# Patient Record
Sex: FEMALE | Race: OTHER | HISPANIC OR LATINO
[De-identification: names, ages, dates, MRNs, and addresses within clinical notes are randomized per-mention and may not be internally consistent; named-entity substitution may affect disease eponyms.]

---

## 2017-02-02 ENCOUNTER — APPOINTMENT (OUTPATIENT)
Dept: OBGYN | Facility: CLINIC | Age: 67
End: 2017-02-02

## 2017-02-02 VITALS
BODY MASS INDEX: 29.88 KG/M2 | WEIGHT: 175 LBS | DIASTOLIC BLOOD PRESSURE: 70 MMHG | HEIGHT: 64 IN | SYSTOLIC BLOOD PRESSURE: 114 MMHG

## 2017-02-02 DIAGNOSIS — Z80.3 FAMILY HISTORY OF MALIGNANT NEOPLASM OF BREAST: ICD-10-CM

## 2017-02-02 DIAGNOSIS — Z80.51 FAMILY HISTORY OF MALIGNANT NEOPLASM OF KIDNEY: ICD-10-CM

## 2017-02-02 DIAGNOSIS — N85.9 NONINFLAMMATORY DISORDER OF UTERUS, UNSPECIFIED: ICD-10-CM

## 2017-02-02 DIAGNOSIS — N83.299 OTHER OVARIAN CYST, UNSPECIFIED SIDE: ICD-10-CM

## 2017-02-02 LAB — HEMOCCULT SP1 STL QL: NEGATIVE

## 2017-02-20 ENCOUNTER — APPOINTMENT (OUTPATIENT)
Dept: OBGYN | Facility: CLINIC | Age: 67
End: 2017-02-20

## 2017-02-20 DIAGNOSIS — Z13.71 ENCOUNTER FOR NONPROCREATIVE SCREENING FOR GENETIC DISEASE CARRIER STATUS: ICD-10-CM

## 2018-02-02 ENCOUNTER — APPOINTMENT (OUTPATIENT)
Dept: OBGYN | Facility: CLINIC | Age: 68
End: 2018-02-02
Payer: COMMERCIAL

## 2018-02-02 VITALS
HEIGHT: 64 IN | BODY MASS INDEX: 30.73 KG/M2 | DIASTOLIC BLOOD PRESSURE: 69 MMHG | SYSTOLIC BLOOD PRESSURE: 119 MMHG | WEIGHT: 180 LBS

## 2018-02-02 DIAGNOSIS — N89.8 OTHER SPECIFIED NONINFLAMMATORY DISORDERS OF VAGINA: ICD-10-CM

## 2018-02-02 DIAGNOSIS — N83.299 OTHER OVARIAN CYST, UNSPECIFIED SIDE: ICD-10-CM

## 2018-02-02 LAB — HEMOCCULT SP1 STL QL: NEGATIVE

## 2018-02-02 PROCEDURE — 82270 OCCULT BLOOD FECES: CPT

## 2018-02-02 PROCEDURE — 99213 OFFICE O/P EST LOW 20 MIN: CPT

## 2018-02-08 ENCOUNTER — APPOINTMENT (OUTPATIENT)
Dept: OBGYN | Facility: CLINIC | Age: 68
End: 2018-02-08

## 2018-02-21 ENCOUNTER — OTHER (OUTPATIENT)
Age: 68
End: 2018-02-21

## 2018-02-26 ENCOUNTER — RESULT REVIEW (OUTPATIENT)
Age: 68
End: 2018-02-26

## 2018-03-01 ENCOUNTER — APPOINTMENT (OUTPATIENT)
Dept: ORTHOPEDIC SURGERY | Facility: CLINIC | Age: 68
End: 2018-03-01
Payer: COMMERCIAL

## 2018-03-01 VITALS
BODY MASS INDEX: 30.73 KG/M2 | HEART RATE: 69 BPM | DIASTOLIC BLOOD PRESSURE: 79 MMHG | SYSTOLIC BLOOD PRESSURE: 133 MMHG | HEIGHT: 64 IN | WEIGHT: 180 LBS

## 2018-03-01 PROCEDURE — 73502 X-RAY EXAM HIP UNI 2-3 VIEWS: CPT | Mod: RT

## 2018-03-01 PROCEDURE — 99204 OFFICE O/P NEW MOD 45 MIN: CPT

## 2018-03-08 ENCOUNTER — ASOB RESULT (OUTPATIENT)
Age: 68
End: 2018-03-08

## 2018-03-08 ENCOUNTER — APPOINTMENT (OUTPATIENT)
Dept: OBGYN | Facility: CLINIC | Age: 68
End: 2018-03-08
Payer: COMMERCIAL

## 2018-03-08 ENCOUNTER — RESULT REVIEW (OUTPATIENT)
Age: 68
End: 2018-03-08

## 2018-03-08 PROCEDURE — 76857 US EXAM PELVIC LIMITED: CPT

## 2018-03-08 PROCEDURE — 76830 TRANSVAGINAL US NON-OB: CPT

## 2018-05-25 ENCOUNTER — OUTPATIENT (OUTPATIENT)
Dept: OUTPATIENT SERVICES | Facility: HOSPITAL | Age: 68
LOS: 1 days | Discharge: HOME | End: 2018-05-25

## 2018-05-25 DIAGNOSIS — D72.829 ELEVATED WHITE BLOOD CELL COUNT, UNSPECIFIED: ICD-10-CM

## 2018-05-29 ENCOUNTER — APPOINTMENT (OUTPATIENT)
Dept: HEMATOLOGY ONCOLOGY | Facility: CLINIC | Age: 68
End: 2018-05-29

## 2018-06-19 ENCOUNTER — RESULT REVIEW (OUTPATIENT)
Age: 68
End: 2018-06-19

## 2018-06-19 ENCOUNTER — APPOINTMENT (OUTPATIENT)
Dept: HEMATOLOGY ONCOLOGY | Facility: CLINIC | Age: 68
End: 2018-06-19
Payer: COMMERCIAL

## 2018-06-19 ENCOUNTER — OUTPATIENT (OUTPATIENT)
Dept: OUTPATIENT SERVICES | Facility: HOSPITAL | Age: 68
LOS: 1 days | Discharge: HOME | End: 2018-06-19

## 2018-06-19 ENCOUNTER — OUTPATIENT (OUTPATIENT)
Dept: OUTPATIENT SERVICES | Facility: HOSPITAL | Age: 68
LOS: 1 days | Discharge: HOME | End: 2018-06-19
Payer: COMMERCIAL

## 2018-06-19 VITALS
OXYGEN SATURATION: 100 % | HEART RATE: 63 BPM | TEMPERATURE: 98.2 F | BODY MASS INDEX: 30.99 KG/M2 | SYSTOLIC BLOOD PRESSURE: 148 MMHG | DIASTOLIC BLOOD PRESSURE: 84 MMHG | WEIGHT: 181.55 LBS | HEIGHT: 64 IN

## 2018-06-19 DIAGNOSIS — N20.0 CALCULUS OF KIDNEY: ICD-10-CM

## 2018-06-19 DIAGNOSIS — R76.8 OTHER SPECIFIED ABNORMAL IMMUNOLOGICAL FINDINGS IN SERUM: ICD-10-CM

## 2018-06-19 DIAGNOSIS — D72.829 ELEVATED WHITE BLOOD CELL COUNT, UNSPECIFIED: ICD-10-CM

## 2018-06-19 LAB
BASOPHILS # BLD AUTO: 0.1 K/UL — SIGNIFICANT CHANGE UP (ref 0–0.2)
BASOPHILS NFR BLD AUTO: 0.9 % — SIGNIFICANT CHANGE UP (ref 0–2)
EOSINOPHIL # BLD AUTO: 0.1 K/UL — SIGNIFICANT CHANGE UP (ref 0–0.5)
EOSINOPHIL NFR BLD AUTO: 1.5 % — SIGNIFICANT CHANGE UP (ref 0–6)
HCT VFR BLD CALC: 36 % — SIGNIFICANT CHANGE UP (ref 34.5–45)
HGB BLD-MCNC: 11.9 G/DL — SIGNIFICANT CHANGE UP (ref 11.5–15.5)
LYMPHOCYTES # BLD AUTO: 2.6 K/UL — SIGNIFICANT CHANGE UP (ref 1–3.3)
LYMPHOCYTES # BLD AUTO: 33.2 % — SIGNIFICANT CHANGE UP (ref 13–44)
MCHC RBC-ENTMCNC: 28.8 PG — SIGNIFICANT CHANGE UP (ref 27–34)
MCHC RBC-ENTMCNC: 33 GM/DL — SIGNIFICANT CHANGE UP (ref 32–36)
MCV RBC AUTO: 87 FL — SIGNIFICANT CHANGE UP (ref 80–100)
MONOCYTES # BLD AUTO: 0.4 K/UL — SIGNIFICANT CHANGE UP (ref 0–0.9)
MONOCYTES NFR BLD AUTO: 5.4 % — SIGNIFICANT CHANGE UP (ref 2–14)
NEUTROPHILS # BLD AUTO: 4.5 K/UL — SIGNIFICANT CHANGE UP (ref 1.8–7.4)
NEUTROPHILS NFR BLD AUTO: 59.1 % — SIGNIFICANT CHANGE UP (ref 43–77)
PLATELET # BLD AUTO: 165 K/UL — SIGNIFICANT CHANGE UP (ref 150–400)
RBC # BLD: 4.14 M/UL — SIGNIFICANT CHANGE UP (ref 3.8–5.2)
RBC # FLD: 11.9 % — SIGNIFICANT CHANGE UP (ref 10.3–14.5)
WBC # BLD: 7.7 K/UL — SIGNIFICANT CHANGE UP (ref 3.8–10.5)
WBC # FLD AUTO: 7.7 K/UL — SIGNIFICANT CHANGE UP (ref 3.8–10.5)

## 2018-06-19 PROCEDURE — 99205 OFFICE O/P NEW HI 60 MIN: CPT

## 2018-06-19 RX ORDER — MELOXICAM 7.5 MG/1
7.5 TABLET ORAL TWICE DAILY
Qty: 60 | Refills: 0 | Status: DISCONTINUED | COMMUNITY
Start: 2018-03-01 | End: 2018-06-19

## 2018-06-20 ENCOUNTER — OUTPATIENT (OUTPATIENT)
Dept: OUTPATIENT SERVICES | Facility: HOSPITAL | Age: 68
LOS: 1 days | End: 2018-06-20
Payer: COMMERCIAL

## 2018-06-20 DIAGNOSIS — C90.00 MULTIPLE MYELOMA NOT HAVING ACHIEVED REMISSION: ICD-10-CM

## 2018-06-20 PROBLEM — R76.8 ELEVATED SERUM IMMUNOGLOBULIN FREE LIGHT CHAINS: Status: ACTIVE | Noted: 2018-06-20

## 2018-06-20 LAB
ALBUMIN MFR SERPL ELPH: 63.5 %
ALBUMIN SERPL ELPH-MCNC: 4.4 G/DL
ALBUMIN SERPL-MCNC: 4.3 G/DL
ALBUMIN/GLOB SERPL: 1.8 RATIO
ALP BLD-CCNC: 69 U/L
ALPHA1 GLOB MFR SERPL ELPH: 4.9 %
ALPHA1 GLOB SERPL ELPH-MCNC: 0.3 G/DL
ALPHA2 GLOB MFR SERPL ELPH: 12.9 %
ALPHA2 GLOB SERPL ELPH-MCNC: 0.9 G/DL
ALT SERPL-CCNC: 10 U/L
ANION GAP SERPL CALC-SCNC: 15 MMOL/L
AST SERPL-CCNC: 18 U/L
B-GLOBULIN MFR SERPL ELPH: 10.2 %
B-GLOBULIN SERPL ELPH-MCNC: 0.7 G/DL
B2 MICROGLOB SERPL-MCNC: 1.5 MG/L
BILIRUB SERPL-MCNC: 0.3 MG/DL
BUN SERPL-MCNC: 12 MG/DL
CALCIUM SERPL-MCNC: 9.9 MG/DL
CHLORIDE SERPL-SCNC: 104 MMOL/L
CO2 SERPL-SCNC: 26 MMOL/L
CREAT SERPL-MCNC: 0.67 MG/DL
DEPRECATED KAPPA LC FREE/LAMBDA SER: 198.61 RATIO
FERRITIN SERPL-MCNC: 89 NG/ML
GAMMA GLOB FLD ELPH-MCNC: 0.6 G/DL
GAMMA GLOB MFR SERPL ELPH: 8.5 %
IGA SER QL IEP: 18 MG/DL
IGG SER QL IEP: 575 MG/DL
IGM SER QL IEP: 19 MG/DL
INTERPRETATION SERPL IEP-IMP: NORMAL
IRON SATN MFR SERPL: 25 %
IRON SERPL-MCNC: 86 UG/DL
KAPPA LC CSF-MCNC: 0.36 MG/DL
KAPPA LC SERPL-MCNC: 71.5 MG/DL
LDH SERPL-CCNC: 202 U/L
M PROTEIN MFR SERPL ELPH: 3.3 %
M PROTEIN SPEC IFE-MCNC: NORMAL
MONOCLON BAND OBS SERPL: 0.2 G/DL
POTASSIUM SERPL-SCNC: 4.3 MMOL/L
PROT SERPL-MCNC: 6.7 G/DL
SODIUM SERPL-SCNC: 145 MMOL/L
TIBC SERPL-MCNC: 341 UG/DL
UIBC SERPL-MCNC: 255 UG/DL

## 2018-06-22 ENCOUNTER — RESULT REVIEW (OUTPATIENT)
Age: 68
End: 2018-06-22

## 2018-06-22 ENCOUNTER — APPOINTMENT (OUTPATIENT)
Dept: HEMATOLOGY ONCOLOGY | Facility: CLINIC | Age: 68
End: 2018-06-22
Payer: COMMERCIAL

## 2018-06-22 VITALS
SYSTOLIC BLOOD PRESSURE: 150 MMHG | BODY MASS INDEX: 30.88 KG/M2 | HEART RATE: 72 BPM | DIASTOLIC BLOOD PRESSURE: 83 MMHG | WEIGHT: 179.88 LBS | OXYGEN SATURATION: 99 %

## 2018-06-22 LAB
BASOPHILS # BLD AUTO: 0.1 K/UL — SIGNIFICANT CHANGE UP (ref 0–0.2)
BASOPHILS NFR BLD AUTO: 0.9 % — SIGNIFICANT CHANGE UP (ref 0–2)
EOSINOPHIL # BLD AUTO: 0.2 K/UL — SIGNIFICANT CHANGE UP (ref 0–0.5)
EOSINOPHIL NFR BLD AUTO: 2.3 % — SIGNIFICANT CHANGE UP (ref 0–6)
HCT VFR BLD CALC: 37.4 % — SIGNIFICANT CHANGE UP (ref 34.5–45)
HGB BLD-MCNC: 12.2 G/DL — SIGNIFICANT CHANGE UP (ref 11.5–15.5)
LYMPHOCYTES # BLD AUTO: 2.9 K/UL — SIGNIFICANT CHANGE UP (ref 1–3.3)
LYMPHOCYTES # BLD AUTO: 37.9 % — SIGNIFICANT CHANGE UP (ref 13–44)
MCHC RBC-ENTMCNC: 28.6 PG — SIGNIFICANT CHANGE UP (ref 27–34)
MCHC RBC-ENTMCNC: 32.5 GM/DL — SIGNIFICANT CHANGE UP (ref 32–36)
MCV RBC AUTO: 88.1 FL — SIGNIFICANT CHANGE UP (ref 80–100)
MONOCYTES # BLD AUTO: 0.5 K/UL — SIGNIFICANT CHANGE UP (ref 0–0.9)
MONOCYTES NFR BLD AUTO: 6.3 % — SIGNIFICANT CHANGE UP (ref 2–14)
NEUTROPHILS # BLD AUTO: 4.1 K/UL — SIGNIFICANT CHANGE UP (ref 1.8–7.4)
NEUTROPHILS NFR BLD AUTO: 52.6 % — SIGNIFICANT CHANGE UP (ref 43–77)
PLATELET # BLD AUTO: 205 K/UL — SIGNIFICANT CHANGE UP (ref 150–400)
RBC # BLD: 4.24 M/UL — SIGNIFICANT CHANGE UP (ref 3.8–5.2)
RBC # FLD: 12.2 % — SIGNIFICANT CHANGE UP (ref 10.3–14.5)
WBC # BLD: 7.8 K/UL — SIGNIFICANT CHANGE UP (ref 3.8–10.5)
WBC # FLD AUTO: 7.8 K/UL — SIGNIFICANT CHANGE UP (ref 3.8–10.5)

## 2018-06-22 PROCEDURE — 88185 FLOWCYTOMETRY/TC ADD-ON: CPT

## 2018-06-22 PROCEDURE — 88360 TUMOR IMMUNOHISTOCHEM/MANUAL: CPT | Mod: 26

## 2018-06-22 PROCEDURE — 88313 SPECIAL STAINS GROUP 2: CPT

## 2018-06-22 PROCEDURE — 38222 DX BONE MARROW BX & ASPIR: CPT | Mod: RT

## 2018-06-22 PROCEDURE — 88285 CHROMOSOME COUNT ADDITIONAL: CPT

## 2018-06-22 PROCEDURE — 88342 IMHCHEM/IMCYTCHM 1ST ANTB: CPT

## 2018-06-22 PROCEDURE — 88271 CYTOGENETICS DNA PROBE: CPT

## 2018-06-22 PROCEDURE — 88237 TISSUE CULTURE BONE MARROW: CPT

## 2018-06-22 PROCEDURE — 88275 CYTOGENETICS 100-300: CPT

## 2018-06-22 PROCEDURE — 88184 FLOWCYTOMETRY/ TC 1 MARKER: CPT

## 2018-06-22 PROCEDURE — 88313 SPECIAL STAINS GROUP 2: CPT | Mod: 26

## 2018-06-22 PROCEDURE — 88342 IMHCHEM/IMCYTCHM 1ST ANTB: CPT | Mod: 26,59

## 2018-06-22 PROCEDURE — 88264 CHROMOSOME ANALYSIS 20-25: CPT

## 2018-06-22 PROCEDURE — 88305 TISSUE EXAM BY PATHOLOGIST: CPT | Mod: 26

## 2018-06-22 PROCEDURE — 88291 CYTO/MOLECULAR REPORT: CPT

## 2018-06-22 PROCEDURE — 88188 FLOWCYTOMETRY/READ 9-15: CPT

## 2018-06-22 PROCEDURE — 85097 BONE MARROW INTERPRETATION: CPT

## 2018-06-22 PROCEDURE — 88305 TISSUE EXAM BY PATHOLOGIST: CPT

## 2018-06-22 PROCEDURE — 88280 CHROMOSOME KARYOTYPE STUDY: CPT

## 2018-06-22 PROCEDURE — 88360 TUMOR IMMUNOHISTOCHEM/MANUAL: CPT

## 2018-06-22 PROCEDURE — 88341 IMHCHEM/IMCYTCHM EA ADD ANTB: CPT

## 2018-06-22 PROCEDURE — 88341 IMHCHEM/IMCYTCHM EA ADD ANTB: CPT | Mod: 26,59

## 2018-06-29 ENCOUNTER — APPOINTMENT (OUTPATIENT)
Dept: HEMATOLOGY ONCOLOGY | Facility: CLINIC | Age: 68
End: 2018-06-29
Payer: COMMERCIAL

## 2018-06-29 VITALS
SYSTOLIC BLOOD PRESSURE: 131 MMHG | OXYGEN SATURATION: 100 % | BODY MASS INDEX: 31.03 KG/M2 | WEIGHT: 180.78 LBS | HEART RATE: 90 BPM | TEMPERATURE: 98.5 F | DIASTOLIC BLOOD PRESSURE: 81 MMHG

## 2018-06-29 PROCEDURE — 99215 OFFICE O/P EST HI 40 MIN: CPT

## 2018-06-29 PROCEDURE — 93010 ELECTROCARDIOGRAM REPORT: CPT

## 2018-06-29 RX ORDER — POLYETHYLENE GLYCOL 3350 17 G/17G
17 POWDER, FOR SOLUTION ORAL
Qty: 28 | Refills: 0 | Status: DISCONTINUED | COMMUNITY
Start: 2018-06-08

## 2018-06-29 RX ORDER — MELOXICAM 15 MG/1
15 TABLET ORAL
Qty: 30 | Refills: 0 | Status: DISCONTINUED | COMMUNITY
Start: 2018-04-30

## 2018-06-29 RX ORDER — ASPIRIN 325 MG/1
325 TABLET, FILM COATED ORAL DAILY
Qty: 30 | Refills: 3 | Status: DISCONTINUED | COMMUNITY
Start: 2018-06-29 | End: 2018-06-29

## 2018-07-11 ENCOUNTER — FORM ENCOUNTER (OUTPATIENT)
Age: 68
End: 2018-07-11

## 2018-07-12 ENCOUNTER — APPOINTMENT (OUTPATIENT)
Dept: INFUSION THERAPY | Facility: CLINIC | Age: 68
End: 2018-07-12

## 2018-07-12 ENCOUNTER — OUTPATIENT (OUTPATIENT)
Dept: OUTPATIENT SERVICES | Facility: HOSPITAL | Age: 68
LOS: 1 days | End: 2018-07-12
Payer: COMMERCIAL

## 2018-07-12 ENCOUNTER — APPOINTMENT (OUTPATIENT)
Dept: HEMATOLOGY ONCOLOGY | Facility: CLINIC | Age: 68
End: 2018-07-12
Payer: COMMERCIAL

## 2018-07-12 ENCOUNTER — APPOINTMENT (OUTPATIENT)
Dept: NUCLEAR MEDICINE | Facility: CLINIC | Age: 68
End: 2018-07-12

## 2018-07-12 VITALS
OXYGEN SATURATION: 100 % | DIASTOLIC BLOOD PRESSURE: 86 MMHG | WEIGHT: 178.55 LBS | TEMPERATURE: 98.2 F | HEART RATE: 68 BPM | BODY MASS INDEX: 30.65 KG/M2 | SYSTOLIC BLOOD PRESSURE: 163 MMHG

## 2018-07-12 DIAGNOSIS — C90.00 MULTIPLE MYELOMA NOT HAVING ACHIEVED REMISSION: ICD-10-CM

## 2018-07-12 PROCEDURE — 78816 PET IMAGE W/CT FULL BODY: CPT | Mod: 26,PI

## 2018-07-12 PROCEDURE — 99214 OFFICE O/P EST MOD 30 MIN: CPT

## 2018-07-17 ENCOUNTER — APPOINTMENT (OUTPATIENT)
Dept: HEMATOLOGY ONCOLOGY | Facility: CLINIC | Age: 68
End: 2018-07-17

## 2018-07-19 ENCOUNTER — APPOINTMENT (OUTPATIENT)
Dept: INFUSION THERAPY | Facility: CLINIC | Age: 68
End: 2018-07-19

## 2018-07-19 ENCOUNTER — TRANSCRIPTION ENCOUNTER (OUTPATIENT)
Age: 68
End: 2018-07-19

## 2018-07-19 ENCOUNTER — RESULT REVIEW (OUTPATIENT)
Age: 68
End: 2018-07-19

## 2018-07-19 LAB
BASOPHILS # BLD AUTO: 0.1 K/UL — SIGNIFICANT CHANGE UP (ref 0–0.2)
BASOPHILS NFR BLD AUTO: 0.8 % — SIGNIFICANT CHANGE UP (ref 0–2)
EOSINOPHIL # BLD AUTO: 0.2 K/UL — SIGNIFICANT CHANGE UP (ref 0–0.5)
EOSINOPHIL NFR BLD AUTO: 2.6 % — SIGNIFICANT CHANGE UP (ref 0–6)
HCT VFR BLD CALC: 39.8 % — SIGNIFICANT CHANGE UP (ref 34.5–45)
HGB BLD-MCNC: 12.8 G/DL — SIGNIFICANT CHANGE UP (ref 11.5–15.5)
LYMPHOCYTES # BLD AUTO: 2.1 K/UL — SIGNIFICANT CHANGE UP (ref 1–3.3)
LYMPHOCYTES # BLD AUTO: 27.2 % — SIGNIFICANT CHANGE UP (ref 13–44)
MCHC RBC-ENTMCNC: 28.2 PG — SIGNIFICANT CHANGE UP (ref 27–34)
MCHC RBC-ENTMCNC: 32.2 GM/DL — SIGNIFICANT CHANGE UP (ref 32–36)
MCV RBC AUTO: 87.7 FL — SIGNIFICANT CHANGE UP (ref 80–100)
MONOCYTES # BLD AUTO: 0.3 K/UL — SIGNIFICANT CHANGE UP (ref 0–0.9)
MONOCYTES NFR BLD AUTO: 3.5 % — SIGNIFICANT CHANGE UP (ref 2–14)
NEUTROPHILS # BLD AUTO: 5 K/UL — SIGNIFICANT CHANGE UP (ref 1.8–7.4)
NEUTROPHILS NFR BLD AUTO: 65.8 % — SIGNIFICANT CHANGE UP (ref 43–77)
PLATELET # BLD AUTO: 171 K/UL — SIGNIFICANT CHANGE UP (ref 150–400)
RBC # BLD: 4.54 M/UL — SIGNIFICANT CHANGE UP (ref 3.8–5.2)
RBC # FLD: 12.8 % — SIGNIFICANT CHANGE UP (ref 10.3–14.5)
WBC # BLD: 7.5 K/UL — SIGNIFICANT CHANGE UP (ref 3.8–10.5)
WBC # FLD AUTO: 7.5 K/UL — SIGNIFICANT CHANGE UP (ref 3.8–10.5)

## 2018-07-20 ENCOUNTER — OUTPATIENT (OUTPATIENT)
Dept: OUTPATIENT SERVICES | Facility: HOSPITAL | Age: 68
LOS: 1 days | Discharge: HOME | End: 2018-07-20

## 2018-07-20 DIAGNOSIS — Z51.11 ENCOUNTER FOR ANTINEOPLASTIC CHEMOTHERAPY: ICD-10-CM

## 2018-07-20 DIAGNOSIS — C90.00 MULTIPLE MYELOMA NOT HAVING ACHIEVED REMISSION: ICD-10-CM

## 2018-07-20 DIAGNOSIS — R11.2 NAUSEA WITH VOMITING, UNSPECIFIED: ICD-10-CM

## 2018-07-22 PROBLEM — Z13.71 BRCA NEGATIVE: Status: ACTIVE | Noted: 2017-02-02

## 2018-07-26 ENCOUNTER — RESULT REVIEW (OUTPATIENT)
Age: 68
End: 2018-07-26

## 2018-07-26 ENCOUNTER — APPOINTMENT (OUTPATIENT)
Dept: INFUSION THERAPY | Facility: CLINIC | Age: 68
End: 2018-07-26

## 2018-07-26 LAB
BASOPHILS # BLD AUTO: 0 K/UL — SIGNIFICANT CHANGE UP (ref 0–0.2)
BASOPHILS NFR BLD AUTO: 0.3 % — SIGNIFICANT CHANGE UP (ref 0–2)
EOSINOPHIL # BLD AUTO: 0 K/UL — SIGNIFICANT CHANGE UP (ref 0–0.5)
EOSINOPHIL NFR BLD AUTO: 0.4 % — SIGNIFICANT CHANGE UP (ref 0–6)
HCT VFR BLD CALC: 38.5 % — SIGNIFICANT CHANGE UP (ref 34.5–45)
HGB BLD-MCNC: 12.6 G/DL — SIGNIFICANT CHANGE UP (ref 11.5–15.5)
LYMPHOCYTES # BLD AUTO: 0.7 K/UL — LOW (ref 1–3.3)
LYMPHOCYTES # BLD AUTO: 6.5 % — LOW (ref 13–44)
MCHC RBC-ENTMCNC: 28.8 PG — SIGNIFICANT CHANGE UP (ref 27–34)
MCHC RBC-ENTMCNC: 32.9 GM/DL — SIGNIFICANT CHANGE UP (ref 32–36)
MCV RBC AUTO: 87.4 FL — SIGNIFICANT CHANGE UP (ref 80–100)
MONOCYTES # BLD AUTO: 0.1 K/UL — SIGNIFICANT CHANGE UP (ref 0–0.9)
MONOCYTES NFR BLD AUTO: 0.4 % — LOW (ref 2–14)
NEUTROPHILS # BLD AUTO: 10.4 K/UL — HIGH (ref 1.8–7.4)
NEUTROPHILS NFR BLD AUTO: 92.3 % — HIGH (ref 43–77)
PLATELET # BLD AUTO: 191 K/UL — SIGNIFICANT CHANGE UP (ref 150–400)
RBC # BLD: 4.4 M/UL — SIGNIFICANT CHANGE UP (ref 3.8–5.2)
RBC # FLD: 12.8 % — SIGNIFICANT CHANGE UP (ref 10.3–14.5)
WBC # BLD: 11.3 K/UL — HIGH (ref 3.8–10.5)
WBC # FLD AUTO: 11.3 K/UL — HIGH (ref 3.8–10.5)

## 2018-07-27 ENCOUNTER — RESULT REVIEW (OUTPATIENT)
Age: 68
End: 2018-07-27

## 2018-07-27 DIAGNOSIS — Z51.11 ENCOUNTER FOR ANTINEOPLASTIC CHEMOTHERAPY: ICD-10-CM

## 2018-07-27 DIAGNOSIS — R11.2 NAUSEA WITH VOMITING, UNSPECIFIED: ICD-10-CM

## 2018-08-02 ENCOUNTER — RESULT REVIEW (OUTPATIENT)
Age: 68
End: 2018-08-02

## 2018-08-02 ENCOUNTER — LABORATORY RESULT (OUTPATIENT)
Age: 68
End: 2018-08-02

## 2018-08-02 ENCOUNTER — APPOINTMENT (OUTPATIENT)
Dept: INFUSION THERAPY | Facility: CLINIC | Age: 68
End: 2018-08-02

## 2018-08-02 LAB
BASOPHILS # BLD AUTO: 0 K/UL — SIGNIFICANT CHANGE UP (ref 0–0.2)
BASOPHILS NFR BLD AUTO: 0.4 % — SIGNIFICANT CHANGE UP (ref 0–2)
EOSINOPHIL # BLD AUTO: 0 K/UL — SIGNIFICANT CHANGE UP (ref 0–0.5)
EOSINOPHIL NFR BLD AUTO: 0.1 % — SIGNIFICANT CHANGE UP (ref 0–6)
HCT VFR BLD CALC: 33.9 % — LOW (ref 34.5–45)
HGB BLD-MCNC: 11.4 G/DL — LOW (ref 11.5–15.5)
LYMPHOCYTES # BLD AUTO: 0.5 K/UL — LOW (ref 1–3.3)
LYMPHOCYTES # BLD AUTO: 4.4 % — LOW (ref 13–44)
MCHC RBC-ENTMCNC: 29.4 PG — SIGNIFICANT CHANGE UP (ref 27–34)
MCHC RBC-ENTMCNC: 33.6 GM/DL — SIGNIFICANT CHANGE UP (ref 32–36)
MCV RBC AUTO: 87.5 FL — SIGNIFICANT CHANGE UP (ref 80–100)
MONOCYTES # BLD AUTO: 0.1 K/UL — SIGNIFICANT CHANGE UP (ref 0–0.9)
MONOCYTES NFR BLD AUTO: 0.7 % — LOW (ref 2–14)
NEUTROPHILS # BLD AUTO: 10.1 K/UL — HIGH (ref 1.8–7.4)
NEUTROPHILS NFR BLD AUTO: 94.4 % — HIGH (ref 43–77)
PLATELET # BLD AUTO: 226 K/UL — SIGNIFICANT CHANGE UP (ref 150–400)
RBC # BLD: 3.87 M/UL — SIGNIFICANT CHANGE UP (ref 3.8–5.2)
RBC # FLD: 12.9 % — SIGNIFICANT CHANGE UP (ref 10.3–14.5)
WBC # BLD: 10.7 K/UL — HIGH (ref 3.8–10.5)
WBC # FLD AUTO: 10.7 K/UL — HIGH (ref 3.8–10.5)

## 2018-08-09 ENCOUNTER — APPOINTMENT (OUTPATIENT)
Dept: INFUSION THERAPY | Facility: CLINIC | Age: 68
End: 2018-08-09

## 2018-08-09 ENCOUNTER — APPOINTMENT (OUTPATIENT)
Dept: HEMATOLOGY ONCOLOGY | Facility: CLINIC | Age: 68
End: 2018-08-09
Payer: COMMERCIAL

## 2018-08-09 ENCOUNTER — RESULT REVIEW (OUTPATIENT)
Age: 68
End: 2018-08-09

## 2018-08-09 ENCOUNTER — LABORATORY RESULT (OUTPATIENT)
Age: 68
End: 2018-08-09

## 2018-08-09 VITALS
SYSTOLIC BLOOD PRESSURE: 133 MMHG | WEIGHT: 184.19 LBS | HEIGHT: 64 IN | BODY MASS INDEX: 31.45 KG/M2 | TEMPERATURE: 98.1 F | HEART RATE: 77 BPM | DIASTOLIC BLOOD PRESSURE: 75 MMHG | OXYGEN SATURATION: 100 %

## 2018-08-09 LAB
BASOPHILS # BLD AUTO: 0.1 K/UL — SIGNIFICANT CHANGE UP (ref 0–0.2)
BASOPHILS NFR BLD AUTO: 0.7 % — SIGNIFICANT CHANGE UP (ref 0–2)
EOSINOPHIL # BLD AUTO: 0 K/UL — SIGNIFICANT CHANGE UP (ref 0–0.5)
EOSINOPHIL NFR BLD AUTO: 0.2 % — SIGNIFICANT CHANGE UP (ref 0–6)
HCT VFR BLD CALC: 34.2 % — LOW (ref 34.5–45)
HGB BLD-MCNC: 11.3 G/DL — LOW (ref 11.5–15.5)
LYMPHOCYTES # BLD AUTO: 1 K/UL — SIGNIFICANT CHANGE UP (ref 1–3.3)
LYMPHOCYTES # BLD AUTO: 13.1 % — SIGNIFICANT CHANGE UP (ref 13–44)
MCHC RBC-ENTMCNC: 29.7 PG — SIGNIFICANT CHANGE UP (ref 27–34)
MCHC RBC-ENTMCNC: 32.9 GM/DL — SIGNIFICANT CHANGE UP (ref 32–36)
MCV RBC AUTO: 90.2 FL — SIGNIFICANT CHANGE UP (ref 80–100)
MONOCYTES # BLD AUTO: 0.2 K/UL — SIGNIFICANT CHANGE UP (ref 0–0.9)
MONOCYTES NFR BLD AUTO: 2.7 % — SIGNIFICANT CHANGE UP (ref 2–14)
NEUTROPHILS # BLD AUTO: 6.6 K/UL — SIGNIFICANT CHANGE UP (ref 1.8–7.4)
NEUTROPHILS NFR BLD AUTO: 83.3 % — HIGH (ref 43–77)
PLATELET # BLD AUTO: 195 K/UL — SIGNIFICANT CHANGE UP (ref 150–400)
RBC # BLD: 3.79 M/UL — LOW (ref 3.8–5.2)
RBC # FLD: 13.5 % — SIGNIFICANT CHANGE UP (ref 10.3–14.5)
WBC # BLD: 7.9 K/UL — SIGNIFICANT CHANGE UP (ref 3.8–10.5)
WBC # FLD AUTO: 7.9 K/UL — SIGNIFICANT CHANGE UP (ref 3.8–10.5)

## 2018-08-09 PROCEDURE — 99214 OFFICE O/P EST MOD 30 MIN: CPT

## 2018-08-16 ENCOUNTER — RESULT REVIEW (OUTPATIENT)
Age: 68
End: 2018-08-16

## 2018-08-16 ENCOUNTER — APPOINTMENT (OUTPATIENT)
Dept: INFUSION THERAPY | Facility: CLINIC | Age: 68
End: 2018-08-16

## 2018-08-16 LAB
BASOPHILS # BLD AUTO: 0.1 K/UL — SIGNIFICANT CHANGE UP (ref 0–0.2)
BASOPHILS NFR BLD AUTO: 1.4 % — SIGNIFICANT CHANGE UP (ref 0–2)
EOSINOPHIL # BLD AUTO: 0.3 K/UL — SIGNIFICANT CHANGE UP (ref 0–0.5)
EOSINOPHIL NFR BLD AUTO: 4.2 % — SIGNIFICANT CHANGE UP (ref 0–6)
HCT VFR BLD CALC: 35.1 % — SIGNIFICANT CHANGE UP (ref 34.5–45)
HGB BLD-MCNC: 12 G/DL — SIGNIFICANT CHANGE UP (ref 11.5–15.5)
LYMPHOCYTES # BLD AUTO: 2 K/UL — SIGNIFICANT CHANGE UP (ref 1–3.3)
LYMPHOCYTES # BLD AUTO: 28.6 % — SIGNIFICANT CHANGE UP (ref 13–44)
MCHC RBC-ENTMCNC: 30 PG — SIGNIFICANT CHANGE UP (ref 27–34)
MCHC RBC-ENTMCNC: 34.2 GM/DL — SIGNIFICANT CHANGE UP (ref 32–36)
MCV RBC AUTO: 87.8 FL — SIGNIFICANT CHANGE UP (ref 80–100)
MONOCYTES # BLD AUTO: 0.5 K/UL — SIGNIFICANT CHANGE UP (ref 0–0.9)
MONOCYTES NFR BLD AUTO: 6.9 % — SIGNIFICANT CHANGE UP (ref 2–14)
NEUTROPHILS # BLD AUTO: 4.2 K/UL — SIGNIFICANT CHANGE UP (ref 1.8–7.4)
NEUTROPHILS NFR BLD AUTO: 58.9 % — SIGNIFICANT CHANGE UP (ref 43–77)
PLATELET # BLD AUTO: 164 K/UL — SIGNIFICANT CHANGE UP (ref 150–400)
RBC # BLD: 4 M/UL — SIGNIFICANT CHANGE UP (ref 3.8–5.2)
RBC # FLD: 13.5 % — SIGNIFICANT CHANGE UP (ref 10.3–14.5)
WBC # BLD: 7.1 K/UL — SIGNIFICANT CHANGE UP (ref 3.8–10.5)
WBC # FLD AUTO: 7.1 K/UL — SIGNIFICANT CHANGE UP (ref 3.8–10.5)

## 2018-08-17 ENCOUNTER — OUTPATIENT (OUTPATIENT)
Dept: OUTPATIENT SERVICES | Facility: HOSPITAL | Age: 68
LOS: 1 days | Discharge: HOME | End: 2018-08-17

## 2018-08-17 DIAGNOSIS — C90.00 MULTIPLE MYELOMA NOT HAVING ACHIEVED REMISSION: ICD-10-CM

## 2018-08-23 ENCOUNTER — RESULT REVIEW (OUTPATIENT)
Age: 68
End: 2018-08-23

## 2018-08-23 ENCOUNTER — APPOINTMENT (OUTPATIENT)
Dept: INFUSION THERAPY | Facility: CLINIC | Age: 68
End: 2018-08-23

## 2018-08-23 LAB
BASOPHILS # BLD AUTO: 0.1 K/UL — SIGNIFICANT CHANGE UP (ref 0–0.2)
BASOPHILS NFR BLD AUTO: 1.2 % — SIGNIFICANT CHANGE UP (ref 0–2)
EOSINOPHIL # BLD AUTO: 0.5 K/UL — SIGNIFICANT CHANGE UP (ref 0–0.5)
EOSINOPHIL NFR BLD AUTO: 7.7 % — HIGH (ref 0–6)
HCT VFR BLD CALC: 34.4 % — LOW (ref 34.5–45)
HGB BLD-MCNC: 12 G/DL — SIGNIFICANT CHANGE UP (ref 11.5–15.5)
LYMPHOCYTES # BLD AUTO: 1.7 K/UL — SIGNIFICANT CHANGE UP (ref 1–3.3)
LYMPHOCYTES # BLD AUTO: 25.6 % — SIGNIFICANT CHANGE UP (ref 13–44)
MCHC RBC-ENTMCNC: 30.6 PG — SIGNIFICANT CHANGE UP (ref 27–34)
MCHC RBC-ENTMCNC: 34.9 GM/DL — SIGNIFICANT CHANGE UP (ref 32–36)
MCV RBC AUTO: 87.6 FL — SIGNIFICANT CHANGE UP (ref 80–100)
MONOCYTES # BLD AUTO: 0.3 K/UL — SIGNIFICANT CHANGE UP (ref 0–0.9)
MONOCYTES NFR BLD AUTO: 4.2 % — SIGNIFICANT CHANGE UP (ref 2–14)
NEUTROPHILS # BLD AUTO: 4.1 K/UL — SIGNIFICANT CHANGE UP (ref 1.8–7.4)
NEUTROPHILS NFR BLD AUTO: 61.3 % — SIGNIFICANT CHANGE UP (ref 43–77)
PLATELET # BLD AUTO: 230 K/UL — SIGNIFICANT CHANGE UP (ref 150–400)
RBC # BLD: 3.93 M/UL — SIGNIFICANT CHANGE UP (ref 3.8–5.2)
RBC # FLD: 13.6 % — SIGNIFICANT CHANGE UP (ref 10.3–14.5)
WBC # BLD: 6.7 K/UL — SIGNIFICANT CHANGE UP (ref 3.8–10.5)
WBC # FLD AUTO: 6.7 K/UL — SIGNIFICANT CHANGE UP (ref 3.8–10.5)

## 2018-08-24 DIAGNOSIS — Z51.11 ENCOUNTER FOR ANTINEOPLASTIC CHEMOTHERAPY: ICD-10-CM

## 2018-08-24 DIAGNOSIS — R11.2 NAUSEA WITH VOMITING, UNSPECIFIED: ICD-10-CM

## 2018-08-28 ENCOUNTER — RESULT REVIEW (OUTPATIENT)
Age: 68
End: 2018-08-28

## 2018-08-28 ENCOUNTER — APPOINTMENT (OUTPATIENT)
Dept: HEMATOLOGY ONCOLOGY | Facility: CLINIC | Age: 68
End: 2018-08-28
Payer: COMMERCIAL

## 2018-08-28 LAB
ALBUMIN SERPL ELPH-MCNC: 4.5 G/DL
ALP BLD-CCNC: 58 U/L
ALT SERPL-CCNC: 14 U/L
ANION GAP SERPL CALC-SCNC: 15 MMOL/L
AST SERPL-CCNC: 16 U/L
BASOPHILS # BLD AUTO: 0.1 K/UL — SIGNIFICANT CHANGE UP (ref 0–0.2)
BASOPHILS NFR BLD AUTO: 0.9 % — SIGNIFICANT CHANGE UP (ref 0–2)
BILIRUB SERPL-MCNC: 0.4 MG/DL
BUN SERPL-MCNC: 11 MG/DL
CALCIUM SERPL-MCNC: 9 MG/DL
CHLORIDE SERPL-SCNC: 106 MMOL/L
CO2 SERPL-SCNC: 24 MMOL/L
CREAT SERPL-MCNC: 0.67 MG/DL
EOSINOPHIL # BLD AUTO: 0.1 K/UL — SIGNIFICANT CHANGE UP (ref 0–0.5)
EOSINOPHIL NFR BLD AUTO: 1.3 % — SIGNIFICANT CHANGE UP (ref 0–6)
GLUCOSE SERPL-MCNC: 95 MG/DL
HCT VFR BLD CALC: 34.8 % — SIGNIFICANT CHANGE UP (ref 34.5–45)
HGB BLD-MCNC: 11.5 G/DL — SIGNIFICANT CHANGE UP (ref 11.5–15.5)
LYMPHOCYTES # BLD AUTO: 1.2 K/UL — SIGNIFICANT CHANGE UP (ref 1–3.3)
LYMPHOCYTES # BLD AUTO: 20.8 % — SIGNIFICANT CHANGE UP (ref 13–44)
MCHC RBC-ENTMCNC: 29.3 PG — SIGNIFICANT CHANGE UP (ref 27–34)
MCHC RBC-ENTMCNC: 33 GM/DL — SIGNIFICANT CHANGE UP (ref 32–36)
MCV RBC AUTO: 88.7 FL — SIGNIFICANT CHANGE UP (ref 80–100)
MONOCYTES # BLD AUTO: 0.5 K/UL — SIGNIFICANT CHANGE UP (ref 0–0.9)
MONOCYTES NFR BLD AUTO: 7.9 % — SIGNIFICANT CHANGE UP (ref 2–14)
NEUTROPHILS # BLD AUTO: 4.1 K/UL — SIGNIFICANT CHANGE UP (ref 1.8–7.4)
NEUTROPHILS NFR BLD AUTO: 69 % — SIGNIFICANT CHANGE UP (ref 43–77)
PLATELET # BLD AUTO: 217 K/UL — SIGNIFICANT CHANGE UP (ref 150–400)
POTASSIUM SERPL-SCNC: 3.8 MMOL/L
PROT SERPL-MCNC: 6.1 G/DL
RBC # BLD: 3.92 M/UL — SIGNIFICANT CHANGE UP (ref 3.8–5.2)
RBC # FLD: 13.7 % — SIGNIFICANT CHANGE UP (ref 10.3–14.5)
SODIUM SERPL-SCNC: 145 MMOL/L
WBC # BLD: 5.9 K/UL — SIGNIFICANT CHANGE UP (ref 3.8–10.5)
WBC # FLD AUTO: 5.9 K/UL — SIGNIFICANT CHANGE UP (ref 3.8–10.5)

## 2018-08-28 PROCEDURE — 99214 OFFICE O/P EST MOD 30 MIN: CPT

## 2018-08-30 ENCOUNTER — RESULT REVIEW (OUTPATIENT)
Age: 68
End: 2018-08-30

## 2018-08-30 ENCOUNTER — APPOINTMENT (OUTPATIENT)
Dept: INFUSION THERAPY | Facility: CLINIC | Age: 68
End: 2018-08-30

## 2018-08-30 LAB
BASOPHILS # BLD AUTO: 0.1 K/UL — SIGNIFICANT CHANGE UP (ref 0–0.2)
BASOPHILS NFR BLD AUTO: 1.1 % — SIGNIFICANT CHANGE UP (ref 0–2)
EOSINOPHIL # BLD AUTO: 0.4 K/UL — SIGNIFICANT CHANGE UP (ref 0–0.5)
EOSINOPHIL NFR BLD AUTO: 7.1 % — HIGH (ref 0–6)
HCT VFR BLD CALC: 34.7 % — SIGNIFICANT CHANGE UP (ref 34.5–45)
HGB BLD-MCNC: 11.9 G/DL — SIGNIFICANT CHANGE UP (ref 11.5–15.5)
LYMPHOCYTES # BLD AUTO: 1.5 K/UL — SIGNIFICANT CHANGE UP (ref 1–3.3)
LYMPHOCYTES # BLD AUTO: 28.3 % — SIGNIFICANT CHANGE UP (ref 13–44)
MCHC RBC-ENTMCNC: 30.2 PG — SIGNIFICANT CHANGE UP (ref 27–34)
MCHC RBC-ENTMCNC: 34.3 GM/DL — SIGNIFICANT CHANGE UP (ref 32–36)
MCV RBC AUTO: 88 FL — SIGNIFICANT CHANGE UP (ref 80–100)
MONOCYTES # BLD AUTO: 0.4 K/UL — SIGNIFICANT CHANGE UP (ref 0–0.9)
MONOCYTES NFR BLD AUTO: 8.1 % — SIGNIFICANT CHANGE UP (ref 2–14)
NEUTROPHILS # BLD AUTO: 2.9 K/UL — SIGNIFICANT CHANGE UP (ref 1.8–7.4)
NEUTROPHILS NFR BLD AUTO: 55.4 % — SIGNIFICANT CHANGE UP (ref 43–77)
PLATELET # BLD AUTO: 192 K/UL — SIGNIFICANT CHANGE UP (ref 150–400)
RBC # BLD: 3.94 M/UL — SIGNIFICANT CHANGE UP (ref 3.8–5.2)
RBC # FLD: 13.3 % — SIGNIFICANT CHANGE UP (ref 10.3–14.5)
WBC # BLD: 5.3 K/UL — SIGNIFICANT CHANGE UP (ref 3.8–10.5)
WBC # FLD AUTO: 5.3 K/UL — SIGNIFICANT CHANGE UP (ref 3.8–10.5)

## 2018-09-06 ENCOUNTER — APPOINTMENT (OUTPATIENT)
Dept: INFUSION THERAPY | Facility: CLINIC | Age: 68
End: 2018-09-06

## 2018-09-06 ENCOUNTER — RESULT REVIEW (OUTPATIENT)
Age: 68
End: 2018-09-06

## 2018-09-06 LAB
BASOPHILS # BLD AUTO: 0.1 K/UL — SIGNIFICANT CHANGE UP (ref 0–0.2)
BASOPHILS NFR BLD AUTO: 1.5 % — SIGNIFICANT CHANGE UP (ref 0–2)
EOSINOPHIL # BLD AUTO: 0.5 K/UL — SIGNIFICANT CHANGE UP (ref 0–0.5)
EOSINOPHIL NFR BLD AUTO: 8 % — HIGH (ref 0–6)
HCT VFR BLD CALC: 32.3 % — LOW (ref 34.5–45)
HGB BLD-MCNC: 11.3 G/DL — LOW (ref 11.5–15.5)
LYMPHOCYTES # BLD AUTO: 1.5 K/UL — SIGNIFICANT CHANGE UP (ref 1–3.3)
LYMPHOCYTES # BLD AUTO: 25.9 % — SIGNIFICANT CHANGE UP (ref 13–44)
MCHC RBC-ENTMCNC: 30.1 PG — SIGNIFICANT CHANGE UP (ref 27–34)
MCHC RBC-ENTMCNC: 34.9 GM/DL — SIGNIFICANT CHANGE UP (ref 32–36)
MCV RBC AUTO: 86.1 FL — SIGNIFICANT CHANGE UP (ref 80–100)
MONOCYTES # BLD AUTO: 0.6 K/UL — SIGNIFICANT CHANGE UP (ref 0–0.9)
MONOCYTES NFR BLD AUTO: 10 % — SIGNIFICANT CHANGE UP (ref 2–14)
NEUTROPHILS # BLD AUTO: 3.2 K/UL — SIGNIFICANT CHANGE UP (ref 1.8–7.4)
NEUTROPHILS NFR BLD AUTO: 54.5 % — SIGNIFICANT CHANGE UP (ref 43–77)
PLATELET # BLD AUTO: 154 K/UL — SIGNIFICANT CHANGE UP (ref 150–400)
RBC # BLD: 3.75 M/UL — LOW (ref 3.8–5.2)
RBC # FLD: 13.7 % — SIGNIFICANT CHANGE UP (ref 10.3–14.5)
WBC # BLD: 5.8 K/UL — SIGNIFICANT CHANGE UP (ref 3.8–10.5)
WBC # FLD AUTO: 5.8 K/UL — SIGNIFICANT CHANGE UP (ref 3.8–10.5)

## 2018-09-13 ENCOUNTER — RESULT REVIEW (OUTPATIENT)
Age: 68
End: 2018-09-13

## 2018-09-13 ENCOUNTER — APPOINTMENT (OUTPATIENT)
Dept: INFUSION THERAPY | Facility: CLINIC | Age: 68
End: 2018-09-13

## 2018-09-13 LAB
BASOPHILS # BLD AUTO: 0.1 K/UL — SIGNIFICANT CHANGE UP (ref 0–0.2)
BASOPHILS NFR BLD AUTO: 1 % — SIGNIFICANT CHANGE UP (ref 0–2)
EOSINOPHIL # BLD AUTO: 0.2 K/UL — SIGNIFICANT CHANGE UP (ref 0–0.5)
EOSINOPHIL NFR BLD AUTO: 4 % — SIGNIFICANT CHANGE UP (ref 0–6)
HCT VFR BLD CALC: 34.2 % — LOW (ref 34.5–45)
HGB BLD-MCNC: 11.6 G/DL — SIGNIFICANT CHANGE UP (ref 11.5–15.5)
LYMPHOCYTES # BLD AUTO: 1.5 K/UL — SIGNIFICANT CHANGE UP (ref 1–3.3)
LYMPHOCYTES # BLD AUTO: 24.1 % — SIGNIFICANT CHANGE UP (ref 13–44)
MCHC RBC-ENTMCNC: 29.8 PG — SIGNIFICANT CHANGE UP (ref 27–34)
MCHC RBC-ENTMCNC: 33.9 GM/DL — SIGNIFICANT CHANGE UP (ref 32–36)
MCV RBC AUTO: 88.1 FL — SIGNIFICANT CHANGE UP (ref 80–100)
MONOCYTES # BLD AUTO: 0.3 K/UL — SIGNIFICANT CHANGE UP (ref 0–0.9)
MONOCYTES NFR BLD AUTO: 4.3 % — SIGNIFICANT CHANGE UP (ref 2–14)
NEUTROPHILS # BLD AUTO: 4.1 K/UL — SIGNIFICANT CHANGE UP (ref 1.8–7.4)
NEUTROPHILS NFR BLD AUTO: 66.6 % — SIGNIFICANT CHANGE UP (ref 43–77)
PLATELET # BLD AUTO: 167 K/UL — SIGNIFICANT CHANGE UP (ref 150–400)
RBC # BLD: 3.88 M/UL — SIGNIFICANT CHANGE UP (ref 3.8–5.2)
RBC # FLD: 13.8 % — SIGNIFICANT CHANGE UP (ref 10.3–14.5)
WBC # BLD: 6.1 K/UL — SIGNIFICANT CHANGE UP (ref 3.8–10.5)
WBC # FLD AUTO: 6.1 K/UL — SIGNIFICANT CHANGE UP (ref 3.8–10.5)

## 2018-09-14 ENCOUNTER — OUTPATIENT (OUTPATIENT)
Dept: OUTPATIENT SERVICES | Facility: HOSPITAL | Age: 68
LOS: 1 days | Discharge: HOME | End: 2018-09-14

## 2018-09-14 DIAGNOSIS — C90.00 MULTIPLE MYELOMA NOT HAVING ACHIEVED REMISSION: ICD-10-CM

## 2018-09-20 ENCOUNTER — APPOINTMENT (OUTPATIENT)
Dept: HEMATOLOGY ONCOLOGY | Facility: CLINIC | Age: 68
End: 2018-09-20
Payer: COMMERCIAL

## 2018-09-20 ENCOUNTER — APPOINTMENT (OUTPATIENT)
Dept: INFUSION THERAPY | Facility: CLINIC | Age: 68
End: 2018-09-20

## 2018-09-20 ENCOUNTER — LABORATORY RESULT (OUTPATIENT)
Age: 68
End: 2018-09-20

## 2018-09-20 ENCOUNTER — RESULT REVIEW (OUTPATIENT)
Age: 68
End: 2018-09-20

## 2018-09-20 VITALS
HEART RATE: 61 BPM | DIASTOLIC BLOOD PRESSURE: 83 MMHG | TEMPERATURE: 98.4 F | BODY MASS INDEX: 31.24 KG/M2 | OXYGEN SATURATION: 100 % | HEIGHT: 64 IN | WEIGHT: 183 LBS | SYSTOLIC BLOOD PRESSURE: 148 MMHG

## 2018-09-20 LAB
ALBUMIN MFR SERPL ELPH: 68.5 %
ALBUMIN SERPL ELPH-MCNC: 4.5 G/DL
ALBUMIN SERPL-MCNC: 4.3 G/DL
ALBUMIN/GLOB SERPL: 2.2 RATIO
ALP BLD-CCNC: 49 U/L
ALPHA1 GLOB MFR SERPL ELPH: 4.7 %
ALPHA1 GLOB SERPL ELPH-MCNC: 0.3 G/DL
ALPHA2 GLOB MFR SERPL ELPH: 11.8 %
ALPHA2 GLOB SERPL ELPH-MCNC: 0.7 G/DL
ALT SERPL-CCNC: 16 U/L
ANION GAP SERPL CALC-SCNC: 16 MMOL/L
AST SERPL-CCNC: 17 U/L
B-GLOBULIN MFR SERPL ELPH: 9.7 %
B-GLOBULIN SERPL ELPH-MCNC: 0.6 G/DL
B2 MICROGLOB SERPL-MCNC: 1.9 MG/L
BASOPHILS # BLD AUTO: 0.1 K/UL — SIGNIFICANT CHANGE UP (ref 0–0.2)
BASOPHILS NFR BLD AUTO: 1.7 % — SIGNIFICANT CHANGE UP (ref 0–2)
BILIRUB SERPL-MCNC: 0.4 MG/DL
BUN SERPL-MCNC: 8 MG/DL
CALCIUM SERPL-MCNC: 9.2 MG/DL
CHLORIDE SERPL-SCNC: 106 MMOL/L
CO2 SERPL-SCNC: 23 MMOL/L
CREAT SERPL-MCNC: 0.68 MG/DL
DEPRECATED KAPPA LC FREE/LAMBDA SER: 67 RATIO
DEPRECATED KAPPA LC FREE/LAMBDA SER: 67 RATIO
EOSINOPHIL # BLD AUTO: 0.3 K/UL — SIGNIFICANT CHANGE UP (ref 0–0.5)
EOSINOPHIL NFR BLD AUTO: 4.6 % — SIGNIFICANT CHANGE UP (ref 0–6)
GAMMA GLOB FLD ELPH-MCNC: 0.3 G/DL
GAMMA GLOB MFR SERPL ELPH: 5.3 %
GLUCOSE SERPL-MCNC: 120 MG/DL
HCT VFR BLD CALC: 33.8 % — LOW (ref 34.5–45)
HGB BLD-MCNC: 11.4 G/DL — LOW (ref 11.5–15.5)
IGA SER QL IEP: 9 MG/DL
IGG SER QL IEP: 432 MG/DL
IGM SER QL IEP: 11 MG/DL
INTERPRETATION SERPL IEP-IMP: NORMAL
KAPPA LC CSF-MCNC: 0.33 MG/DL
KAPPA LC CSF-MCNC: 0.33 MG/DL
KAPPA LC SERPL-MCNC: 22.11 MG/DL
KAPPA LC SERPL-MCNC: 22.11 MG/DL
LDH SERPL-CCNC: 222 U/L
LYMPHOCYTES # BLD AUTO: 1.8 K/UL — SIGNIFICANT CHANGE UP (ref 1–3.3)
LYMPHOCYTES # BLD AUTO: 27.1 % — SIGNIFICANT CHANGE UP (ref 13–44)
M PROTEIN MFR SERPL ELPH: 1.8 %
M PROTEIN SPEC IFE-MCNC: NORMAL
MCHC RBC-ENTMCNC: 29.9 PG — SIGNIFICANT CHANGE UP (ref 27–34)
MCHC RBC-ENTMCNC: 33.7 GM/DL — SIGNIFICANT CHANGE UP (ref 32–36)
MCV RBC AUTO: 88.7 FL — SIGNIFICANT CHANGE UP (ref 80–100)
MONOCLON BAND OBS SERPL: 0.1 G/DL
MONOCYTES # BLD AUTO: 0.5 K/UL — SIGNIFICANT CHANGE UP (ref 0–0.9)
MONOCYTES NFR BLD AUTO: 7.7 % — SIGNIFICANT CHANGE UP (ref 2–14)
NEUTROPHILS # BLD AUTO: 3.9 K/UL — SIGNIFICANT CHANGE UP (ref 1.8–7.4)
NEUTROPHILS NFR BLD AUTO: 58.9 % — SIGNIFICANT CHANGE UP (ref 43–77)
PLATELET # BLD AUTO: 202 K/UL — SIGNIFICANT CHANGE UP (ref 150–400)
POTASSIUM SERPL-SCNC: 3.4 MMOL/L
PROT SERPL-MCNC: 6.3 G/DL
RBC # BLD: 3.81 M/UL — SIGNIFICANT CHANGE UP (ref 3.8–5.2)
RBC # FLD: 14.3 % — SIGNIFICANT CHANGE UP (ref 10.3–14.5)
SODIUM SERPL-SCNC: 145 MMOL/L
WBC # BLD: 6.6 K/UL — SIGNIFICANT CHANGE UP (ref 3.8–10.5)
WBC # FLD AUTO: 6.6 K/UL — SIGNIFICANT CHANGE UP (ref 3.8–10.5)

## 2018-09-20 PROCEDURE — 99214 OFFICE O/P EST MOD 30 MIN: CPT

## 2018-09-21 ENCOUNTER — MOBILE ON CALL (OUTPATIENT)
Age: 68
End: 2018-09-21

## 2018-09-21 DIAGNOSIS — Z51.11 ENCOUNTER FOR ANTINEOPLASTIC CHEMOTHERAPY: ICD-10-CM

## 2018-09-25 ENCOUNTER — RX RENEWAL (OUTPATIENT)
Age: 68
End: 2018-09-25

## 2018-09-26 DIAGNOSIS — R11.2 NAUSEA WITH VOMITING, UNSPECIFIED: ICD-10-CM

## 2018-09-26 DIAGNOSIS — Z51.81 ENCOUNTER FOR THERAPEUTIC DRUG LEVEL MONITORING: ICD-10-CM

## 2018-09-27 ENCOUNTER — APPOINTMENT (OUTPATIENT)
Dept: INFUSION THERAPY | Facility: CLINIC | Age: 68
End: 2018-09-27
Payer: COMMERCIAL

## 2018-09-27 ENCOUNTER — LABORATORY RESULT (OUTPATIENT)
Age: 68
End: 2018-09-27

## 2018-09-27 ENCOUNTER — RESULT REVIEW (OUTPATIENT)
Age: 68
End: 2018-09-27

## 2018-09-27 LAB
B2 MICROGLOB SERPL-MCNC: 1.7 MG/L
BASOPHILS # BLD AUTO: 0.1 K/UL — SIGNIFICANT CHANGE UP (ref 0–0.2)
BASOPHILS NFR BLD AUTO: 1.4 % — SIGNIFICANT CHANGE UP (ref 0–2)
DEPRECATED KAPPA LC FREE/LAMBDA SER: 115.5 RATIO
DEPRECATED KAPPA LC FREE/LAMBDA SER: 115.5 RATIO
EOSINOPHIL # BLD AUTO: 0.1 K/UL — SIGNIFICANT CHANGE UP (ref 0–0.5)
EOSINOPHIL NFR BLD AUTO: 2.2 % — SIGNIFICANT CHANGE UP (ref 0–6)
HCT VFR BLD CALC: 33.6 % — LOW (ref 34.5–45)
HGB BLD-MCNC: 11.1 G/DL — LOW (ref 11.5–15.5)
IGA SER QL IEP: 4 MG/DL
IGG SER QL IEP: 414 MG/DL
IGM SER QL IEP: <11 MG/DL
KAPPA LC CSF-MCNC: 0.3 MG/DL
KAPPA LC CSF-MCNC: 0.3 MG/DL
KAPPA LC SERPL-MCNC: 34.65 MG/DL
KAPPA LC SERPL-MCNC: 34.65 MG/DL
LDH SERPL-CCNC: 286 U/L
LYMPHOCYTES # BLD AUTO: 2 K/UL — SIGNIFICANT CHANGE UP (ref 1–3.3)
LYMPHOCYTES # BLD AUTO: 31.6 % — SIGNIFICANT CHANGE UP (ref 13–44)
MCHC RBC-ENTMCNC: 29.1 PG — SIGNIFICANT CHANGE UP (ref 27–34)
MCHC RBC-ENTMCNC: 32.9 GM/DL — SIGNIFICANT CHANGE UP (ref 32–36)
MCV RBC AUTO: 88.6 FL — SIGNIFICANT CHANGE UP (ref 80–100)
MONOCYTES # BLD AUTO: 0.6 K/UL — SIGNIFICANT CHANGE UP (ref 0–0.9)
MONOCYTES NFR BLD AUTO: 9.4 % — SIGNIFICANT CHANGE UP (ref 2–14)
NEUTROPHILS # BLD AUTO: 3.6 K/UL — SIGNIFICANT CHANGE UP (ref 1.8–7.4)
NEUTROPHILS NFR BLD AUTO: 55.4 % — SIGNIFICANT CHANGE UP (ref 43–77)
PLATELET # BLD AUTO: 172 K/UL — SIGNIFICANT CHANGE UP (ref 150–400)
RBC # BLD: 3.79 M/UL — LOW (ref 3.8–5.2)
RBC # FLD: 14.7 % — HIGH (ref 10.3–14.5)
WBC # BLD: 6.5 K/UL — SIGNIFICANT CHANGE UP (ref 3.8–10.5)
WBC # FLD AUTO: 6.5 K/UL — SIGNIFICANT CHANGE UP (ref 3.8–10.5)

## 2018-09-27 PROCEDURE — G0008: CPT

## 2018-10-01 ENCOUNTER — RESULT REVIEW (OUTPATIENT)
Age: 68
End: 2018-10-01

## 2018-10-01 ENCOUNTER — APPOINTMENT (OUTPATIENT)
Dept: HEMATOLOGY ONCOLOGY | Facility: CLINIC | Age: 68
End: 2018-10-01

## 2018-10-01 LAB
BASOPHILS # BLD AUTO: 0.1 K/UL — SIGNIFICANT CHANGE UP (ref 0–0.2)
BASOPHILS NFR BLD AUTO: 1.1 % — SIGNIFICANT CHANGE UP (ref 0–2)
EOSINOPHIL # BLD AUTO: 0.2 K/UL — SIGNIFICANT CHANGE UP (ref 0–0.5)
EOSINOPHIL NFR BLD AUTO: 2.8 % — SIGNIFICANT CHANGE UP (ref 0–6)
HCT VFR BLD CALC: 36.2 % — SIGNIFICANT CHANGE UP (ref 34.5–45)
HGB BLD-MCNC: 11.5 G/DL — SIGNIFICANT CHANGE UP (ref 11.5–15.5)
LYMPHOCYTES # BLD AUTO: 2.4 K/UL — SIGNIFICANT CHANGE UP (ref 1–3.3)
LYMPHOCYTES # BLD AUTO: 43 % — SIGNIFICANT CHANGE UP (ref 13–44)
MCHC RBC-ENTMCNC: 28.6 PG — SIGNIFICANT CHANGE UP (ref 27–34)
MCHC RBC-ENTMCNC: 31.8 GM/DL — LOW (ref 32–36)
MCV RBC AUTO: 89.9 FL — SIGNIFICANT CHANGE UP (ref 80–100)
MONOCYTES # BLD AUTO: 0.4 K/UL — SIGNIFICANT CHANGE UP (ref 0–0.9)
MONOCYTES NFR BLD AUTO: 7.6 % — SIGNIFICANT CHANGE UP (ref 2–14)
NEUTROPHILS # BLD AUTO: 2.5 K/UL — SIGNIFICANT CHANGE UP (ref 1.8–7.4)
NEUTROPHILS NFR BLD AUTO: 45.5 % — SIGNIFICANT CHANGE UP (ref 43–77)
PLATELET # BLD AUTO: 190 K/UL — SIGNIFICANT CHANGE UP (ref 150–400)
RBC # BLD: 4.03 M/UL — SIGNIFICANT CHANGE UP (ref 3.8–5.2)
RBC # FLD: 14.8 % — HIGH (ref 10.3–14.5)
WBC # BLD: 5.6 K/UL — SIGNIFICANT CHANGE UP (ref 3.8–10.5)
WBC # FLD AUTO: 5.6 K/UL — SIGNIFICANT CHANGE UP (ref 3.8–10.5)

## 2018-10-02 LAB
ALBUMIN SERPL ELPH-MCNC: 4.3 G/DL
ALBUPE: 17.4 %
ALP BLD-CCNC: 51 U/L
ALPHA1UPE: 5.8 %
ALPHA2UPE: 11.5 %
ALT SERPL-CCNC: 19 U/L
ANION GAP SERPL CALC-SCNC: 13 MMOL/L
AST SERPL-CCNC: 14 U/L
BENCE JONES EXCRETION: 162.15 MG/24HR
BETAUPE: 4.9 %
BILIRUB SERPL-MCNC: 0.2 MG/DL
BUN SERPL-MCNC: 13 MG/DL
CALCIUM SERPL-MCNC: 9.2 MG/DL
CHLORIDE SERPL-SCNC: 105 MMOL/L
CO2 SERPL-SCNC: 27 MMOL/L
CREAT 24H UR-MCNC: 0.8 G/24 H
CREAT 24H UR-MCNC: 0.8 G/24 H
CREAT ?TM UR-MCNC: 45 MG/DL
CREAT SERPL-MCNC: 0.6 MG/DL
CREATININE UR (MAYO): 46 MG/DL
GAMMAUPE: 60.4 %
IGA 24H UR QL IFE: NORMAL
KAPPA LC 24H UR QL: 9.4 MG/DL
KAPPA LC 24H UR QL: PRESENT
LDH SERPL-CCNC: 190 U/L
M PROTEIN 24H MFR UR ELPH: 36.3 %
POTASSIUM SERPL-SCNC: 4.1 MMOL/L
PROT ?TM UR-MCNC: 24 HR
PROT ?TM UR-MCNC: 24 HR
PROT PATTERN 24H UR ELPH-IMP: NORMAL
PROT SERPL-MCNC: 5.5 G/DL
PROT UR-MCNC: 13 MG/DL
PROT UR-MCNC: 13 MG/DL
SODIUM SERPL-SCNC: 145 MMOL/L
SPECIMEN VOL 24H UR: 1725 ML
SPECIMEN VOL 24H UR: 1725 ML
U PROTEIN QNT CALCULATION: 224 MG/24 H

## 2018-10-04 ENCOUNTER — APPOINTMENT (OUTPATIENT)
Dept: HEMATOLOGY ONCOLOGY | Facility: CLINIC | Age: 68
End: 2018-10-04
Payer: COMMERCIAL

## 2018-10-04 ENCOUNTER — RESULT REVIEW (OUTPATIENT)
Age: 68
End: 2018-10-04

## 2018-10-04 ENCOUNTER — APPOINTMENT (OUTPATIENT)
Dept: INFUSION THERAPY | Facility: CLINIC | Age: 68
End: 2018-10-04

## 2018-10-04 VITALS
BODY MASS INDEX: 30.66 KG/M2 | OXYGEN SATURATION: 99 % | HEART RATE: 98 BPM | WEIGHT: 179.56 LBS | SYSTOLIC BLOOD PRESSURE: 133 MMHG | DIASTOLIC BLOOD PRESSURE: 74 MMHG | HEIGHT: 64 IN

## 2018-10-04 LAB
ALBUMIN MFR SERPL ELPH: 64.6 %
ALBUMIN SERPL-MCNC: 3.6 G/DL
ALBUMIN/GLOB SERPL: 1.9 RATIO
ALPHA1 GLOB MFR SERPL ELPH: 5.5 %
ALPHA1 GLOB SERPL ELPH-MCNC: 0.3 G/DL
ALPHA2 GLOB MFR SERPL ELPH: 13 %
ALPHA2 GLOB SERPL ELPH-MCNC: 0.7 G/DL
B-GLOBULIN MFR SERPL ELPH: 11.3 %
B-GLOBULIN SERPL ELPH-MCNC: 0.6 G/DL
BASOPHILS # BLD AUTO: 0.1 K/UL — SIGNIFICANT CHANGE UP (ref 0–0.2)
BASOPHILS NFR BLD AUTO: 0.7 % — SIGNIFICANT CHANGE UP (ref 0–2)
CREAT 24H UR-MCNC: 1.1 G/24 H
CREAT ?TM UR-MCNC: 47 MG/DL
DEPRECATED KAPPA LC FREE/LAMBDA SER: 91.61 RATIO
EOSINOPHIL # BLD AUTO: 0.3 K/UL — SIGNIFICANT CHANGE UP (ref 0–0.5)
EOSINOPHIL NFR BLD AUTO: 4.4 % — SIGNIFICANT CHANGE UP (ref 0–6)
GAMMA GLOB FLD ELPH-MCNC: 0.3 G/DL
GAMMA GLOB MFR SERPL ELPH: 5.6 %
HCT VFR BLD CALC: 35 % — SIGNIFICANT CHANGE UP (ref 34.5–45)
HGB BLD-MCNC: 11.7 G/DL — SIGNIFICANT CHANGE UP (ref 11.5–15.5)
IGA SER QL IEP: 3 MG/DL
IGG SER QL IEP: 290 MG/DL
IGM SER QL IEP: <11 MG/DL
INTERPRETATION SERPL IEP-IMP: NORMAL
KAPPA LC CSF-MCNC: 0.28 MG/DL
KAPPA LC SERPL-MCNC: 25.65 MG/DL
LYMPHOCYTES # BLD AUTO: 2.2 K/UL — SIGNIFICANT CHANGE UP (ref 1–3.3)
LYMPHOCYTES # BLD AUTO: 30 % — SIGNIFICANT CHANGE UP (ref 13–44)
M PROTEIN SPEC IFE-MCNC: NORMAL
MCHC RBC-ENTMCNC: 29.8 PG — SIGNIFICANT CHANGE UP (ref 27–34)
MCHC RBC-ENTMCNC: 33.4 GM/DL — SIGNIFICANT CHANGE UP (ref 32–36)
MCV RBC AUTO: 89.1 FL — SIGNIFICANT CHANGE UP (ref 80–100)
MONOCYTES # BLD AUTO: 0.3 K/UL — SIGNIFICANT CHANGE UP (ref 0–0.9)
MONOCYTES NFR BLD AUTO: 3.9 % — SIGNIFICANT CHANGE UP (ref 2–14)
NEUTROPHILS # BLD AUTO: 4.4 K/UL — SIGNIFICANT CHANGE UP (ref 1.8–7.4)
NEUTROPHILS NFR BLD AUTO: 61 % — SIGNIFICANT CHANGE UP (ref 43–77)
PLATELET # BLD AUTO: 120 K/UL — LOW (ref 150–400)
PROT 24H UR-MRATE: 8 MG/DL
PROT ?TM UR-MCNC: 24 HR
PROT SERPL-MCNC: 5.5 G/DL
PROT SERPL-MCNC: 5.5 G/DL
PROT UR-MCNC: 184 MG/24 H
RBC # BLD: 3.93 M/UL — SIGNIFICANT CHANGE UP (ref 3.8–5.2)
RBC # FLD: 14.6 % — HIGH (ref 10.3–14.5)
SPECIMEN VOL 24H UR: 2300 ML
WBC # BLD: 7.3 K/UL — SIGNIFICANT CHANGE UP (ref 3.8–10.5)
WBC # FLD AUTO: 7.3 K/UL — SIGNIFICANT CHANGE UP (ref 3.8–10.5)

## 2018-10-04 PROCEDURE — 99214 OFFICE O/P EST MOD 30 MIN: CPT

## 2018-10-04 RX ORDER — ASPIRIN 81 MG/1
81 TABLET ORAL DAILY
Qty: 90 | Refills: 2 | Status: DISCONTINUED | COMMUNITY
Start: 2018-06-29 | End: 2018-10-04

## 2018-10-11 ENCOUNTER — RESULT REVIEW (OUTPATIENT)
Age: 68
End: 2018-10-11

## 2018-10-11 ENCOUNTER — LABORATORY RESULT (OUTPATIENT)
Age: 68
End: 2018-10-11

## 2018-10-11 ENCOUNTER — APPOINTMENT (OUTPATIENT)
Age: 68
End: 2018-10-11

## 2018-10-11 LAB
BASOPHILS # BLD AUTO: 0.1 K/UL — SIGNIFICANT CHANGE UP (ref 0–0.2)
BASOPHILS NFR BLD AUTO: 1.1 % — SIGNIFICANT CHANGE UP (ref 0–2)
EOSINOPHIL # BLD AUTO: 0.2 K/UL — SIGNIFICANT CHANGE UP (ref 0–0.5)
EOSINOPHIL NFR BLD AUTO: 2.3 % — SIGNIFICANT CHANGE UP (ref 0–6)
HCT VFR BLD CALC: 35.1 % — SIGNIFICANT CHANGE UP (ref 34.5–45)
HGB BLD-MCNC: 11.3 G/DL — LOW (ref 11.5–15.5)
LYMPHOCYTES # BLD AUTO: 1.6 K/UL — SIGNIFICANT CHANGE UP (ref 1–3.3)
LYMPHOCYTES # BLD AUTO: 21.3 % — SIGNIFICANT CHANGE UP (ref 13–44)
MCHC RBC-ENTMCNC: 29 PG — SIGNIFICANT CHANGE UP (ref 27–34)
MCHC RBC-ENTMCNC: 32.2 GM/DL — SIGNIFICANT CHANGE UP (ref 32–36)
MCV RBC AUTO: 90.2 FL — SIGNIFICANT CHANGE UP (ref 80–100)
MONOCYTES # BLD AUTO: 0.5 K/UL — SIGNIFICANT CHANGE UP (ref 0–0.9)
MONOCYTES NFR BLD AUTO: 6.7 % — SIGNIFICANT CHANGE UP (ref 2–14)
NEUTROPHILS # BLD AUTO: 5.3 K/UL — SIGNIFICANT CHANGE UP (ref 1.8–7.4)
NEUTROPHILS NFR BLD AUTO: 68.6 % — SIGNIFICANT CHANGE UP (ref 43–77)
PLATELET # BLD AUTO: 204 K/UL — SIGNIFICANT CHANGE UP (ref 150–400)
RBC # BLD: 3.9 M/UL — SIGNIFICANT CHANGE UP (ref 3.8–5.2)
RBC # FLD: 14.5 % — SIGNIFICANT CHANGE UP (ref 10.3–14.5)
WBC # BLD: 7.7 K/UL — SIGNIFICANT CHANGE UP (ref 3.8–10.5)
WBC # FLD AUTO: 7.7 K/UL — SIGNIFICANT CHANGE UP (ref 3.8–10.5)

## 2018-10-12 ENCOUNTER — APPOINTMENT (OUTPATIENT)
Age: 68
End: 2018-10-12

## 2018-10-15 ENCOUNTER — OUTPATIENT (OUTPATIENT)
Dept: OUTPATIENT SERVICES | Facility: HOSPITAL | Age: 68
LOS: 1 days | Discharge: HOME | End: 2018-10-15

## 2018-10-15 DIAGNOSIS — C90.00 MULTIPLE MYELOMA NOT HAVING ACHIEVED REMISSION: ICD-10-CM

## 2018-10-15 DIAGNOSIS — R11.2 NAUSEA WITH VOMITING, UNSPECIFIED: ICD-10-CM

## 2018-10-15 DIAGNOSIS — Z51.11 ENCOUNTER FOR ANTINEOPLASTIC CHEMOTHERAPY: ICD-10-CM

## 2018-10-18 ENCOUNTER — RX RENEWAL (OUTPATIENT)
Age: 68
End: 2018-10-18

## 2018-10-18 ENCOUNTER — LABORATORY RESULT (OUTPATIENT)
Age: 68
End: 2018-10-18

## 2018-10-18 ENCOUNTER — APPOINTMENT (OUTPATIENT)
Age: 68
End: 2018-10-18

## 2018-10-18 ENCOUNTER — RESULT REVIEW (OUTPATIENT)
Age: 68
End: 2018-10-18

## 2018-10-18 LAB
BASOPHILS # BLD AUTO: 0.1 K/UL — SIGNIFICANT CHANGE UP (ref 0–0.2)
BASOPHILS NFR BLD AUTO: 0.8 % — SIGNIFICANT CHANGE UP (ref 0–2)
EOSINOPHIL # BLD AUTO: 0.2 K/UL — SIGNIFICANT CHANGE UP (ref 0–0.5)
EOSINOPHIL NFR BLD AUTO: 2.7 % — SIGNIFICANT CHANGE UP (ref 0–6)
HCT VFR BLD CALC: 35.5 % — SIGNIFICANT CHANGE UP (ref 34.5–45)
HGB BLD-MCNC: 11.6 G/DL — SIGNIFICANT CHANGE UP (ref 11.5–15.5)
LYMPHOCYTES # BLD AUTO: 2.3 K/UL — SIGNIFICANT CHANGE UP (ref 1–3.3)
LYMPHOCYTES # BLD AUTO: 33.2 % — SIGNIFICANT CHANGE UP (ref 13–44)
MCHC RBC-ENTMCNC: 29.4 PG — SIGNIFICANT CHANGE UP (ref 27–34)
MCHC RBC-ENTMCNC: 32.7 GM/DL — SIGNIFICANT CHANGE UP (ref 32–36)
MCV RBC AUTO: 90 FL — SIGNIFICANT CHANGE UP (ref 80–100)
MONOCYTES # BLD AUTO: 0.7 K/UL — SIGNIFICANT CHANGE UP (ref 0–0.9)
MONOCYTES NFR BLD AUTO: 9.8 % — SIGNIFICANT CHANGE UP (ref 2–14)
NEUTROPHILS # BLD AUTO: 3.7 K/UL — SIGNIFICANT CHANGE UP (ref 1.8–7.4)
NEUTROPHILS NFR BLD AUTO: 53.5 % — SIGNIFICANT CHANGE UP (ref 43–77)
PLATELET # BLD AUTO: 216 K/UL — SIGNIFICANT CHANGE UP (ref 150–400)
RBC # BLD: 3.94 M/UL — SIGNIFICANT CHANGE UP (ref 3.8–5.2)
RBC # FLD: 14.2 % — SIGNIFICANT CHANGE UP (ref 10.3–14.5)
WBC # BLD: 7 K/UL — SIGNIFICANT CHANGE UP (ref 3.8–10.5)
WBC # FLD AUTO: 7 K/UL — SIGNIFICANT CHANGE UP (ref 3.8–10.5)

## 2018-10-19 ENCOUNTER — APPOINTMENT (OUTPATIENT)
Age: 68
End: 2018-10-19

## 2018-10-22 DIAGNOSIS — E86.0 DEHYDRATION: ICD-10-CM

## 2018-11-02 ENCOUNTER — OUTPATIENT (OUTPATIENT)
Dept: OUTPATIENT SERVICES | Facility: HOSPITAL | Age: 68
LOS: 1 days | Discharge: HOME | End: 2018-11-02

## 2018-11-02 DIAGNOSIS — C90.00 MULTIPLE MYELOMA NOT HAVING ACHIEVED REMISSION: ICD-10-CM

## 2018-11-05 ENCOUNTER — RESULT REVIEW (OUTPATIENT)
Age: 68
End: 2018-11-05

## 2018-11-05 ENCOUNTER — RX RENEWAL (OUTPATIENT)
Age: 68
End: 2018-11-05

## 2018-11-05 ENCOUNTER — APPOINTMENT (OUTPATIENT)
Age: 68
End: 2018-11-05

## 2018-11-05 LAB
BASOPHILS # BLD AUTO: 0 K/UL — SIGNIFICANT CHANGE UP (ref 0–0.2)
BASOPHILS NFR BLD AUTO: 0.4 % — SIGNIFICANT CHANGE UP (ref 0–2)
EOSINOPHIL # BLD AUTO: 0 K/UL — SIGNIFICANT CHANGE UP (ref 0–0.5)
EOSINOPHIL NFR BLD AUTO: 0.4 % — SIGNIFICANT CHANGE UP (ref 0–6)
HCT VFR BLD CALC: 33.8 % — LOW (ref 34.5–45)
HGB BLD-MCNC: 10.9 G/DL — LOW (ref 11.5–15.5)
LYMPHOCYTES # BLD AUTO: 0.8 K/UL — LOW (ref 1–3.3)
LYMPHOCYTES # BLD AUTO: 8.4 % — LOW (ref 13–44)
MCHC RBC-ENTMCNC: 29.2 PG — SIGNIFICANT CHANGE UP (ref 27–34)
MCHC RBC-ENTMCNC: 32.4 GM/DL — SIGNIFICANT CHANGE UP (ref 32–36)
MCV RBC AUTO: 90.2 FL — SIGNIFICANT CHANGE UP (ref 80–100)
MONOCYTES # BLD AUTO: 0 K/UL — SIGNIFICANT CHANGE UP (ref 0–0.9)
MONOCYTES NFR BLD AUTO: 0.4 % — LOW (ref 2–14)
NEUTROPHILS # BLD AUTO: 8.4 K/UL — HIGH (ref 1.8–7.4)
NEUTROPHILS NFR BLD AUTO: 90.4 % — HIGH (ref 43–77)
PLATELET # BLD AUTO: 284 K/UL — SIGNIFICANT CHANGE UP (ref 150–400)
RBC # BLD: 3.75 M/UL — LOW (ref 3.8–5.2)
RBC # FLD: 14.2 % — SIGNIFICANT CHANGE UP (ref 10.3–14.5)
WBC # BLD: 9.3 K/UL — SIGNIFICANT CHANGE UP (ref 3.8–10.5)
WBC # FLD AUTO: 9.3 K/UL — SIGNIFICANT CHANGE UP (ref 3.8–10.5)

## 2018-11-06 ENCOUNTER — APPOINTMENT (OUTPATIENT)
Dept: HEMATOLOGY ONCOLOGY | Facility: CLINIC | Age: 68
End: 2018-11-06
Payer: COMMERCIAL

## 2018-11-06 ENCOUNTER — APPOINTMENT (OUTPATIENT)
Age: 68
End: 2018-11-06

## 2018-11-06 PROCEDURE — 99214 OFFICE O/P EST MOD 30 MIN: CPT

## 2018-11-06 NOTE — ADDENDUM
[FreeTextEntry1] : I, Gilma English, acted solely as a scribe for Dr. Marshall De Oliveira on this date 11/6/18.

## 2018-11-06 NOTE — PHYSICAL EXAM
[Restricted in physically strenuous activity but ambulatory and able to carry out work of a light or sedentary nature] : Status 1- Restricted in physically strenuous activity but ambulatory and able to carry out work of a light or sedentary nature, e.g., light house work, office work [Normal] : affect appropriate [de-identified] : negative cervical, supra/infraclavicular adenopathy. [de-identified] : negative pedal edema or calve tenderness [de-identified] : BS+, soft, nontender on palpation. [de-identified] : without spinal or cva tenderness.

## 2018-11-06 NOTE — HISTORY OF PRESENT ILLNESS
[de-identified] : Ms. Calero is a 68 year old female who was initially referred for monoclonal gammopathy.\par She was noted to have proteinuria and then had a 24 hour urine protein which showed non-nephrotic range proteinuria of 510 mg/24 hours. She was referred to Dr. Lesa Rendon of nephrology. SPEP showed faint M-spike 0.2 g/dL in gammaglobulin region, immunofixation showed this to be a IgG kappa monoclonal protein. UPEP showed 2 monoclonal protein bands, 61%, and 0.7%. \par \par Subsequent work up:\par 6/19/18 M-protein 0.2 g/dL, Kappa FLC 71, lambda FLC 0.36, FLC ratio 198.61\par , Beta 2 microglobulin 1.5 mg/L\par \par She had a bone marrow biopsy on 6/22/18. Pathology: plasma cell neoplasm, plasma cells comprise 20-25% of marrow cells. Trilineage hematopoiesis present with maturation, increased iron stores. Congo red stain negative for amyloid. Karyotype 46, XX. FISH panel - positive for CCND1/IGH fusion - a/w favorable prognosis. [de-identified] : Returns for follow up.  Today is Carfilzomib C2D1. Reports bipedal edema during trip to [place] which she suspects was secondary to a lot of walking+travel. Denies fatigue.  No pain. No fevers. No nausea or vomiting. Recurrent stye, no symptoms at this time. No cough. No SOB. No fever.  No headaches or dizziness. No SOB. No diarrhea.

## 2018-11-06 NOTE — ASSESSMENT
[FreeTextEntry1] :  68 year old female with stage I multiple myeloma, IgG kappa M protien 0.2 g/dl, FLC ratio 198 + proteinuria. S/p bone marrow biopsy on 6/22/18 consistent with plasma cell neoplasm. FISH panel - positive for CCND1/IGH fusion - a/w favorable prognosis. PET CT (7/12/18) did not show any bone lesions. Began VRd on 7/19/18. \par \par On C4 of VRd.  FLC ratio initially decreasing -->198 --> 126 --> 68, however has increased to 115 with urine studies positive for bence koo protein.  For refractory disease, she was switched to KRd for a deeper response.  Developed HTN with first cycle which is now well controlled on current meds. \par \par Plan:\par Carfilzomib C2D1\par Continue infection prophylaxis - Bactrim DS, Acyclovir \par Continue vitamin B6\par Thromboprophylaxis -  mg\par Transplant evaluation\par RTO 4 weeks

## 2018-11-07 DIAGNOSIS — Z51.11 ENCOUNTER FOR ANTINEOPLASTIC CHEMOTHERAPY: ICD-10-CM

## 2018-11-07 DIAGNOSIS — E86.0 DEHYDRATION: ICD-10-CM

## 2018-11-07 DIAGNOSIS — R11.2 NAUSEA WITH VOMITING, UNSPECIFIED: ICD-10-CM

## 2018-11-12 ENCOUNTER — APPOINTMENT (OUTPATIENT)
Age: 68
End: 2018-11-12

## 2018-11-12 ENCOUNTER — APPOINTMENT (OUTPATIENT)
Dept: HEMATOLOGY ONCOLOGY | Facility: CLINIC | Age: 68
End: 2018-11-12
Payer: COMMERCIAL

## 2018-11-12 ENCOUNTER — RX RENEWAL (OUTPATIENT)
Age: 68
End: 2018-11-12

## 2018-11-12 ENCOUNTER — LABORATORY RESULT (OUTPATIENT)
Age: 68
End: 2018-11-12

## 2018-11-12 ENCOUNTER — RESULT REVIEW (OUTPATIENT)
Age: 68
End: 2018-11-12

## 2018-11-12 ENCOUNTER — OUTPATIENT (OUTPATIENT)
Dept: OUTPATIENT SERVICES | Facility: HOSPITAL | Age: 68
LOS: 1 days | Discharge: HOME | End: 2018-11-12

## 2018-11-12 DIAGNOSIS — C90.00 MULTIPLE MYELOMA NOT HAVING ACHIEVED REMISSION: ICD-10-CM

## 2018-11-12 LAB
BASOPHILS # BLD AUTO: 0.1 K/UL — SIGNIFICANT CHANGE UP (ref 0–0.2)
BASOPHILS NFR BLD AUTO: 0.8 % — SIGNIFICANT CHANGE UP (ref 0–2)
EOSINOPHIL # BLD AUTO: 0.3 K/UL — SIGNIFICANT CHANGE UP (ref 0–0.5)
EOSINOPHIL NFR BLD AUTO: 4.3 % — SIGNIFICANT CHANGE UP (ref 0–6)
HCT VFR BLD CALC: 33.8 % — LOW (ref 34.5–45)
HGB BLD-MCNC: 11.2 G/DL — LOW (ref 11.5–15.5)
LYMPHOCYTES # BLD AUTO: 2.3 K/UL — SIGNIFICANT CHANGE UP (ref 1–3.3)
LYMPHOCYTES # BLD AUTO: 29 % — SIGNIFICANT CHANGE UP (ref 13–44)
MCHC RBC-ENTMCNC: 29.4 PG — SIGNIFICANT CHANGE UP (ref 27–34)
MCHC RBC-ENTMCNC: 33.3 GM/DL — SIGNIFICANT CHANGE UP (ref 32–36)
MCV RBC AUTO: 88.4 FL — SIGNIFICANT CHANGE UP (ref 80–100)
MONOCYTES # BLD AUTO: 0.2 K/UL — SIGNIFICANT CHANGE UP (ref 0–0.9)
MONOCYTES NFR BLD AUTO: 2.9 % — SIGNIFICANT CHANGE UP (ref 2–14)
NEUTROPHILS # BLD AUTO: 5 K/UL — SIGNIFICANT CHANGE UP (ref 1.8–7.4)
NEUTROPHILS NFR BLD AUTO: 63.1 % — SIGNIFICANT CHANGE UP (ref 43–77)
PLATELET # BLD AUTO: 216 K/UL — SIGNIFICANT CHANGE UP (ref 150–400)
RBC # BLD: 3.82 M/UL — SIGNIFICANT CHANGE UP (ref 3.8–5.2)
RBC # FLD: 14 % — SIGNIFICANT CHANGE UP (ref 10.3–14.5)
WBC # BLD: 8 K/UL — SIGNIFICANT CHANGE UP (ref 3.8–10.5)
WBC # FLD AUTO: 8 K/UL — SIGNIFICANT CHANGE UP (ref 3.8–10.5)

## 2018-11-13 ENCOUNTER — APPOINTMENT (OUTPATIENT)
Age: 68
End: 2018-11-13

## 2018-11-19 ENCOUNTER — APPOINTMENT (OUTPATIENT)
Dept: HEMATOLOGY ONCOLOGY | Facility: CLINIC | Age: 68
End: 2018-11-19

## 2018-11-19 ENCOUNTER — APPOINTMENT (OUTPATIENT)
Dept: HEMATOLOGY ONCOLOGY | Facility: CLINIC | Age: 68
End: 2018-11-19
Payer: COMMERCIAL

## 2018-11-19 ENCOUNTER — RX RENEWAL (OUTPATIENT)
Age: 68
End: 2018-11-19

## 2018-11-19 ENCOUNTER — LABORATORY RESULT (OUTPATIENT)
Age: 68
End: 2018-11-19

## 2018-11-19 ENCOUNTER — APPOINTMENT (OUTPATIENT)
Age: 68
End: 2018-11-19

## 2018-11-19 ENCOUNTER — RESULT REVIEW (OUTPATIENT)
Age: 68
End: 2018-11-19

## 2018-11-19 LAB
BASOPHILS # BLD AUTO: 0.1 K/UL — SIGNIFICANT CHANGE UP (ref 0–0.2)
BASOPHILS NFR BLD AUTO: 0.7 % — SIGNIFICANT CHANGE UP (ref 0–2)
EOSINOPHIL # BLD AUTO: 0.3 K/UL — SIGNIFICANT CHANGE UP (ref 0–0.5)
EOSINOPHIL NFR BLD AUTO: 3.5 % — SIGNIFICANT CHANGE UP (ref 0–6)
HCT VFR BLD CALC: 33.1 % — LOW (ref 34.5–45)
HGB BLD-MCNC: 10.7 G/DL — LOW (ref 11.5–15.5)
LYMPHOCYTES # BLD AUTO: 1.5 K/UL — SIGNIFICANT CHANGE UP (ref 1–3.3)
LYMPHOCYTES # BLD AUTO: 16.2 % — SIGNIFICANT CHANGE UP (ref 13–44)
MCHC RBC-ENTMCNC: 29 PG — SIGNIFICANT CHANGE UP (ref 27–34)
MCHC RBC-ENTMCNC: 32.4 GM/DL — SIGNIFICANT CHANGE UP (ref 32–36)
MCV RBC AUTO: 89.6 FL — SIGNIFICANT CHANGE UP (ref 80–100)
MONOCYTES # BLD AUTO: 0.4 K/UL — SIGNIFICANT CHANGE UP (ref 0–0.9)
MONOCYTES NFR BLD AUTO: 4.5 % — SIGNIFICANT CHANGE UP (ref 2–14)
NEUTROPHILS # BLD AUTO: 6.8 K/UL — SIGNIFICANT CHANGE UP (ref 1.8–7.4)
NEUTROPHILS NFR BLD AUTO: 75.1 % — SIGNIFICANT CHANGE UP (ref 43–77)
PLATELET # BLD AUTO: 221 K/UL — SIGNIFICANT CHANGE UP (ref 150–400)
RBC # BLD: 3.7 M/UL — LOW (ref 3.8–5.2)
RBC # FLD: 14 % — SIGNIFICANT CHANGE UP (ref 10.3–14.5)
WBC # BLD: 9.1 K/UL — SIGNIFICANT CHANGE UP (ref 3.8–10.5)
WBC # FLD AUTO: 9.1 K/UL — SIGNIFICANT CHANGE UP (ref 3.8–10.5)

## 2018-11-19 PROCEDURE — 99214 OFFICE O/P EST MOD 30 MIN: CPT

## 2018-11-20 ENCOUNTER — APPOINTMENT (OUTPATIENT)
Age: 68
End: 2018-11-20

## 2018-11-21 DIAGNOSIS — Z51.11 ENCOUNTER FOR ANTINEOPLASTIC CHEMOTHERAPY: ICD-10-CM

## 2018-11-21 DIAGNOSIS — R11.2 NAUSEA WITH VOMITING, UNSPECIFIED: ICD-10-CM

## 2018-11-21 NOTE — PHYSICAL EXAM
[Restricted in physically strenuous activity but ambulatory and able to carry out work of a light or sedentary nature] : Status 1- Restricted in physically strenuous activity but ambulatory and able to carry out work of a light or sedentary nature, e.g., light house work, office work [Normal] : affect appropriate [de-identified] : negative cervical, supra/infraclavicular adenopathy. [de-identified] : negative pedal edema or calve tenderness [de-identified] : BS+, soft, nontender on palpation. [de-identified] : without spinal or cva tenderness.

## 2018-11-21 NOTE — HISTORY OF PRESENT ILLNESS
[de-identified] : Ms. Calero is a 68 year old female who was initially referred for monoclonal gammopathy.\par She was noted to have proteinuria and then had a 24 hour urine protein which showed non-nephrotic range proteinuria of 510 mg/24 hours. She was referred to Dr. Lesa Rendon of nephrology. SPEP showed faint M-spike 0.2 g/dL in gammaglobulin region, immunofixation showed this to be a IgG kappa monoclonal protein. UPEP showed 2 monoclonal protein bands, 61%, and 0.7%. \par \par Subsequent work up:\par 6/19/18 M-protein 0.2 g/dL, Kappa FLC 71, lambda FLC 0.36, FLC ratio 198.61\par , Beta 2 microglobulin 1.5 mg/L\par \par She had a bone marrow biopsy on 6/22/18. Pathology: plasma cell neoplasm, plasma cells comprise 20-25% of marrow cells. Trilineage hematopoiesis present with maturation, increased iron stores. Congo red stain negative for amyloid. Karyotype 46, XX. FISH panel - positive for CCND1/IGH fusion - a/w favorable prognosis. [de-identified] : Denies fevers, has no night sweats, reports no bony aches or pains. Has c/o tingling in toes, when in bed, no discomfort w/ ambulation. Has no SOB, CP, appetite is good, no constipation or diarrhea, no swelling in LE. She remains on ASA, takes dexamethasone 40 mg once weekly, and took today. Taking prophylactic meds (Bactrim and acyclovir ) as directed.  Completed Revlimid yesterday (day 14). Last week had a sensation of imbalance w/ lightheadedness -she did not contact us because she thought it was the aftermath of a long trip and "jet-lag". Now completely resolved. The remainder of her ROS is negative.

## 2018-11-21 NOTE — ASSESSMENT
[FreeTextEntry1] :  68 year old female with stage I multiple myeloma, IgG kappa M protien 0.2 g/dl, FLC ratio 198 + proteinuria. S/p bone marrow biopsy on 6/22/18 consistent with plasma cell neoplasm. FISH panel - positive for CCND1/IGH fusion - a/w favorable prognosis. PET CT (7/12/18) did not show any bone lesions. Began VRd on 7/19/18. \par \par On C4 of VRd.  FLC ratio initially decreasing -->198 --> 126 --> 68, however has increased to 115 with urine studies positive for bence koo protein.  For refractory disease, she was switched to KRd for a deeper response.  Developed HTN with first cycle which is now well controlled on current meds. \par \par Plan:\par Carfilzomib C3D15 today.Notified [facility] that Revlimid is being changed from 14 days to a 21 day cycle. Received PA and sent to mail order pharmacy. They will send out ASAP. Wrote new calender out and instructed to stop on d 21- 11/25. \par Restarts Revlimid 12/03/18\par Continue infection prophylaxis - Bactrim DS, Acyclovir \par Continue vitamin B6\par Thromboprophylaxis -  mg\par Transplant evaluation on 11/26/18.\par RTO 4 weeks

## 2018-11-26 ENCOUNTER — APPOINTMENT (OUTPATIENT)
Dept: HEMATOLOGY ONCOLOGY | Facility: CLINIC | Age: 68
End: 2018-11-26
Payer: COMMERCIAL

## 2018-11-26 VITALS
WEIGHT: 178.13 LBS | TEMPERATURE: 98.2 F | RESPIRATION RATE: 16 BRPM | SYSTOLIC BLOOD PRESSURE: 148 MMHG | DIASTOLIC BLOOD PRESSURE: 77 MMHG | HEIGHT: 63.23 IN | BODY MASS INDEX: 31.17 KG/M2 | HEART RATE: 84 BPM | OXYGEN SATURATION: 99 %

## 2018-11-26 DIAGNOSIS — Z82.61 FAMILY HISTORY OF ARTHRITIS: ICD-10-CM

## 2018-11-26 DIAGNOSIS — Z86.39 PERSONAL HISTORY OF OTHER ENDOCRINE, NUTRITIONAL AND METABOLIC DISEASE: ICD-10-CM

## 2018-11-26 DIAGNOSIS — Z80.9 FAMILY HISTORY OF MALIGNANT NEOPLASM, UNSPECIFIED: ICD-10-CM

## 2018-11-26 DIAGNOSIS — N76.2 ACUTE VULVITIS: ICD-10-CM

## 2018-11-26 PROCEDURE — 99215 OFFICE O/P EST HI 40 MIN: CPT

## 2018-11-26 RX ORDER — ASPIRIN 325 MG/1
325 TABLET, COATED ORAL
Qty: 30 | Refills: 0 | Status: DISCONTINUED | COMMUNITY
Start: 2018-10-04 | End: 2018-11-26

## 2018-11-27 NOTE — ASSESSMENT
[FreeTextEntry1] : I had a very lengthy discussion with the patient regarding the diagnosis of multiple myeloma and that myeloma is not a curable disease with standard chemotherapy treatment.  I also discussed that high-dose chemotherapy followed by autologous peripheral stem cell transplant is considered one of the treatment options as part of standard of care for patients less than 70 years of age with newly diagnosed multiple myeloma. I discussed that event-free survival and overall survival are prolonged following high-dose chemotherapy followed by autologous peripheral stem cell transplantation compared with conventional chemotherapy treatment.  I did discuss that with the improved response rate with newer therapies for myeloma there is a question regarding timing of autologous stem cell transplant - early versus delayed.  I did discuss that early autologous peripheral stem cell transplantation may minimize total exposure to chemotherapy and may lead to an improvement in quality of life.  \par \par In addition, comprehensive discussion regarding acquisition of hematopoietic stem cells for autologous transplantation by apheresis following mobilization with Cytoxan chemotherapy plus growth factor took place. This was followed by discussion regarding hospitalization for high-dose chemotherapy with Melphalan followed by autologous stem cell transplantation.  Potential side effects and toxicities of Cytoxan chemotherapy for mobilization as well as subsequent high-dose chemotherapy with Melphalan preparative regimen in the setting of transplant were discussed with review of specific consent forms taking place during consultation visit.  The patient states she will review consent forms at home, speak to her family and primary hematologist. She will then inform me as to whether she wishes to proceed with early autologous peripheral stem cell transplantation for her disease. She will be scheduled for standard pre-transplant testing if she decides to proceed with transplant.\par \par I have had an extensive discussion with the patient regarding the risks, benefits and alternatives to high-dose chemotherapy and autologous peripheral blood stem cell transplantation. We discussed stem cell mobilization with Cytoxan 3 g/m2 followed by Neulasta 12 mg.  My goal would be to collect 5 to 10 x 10^6 CD34 cells per kilo.  This would be enough for two transplants if at some point a second transplant be deemed necessary.   A preparative regimen including Melphalan 200 mg/m2 was discussed.  Risks including but not limited to infection, bleeding, end organ damage, secondary malignancy, venoocclusive disease and mortality were discussed. Arrangements will be made for the patient to attend our transplant orientation meeting.   Venoocclusive disease and infection prophylaxis and the use of Kepivance to help prevent mucositis were discussed.  Literature and consents have been provided for review.   \par \par \par

## 2018-11-27 NOTE — CONSULT LETTER
[Dear  ___] : Dear  [unfilled], [Consult Letter:] : I had the pleasure of evaluating your patient, [unfilled]. [Please see my note below.] : Please see my note below. [Consult Closing:] : Thank you very much for allowing me to participate in the care of this patient.  If you have any questions, please do not hesitate to contact me. [Sincerely,] : Sincerely, [FreeTextEntry2] : Marshall De Oliveira MD\par Medical Oncology/Hematology\par [facility]\par [facility] \par [address] [facility]\par [place]   [id number]\par \par  [FreeTextEntry3] : \par Analisa Rahman M.D.\par \par  of Medicine\par [facility]\par [facility] \par [facility]\par [address] [place] [address] \[place] [address] \par ph: [phone]\par fax: [phone]\par

## 2018-11-27 NOTE — HISTORY OF PRESENT ILLNESS
[de-identified] : Ms. Calero is a 68 year old female who was initially referred for monoclonal gammopathy.\par She was noted to have proteinuria and then had a 24 hour urine protein which showed non-nephrotic range proteinuria of 510 mg/24 hours. She was referred to Dr. Lesa Rendon of nephrology. SPEP showed faint M-spike 0.2 g/dL in gammaglobulin region, immunofixation showed this to be a IgG kappa monoclonal protein. UPEP showed 2 monoclonal protein bands, 61%, and 0.7%. \par \par Subsequent work up:\par 6/19/18 M-protein 0.2 g/dL, Kappa FLC 71, lambda FLC 0.36, FLC ratio 198.61\par , Beta 2 microglobulin 1.5 mg/L\par \par She had a bone marrow biopsy on 6/22/18. Pathology: plasma cell neoplasm, plasma cells comprise 20-25% of marrow cells. Trilineage hematopoiesis present with maturation, increased iron stores. Congo red stain negative for amyloid. Karyotype 46, XX. FISH panel - positive for CCND1/IGH fusion - a/w favorable prognosis. PET CT (7/12/18) did not show any bone lesions. Began VRd on 7/19/18. \par \par On C4 of VRd. FLC ratio initially decreasing -->198 --> 126 --> 68, however has increased to 115 with urine studies positive for Bence Brooks protein. For refractory disease, she was switched to KRd for a deeper response.\par \par

## 2018-11-27 NOTE — REVIEW OF SYSTEMS
[Fever] : no fever [Chills] : no chills [Vision Problems] : no vision problems [Mucosal Pain] : no mucosal pain [Chest Pain] : no chest pain [Lower Ext Edema] : no lower extremity edema [Shortness Of Breath] : no shortness of breath [Cough] : no cough [Abdominal Pain] : no abdominal pain [Vomiting] : no vomiting [Diarrhea] : no diarrhea [Dysuria] : no dysuria [Skin Rash] : no skin rash [Dizziness] : no dizziness [Fainting] : no fainting [Easy Bleeding] : no tendency for easy bleeding [Easy Bruising] : no tendency for easy bruising [FreeTextEntry7] : no nausea [FreeTextEntry9] : no bone pain [de-identified] : Has c/o tingling in toes, when in bed, no discomfort w/ ambulation.

## 2018-11-27 NOTE — REASON FOR VISIT
[Initial Consultation] : an initial consultation for [FreeTextEntry2] : IgG kappa myeloma, currently on KRd to discuss high-dose chemotherapy followed by autologous peripheral stem cell transplant.

## 2018-11-27 NOTE — PHYSICAL EXAM
[Normal] : affect appropriate [Ulcers] : no ulcers [Thrush] : no thrush [de-identified] : RRR, normal S1S2 [de-identified] : warm, no edema [de-identified] : no cervical/SCV adenopathy [de-identified] : no rash [de-identified] : A & O x 4

## 2018-11-28 ENCOUNTER — RX RENEWAL (OUTPATIENT)
Age: 68
End: 2018-11-28

## 2018-12-04 ENCOUNTER — RESULT REVIEW (OUTPATIENT)
Age: 68
End: 2018-12-04

## 2018-12-04 ENCOUNTER — APPOINTMENT (OUTPATIENT)
Dept: HEMATOLOGY ONCOLOGY | Facility: CLINIC | Age: 68
End: 2018-12-04
Payer: COMMERCIAL

## 2018-12-04 ENCOUNTER — LABORATORY RESULT (OUTPATIENT)
Age: 68
End: 2018-12-04

## 2018-12-04 ENCOUNTER — APPOINTMENT (OUTPATIENT)
Age: 68
End: 2018-12-04

## 2018-12-04 ENCOUNTER — OUTPATIENT (OUTPATIENT)
Dept: OUTPATIENT SERVICES | Facility: HOSPITAL | Age: 68
LOS: 1 days | Discharge: HOME | End: 2018-12-04

## 2018-12-04 VITALS
SYSTOLIC BLOOD PRESSURE: 147 MMHG | TEMPERATURE: 97.9 F | BODY MASS INDEX: 31.4 KG/M2 | OXYGEN SATURATION: 99 % | DIASTOLIC BLOOD PRESSURE: 82 MMHG | WEIGHT: 179.45 LBS | HEIGHT: 63.23 IN | HEART RATE: 82 BPM

## 2018-12-04 DIAGNOSIS — C90.00 MULTIPLE MYELOMA NOT HAVING ACHIEVED REMISSION: ICD-10-CM

## 2018-12-04 LAB
BASOPHILS # BLD AUTO: 0.1 K/UL — SIGNIFICANT CHANGE UP (ref 0–0.2)
EOSINOPHIL # BLD AUTO: 0.1 K/UL — SIGNIFICANT CHANGE UP (ref 0–0.5)
HCT VFR BLD CALC: 32.6 % — LOW (ref 34.5–45)
HGB BLD-MCNC: 10.4 G/DL — LOW (ref 11.5–15.5)
LYMPHOCYTES # BLD AUTO: 0.8 K/UL — LOW (ref 1–3.3)
LYMPHOCYTES # BLD AUTO: 9 % — LOW (ref 13–44)
MCHC RBC-ENTMCNC: 29.7 PG — SIGNIFICANT CHANGE UP (ref 27–34)
MCHC RBC-ENTMCNC: 31.9 GM/DL — LOW (ref 32–36)
MCV RBC AUTO: 93 FL — SIGNIFICANT CHANGE UP (ref 80–100)
MONOCYTES # BLD AUTO: 0.2 K/UL — SIGNIFICANT CHANGE UP (ref 0–0.9)
MONOCYTES NFR BLD AUTO: 2 % — SIGNIFICANT CHANGE UP (ref 2–14)
NEUTROPHILS # BLD AUTO: 6.7 K/UL — SIGNIFICANT CHANGE UP (ref 1.8–7.4)
NEUTROPHILS NFR BLD AUTO: 89 % — HIGH (ref 43–77)
PLAT MORPH BLD: NORMAL — SIGNIFICANT CHANGE UP
PLATELET # BLD AUTO: 271 K/UL — SIGNIFICANT CHANGE UP (ref 150–400)
RBC # BLD: 3.51 M/UL — LOW (ref 3.8–5.2)
RBC # FLD: 14.8 % — HIGH (ref 10.3–14.5)
RBC BLD AUTO: NORMAL — SIGNIFICANT CHANGE UP
WBC # BLD: 7.8 K/UL — SIGNIFICANT CHANGE UP (ref 3.8–10.5)
WBC # FLD AUTO: 7.8 K/UL — SIGNIFICANT CHANGE UP (ref 3.8–10.5)

## 2018-12-04 PROCEDURE — 99214 OFFICE O/P EST MOD 30 MIN: CPT

## 2018-12-05 ENCOUNTER — APPOINTMENT (OUTPATIENT)
Age: 68
End: 2018-12-05

## 2018-12-05 DIAGNOSIS — R11.2 NAUSEA WITH VOMITING, UNSPECIFIED: ICD-10-CM

## 2018-12-05 DIAGNOSIS — Z51.11 ENCOUNTER FOR ANTINEOPLASTIC CHEMOTHERAPY: ICD-10-CM

## 2018-12-05 DIAGNOSIS — E86.0 DEHYDRATION: ICD-10-CM

## 2018-12-05 NOTE — PHYSICAL EXAM
[Restricted in physically strenuous activity but ambulatory and able to carry out work of a light or sedentary nature] : Status 1- Restricted in physically strenuous activity but ambulatory and able to carry out work of a light or sedentary nature, e.g., light house work, office work [Normal] : affect appropriate [de-identified] : negative cervical, supra/infraclavicular adenopathy. [de-identified] : negative pedal edema or calve tenderness [de-identified] : BS+, soft, nontender on palpation. [de-identified] : without spinal or cva tenderness.

## 2018-12-05 NOTE — HISTORY OF PRESENT ILLNESS
[de-identified] : Ms. Calero is a 68 year old female who was initially referred for monoclonal gammopathy.\par She was noted to have proteinuria and then had a 24 hour urine protein which showed non-nephrotic range proteinuria of 510 mg/24 hours. She was referred to Dr. Lesa Rendon of nephrology. SPEP showed faint M-spike 0.2 g/dL in gammaglobulin region, immunofixation showed this to be a IgG kappa monoclonal protein. UPEP showed 2 monoclonal protein bands, 61%, and 0.7%. \par \par Subsequent work up:\par 6/19/18 M-protein 0.2 g/dL, Kappa FLC 71, lambda FLC 0.36, FLC ratio 198.61\par , Beta 2 microglobulin 1.5 mg/L\par \par She had a bone marrow biopsy on 6/22/18. Pathology: plasma cell neoplasm, plasma cells comprise 20-25% of marrow cells. Trilineage hematopoiesis present with maturation, increased iron stores. Congo red stain negative for amyloid. Karyotype 46, XX. FISH panel - positive for CCND1/IGH fusion - a/w favorable prognosis.\par \par 7/12/18 PET - negative\par \par Treatment Summary \par 7/19/18 - C1 VRd\par 8/9/18 -C2 VRd\par 8/30/18 - C3 VRd\par 9/20/18 -partial C4 VRd\par 10/11/18 - C1D1 - KRd, carfilzomib only given day 1, day2, day 8, day 9 as patient was travelling out of country\par 11/5/18 -C2 KRd\par 12/4/18 - C3 KRd [de-identified] : Returns for follow up.\par C3D1 of KRd.\par She feels more fatigued compared to before. \par No fever but does have hot flashes/sweating. \par No leg swelling. No SOB. No cough. No chest pain.\par She had numbness/tingling of feet that is intermittent. \par She has no pain. \par S/p autoBMT evaluation with Dr. Rahman.  She is unsure of when to have the transplant. \par

## 2018-12-05 NOTE — ASSESSMENT
[FreeTextEntry1] :  68 year old female with stage I multiple myeloma, IgG kappa M protien 0.2 g/dl, FLC ratio 198 + proteinuria. S/p bone marrow biopsy on 6/22/18 consistent with plasma cell neoplasm. FISH panel - positive for CCND1/IGH fusion - a/w favorable prognosis. PET CT (7/12/18) did not show any bone lesions. S/p 3.5 cycles of VRd from 7/19/18-10/4/18.  \par \par  FLC ratio initially decreasing -->198 --> 126 --> 68, however has increased to 115 with urine studies positive for bence koo protein.  For refractory disease, she was switched to KRd on 10/11/18.  Developed HTN with first cycle which is now well controlled on current meds. \par \par Plan:\par C3D1 KRd today, Revlimid for days 1-21, 1 week off.\par Continue infection prophylaxis - Bactrim DS, Acyclovir \par Continue vitamin B6\par Thromboprophylaxis -  mg\par RTO 4 weeks

## 2018-12-06 ENCOUNTER — MESSAGE (OUTPATIENT)
Age: 68
End: 2018-12-06

## 2018-12-10 ENCOUNTER — RESULT REVIEW (OUTPATIENT)
Age: 68
End: 2018-12-10

## 2018-12-10 ENCOUNTER — LABORATORY RESULT (OUTPATIENT)
Age: 68
End: 2018-12-10

## 2018-12-10 ENCOUNTER — APPOINTMENT (OUTPATIENT)
Dept: HEMATOLOGY ONCOLOGY | Facility: CLINIC | Age: 68
End: 2018-12-10

## 2018-12-10 LAB
BASOPHILS # BLD AUTO: 0 K/UL — SIGNIFICANT CHANGE UP (ref 0–0.2)
BASOPHILS NFR BLD AUTO: 0.3 % — SIGNIFICANT CHANGE UP (ref 0–2)
EOSINOPHIL # BLD AUTO: 0.1 K/UL — SIGNIFICANT CHANGE UP (ref 0–0.5)
EOSINOPHIL NFR BLD AUTO: 0.6 % — SIGNIFICANT CHANGE UP (ref 0–6)
HCT VFR BLD CALC: 35.9 % — SIGNIFICANT CHANGE UP (ref 34.5–45)
HGB BLD-MCNC: 11.2 G/DL — LOW (ref 11.5–15.5)
LYMPHOCYTES # BLD AUTO: 1.2 K/UL — SIGNIFICANT CHANGE UP (ref 1–3.3)
LYMPHOCYTES # BLD AUTO: 13 % — SIGNIFICANT CHANGE UP (ref 13–44)
MCHC RBC-ENTMCNC: 29.2 PG — SIGNIFICANT CHANGE UP (ref 27–34)
MCHC RBC-ENTMCNC: 31.1 GM/DL — LOW (ref 32–36)
MCV RBC AUTO: 93.6 FL — SIGNIFICANT CHANGE UP (ref 80–100)
MONOCYTES # BLD AUTO: 0.2 K/UL — SIGNIFICANT CHANGE UP (ref 0–0.9)
MONOCYTES NFR BLD AUTO: 2.3 % — SIGNIFICANT CHANGE UP (ref 2–14)
NEUTROPHILS # BLD AUTO: 7.5 K/UL — HIGH (ref 1.8–7.4)
NEUTROPHILS NFR BLD AUTO: 83.7 % — HIGH (ref 43–77)
PLATELET # BLD AUTO: 270 K/UL — SIGNIFICANT CHANGE UP (ref 150–400)
RBC # BLD: 3.83 M/UL — SIGNIFICANT CHANGE UP (ref 3.8–5.2)
RBC # FLD: 14.8 % — HIGH (ref 10.3–14.5)
WBC # BLD: 9 K/UL — SIGNIFICANT CHANGE UP (ref 3.8–10.5)
WBC # FLD AUTO: 9 K/UL — SIGNIFICANT CHANGE UP (ref 3.8–10.5)

## 2018-12-11 ENCOUNTER — APPOINTMENT (OUTPATIENT)
Age: 68
End: 2018-12-11

## 2018-12-11 ENCOUNTER — OTHER (OUTPATIENT)
Age: 68
End: 2018-12-11

## 2018-12-12 ENCOUNTER — LABORATORY RESULT (OUTPATIENT)
Age: 68
End: 2018-12-12

## 2018-12-12 ENCOUNTER — APPOINTMENT (OUTPATIENT)
Age: 68
End: 2018-12-12

## 2018-12-12 ENCOUNTER — RESULT REVIEW (OUTPATIENT)
Age: 68
End: 2018-12-12

## 2018-12-12 ENCOUNTER — APPOINTMENT (OUTPATIENT)
Dept: HEMATOLOGY ONCOLOGY | Facility: CLINIC | Age: 68
End: 2018-12-12

## 2018-12-12 LAB
ALBUMIN MFR SERPL ELPH: 64.7 %
ALBUMIN SERPL-MCNC: 4 G/DL
ALBUMIN/GLOB SERPL: 1.8 RATIO
ALPHA1 GLOB MFR SERPL ELPH: 5.8 %
ALPHA1 GLOB SERPL ELPH-MCNC: 0.4 G/DL
ALPHA2 GLOB MFR SERPL ELPH: 13.1 %
ALPHA2 GLOB SERPL ELPH-MCNC: 0.8 G/DL
B-GLOBULIN MFR SERPL ELPH: 11.1 %
B-GLOBULIN SERPL ELPH-MCNC: 0.7 G/DL
BASOPHILS # BLD AUTO: 0 K/UL — SIGNIFICANT CHANGE UP (ref 0–0.2)
BASOPHILS NFR BLD AUTO: 0.5 % — SIGNIFICANT CHANGE UP (ref 0–2)
DEPRECATED KAPPA LC FREE/LAMBDA SER: 96.68 RATIO
EOSINOPHIL # BLD AUTO: 0.2 K/UL — SIGNIFICANT CHANGE UP (ref 0–0.5)
EOSINOPHIL NFR BLD AUTO: 1.8 % — SIGNIFICANT CHANGE UP (ref 0–6)
GAMMA GLOB FLD ELPH-MCNC: 0.3 G/DL
GAMMA GLOB MFR SERPL ELPH: 5.3 %
HCT VFR BLD CALC: 34.5 % — SIGNIFICANT CHANGE UP (ref 34.5–45)
HGB BLD-MCNC: 10.8 G/DL — LOW (ref 11.5–15.5)
IGA SER QL IEP: <2 MG/DL
IGG SER QL IEP: 370 MG/DL
IGM SER QL IEP: 18 MG/DL
INTERPRETATION SERPL IEP-IMP: NORMAL
KAPPA LC CSF-MCNC: 0.19 MG/DL
KAPPA LC SERPL-MCNC: 18.37 MG/DL
LYMPHOCYTES # BLD AUTO: 2.2 K/UL — SIGNIFICANT CHANGE UP (ref 1–3.3)
LYMPHOCYTES # BLD AUTO: 23.3 % — SIGNIFICANT CHANGE UP (ref 13–44)
M PROTEIN SPEC IFE-MCNC: NORMAL
MCHC RBC-ENTMCNC: 29.1 PG — SIGNIFICANT CHANGE UP (ref 27–34)
MCHC RBC-ENTMCNC: 31.4 GM/DL — LOW (ref 32–36)
MCV RBC AUTO: 92.8 FL — SIGNIFICANT CHANGE UP (ref 80–100)
MONOCYTES # BLD AUTO: 0.8 K/UL — SIGNIFICANT CHANGE UP (ref 0–0.9)
MONOCYTES NFR BLD AUTO: 8.2 % — SIGNIFICANT CHANGE UP (ref 2–14)
NEUTROPHILS # BLD AUTO: 6.2 K/UL — SIGNIFICANT CHANGE UP (ref 1.8–7.4)
NEUTROPHILS NFR BLD AUTO: 66.1 % — SIGNIFICANT CHANGE UP (ref 43–77)
PLATELET # BLD AUTO: 220 K/UL — SIGNIFICANT CHANGE UP (ref 150–400)
PROT SERPL-MCNC: 6.2 G/DL
PROT SERPL-MCNC: 6.2 G/DL
RBC # BLD: 3.72 M/UL — LOW (ref 3.8–5.2)
RBC # FLD: 14.5 % — SIGNIFICANT CHANGE UP (ref 10.3–14.5)
WBC # BLD: 9.3 K/UL — SIGNIFICANT CHANGE UP (ref 3.8–10.5)
WBC # FLD AUTO: 9.3 K/UL — SIGNIFICANT CHANGE UP (ref 3.8–10.5)

## 2018-12-13 ENCOUNTER — APPOINTMENT (OUTPATIENT)
Age: 68
End: 2018-12-13

## 2018-12-13 LAB
CREAT 24H UR-MCNC: 1 G/24 H
CREAT ?TM UR-MCNC: 52 MG/DL
PROT ?TM UR-MCNC: 24 HR
SPECIMEN VOL 24H UR: 2000 ML

## 2018-12-14 LAB
ALBUPE: 16.7 %
ALPHA1UPE: 27.7 %
ALPHA2UPE: 17.2 %
BETAUPE: 31.3 %
CREAT 24H UR-MCNC: NORMAL G/24 H
CREATININE UR (MAYO): 21 MG/DL
GAMMAUPE: 7.1 %
IGA 24H UR QL IFE: NORMAL
KAPPA LC 24H UR QL: 21.3 MG/DL
PROT PATTERN 24H UR ELPH-IMP: NORMAL
PROT UR-MCNC: 9 MG/DL
PROT UR-MCNC: 9 MG/DL
SPECIMEN VOL 24H UR: NORMAL

## 2018-12-16 ENCOUNTER — FORM ENCOUNTER (OUTPATIENT)
Age: 68
End: 2018-12-16

## 2018-12-17 ENCOUNTER — APPOINTMENT (OUTPATIENT)
Dept: NUCLEAR MEDICINE | Facility: CLINIC | Age: 68
End: 2018-12-17
Payer: COMMERCIAL

## 2018-12-17 ENCOUNTER — OUTPATIENT (OUTPATIENT)
Dept: OUTPATIENT SERVICES | Facility: HOSPITAL | Age: 68
LOS: 1 days | End: 2018-12-17

## 2018-12-17 DIAGNOSIS — C90.00 MULTIPLE MYELOMA NOT HAVING ACHIEVED REMISSION: ICD-10-CM

## 2018-12-17 PROCEDURE — 78816 PET IMAGE W/CT FULL BODY: CPT | Mod: 26,PS

## 2018-12-18 ENCOUNTER — RESULT REVIEW (OUTPATIENT)
Age: 68
End: 2018-12-18

## 2018-12-18 ENCOUNTER — APPOINTMENT (OUTPATIENT)
Dept: HEMATOLOGY ONCOLOGY | Facility: CLINIC | Age: 68
End: 2018-12-18
Payer: COMMERCIAL

## 2018-12-18 VITALS
WEIGHT: 173.04 LBS | TEMPERATURE: 97.9 F | HEIGHT: 63.23 IN | BODY MASS INDEX: 30.28 KG/M2 | HEART RATE: 75 BPM | SYSTOLIC BLOOD PRESSURE: 121 MMHG | OXYGEN SATURATION: 99 % | DIASTOLIC BLOOD PRESSURE: 76 MMHG

## 2018-12-18 LAB
BASOPHILS # BLD AUTO: 0.1 K/UL — SIGNIFICANT CHANGE UP (ref 0–0.2)
BASOPHILS # BLD AUTO: 0.2 K/UL — SIGNIFICANT CHANGE UP (ref 0–0.2)
BASOPHILS NFR BLD AUTO: 0 % — SIGNIFICANT CHANGE UP (ref 0–2)
ELLIPTOCYTES BLD QL SMEAR: SLIGHT — SIGNIFICANT CHANGE UP
EOSINOPHIL # BLD AUTO: 0 K/UL — SIGNIFICANT CHANGE UP (ref 0–0.5)
EOSINOPHIL # BLD AUTO: 0.1 K/UL — SIGNIFICANT CHANGE UP (ref 0–0.5)
EOSINOPHIL NFR BLD AUTO: 0 % — SIGNIFICANT CHANGE UP (ref 0–6)
HCT VFR BLD CALC: 32.5 % — LOW (ref 34.5–45)
HCT VFR BLD CALC: 34.9 % — SIGNIFICANT CHANGE UP (ref 34.5–45)
HGB BLD-MCNC: 10.6 G/DL — LOW (ref 11.5–15.5)
HGB BLD-MCNC: 11.4 G/DL — LOW (ref 11.5–15.5)
LYMPHOCYTES # BLD AUTO: 1.1 K/UL — SIGNIFICANT CHANGE UP (ref 1–3.3)
LYMPHOCYTES # BLD AUTO: 1.2 K/UL — SIGNIFICANT CHANGE UP (ref 1–3.3)
LYMPHOCYTES # BLD AUTO: 3 % — LOW (ref 13–44)
LYMPHOCYTES # BLD AUTO: 3 % — LOW (ref 13–44)
MCHC RBC-ENTMCNC: 29.8 PG — SIGNIFICANT CHANGE UP (ref 27–34)
MCHC RBC-ENTMCNC: 30 PG — SIGNIFICANT CHANGE UP (ref 27–34)
MCHC RBC-ENTMCNC: 32.5 GM/DL — SIGNIFICANT CHANGE UP (ref 32–36)
MCHC RBC-ENTMCNC: 32.7 GM/DL — SIGNIFICANT CHANGE UP (ref 32–36)
MCV RBC AUTO: 91.6 FL — SIGNIFICANT CHANGE UP (ref 80–100)
MCV RBC AUTO: 91.7 FL — SIGNIFICANT CHANGE UP (ref 80–100)
MONOCYTES # BLD AUTO: 1.9 K/UL — HIGH (ref 0–0.9)
MONOCYTES # BLD AUTO: 2.1 K/UL — HIGH (ref 0–0.9)
MONOCYTES NFR BLD AUTO: 7 % — SIGNIFICANT CHANGE UP (ref 2–14)
MONOCYTES NFR BLD AUTO: 8 % — SIGNIFICANT CHANGE UP (ref 2–14)
NEUTROPHILS # BLD AUTO: 18.6 K/UL — HIGH (ref 1.8–7.4)
NEUTROPHILS # BLD AUTO: 20.3 K/UL — HIGH (ref 1.8–7.4)
NEUTROPHILS NFR BLD AUTO: 88 % — HIGH (ref 43–77)
NEUTROPHILS NFR BLD AUTO: 89 % — HIGH (ref 43–77)
NEUTS BAND # BLD: 2 % — SIGNIFICANT CHANGE UP (ref 0–8)
OVALOCYTES BLD QL SMEAR: SLIGHT — SIGNIFICANT CHANGE UP
PLAT MORPH BLD: NORMAL — SIGNIFICANT CHANGE UP
PLAT MORPH BLD: NORMAL — SIGNIFICANT CHANGE UP
PLATELET # BLD AUTO: 255 K/UL — SIGNIFICANT CHANGE UP (ref 150–400)
PLATELET # BLD AUTO: 260 K/UL — SIGNIFICANT CHANGE UP (ref 150–400)
POIKILOCYTOSIS BLD QL AUTO: SLIGHT — SIGNIFICANT CHANGE UP
RBC # BLD: 3.55 M/UL — LOW (ref 3.8–5.2)
RBC # BLD: 3.8 M/UL — SIGNIFICANT CHANGE UP (ref 3.8–5.2)
RBC # FLD: 13.9 % — SIGNIFICANT CHANGE UP (ref 10.3–14.5)
RBC # FLD: 13.9 % — SIGNIFICANT CHANGE UP (ref 10.3–14.5)
RBC BLD AUTO: NORMAL — SIGNIFICANT CHANGE UP
RBC BLD AUTO: NORMAL — SIGNIFICANT CHANGE UP
SCHISTOCYTES BLD QL AUTO: SLIGHT — SIGNIFICANT CHANGE UP
SMUDGE CELLS # BLD: PRESENT — SIGNIFICANT CHANGE UP
WBC # BLD: 21.9 K/UL — HIGH (ref 3.8–10.5)
WBC # BLD: 23.7 K/UL — HIGH (ref 3.8–10.5)
WBC # FLD AUTO: 21.9 K/UL — HIGH (ref 3.8–10.5)
WBC # FLD AUTO: 23.7 K/UL — HIGH (ref 3.8–10.5)

## 2018-12-18 PROCEDURE — 99214 OFFICE O/P EST MOD 30 MIN: CPT

## 2018-12-19 ENCOUNTER — LABORATORY RESULT (OUTPATIENT)
Age: 68
End: 2018-12-19

## 2018-12-19 ENCOUNTER — APPOINTMENT (OUTPATIENT)
Age: 68
End: 2018-12-19

## 2018-12-20 ENCOUNTER — APPOINTMENT (OUTPATIENT)
Age: 68
End: 2018-12-20

## 2018-12-20 ENCOUNTER — FORM ENCOUNTER (OUTPATIENT)
Age: 68
End: 2018-12-20

## 2018-12-21 ENCOUNTER — LABORATORY RESULT (OUTPATIENT)
Age: 68
End: 2018-12-21

## 2018-12-21 ENCOUNTER — APPOINTMENT (OUTPATIENT)
Dept: RADIOLOGY | Facility: IMAGING CENTER | Age: 68
End: 2018-12-21
Payer: COMMERCIAL

## 2018-12-21 ENCOUNTER — APPOINTMENT (OUTPATIENT)
Dept: PULMONOLOGY | Facility: CLINIC | Age: 68
End: 2018-12-21
Payer: COMMERCIAL

## 2018-12-21 ENCOUNTER — RESULT REVIEW (OUTPATIENT)
Age: 68
End: 2018-12-21

## 2018-12-21 ENCOUNTER — APPOINTMENT (OUTPATIENT)
Dept: HEMATOLOGY ONCOLOGY | Facility: CLINIC | Age: 68
End: 2018-12-21

## 2018-12-21 ENCOUNTER — OUTPATIENT (OUTPATIENT)
Dept: OUTPATIENT SERVICES | Facility: HOSPITAL | Age: 68
LOS: 1 days | End: 2018-12-21
Payer: COMMERCIAL

## 2018-12-21 ENCOUNTER — APPOINTMENT (OUTPATIENT)
Dept: CV DIAGNOSITCS | Facility: HOSPITAL | Age: 68
End: 2018-12-21

## 2018-12-21 ENCOUNTER — APPOINTMENT (OUTPATIENT)
Dept: NUCLEAR MEDICINE | Facility: HOSPITAL | Age: 68
End: 2018-12-21

## 2018-12-21 ENCOUNTER — OUTPATIENT (OUTPATIENT)
Dept: OUTPATIENT SERVICES | Facility: HOSPITAL | Age: 68
LOS: 1 days | Discharge: HOME | End: 2018-12-21

## 2018-12-21 DIAGNOSIS — C90.00 MULTIPLE MYELOMA NOT HAVING ACHIEVED REMISSION: ICD-10-CM

## 2018-12-21 DIAGNOSIS — Z00.8 ENCOUNTER FOR OTHER GENERAL EXAMINATION: ICD-10-CM

## 2018-12-21 LAB
HCT VFR BLD CALC: 29.2 % — LOW (ref 34.5–45)
HGB BLD-MCNC: 10.2 G/DL — LOW (ref 11.5–15.5)
MCHC RBC-ENTMCNC: 31.9 PG — SIGNIFICANT CHANGE UP (ref 27–34)
MCHC RBC-ENTMCNC: 34.9 G/DL — SIGNIFICANT CHANGE UP (ref 32–36)
MCV RBC AUTO: 91.4 FL — SIGNIFICANT CHANGE UP (ref 80–100)
PLATELET # BLD AUTO: 205 K/UL — SIGNIFICANT CHANGE UP (ref 150–400)
RBC # BLD: 3.2 M/UL — LOW (ref 3.8–5.2)
RBC # FLD: 14.3 % — SIGNIFICANT CHANGE UP (ref 10.3–14.5)
WBC # BLD: 14.1 K/UL — HIGH (ref 3.8–10.5)
WBC # FLD AUTO: 14.1 K/UL — HIGH (ref 3.8–10.5)

## 2018-12-21 PROCEDURE — 94726 PLETHYSMOGRAPHY LUNG VOLUMES: CPT

## 2018-12-21 PROCEDURE — ZZZZZ: CPT

## 2018-12-21 PROCEDURE — 78472 GATED HEART PLANAR SINGLE: CPT

## 2018-12-21 PROCEDURE — 36600 WITHDRAWAL OF ARTERIAL BLOOD: CPT | Mod: 59

## 2018-12-21 PROCEDURE — 78472 GATED HEART PLANAR SINGLE: CPT | Mod: 26

## 2018-12-21 PROCEDURE — 94729 DIFFUSING CAPACITY: CPT

## 2018-12-21 PROCEDURE — 70210 X-RAY EXAM OF SINUSES: CPT

## 2018-12-21 PROCEDURE — 93306 TTE W/DOPPLER COMPLETE: CPT

## 2018-12-21 PROCEDURE — A9560: CPT

## 2018-12-21 PROCEDURE — 93306 TTE W/DOPPLER COMPLETE: CPT | Mod: 26

## 2018-12-21 PROCEDURE — 70210 X-RAY EXAM OF SINUSES: CPT | Mod: 26

## 2018-12-21 PROCEDURE — 94060 EVALUATION OF WHEEZING: CPT

## 2018-12-21 PROCEDURE — 82803 BLOOD GASES ANY COMBINATION: CPT

## 2018-12-21 PROCEDURE — 0399T: CPT

## 2018-12-27 ENCOUNTER — RX RENEWAL (OUTPATIENT)
Age: 68
End: 2018-12-27

## 2019-01-02 ENCOUNTER — APPOINTMENT (OUTPATIENT)
Age: 69
End: 2019-01-02

## 2019-01-02 ENCOUNTER — LABORATORY RESULT (OUTPATIENT)
Age: 69
End: 2019-01-02

## 2019-01-02 ENCOUNTER — RESULT REVIEW (OUTPATIENT)
Age: 69
End: 2019-01-02

## 2019-01-02 LAB
APPEARANCE: CLEAR
BACTERIA UR CULT: ABNORMAL
BACTERIA: NEGATIVE
BASOPHILS # BLD AUTO: 0.1 K/UL — SIGNIFICANT CHANGE UP (ref 0–0.2)
BASOPHILS NFR BLD AUTO: 0.9 % — SIGNIFICANT CHANGE UP (ref 0–2)
BILIRUBIN URINE: NEGATIVE
BLOOD URINE: NEGATIVE
COLOR: YELLOW
EOSINOPHIL # BLD AUTO: 0 K/UL — SIGNIFICANT CHANGE UP (ref 0–0.5)
EOSINOPHIL NFR BLD AUTO: 0.2 % — SIGNIFICANT CHANGE UP (ref 0–6)
GLUCOSE QUALITATIVE U: 100 MG/DL
HCT VFR BLD CALC: 31.2 % — LOW (ref 34.5–45)
HGB BLD-MCNC: 10.1 G/DL — LOW (ref 11.5–15.5)
HYALINE CASTS: 11 /LPF
KETONES URINE: ABNORMAL
LEUKOCYTE ESTERASE URINE: NEGATIVE
LYMPHOCYTES # BLD AUTO: 2.6 K/UL — SIGNIFICANT CHANGE UP (ref 1–3.3)
LYMPHOCYTES # BLD AUTO: 25.4 % — SIGNIFICANT CHANGE UP (ref 13–44)
MCHC RBC-ENTMCNC: 30.5 PG — SIGNIFICANT CHANGE UP (ref 27–34)
MCHC RBC-ENTMCNC: 32.3 GM/DL — SIGNIFICANT CHANGE UP (ref 32–36)
MCV RBC AUTO: 94.2 FL — SIGNIFICANT CHANGE UP (ref 80–100)
MICROSCOPIC-UA: NORMAL
MONOCYTES # BLD AUTO: 1.2 K/UL — HIGH (ref 0–0.9)
MONOCYTES NFR BLD AUTO: 11.9 % — SIGNIFICANT CHANGE UP (ref 2–14)
NEUTROPHILS # BLD AUTO: 6.2 K/UL — SIGNIFICANT CHANGE UP (ref 1.8–7.4)
NEUTROPHILS NFR BLD AUTO: 61.6 % — SIGNIFICANT CHANGE UP (ref 43–77)
NITRITE URINE: NEGATIVE
PH URINE: 5
PLATELET # BLD AUTO: 241 K/UL — SIGNIFICANT CHANGE UP (ref 150–400)
PROTEIN URINE: ABNORMAL MG/DL
RBC # BLD: 3.32 M/UL — LOW (ref 3.8–5.2)
RBC # FLD: 15.1 % — HIGH (ref 10.3–14.5)
RED BLOOD CELLS URINE: 2 /HPF
SPECIFIC GRAVITY URINE: 1.02
SQUAMOUS EPITHELIAL CELLS: 5 /HPF
UROBILINOGEN URINE: NEGATIVE MG/DL
WBC # BLD: 10.1 K/UL — SIGNIFICANT CHANGE UP (ref 3.8–10.5)
WBC # FLD AUTO: 10.1 K/UL — SIGNIFICANT CHANGE UP (ref 3.8–10.5)
WHITE BLOOD CELLS URINE: 3 /HPF

## 2019-01-02 NOTE — PHYSICAL EXAM
[Restricted in physically strenuous activity but ambulatory and able to carry out work of a light or sedentary nature] : Status 1- Restricted in physically strenuous activity but ambulatory and able to carry out work of a light or sedentary nature, e.g., light house work, office work [Normal] : affect appropriate [de-identified] : negative cervical, supra/infraclavicular adenopathy. [de-identified] : negative pedal edema or calve tenderness [de-identified] : BS+, soft, nontender on palpation. [de-identified] : without spinal or cva tenderness.

## 2019-01-02 NOTE — ASSESSMENT
[FreeTextEntry1] :  68 year old female with stage I multiple myeloma, IgG kappa M protien 0.2 g/dl, FLC ratio 198 + proteinuria. S/p bone marrow biopsy on 6/22/18 consistent with plasma cell neoplasm. FISH panel - positive for CCND1/IGH fusion - a/w favorable prognosis. PET CT (7/12/18) did not show any bone lesions. S/p 3.5 cycles of VRd from 7/19/18-10/4/18.  \par \par  FLC ratio initially decreasing -->198 --> 126 --> 68, however has increased to 115 with urine studies positive for bence koo protein.  For refractory disease, she was switched to KRd on 10/11/18.  Developed HTN with first cycle which is now well controlled on current meds. \par \par Plan:\par Proceed w/ C3D15 KRd tomorrow. Cont Revlimid (start date this cycle ~ 12/03- thru day 21), then 1 week off.\par No c/o dysuria, but notes "foamy" consistency to urine--> u/a and C&S.\par Continue infection prophylaxis - Bactrim DS, Acyclovir \par Continue vitamin B6\par Thromboprophylaxis -  mg\par BMT evaluation is in process w/ Dr. Analisa Melgar at [facility].\par RTO 4 weeks

## 2019-01-02 NOTE — ADDENDUM
[FreeTextEntry1] : U/A appearance clear, nitrite - negative, bacteria - neg, leukocyte esterase  neg, wbc 3 HPF, RBC 2 HPF, trace Protein. \par Discussed w/ Anahi that U/A is not showing signs of active infection. Urine Cx-  Verified:12/19/2018 21:43\par      10,000 - 49,000 CFU/mL Coag Negative Staphylococcus\par      "Susceptibilities not performed"\par No antibx prescribed.

## 2019-01-02 NOTE — HISTORY OF PRESENT ILLNESS
[de-identified] : Ms. Calreo is a 68 year old female who was initially referred for monoclonal gammopathy.\par She was noted to have proteinuria and then had a 24 hour urine protein which showed non-nephrotic range proteinuria of 510 mg/24 hours. She was referred to Dr. Lesa Rendon of nephrology. SPEP showed faint M-spike 0.2 g/dL in gammaglobulin region, immunofixation showed this to be a IgG kappa monoclonal protein. UPEP showed 2 monoclonal protein bands, 61%, and 0.7%. \par \par Subsequent work up:\par 6/19/18 M-protein 0.2 g/dL, Kappa FLC 71, lambda FLC 0.36, FLC ratio 198.61\par , Beta 2 microglobulin 1.5 mg/L\par \par She had a bone marrow biopsy on 6/22/18. Pathology: plasma cell neoplasm, plasma cells comprise 20-25% of marrow cells. Trilineage hematopoiesis present with maturation, increased iron stores. Congo red stain negative for amyloid. Karyotype 46, XX. FISH panel - positive for CCND1/IGH fusion - a/w favorable prognosis.\par \par 7/12/18 PET - negative\par \par Treatment Summary \par 7/19/18 - C1 VRd\par 8/9/18 -C2 VRd\par 8/30/18 - C3 VRd\par 9/20/18 -partial C4 VRd\par 10/11/18 - C1D1 - KRd, carfilzomib only given day 1, day 2, day 8, day 9 as patient was travelling out of country\par 11/5/18 -C2 KRd\par 12/4/18 - C3 KRd [de-identified] : Returns for follow up. Today is day 15 of C3 Kyprolis/Revlimid. Her Kyprolis is scheduled 1 day off tomorrow on 12/19 and 12/20 (days 15 + 16). \par Denies any fevers, no night sweats, no SOB, no CP, appetite has decreased a little- and rates as fair. Has lost 1 pant size. Chronic constipation manageable w/ laxative, no diarrhea, no pain or burning w/ urination. Notes "sudsy foam" to urine consistency. PET-CT done yesterday.\par \par S/p autoBMT evaluation with Dr. Rahman.  She is unsure of when to have the transplant. \par

## 2019-01-03 ENCOUNTER — APPOINTMENT (OUTPATIENT)
Age: 69
End: 2019-01-03

## 2019-01-03 ENCOUNTER — APPOINTMENT (OUTPATIENT)
Dept: HEMATOLOGY ONCOLOGY | Facility: CLINIC | Age: 69
End: 2019-01-03
Payer: COMMERCIAL

## 2019-01-03 PROCEDURE — 99214 OFFICE O/P EST MOD 30 MIN: CPT

## 2019-01-03 NOTE — HISTORY OF PRESENT ILLNESS
[de-identified] : Ms. Calero is a 68 year old female who was initially referred for monoclonal gammopathy.\par She was noted to have proteinuria and then had a 24 hour urine protein which showed non-nephrotic range proteinuria of 510 mg/24 hours. She was referred to Dr. Lesa Rendon of nephrology. SPEP showed faint M-spike 0.2 g/dL in gammaglobulin region, immunofixation showed this to be a IgG kappa monoclonal protein. UPEP showed 2 monoclonal protein bands, 61%, and 0.7%. \par \par Subsequent work up:\par 6/19/18 M-protein 0.2 g/dL, Kappa FLC 71, lambda FLC 0.36, FLC ratio 198.61\par , Beta 2 microglobulin 1.5 mg/L\par \par She had a bone marrow biopsy on 6/22/18. Pathology: plasma cell neoplasm, plasma cells comprise 20-25% of marrow cells. Trilineage hematopoiesis present with maturation, increased iron stores. Congo red stain negative for amyloid. Karyotype 46, XX. FISH panel - positive for CCND1/IGH fusion - a/w favorable prognosis.\par \par 7/12/18 PET - negative\par \par Treatment Summary \par 7/19/18 - C1 VRd\par 8/9/18 -C2 VRd\par 8/30/18 - C3 VRd\par 9/20/18 -partial C4 VRd\par 10/11/18 - C1D1 - KRd, carfilzomib only given day 1, day 2, day 8, day 9 as patient was [place] of [place]\par 11/5/18 -C2 KRd\par 12/4/18 - C3 KRd\par 1/2/19 - C4 KRd [de-identified] : Returns for follow up. Today is C4D2 KRd. \par She has some mild fatigue but otherwise denies any SOB, cough, SOB, leg swelling.  \par No dysuria. \par \par S/p autoBMT evaluation with Dr. Rahman on 11/26/18.

## 2019-01-03 NOTE — ASSESSMENT
[FreeTextEntry1] :  68 year old female with stage I multiple myeloma, IgG kappa M protien 0.2 g/dl, FLC ratio 198 + proteinuria. S/p bone marrow biopsy on 6/22/18 consistent with plasma cell neoplasm. FISH panel - positive for CCND1/IGH fusion - a/w favorable prognosis. PET CT (7/12/18) did not show any bone lesions. S/p 3.5 cycles of VRd from 7/19/18-10/4/18.  \par \par FLC ratio initially decreasing -->198 --> 126 --> 68, however then increased to 115 with urine studies positive for bence koo protein.  For refractory disease, she was switched to KRd on 10/11/18.  Developed HTN with first cycle which is now well controlled on current meds.  On 12/4/18, she did have FLC ratio rise to 207 which on repeat was 96 and stable.\par \par She's doing well on KRd, minimal fatigue. Kappa light chains and FLC ratio downtrending.  12/17/18 PET negative for FDG avid lesion, but showed unchanged right ovarian hypodensity.\par \par Plan:\par Continue KRd\par Continue infection prophylaxis - Bactrim DS, Acyclovir \par Continue vitamin B6\par Thromboprophylaxis -  mg\par BMT evaluation is in process w/ Dr. Analisa Melgar at [facility].\par RTO 4 weeks\par Right ovarian hypodensity --> transvaginal US for 2/2019

## 2019-01-03 NOTE — PHYSICAL EXAM
[Restricted in physically strenuous activity but ambulatory and able to carry out work of a light or sedentary nature] : Status 1- Restricted in physically strenuous activity but ambulatory and able to carry out work of a light or sedentary nature, e.g., light house work, office work [Normal] : affect appropriate [de-identified] : negative cervical, supra/infraclavicular adenopathy. [de-identified] : negative pedal edema or calve tenderness [de-identified] : BS+, soft, nontender on palpation. [de-identified] : without spinal or cva tenderness.

## 2019-01-04 DIAGNOSIS — Z51.11 ENCOUNTER FOR ANTINEOPLASTIC CHEMOTHERAPY: ICD-10-CM

## 2019-01-04 DIAGNOSIS — R11.2 NAUSEA WITH VOMITING, UNSPECIFIED: ICD-10-CM

## 2019-01-07 ENCOUNTER — OUTPATIENT (OUTPATIENT)
Dept: OUTPATIENT SERVICES | Facility: HOSPITAL | Age: 69
LOS: 1 days | Discharge: HOME | End: 2019-01-07
Payer: COMMERCIAL

## 2019-01-07 DIAGNOSIS — C90.00 MULTIPLE MYELOMA NOT HAVING ACHIEVED REMISSION: ICD-10-CM

## 2019-01-08 ENCOUNTER — RX RENEWAL (OUTPATIENT)
Age: 69
End: 2019-01-08

## 2019-01-09 ENCOUNTER — LABORATORY RESULT (OUTPATIENT)
Age: 69
End: 2019-01-09

## 2019-01-09 ENCOUNTER — RESULT REVIEW (OUTPATIENT)
Age: 69
End: 2019-01-09

## 2019-01-09 ENCOUNTER — APPOINTMENT (OUTPATIENT)
Age: 69
End: 2019-01-09

## 2019-01-09 LAB
APPEARANCE: CLEAR
BACTERIA UR CULT: NORMAL
BASOPHILS # BLD AUTO: 0.1 K/UL — SIGNIFICANT CHANGE UP (ref 0–0.2)
BASOPHILS NFR BLD AUTO: 0.9 % — SIGNIFICANT CHANGE UP (ref 0–2)
BILIRUBIN URINE: NEGATIVE
BLOOD URINE: NEGATIVE
COLOR: YELLOW
CREAT 24H UR-MCNC: 1.1 G/24 H
CREAT ?TM UR-MCNC: 42 MG/DL
EOSINOPHIL # BLD AUTO: 0.2 K/UL — SIGNIFICANT CHANGE UP (ref 0–0.5)
EOSINOPHIL NFR BLD AUTO: 2.9 % — SIGNIFICANT CHANGE UP (ref 0–6)
GLUCOSE QUALITATIVE U: NEGATIVE MG/DL
HCT VFR BLD CALC: 29.8 % — LOW (ref 34.5–45)
HGB BLD-MCNC: 9.8 G/DL — LOW (ref 11.5–15.5)
KETONES URINE: NEGATIVE
LEUKOCYTE ESTERASE URINE: NEGATIVE
LYMPHOCYTES # BLD AUTO: 1.9 K/UL — SIGNIFICANT CHANGE UP (ref 1–3.3)
LYMPHOCYTES # BLD AUTO: 24.7 % — SIGNIFICANT CHANGE UP (ref 13–44)
MCHC RBC-ENTMCNC: 31 PG — SIGNIFICANT CHANGE UP (ref 27–34)
MCHC RBC-ENTMCNC: 33 GM/DL — SIGNIFICANT CHANGE UP (ref 32–36)
MCV RBC AUTO: 94 FL — SIGNIFICANT CHANGE UP (ref 80–100)
MONOCYTES # BLD AUTO: 0.5 K/UL — SIGNIFICANT CHANGE UP (ref 0–0.9)
MONOCYTES NFR BLD AUTO: 6.3 % — SIGNIFICANT CHANGE UP (ref 2–14)
NEUTROPHILS # BLD AUTO: 5.1 K/UL — SIGNIFICANT CHANGE UP (ref 1.8–7.4)
NEUTROPHILS NFR BLD AUTO: 65.1 % — SIGNIFICANT CHANGE UP (ref 43–77)
NITRITE URINE: NEGATIVE
PH URINE: 7
PLATELET # BLD AUTO: 191 K/UL — SIGNIFICANT CHANGE UP (ref 150–400)
PROT 24H UR-MRATE: 10 MG/DL
PROT ?TM UR-MCNC: 24 HR
PROT UR-MCNC: 252 MG/24 H
PROTEIN URINE: NEGATIVE MG/DL
RBC # BLD: 3.17 M/UL — LOW (ref 3.8–5.2)
RBC # FLD: 14.6 % — HIGH (ref 10.3–14.5)
SPECIFIC GRAVITY URINE: 1.01
SPECIMEN VOL 24H UR: 2525 ML
UROBILINOGEN URINE: NEGATIVE MG/DL
WBC # BLD: 7.8 K/UL — SIGNIFICANT CHANGE UP (ref 3.8–10.5)
WBC # FLD AUTO: 7.8 K/UL — SIGNIFICANT CHANGE UP (ref 3.8–10.5)

## 2019-01-10 ENCOUNTER — APPOINTMENT (OUTPATIENT)
Age: 69
End: 2019-01-10

## 2019-01-10 DIAGNOSIS — Z51.11 ENCOUNTER FOR ANTINEOPLASTIC CHEMOTHERAPY: ICD-10-CM

## 2019-01-10 DIAGNOSIS — R11.2 NAUSEA WITH VOMITING, UNSPECIFIED: ICD-10-CM

## 2019-01-11 ENCOUNTER — RX RENEWAL (OUTPATIENT)
Age: 69
End: 2019-01-11

## 2019-01-11 LAB
KAPPA LC 24H UR-MCNC: 7.59 MG/DL
KAPPA LC 24H UR-MRATE: 191.65 MG/24 H
LAMBDA LC 24H UR-MCNC: <0.4 MG/DL
LAMBDA LC 24H UR-MRATE: NORMAL
SPECIMEN VOL 24H UR: 2525 ML/24 H

## 2019-01-15 ENCOUNTER — APPOINTMENT (OUTPATIENT)
Dept: HEMATOLOGY ONCOLOGY | Facility: CLINIC | Age: 69
End: 2019-01-15
Payer: COMMERCIAL

## 2019-01-15 ENCOUNTER — RESULT REVIEW (OUTPATIENT)
Age: 69
End: 2019-01-15

## 2019-01-15 VITALS
DIASTOLIC BLOOD PRESSURE: 73 MMHG | OXYGEN SATURATION: 100 % | TEMPERATURE: 98.3 F | SYSTOLIC BLOOD PRESSURE: 136 MMHG | RESPIRATION RATE: 16 BRPM | BODY MASS INDEX: 31.02 KG/M2 | WEIGHT: 176.37 LBS | HEART RATE: 71 BPM

## 2019-01-15 LAB
BASOPHILS # BLD AUTO: 0 K/UL — SIGNIFICANT CHANGE UP (ref 0–0.2)
BASOPHILS NFR BLD AUTO: 0.2 % — SIGNIFICANT CHANGE UP (ref 0–2)
EOSINOPHIL # BLD AUTO: 0.1 K/UL — SIGNIFICANT CHANGE UP (ref 0–0.5)
EOSINOPHIL NFR BLD AUTO: 0.5 % — SIGNIFICANT CHANGE UP (ref 0–6)
HCT VFR BLD CALC: 30 % — LOW (ref 34.5–45)
HGB BLD-MCNC: 10.2 G/DL — LOW (ref 11.5–15.5)
LYMPHOCYTES # BLD AUTO: 1.7 K/UL — SIGNIFICANT CHANGE UP (ref 1–3.3)
LYMPHOCYTES # BLD AUTO: 10 % — LOW (ref 13–44)
MCHC RBC-ENTMCNC: 31.3 PG — SIGNIFICANT CHANGE UP (ref 27–34)
MCHC RBC-ENTMCNC: 33.9 G/DL — SIGNIFICANT CHANGE UP (ref 32–36)
MCV RBC AUTO: 92.4 FL — SIGNIFICANT CHANGE UP (ref 80–100)
MONOCYTES # BLD AUTO: 2 K/UL — HIGH (ref 0–0.9)
MONOCYTES NFR BLD AUTO: 11.7 % — SIGNIFICANT CHANGE UP (ref 2–14)
NEUTROPHILS # BLD AUTO: 13 K/UL — HIGH (ref 1.8–7.4)
NEUTROPHILS NFR BLD AUTO: 77.6 % — HIGH (ref 43–77)
PLATELET # BLD AUTO: 256 K/UL — SIGNIFICANT CHANGE UP (ref 150–400)
RBC # BLD: 3.25 M/UL — LOW (ref 3.8–5.2)
RBC # FLD: 14.1 % — SIGNIFICANT CHANGE UP (ref 10.3–14.5)
WBC # BLD: 16.8 K/UL — HIGH (ref 3.8–10.5)
WBC # FLD AUTO: 16.8 K/UL — HIGH (ref 3.8–10.5)

## 2019-01-15 PROCEDURE — 99215 OFFICE O/P EST HI 40 MIN: CPT

## 2019-01-16 ENCOUNTER — APPOINTMENT (OUTPATIENT)
Age: 69
End: 2019-01-16

## 2019-01-16 ENCOUNTER — LABORATORY RESULT (OUTPATIENT)
Age: 69
End: 2019-01-16

## 2019-01-17 ENCOUNTER — APPOINTMENT (OUTPATIENT)
Dept: HEMATOLOGY ONCOLOGY | Facility: CLINIC | Age: 69
End: 2019-01-17

## 2019-01-17 ENCOUNTER — APPOINTMENT (OUTPATIENT)
Age: 69
End: 2019-01-17

## 2019-01-22 ENCOUNTER — APPOINTMENT (OUTPATIENT)
Dept: HEMATOLOGY ONCOLOGY | Facility: CLINIC | Age: 69
End: 2019-01-22
Payer: COMMERCIAL

## 2019-01-22 ENCOUNTER — RESULT REVIEW (OUTPATIENT)
Age: 69
End: 2019-01-22

## 2019-01-22 VITALS
HEART RATE: 81 BPM | TEMPERATURE: 98.2 F | OXYGEN SATURATION: 97 % | DIASTOLIC BLOOD PRESSURE: 75 MMHG | WEIGHT: 175.05 LBS | BODY MASS INDEX: 30.63 KG/M2 | SYSTOLIC BLOOD PRESSURE: 142 MMHG | HEIGHT: 63.23 IN

## 2019-01-22 LAB
BASOPHILS # BLD AUTO: 0.1 K/UL — SIGNIFICANT CHANGE UP (ref 0–0.2)
BASOPHILS NFR BLD AUTO: 0.5 % — SIGNIFICANT CHANGE UP (ref 0–2)
EOSINOPHIL # BLD AUTO: 0 K/UL — SIGNIFICANT CHANGE UP (ref 0–0.5)
EOSINOPHIL NFR BLD AUTO: 0.2 % — SIGNIFICANT CHANGE UP (ref 0–6)
HCT VFR BLD CALC: 32.5 % — LOW (ref 34.5–45)
HGB BLD-MCNC: 10.9 G/DL — LOW (ref 11.5–15.5)
LYMPHOCYTES # BLD AUTO: 1.6 K/UL — SIGNIFICANT CHANGE UP (ref 1–3.3)
LYMPHOCYTES # BLD AUTO: 11.7 % — LOW (ref 13–44)
MCHC RBC-ENTMCNC: 30.8 PG — SIGNIFICANT CHANGE UP (ref 27–34)
MCHC RBC-ENTMCNC: 33.4 GM/DL — SIGNIFICANT CHANGE UP (ref 32–36)
MCV RBC AUTO: 92.2 FL — SIGNIFICANT CHANGE UP (ref 80–100)
MONOCYTES # BLD AUTO: 1.1 K/UL — HIGH (ref 0–0.9)
MONOCYTES NFR BLD AUTO: 7.8 % — SIGNIFICANT CHANGE UP (ref 2–14)
NEUTROPHILS # BLD AUTO: 10.7 K/UL — HIGH (ref 1.8–7.4)
NEUTROPHILS NFR BLD AUTO: 79.8 % — HIGH (ref 43–77)
PLATELET # BLD AUTO: 162 K/UL — SIGNIFICANT CHANGE UP (ref 150–400)
RBC # BLD: 3.53 M/UL — LOW (ref 3.8–5.2)
RBC # FLD: 14.1 % — SIGNIFICANT CHANGE UP (ref 10.3–14.5)
WBC # BLD: 13.4 K/UL — HIGH (ref 3.8–10.5)
WBC # FLD AUTO: 13.4 K/UL — HIGH (ref 3.8–10.5)

## 2019-01-22 PROCEDURE — 99212 OFFICE O/P EST SF 10 MIN: CPT

## 2019-01-24 LAB
ALBUMIN MFR SERPL ELPH: 65.9 %
ALBUMIN SERPL ELPH-MCNC: 4.2 G/DL
ALBUMIN SERPL-MCNC: 4.1 G/DL
ALBUMIN/GLOB SERPL: 2 RATIO
ALP BLD-CCNC: 55 U/L
ALPHA1 GLOB MFR SERPL ELPH: 6.2 %
ALPHA1 GLOB SERPL ELPH-MCNC: 0.4 G/DL
ALPHA2 GLOB MFR SERPL ELPH: 13 %
ALPHA2 GLOB SERPL ELPH-MCNC: 0.8 G/DL
ALT SERPL-CCNC: 48 U/L
ANION GAP SERPL CALC-SCNC: 11 MMOL/L
AST SERPL-CCNC: 24 U/L
B-GLOBULIN MFR SERPL ELPH: 10.7 %
B-GLOBULIN SERPL ELPH-MCNC: 0.7 G/DL
BILIRUB SERPL-MCNC: 0.3 MG/DL
BUN SERPL-MCNC: 17 MG/DL
CALCIUM SERPL-MCNC: 9.4 MG/DL
CHLORIDE SERPL-SCNC: 108 MMOL/L
CO2 SERPL-SCNC: 23 MMOL/L
CREAT SERPL-MCNC: 0.58 MG/DL
DEPRECATED KAPPA LC FREE/LAMBDA SER: 111.6 RATIO
GAMMA GLOB FLD ELPH-MCNC: 0.3 G/DL
GAMMA GLOB MFR SERPL ELPH: 4.2 %
GLUCOSE SERPL-MCNC: 103 MG/DL
IGA SER QL IEP: <2 MG/DL
IGG SER QL IEP: 267 MG/DL
IGM SER QL IEP: 12 MG/DL
INTERPRETATION SERPL IEP-IMP: NORMAL
KAPPA LC CSF-MCNC: 0.15 MG/DL
KAPPA LC SERPL-MCNC: 16.74 MG/DL
LDH SERPL-CCNC: 234 U/L
M PROTEIN SPEC IFE-MCNC: NORMAL
MAGNESIUM SERPL-MCNC: 2.1 MG/DL
POTASSIUM SERPL-SCNC: 3.7 MMOL/L
PROT SERPL-MCNC: 6.2 G/DL
SODIUM SERPL-SCNC: 142 MMOL/L

## 2019-01-25 ENCOUNTER — OUTPATIENT (OUTPATIENT)
Dept: OUTPATIENT SERVICES | Facility: HOSPITAL | Age: 69
LOS: 1 days | Discharge: HOME | End: 2019-01-25

## 2019-01-25 DIAGNOSIS — C90.00 MULTIPLE MYELOMA NOT HAVING ACHIEVED REMISSION: ICD-10-CM

## 2019-01-30 ENCOUNTER — RX CHANGE (OUTPATIENT)
Age: 69
End: 2019-01-30

## 2019-01-31 ENCOUNTER — MESSAGE (OUTPATIENT)
Age: 69
End: 2019-01-31

## 2019-02-02 NOTE — HISTORY OF PRESENT ILLNESS
[de-identified] : Ms. Calero is a 68 year old female who was initially referred for monoclonal gammopathy.\par She was noted to have proteinuria and then had a 24 hour urine protein which showed non-nephrotic range proteinuria of 510 mg/24 hours. She was referred to Dr. Lesa Rendon of nephrology. SPEP showed faint M-spike 0.2 g/dL in gammaglobulin region, immunofixation showed this to be a IgG kappa monoclonal protein. UPEP showed 2 monoclonal protein bands, 61%, and 0.7%. \par \par Subsequent work up:\par 6/19/18 M-protein 0.2 g/dL, Kappa FLC 71, lambda FLC 0.36, FLC ratio 198.61\par , Beta 2 microglobulin 1.5 mg/L\par \par She had a bone marrow biopsy on 6/22/18. Pathology: plasma cell neoplasm, plasma cells comprise 20-25% of marrow cells. Trilineage hematopoiesis present with maturation, increased iron stores. Congo red stain negative for amyloid. Karyotype 46, XX. FISH panel - positive for CCND1/IGH fusion - a/w favorable prognosis. PET CT (7/12/18) did not show any bone lesions. Began VRd on 7/19/18. \par \par On C4 of VRd. FLC ratio initially decreasing -->198 --> 126 --> 68, however has increased to 115 with urine studies positive for Bence Brooks protein. For refractory disease, she was switched to KRd for a deeper response.\par \par  [de-identified] : Since initial consult visit on 11/26/18, the patient is currently on cycle 4 KRd(started 1/2/19), with day 21 of Revlimid completed on 1/22/19. She underwent pre-transplant testing on 12/21/18. She has moderate fatigue and good appetite. She denies fever, shortness of breath, abdominal pain. Today, I again did comprehensive review of consent forms and after all questions addressed, the patient signed consent forms.

## 2019-02-02 NOTE — PHYSICAL EXAM
[Normal] : affect appropriate [Ulcers] : no ulcers [Thrush] : no thrush [de-identified] : RRR, normal S1S2 [de-identified] : warm, no edema [de-identified] : no cervical/SCV adenopathy [de-identified] : no rash [de-identified] : A & O x 4

## 2019-02-02 NOTE — REASON FOR VISIT
[Follow-Up Visit] : a follow-up visit for [Family Member] : family member [FreeTextEntry2] : IgG kappa myeloma, currently on KRd to re-discuss high-dose chemotherapy followed by autologous peripheral stem cell transplant.

## 2019-02-02 NOTE — CONSULT LETTER
[Dear  ___] : Dear  [unfilled], [Consult Letter:] : I had the pleasure of evaluating your patient, [unfilled]. [Please see my note below.] : Please see my note below. [Consult Closing:] : Thank you very much for allowing me to participate in the care of this patient.  If you have any questions, please do not hesitate to contact me. [Sincerely,] : Sincerely, [FreeTextEntry2] : Marshall De Oliveira MD\par Medical Oncology/Hematology\par [facility] [place]\par [facility] \par [address] [facility] [facility]\[place]   [address]\par \par  [FreeTextEntry3] : \par Analisa Rahman M.D.\par \par  of Medicine\par [facility]\par [facility] \par [facility] [place]\par [address] [place] [address] \[place] [address] \par ph: [phone]\par fax: [phone]\par

## 2019-02-02 NOTE — ASSESSMENT
[FreeTextEntry1] : IgG kappa myeloma, currently on KRd to re-discuss high-dose chemotherapy followed by autologous peripheral stem cell transplant.\par Plan- \par Cytoxan 3 gm/m2 IV with IV hydration, and MESNA on 2/5/19(day 1)\par Drink plenty of water for several days post Cytoxan as directed\par Neulasta 12 mg sq on 2/6/19(day 2)\par Antiemetics\par Discontinue aspirin on day 1 with Cytoxan\par Begin Cipro 250 mg po 2X/day on day +5, until WBC recovery post iris count\par Monitor CBC with diff, CD34 count as directed

## 2019-02-02 NOTE — REVIEW OF SYSTEMS
[Fever] : no fever [Chills] : no chills [Vision Problems] : no vision problems [Mucosal Pain] : no mucosal pain [Chest Pain] : no chest pain [Lower Ext Edema] : no lower extremity edema [Shortness Of Breath] : no shortness of breath [Cough] : no cough [Abdominal Pain] : no abdominal pain [Vomiting] : no vomiting [Diarrhea] : no diarrhea [Skin Rash] : no skin rash [Dizziness] : no dizziness [Fainting] : no fainting [Easy Bleeding] : no tendency for easy bleeding [Easy Bruising] : no tendency for easy bruising [FreeTextEntry7] : no nausea [FreeTextEntry9] : no bone pain [de-identified] : Has c/o tingling in toes, when in bed, no discomfort w/ ambulation.

## 2019-02-04 ENCOUNTER — MESSAGE (OUTPATIENT)
Age: 69
End: 2019-02-04

## 2019-02-04 ENCOUNTER — APPOINTMENT (OUTPATIENT)
Dept: HEMATOLOGY ONCOLOGY | Facility: CLINIC | Age: 69
End: 2019-02-04

## 2019-02-05 ENCOUNTER — APPOINTMENT (OUTPATIENT)
Dept: INFUSION THERAPY | Facility: HOSPITAL | Age: 69
End: 2019-02-05

## 2019-02-05 ENCOUNTER — RESULT REVIEW (OUTPATIENT)
Age: 69
End: 2019-02-05

## 2019-02-05 ENCOUNTER — OUTPATIENT (OUTPATIENT)
Dept: OUTPATIENT SERVICES | Facility: HOSPITAL | Age: 69
LOS: 1 days | End: 2019-02-05
Payer: COMMERCIAL

## 2019-02-05 DIAGNOSIS — C90.00 MULTIPLE MYELOMA NOT HAVING ACHIEVED REMISSION: ICD-10-CM

## 2019-02-05 LAB
HCT VFR BLD CALC: 32.5 % — LOW (ref 34.5–45)
HGB BLD-MCNC: 11.1 G/DL — LOW (ref 11.5–15.5)
MCHC RBC-ENTMCNC: 31.7 PG — SIGNIFICANT CHANGE UP (ref 27–34)
MCHC RBC-ENTMCNC: 34 G/DL — SIGNIFICANT CHANGE UP (ref 32–36)
MCV RBC AUTO: 93.1 FL — SIGNIFICANT CHANGE UP (ref 80–100)
PLATELET # BLD AUTO: 293 K/UL — SIGNIFICANT CHANGE UP (ref 150–400)
RBC # BLD: 3.49 M/UL — LOW (ref 3.8–5.2)
RBC # FLD: 13.6 % — SIGNIFICANT CHANGE UP (ref 10.3–14.5)
WBC # BLD: 5.4 K/UL — SIGNIFICANT CHANGE UP (ref 3.8–10.5)
WBC # FLD AUTO: 5.4 K/UL — SIGNIFICANT CHANGE UP (ref 3.8–10.5)

## 2019-02-05 PROCEDURE — 93010 ELECTROCARDIOGRAM REPORT: CPT

## 2019-02-06 ENCOUNTER — APPOINTMENT (OUTPATIENT)
Dept: INFUSION THERAPY | Facility: HOSPITAL | Age: 69
End: 2019-02-06

## 2019-02-06 ENCOUNTER — APPOINTMENT (OUTPATIENT)
Dept: HEMATOLOGY ONCOLOGY | Facility: CLINIC | Age: 69
End: 2019-02-06

## 2019-02-06 DIAGNOSIS — R11.2 NAUSEA WITH VOMITING, UNSPECIFIED: ICD-10-CM

## 2019-02-06 DIAGNOSIS — Z51.11 ENCOUNTER FOR ANTINEOPLASTIC CHEMOTHERAPY: ICD-10-CM

## 2019-02-06 DIAGNOSIS — E86.0 DEHYDRATION: ICD-10-CM

## 2019-02-07 ENCOUNTER — MESSAGE (OUTPATIENT)
Age: 69
End: 2019-02-07

## 2019-02-07 ENCOUNTER — APPOINTMENT (OUTPATIENT)
Dept: OBGYN | Facility: CLINIC | Age: 69
End: 2019-02-07
Payer: COMMERCIAL

## 2019-02-07 VITALS
SYSTOLIC BLOOD PRESSURE: 116 MMHG | BODY MASS INDEX: 30.83 KG/M2 | DIASTOLIC BLOOD PRESSURE: 68 MMHG | WEIGHT: 174 LBS | HEIGHT: 63 IN

## 2019-02-07 DIAGNOSIS — Z51.89 ENCOUNTER FOR OTHER SPECIFIED AFTERCARE: ICD-10-CM

## 2019-02-07 DIAGNOSIS — Z01.419 ENCOUNTER FOR GYNECOLOGICAL EXAMINATION (GENERAL) (ROUTINE) W/OUT ABNORMAL FINDINGS: ICD-10-CM

## 2019-02-07 LAB — HEMOCCULT SP1 STL QL: NEGATIVE

## 2019-02-07 PROCEDURE — 82270 OCCULT BLOOD FECES: CPT

## 2019-02-07 PROCEDURE — G0101: CPT

## 2019-02-07 RX ORDER — DEXAMETHASONE 4 MG/1
4 TABLET ORAL
Qty: 40 | Refills: 5 | Status: COMPLETED | COMMUNITY
Start: 2018-06-29 | End: 2019-02-07

## 2019-02-07 RX ORDER — METOCLOPRAMIDE 10 MG/1
10 TABLET ORAL EVERY 6 HOURS
Qty: 30 | Refills: 0 | Status: COMPLETED | COMMUNITY
Start: 2019-01-30 | End: 2019-02-07

## 2019-02-07 RX ORDER — POTASSIUM CHLORIDE 1500 MG/1
20 TABLET, EXTENDED RELEASE ORAL
Qty: 5 | Refills: 0 | Status: COMPLETED | COMMUNITY
Start: 2019-01-11 | End: 2019-02-07

## 2019-02-07 RX ORDER — OFLOXACIN 3 MG/ML
0.3 SOLUTION/ DROPS OPHTHALMIC
Qty: 10 | Refills: 0 | Status: COMPLETED | COMMUNITY
Start: 2019-01-12 | End: 2019-02-07

## 2019-02-07 RX ORDER — ASPIRIN 325 MG/1
325 TABLET, FILM COATED ORAL DAILY
Qty: 90 | Refills: 3 | Status: COMPLETED | COMMUNITY
Start: 2018-10-04 | End: 2019-02-07

## 2019-02-07 RX ORDER — TOBRAMYCIN AND DEXAMETHASONE 3; 1 MG/G; MG/G
0.3-0.1 OINTMENT OPHTHALMIC
Qty: 4 | Refills: 0 | Status: COMPLETED | COMMUNITY
Start: 2018-10-06 | End: 2019-02-07

## 2019-02-07 RX ORDER — NEOMYCIN SULFATE, POLYMYXIN B SULFATE AND DEXAMETHASONE 3.5; 10000; 1 MG/ML; [USP'U]/ML; MG/ML
3.5-10000-0.1 SUSPENSION OPHTHALMIC
Qty: 5 | Refills: 0 | Status: COMPLETED | COMMUNITY
Start: 2018-10-08 | End: 2019-02-07

## 2019-02-07 RX ORDER — SULFAMETHOXAZOLE AND TRIMETHOPRIM 800; 160 MG/1; MG/1
800-160 TABLET ORAL DAILY
Qty: 30 | Refills: 1 | Status: COMPLETED | COMMUNITY
Start: 2018-06-29 | End: 2019-02-07

## 2019-02-07 NOTE — COUNSELING
[Breast Self Exam] : breast self exam [Nutrition] : nutrition [Exercise] : exercise [FreeTextEntry2] : Bone health discussed, pap protocol, call for any postmenop bleeding.  Discussed remedies for vaginal dryness

## 2019-02-07 NOTE — HISTORY OF PRESENT ILLNESS
[1 Year Ago] : 1 year ago [Good] : being in good health [Healthy Diet] : a healthy diet [Weight Concerns] : weight concens [___ Servings of Dairy/Day] : [unfilled] servings of dairy foods per day [Less than 3 times/wk] : less than three times a week [Last Mammogram ___] : Last Mammogram was [unfilled] [Last Bone Density ___] : Last bone density studies [unfilled] [Last Colonoscopy ___] : Last colonoscopy [unfilled] [Last Pap ___] : Last cervical pap smear was [unfilled] [BRCA1 ___] : BRCA1 gene [unfilled] [BRCA2 ___] : BRCA2 gene [unfilled] [ ___] :  [unfilled] [Performed Within 5 Years] : lipid profile performed within the past five years [Performed Last Year] : thyroid function test performed last year [High LDL] : high LDL cholesterol [Low HDL] : low HDL cholesterol [Obesity] : obesity [Sedentary Lifestyle] : sedentary lifestyle [FH Cardiovascular Disease] : family history of cardiovascular disease [Previous Colon Polyps] : previous colon polyps [Osteoporosis Risk Factors] : osteoporosis risk factors [Postmenopausal] : is postmenopausal [Pregnancy History] : pregnancy history: [Total Preg ___] : [unfilled] [Full Term ___] : [unfilled] [AB Induced ___] : elective abortions: [unfilled] [Sexually Active] : is sexually active [Monogamous (Male Partner)] : is monogamous with a male partner [Current] : risk screening reviewed and current [Hypertension] : hypertension [Regular Exercise] : not exercising regularly [Diabetes] : no diabetes [Stress] : no stress [Tobacco Use] : no tobacco use [Illicit Drug Use] : no illicit drug use [Previous Breast Cancer] : no previous breast cancer [Abnormal Cervical Cytology] : no abnormal cervical cytology [HPV Positive] : no positive screening for human papilloma virus [Hormone Therapy] : no hormone therapy [In Utero BARBY Exposure] : no diethylstilbestrol (BARBY) exposure in utero [Inflammatory Bowel Disease] : no inflammatory bowel disease [de-identified] : Menopause age 51

## 2019-02-07 NOTE — REVIEW OF SYSTEMS
[Feeling Tired] : feeling tired [Dec Hearing] : decreased hearing [Cough] : cough [Loss of Hair] : loss of hair [Nl] : Integumentary [FreeTextEntry3] : hair loss

## 2019-02-07 NOTE — PHYSICAL EXAM
[Awake] : awake [Alert] : alert [Thyroid Nodule] : thyroid nodule [Soft] : soft [Oriented x3] : oriented to person, place, and time [Normal] : uterus [Atrophy] : atrophy [No Bleeding] : there was no active vaginal bleeding [Uterine Adnexae] : were not tender and not enlarged [No Tenderness] : no rectal tenderness [RRR, No Murmurs] : RRR, no murmurs [CTAB] : CTAB [Acute Distress] : no acute distress [LAD] : no lymphadenopathy [Goiter] : goiter [Mass] : no breast mass [Nipple Discharge] : no nipple discharge [Axillary LAD] : no axillary lymphadenopathy [Tender] : non tender [Occult Blood] : occult blood test from digital rectal exam was negative

## 2019-02-08 ENCOUNTER — RESULT REVIEW (OUTPATIENT)
Age: 69
End: 2019-02-08

## 2019-02-08 ENCOUNTER — APPOINTMENT (OUTPATIENT)
Dept: HEMATOLOGY ONCOLOGY | Facility: CLINIC | Age: 69
End: 2019-02-08
Payer: COMMERCIAL

## 2019-02-08 VITALS
TEMPERATURE: 98.6 F | SYSTOLIC BLOOD PRESSURE: 122 MMHG | DIASTOLIC BLOOD PRESSURE: 70 MMHG | RESPIRATION RATE: 16 BRPM | OXYGEN SATURATION: 98 % | WEIGHT: 177.47 LBS | BODY MASS INDEX: 31.44 KG/M2 | HEART RATE: 82 BPM

## 2019-02-08 LAB
BASOPHILS # BLD AUTO: 0 K/UL — SIGNIFICANT CHANGE UP (ref 0–0.2)
BASOPHILS NFR BLD AUTO: 0.2 % — SIGNIFICANT CHANGE UP (ref 0–2)
BLD GP AB SCN SERPL QL: NEGATIVE — SIGNIFICANT CHANGE UP
EOSINOPHIL # BLD AUTO: 0 K/UL — SIGNIFICANT CHANGE UP (ref 0–0.5)
EOSINOPHIL NFR BLD AUTO: 0.3 % — SIGNIFICANT CHANGE UP (ref 0–6)
HCT VFR BLD CALC: 28.1 % — LOW (ref 34.5–45)
HGB BLD-MCNC: 9.8 G/DL — LOW (ref 11.5–15.5)
LYMPHOCYTES # BLD AUTO: 0.9 K/UL — LOW (ref 1–3.3)
LYMPHOCYTES # BLD AUTO: 6.6 % — LOW (ref 13–44)
MCHC RBC-ENTMCNC: 33.3 PG — SIGNIFICANT CHANGE UP (ref 27–34)
MCHC RBC-ENTMCNC: 34.9 G/DL — SIGNIFICANT CHANGE UP (ref 32–36)
MCV RBC AUTO: 95.2 FL — SIGNIFICANT CHANGE UP (ref 80–100)
MONOCYTES # BLD AUTO: 0.2 K/UL — SIGNIFICANT CHANGE UP (ref 0–0.9)
MONOCYTES NFR BLD AUTO: 1.3 % — LOW (ref 2–14)
NEUTROPHILS # BLD AUTO: 11.8 K/UL — HIGH (ref 1.8–7.4)
NEUTROPHILS NFR BLD AUTO: 91.7 % — HIGH (ref 43–77)
PLATELET # BLD AUTO: 186 K/UL — SIGNIFICANT CHANGE UP (ref 150–400)
RBC # BLD: 2.95 M/UL — LOW (ref 3.8–5.2)
RBC # FLD: 13.2 % — SIGNIFICANT CHANGE UP (ref 10.3–14.5)
RH IG SCN BLD-IMP: POSITIVE — SIGNIFICANT CHANGE UP
RH IG SCN BLD-IMP: POSITIVE — SIGNIFICANT CHANGE UP
WBC # BLD: 12.8 K/UL — HIGH (ref 3.8–10.5)
WBC # FLD AUTO: 12.8 K/UL — HIGH (ref 3.8–10.5)

## 2019-02-08 PROCEDURE — 99214 OFFICE O/P EST MOD 30 MIN: CPT

## 2019-02-11 ENCOUNTER — APPOINTMENT (OUTPATIENT)
Dept: HEMATOLOGY ONCOLOGY | Facility: CLINIC | Age: 69
End: 2019-02-11
Payer: COMMERCIAL

## 2019-02-11 ENCOUNTER — RESULT REVIEW (OUTPATIENT)
Age: 69
End: 2019-02-11

## 2019-02-11 VITALS
DIASTOLIC BLOOD PRESSURE: 76 MMHG | TEMPERATURE: 98.8 F | HEART RATE: 87 BPM | SYSTOLIC BLOOD PRESSURE: 120 MMHG | OXYGEN SATURATION: 100 % | RESPIRATION RATE: 16 BRPM | BODY MASS INDEX: 31.01 KG/M2 | WEIGHT: 175.05 LBS

## 2019-02-11 LAB
BASOPHILS # BLD AUTO: 0 K/UL — SIGNIFICANT CHANGE UP (ref 0–0.2)
BASOPHILS NFR BLD AUTO: 4 % — HIGH (ref 0–2)
BLD GP AB SCN SERPL QL: NEGATIVE — SIGNIFICANT CHANGE UP
CYTOLOGY CVX/VAG DOC THIN PREP: NORMAL
EOSINOPHIL # BLD AUTO: 0.1 K/UL — SIGNIFICANT CHANGE UP (ref 0–0.5)
EOSINOPHIL NFR BLD AUTO: 24 % — HIGH (ref 0–6)
HCT VFR BLD CALC: 29.5 % — LOW (ref 34.5–45)
HGB BLD-MCNC: 10.1 G/DL — LOW (ref 11.5–15.5)
HPV HIGH+LOW RISK DNA PNL CVX: NOT DETECTED
LYMPHOCYTES # BLD AUTO: 0.4 K/UL — LOW (ref 1–3.3)
LYMPHOCYTES # BLD AUTO: 62 % — HIGH (ref 13–44)
MCHC RBC-ENTMCNC: 32.5 PG — SIGNIFICANT CHANGE UP (ref 27–34)
MCHC RBC-ENTMCNC: 34.3 G/DL — SIGNIFICANT CHANGE UP (ref 32–36)
MCV RBC AUTO: 94.6 FL — SIGNIFICANT CHANGE UP (ref 80–100)
MONOCYTES # BLD AUTO: 0 K/UL — SIGNIFICANT CHANGE UP (ref 0–0.9)
MONOCYTES NFR BLD AUTO: 2 % — SIGNIFICANT CHANGE UP (ref 2–14)
NEUTROPHILS # BLD AUTO: 0 K/UL — LOW (ref 1.8–7.4)
NEUTROPHILS NFR BLD AUTO: 8 % — LOW (ref 43–77)
PLAT MORPH BLD: NORMAL — SIGNIFICANT CHANGE UP
PLATELET # BLD AUTO: 144 K/UL — LOW (ref 150–400)
RBC # BLD: 3.12 M/UL — LOW (ref 3.8–5.2)
RBC # FLD: 12.6 % — SIGNIFICANT CHANGE UP (ref 10.3–14.5)
RBC BLD AUTO: SIGNIFICANT CHANGE UP
RH IG SCN BLD-IMP: POSITIVE — SIGNIFICANT CHANGE UP
WBC # BLD: 0.6 K/UL — CRITICAL LOW (ref 3.8–10.5)
WBC # FLD AUTO: 0.6 K/UL — CRITICAL LOW (ref 3.8–10.5)

## 2019-02-11 PROCEDURE — 99214 OFFICE O/P EST MOD 30 MIN: CPT

## 2019-02-12 NOTE — CONSULT LETTER
[Please see my note below.] : Please see my note below. [Consult Closing:] : Thank you very much for allowing me to participate in the care of this patient.  If you have any questions, please do not hesitate to contact me. [Sincerely,] : Sincerely, [FreeTextEntry3] : \par Analisa Rahman M.D.\par \par  of Medicine\par [facility]\par [facility] \par [facility] [place]\par [address] [place] [address] \[place] [address] \par ph: [phone]\par fax: [phone]\par

## 2019-02-12 NOTE — REVIEW OF SYSTEMS
[Negative] : Neurological [Fever] : no fever [Chills] : no chills [Fatigue] : no fatigue [Eye Pain] : no eye pain [Dry Eyes] : no dryness of the eyes [Vision Problems] : no vision problems [Dysphagia] : no dysphagia [Nosebleeds] : no nosebleeds [Odynophagia] : no odynophagia [Mucosal Pain] : no mucosal pain [Chest Pain] : no chest pain [Lower Ext Edema] : no lower extremity edema [Shortness Of Breath] : no shortness of breath [Cough] : no cough [Abdominal Pain] : no abdominal pain [Vomiting] : no vomiting [Constipation] : no constipation [Diarrhea] : no diarrhea [Dysuria] : no dysuria [Joint Pain] : no joint pain [Muscle Pain] : no muscle pain [Skin Rash] : no skin rash [Dizziness] : no dizziness [Fainting] : no fainting [Easy Bleeding] : no tendency for easy bleeding [FreeTextEntry7] : nausea without vomiting [FreeTextEntry9] : no bone pain

## 2019-02-12 NOTE — REASON FOR VISIT
[Follow-Up Visit] : a follow-up visit for [Other: _____] : [unfilled] [FreeTextEntry2] : IgG kappa myeloma, undergoing stem cell mobilization s/p Cytoxan on 2/5/19 and DD Neulasta on 2/6/19, here for count check

## 2019-02-12 NOTE — HISTORY OF PRESENT ILLNESS
[de-identified] : Ms. Calero is a 68 year old female who was initially referred for monoclonal gammopathy.\par She was noted to have proteinuria and then had a 24 hour urine protein which showed non-nephrotic range proteinuria of 510 mg/24 hours. She was referred to Dr. Lesa Rendon of nephrology. SPEP showed faint M-spike 0.2 g/dL in gammaglobulin region, immunofixation showed this to be a IgG kappa monoclonal protein. UPEP showed 2 monoclonal protein bands, 61%, and 0.7%. \par \par Subsequent work up:\par 6/19/18 M-protein 0.2 g/dL, Kappa FLC 71, lambda FLC 0.36, FLC ratio 198.61\par , Beta 2 microglobulin 1.5 mg/L\par \par She had a bone marrow biopsy on 6/22/18. Pathology: plasma cell neoplasm, plasma cells comprise 20-25% of marrow cells. Trilineage hematopoiesis present with maturation, increased iron stores. Congo red stain negative for amyloid. Karyotype 46, XX. FISH panel - positive for CCND1/IGH fusion - a/w favorable prognosis. PET CT (7/12/18) did not show any bone lesions. Began VRd on 7/19/18. \par \par On C4 of VRd. FLC ratio initially decreasing -->198 --> 126 --> 67, however has increased to 115 with urine studies positive for Bence Brooks protein. For refractory disease, she was switched to KRd for a deeper response.\par \par  [de-identified] : Since initial consult visit on 11/26/18, the patient is currently on cycle 4 KRd(started 1/2/19), with day 21 of Revlimid completed on 1/22/19. She underwent pre-transplant testing on 12/21/18. She has moderate fatigue and good appetite. She denies fever, shortness of breath, abdominal pain. Today, I again did comprehensive review of consent forms and after all questions addressed, the patient signed consent forms.\par \par On 2/8/19 visit, s/p Cytoxan on 2/5/19 and double dose Neulasta on 2/6/19. Overall feeling well. Decreased appetite but tolerating adequate PO intake. Occasional nausea without vomiting, has not needed PRN antiemetics. Rhinitis without sore throat. Compliant with meds as prescribed. Denies fever, chills, headache, dizziness, blurred vision, mucositis/odynophagia, chest pain, SOB, cough, vomiting, diarrhea/constipation, abdominal pain, dysuria, LE edema, rash, bleeding, pain

## 2019-02-12 NOTE — ASSESSMENT
[FreeTextEntry1] : IgG kappa myeloma, currently on KRd to re-discuss high-dose chemotherapy followed by autologous peripheral stem cell transplant.\par Plan- \par Cytoxan 3 gm/m2 IV with IV hydration, and MESNA on 2/5/19(day 1)\par Drink plenty of water for several days post Cytoxan as directed\par Neulasta 12 mg sq on 2/6/19(day 2)\par Antiemetics\par Discontinue aspirin on day 1 with Cytoxan\par Begin Cipro 250 mg po 2X/day on day +5, until WBC recovery post iris count\par Monitor CBC with diff, CD34 count as directed\par \par 2/8/19:\par Stem cell mobilization s/p Cytoxan on 2/5/19 and double dose Neulasta on 2/6/19\par CBC reviewed with pt, too early for stem cell collection at this time\par Instructed to start ppx with ciprofloxacin 250mg bid x 10 days on Sun 2/10/19\par PRN Reglan for nausea/vomiting\par May use Claritin daily for rhinitis and in anticipation of bone pain\par Instructed to avoid ASA and NSAIDs (i.e. ibuprofen, Advil, Motrin, Aleve), may only use PRN Tylenol for pain \par I spent 15min reviewing anticipated side effects of recent Neulasta, upcoming appts for count checks, Shiley catheter placement, and stem cell collection process \par Pt educated regarding plan of care, all questions/concerns addressed \par F/u with NP on Mon 2/11/19 for ongoing count checks\par

## 2019-02-12 NOTE — PHYSICAL EXAM
[Restricted in physically strenuous activity but ambulatory and able to carry out work of a light or sedentary nature] : Status 1- Restricted in physically strenuous activity but ambulatory and able to carry out work of a light or sedentary nature, e.g., light house work, office work [Normal] : grossly intact [Ulcers] : no ulcers [Thrush] : no thrush [de-identified] : warm, no edema [de-identified] : no rash

## 2019-02-13 ENCOUNTER — RESULT REVIEW (OUTPATIENT)
Age: 69
End: 2019-02-13

## 2019-02-13 ENCOUNTER — APPOINTMENT (OUTPATIENT)
Dept: INFUSION THERAPY | Facility: HOSPITAL | Age: 69
End: 2019-02-13

## 2019-02-13 ENCOUNTER — APPOINTMENT (OUTPATIENT)
Dept: HEMATOLOGY ONCOLOGY | Facility: CLINIC | Age: 69
End: 2019-02-13
Payer: COMMERCIAL

## 2019-02-13 VITALS
BODY MASS INDEX: 31.4 KG/M2 | RESPIRATION RATE: 16 BRPM | OXYGEN SATURATION: 100 % | SYSTOLIC BLOOD PRESSURE: 125 MMHG | HEART RATE: 65 BPM | TEMPERATURE: 98.1 F | WEIGHT: 177.25 LBS | DIASTOLIC BLOOD PRESSURE: 86 MMHG

## 2019-02-13 LAB — CD34 CELLS # FLD: 3 /UL — SIGNIFICANT CHANGE UP

## 2019-02-13 PROCEDURE — 99213 OFFICE O/P EST LOW 20 MIN: CPT

## 2019-02-13 RX ORDER — LENALIDOMIDE 25 MG/1
25 CAPSULE ORAL
Qty: 21 | Refills: 0 | Status: DISCONTINUED | COMMUNITY
Start: 2018-06-29 | End: 2019-02-13

## 2019-02-13 NOTE — PHYSICAL EXAM
[Normal] : affect appropriate [Ulcers] : no ulcers [Thrush] : no thrush [de-identified] : RRR, normal S1S2 [de-identified] : warm, no edema [de-identified] : no rash [de-identified] : A & O x 4

## 2019-02-13 NOTE — ASSESSMENT
[FreeTextEntry1] : IgG kappa myeloma, currently on KRd to re-discuss high-dose chemotherapy followed by autologous peripheral stem cell transplant.\par Plan- \par Cytoxan 3 gm/m2 IV with IV hydration, and MESNA on 2/5/19(day 1)\par Drink plenty of water for several days post Cytoxan as directed\par Neulasta 12 mg sq on 2/6/19(day 2)\par Antiemetics\par Discontinue aspirin on day 1 with Cytoxan\par Begin Cipro 250 mg po 2X/day on day +5, until WBC recovery post iris count\par Monitor CBC with diff, CD34 count as directed\par \par 2/11/19\par IgG kappa myeloma s/p cytoxan on 2/5/19 and 12 mg of neulasta on 2/6/19. \par Patient to proceed with autologous peripheral stem cell transplant.\par \par Peripheral blood work reviewed. Type and cross sent today.\par CBC results: WBC 0.6, H/H 10.1/29.5, platelets 144. \par Patient aware it can take 7-10 days post neulasta to mobilize stem cells\par Continue ciprofloxacin 250 mg BID for 10 days\par Continue to monitor cbc with diff, cd 34\par Patient currently not on any blood thinners or NSAIDS\par Questions and concerns addressed\par Patient to follow up on Wednesday 2/13/19 with NUNU Fu. Tentative catheter appointment on 2/13/19.\par

## 2019-02-13 NOTE — CONSULT LETTER
[Dear  ___] : Dear  [unfilled], [Consult Letter:] : I had the pleasure of evaluating your patient, [unfilled]. [Please see my note below.] : Please see my note below. [Consult Closing:] : Thank you very much for allowing me to participate in the care of this patient.  If you have any questions, please do not hesitate to contact me. [Sincerely,] : Sincerely, [FreeTextEntry2] : Marshall De Oliveira MD\par Medical Oncology/Hematology\par [facility] [place]\par [facility] \par [address] [facility]\par [place], [place]   [address]\par \par  [FreeTextEntry3] : \par Analisa Rahman M.D.\par \par  of Medicine\par [facility]\par [facility] \par [facility] [place]\par [address] [place] [address] \[place] [address] \par ph: [phone]\par fax: [phone]\par

## 2019-02-13 NOTE — REVIEW OF SYSTEMS
[Negative] : Integumentary [Fever] : no fever [Chills] : no chills [Vision Problems] : no vision problems [Mucosal Pain] : no mucosal pain [Chest Pain] : no chest pain [Lower Ext Edema] : no lower extremity edema [Shortness Of Breath] : no shortness of breath [Cough] : no cough [Abdominal Pain] : no abdominal pain [Vomiting] : no vomiting [Diarrhea] : no diarrhea [Skin Rash] : no skin rash [Dizziness] : no dizziness [Fainting] : no fainting [Easy Bleeding] : no tendency for easy bleeding [Easy Bruising] : no tendency for easy bruising [FreeTextEntry9] : Right hip bone pain

## 2019-02-13 NOTE — REASON FOR VISIT
[Follow-Up Visit] : a follow-up visit for [FreeTextEntry2] : IgG kappa myeloma s/p cytoxan on 2/5/19 and 12 mg neulasta on 2/6/19 for stem cell mobilization

## 2019-02-13 NOTE — REVIEW OF SYSTEMS
[Negative] : Integumentary [Fever] : no fever [Chills] : no chills [Vision Problems] : no vision problems [Mucosal Pain] : no mucosal pain [Chest Pain] : no chest pain [Lower Ext Edema] : no lower extremity edema [Shortness Of Breath] : no shortness of breath [Cough] : no cough [Abdominal Pain] : no abdominal pain [Vomiting] : no vomiting [Diarrhea] : no diarrhea [Skin Rash] : no skin rash [Dizziness] : no dizziness [Fainting] : no fainting [Easy Bleeding] : no tendency for easy bleeding [Easy Bruising] : no tendency for easy bruising [FreeTextEntry9] : no bone pain

## 2019-02-13 NOTE — CONSULT LETTER
[Dear  ___] : Dear  [unfilled], [Consult Letter:] : I had the pleasure of evaluating your patient, [unfilled]. [Please see my note below.] : Please see my note below. [Consult Closing:] : Thank you very much for allowing me to participate in the care of this patient.  If you have any questions, please do not hesitate to contact me. [Sincerely,] : Sincerely, [FreeTextEntry2] : Marshall De Oliveira MD\par Medical Oncology/Hematology\par [facility] [place]\par [facility] \par [address] [facility]\[place], [place]   [address]\par \par  [FreeTextEntry3] : \par Analisa Rahman M.D.\par \par  of Medicine\par [facility]\par [facility] \par [facility] [place]\par [address] [place] [address] \[place] [address] \par ph: [phone]\par fax: [phone]\par

## 2019-02-13 NOTE — HISTORY OF PRESENT ILLNESS
[de-identified] : Ms. Calero is a 68 year old female who was initially referred for monoclonal gammopathy.\par She was noted to have proteinuria and then had a 24 hour urine protein which showed non-nephrotic range proteinuria of 510 mg/24 hours. She was referred to Dr. Lesa Rendon of nephrology. SPEP showed faint M-spike 0.2 g/dL in gammaglobulin region, immunofixation showed this to be a IgG kappa monoclonal protein. UPEP showed 2 monoclonal protein bands, 61%, and 0.7%. \par \par Subsequent work up:\par 6/19/18 M-protein 0.2 g/dL, Kappa FLC 71, lambda FLC 0.36, FLC ratio 198.61\par , Beta 2 microglobulin 1.5 mg/L\par \par She had a bone marrow biopsy on 6/22/18. Pathology: plasma cell neoplasm, plasma cells comprise 20-25% of marrow cells. Trilineage hematopoiesis present with maturation, increased iron stores. Congo red stain negative for amyloid. Karyotype 46, XX. FISH panel - positive for CCND1/IGH fusion - a/w favorable prognosis. PET CT (7/12/18) did not show any bone lesions. Began VRd on 7/19/18. \par \par On C4 of VRd. FLC ratio initially decreasing -->198 --> 126 --> 67, however has increased to 115 with urine studies positive for Bence Brooks protein. For refractory disease, she was switched to KRd for a deeper response.\par \par  [de-identified] : Since initial consult visit on 11/26/18, the patient is currently on cycle 4 KRd(started 1/2/19), with day 21 of Revlimid completed on 1/22/19. She underwent pre-transplant testing on 12/21/18. She has moderate fatigue and good appetite. She denies fever, shortness of breath, abdominal pain. Today, I again did comprehensive review of consent forms and after all questions addressed, the patient signed consent forms.\par \par On 2/11/19, patient presents for a pre stem cell collection visit. Anahi received cytoxan on 2/5/19 followed by 12 mg of neulasta on 2/6/19. Overall patient is well and offers no acute concerns today. Denies fever, chills, nausea, vomiting or diarrhea. Denies SOB, chest pain or B/L LE edema. Patient is currently on ciprofloxacin for prophylaxis. Denies any pain at this time.

## 2019-02-13 NOTE — ASSESSMENT
[FreeTextEntry1] : IgG kappa myeloma, currently on KRd to re-discuss high-dose chemotherapy followed by autologous peripheral stem cell transplant.\par Plan- \par Cytoxan 3 gm/m2 IV with IV hydration, and MESNA on 2/5/19(day 1)\par Drink plenty of water for several days post Cytoxan as directed\par Neulasta 12 mg sq on 2/6/19(day 2)\par Antiemetics\par Discontinue aspirin on day 1 with Cytoxan\par Begin Cipro 250 mg po 2X/day on day +5, until WBC recovery post iris count\par Monitor CBC with diff, CD34 count as directed\par \par 2/11/19\par IgG kappa myeloma s/p cytoxan on 2/5/19 and 12 mg of neulasta on 2/6/19. \par Patient to proceed with autologous peripheral stem cell transplant.\par \par Peripheral blood work reviewed. Type and cross sent today.\par CBC results: WBC 0.6, H/H 10.1/29.5, platelets 144. \par Patient aware it can take 7-10 days post neulasta to mobilize stem cells\par Continue ciprofloxacin 250 mg BID for 10 days\par Continue to monitor cbc with diff, cd 34\par Patient currently not on any blood thinners or NSAIDS\par Questions and concerns addressed\par Patient to follow up on Wednesday 2/13/19 with NUNU Fu. Tentative catheter appointment on 2/13/19.\par \par 2/13/19\par IgG kappa myeloma s/p cytoxan on 2/5/19 and 12 mg of neulasta on 2/6/19. \par Patient to proceed with autologous peripheral stem cell transplant.\par \par Peripheral blood work reviewed\par CBC results: WBC 0.6, H/H 9.1/27.9, platelets 120.\par Canceled 1 PRBC and platelets for today. \par Continue ciprofloxacin 250 mg BID for a total of 10 days and medications listed in medication list\par Patient is not taking any blood thinners\par Will cancel catheter appointment as patient has good peripheral venous access\par Continue to monitor cbc with diff, cd 34\par Questions and concerns addressed\par Follow up tomorrow morning for cbc with differential and CD 34. Follow up with NUNU Fu \par Follow up on Friday 2/15/19 with NP Nancy\par \par

## 2019-02-13 NOTE — HISTORY OF PRESENT ILLNESS
[de-identified] : Ms. Calero is a 68 year old female who was initially referred for monoclonal gammopathy.\par She was noted to have proteinuria and then had a 24 hour urine protein which showed non-nephrotic range proteinuria of 510 mg/24 hours. She was referred to Dr. Lesa Rendon of nephrology. SPEP showed faint M-spike 0.2 g/dL in gammaglobulin region, immunofixation showed this to be a IgG kappa monoclonal protein. UPEP showed 2 monoclonal protein bands, 61%, and 0.7%. \par \par Subsequent work up:\par 6/19/18 M-protein 0.2 g/dL, Kappa FLC 71, lambda FLC 0.36, FLC ratio 198.61\par , Beta 2 microglobulin 1.5 mg/L\par \par She had a bone marrow biopsy on 6/22/18. Pathology: plasma cell neoplasm, plasma cells comprise 20-25% of marrow cells. Trilineage hematopoiesis present with maturation, increased iron stores. Congo red stain negative for amyloid. Karyotype 46, XX. FISH panel - positive for CCND1/IGH fusion - a/w favorable prognosis. PET CT (7/12/18) did not show any bone lesions. Began VRd on 7/19/18. \par \par On C4 of VRd. FLC ratio initially decreasing -->198 --> 126 --> 68, however has increased to 115 with urine studies positive for Bence Brooks protein. For refractory disease, she was switched to KRd for a deeper response.\par \par  [de-identified] : Since initial consult visit on 11/26/18, the patient is currently on cycle 4 KRd(started 1/2/19), with day 21 of Revlimid completed on 1/22/19. She underwent pre-transplant testing on 12/21/18. She has moderate fatigue and good appetite. She denies fever, shortness of breath, abdominal pain. Today, I again did comprehensive review of consent forms and after all questions addressed, the patient signed consent forms.\par \par On 2/11/19, patient presents for a pre stem cell collection visit. Anahi received cytoxan on 2/5/19 followed by 12 mg of neulasta on 2/6/19. Overall patient is well and offers no acute concerns today. Denies fever, chills, nausea, vomiting or diarrhea. Denies SOB, chest pain or B/L LE edema. Patient is currently on ciprofloxacin for prophylaxis. Denies any pain at this time. \par \par On 2/13/19 visit, patient presents for a pre stem cell collection visit. Patient offers no acute concerns. Currently taking ciprofloxacin 250 mg BID for 10 days as prophylaxis. Denies fever, chills, nausea, vomiting,diarrhea, rash or dysuria. Denies SOB, chest pain or B/L LE edema. Currently not on any blood thinners.

## 2019-02-14 ENCOUNTER — RESULT REVIEW (OUTPATIENT)
Age: 69
End: 2019-02-14

## 2019-02-14 ENCOUNTER — APPOINTMENT (OUTPATIENT)
Dept: HEMATOLOGY ONCOLOGY | Facility: CLINIC | Age: 69
End: 2019-02-14

## 2019-02-14 LAB
ANISOCYTOSIS BLD QL: SLIGHT — SIGNIFICANT CHANGE UP
BASOPHILS # BLD AUTO: 0 K/UL — SIGNIFICANT CHANGE UP (ref 0–0.2)
BASOPHILS NFR BLD AUTO: 1 % — SIGNIFICANT CHANGE UP (ref 0–2)
BLD GP AB SCN SERPL QL: NEGATIVE — SIGNIFICANT CHANGE UP
CD34 CELLS # FLD: 60 /UL — SIGNIFICANT CHANGE UP
ELLIPTOCYTES BLD QL SMEAR: SLIGHT — SIGNIFICANT CHANGE UP
EOSINOPHIL # BLD AUTO: 0.1 K/UL — SIGNIFICANT CHANGE UP (ref 0–0.5)
EOSINOPHIL NFR BLD AUTO: 3 % — SIGNIFICANT CHANGE UP (ref 0–6)
HCT VFR BLD CALC: 28.2 % — LOW (ref 34.5–45)
HGB BLD-MCNC: 9.3 G/DL — LOW (ref 11.5–15.5)
HYPOCHROMIA BLD QL: SLIGHT — SIGNIFICANT CHANGE UP
LYMPHOCYTES # BLD AUTO: 0.7 K/UL — LOW (ref 1–3.3)
LYMPHOCYTES # BLD AUTO: 21 % — SIGNIFICANT CHANGE UP (ref 13–44)
MACROCYTES BLD QL: SLIGHT — SIGNIFICANT CHANGE UP
MCHC RBC-ENTMCNC: 30.8 PG — SIGNIFICANT CHANGE UP (ref 27–34)
MCHC RBC-ENTMCNC: 32.9 G/DL — SIGNIFICANT CHANGE UP (ref 32–36)
MCV RBC AUTO: 93.8 FL — SIGNIFICANT CHANGE UP (ref 80–100)
METAMYELOCYTES # FLD: 3 % — HIGH (ref 0–0)
MONOCYTES # BLD AUTO: 0.4 K/UL — SIGNIFICANT CHANGE UP (ref 0–0.9)
MONOCYTES NFR BLD AUTO: 8 % — SIGNIFICANT CHANGE UP (ref 2–14)
MYELOCYTES NFR BLD: 3 % — HIGH (ref 0–0)
NEUTROPHILS # BLD AUTO: 2.2 K/UL — SIGNIFICANT CHANGE UP (ref 1.8–7.4)
NEUTROPHILS NFR BLD AUTO: 54 % — SIGNIFICANT CHANGE UP (ref 43–77)
NEUTS BAND # BLD: 7 % — SIGNIFICANT CHANGE UP (ref 0–8)
PLAT MORPH BLD: NORMAL — SIGNIFICANT CHANGE UP
PLATELET # BLD AUTO: 116 K/UL — SIGNIFICANT CHANGE UP (ref 150–400)
POIKILOCYTOSIS BLD QL AUTO: SLIGHT — SIGNIFICANT CHANGE UP
POLYCHROMASIA BLD QL SMEAR: SLIGHT — SIGNIFICANT CHANGE UP
RBC # BLD: 3 M/UL — LOW (ref 3.8–5.2)
RBC # FLD: 12.7 % — SIGNIFICANT CHANGE UP (ref 10.3–14.5)
RBC BLD AUTO: ABNORMAL
RH IG SCN BLD-IMP: POSITIVE — SIGNIFICANT CHANGE UP
WBC # BLD: 3.4 K/UL — LOW (ref 3.8–10.5)
WBC # FLD AUTO: 3.4 K/UL — LOW (ref 3.8–10.5)

## 2019-02-14 PROCEDURE — 86900 BLOOD TYPING SEROLOGIC ABO: CPT

## 2019-02-14 PROCEDURE — 86901 BLOOD TYPING SEROLOGIC RH(D): CPT

## 2019-02-14 PROCEDURE — 86850 RBC ANTIBODY SCREEN: CPT

## 2019-02-14 PROCEDURE — 86923 COMPATIBILITY TEST ELECTRIC: CPT

## 2019-02-14 PROCEDURE — 86367 STEM CELLS TOTAL COUNT: CPT

## 2019-02-15 ENCOUNTER — APPOINTMENT (OUTPATIENT)
Dept: INFUSION THERAPY | Facility: HOSPITAL | Age: 69
End: 2019-02-15

## 2019-02-15 ENCOUNTER — OUTPATIENT (OUTPATIENT)
Dept: OUTPATIENT SERVICES | Facility: HOSPITAL | Age: 69
LOS: 1 days | End: 2019-02-15
Payer: COMMERCIAL

## 2019-02-15 ENCOUNTER — APPOINTMENT (OUTPATIENT)
Dept: HEMATOLOGY ONCOLOGY | Facility: CLINIC | Age: 69
End: 2019-02-15

## 2019-02-15 DIAGNOSIS — C90.00 MULTIPLE MYELOMA NOT HAVING ACHIEVED REMISSION: ICD-10-CM

## 2019-02-15 LAB
BASOPHILS # BLD AUTO: 0.1 K/UL — SIGNIFICANT CHANGE UP (ref 0–0.2)
BLD GP AB SCN SERPL QL: NEGATIVE — SIGNIFICANT CHANGE UP
CA-I BLD-SCNC: 1.3 MMOL/L — SIGNIFICANT CHANGE UP (ref 1.12–1.3)
CD34 CELLS # FLD: 196 /UL — SIGNIFICANT CHANGE UP
EOSINOPHIL # BLD AUTO: 0.2 K/UL — SIGNIFICANT CHANGE UP (ref 0–0.5)
EOSINOPHIL NFR BLD AUTO: 2 — SIGNIFICANT CHANGE UP
HCT VFR BLD CALC: 27.4 % — LOW (ref 34.5–45)
HGB BLD-MCNC: 9.2 G/DL — LOW (ref 11.5–15.5)
LYMPHOCYTES # BLD AUTO: 1.5 K/UL — SIGNIFICANT CHANGE UP (ref 1–3.3)
LYMPHOCYTES # BLD AUTO: 13 % — SIGNIFICANT CHANGE UP (ref 13–44)
MCHC RBC-ENTMCNC: 31.3 PG — SIGNIFICANT CHANGE UP (ref 27–34)
MCHC RBC-ENTMCNC: 33.6 GM/DL — SIGNIFICANT CHANGE UP (ref 32–36)
MCV RBC AUTO: 93.3 FL — SIGNIFICANT CHANGE UP (ref 80–100)
MONOCYTES # BLD AUTO: 1.2 K/UL — HIGH (ref 0–0.9)
MONOCYTES NFR BLD AUTO: 7 % — SIGNIFICANT CHANGE UP (ref 2–14)
MYELOCYTES NFR BLD: 2 % — HIGH (ref 0–0)
NEUTROPHILS # BLD AUTO: 13.5 K/UL — HIGH (ref 1.8–7.4)
NEUTROPHILS NFR BLD AUTO: 53 % — SIGNIFICANT CHANGE UP (ref 43–77)
NEUTS BAND # BLD: 23 % — HIGH (ref 0–8)
NRBC # BLD: 4 /100 — HIGH (ref 0–0)
PLAT MORPH BLD: NORMAL — SIGNIFICANT CHANGE UP
PLATELET # BLD AUTO: 104 K/UL — LOW (ref 150–400)
RBC # BLD: 2.94 M/UL — LOW (ref 3.8–5.2)
RBC # FLD: 13.8 % — SIGNIFICANT CHANGE UP (ref 10.3–14.5)
RBC BLD AUTO: SIGNIFICANT CHANGE UP
RH IG SCN BLD-IMP: POSITIVE — SIGNIFICANT CHANGE UP
WBC # BLD: 16.6 K/UL — HIGH (ref 3.8–10.5)
WBC # FLD AUTO: 16.6 K/UL — HIGH (ref 3.8–10.5)

## 2019-02-15 PROCEDURE — 86367 STEM CELLS TOTAL COUNT: CPT

## 2019-02-15 PROCEDURE — 99204 OFFICE O/P NEW MOD 45 MIN: CPT | Mod: 25

## 2019-02-15 PROCEDURE — 86901 BLOOD TYPING SEROLOGIC RH(D): CPT

## 2019-02-15 PROCEDURE — 86900 BLOOD TYPING SEROLOGIC ABO: CPT

## 2019-02-15 PROCEDURE — 85027 COMPLETE CBC AUTOMATED: CPT

## 2019-02-15 PROCEDURE — 82330 ASSAY OF CALCIUM: CPT

## 2019-02-15 PROCEDURE — 86850 RBC ANTIBODY SCREEN: CPT

## 2019-02-15 PROCEDURE — 38206 HARVEST AUTO STEM CELLS: CPT

## 2019-02-21 ENCOUNTER — LABORATORY RESULT (OUTPATIENT)
Age: 69
End: 2019-02-21

## 2019-02-21 ENCOUNTER — RESULT REVIEW (OUTPATIENT)
Age: 69
End: 2019-02-21

## 2019-02-21 ENCOUNTER — APPOINTMENT (OUTPATIENT)
Dept: HEMATOLOGY ONCOLOGY | Facility: CLINIC | Age: 69
End: 2019-02-21
Payer: COMMERCIAL

## 2019-02-21 ENCOUNTER — APPOINTMENT (OUTPATIENT)
Dept: INFUSION THERAPY | Facility: HOSPITAL | Age: 69
End: 2019-02-21

## 2019-02-21 VITALS
BODY MASS INDEX: 31.01 KG/M2 | DIASTOLIC BLOOD PRESSURE: 85 MMHG | WEIGHT: 175.05 LBS | SYSTOLIC BLOOD PRESSURE: 128 MMHG | HEART RATE: 80 BPM | TEMPERATURE: 98.7 F | OXYGEN SATURATION: 100 % | RESPIRATION RATE: 16 BRPM

## 2019-02-21 LAB
BASOPHILS # BLD AUTO: 0.1 K/UL — SIGNIFICANT CHANGE UP (ref 0–0.2)
BASOPHILS NFR BLD AUTO: 0.5 % — SIGNIFICANT CHANGE UP (ref 0–2)
EOSINOPHIL # BLD AUTO: 0 K/UL — SIGNIFICANT CHANGE UP (ref 0–0.5)
EOSINOPHIL NFR BLD AUTO: 0 % — SIGNIFICANT CHANGE UP (ref 0–6)
HCT VFR BLD CALC: 28.5 % — LOW (ref 34.5–45)
HGB BLD-MCNC: 9.3 G/DL — LOW (ref 11.5–15.5)
LYMPHOCYTES # BLD AUTO: 1.4 K/UL — SIGNIFICANT CHANGE UP (ref 1–3.3)
LYMPHOCYTES # BLD AUTO: 11.4 % — LOW (ref 13–44)
MCHC RBC-ENTMCNC: 30.1 PG — SIGNIFICANT CHANGE UP (ref 27–34)
MCHC RBC-ENTMCNC: 32.5 G/DL — SIGNIFICANT CHANGE UP (ref 32–36)
MCV RBC AUTO: 92.5 FL — SIGNIFICANT CHANGE UP (ref 80–100)
MONOCYTES # BLD AUTO: 0.7 K/UL — SIGNIFICANT CHANGE UP (ref 0–0.9)
MONOCYTES NFR BLD AUTO: 5.9 % — SIGNIFICANT CHANGE UP (ref 2–14)
NEUTROPHILS # BLD AUTO: 9.8 K/UL — HIGH (ref 1.8–7.4)
NEUTROPHILS NFR BLD AUTO: 82.2 % — HIGH (ref 43–77)
PLATELET # BLD AUTO: 309 K/UL — SIGNIFICANT CHANGE UP (ref 150–400)
RBC # BLD: 3.08 M/UL — LOW (ref 3.8–5.2)
RBC # FLD: 14.4 % — SIGNIFICANT CHANGE UP (ref 10.3–14.5)
WBC # BLD: 11.9 K/UL — HIGH (ref 3.8–10.5)
WBC # FLD AUTO: 11.9 K/UL — HIGH (ref 3.8–10.5)

## 2019-02-21 PROCEDURE — 99214 OFFICE O/P EST MOD 30 MIN: CPT

## 2019-02-22 ENCOUNTER — APPOINTMENT (OUTPATIENT)
Dept: INFUSION THERAPY | Facility: HOSPITAL | Age: 69
End: 2019-02-22

## 2019-02-22 DIAGNOSIS — Z01.818 ENCOUNTER FOR OTHER PREPROCEDURAL EXAMINATION: ICD-10-CM

## 2019-02-23 ENCOUNTER — APPOINTMENT (OUTPATIENT)
Dept: INFUSION THERAPY | Facility: HOSPITAL | Age: 69
End: 2019-02-23

## 2019-02-25 ENCOUNTER — INPATIENT (INPATIENT)
Facility: HOSPITAL | Age: 69
LOS: 17 days | Discharge: HOME | DRG: 16 | End: 2019-03-15
Attending: INTERNAL MEDICINE | Admitting: INTERNAL MEDICINE
Payer: COMMERCIAL

## 2019-02-25 VITALS
WEIGHT: 174.61 LBS | OXYGEN SATURATION: 99 % | HEIGHT: 63.78 IN | TEMPERATURE: 97 F | SYSTOLIC BLOOD PRESSURE: 147 MMHG | HEART RATE: 86 BPM | DIASTOLIC BLOOD PRESSURE: 78 MMHG | RESPIRATION RATE: 18 BRPM

## 2019-02-25 DIAGNOSIS — Z29.9 ENCOUNTER FOR PROPHYLACTIC MEASURES, UNSPECIFIED: ICD-10-CM

## 2019-02-25 DIAGNOSIS — C90.00 MULTIPLE MYELOMA NOT HAVING ACHIEVED REMISSION: ICD-10-CM

## 2019-02-25 DIAGNOSIS — Z98.51 TUBAL LIGATION STATUS: Chronic | ICD-10-CM

## 2019-02-25 DIAGNOSIS — Z76.82 AWAITING ORGAN TRANSPLANT STATUS: ICD-10-CM

## 2019-02-25 LAB
ALBUMIN SERPL ELPH-MCNC: 4.4 G/DL — SIGNIFICANT CHANGE UP (ref 3.3–5)
ALP SERPL-CCNC: 85 U/L — SIGNIFICANT CHANGE UP (ref 40–120)
ALT FLD-CCNC: 78 U/L — HIGH (ref 10–45)
ANION GAP SERPL CALC-SCNC: 15 MMOL/L — SIGNIFICANT CHANGE UP (ref 5–17)
APPEARANCE UR: CLEAR — SIGNIFICANT CHANGE UP
APTT BLD: 30.1 SEC — SIGNIFICANT CHANGE UP (ref 27.5–36.3)
AST SERPL-CCNC: 75 U/L — HIGH (ref 10–40)
BASOPHILS # BLD AUTO: 0 K/UL — SIGNIFICANT CHANGE UP (ref 0–0.2)
BASOPHILS NFR BLD AUTO: 0.7 % — SIGNIFICANT CHANGE UP (ref 0–2)
BILIRUB DIRECT SERPL-MCNC: <0.1 MG/DL — SIGNIFICANT CHANGE UP (ref 0–0.2)
BILIRUB INDIRECT FLD-MCNC: >0.1 MG/DL — LOW (ref 0.2–1)
BILIRUB SERPL-MCNC: 0.2 MG/DL — SIGNIFICANT CHANGE UP (ref 0.2–1.2)
BILIRUB UR-MCNC: NEGATIVE — SIGNIFICANT CHANGE UP
BUN SERPL-MCNC: 8 MG/DL — SIGNIFICANT CHANGE UP (ref 7–23)
CALCIUM SERPL-MCNC: 9.5 MG/DL — SIGNIFICANT CHANGE UP (ref 8.4–10.5)
CHLORIDE SERPL-SCNC: 108 MMOL/L — SIGNIFICANT CHANGE UP (ref 96–108)
CO2 SERPL-SCNC: 22 MMOL/L — SIGNIFICANT CHANGE UP (ref 22–31)
COLOR SPEC: SIGNIFICANT CHANGE UP
CREAT SERPL-MCNC: 0.53 MG/DL — SIGNIFICANT CHANGE UP (ref 0.5–1.3)
DIFF PNL FLD: NEGATIVE — SIGNIFICANT CHANGE UP
EOSINOPHIL # BLD AUTO: 0 K/UL — SIGNIFICANT CHANGE UP (ref 0–0.5)
EOSINOPHIL NFR BLD AUTO: 0.6 % — SIGNIFICANT CHANGE UP (ref 0–6)
FIBRINOGEN PPP-MCNC: 546 MG/DL — HIGH (ref 350–510)
GLUCOSE SERPL-MCNC: 83 MG/DL — SIGNIFICANT CHANGE UP (ref 70–99)
GLUCOSE UR QL: SIGNIFICANT CHANGE UP
HCT VFR BLD CALC: 27.1 % — LOW (ref 34.5–45)
HGB BLD-MCNC: 9.3 G/DL — LOW (ref 11.5–15.5)
IGA FLD-MCNC: <2 MG/DL — LOW (ref 84–499)
IGD SER-MCNC: <1 MG/DL — SIGNIFICANT CHANGE UP
IGE SERPL-ACNC: <10 IU/ML — SIGNIFICANT CHANGE UP
IGG FLD-MCNC: 267 MG/DL — LOW (ref 610–1660)
IGM SERPL-MCNC: <10 MG/DL — LOW (ref 35–242)
INR BLD: 1.03 RATIO — SIGNIFICANT CHANGE UP (ref 0.88–1.16)
KAPPA LC SER QL IFE: 57.19 MG/DL — HIGH (ref 0.33–1.94)
KAPPA/LAMBDA FREE LIGHT CHAIN RATIO, SERUM: 301 RATIO — HIGH (ref 0.26–1.65)
KETONES UR-MCNC: NEGATIVE — SIGNIFICANT CHANGE UP
LAMBDA LC SER QL IFE: 0.19 MG/DL — LOW (ref 0.57–2.63)
LDH SERPL L TO P-CCNC: 268 U/L — HIGH (ref 50–242)
LEUKOCYTE ESTERASE UR-ACNC: NEGATIVE — SIGNIFICANT CHANGE UP
LYMPHOCYTES # BLD AUTO: 0.9 K/UL — LOW (ref 1–3.3)
LYMPHOCYTES # BLD AUTO: 18.3 % — SIGNIFICANT CHANGE UP (ref 13–44)
MAGNESIUM SERPL-MCNC: 2.1 MG/DL — SIGNIFICANT CHANGE UP (ref 1.6–2.6)
MCHC RBC-ENTMCNC: 31.9 PG — SIGNIFICANT CHANGE UP (ref 27–34)
MCHC RBC-ENTMCNC: 34.4 GM/DL — SIGNIFICANT CHANGE UP (ref 32–36)
MCV RBC AUTO: 92.8 FL — SIGNIFICANT CHANGE UP (ref 80–100)
MONOCYTES # BLD AUTO: 0.6 K/UL — SIGNIFICANT CHANGE UP (ref 0–0.9)
MONOCYTES NFR BLD AUTO: 11.3 % — SIGNIFICANT CHANGE UP (ref 2–14)
NEUTROPHILS # BLD AUTO: 3.6 K/UL — SIGNIFICANT CHANGE UP (ref 1.8–7.4)
NEUTROPHILS NFR BLD AUTO: 69.1 % — SIGNIFICANT CHANGE UP (ref 43–77)
NITRITE UR-MCNC: NEGATIVE — SIGNIFICANT CHANGE UP
PH UR: 6.5 — SIGNIFICANT CHANGE UP (ref 5–8)
PHOSPHATE SERPL-MCNC: 3.4 MG/DL — SIGNIFICANT CHANGE UP (ref 2.5–4.5)
PLATELET # BLD AUTO: 475 K/UL — HIGH (ref 150–400)
POTASSIUM SERPL-MCNC: 3.8 MMOL/L — SIGNIFICANT CHANGE UP (ref 3.5–5.3)
POTASSIUM SERPL-SCNC: 3.8 MMOL/L — SIGNIFICANT CHANGE UP (ref 3.5–5.3)
PROT SERPL-MCNC: 6.4 G/DL — SIGNIFICANT CHANGE UP (ref 6–8.3)
PROT UR-MCNC: NEGATIVE — SIGNIFICANT CHANGE UP
PROTHROM AB SERPL-ACNC: 11.8 SEC — SIGNIFICANT CHANGE UP (ref 10–12.9)
RBC # BLD: 2.92 M/UL — LOW (ref 3.8–5.2)
RBC # BLD: 2.92 M/UL — LOW (ref 3.8–5.2)
RBC # FLD: 14.6 % — HIGH (ref 10.3–14.5)
RETICS #: 78 K/UL — SIGNIFICANT CHANGE UP (ref 25–125)
RETICS/RBC NFR: 2.7 % — HIGH (ref 0.5–2.5)
SODIUM SERPL-SCNC: 145 MMOL/L — SIGNIFICANT CHANGE UP (ref 135–145)
SP GR SPEC: 1.01 — LOW (ref 1.01–1.02)
UROBILINOGEN FLD QL: SIGNIFICANT CHANGE UP
WBC # BLD: 5.2 K/UL — SIGNIFICANT CHANGE UP (ref 3.8–10.5)
WBC # FLD AUTO: 5.2 K/UL — SIGNIFICANT CHANGE UP (ref 3.8–10.5)

## 2019-02-25 PROCEDURE — 36556 INSERT NON-TUNNEL CV CATH: CPT

## 2019-02-25 PROCEDURE — 93010 ELECTROCARDIOGRAM REPORT: CPT | Mod: 76

## 2019-02-25 PROCEDURE — 76937 US GUIDE VASCULAR ACCESS: CPT | Mod: 26

## 2019-02-25 PROCEDURE — 99291 CRITICAL CARE FIRST HOUR: CPT

## 2019-02-25 PROCEDURE — 77001 FLUOROGUIDE FOR VEIN DEVICE: CPT | Mod: 26

## 2019-02-25 RX ORDER — DOCUSATE SODIUM 100 MG
100 CAPSULE ORAL THREE TIMES A DAY
Qty: 0 | Refills: 0 | Status: DISCONTINUED | OUTPATIENT
Start: 2019-02-25 | End: 2019-03-15

## 2019-02-25 RX ORDER — SALIVA SUBSTITUTE COMB NO.11 351 MG
5 POWDER IN PACKET (EA) MUCOUS MEMBRANE
Qty: 0 | Refills: 0 | Status: DISCONTINUED | OUTPATIENT
Start: 2019-02-25 | End: 2019-03-15

## 2019-02-25 RX ORDER — NYSTATIN CREAM 100000 [USP'U]/G
1 CREAM TOPICAL THREE TIMES A DAY
Qty: 0 | Refills: 0 | Status: DISCONTINUED | OUTPATIENT
Start: 2019-02-25 | End: 2019-03-15

## 2019-02-25 RX ORDER — APREPITANT 80 MG/1
80 CAPSULE ORAL DAILY
Qty: 0 | Refills: 0 | Status: COMPLETED | OUTPATIENT
Start: 2019-02-26 | End: 2019-02-28

## 2019-02-25 RX ORDER — SODIUM CHLORIDE 9 MG/ML
1000 INJECTION, SOLUTION INTRAVENOUS
Qty: 0 | Refills: 0 | Status: DISCONTINUED | OUTPATIENT
Start: 2019-02-27 | End: 2019-03-02

## 2019-02-25 RX ORDER — SODIUM CHLORIDE 9 MG/ML
1000 INJECTION INTRAMUSCULAR; INTRAVENOUS; SUBCUTANEOUS
Qty: 0 | Refills: 0 | Status: DISCONTINUED | OUTPATIENT
Start: 2019-02-25 | End: 2019-03-15

## 2019-02-25 RX ORDER — METOCLOPRAMIDE HCL 10 MG
10 TABLET ORAL EVERY 6 HOURS
Qty: 0 | Refills: 0 | Status: DISCONTINUED | OUTPATIENT
Start: 2019-02-25 | End: 2019-03-15

## 2019-02-25 RX ORDER — FOLIC ACID 0.8 MG
1 TABLET ORAL DAILY
Qty: 0 | Refills: 0 | Status: DISCONTINUED | OUTPATIENT
Start: 2019-02-25 | End: 2019-03-15

## 2019-02-25 RX ORDER — PANTOPRAZOLE SODIUM 20 MG/1
40 TABLET, DELAYED RELEASE ORAL
Qty: 0 | Refills: 0 | Status: DISCONTINUED | OUTPATIENT
Start: 2019-02-25 | End: 2019-03-15

## 2019-02-25 RX ORDER — SODIUM CHLORIDE 9 MG/ML
1000 INJECTION, SOLUTION INTRAVENOUS
Qty: 0 | Refills: 0 | Status: DISCONTINUED | OUTPATIENT
Start: 2019-02-25 | End: 2019-03-02

## 2019-02-25 RX ORDER — AMLODIPINE BESYLATE 2.5 MG/1
5 TABLET ORAL DAILY
Qty: 0 | Refills: 0 | Status: DISCONTINUED | OUTPATIENT
Start: 2019-02-25 | End: 2019-02-27

## 2019-02-25 RX ORDER — HEPARIN SODIUM 5000 [USP'U]/ML
330 INJECTION INTRAVENOUS; SUBCUTANEOUS
Qty: 25000 | Refills: 0 | Status: DISCONTINUED | OUTPATIENT
Start: 2019-02-25 | End: 2019-03-14

## 2019-02-25 RX ORDER — MELPHALAN HYDROCHLORIDE 50 MG
167 KIT INTRAVENOUS DAILY
Qty: 0 | Refills: 0 | Status: COMPLETED | OUTPATIENT
Start: 2019-02-25 | End: 2019-02-27

## 2019-02-25 RX ORDER — SENNA PLUS 8.6 MG/1
1 TABLET ORAL AT BEDTIME
Qty: 0 | Refills: 0 | Status: DISCONTINUED | OUTPATIENT
Start: 2019-02-25 | End: 2019-03-15

## 2019-02-25 RX ORDER — FLUCONAZOLE 150 MG/1
400 TABLET ORAL DAILY
Qty: 0 | Refills: 0 | Status: DISCONTINUED | OUTPATIENT
Start: 2019-02-25 | End: 2019-03-03

## 2019-02-25 RX ORDER — ACETAMINOPHEN 500 MG
650 TABLET ORAL EVERY 6 HOURS
Qty: 0 | Refills: 0 | Status: DISCONTINUED | OUTPATIENT
Start: 2019-02-25 | End: 2019-03-15

## 2019-02-25 RX ORDER — FUROSEMIDE 40 MG
20 TABLET ORAL EVERY 24 HOURS
Qty: 0 | Refills: 0 | Status: DISCONTINUED | OUTPATIENT
Start: 2019-02-25 | End: 2019-03-15

## 2019-02-25 RX ORDER — URSODIOL 250 MG/1
300 TABLET, FILM COATED ORAL
Qty: 0 | Refills: 0 | Status: DISCONTINUED | OUTPATIENT
Start: 2019-02-25 | End: 2019-03-15

## 2019-02-25 RX ORDER — DIPHENHYDRAMINE HCL 50 MG
25 CAPSULE ORAL EVERY 4 HOURS
Qty: 0 | Refills: 0 | Status: DISCONTINUED | OUTPATIENT
Start: 2019-02-25 | End: 2019-03-15

## 2019-02-25 RX ORDER — DEXAMETHASONE 0.5 MG/5ML
20 ELIXIR ORAL EVERY 24 HOURS
Qty: 0 | Refills: 0 | Status: COMPLETED | OUTPATIENT
Start: 2019-02-25 | End: 2019-02-26

## 2019-02-25 RX ORDER — ONDANSETRON 8 MG/1
8 TABLET, FILM COATED ORAL EVERY 8 HOURS
Qty: 0 | Refills: 0 | Status: COMPLETED | OUTPATIENT
Start: 2019-02-25 | End: 2019-02-27

## 2019-02-25 RX ORDER — ACYCLOVIR SODIUM 500 MG
400 VIAL (EA) INTRAVENOUS EVERY 8 HOURS
Qty: 0 | Refills: 0 | Status: DISCONTINUED | OUTPATIENT
Start: 2019-02-27 | End: 2019-03-15

## 2019-02-25 RX ORDER — APREPITANT 80 MG/1
125 CAPSULE ORAL ONCE
Qty: 0 | Refills: 0 | Status: COMPLETED | OUTPATIENT
Start: 2019-02-25 | End: 2019-02-25

## 2019-02-25 RX ORDER — SODIUM BICARBONATE 1 MEQ/ML
10 SYRINGE (ML) INTRAVENOUS
Qty: 0 | Refills: 0 | Status: DISCONTINUED | OUTPATIENT
Start: 2019-02-25 | End: 2019-03-15

## 2019-02-25 RX ORDER — CLOTRIMAZOLE 10 MG
1 TROCHE MUCOUS MEMBRANE
Qty: 0 | Refills: 0 | Status: DISCONTINUED | OUTPATIENT
Start: 2019-02-25 | End: 2019-03-15

## 2019-02-25 RX ADMIN — Medication 1 TABLET(S): at 11:38

## 2019-02-25 RX ADMIN — FLUCONAZOLE 400 MILLIGRAM(S): 150 TABLET ORAL at 11:38

## 2019-02-25 RX ADMIN — APREPITANT 125 MILLIGRAM(S): 80 CAPSULE ORAL at 14:55

## 2019-02-25 RX ADMIN — Medication 10 MILLILITER(S): at 16:32

## 2019-02-25 RX ADMIN — SODIUM CHLORIDE 20 MILLILITER(S): 9 INJECTION INTRAMUSCULAR; INTRAVENOUS; SUBCUTANEOUS at 23:05

## 2019-02-25 RX ADMIN — Medication 1 MILLIGRAM(S): at 11:38

## 2019-02-25 RX ADMIN — Medication 10 MILLILITER(S): at 20:56

## 2019-02-25 RX ADMIN — Medication 5 MILLILITER(S): at 23:05

## 2019-02-25 RX ADMIN — HEPARIN SODIUM 3.3 UNIT(S)/HR: 5000 INJECTION INTRAVENOUS; SUBCUTANEOUS at 20:56

## 2019-02-25 RX ADMIN — Medication 1 LOZENGE: at 20:56

## 2019-02-25 RX ADMIN — Medication 5 MILLILITER(S): at 16:32

## 2019-02-25 RX ADMIN — Medication 5 MILLILITER(S): at 20:56

## 2019-02-25 RX ADMIN — SODIUM CHLORIDE 200 MILLILITER(S): 9 INJECTION, SOLUTION INTRAVENOUS at 14:55

## 2019-02-25 RX ADMIN — ONDANSETRON 8 MILLIGRAM(S): 8 TABLET, FILM COATED ORAL at 16:32

## 2019-02-25 RX ADMIN — ONDANSETRON 8 MILLIGRAM(S): 8 TABLET, FILM COATED ORAL at 21:28

## 2019-02-25 RX ADMIN — Medication 1 LOZENGE: at 23:07

## 2019-02-25 RX ADMIN — SODIUM CHLORIDE 20 MILLILITER(S): 9 INJECTION INTRAMUSCULAR; INTRAVENOUS; SUBCUTANEOUS at 14:56

## 2019-02-25 RX ADMIN — AMLODIPINE BESYLATE 5 MILLIGRAM(S): 2.5 TABLET ORAL at 20:57

## 2019-02-25 RX ADMIN — HEPARIN SODIUM 3.3 UNIT(S)/HR: 5000 INJECTION INTRAVENOUS; SUBCUTANEOUS at 15:41

## 2019-02-25 RX ADMIN — SODIUM CHLORIDE 200 MILLILITER(S): 9 INJECTION, SOLUTION INTRAVENOUS at 20:55

## 2019-02-25 RX ADMIN — Medication 1 LOZENGE: at 16:32

## 2019-02-25 RX ADMIN — MELPHALAN HYDROCHLORIDE 1066.8 MILLIGRAM(S): KIT INTRAVENOUS at 18:11

## 2019-02-25 RX ADMIN — Medication 110 MILLIGRAM(S): at 17:25

## 2019-02-25 RX ADMIN — Medication 1 TABLET(S): at 17:31

## 2019-02-25 RX ADMIN — URSODIOL 300 MILLIGRAM(S): 250 TABLET, FILM COATED ORAL at 17:31

## 2019-02-25 RX ADMIN — Medication 10 MILLILITER(S): at 23:07

## 2019-02-25 NOTE — H&P ADULT - SKIN COMMENTS
macular discoloration bilateral axilla and neck, groin not examined. macular discoloration bilateral axilla and neck, s/p Kepivance

## 2019-02-25 NOTE — H&P ADULT - PROBLEM SELECTOR PLAN 1
1. Admit to BMTU   2. 3 hours prior to Melphalan start hydration: D5NS at 200ml /hr and continue 24 hours post Melphalan infusion  3. Day – 3 & day -2 - Melphalan 100mg/m2  IV   4. Strict I&O, daily weights, prn diuresis   5. Day -1 rest day. At 2200 on day – 1, start transplant hydration 1/2NS + 50mEq NaHCO3- (may substitute [id number] if bicarb not available)  6. Day 0 – infuse HPC product. 4 hours after infusion of cells start Zarxio 5 micrograms / kg (actual weight) . Continue through engraftment.   7. On day 0, + 1, + 2-give Kepivance 60micrograms / kg (actual weight) 1. Admit to BMTU   2. -3 hours prior to Melphalan start hydration: D5NS at 200ml /hr and continue 24 hours post Melphalan infusion  3. Day – 3 & day -2 - Melphalan 100mg/m2  IV   4. Strict I&O, daily weights, prn diuresis   5. Day -1 rest day. At 2200 on day – 1, start transplant hydration 1/2NS + 50mEq NaHCO3- (may substitute [id number] if bicarb not available)  6. Day 0 – infuse HPC product. 4 hours after infusion of cells start Zarxio 5 micrograms / kg (actual weight) . Continue through engraftment.   7. On day 0, + 1, + 2-give Kepivance 60micrograms / kg (actual weight)

## 2019-02-25 NOTE — H&P ADULT - PMH
Complex ovarian cyst    Endometrial disorder  Fluid in endometrial cavity  Hyperlipidemia, unspecified hyperlipidemia type    Subacute vulvitis    Termination of pregnancy (fetus)

## 2019-02-25 NOTE — H&P ADULT - NSHPSOCIALHISTORY_GEN_ALL_CORE
Former smoker, no illicits, occasional alcohol, lives with family. Former smoker, no illicit drugs, occasional alcohol, lives with family.

## 2019-02-25 NOTE — PROGRESS NOTE ADULT - SUBJECTIVE AND OBJECTIVE BOX
Interventional Radiology     69y Female with PMH as below with Multiple Myeloma admitted for autologous SCT and referred to IR for central veinous access.    PAST MEDICAL & SURGICAL HISTORY:  Subacute vulvitis  Termination of pregnancy (fetus)  Hyperlipidemia, unspecified hyperlipidemia type  Endometrial disorder: Fluid in endometrial cavity  Complex ovarian cyst  H/O tubal ligation    Allergies  No Known Allergies    Labs:                        9.3    5.2   )-----------( 475      ( 25 Feb 2019 11:11 )             27.1     02-25    x   |  x   |  x   ----------------------------<  83  x    |  x   |  0.53      TPro  x   /  Alb  4.4  /  TBili  x   /  DBili  <0.1  /  AST  75<H>  /  ALT  78<H>  /  AlkPhos  85  02-25    PT/INR - ( 25 Feb 2019 11:11 )   PT: 11.8 sec;   INR: 1.03 ratio    PTT - ( 25 Feb 2019 11:11 )  PTT:30.1 sec    After risks, benefits, alternatives discussion pt verbalized understanding and signed informed consent.  Will plan for triple lumen central veinous catheter placement.    SUZAN Mcqueen  [facility] [id number]  Ext [phone]

## 2019-02-25 NOTE — ADVANCED PRACTICE NURSE CONSULT - ASSESSMENT
Pt A/Ox4. Vital signs stable, afebrile. OOB independently with steady gait. Pt voiding well. Right TL IJ dressing CDI, easily flushed with brisk blood return. Melphalan hydration D5Ns @ 200ml/hr started @ 14:55. Labs reviewed by Dr. Rahman on rounds. Melphalan 100mg/m2 = 167mg in 500ml NS infused over 30 minutes via brown lumen on TLC. Pt premedicated as ordered with emend 125mg PO, 8mg zofran IVP, and decardon 20mg IVSS as ordered. Education provided to patient and daughter. Medication printout provided to patient. Safety maintained.

## 2019-02-25 NOTE — PROCEDURE NOTE - NSPROCSEDATIONNOT_GEN_ALL_CORE
Sent rx to [facility] for approval    ----- Message from Nicole Spivey sent at 2/6/2019  2:58 PM EST -----  Contact: patient  Patient returned call and requested Testosterone CYP, 200 mg/ml, vials,1mls  every 10 days, 3 vials 30 day supply, [address], [phone].  Pt - [phone]   ----- Message -----  From: Nicole Spivey  Sent: 2/6/2019   2:35 PM  To: BINH Metcalf: Patient asked for Testosterone script and before I could ask questions we got disconnected. I tried to call back but had to leave message for call back. Thank you       No

## 2019-02-25 NOTE — PROCEDURE NOTE - ADDITIONAL PROCEDURE DETAILS
S/p Right IJ TLC CV catheter, 7F x 16 cm catheter placed.  TIP confirmed in SVC.  OK to use catheter immediately.

## 2019-02-25 NOTE — H&P ADULT - HISTORY OF PRESENT ILLNESS
Ms. 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  She was noted to have proteinuria and then had a 24 hour urine protein which showed non-nephrotic range proteinuria of 510 mg/24 hours. She was referred to Dr. Lesa Rendon of nephrology. SPEP showed faint M-spike 0.2 g/dL in gammaglobulin region, immunofixation showed this to be a IgG kappa monoclonal protein. UPEP showed 2 monoclonal protein bands, 61%, and 0.7%.     Subsequent work up:  6/19/18 M-protein 0.2 g/dL, Kappa FLC 71, lambda FLC 0.36, FLC ratio 198.61  , Beta 2 microglobulin 1.5 mg/L    She had a bone marrow biopsy on 6/22/18. Pathology: plasma cell neoplasm, plasma cells comprise 20-25% of marrow cells. Trilineage hematopoiesis present with maturation, increased iron stores. Congo red stain negative for amyloid. Karyotype 46, XX. FISH panel - positive for CCND1/IGH fusion - a/w favorable prognosis. PET CT (7/12/18) did not show any bone lesions. Began VRd on 7/19/18.     On C4 of VRd. FLC ratio initially decreasing -->198 --> 126 --> 67, however has increased to 115 with urine studies positive for Bence Brooks protein. For refractory disease, she was switched to KRd for a deeper response.  IgG kappa myeloma, currently on KRd to re-discuss high-dose chemotherapy followed by autologous peripheral stem cell transplant.  Plan-   Cytoxan 3 gm/m2 IV with IV hydration, and MESNA on 2/5/19(day 1)  Drink plenty of water for several days post Cytoxan as directed  Neulasta 12 mg sq on 2/6/19(day 2)  Antiemetics  Discontinue aspirin on day 1 with Cytoxan  Begin Cipro 250 mg po 2X/day on day +5, until WBC recovery post iris count  Monitor CBC with diff, CD34 count as directed Ms. 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  She was noted to have proteinuria and then had a 24 hour urine protein which showed non-nephrotic range proteinuria of 510 mg/24 hours. She was referred to Dr. Lesa Rendon of nephrology. SPEP showed faint M-spike 0.2 g/dL in gammaglobulin region, immunofixation showed this to be a IgG kappa monoclonal protein. UPEP showed 2 monoclonal protein bands, 61%, and 0.7%.     She had a bone marrow biopsy on 6/22/18. Pathology: plasma cell neoplasm, plasma cells comprise 20-25% of marrow cells. Trilineage hematopoiesis present with maturation, increased iron stores. Congo red stain negative for amyloid. Karyotype 46, XX. FISH panel - positive for CCND1/IGH fusion - a/w favorable prognosis. PET CT (7/12/18) did not show any bone lesions. Began VRd on 7/19/18.     On C4 of VRd. FLC ratio initially decreasing -->198 --> 126 --> 67, however has increased to 115 with urine studies positive for Bence Brooks protein. For refractory disease, she was switched to KRd for a deeper response.  Ms Calero was mobilized with Cytoxan and Neulasta and is now admitted for autologous stem cell transplant with Melphalan conditioning. Ms. Calero is a 69 year old female who was initially referred for monoclonal gammopathy.  She was noted to have proteinuria and then had a 24 hour urine protein which showed non-nephrotic range proteinuria of 510 mg/24 hours. She was referred to Dr. Lesa Rendon of nephrology. SPEP showed faint M-spike 0.2 g/dL in gammaglobulin region, immunofixation showed this to be a IgG kappa monoclonal protein. UPEP showed 2 monoclonal protein bands, 61%, and 0.7%.     She had a bone marrow biopsy on 6/22/18. Pathology: plasma cell neoplasm, plasma cells comprise 20-25% of marrow cells. Trilineage hematopoiesis present with maturation, increased iron stores. Congo red stain negative for amyloid. Karyotype 46, XX. FISH panel - positive for CCND1/IGH fusion - a/w favorable prognosis. PET CT (7/12/18) did not show any bone lesions. Began VRd on 7/19/18.     On C4 of VRd. FLC ratio initially decreasing -->198 --> 126 --> 67, however has increased to 115 with urine studies positive for Bence Brooks protein. For refractory disease, she was switched to KRd for a deeper response.  Ms Calero was mobilized with Cytoxan and Neulasta and is now admitted for autologous stem cell transplant with Melphalan conditioning.

## 2019-02-25 NOTE — H&P ADULT - ITE SK HX ROS MEA POS PC
burning palms/fingertips/rash burning palms/fingertips, rash in axilla/neck/groin burning palms/fingertips, rash in axilla/neck /groin s/p Kepivance

## 2019-02-25 NOTE — H&P ADULT - FAMILY HISTORY
Grandparent  Still living? Unknown  Family history of cerebrovascular accident (CVA), Age at diagnosis: Age Unknown     Mother  Still living? Unknown  Family history of cancer of the kidney, Age at diagnosis: Age Unknown     Aunt  Still living? Unknown  Family history of ovarian cancer, Age at diagnosis: Age Unknown

## 2019-02-25 NOTE — H&P ADULT - RS GEN PE MLT RESP DETAILS PC
good air movement/breath sounds equal clear to auscultation bilaterally/good air movement/breath sounds equal

## 2019-02-26 LAB
ALBUMIN SERPL ELPH-MCNC: 3.9 G/DL — SIGNIFICANT CHANGE UP (ref 3.3–5)
ALP SERPL-CCNC: 75 U/L — SIGNIFICANT CHANGE UP (ref 40–120)
ALT FLD-CCNC: 81 U/L — HIGH (ref 10–45)
ANION GAP SERPL CALC-SCNC: 10 MMOL/L — SIGNIFICANT CHANGE UP (ref 5–17)
AST SERPL-CCNC: 70 U/L — HIGH (ref 10–40)
BASOPHILS # BLD AUTO: 0 K/UL — SIGNIFICANT CHANGE UP (ref 0–0.2)
BASOPHILS NFR BLD AUTO: 0 % — SIGNIFICANT CHANGE UP (ref 0–2)
BILIRUB SERPL-MCNC: 0.1 MG/DL — LOW (ref 0.2–1.2)
BUN SERPL-MCNC: 7 MG/DL — SIGNIFICANT CHANGE UP (ref 7–23)
CALCIUM SERPL-MCNC: 8.9 MG/DL — SIGNIFICANT CHANGE UP (ref 8.4–10.5)
CHLORIDE SERPL-SCNC: 115 MMOL/L — HIGH (ref 96–108)
CO2 SERPL-SCNC: 20 MMOL/L — LOW (ref 22–31)
CREAT SERPL-MCNC: 0.51 MG/DL — SIGNIFICANT CHANGE UP (ref 0.5–1.3)
EOSINOPHIL # BLD AUTO: 0 K/UL — SIGNIFICANT CHANGE UP (ref 0–0.5)
EOSINOPHIL NFR BLD AUTO: 0.3 % — SIGNIFICANT CHANGE UP (ref 0–6)
GLUCOSE SERPL-MCNC: 179 MG/DL — HIGH (ref 70–99)
HCT VFR BLD CALC: 24.5 % — LOW (ref 34.5–45)
HCV AB S/CO SERPL IA: 0.03 S/CO — SIGNIFICANT CHANGE UP (ref 0–0.79)
HCV AB SERPL-IMP: SIGNIFICANT CHANGE UP
HGB BLD-MCNC: 8.3 G/DL — LOW (ref 11.5–15.5)
LDH SERPL L TO P-CCNC: 232 U/L — SIGNIFICANT CHANGE UP (ref 50–242)
LYMPHOCYTES # BLD AUTO: 0.4 K/UL — LOW (ref 1–3.3)
LYMPHOCYTES # BLD AUTO: 6 % — LOW (ref 13–44)
MAGNESIUM SERPL-MCNC: 1.9 MG/DL — SIGNIFICANT CHANGE UP (ref 1.6–2.6)
MCHC RBC-ENTMCNC: 31.9 PG — SIGNIFICANT CHANGE UP (ref 27–34)
MCHC RBC-ENTMCNC: 33.9 GM/DL — SIGNIFICANT CHANGE UP (ref 32–36)
MCV RBC AUTO: 94 FL — SIGNIFICANT CHANGE UP (ref 80–100)
MONOCYTES # BLD AUTO: 0 K/UL — SIGNIFICANT CHANGE UP (ref 0–0.9)
MONOCYTES NFR BLD AUTO: 0.4 % — LOW (ref 2–14)
NEUTROPHILS # BLD AUTO: 6.9 K/UL — SIGNIFICANT CHANGE UP (ref 1.8–7.4)
NEUTROPHILS NFR BLD AUTO: 93.4 % — HIGH (ref 43–77)
PHOSPHATE SERPL-MCNC: 2.5 MG/DL — SIGNIFICANT CHANGE UP (ref 2.5–4.5)
PLATELET # BLD AUTO: 452 K/UL — HIGH (ref 150–400)
POTASSIUM SERPL-MCNC: 3.9 MMOL/L — SIGNIFICANT CHANGE UP (ref 3.5–5.3)
POTASSIUM SERPL-SCNC: 3.9 MMOL/L — SIGNIFICANT CHANGE UP (ref 3.5–5.3)
PROT SERPL-MCNC: 5.7 G/DL — LOW (ref 6–8.3)
RBC # BLD: 2.6 M/UL — LOW (ref 3.8–5.2)
RBC # FLD: 14.7 % — HIGH (ref 10.3–14.5)
SODIUM SERPL-SCNC: 145 MMOL/L — SIGNIFICANT CHANGE UP (ref 135–145)
WBC # BLD: 7.4 K/UL — SIGNIFICANT CHANGE UP (ref 3.8–10.5)
WBC # FLD AUTO: 7.4 K/UL — SIGNIFICANT CHANGE UP (ref 3.8–10.5)

## 2019-02-26 PROCEDURE — 99291 CRITICAL CARE FIRST HOUR: CPT

## 2019-02-26 RX ORDER — LIDOCAINE AND PRILOCAINE CREAM 25; 25 MG/G; MG/G
1 CREAM TOPICAL DAILY
Qty: 0 | Refills: 0 | Status: DISCONTINUED | OUTPATIENT
Start: 2019-02-28 | End: 2019-03-15

## 2019-02-26 RX ORDER — FUROSEMIDE 40 MG
40 TABLET ORAL ONCE
Qty: 0 | Refills: 0 | Status: COMPLETED | OUTPATIENT
Start: 2019-02-26 | End: 2019-02-26

## 2019-02-26 RX ORDER — DIPHENHYDRAMINE HCL 50 MG
25 CAPSULE ORAL ONCE
Qty: 0 | Refills: 0 | Status: COMPLETED | OUTPATIENT
Start: 2019-02-28 | End: 2019-02-28

## 2019-02-26 RX ORDER — FILGRASTIM 480MCG/1.6
480 VIAL (ML) INJECTION DAILY
Qty: 0 | Refills: 0 | Status: COMPLETED | OUTPATIENT
Start: 2019-02-28 | End: 2019-03-12

## 2019-02-26 RX ORDER — ACETAMINOPHEN 500 MG
650 TABLET ORAL ONCE
Qty: 0 | Refills: 0 | Status: COMPLETED | OUTPATIENT
Start: 2019-02-28 | End: 2019-02-28

## 2019-02-26 RX ORDER — HYDROCORTISONE 20 MG
50 TABLET ORAL ONCE
Qty: 0 | Refills: 0 | Status: COMPLETED | OUTPATIENT
Start: 2019-02-28 | End: 2019-02-28

## 2019-02-26 RX ADMIN — MELPHALAN HYDROCHLORIDE 1066.8 MILLIGRAM(S): KIT INTRAVENOUS at 18:05

## 2019-02-26 RX ADMIN — ONDANSETRON 8 MILLIGRAM(S): 8 TABLET, FILM COATED ORAL at 13:16

## 2019-02-26 RX ADMIN — Medication 1 MILLIGRAM(S): at 11:07

## 2019-02-26 RX ADMIN — AMLODIPINE BESYLATE 5 MILLIGRAM(S): 2.5 TABLET ORAL at 05:57

## 2019-02-26 RX ADMIN — FLUCONAZOLE 400 MILLIGRAM(S): 150 TABLET ORAL at 11:07

## 2019-02-26 RX ADMIN — NYSTATIN CREAM 1 APPLICATION(S): 100000 CREAM TOPICAL at 13:17

## 2019-02-26 RX ADMIN — Medication 40 MILLIGRAM(S): at 18:04

## 2019-02-26 RX ADMIN — Medication 1 TABLET(S): at 11:07

## 2019-02-26 RX ADMIN — Medication 5 MILLILITER(S): at 15:07

## 2019-02-26 RX ADMIN — Medication 5 MILLILITER(S): at 21:10

## 2019-02-26 RX ADMIN — Medication 1 LOZENGE: at 21:10

## 2019-02-26 RX ADMIN — Medication 110 MILLIGRAM(S): at 17:22

## 2019-02-26 RX ADMIN — Medication 10 MILLILITER(S): at 15:07

## 2019-02-26 RX ADMIN — Medication 40 MILLIGRAM(S): at 11:07

## 2019-02-26 RX ADMIN — ONDANSETRON 8 MILLIGRAM(S): 8 TABLET, FILM COATED ORAL at 21:11

## 2019-02-26 RX ADMIN — Medication 1 LOZENGE: at 09:03

## 2019-02-26 RX ADMIN — Medication 5 MILLILITER(S): at 09:03

## 2019-02-26 RX ADMIN — APREPITANT 80 MILLIGRAM(S): 80 CAPSULE ORAL at 11:06

## 2019-02-26 RX ADMIN — SODIUM CHLORIDE 200 MILLILITER(S): 9 INJECTION, SOLUTION INTRAVENOUS at 21:10

## 2019-02-26 RX ADMIN — NYSTATIN CREAM 1 APPLICATION(S): 100000 CREAM TOPICAL at 21:10

## 2019-02-26 RX ADMIN — URSODIOL 300 MILLIGRAM(S): 250 TABLET, FILM COATED ORAL at 10:02

## 2019-02-26 RX ADMIN — PANTOPRAZOLE SODIUM 40 MILLIGRAM(S): 20 TABLET, DELAYED RELEASE ORAL at 05:57

## 2019-02-26 RX ADMIN — URSODIOL 300 MILLIGRAM(S): 250 TABLET, FILM COATED ORAL at 18:08

## 2019-02-26 RX ADMIN — Medication 10 MILLILITER(S): at 11:07

## 2019-02-26 RX ADMIN — ONDANSETRON 8 MILLIGRAM(S): 8 TABLET, FILM COATED ORAL at 05:57

## 2019-02-26 RX ADMIN — Medication 1 TABLET(S): at 18:08

## 2019-02-26 RX ADMIN — HEPARIN SODIUM 3.3 UNIT(S)/HR: 5000 INJECTION INTRAVENOUS; SUBCUTANEOUS at 21:09

## 2019-02-26 RX ADMIN — SODIUM CHLORIDE 200 MILLILITER(S): 9 INJECTION, SOLUTION INTRAVENOUS at 13:16

## 2019-02-26 RX ADMIN — Medication 10 MILLILITER(S): at 09:03

## 2019-02-26 RX ADMIN — Medication 1 LOZENGE: at 11:07

## 2019-02-26 RX ADMIN — HEPARIN SODIUM 3.3 UNIT(S)/HR: 5000 INJECTION INTRAVENOUS; SUBCUTANEOUS at 11:06

## 2019-02-26 RX ADMIN — Medication 1 TABLET(S): at 05:57

## 2019-02-26 RX ADMIN — Medication 10 MILLILITER(S): at 21:10

## 2019-02-26 RX ADMIN — Medication 5 MILLILITER(S): at 11:07

## 2019-02-26 RX ADMIN — SODIUM CHLORIDE 20 MILLILITER(S): 9 INJECTION INTRAMUSCULAR; INTRAVENOUS; SUBCUTANEOUS at 21:09

## 2019-02-26 RX ADMIN — Medication 1 LOZENGE: at 15:07

## 2019-02-26 NOTE — ADVANCED PRACTICE NURSE CONSULT - ASSESSMENT
Pt A/Ox4. Vital signs stable, afebrile. OOB independently with steady gait. 40mg lasix IVP ordered and given x2. Pt voiding well. Right TL IJ dressing dressing changed; TLC easily flushed with brisk blood return. Continuing Melphalan hydration D5NS @ 200ml/hr. Labs reviewed by Dr. Rahman on rounds. Melphalan 100mg/m2 = 167mg in 500ml NS infused over 30 minutes via TLC. Pt premedicated as ordered with decardon 20mg IVSS. 80mg emend PO given, continuing ATC zofran 8mg IVP. Education reinforced to patient. Safety maintained.

## 2019-02-26 NOTE — PROGRESS NOTE ADULT - ATTENDING COMMENTS
70 y/o F w/ IgG myeloma admitted for autologous pbsct w/ Melphalan prep (100mg/m2 x 2)   Day - 2 - continue Melphalan hydration for 24 hours after infusion of last dose   Strict I&O, daily weights, prn diuresis   Antiemetics, mouth care skin care   Bactrim DS, Fluconazole ppx   OOB, ambulate 68 y/o F w/ IgG kappa myeloma admitted for autologous pbsct w/ Melphalan prep (100mg/m2 x 2)   Day - 2 - continue Melphalan hydration for 24 hours after infusion of last dose   Strict I&O, daily weights, prn diuresis   Antiemetics, mouth care skin care   Bactrim DS, Fluconazole prophylaxis  VOD prophylaxis  OOB, ambulate

## 2019-02-26 NOTE — PROGRESS NOTE ADULT - SUBJECTIVE AND OBJECTIVE BOX
[facility] Transplant Team                                                      Critical / Counseling Time Provided: 30 minutes                                                                                                                                                        Chief Complaint: admitted for autologous transplant    S: Patient seen and examined with [facility] Transplant Team:       Denies mouth / tongue / throat pain, dyspnea, cough, nausea, vomiting, diarrhea, abdominal pain     O: Vitals:   Vital Signs Last 24 Hrs  T(C): 35.7 (26 Feb 2019 05:18), Max: 36.6 (25 Feb 2019 17:19)  T(F): 96.3 (26 Feb 2019 05:18), Max: 97.9 (25 Feb 2019 17:19)  HR: 83 (26 Feb 2019 05:18) (83 - 92)  BP: 119/64 (26 Feb 2019 05:18) (113/67 - 147/78)  BP(mean): --  RR: 18 (26 Feb 2019 05:18) (18 - 18)  SpO2: 96% (26 Feb 2019 05:18) (95% - 99%)    Admit weight: 79.2 kg    Daily weight: pending    Intake / Output:   02-25 @ 07:01 - 02-26 @ 07:00  --------------------------------------------------------  IN: 4175.5 mL / OUT: 5000 mL / NET: -824.5 mL    02-26 @ 07:01  - 02-26 @ 09:48  --------------------------------------------------------  IN: 285.1 mL / OUT: 0 mL / NET: 285.1 mL    PE:   Oropharynx: no erythema or ulcers  Oral Mucositis: n/a                                                       Grade:   CVS: S1S2, RRR  Lungs: clear bilaterally  Abdomen: NTND, normoactive BS x 4Q  Extremities: no edema  Gastric Mucositis: n/a                                                Grade:   Intestinal Mucositis: n/a                                             Grade:   Skin: no rashes  TLC: covered  Neuro: nonfocal     Labs:   CBC Full  -  ( 26 Feb 2019 06:51 )  WBC Count : 7.4 K/uL  Hemoglobin : 8.3 g/dL  Hematocrit : 24.5 %  Platelet Count - Automated : 452 K/uL  Mean Cell Volume : 94.0 fl  Mean Cell Hemoglobin : 31.9 pg  Mean Cell Hemoglobin Concentration : 33.9 gm/dL  Auto Neutrophil # : 6.9 K/uL  Auto Lymphocyte # : 0.4 K/uL  Auto Monocyte # : 0.0 K/uL  Auto Eosinophil # : 0.0 K/uL  Auto Basophil # : 0.0 K/uL  Auto Neutrophil % : 93.4 %  Auto Lymphocyte % : 6.0 %  Auto Monocyte % : 0.4 %  Auto Eosinophil % : 0.3 %  Auto Basophil % : 0.0 %                          8.3    7.4   )-----------( 452      ( 26 Feb 2019 06:51 )             24.5     02-26    145  |  115<H>  |  7   ----------------------------<  179<H>  3.9   |  20<L>  |  0.51    Ca    8.9      26 Feb 2019 06:49  Phos  2.5     02-26  Mg     1.9     02-26    TPro  5.7<L>  /  Alb  3.9  /  TBili  0.1<L>  /  DBili  x   /  AST  70<H>  /  ALT  81<H>  /  AlkPhos  75  02-26    PT/INR - ( 25 Feb 2019 11:11 )   PT: 11.8 sec;   INR: 1.03 ratio      PTT - ( 25 Feb 2019 11:11 )  PTT:30.1 sec  LIVER FUNCTIONS - ( 26 Feb 2019 06:49 )  Alb: 3.9 g/dL / Pro: 5.7 g/dL / ALK PHOS: 75 U/L / ALT: 81 U/L / AST: 70 U/L / GGT: x           Lactate Dehydrogenase, Serum: 232 U/L (02-26 @ 06:49)  Lactate Dehydrogenase, Serum: 268 U/L (02-25 @ 11:11)    Cultures:   no recent    Radiology:   no recent    Meds:   Antimicrobials:   clotrimazole Lozenge 1 Lozenge Oral five times a day  fluconAZOLE   Tablet 400 milliGRAM(s) Oral daily  trimethoprim  160 mG/sulfamethoxazole 800 mG 1 Tablet(s) Oral every 12 hours    Heme / Onc:   heparin  Infusion 330 Unit(s)/Hr IV Continuous <Continuous>  melphalan IVPB (eMAR) 167 milliGRAM(s) IV Intermittent daily    GI:  docusate sodium 100 milliGRAM(s) Oral three times a day PRN  pantoprazole    Tablet 40 milliGRAM(s) Oral before breakfast  senna 1 Tablet(s) Oral at bedtime PRN  sodium bicarbonate Mouth Rinse 10 milliLiter(s) Swish and Spit five times a day  ursodiol Capsule 300 milliGRAM(s) Oral two times a day with meals    Cardiovascular:   amLODIPine   Tablet 5 milliGRAM(s) Oral daily  furosemide   Injectable 20 milliGRAM(s) IV Push every 24 hours  furosemide   Injectable 40 milliGRAM(s) IV Push once    Other medications:   acetaminophen   Tablet .. 650 milliGRAM(s) Oral every 6 hours  aprepitant 80 milliGRAM(s) Oral daily  Biotene Dry Mouth Oral Rinse 5 milliLiter(s) Swish and Spit five times a day  dexamethasone  IVPB 20 milliGRAM(s) IV Intermittent every 24 hours  dextrose 5% + sodium chloride 0.9%. 1000 milliLiter(s) IV Continuous <Continuous>  folic acid 1 milliGRAM(s) Oral daily  LORazepam   Injectable 1 milliGRAM(s) IV Push daily  multivitamin 1 Tablet(s) Oral daily  nystatin Powder 1 Application(s) Topical three times a day  ondansetron Injectable 8 milliGRAM(s) IV Push every 8 hours  sodium chloride 0.9%. 1000 milliLiter(s) IV Continuous <Continuous>    PRN:   acetaminophen   Tablet .. 650 milliGRAM(s) Oral every 6 hours PRN  diphenhydrAMINE   Injectable 25 milliGRAM(s) IV Push every 4 hours PRN  docusate sodium 100 milliGRAM(s) Oral three times a day PRN  LORazepam   Injectable 1 milliGRAM(s) IV Push every 6 hours PRN  LORazepam   Injectable 1 milliGRAM(s) IV Push every 6 hours PRN  metoclopramide Injectable 10 milliGRAM(s) IV Push every 6 hours PRN  senna 1 Tablet(s) Oral at bedtime PRN    A/P:   68 year old female with a history of IgG kappa Multiple Myeloma.  Pre Autologous PBSCT / BMT day -2  Conditioning with Melphalan    1. Infectious Disease:   clotrimazole Lozenge 1 Lozenge Oral five times a day  fluconAZOLE   Tablet 400 milliGRAM(s) Oral daily  trimethoprim  160 mG/sulfamethoxazole 800 mG 1 Tablet(s) Oral every 12 hours    2. VOD Prophylaxis: Actigall, Glutamine, Heparin (dosed at 100 units / kg / day)     3. GI Prophylaxis:  Protonix    4. Mouthcare - NS / NaHCO3 rinses, Mycelex, Caphosol; Skin care     5. GVHD prophylaxis: n/a    6. Transfuse & replete electrolytes prn     7. IV hydration, daily weights, strict I&O, prn diuresis   Lasix 40 mg IVP once    8. PO intake as tolerated, nutrition follow up as needed, MVI, folic acid     9. Antiemetics, anti-diarrhea medications:  Dexamethasone  Emend  Zofran  Ativan   Reglan      10. OOB as tolerated, physical therapy consult if needed     11. Monitor coags / fibrinogen 2x week, vitamin K as needed     12. Monitor closely for clinical changes, monitor for fevers     13. Emotional support provided, plan of care discussed and questions addressed     14. Patient education done regarding chemotherapy prep, plan of care, restrictions and discharge planning     15. Continue regular social work input     I have written the above note for Dr. Rahman who performed service with me in the room.   Lisa Dukes, ANP-BC ([phone])    I have seen and examined patient with NP, I agree with above note as scribed. [facility] Transplant Team                                                      Critical / Counseling Time Provided: 30 minutes                                                                                                                                                        Chief Complaint: admitted for autologous transplant    S: Patient seen and examined with [facility] Transplant Team:     No acute complaints  Kepivance rash    Denies mouth / tongue / throat pain, dyspnea, cough, nausea, vomiting, diarrhea, abdominal pain     O: Vitals:   Vital Signs Last 24 Hrs  T(C): 35.7 (26 Feb 2019 05:18), Max: 36.6 (25 Feb 2019 17:19)  T(F): 96.3 (26 Feb 2019 05:18), Max: 97.9 (25 Feb 2019 17:19)  HR: 83 (26 Feb 2019 05:18) (83 - 92)  BP: 119/64 (26 Feb 2019 05:18) (113/67 - 147/78)  BP(mean): --  RR: 18 (26 Feb 2019 05:18) (18 - 18)  SpO2: 96% (26 Feb 2019 05:18) (95% - 99%)    Admit weight: 79.2 kg    Daily weight: 80.1 kg    Intake / Output:   02-25 @ 07:01 - 02-26 @ 07:00  --------------------------------------------------------  IN: 4175.5 mL / OUT: 5000 mL / NET: -824.5 mL    02-26 @ 07:01  - 02-26 @ 09:48  --------------------------------------------------------  IN: 285.1 mL / OUT: 0 mL / NET: 285.1 mL    PE:   Oropharynx: no erythema or ulcers, kepivance coating  Oral Mucositis: n/a                                                       Grade:   CVS: S1S2, RRR  Lungs: clear bilaterally  Abdomen: NTND, normoactive BS x 4Q  Extremities: no edema  Gastric Mucositis: n/a                                                Grade:   Intestinal Mucositis: n/a                                             Grade:   Skin: Kepivance rash neck, axilla  TLC: covered  Neuro: nonfocal     Labs:   CBC Full  -  ( 26 Feb 2019 06:51 )  WBC Count : 7.4 K/uL  Hemoglobin : 8.3 g/dL  Hematocrit : 24.5 %  Platelet Count - Automated : 452 K/uL  Mean Cell Volume : 94.0 fl  Mean Cell Hemoglobin : 31.9 pg  Mean Cell Hemoglobin Concentration : 33.9 gm/dL  Auto Neutrophil # : 6.9 K/uL  Auto Lymphocyte # : 0.4 K/uL  Auto Monocyte # : 0.0 K/uL  Auto Eosinophil # : 0.0 K/uL  Auto Basophil # : 0.0 K/uL  Auto Neutrophil % : 93.4 %  Auto Lymphocyte % : 6.0 %  Auto Monocyte % : 0.4 %  Auto Eosinophil % : 0.3 %  Auto Basophil % : 0.0 %                          8.3    7.4   )-----------( 452      ( 26 Feb 2019 06:51 )             24.5     02-26    145  |  115<H>  |  7   ----------------------------<  179<H>  3.9   |  20<L>  |  0.51    Ca    8.9      26 Feb 2019 06:49  Phos  2.5     02-26  Mg     1.9     02-26    TPro  5.7<L>  /  Alb  3.9  /  TBili  0.1<L>  /  DBili  x   /  AST  70<H>  /  ALT  81<H>  /  AlkPhos  75  02-26    PT/INR - ( 25 Feb 2019 11:11 )   PT: 11.8 sec;   INR: 1.03 ratio      PTT - ( 25 Feb 2019 11:11 )  PTT:30.1 sec  LIVER FUNCTIONS - ( 26 Feb 2019 06:49 )  Alb: 3.9 g/dL / Pro: 5.7 g/dL / ALK PHOS: 75 U/L / ALT: 81 U/L / AST: 70 U/L / GGT: x           Lactate Dehydrogenase, Serum: 232 U/L (02-26 @ 06:49)  Lactate Dehydrogenase, Serum: 268 U/L (02-25 @ 11:11)    Cultures:   no recent    Radiology:   no recent    Meds:   Antimicrobials:   clotrimazole Lozenge 1 Lozenge Oral five times a day  fluconAZOLE   Tablet 400 milliGRAM(s) Oral daily  trimethoprim  160 mG/sulfamethoxazole 800 mG 1 Tablet(s) Oral every 12 hours    Heme / Onc:   heparin  Infusion 330 Unit(s)/Hr IV Continuous <Continuous>  melphalan IVPB (eMAR) 167 milliGRAM(s) IV Intermittent daily    GI:  docusate sodium 100 milliGRAM(s) Oral three times a day PRN  pantoprazole    Tablet 40 milliGRAM(s) Oral before breakfast  senna 1 Tablet(s) Oral at bedtime PRN  sodium bicarbonate Mouth Rinse 10 milliLiter(s) Swish and Spit five times a day  ursodiol Capsule 300 milliGRAM(s) Oral two times a day with meals    Cardiovascular:   amLODIPine   Tablet 5 milliGRAM(s) Oral daily  furosemide   Injectable 20 milliGRAM(s) IV Push every 24 hours  furosemide   Injectable 40 milliGRAM(s) IV Push once    Other medications:   acetaminophen   Tablet .. 650 milliGRAM(s) Oral every 6 hours  aprepitant 80 milliGRAM(s) Oral daily  Biotene Dry Mouth Oral Rinse 5 milliLiter(s) Swish and Spit five times a day  dexamethasone  IVPB 20 milliGRAM(s) IV Intermittent every 24 hours  dextrose 5% + sodium chloride 0.9%. 1000 milliLiter(s) IV Continuous <Continuous>  folic acid 1 milliGRAM(s) Oral daily  LORazepam   Injectable 1 milliGRAM(s) IV Push daily  multivitamin 1 Tablet(s) Oral daily  nystatin Powder 1 Application(s) Topical three times a day  ondansetron Injectable 8 milliGRAM(s) IV Push every 8 hours  sodium chloride 0.9%. 1000 milliLiter(s) IV Continuous <Continuous>    PRN:   acetaminophen   Tablet .. 650 milliGRAM(s) Oral every 6 hours PRN  diphenhydrAMINE   Injectable 25 milliGRAM(s) IV Push every 4 hours PRN  docusate sodium 100 milliGRAM(s) Oral three times a day PRN  LORazepam   Injectable 1 milliGRAM(s) IV Push every 6 hours PRN  LORazepam   Injectable 1 milliGRAM(s) IV Push every 6 hours PRN  metoclopramide Injectable 10 milliGRAM(s) IV Push every 6 hours PRN  senna 1 Tablet(s) Oral at bedtime PRN    A/P:   68 year old female with a history of IgG kappa Multiple Myeloma.  Pre Autologous PBSCT / BMT day -2  Conditioning with Melphalan    1. Infectious Disease:   clotrimazole Lozenge 1 Lozenge Oral five times a day  fluconAZOLE   Tablet 400 milliGRAM(s) Oral daily  trimethoprim  160 mG/sulfamethoxazole 800 mG 1 Tablet(s) Oral every 12 hours    2. VOD Prophylaxis: Actigall, Glutamine, Heparin (dosed at 100 units / kg / day)     3. GI Prophylaxis:  Protonix    4. Mouthcare - NS / NaHCO3 rinses, Mycelex, Caphosol; Skin care     5. GVHD prophylaxis: n/a    6. Transfuse & replete electrolytes prn     7. IV hydration, daily weights, strict I&O, prn diuresis   Lasix 40 mg IVP once    8. PO intake as tolerated, nutrition follow up as needed, MVI, folic acid     9. Antiemetics, anti-diarrhea medications:  Dexamethasone  Emend  Zofran  Ativan   Reglan      10. OOB as tolerated, physical therapy consult if needed     11. Monitor coags / fibrinogen 2x week, vitamin K as needed     12. Monitor closely for clinical changes, monitor for fevers     13. Emotional support provided, plan of care discussed and questions addressed     14. Patient education done regarding chemotherapy prep, plan of care, restrictions and discharge planning     15. Continue regular social work input     I have written the above note for Dr. Rahman who performed service with me in the room.   Lisa Dukes, MUSTAPHA-BC ([phone])    I have seen and examined patient with NP, I agree with above note as scribed. [facility] Transplant Team                                                      Critical / Counseling Time Provided: 30 minutes                                                                                                                                                        Chief Complaint: admitted for autologous transplant    S: Patient seen and examined with [facility] Transplant Team:     No acute complaints  Kepivance rash    Denies mouth / tongue / throat pain, dyspnea, cough, nausea, vomiting, diarrhea, abdominal pain     O: Vitals:   Vital Signs Last 24 Hrs  T(C): 35.7 (26 Feb 2019 05:18), Max: 36.6 (25 Feb 2019 17:19)  T(F): 96.3 (26 Feb 2019 05:18), Max: 97.9 (25 Feb 2019 17:19)  HR: 83 (26 Feb 2019 05:18) (83 - 92)  BP: 119/64 (26 Feb 2019 05:18) (113/67 - 147/78)  BP(mean): --  RR: 18 (26 Feb 2019 05:18) (18 - 18)  SpO2: 96% (26 Feb 2019 05:18) (95% - 99%)    Admit weight: 79.2 kg    Daily weight: 80.1 kg    Intake / Output:   02-25 @ 07:01 - 02-26 @ 07:00  --------------------------------------------------------  IN: 4175.5 mL / OUT: 5000 mL / NET: -824.5 mL    02-26 @ 07:01  - 02-26 @ 09:48  --------------------------------------------------------  IN: 285.1 mL / OUT: 0 mL / NET: 285.1 mL    PE:   Oropharynx: no erythema or ulcers, kepivance coating  Oral Mucositis: n/a                                                       Grade:   CVS: S1S2, RRR  Lungs: clear bilaterally  Abdomen: NTND, normoactive BS x 4Q  Extremities: no edema  Gastric Mucositis: n/a                                                Grade:   Intestinal Mucositis: n/a                                             Grade:   Skin: Kepivance rash neck, axilla  TLC: covered  Neuro: nonfocal     Labs:   CBC Full  -  ( 26 Feb 2019 06:51 )  WBC Count : 7.4 K/uL  Hemoglobin : 8.3 g/dL  Hematocrit : 24.5 %  Platelet Count - Automated : 452 K/uL  Mean Cell Volume : 94.0 fl  Mean Cell Hemoglobin : 31.9 pg  Mean Cell Hemoglobin Concentration : 33.9 gm/dL  Auto Neutrophil # : 6.9 K/uL  Auto Lymphocyte # : 0.4 K/uL  Auto Monocyte # : 0.0 K/uL  Auto Eosinophil # : 0.0 K/uL  Auto Basophil # : 0.0 K/uL  Auto Neutrophil % : 93.4 %  Auto Lymphocyte % : 6.0 %  Auto Monocyte % : 0.4 %  Auto Eosinophil % : 0.3 %  Auto Basophil % : 0.0 %                          8.3    7.4   )-----------( 452      ( 26 Feb 2019 06:51 )             24.5     02-26    145  |  115<H>  |  7   ----------------------------<  179<H>  3.9   |  20<L>  |  0.51    Ca    8.9      26 Feb 2019 06:49  Phos  2.5     02-26  Mg     1.9     02-26    TPro  5.7<L>  /  Alb  3.9  /  TBili  0.1<L>  /  DBili  x   /  AST  70<H>  /  ALT  81<H>  /  AlkPhos  75  02-26    PT/INR - ( 25 Feb 2019 11:11 )   PT: 11.8 sec;   INR: 1.03 ratio      PTT - ( 25 Feb 2019 11:11 )  PTT:30.1 sec  LIVER FUNCTIONS - ( 26 Feb 2019 06:49 )  Alb: 3.9 g/dL / Pro: 5.7 g/dL / ALK PHOS: 75 U/L / ALT: 81 U/L / AST: 70 U/L / GGT: x           Lactate Dehydrogenase, Serum: 232 U/L (02-26 @ 06:49)  Lactate Dehydrogenase, Serum: 268 U/L (02-25 @ 11:11)    Cultures:   no recent    Radiology:   no recent    Meds:   Antimicrobials:   clotrimazole Lozenge 1 Lozenge Oral five times a day  fluconAZOLE   Tablet 400 milliGRAM(s) Oral daily  trimethoprim  160 mG/sulfamethoxazole 800 mG 1 Tablet(s) Oral every 12 hours    Heme / Onc:   heparin  Infusion 330 Unit(s)/Hr IV Continuous <Continuous>  melphalan IVPB (eMAR) 167 milliGRAM(s) IV Intermittent daily    GI:  docusate sodium 100 milliGRAM(s) Oral three times a day PRN  pantoprazole    Tablet 40 milliGRAM(s) Oral before breakfast  senna 1 Tablet(s) Oral at bedtime PRN  sodium bicarbonate Mouth Rinse 10 milliLiter(s) Swish and Spit five times a day  ursodiol Capsule 300 milliGRAM(s) Oral two times a day with meals    Cardiovascular:   amLODIPine   Tablet 5 milliGRAM(s) Oral daily  furosemide   Injectable 20 milliGRAM(s) IV Push every 24 hours  furosemide   Injectable 40 milliGRAM(s) IV Push once    Other medications:   acetaminophen   Tablet .. 650 milliGRAM(s) Oral every 6 hours  aprepitant 80 milliGRAM(s) Oral daily  Biotene Dry Mouth Oral Rinse 5 milliLiter(s) Swish and Spit five times a day  dexamethasone  IVPB 20 milliGRAM(s) IV Intermittent every 24 hours  dextrose 5% + sodium chloride 0.9%. 1000 milliLiter(s) IV Continuous <Continuous>  folic acid 1 milliGRAM(s) Oral daily  LORazepam   Injectable 1 milliGRAM(s) IV Push daily  multivitamin 1 Tablet(s) Oral daily  nystatin Powder 1 Application(s) Topical three times a day  ondansetron Injectable 8 milliGRAM(s) IV Push every 8 hours  sodium chloride 0.9%. 1000 milliLiter(s) IV Continuous <Continuous>    PRN:   acetaminophen   Tablet .. 650 milliGRAM(s) Oral every 6 hours PRN  diphenhydrAMINE   Injectable 25 milliGRAM(s) IV Push every 4 hours PRN  docusate sodium 100 milliGRAM(s) Oral three times a day PRN  LORazepam   Injectable 1 milliGRAM(s) IV Push every 6 hours PRN  LORazepam   Injectable 1 milliGRAM(s) IV Push every 6 hours PRN  metoclopramide Injectable 10 milliGRAM(s) IV Push every 6 hours PRN  senna 1 Tablet(s) Oral at bedtime PRN    A/P:   68 year old female with a history of IgG kappa Multiple Myeloma.  Pre Autologous PBSCT,  day -2  Conditioning with Melphalan    1. Infectious Disease:   clotrimazole Lozenge 1 Lozenge Oral five times a day  fluconAZOLE   Tablet 400 milliGRAM(s) Oral daily  trimethoprim  160 mG/sulfamethoxazole 800 mG 1 Tablet(s) Oral every 12 hours    2. VOD Prophylaxis: Actigall, Glutamine, Heparin (dosed at 100 units / kg / day)     3. GI Prophylaxis:  Protonix    4. Mouth care - NS / NaHCO3 rinses, Mycelex, Biotene; Skin care     5. GVHD prophylaxis: n/a    6. Transfuse & replete electrolytes prn     7. IV hydration, daily weights, strict I&O, prn diuresis   Lasix 40 mg IVP once    8. PO intake as tolerated, nutrition follow up as needed, MVI, folic acid     9. Antiemetics, anti-diarrhea medications:  Dexamethasone  Emend  Zofran  Ativan   Reglan      10. OOB as tolerated, physical therapy consult if needed     11. Monitor coags / fibrinogen 2x week, vitamin K as needed     12. Monitor closely for clinical changes, monitor for fevers     13. Emotional support provided, plan of care discussed and questions addressed     14. Patient education done regarding chemotherapy prep, plan of care, restrictions and discharge planning     15. Continue regular social work input     I have written the above note for Dr. Rahman who performed service with me in the room.   Lisa Dukes, ANP-BC ([phone])    I have seen and examined patient with NP, I agree with above note as scribed.

## 2019-02-27 LAB
ALBUMIN SERPL ELPH-MCNC: 3.6 G/DL — SIGNIFICANT CHANGE UP (ref 3.3–5)
ALP SERPL-CCNC: 63 U/L — SIGNIFICANT CHANGE UP (ref 40–120)
ALT FLD-CCNC: 72 U/L — HIGH (ref 10–45)
ANION GAP SERPL CALC-SCNC: 14 MMOL/L — SIGNIFICANT CHANGE UP (ref 5–17)
AST SERPL-CCNC: 51 U/L — HIGH (ref 10–40)
BASOPHILS # BLD AUTO: 0 K/UL — SIGNIFICANT CHANGE UP (ref 0–0.2)
BASOPHILS NFR BLD AUTO: 0 % — SIGNIFICANT CHANGE UP (ref 0–2)
BILIRUB DIRECT SERPL-MCNC: <0.1 MG/DL — SIGNIFICANT CHANGE UP (ref 0–0.2)
BILIRUB INDIRECT FLD-MCNC: >0.1 MG/DL — LOW (ref 0.2–1)
BILIRUB SERPL-MCNC: 0.2 MG/DL — SIGNIFICANT CHANGE UP (ref 0.2–1.2)
BLD GP AB SCN SERPL QL: NEGATIVE — SIGNIFICANT CHANGE UP
BUN SERPL-MCNC: 12 MG/DL — SIGNIFICANT CHANGE UP (ref 7–23)
CALCIUM SERPL-MCNC: 8.2 MG/DL — LOW (ref 8.4–10.5)
CHLORIDE SERPL-SCNC: 112 MMOL/L — HIGH (ref 96–108)
CO2 SERPL-SCNC: 21 MMOL/L — LOW (ref 22–31)
CREAT SERPL-MCNC: 0.58 MG/DL — SIGNIFICANT CHANGE UP (ref 0.5–1.3)
EOSINOPHIL # BLD AUTO: 0 K/UL — SIGNIFICANT CHANGE UP (ref 0–0.5)
EOSINOPHIL NFR BLD AUTO: 0.4 % — SIGNIFICANT CHANGE UP (ref 0–6)
G6PD RBC-CCNC: 19.5 U/G HGB — SIGNIFICANT CHANGE UP (ref 7–20.5)
GLUCOSE SERPL-MCNC: 163 MG/DL — HIGH (ref 70–99)
HCT VFR BLD CALC: 22.5 % — LOW (ref 34.5–45)
HGB BLD-MCNC: 7.5 G/DL — LOW (ref 11.5–15.5)
LDH SERPL L TO P-CCNC: 199 U/L — SIGNIFICANT CHANGE UP (ref 50–242)
LYMPHOCYTES # BLD AUTO: 0.2 K/UL — LOW (ref 1–3.3)
LYMPHOCYTES # BLD AUTO: 2 % — LOW (ref 13–44)
MAGNESIUM SERPL-MCNC: 1.8 MG/DL — SIGNIFICANT CHANGE UP (ref 1.6–2.6)
MCHC RBC-ENTMCNC: 31.4 PG — SIGNIFICANT CHANGE UP (ref 27–34)
MCHC RBC-ENTMCNC: 33.2 GM/DL — SIGNIFICANT CHANGE UP (ref 32–36)
MCV RBC AUTO: 94.5 FL — SIGNIFICANT CHANGE UP (ref 80–100)
MONOCYTES # BLD AUTO: 0.2 K/UL — SIGNIFICANT CHANGE UP (ref 0–0.9)
MONOCYTES NFR BLD AUTO: 2 % — SIGNIFICANT CHANGE UP (ref 2–14)
NEUTROPHILS # BLD AUTO: 8.5 K/UL — HIGH (ref 1.8–7.4)
NEUTROPHILS NFR BLD AUTO: 95.7 % — HIGH (ref 43–77)
PHOSPHATE SERPL-MCNC: 2.8 MG/DL — SIGNIFICANT CHANGE UP (ref 2.5–4.5)
PLATELET # BLD AUTO: 400 K/UL — SIGNIFICANT CHANGE UP (ref 150–400)
POTASSIUM SERPL-MCNC: 3.5 MMOL/L — SIGNIFICANT CHANGE UP (ref 3.5–5.3)
POTASSIUM SERPL-SCNC: 3.5 MMOL/L — SIGNIFICANT CHANGE UP (ref 3.5–5.3)
PROT SERPL-MCNC: 5.1 G/DL — LOW (ref 6–8.3)
RBC # BLD: 2.39 M/UL — LOW (ref 3.8–5.2)
RBC # FLD: 15 % — HIGH (ref 10.3–14.5)
RH IG SCN BLD-IMP: POSITIVE — SIGNIFICANT CHANGE UP
SODIUM SERPL-SCNC: 147 MMOL/L — HIGH (ref 135–145)
WBC # BLD: 8.9 K/UL — SIGNIFICANT CHANGE UP (ref 3.8–10.5)
WBC # FLD AUTO: 8.9 K/UL — SIGNIFICANT CHANGE UP (ref 3.8–10.5)

## 2019-02-27 PROCEDURE — 99291 CRITICAL CARE FIRST HOUR: CPT

## 2019-02-27 RX ORDER — AMLODIPINE BESYLATE 2.5 MG/1
5 TABLET ORAL DAILY
Qty: 0 | Refills: 0 | Status: DISCONTINUED | OUTPATIENT
Start: 2019-02-27 | End: 2019-03-15

## 2019-02-27 RX ORDER — POTASSIUM CHLORIDE 20 MEQ
20 PACKET (EA) ORAL
Qty: 0 | Refills: 0 | Status: COMPLETED | OUTPATIENT
Start: 2019-02-27 | End: 2019-02-27

## 2019-02-27 RX ORDER — FUROSEMIDE 40 MG
40 TABLET ORAL ONCE
Qty: 0 | Refills: 0 | Status: COMPLETED | OUTPATIENT
Start: 2019-02-27 | End: 2019-02-27

## 2019-02-27 RX ADMIN — Medication 400 MILLIGRAM(S): at 21:30

## 2019-02-27 RX ADMIN — URSODIOL 300 MILLIGRAM(S): 250 TABLET, FILM COATED ORAL at 17:27

## 2019-02-27 RX ADMIN — Medication 100 MILLIGRAM(S): at 21:35

## 2019-02-27 RX ADMIN — Medication 10 MILLIGRAM(S): at 21:31

## 2019-02-27 RX ADMIN — Medication 1 LOZENGE: at 11:06

## 2019-02-27 RX ADMIN — PANTOPRAZOLE SODIUM 40 MILLIGRAM(S): 20 TABLET, DELAYED RELEASE ORAL at 05:29

## 2019-02-27 RX ADMIN — Medication 1 LOZENGE: at 16:14

## 2019-02-27 RX ADMIN — URSODIOL 300 MILLIGRAM(S): 250 TABLET, FILM COATED ORAL at 07:39

## 2019-02-27 RX ADMIN — HEPARIN SODIUM 3.3 UNIT(S)/HR: 5000 INJECTION INTRAVENOUS; SUBCUTANEOUS at 21:30

## 2019-02-27 RX ADMIN — Medication 1 LOZENGE: at 00:00

## 2019-02-27 RX ADMIN — Medication 10 MILLILITER(S): at 00:02

## 2019-02-27 RX ADMIN — SODIUM CHLORIDE 20 MILLILITER(S): 9 INJECTION INTRAMUSCULAR; INTRAVENOUS; SUBCUTANEOUS at 21:30

## 2019-02-27 RX ADMIN — Medication 5 MILLILITER(S): at 16:13

## 2019-02-27 RX ADMIN — Medication 10 MILLILITER(S): at 21:29

## 2019-02-27 RX ADMIN — SODIUM CHLORIDE 200 MILLILITER(S): 9 INJECTION, SOLUTION INTRAVENOUS at 16:12

## 2019-02-27 RX ADMIN — NYSTATIN CREAM 1 APPLICATION(S): 100000 CREAM TOPICAL at 13:48

## 2019-02-27 RX ADMIN — SODIUM CHLORIDE 200 MILLILITER(S): 9 INJECTION, SOLUTION INTRAVENOUS at 11:04

## 2019-02-27 RX ADMIN — AMLODIPINE BESYLATE 5 MILLIGRAM(S): 2.5 TABLET ORAL at 13:49

## 2019-02-27 RX ADMIN — Medication 5 MILLILITER(S): at 00:02

## 2019-02-27 RX ADMIN — Medication 5 MILLILITER(S): at 21:29

## 2019-02-27 RX ADMIN — Medication 10 MILLILITER(S): at 16:12

## 2019-02-27 RX ADMIN — Medication 40 MILLIGRAM(S): at 11:05

## 2019-02-27 RX ADMIN — Medication 1 LOZENGE: at 21:31

## 2019-02-27 RX ADMIN — SENNA PLUS 1 TABLET(S): 8.6 TABLET ORAL at 21:35

## 2019-02-27 RX ADMIN — SODIUM CHLORIDE 20 MILLILITER(S): 9 INJECTION INTRAMUSCULAR; INTRAVENOUS; SUBCUTANEOUS at 18:09

## 2019-02-27 RX ADMIN — Medication 50 MILLIEQUIVALENT(S): at 16:12

## 2019-02-27 RX ADMIN — Medication 1 LOZENGE: at 07:38

## 2019-02-27 RX ADMIN — Medication 10 MILLIGRAM(S): at 13:46

## 2019-02-27 RX ADMIN — Medication 1 TABLET(S): at 11:06

## 2019-02-27 RX ADMIN — Medication 1 MILLIGRAM(S): at 11:06

## 2019-02-27 RX ADMIN — Medication 50 MILLIEQUIVALENT(S): at 13:44

## 2019-02-27 RX ADMIN — FLUCONAZOLE 400 MILLIGRAM(S): 150 TABLET ORAL at 11:06

## 2019-02-27 RX ADMIN — Medication 10 MILLILITER(S): at 11:08

## 2019-02-27 RX ADMIN — APREPITANT 80 MILLIGRAM(S): 80 CAPSULE ORAL at 11:06

## 2019-02-27 RX ADMIN — Medication 400 MILLIGRAM(S): at 13:45

## 2019-02-27 RX ADMIN — Medication 10 MILLILITER(S): at 07:38

## 2019-02-27 RX ADMIN — Medication 5 MILLILITER(S): at 07:39

## 2019-02-27 RX ADMIN — NYSTATIN CREAM 1 APPLICATION(S): 100000 CREAM TOPICAL at 21:31

## 2019-02-27 RX ADMIN — Medication 400 MILLIGRAM(S): at 05:29

## 2019-02-27 RX ADMIN — Medication 50 MILLIEQUIVALENT(S): at 11:15

## 2019-02-27 RX ADMIN — Medication 5 MILLILITER(S): at 11:08

## 2019-02-27 RX ADMIN — ONDANSETRON 8 MILLIGRAM(S): 8 TABLET, FILM COATED ORAL at 05:29

## 2019-02-27 RX ADMIN — HEPARIN SODIUM 3.3 UNIT(S)/HR: 5000 INJECTION INTRAVENOUS; SUBCUTANEOUS at 17:24

## 2019-02-27 NOTE — PROGRESS NOTE ADULT - SUBJECTIVE AND OBJECTIVE BOX
[facility] Transplant Team                                                      Critical / Counseling Time Provided: 30 minutes                                                                                                                                                        Chief Complaint: admitted for autologous transplant    S: Patient seen and examined with [facility] Transplant Team:     No acute complaints  Kepivance rash    Denies mouth / tongue / throat pain, dyspnea, cough, nausea, vomiting, diarrhea, abdominal pain     Vital Signs Last 24 Hrs  T(C): 36.1 (2019 04:33), Max: 36.5 (2019 14:23)  T(F): 96.9 (2019 04:33), Max: 97.7 (2019 14:23)  HR: 83 (2019 04:33) (83 - 89)  BP: 97/56 (2019 04:33) (97/56 - 124/67)  BP(mean): --  RR: 18 (2019 04:33) (18 - 18)  SpO2: 98% (2019 04:33) (97% - 98%)    Admit weight: 79.2 kg    Daily weight: pending    I&O's Summary    2019 07:01  -  2019 07:00  --------------------------------------------------------  IN: 6866.5 mL / OUT: 6350 mL / NET: 516.5 mL    2019 07:01  -  2019 08:53  --------------------------------------------------------  IN: 444.5 mL / OUT: 450 mL / NET: -5.5 mL    PE:   Oropharynx: no erythema or ulcers, kepivance coating  Oral Mucositis: n/a                                                       Grade:   CVS: S1S2, RRR  Lungs: clear bilaterally  Abdomen: NTND, normoactive BS x 4Q  Extremities: no edema  Gastric Mucositis: n/a                                                Grade:   Intestinal Mucositis: n/a                                             Grade:   Skin: Kepivance rash neck, axilla  TLC: covered  Neuro: nonfocal     Labs:                         7.5    8.9   )-----------( 400      ( 2019 06:49 )             22.5         147<H>  |  112<H>  |  12  ----------------------------<  163<H>  3.5   |  21<L>  |  0.58    Ca    8.2<L>      2019 06:48  Phos  2.8       Mg     1.8         TPro  5.1<L>  /  Alb  3.6  /  TBili  0.2  /  DBili  <0.1  /  AST  51<H>  /  ALT  72<H>  /  AlkPhos  63      PT/INR - ( 2019 11:11 )   PT: 11.8 sec;   INR: 1.03 ratio      PTT - ( 2019 11:11 )  PTT:30.1 sec  Urinalysis Basic - ( 2019 16:25 )    Color: Light Yellow / Appearance: Clear / S.009 / pH: x  Gluc: x / Ketone: Negative  / Bili: Negative / Urobili: <2 mg/dL   Blood: x / Protein: Negative / Nitrite: Negative   Leuk Esterase: Negative / RBC: x / WBC x   Sq Epi: x / Non Sq Epi: x / Bacteria: x    Cultures:   no recent    Radiology:   no recent    MEDICATIONS  (STANDING):  acetaminophen   Tablet .. 650 milliGRAM(s) Oral every 6 hours  acyclovir   Oral Tab/Cap 400 milliGRAM(s) Oral every 8 hours  amLODIPine   Tablet 5 milliGRAM(s) Oral daily  aprepitant 80 milliGRAM(s) Oral daily  Biotene Dry Mouth Oral Rinse 5 milliLiter(s) Swish and Spit five times a day  clotrimazole Lozenge 1 Lozenge Oral five times a day  dextrose 5% + sodium chloride 0.9%. 1000 milliLiter(s) (200 mL/Hr) IV Continuous <Continuous>  fluconAZOLE   Tablet 400 milliGRAM(s) Oral daily  folic acid 1 milliGRAM(s) Oral daily  heparin  Infusion 330 Unit(s)/Hr (3.3 mL/Hr) IV Continuous <Continuous>  melphalan IVPB (eMAR) 167 milliGRAM(s) IV Intermittent daily  multivitamin 1 Tablet(s) Oral daily  nystatin Powder 1 Application(s) Topical three times a day  pantoprazole    Tablet 40 milliGRAM(s) Oral before breakfast  sodium bicarbonate Mouth Rinse 10 milliLiter(s) Swish and Spit five times a day  sodium chloride 0.45% 1000 milliLiter(s) (150 mL/Hr) IV Continuous <Continuous>  sodium chloride 0.9%. 1000 milliLiter(s) (20 mL/Hr) IV Continuous <Continuous>  ursodiol Capsule 300 milliGRAM(s) Oral two times a day with meals    MEDICATIONS  (PRN):  acetaminophen   Tablet .. 650 milliGRAM(s) Oral every 6 hours PRN Temp greater or equal to 38C (100.4F), Mild Pain (1 - 3)  diphenhydrAMINE   Injectable 25 milliGRAM(s) IV Push every 4 hours PRN Allergy symptoms  docusate sodium 100 milliGRAM(s) Oral three times a day PRN Constipation  furosemide   Injectable 20 milliGRAM(s) IV Push every 24 hours PRN if urine output< 100 ml/hr x 2 hours  LORazepam   Injectable 1 milliGRAM(s) IV Push every 6 hours PRN Anxiety  LORazepam   Injectable 1 milliGRAM(s) IV Push every 6 hours PRN Nausea and/or Vomiting  metoclopramide Injectable 10 milliGRAM(s) IV Push every 6 hours PRN Nausea and/or Vomiting  senna 1 Tablet(s) Oral at bedtime PRN Constipation    A/P:   68 year old female with a history of IgG kappa Multiple Myeloma.  Pre Autologous PBSCT,  day -1  Conditioning with Melphalan  Mildly hypotensive overnight, Norvasc held    1. Infectious Disease:   clotrimazole Lozenge 1 Lozenge Oral five times a day  fluconAZOLE   Tablet 400 milliGRAM(s) Oral daily  trimethoprim  160 mG/sulfamethoxazole 800 mG 1 Tablet(s) Oral every 12 hours    2. VOD Prophylaxis: Actigall, Glutamine, Heparin (dosed at 100 units / kg / day)     3. GI Prophylaxis:  Protonix    4. Mouth care - NS / NaHCO3 rinses, Mycelex, Biotene; Skin care     5. GVHD prophylaxis: n/a    6. Transfuse & replete electrolytes prn     7. IV hydration, daily weights, strict I&O, prn diuresis     8. PO intake as tolerated, nutrition follow up as needed, MVI, folic acid     9. Antiemetics, anti-diarrhea medications:  Dexamethasone  Emend  Zofran  Ativan   Reglan      10. OOB as tolerated, physical therapy consult if needed     11. Monitor coags / fibrinogen 2x week, vitamin K as needed     12. Monitor closely for clinical changes, monitor for fevers     13. Emotional support provided, plan of care discussed and questions addressed     14. Patient education done regarding chemotherapy prep, plan of care, restrictions and discharge planning     15. Continue regular social work input     I have written the above note for Dr. Rahman who performed service with me in the room.   Lisa Dukes, ANP-BC ([phone])    I have seen and examined patient with NP, I agree with above note as scribed. [facility]plant Team                                                      Critical / Counseling Time Provided: 30 minutes                                                                                                                                                        Chief Complaint: admitted for autologous transplant for treatment of multiple myeloma     S: Patient seen and examined with [facility] Transplant Team:   + constipation   Denies mouth / tongue / throat pain, dyspnea, cough, nausea, vomiting, diarrhea, abdominal pain     Vital Signs Last 24 Hrs  T(C): 36.1 (2019 04:33), Max: 36.5 (2019 14:23)  T(F): 96.9 (2019 04:33), Max: 97.7 (2019 14:23)  HR: 83 (2019 04:33) (83 - 89)  BP: 97/56 (2019 04:33) (97/56 - 124/67)  BP(mean): --  RR: 18 (2019 04:33) (18 - 18)  SpO2: 98% (2019 04:33) (97% - 98%)    Admit weight: 79.2 kg    Daily weight: 82.6kg     I&O's Summary    2019 07:01  -  2019 07:00  --------------------------------------------------------  IN: 6866.5 mL / OUT: 6350 mL / NET: 516.5 mL    2019 07:01  -  2019 08:53  --------------------------------------------------------  IN: 444.5 mL / OUT: 450 mL / NET: -5.5 mL    PE:   Oropharynx: no erythema or ulcers, kepivance coating  Oral Mucositis: n/a                                                       Grade:   CVS: S1S2, RRR  Lungs: clear bilaterally  Abdomen: NTND, normoactive BS x 4Q  Extremities: no edema  Gastric Mucositis: n/a                                                Grade:   Intestinal Mucositis: n/a                                             Grade:   Skin: Kepivance rash neck, axilla - resolving   TLC: covered  Neuro: nonfocal     Labs:                         7.5    8.9   )-----------( 400      ( 2019 06:49 )             22.5         147<H>  |  112<H>  |  12  ----------------------------<  163<H>  3.5   |  21<L>  |  0.58    Ca    8.2<L>      2019 06:48  Phos  2.8       Mg     1.8         TPro  5.1<L>  /  Alb  3.6  /  TBili  0.2  /  DBili  <0.1  /  AST  51<H>  /  ALT  72<H>  /  AlkPhos  63      PT/INR - ( 2019 11:11 )   PT: 11.8 sec;   INR: 1.03 ratio      PTT - ( 2019 11:11 )  PTT:30.1 sec  Urinalysis Basic - ( 2019 16:25 )    Color: Light Yellow / Appearance: Clear / S.009 / pH: x  Gluc: x / Ketone: Negative  / Bili: Negative / Urobili: <2 mg/dL   Blood: x / Protein: Negative / Nitrite: Negative   Leuk Esterase: Negative / RBC: x / WBC x   Sq Epi: x / Non Sq Epi: x / Bacteria: x    Cultures:   no recent    Radiology:   no recent    MEDICATIONS  (STANDING):  acetaminophen   Tablet .. 650 milliGRAM(s) Oral every 6 hours  acyclovir   Oral Tab/Cap 400 milliGRAM(s) Oral every 8 hours  amLODIPine   Tablet 5 milliGRAM(s) Oral daily  aprepitant 80 milliGRAM(s) Oral daily  Biotene Dry Mouth Oral Rinse 5 milliLiter(s) Swish and Spit five times a day  clotrimazole Lozenge 1 Lozenge Oral five times a day  dextrose 5% + sodium chloride 0.9%. 1000 milliLiter(s) (200 mL/Hr) IV Continuous <Continuous>  fluconAZOLE   Tablet 400 milliGRAM(s) Oral daily  folic acid 1 milliGRAM(s) Oral daily  heparin  Infusion 330 Unit(s)/Hr (3.3 mL/Hr) IV Continuous <Continuous>  melphalan IVPB (eMAR) 167 milliGRAM(s) IV Intermittent daily  multivitamin 1 Tablet(s) Oral daily  nystatin Powder 1 Application(s) Topical three times a day  pantoprazole    Tablet 40 milliGRAM(s) Oral before breakfast  sodium bicarbonate Mouth Rinse 10 milliLiter(s) Swish and Spit five times a day  sodium chloride 0.45% 1000 milliLiter(s) (150 mL/Hr) IV Continuous <Continuous>  sodium chloride 0.9%. 1000 milliLiter(s) (20 mL/Hr) IV Continuous <Continuous>  ursodiol Capsule 300 milliGRAM(s) Oral two times a day with meals    MEDICATIONS  (PRN):  acetaminophen   Tablet .. 650 milliGRAM(s) Oral every 6 hours PRN Temp greater or equal to 38C (100.4F), Mild Pain (1 - 3)  diphenhydrAMINE   Injectable 25 milliGRAM(s) IV Push every 4 hours PRN Allergy symptoms  docusate sodium 100 milliGRAM(s) Oral three times a day PRN Constipation  furosemide   Injectable 20 milliGRAM(s) IV Push every 24 hours PRN if urine output< 100 ml/hr x 2 hours  LORazepam   Injectable 1 milliGRAM(s) IV Push every 6 hours PRN Anxiety  LORazepam   Injectable 1 milliGRAM(s) IV Push every 6 hours PRN Nausea and/or Vomiting  metoclopramide Injectable 10 milliGRAM(s) IV Push every 6 hours PRN Nausea and/or Vomiting  senna 1 Tablet(s) Oral at bedtime PRN Constipation    A/P:   68 year old female with a history of IgG kappa Multiple Myeloma.  Pre Autologous PBSCT,  day -1  Conditioning with Melphalan  Mildly hypotensive overnight, Norvasc held    1. Infectious Disease:   clotrimazole Lozenge 1 Lozenge Oral five times a day  fluconAZOLE   Tablet 400 milliGRAM(s) Oral daily  trimethoprim  160 mG/sulfamethoxazole 800 mG 1 Tablet(s) Oral every 12 hours    2. VOD Prophylaxis: Actigall, Glutamine, Heparin (dosed at 100 units / kg / day)     3. GI Prophylaxis:  Protonix    4. Mouth care - NS / NaHCO3 rinses, Mycelex, Biotene; Skin care     5. GVHD prophylaxis: n/a    6. Transfuse & replete electrolytes prn     7. IV hydration, daily weights, strict I&O, prn diuresis     8. PO intake as tolerated, nutrition follow up as needed, MVI, folic acid     9. Antiemetics, anti-diarrhea medications:  Dexamethasone  Emend  Zofran  Ativan   Reglan      10. OOB as tolerated, physical therapy consult if needed     11. Monitor coags / fibrinogen 2x week, vitamin K as needed     12. Monitor closely for clinical changes, monitor for fevers     13. Emotional support provided, plan of care discussed and questions addressed     14. Patient education done regarding chemotherapy prep, plan of care, restrictions and discharge planning     15. Continue regular social work input     I have written the above note for Dr. Rahman who performed service with me in the room.   Lisa Dukes, ANP-BC ([phone])    I have seen and examined patient with NP, I agree with above note as scribed. [facility]plant Team                                                      Critical / Counseling Time Provided: 30 minutes                                                                                                                                                        Chief Complaint: admitted for autologous transplant for treatment of multiple myeloma     S: Patient seen and examined with [facility] Transplant Team:   + constipation   Denies mouth / tongue / throat pain, dyspnea, cough, nausea, vomiting, diarrhea, abdominal pain     Vital Signs Last 24 Hrs  T(C): 36.1 (2019 04:33), Max: 36.5 (2019 14:23)  T(F): 96.9 (2019 04:33), Max: 97.7 (2019 14:23)  HR: 83 (2019 04:33) (83 - 89)  BP: 97/56 (2019 04:33) (97/56 - 124/67)  BP(mean): --  RR: 18 (2019 04:33) (18 - 18)  SpO2: 98% (2019 04:33) (97% - 98%)    Admit weight: 79.2 kg    Daily weight: 82.6kg     I&O's Summary    2019 07:01  -  2019 07:00  --------------------------------------------------------  IN: 6866.5 mL / OUT: 6350 mL / NET: 516.5 mL    2019 07:01  -  2019 08:53  --------------------------------------------------------  IN: 444.5 mL / OUT: 450 mL / NET: -5.5 mL    PE:   Oropharynx: no erythema or ulcers, kepivance coating  Oral Mucositis: n/a                                                       Grade:   CVS: S1S2, RRR  Lungs: clear bilaterally  Abdomen: soft, NT, ND, normoactive BS x 4Q  Extremities: no edema  Gastric Mucositis: n/a                                                Grade:   Intestinal Mucositis: n/a                                             Grade:   Skin: Kepivance rash neck, axilla - resolving   TLC: covered  Neuro: nonfocal     Labs:                         7.5    8.9   )-----------( 400      ( 2019 06:49 )             22.5         147<H>  |  112<H>  |  12  ----------------------------<  163<H>  3.5   |  21<L>  |  0.58    Ca    8.2<L>      2019 06:48  Phos  2.8       Mg     1.8         TPro  5.1<L>  /  Alb  3.6  /  TBili  0.2  /  DBili  <0.1  /  AST  51<H>  /  ALT  72<H>  /  AlkPhos  63      PT/INR - ( 2019 11:11 )   PT: 11.8 sec;   INR: 1.03 ratio      PTT - ( 2019 11:11 )  PTT:30.1 sec  Urinalysis Basic - ( 2019 16:25 )    Color: Light Yellow / Appearance: Clear / S.009 / pH: x  Gluc: x / Ketone: Negative  / Bili: Negative / Urobili: <2 mg/dL   Blood: x / Protein: Negative / Nitrite: Negative   Leuk Esterase: Negative / RBC: x / WBC x   Sq Epi: x / Non Sq Epi: x / Bacteria: x    Cultures:   no recent    Radiology:   no recent    MEDICATIONS  (STANDING):  acetaminophen   Tablet .. 650 milliGRAM(s) Oral every 6 hours  acyclovir   Oral Tab/Cap 400 milliGRAM(s) Oral every 8 hours  amLODIPine   Tablet 5 milliGRAM(s) Oral daily  aprepitant 80 milliGRAM(s) Oral daily  Biotene Dry Mouth Oral Rinse 5 milliLiter(s) Swish and Spit five times a day  clotrimazole Lozenge 1 Lozenge Oral five times a day  dextrose 5% + sodium chloride 0.9%. 1000 milliLiter(s) (200 mL/Hr) IV Continuous <Continuous>  fluconAZOLE   Tablet 400 milliGRAM(s) Oral daily  folic acid 1 milliGRAM(s) Oral daily  heparin  Infusion 330 Unit(s)/Hr (3.3 mL/Hr) IV Continuous <Continuous>  melphalan IVPB (eMAR) 167 milliGRAM(s) IV Intermittent daily  multivitamin 1 Tablet(s) Oral daily  nystatin Powder 1 Application(s) Topical three times a day  pantoprazole    Tablet 40 milliGRAM(s) Oral before breakfast  sodium bicarbonate Mouth Rinse 10 milliLiter(s) Swish and Spit five times a day  sodium chloride 0.45% 1000 milliLiter(s) (150 mL/Hr) IV Continuous <Continuous>  sodium chloride 0.9%. 1000 milliLiter(s) (20 mL/Hr) IV Continuous <Continuous>  ursodiol Capsule 300 milliGRAM(s) Oral two times a day with meals    MEDICATIONS  (PRN):  acetaminophen   Tablet .. 650 milliGRAM(s) Oral every 6 hours PRN Temp greater or equal to 38C (100.4F), Mild Pain (1 - 3)  diphenhydrAMINE   Injectable 25 milliGRAM(s) IV Push every 4 hours PRN Allergy symptoms  docusate sodium 100 milliGRAM(s) Oral three times a day PRN Constipation  furosemide   Injectable 20 milliGRAM(s) IV Push every 24 hours PRN if urine output< 100 ml/hr x 2 hours  LORazepam   Injectable 1 milliGRAM(s) IV Push every 6 hours PRN Anxiety  LORazepam   Injectable 1 milliGRAM(s) IV Push every 6 hours PRN Nausea and/or Vomiting  metoclopramide Injectable 10 milliGRAM(s) IV Push every 6 hours PRN Nausea and/or Vomiting  senna 1 Tablet(s) Oral at bedtime PRN Constipation    A/P:   68 year old female with a history of IgG kappa Multiple Myeloma.  Pre Autologous PBSCT,  day -1  Conditioning with Melphalan  Mildly hypotensive overnight, Norvasc held    1. Infectious Disease:   clotrimazole Lozenge 1 Lozenge Oral five times a day  fluconAZOLE   Tablet 400 milliGRAM(s) Oral daily  acyclovir   Oral Tab/Cap 400 milliGRAM(s) Oral every 8 hours    2. VOD Prophylaxis: Actigall, Glutamine, Heparin (dosed at 100 units / kg / day)     3. GI Prophylaxis:  Protonix    4. Mouth care - NS / NaHCO3 rinses, Mycelex, Biotene; Skin care     5. GVHD prophylaxis: n/a    6. Transfuse & replete electrolytes prn     7. IV hydration, daily weights, strict I&O, prn diuresis     8. PO intake as tolerated, nutrition follow up as needed, MVI, folic acid     9. Antiemetics, anti-diarrhea medications:  Dexamethasone  Emend  Zofran  Ativan   Reglan      10. OOB as tolerated, physical therapy consult if needed     11. Monitor coags / fibrinogen 2x week, vitamin K as needed     12. Monitor closely for clinical changes, monitor for fevers     13. Emotional support provided, plan of care discussed and questions addressed     14. Patient education done regarding chemotherapy prep, plan of care, restrictions and discharge planning     15. Continue regular social work input     I have written the above note for Dr. Rahman who performed service with me in the room.   Lisa Dukes, ANP-BC ([phone])    I have seen and examined patient with NP, I agree with above note as scribed. [facility]plant Team                                                      Critical / Counseling Time Provided: 30 minutes                                                                                                                                                        Chief Complaint: admitted for autologous transplant for treatment of multiple myeloma     S: Patient seen and examined with [facility] Transplant Team:   + constipation   Denies mouth / tongue / throat pain, dyspnea, cough, nausea, vomiting, diarrhea, abdominal pain     Vital Signs Last 24 Hrs  T(C): 36.1 (2019 04:33), Max: 36.5 (2019 14:23)  T(F): 96.9 (2019 04:33), Max: 97.7 (2019 14:23)  HR: 83 (2019 04:33) (83 - 89)  BP: 97/56 (2019 04:33) (97/56 - 124/67)  BP(mean): --  RR: 18 (2019 04:33) (18 - 18)  SpO2: 98% (2019 04:33) (97% - 98%)    Admit weight: 79.2 kg    Daily weight: 82.6kg     I&O's Summary    2019 07:01  -  2019 07:00  --------------------------------------------------------  IN: 6866.5 mL / OUT: 6350 mL / NET: 516.5 mL    2019 07:01  -  2019 08:53  --------------------------------------------------------  IN: 444.5 mL / OUT: 450 mL / NET: -5.5 mL    PE:   Oropharynx: no erythema or ulcers, kepivance coating  Oral Mucositis: n/a                                                       Grade:   CVS: S1S2, RRR  Lungs: clear bilaterally  Abdomen: soft, NT, ND, normoactive BS x 4Q  Extremities: no edema  Gastric Mucositis: n/a                                                Grade:   Intestinal Mucositis: n/a                                             Grade:   Skin: Kepivance rash neck, axilla - resolving   TLC: covered  Neuro: nonfocal     Karnofsky / Lansky Scale: 60   GVHD: not applicable, autologous transplant  Skin:   Liver:   Gut:   Overall Grade:       Labs:                         7.5    8.9   )-----------( 400      ( 2019 06:49 )             22.5         147<H>  |  112<H>  |  12  ----------------------------<  163<H>  3.5   |  21<L>  |  0.58    Ca    8.2<L>      2019 06:48  Phos  2.8       Mg     1.8         TPro  5.1<L>  /  Alb  3.6  /  TBili  0.2  /  DBili  <0.1  /  AST  51<H>  /  ALT  72<H>  /  AlkPhos  63      PT/INR - ( 2019 11:11 )   PT: 11.8 sec;   INR: 1.03 ratio      PTT - ( 2019 11:11 )  PTT:30.1 sec  Urinalysis Basic - ( 2019 16:25 )    Color: Light Yellow / Appearance: Clear / S.009 / pH: x  Gluc: x / Ketone: Negative  / Bili: Negative / Urobili: <2 mg/dL   Blood: x / Protein: Negative / Nitrite: Negative   Leuk Esterase: Negative / RBC: x / WBC x   Sq Epi: x / Non Sq Epi: x / Bacteria: x    Cultures:   no recent    Radiology:   no recent    MEDICATIONS  (STANDING):  acetaminophen   Tablet .. 650 milliGRAM(s) Oral every 6 hours  acyclovir   Oral Tab/Cap 400 milliGRAM(s) Oral every 8 hours  amLODIPine   Tablet 5 milliGRAM(s) Oral daily  aprepitant 80 milliGRAM(s) Oral daily  Biotene Dry Mouth Oral Rinse 5 milliLiter(s) Swish and Spit five times a day  clotrimazole Lozenge 1 Lozenge Oral five times a day  dextrose 5% + sodium chloride 0.9%. 1000 milliLiter(s) (200 mL/Hr) IV Continuous <Continuous>  fluconAZOLE   Tablet 400 milliGRAM(s) Oral daily  folic acid 1 milliGRAM(s) Oral daily  heparin  Infusion 330 Unit(s)/Hr (3.3 mL/Hr) IV Continuous <Continuous>  melphalan IVPB (eMAR) 167 milliGRAM(s) IV Intermittent daily  multivitamin 1 Tablet(s) Oral daily  nystatin Powder 1 Application(s) Topical three times a day  pantoprazole    Tablet 40 milliGRAM(s) Oral before breakfast  sodium bicarbonate Mouth Rinse 10 milliLiter(s) Swish and Spit five times a day  sodium chloride 0.45% 1000 milliLiter(s) (150 mL/Hr) IV Continuous <Continuous>  sodium chloride 0.9%. 1000 milliLiter(s) (20 mL/Hr) IV Continuous <Continuous>  ursodiol Capsule 300 milliGRAM(s) Oral two times a day with meals    MEDICATIONS  (PRN):  acetaminophen   Tablet .. 650 milliGRAM(s) Oral every 6 hours PRN Temp greater or equal to 38C (100.4F), Mild Pain (1 - 3)  diphenhydrAMINE   Injectable 25 milliGRAM(s) IV Push every 4 hours PRN Allergy symptoms  docusate sodium 100 milliGRAM(s) Oral three times a day PRN Constipation  furosemide   Injectable 20 milliGRAM(s) IV Push every 24 hours PRN if urine output< 100 ml/hr x 2 hours  LORazepam   Injectable 1 milliGRAM(s) IV Push every 6 hours PRN Anxiety  LORazepam   Injectable 1 milliGRAM(s) IV Push every 6 hours PRN Nausea and/or Vomiting  metoclopramide Injectable 10 milliGRAM(s) IV Push every 6 hours PRN Nausea and/or Vomiting  senna 1 Tablet(s) Oral at bedtime PRN Constipation    A/P:   68 year old female with a history of IgG kappa Multiple Myeloma.  Pre Autologous PBSCT,  day -1  Conditioning with Melphalan  Mildly hypotensive overnight, Norvasc held    1. Infectious Disease:   clotrimazole Lozenge 1 Lozenge Oral five times a day  fluconAZOLE   Tablet 400 milliGRAM(s) Oral daily  acyclovir   Oral Tab/Cap 400 milliGRAM(s) Oral every 8 hours    2. VOD Prophylaxis: Actigall, Glutamine, Heparin (dosed at 100 units / kg / day)     3. GI Prophylaxis:  Protonix    4. Mouth care - NS / NaHCO3 rinses, Mycelex, Biotene; Skin care     5. GVHD prophylaxis: n/a    6. Transfuse & replete electrolytes prn     7. IV hydration, daily weights, strict I&O, prn diuresis     8. PO intake as tolerated, nutrition follow up as needed, MVI, folic acid     9. Antiemetics, anti-diarrhea medications:  Dexamethasone  Emend  Zofran  Ativan   Reglan      10. OOB as tolerated, physical therapy consult if needed     11. Monitor coags / fibrinogen 2x week, vitamin K as needed     12. Monitor closely for clinical changes, monitor for fevers     13. Emotional support provided, plan of care discussed and questions addressed     14. Patient education done regarding chemotherapy prep, plan of care, restrictions and discharge planning     15. Continue regular social work input     I have written the above note for Dr. Rahman who performed service with me in the room.   Lisa Dukes, ANP-BC ([phone])    I have seen and examined patient with NP, I agree with above note as scribed.

## 2019-02-27 NOTE — DIETITIAN INITIAL EVALUATION ADULT. - ORAL INTAKE PTA
good/Pt reports eating normally PTA consuming usually 2 meals per day and various snacks in between, pt denies recent changes in appetite or intake

## 2019-02-27 NOTE — DIETITIAN INITIAL EVALUATION ADULT. - PERTINENT MEDS FT
MEDICATIONS  (STANDING):  acetaminophen   Tablet .. 650 milliGRAM(s) Oral every 6 hours  acyclovir   Oral Tab/Cap 400 milliGRAM(s) Oral every 8 hours  amLODIPine   Tablet 5 milliGRAM(s) Oral daily  aprepitant 80 milliGRAM(s) Oral daily  Biotene Dry Mouth Oral Rinse 5 milliLiter(s) Swish and Spit five times a day  clotrimazole Lozenge 1 Lozenge Oral five times a day  dextrose 5% + sodium chloride 0.9%. 1000 milliLiter(s) (200 mL/Hr) IV Continuous <Continuous>  fluconAZOLE   Tablet 400 milliGRAM(s) Oral daily  folic acid 1 milliGRAM(s) Oral daily  heparin  Infusion 330 Unit(s)/Hr (3.3 mL/Hr) IV Continuous <Continuous>  melphalan IVPB (eMAR) 167 milliGRAM(s) IV Intermittent daily  multivitamin 1 Tablet(s) Oral daily  nystatin Powder 1 Application(s) Topical three times a day  pantoprazole    Tablet 40 milliGRAM(s) Oral before breakfast  sodium bicarbonate Mouth Rinse 10 milliLiter(s) Swish and Spit five times a day  sodium chloride 0.45% 1000 milliLiter(s) (150 mL/Hr) IV Continuous <Continuous>  sodium chloride 0.9%. 1000 milliLiter(s) (20 mL/Hr) IV Continuous <Continuous>  ursodiol Capsule 300 milliGRAM(s) Oral two times a day with meals    MEDICATIONS  (PRN):  acetaminophen   Tablet .. 650 milliGRAM(s) Oral every 6 hours PRN Temp greater or equal to 38C (100.4F), Mild Pain (1 - 3)  diphenhydrAMINE   Injectable 25 milliGRAM(s) IV Push every 4 hours PRN Allergy symptoms  docusate sodium 100 milliGRAM(s) Oral three times a day PRN Constipation  furosemide   Injectable 20 milliGRAM(s) IV Push every 24 hours PRN if urine output< 100 ml/hr x 2 hours  LORazepam   Injectable 1 milliGRAM(s) IV Push every 6 hours PRN Anxiety  LORazepam   Injectable 1 milliGRAM(s) IV Push every 6 hours PRN Nausea and/or Vomiting  metoclopramide Injectable 10 milliGRAM(s) IV Push every 6 hours PRN Nausea and/or Vomiting  senna 1 Tablet(s) Oral at bedtime PRN Constipation

## 2019-02-27 NOTE — DIETITIAN INITIAL EVALUATION ADULT. - ENERGY NEEDS
Pt seen for new stem cell transplant. Pt with multiple myeloma day -1 pre autologous PBSCT.    Ht: 5'4", Wt: 174.6 lbs, BMI: 30 kg/m2, IBW: 120 lbs +/- 10%, %IBW: 145%  No edema, Skin intact

## 2019-02-27 NOTE — DIETITIAN INITIAL EVALUATION ADULT. - NS AS NUTRI INTERV ED CONTENT
Reviewed importance of adequate po intake with overall goal for weight maintenance, usage and importance of glutasolve supplement, briefly reviewed food safety diet guidelines

## 2019-02-27 NOTE — DIETITIAN INITIAL EVALUATION ADULT. - OTHER INFO
Pt reports recent history of weight loss from 185 lbs (6/2018) to ~175 lbs where pt states she has been stable for the past 2 months, consistent with current admission weight 174.6 lbs (2/25). Pt with no food allergies, takes calcium with D, B12, biotin, vitamin E, and iron at home. Pt reports slight nausea this morning which quickly resolved, no vomiting last BM 2 days ago. Pt with no difficulty chewing/swallowing. In house pt reports appetite has been fair, decreased this morning, pt looking forward to milkshake.

## 2019-02-27 NOTE — PROGRESS NOTE ADULT - ATTENDING COMMENTS
70 y/o F w/ IgG kappa myeloma admitted for autologous pbsct w/ Melphalan prep (100mg/m2 x 2)   Day - 2 - continue Melphalan hydration for 24 hours after infusion of last dose   Strict I&O, daily weights, prn diuresis   Antiemetics, mouth care skin care   Bactrim DS, Fluconazole prophylaxis  VOD prophylaxis  OOB, ambulate 70 y/o F w/ IgG kappa myeloma admitted for autologous pbsct w/ Melphalan prep (100mg/m2 x 2)   Day - 1 - continue Melphalan hydration for 24 hours after infusion of last dose   Strict I&O, daily weights, prn diuresis   Antiemetics, mouth care skin care   Begin Acyclovir, and continue Fluconazole prophylaxis  Supplement lytes  Transaminitis- mild, monitor LFT's  VOD prophylaxis  OOB, ambulate

## 2019-02-27 NOTE — DIETITIAN INITIAL EVALUATION ADULT. - PROBLEM SELECTOR PLAN 1
1. Admit to BMTU   2. -3 hours prior to Melphalan start hydration: D5NS at 200ml /hr and continue 24 hours post Melphalan infusion  3. Day – 3 & day -2 - Melphalan 100mg/m2  IV   4. Strict I&O, daily weights, prn diuresis   5. Day -1 rest day. At 2200 on day – 1, start transplant hydration 1/2NS + 50mEq NaHCO3- (may substitute [id number] if bicarb not available)  6. Day 0 – infuse HPC product. 4 hours after infusion of cells start Zarxio 5 micrograms / kg (actual weight) . Continue through engraftment.   7. On day 0, + 1, + 2-give Kepivance 60micrograms / kg (actual weight)

## 2019-02-28 LAB
ALBUMIN SERPL ELPH-MCNC: 3.6 G/DL — SIGNIFICANT CHANGE UP (ref 3.3–5)
ALP SERPL-CCNC: 59 U/L — SIGNIFICANT CHANGE UP (ref 40–120)
ALT FLD-CCNC: 87 U/L — HIGH (ref 10–45)
ANION GAP SERPL CALC-SCNC: 10 MMOL/L — SIGNIFICANT CHANGE UP (ref 5–17)
APTT BLD: 34.5 SEC — SIGNIFICANT CHANGE UP (ref 27.5–36.3)
AST SERPL-CCNC: 55 U/L — HIGH (ref 10–40)
BASOPHILS # BLD AUTO: 0 K/UL — SIGNIFICANT CHANGE UP (ref 0–0.2)
BASOPHILS NFR BLD AUTO: 0 % — SIGNIFICANT CHANGE UP (ref 0–2)
BILIRUB SERPL-MCNC: 0.3 MG/DL — SIGNIFICANT CHANGE UP (ref 0.2–1.2)
BUN SERPL-MCNC: 12 MG/DL — SIGNIFICANT CHANGE UP (ref 7–23)
CALCIUM SERPL-MCNC: 7.9 MG/DL — LOW (ref 8.4–10.5)
CHLORIDE SERPL-SCNC: 110 MMOL/L — HIGH (ref 96–108)
CO2 SERPL-SCNC: 24 MMOL/L — SIGNIFICANT CHANGE UP (ref 22–31)
CREAT SERPL-MCNC: 0.5 MG/DL — SIGNIFICANT CHANGE UP (ref 0.5–1.3)
EOSINOPHIL # BLD AUTO: 0 K/UL — SIGNIFICANT CHANGE UP (ref 0–0.5)
EOSINOPHIL NFR BLD AUTO: 0.4 % — SIGNIFICANT CHANGE UP (ref 0–6)
FIBRINOGEN PPP-MCNC: 368 MG/DL — SIGNIFICANT CHANGE UP (ref 350–510)
GLUCOSE SERPL-MCNC: 120 MG/DL — HIGH (ref 70–99)
HCT VFR BLD CALC: 22.4 % — LOW (ref 34.5–45)
HGB BLD-MCNC: 7.6 G/DL — LOW (ref 11.5–15.5)
INR BLD: 0.99 RATIO — SIGNIFICANT CHANGE UP (ref 0.88–1.16)
LDH SERPL L TO P-CCNC: 195 U/L — SIGNIFICANT CHANGE UP (ref 50–242)
LYMPHOCYTES # BLD AUTO: 0.1 K/UL — LOW (ref 1–3.3)
LYMPHOCYTES # BLD AUTO: 1.4 % — LOW (ref 13–44)
MAGNESIUM SERPL-MCNC: 1.8 MG/DL — SIGNIFICANT CHANGE UP (ref 1.6–2.6)
MCHC RBC-ENTMCNC: 31.7 PG — SIGNIFICANT CHANGE UP (ref 27–34)
MCHC RBC-ENTMCNC: 33.7 GM/DL — SIGNIFICANT CHANGE UP (ref 32–36)
MCV RBC AUTO: 94.1 FL — SIGNIFICANT CHANGE UP (ref 80–100)
MONOCYTES # BLD AUTO: 0.1 K/UL — SIGNIFICANT CHANGE UP (ref 0–0.9)
MONOCYTES NFR BLD AUTO: 0.9 % — LOW (ref 2–14)
NEUTROPHILS # BLD AUTO: 6.2 K/UL — SIGNIFICANT CHANGE UP (ref 1.8–7.4)
NEUTROPHILS NFR BLD AUTO: 97.2 % — HIGH (ref 43–77)
PHOSPHATE SERPL-MCNC: 2.2 MG/DL — LOW (ref 2.5–4.5)
PLATELET # BLD AUTO: 378 K/UL — SIGNIFICANT CHANGE UP (ref 150–400)
POTASSIUM SERPL-MCNC: 3.5 MMOL/L — SIGNIFICANT CHANGE UP (ref 3.5–5.3)
POTASSIUM SERPL-SCNC: 3.5 MMOL/L — SIGNIFICANT CHANGE UP (ref 3.5–5.3)
PROT SERPL-MCNC: 5.1 G/DL — LOW (ref 6–8.3)
PROTHROM AB SERPL-ACNC: 11.3 SEC — SIGNIFICANT CHANGE UP (ref 10–12.9)
RBC # BLD: 2.38 M/UL — LOW (ref 3.8–5.2)
RBC # FLD: 14.9 % — HIGH (ref 10.3–14.5)
SODIUM SERPL-SCNC: 144 MMOL/L — SIGNIFICANT CHANGE UP (ref 135–145)
WBC # BLD: 6.4 K/UL — SIGNIFICANT CHANGE UP (ref 3.8–10.5)
WBC # FLD AUTO: 6.4 K/UL — SIGNIFICANT CHANGE UP (ref 3.8–10.5)

## 2019-02-28 PROCEDURE — 38241 TRANSPLT AUTOL HCT/DONOR: CPT

## 2019-02-28 PROCEDURE — 99291 CRITICAL CARE FIRST HOUR: CPT

## 2019-02-28 RX ORDER — POTASSIUM CHLORIDE 20 MEQ
20 PACKET (EA) ORAL
Qty: 0 | Refills: 0 | Status: COMPLETED | OUTPATIENT
Start: 2019-02-28 | End: 2019-02-28

## 2019-02-28 RX ORDER — FUROSEMIDE 40 MG
60 TABLET ORAL ONCE
Qty: 0 | Refills: 0 | Status: COMPLETED | OUTPATIENT
Start: 2019-02-28 | End: 2019-02-28

## 2019-02-28 RX ORDER — PALIFERMIN 6.25 MG
4740 VIAL (EA) INTRAVENOUS EVERY 24 HOURS
Qty: 0 | Refills: 0 | Status: DISCONTINUED | OUTPATIENT
Start: 2019-02-28 | End: 2019-03-02

## 2019-02-28 RX ORDER — POTASSIUM PHOSPHATE, MONOBASIC POTASSIUM PHOSPHATE, DIBASIC 236; 224 MG/ML; MG/ML
15 INJECTION, SOLUTION INTRAVENOUS ONCE
Qty: 0 | Refills: 0 | Status: COMPLETED | OUTPATIENT
Start: 2019-02-28 | End: 2019-02-28

## 2019-02-28 RX ADMIN — POTASSIUM PHOSPHATE, MONOBASIC POTASSIUM PHOSPHATE, DIBASIC 62.5 MILLIMOLE(S): 236; 224 INJECTION, SOLUTION INTRAVENOUS at 19:29

## 2019-02-28 RX ADMIN — APREPITANT 80 MILLIGRAM(S): 80 CAPSULE ORAL at 11:10

## 2019-02-28 RX ADMIN — URSODIOL 300 MILLIGRAM(S): 250 TABLET, FILM COATED ORAL at 16:58

## 2019-02-28 RX ADMIN — SODIUM CHLORIDE 20 MILLILITER(S): 9 INJECTION INTRAMUSCULAR; INTRAVENOUS; SUBCUTANEOUS at 17:03

## 2019-02-28 RX ADMIN — Medication 400 MILLIGRAM(S): at 13:05

## 2019-02-28 RX ADMIN — Medication 50 MILLIGRAM(S): at 11:41

## 2019-02-28 RX ADMIN — Medication 400 MILLIGRAM(S): at 06:19

## 2019-02-28 RX ADMIN — Medication 480 MICROGRAM(S): at 16:52

## 2019-02-28 RX ADMIN — Medication 5 MILLILITER(S): at 07:21

## 2019-02-28 RX ADMIN — Medication 25 MILLIGRAM(S): at 11:41

## 2019-02-28 RX ADMIN — Medication 1 LOZENGE: at 11:10

## 2019-02-28 RX ADMIN — Medication 1 MILLIGRAM(S): at 11:10

## 2019-02-28 RX ADMIN — SODIUM CHLORIDE 150 MILLILITER(S): 9 INJECTION, SOLUTION INTRAVENOUS at 17:03

## 2019-02-28 RX ADMIN — Medication 10 MILLILITER(S): at 11:10

## 2019-02-28 RX ADMIN — AMLODIPINE BESYLATE 5 MILLIGRAM(S): 2.5 TABLET ORAL at 06:19

## 2019-02-28 RX ADMIN — Medication 1 LOZENGE: at 07:21

## 2019-02-28 RX ADMIN — Medication 5 MILLILITER(S): at 11:12

## 2019-02-28 RX ADMIN — Medication 650 MILLIGRAM(S): at 11:41

## 2019-02-28 RX ADMIN — Medication 1 LOZENGE: at 20:57

## 2019-02-28 RX ADMIN — Medication 100 MILLIGRAM(S): at 21:50

## 2019-02-28 RX ADMIN — SODIUM CHLORIDE 150 MILLILITER(S): 9 INJECTION, SOLUTION INTRAVENOUS at 21:50

## 2019-02-28 RX ADMIN — Medication 50 MILLIEQUIVALENT(S): at 15:41

## 2019-02-28 RX ADMIN — SODIUM CHLORIDE 150 MILLILITER(S): 9 INJECTION, SOLUTION INTRAVENOUS at 06:21

## 2019-02-28 RX ADMIN — HEPARIN SODIUM 3.3 UNIT(S)/HR: 5000 INJECTION INTRAVENOUS; SUBCUTANEOUS at 17:00

## 2019-02-28 RX ADMIN — Medication 400 MILLIGRAM(S): at 21:50

## 2019-02-28 RX ADMIN — NYSTATIN CREAM 1 APPLICATION(S): 100000 CREAM TOPICAL at 13:51

## 2019-02-28 RX ADMIN — Medication 5 MILLILITER(S): at 15:41

## 2019-02-28 RX ADMIN — Medication 1 MILLIGRAM(S): at 22:30

## 2019-02-28 RX ADMIN — Medication 60 MILLIGRAM(S): at 11:39

## 2019-02-28 RX ADMIN — Medication 1 LOZENGE: at 15:41

## 2019-02-28 RX ADMIN — Medication 10 MILLILITER(S): at 15:41

## 2019-02-28 RX ADMIN — URSODIOL 300 MILLIGRAM(S): 250 TABLET, FILM COATED ORAL at 07:21

## 2019-02-28 RX ADMIN — FLUCONAZOLE 400 MILLIGRAM(S): 150 TABLET ORAL at 11:10

## 2019-02-28 RX ADMIN — Medication 5 MILLILITER(S): at 20:56

## 2019-02-28 RX ADMIN — Medication 50 MILLIEQUIVALENT(S): at 13:50

## 2019-02-28 RX ADMIN — Medication 10 MILLIGRAM(S): at 21:51

## 2019-02-28 RX ADMIN — Medication 10 MILLILITER(S): at 07:21

## 2019-02-28 RX ADMIN — Medication 50 MILLIEQUIVALENT(S): at 11:11

## 2019-02-28 RX ADMIN — Medication 4740 MICROGRAM(S): at 16:53

## 2019-02-28 RX ADMIN — Medication 1 TABLET(S): at 11:10

## 2019-02-28 RX ADMIN — PANTOPRAZOLE SODIUM 40 MILLIGRAM(S): 20 TABLET, DELAYED RELEASE ORAL at 06:19

## 2019-02-28 RX ADMIN — Medication 10 MILLILITER(S): at 21:49

## 2019-02-28 RX ADMIN — SENNA PLUS 1 TABLET(S): 8.6 TABLET ORAL at 21:50

## 2019-02-28 RX ADMIN — Medication 60 MILLIGRAM(S): at 16:53

## 2019-02-28 NOTE — PROGRESS NOTE ADULT - SUBJECTIVE AND OBJECTIVE BOX
[facility] Transplant Team                                                      Critical / Counseling Time Provided: 30 minutes                                                                                                                                                        Chief Complaint: admitted for autologous transplant for treatment of multiple myeloma     S: Patient seen and examined with \[facility]\"" Transplant Team:   + constipation   Denies mouth / tongue / throat pain, dyspnea, cough, nausea, vomiting, diarrhea, abdominal pain       O: Vitals:   Vital Signs Last 24 Hrs  T(C): 36.1 (2019 05:01), Max: 36.7 (2019 22:00)  T(F): 97 (2019 05:01), Max: 98.1 (2019 22:00)  HR: 102 (:) (77 - 102)  BP: 120/75 (2019 05:) (104/68 - 120/75)  RR: 18 (:) (18 - 18)  SpO2: 98% (:) (98% - 100%)    Admit weight: 79.2 kg  Daily     Daily Weight in k.6 (2019 08:45)    Intake / Output:    @ 07:  -   @ 07:00  --------------------------------------------------------  IN: 4515.5 mL / OUT: 4100 mL / NET: 415.5 mL     @ 07: @ 07:38  --------------------------------------------------------  IN: 177.3 mL / OUT: 0 mL / NET: 177.3 mL          PE:   Oropharynx: no erythema or ulcers, kepivance coating  Oral Mucositis: n/a                                                       Grade:   CVS: S1S2, RRR  Lungs: clear bilaterally  Abdomen: soft, NT, ND, normoactive BS x 4Q  Extremities: no edema  Gastric Mucositis: n/a                                                Grade:   Intestinal Mucositis: n/a                                             Grade:   Skin: Kepivance rash neck, axilla - resolving   TLC: covered  Neuro: nonfocal       Labs:                Cultures:         Radiology:       Meds:   Antimicrobials:   acyclovir   Oral Tab/Cap 400 milliGRAM(s) Oral every 8 hours  clotrimazole Lozenge 1 Lozenge Oral five times a day  fluconAZOLE   Tablet 400 milliGRAM(s) Oral daily      Heme / Onc:   heparin  Infusion 330 Unit(s)/Hr IV Continuous <Continuous>      GI:  docusate sodium 100 milliGRAM(s) Oral three times a day PRN  pantoprazole    Tablet 40 milliGRAM(s) Oral before breakfast  senna 1 Tablet(s) Oral at bedtime PRN  sodium bicarbonate Mouth Rinse 10 milliLiter(s) Swish and Spit five times a day  ursodiol Capsule 300 milliGRAM(s) Oral two times a day with meals      Cardiovascular:   amLODIPine   Tablet 5 milliGRAM(s) Oral daily  furosemide   Injectable 20 milliGRAM(s) IV Push every 24 hours PRN      Immunologic:   filgrastim-sndz Injectable 480 MICROGram(s) SubCutaneous daily      Other medications:   acetaminophen   Tablet .. 650 milliGRAM(s) Oral every 6 hours  acetaminophen   Tablet .. 650 milliGRAM(s) Oral once  aprepitant 80 milliGRAM(s) Oral daily  Biotene Dry Mouth Oral Rinse 5 milliLiter(s) Swish and Spit five times a day  dextrose 5% + sodium chloride 0.9%. 1000 milliLiter(s) IV Continuous <Continuous>  diphenhydrAMINE   Injectable 25 milliGRAM(s) IV Push once  folic acid 1 milliGRAM(s) Oral daily  hydrocortisone sodium succinate Injectable 50 milliGRAM(s) IV Push once  lidocaine/prilocaine Cream 1 Application(s) Topical daily  multivitamin 1 Tablet(s) Oral daily  nystatin Powder 1 Application(s) Topical three times a day  sodium chloride 0.45% 1000 milliLiter(s) IV Continuous <Continuous>  sodium chloride 0.9%. 1000 milliLiter(s) IV Continuous <Continuous>      PRN:   acetaminophen   Tablet .. 650 milliGRAM(s) Oral every 6 hours PRN  diphenhydrAMINE   Injectable 25 milliGRAM(s) IV Push every 4 hours PRN  docusate sodium 100 milliGRAM(s) Oral three times a day PRN  furosemide   Injectable 20 milliGRAM(s) IV Push every 24 hours PRN  LORazepam   Injectable 1 milliGRAM(s) IV Push every 6 hours PRN  LORazepam   Injectable 1 milliGRAM(s) IV Push every 6 hours PRN  metoclopramide Injectable 10 milliGRAM(s) IV Push every 6 hours PRN  senna 1 Tablet(s) Oral at bedtime PRN    A/P:   68 year old female with a history of IgG kappa Multiple Myeloma.  Pre Autologous PBSCT,  day 0 - Transplant Day.  Conditioning with Melphalan  Mildly hypotensive, Norvasc held    1. Infectious Disease:   clotrimazole Lozenge 1 Lozenge Oral five times a day  fluconAZOLE   Tablet 400 milliGRAM(s) Oral daily  acyclovir   Oral Tab/Cap 400 milliGRAM(s) Oral every 8 hours    2. VOD Prophylaxis: Actigall, Glutamine, Heparin (dosed at 100 units / kg / day)     3. GI Prophylaxis:  Protonix    4. Mouth care - NS / NaHCO3 rinses, Mycelex, Biotene; Skin care     5. GVHD prophylaxis: n/a    6. Transfuse & replete electrolytes prn     7. IV hydration, daily weights, strict I&O, prn diuresis     8. PO intake as tolerated, nutrition follow up as needed, MVI, folic acid     9. Antiemetics, anti-diarrhea medications:  Dexamethasone  Emend  Zofran  Ativan   Reglan      10. OOB as tolerated, physical therapy consult if needed     11. Monitor coags / fibrinogen 2x week, vitamin K as needed     12. Monitor closely for clinical changes, monitor for fevers     13. Emotional support provided, plan of care discussed and questions addressed     14. Patient education done regarding chemotherapy prep, plan of care, restrictions and discharge planning     15. Continue regular social work input     I have written the above note for Dr. Rahman who performed service with me in the room.   Nancy Sinha NP-C ([phone])    I have seen and examined patient with NP, I agree with above note as scribed. [facility] Transplant Team                                                      Critical / Counseling Time Provided: 30 minutes                                                                                                                                                        Chief Complaint: admitted for autologous transplant for treatment of multiple myeloma     S: Patient seen and examined with [facility] Transplant Team:   + intermittent nausea  + constipation x 2 days  + mild dry cough    Denies mouth / tongue / throat pain, dyspnea,  vomiting, diarrhea      O: Vitals:   Vital Signs Last 24 Hrs  T(C): 36.1 (2019 05:01), Max: 36.7 (2019 22:00)  T(F): 97 (2019 05:01), Max: 98.1 (2019 22:00)  HR: 102 (2019 05:) (77 - 102)  BP: 120/75 (2019 05:01) (104/68 - 120/75)  RR: 18 (:) (18 - 18)  SpO2: 98% (:) (98% - 100%)    Admit weight: 79.2 kg  Daily Weight in k.1 (2019)    Intake / Output:    @ 07: @ 07:00  --------------------------------------------------------  IN: 4515.5 mL / OUT: 4100 mL / NET: 415.5 mL     @ 07: @ 07:38  --------------------------------------------------------  IN: 177.3 mL / OUT: 0 mL / NET: 177.3 mL      PE:   Oropharynx: no erythema or ulcers, kepivance coating  Oral Mucositis: n/a                                                       Grade:   CVS: S1S2, RRR  Lungs: clear bilaterally  Abdomen: soft, NT, ND, normoactive BS x 4Q  Extremities: no edema  Gastric Mucositis: n/a                                                Grade:   Intestinal Mucositis: n/a                                             Grade:   Skin: Kepivance rash neck, axilla - resolving   TLC: covered  Neuro: nonfocal   Pain: 0      Labs:                         7.6    6.4   )-----------( 378      ( 2019 06:57 )             22.4     2019 06:57    144    |  110    |  12     ----------------------------<  120    3.5     |  24     |  0.50     Ca    7.9        2019 06:57  Phos  2.2       2019 06:57  Mg     1.8       2019 06:57    TPro  5.1    /  Alb  3.6    /  TBili  0.3    /  DBili  x      /  AST  55     /  ALT  87     /  AlkPhos  59     2019 06:57    PT/INR - ( 2019 06:57 )   PT: 11.3 sec;   INR: 0.99 ratio    PTT - ( 2019 06:57 )  PTT:34.5 sec      LIVER FUNCTIONS - ( 2019 06:57 )  Alb: 3.6 g/dL / Pro: 5.1 g/dL / ALK PHOS: 59 U/L / ALT: 87 U/L / AST: 55 U/L / GGT: x             Cultures:     Culture - Sterility Check (02.15.19 @ 23:04)    Culture Results:   No growth    Specimen Source: DANI [id number]      Radiology:     from: IR Procedure (19 @ 13:50)     Impression: Successful placement of a right internal jugular vein   triple-lumen catheter using ultrasound and fluoroscopic guidance as above.      Meds:   Antimicrobials:   acyclovir   Oral Tab/Cap 400 milliGRAM(s) Oral every 8 hours  clotrimazole Lozenge 1 Lozenge Oral five times a day  fluconAZOLE   Tablet 400 milliGRAM(s) Oral daily      Heme / Onc:   heparin  Infusion 330 Unit(s)/Hr IV Continuous <Continuous>      GI:  docusate sodium 100 milliGRAM(s) Oral three times a day PRN  pantoprazole    Tablet 40 milliGRAM(s) Oral before breakfast  senna 1 Tablet(s) Oral at bedtime PRN  sodium bicarbonate Mouth Rinse 10 milliLiter(s) Swish and Spit five times a day  ursodiol Capsule 300 milliGRAM(s) Oral two times a day with meals      Cardiovascular:   amLODIPine   Tablet 5 milliGRAM(s) Oral daily  furosemide   Injectable 20 milliGRAM(s) IV Push every 24 hours PRN      Immunologic:   filgrastim-sndz Injectable 480 MICROGram(s) SubCutaneous daily      Other medications:   acetaminophen   Tablet .. 650 milliGRAM(s) Oral every 6 hours  acetaminophen   Tablet .. 650 milliGRAM(s) Oral once  aprepitant 80 milliGRAM(s) Oral daily  Biotene Dry Mouth Oral Rinse 5 milliLiter(s) Swish and Spit five times a day  dextrose 5% + sodium chloride 0.9%. 1000 milliLiter(s) IV Continuous <Continuous>  diphenhydrAMINE   Injectable 25 milliGRAM(s) IV Push once  folic acid 1 milliGRAM(s) Oral daily  hydrocortisone sodium succinate Injectable 50 milliGRAM(s) IV Push once  lidocaine/prilocaine Cream 1 Application(s) Topical daily  multivitamin 1 Tablet(s) Oral daily  nystatin Powder 1 Application(s) Topical three times a day  sodium chloride 0.45% 1000 milliLiter(s) IV Continuous <Continuous>  sodium chloride 0.9%. 1000 milliLiter(s) IV Continuous <Continuous>      PRN:   acetaminophen   Tablet .. 650 milliGRAM(s) Oral every 6 hours PRN  diphenhydrAMINE   Injectable 25 milliGRAM(s) IV Push every 4 hours PRN  docusate sodium 100 milliGRAM(s) Oral three times a day PRN  furosemide   Injectable 20 milliGRAM(s) IV Push every 24 hours PRN  LORazepam   Injectable 1 milliGRAM(s) IV Push every 6 hours PRN  LORazepam   Injectable 1 milliGRAM(s) IV Push every 6 hours PRN  metoclopramide Injectable 10 milliGRAM(s) IV Push every 6 hours PRN  senna 1 Tablet(s) Oral at bedtime PRN    A/P:   68 year old female with a history of IgG kappa Multiple Myeloma.  Pre Autologous PBSCT,  day 0 - Transplant Day.  Conditioning with Melphalan  Mildly hypotensive, Norvasc held  Lasix 60 mg BID today  K+, phos supplementation    1. Infectious Disease:   clotrimazole Lozenge 1 Lozenge Oral five times a day  fluconAZOLE   Tablet 400 milliGRAM(s) Oral daily  acyclovir   Oral Tab/Cap 400 milliGRAM(s) Oral every 8 hours    2. VOD Prophylaxis: Actigall, Glutamine, Heparin (dosed at 100 units / kg / day)     3. GI Prophylaxis:  Protonix    4. Mouth care - NS / NaHCO3 rinses, Mycelex, Biotene; Skin care     5. GVHD prophylaxis: n/a    6. Transfuse & replete electrolytes prn     7. IV hydration, daily weights, strict I&O, prn diuresis     8. PO intake as tolerated, nutrition follow up as needed, MVI, folic acid     9. Antiemetics, anti-diarrhea medications:  Dexamethasone  Emend  Zofran  Ativan   Reglan      10. OOB as tolerated, physical therapy consult if needed     11. Monitor coags / fibrinogen 2x week, vitamin K as needed     12. Monitor closely for clinical changes, monitor for fevers     13. Emotional support provided, plan of care discussed and questions addressed     14. Patient education done regarding chemotherapy prep, plan of care, restrictions and discharge planning     15. Continue regular social work input     I have written the above note for Dr. Rahman who performed service with me in the room.   Nancy Sinha NP-C ([phone])    I have seen and examined patient with NP, I agree with above note as scribed. [facility] Transplant Team                                                      Critical / Counseling Time Provided: 30 minutes                                                                                                                                                        Chief Complaint: admitted for autologous transplant for treatment of multiple myeloma     S: Patient seen and examined with [facility] Transplant Team:   + intermittent nausea  + constipation x 2 days  + mild dry cough    Denies mouth / tongue / throat pain, dyspnea,  vomiting, diarrhea      O: Vitals:   Vital Signs Last 24 Hrs  T(C): 36.1 (2019 05:01), Max: 36.7 (2019 22:00)  T(F): 97 (2019 05:01), Max: 98.1 (2019 22:00)  HR: 102 (2019 05:) (77 - 102)  BP: 120/75 (2019 05:01) (104/68 - 120/75)  RR: 18 (:) (18 - 18)  SpO2: 98% (:) (98% - 100%)    Admit weight: 79.2 kg  Daily Weight in k.1 (2019)    Intake / Output:    @ 07: @ 07:00  --------------------------------------------------------  IN: 4515.5 mL / OUT: 4100 mL / NET: 415.5 mL     @ 07: @ 07:38  --------------------------------------------------------  IN: 177.3 mL / OUT: 0 mL / NET: 177.3 mL      PE:   Oropharynx: no erythema or ulcers, kepivance coating  Oral Mucositis: n/a                                                       Grade:   CVS: S1S2, RRR  Lungs: clear bilaterally  Abdomen: soft, NT, ND, normoactive BS x 4Q  Extremities: no edema  Gastric Mucositis: n/a                                                Grade:   Intestinal Mucositis: n/a                                             Grade:   Skin: Kepivance rash neck, axilla - resolving   TLC: covered  Neuro: nonfocal   Pain: 0      Labs:                         7.6    6.4   )-----------( 378      ( 2019 06:57 )             22.4     2019 06:57    144    |  110    |  12     ----------------------------<  120    3.5     |  24     |  0.50     Ca    7.9        2019 06:57  Phos  2.2       2019 06:57  Mg     1.8       2019 06:57    TPro  5.1    /  Alb  3.6    /  TBili  0.3    /  DBili  x      /  AST  55     /  ALT  87     /  AlkPhos  59     2019 06:57    PT/INR - ( 2019 06:57 )   PT: 11.3 sec;   INR: 0.99 ratio    PTT - ( 2019 06:57 )  PTT:34.5 sec      LIVER FUNCTIONS - ( 2019 06:57 )  Alb: 3.6 g/dL / Pro: 5.1 g/dL / ALK PHOS: 59 U/L / ALT: 87 U/L / AST: 55 U/L / GGT: x             Cultures:     Culture - Sterility Check (02.15.19 @ 23:04)    Culture Results:   No growth    Specimen Source: DANI [id number]      Radiology:     from: IR Procedure (19 @ 13:50)     Impression: Successful placement of a right internal jugular vein   triple-lumen catheter using ultrasound and fluoroscopic guidance as above.      Meds:   Antimicrobials:   acyclovir   Oral Tab/Cap 400 milliGRAM(s) Oral every 8 hours  clotrimazole Lozenge 1 Lozenge Oral five times a day  fluconAZOLE   Tablet 400 milliGRAM(s) Oral daily      Heme / Onc:   heparin  Infusion 330 Unit(s)/Hr IV Continuous <Continuous>      GI:  docusate sodium 100 milliGRAM(s) Oral three times a day PRN  pantoprazole    Tablet 40 milliGRAM(s) Oral before breakfast  senna 1 Tablet(s) Oral at bedtime PRN  sodium bicarbonate Mouth Rinse 10 milliLiter(s) Swish and Spit five times a day  ursodiol Capsule 300 milliGRAM(s) Oral two times a day with meals      Cardiovascular:   amLODIPine   Tablet 5 milliGRAM(s) Oral daily  furosemide   Injectable 20 milliGRAM(s) IV Push every 24 hours PRN      Immunologic:   filgrastim-sndz Injectable 480 MICROGram(s) SubCutaneous daily      Other medications:   acetaminophen   Tablet .. 650 milliGRAM(s) Oral every 6 hours  acetaminophen   Tablet .. 650 milliGRAM(s) Oral once  aprepitant 80 milliGRAM(s) Oral daily  Biotene Dry Mouth Oral Rinse 5 milliLiter(s) Swish and Spit five times a day  dextrose 5% + sodium chloride 0.9%. 1000 milliLiter(s) IV Continuous <Continuous>  diphenhydrAMINE   Injectable 25 milliGRAM(s) IV Push once  folic acid 1 milliGRAM(s) Oral daily  hydrocortisone sodium succinate Injectable 50 milliGRAM(s) IV Push once  lidocaine/prilocaine Cream 1 Application(s) Topical daily  multivitamin 1 Tablet(s) Oral daily  nystatin Powder 1 Application(s) Topical three times a day  sodium chloride 0.45% 1000 milliLiter(s) IV Continuous <Continuous>  sodium chloride 0.9%. 1000 milliLiter(s) IV Continuous <Continuous>      PRN:   acetaminophen   Tablet .. 650 milliGRAM(s) Oral every 6 hours PRN  diphenhydrAMINE   Injectable 25 milliGRAM(s) IV Push every 4 hours PRN  docusate sodium 100 milliGRAM(s) Oral three times a day PRN  furosemide   Injectable 20 milliGRAM(s) IV Push every 24 hours PRN  LORazepam   Injectable 1 milliGRAM(s) IV Push every 6 hours PRN  LORazepam   Injectable 1 milliGRAM(s) IV Push every 6 hours PRN  metoclopramide Injectable 10 milliGRAM(s) IV Push every 6 hours PRN  senna 1 Tablet(s) Oral at bedtime PRN    A/P:   68 year old female with a history of IgG kappa Multiple Myeloma.  Pre Autologous PBSCT,  day 0 - Transplant Day.  Completed conditioning regimen with Melphalan  Mildly hypotensive, Norvasc held  Lasix 60 mg BID today  K+, phos supplementation    1. Infectious Disease:   clotrimazole Lozenge 1 Lozenge Oral five times a day  fluconAZOLE   Tablet 400 milliGRAM(s) Oral daily  acyclovir   Oral Tab/Cap 400 milliGRAM(s) Oral every 8 hours    2. VOD Prophylaxis: Actigall, Glutamine, Heparin (dosed at 100 units / kg / day)     3. GI Prophylaxis:  Protonix    4. Mouth care - NS / NaHCO3 rinses, Mycelex, Biotene; Skin care     5. GVHD prophylaxis: n/a    6. Transfuse & replete electrolytes prn     7. IV hydration, daily weights, strict I&O, prn diuresis     8. PO intake as tolerated, nutrition follow up as needed, MVI, folic acid     9. Antiemetics, anti-diarrhea medications:  Dexamethasone  Emend  Zofran  Ativan   Reglan      10. OOB as tolerated, physical therapy consult if needed     11. Monitor coags / fibrinogen 2x week, vitamin K as needed     12. Monitor closely for clinical changes, monitor for fevers     13. Emotional support provided, plan of care discussed and questions addressed     14. Patient education done regarding chemotherapy prep, plan of care, restrictions and discharge planning     15. Continue regular social work input     I have written the above note for Dr. Rahman who performed service with me in the room.   Nancy Sinha NP-C ([phone])    I have seen and examined patient with NP, I agree with above note as scribed.

## 2019-02-28 NOTE — PROGRESS NOTE ADULT - ATTENDING COMMENTS
68 y/o F w/ IgG kappa myeloma admitted for autologous pbsct w/ Melphalan prep (100mg/m2 x 2)   Day - 1 - continue Melphalan hydration for 24 hours after infusion of last dose   Strict I&O, daily weights, prn diuresis   Antiemetics, mouth care skin care   Begin Acyclovir, and continue Fluconazole prophylaxis  Supplement lytes  Transaminitis- mild, monitor LFT's  VOD prophylaxis  OOB, ambulate 68 y/o F w/ IgG kappa myeloma admitted for autologous pbsct w/ Melphalan prep (100mg/m2 x 2)   Day 0- HPC transplant today; begin Zarxio 4 hrs post transplant  IV hydration, strict I&O, daily weights, prn diuresis   Antiemetics, mouth care skin care   Continue Acyclovir and Fluconazole prophylaxis  Supplement lytes  Transaminitis- mild, monitor LFT's  VOD prophylaxis  OOB, ambulate

## 2019-03-01 LAB
ALBUMIN SERPL ELPH-MCNC: 3.3 G/DL — SIGNIFICANT CHANGE UP (ref 3.3–5)
ALP SERPL-CCNC: 69 U/L — SIGNIFICANT CHANGE UP (ref 40–120)
ALT FLD-CCNC: 77 U/L — HIGH (ref 10–45)
ANION GAP SERPL CALC-SCNC: 16 MMOL/L — SIGNIFICANT CHANGE UP (ref 5–17)
AST SERPL-CCNC: 54 U/L — HIGH (ref 10–40)
BASOPHILS # BLD AUTO: 0 K/UL — SIGNIFICANT CHANGE UP (ref 0–0.2)
BASOPHILS NFR BLD AUTO: 0 % — SIGNIFICANT CHANGE UP (ref 0–2)
BILIRUB DIRECT SERPL-MCNC: <0.1 MG/DL — SIGNIFICANT CHANGE UP (ref 0–0.2)
BILIRUB INDIRECT FLD-MCNC: >0.2 MG/DL — SIGNIFICANT CHANGE UP (ref 0.2–1)
BILIRUB SERPL-MCNC: 0.3 MG/DL — SIGNIFICANT CHANGE UP (ref 0.2–1.2)
BLD GP AB SCN SERPL QL: NEGATIVE — SIGNIFICANT CHANGE UP
BUN SERPL-MCNC: 7 MG/DL — SIGNIFICANT CHANGE UP (ref 7–23)
CALCIUM SERPL-MCNC: 7.7 MG/DL — LOW (ref 8.4–10.5)
CHLORIDE SERPL-SCNC: 104 MMOL/L — SIGNIFICANT CHANGE UP (ref 96–108)
CO2 SERPL-SCNC: 26 MMOL/L — SIGNIFICANT CHANGE UP (ref 22–31)
CREAT SERPL-MCNC: 0.56 MG/DL — SIGNIFICANT CHANGE UP (ref 0.5–1.3)
CULTURE RESULTS: SIGNIFICANT CHANGE UP
EOSINOPHIL # BLD AUTO: 0.2 K/UL — SIGNIFICANT CHANGE UP (ref 0–0.5)
EOSINOPHIL NFR BLD AUTO: 0.4 % — SIGNIFICANT CHANGE UP (ref 0–6)
GLUCOSE SERPL-MCNC: 59 MG/DL — LOW (ref 70–99)
HCT VFR BLD CALC: 24.9 % — LOW (ref 34.5–45)
HGB BLD-MCNC: 8 G/DL — LOW (ref 11.5–15.5)
LDH SERPL L TO P-CCNC: 332 U/L — HIGH (ref 50–242)
LYMPHOCYTES # BLD AUTO: 0.2 K/UL — LOW (ref 1–3.3)
LYMPHOCYTES # BLD AUTO: 0.5 % — LOW (ref 13–44)
MAGNESIUM SERPL-MCNC: 1.7 MG/DL — SIGNIFICANT CHANGE UP (ref 1.6–2.6)
MCHC RBC-ENTMCNC: 30.5 PG — SIGNIFICANT CHANGE UP (ref 27–34)
MCHC RBC-ENTMCNC: 32.3 GM/DL — SIGNIFICANT CHANGE UP (ref 32–36)
MCV RBC AUTO: 94.6 FL — SIGNIFICANT CHANGE UP (ref 80–100)
MONOCYTES # BLD AUTO: 0 K/UL — SIGNIFICANT CHANGE UP (ref 0–0.9)
MONOCYTES NFR BLD AUTO: 0.1 % — LOW (ref 2–14)
NEUTROPHILS # BLD AUTO: 38.9 K/UL — HIGH (ref 1.8–7.4)
NEUTROPHILS NFR BLD AUTO: 99 % — HIGH (ref 43–77)
PHOSPHATE SERPL-MCNC: 2.5 MG/DL — SIGNIFICANT CHANGE UP (ref 2.5–4.5)
PLAT MORPH BLD: NORMAL — SIGNIFICANT CHANGE UP
PLATELET # BLD AUTO: 372 K/UL — SIGNIFICANT CHANGE UP (ref 150–400)
POTASSIUM SERPL-MCNC: 3 MMOL/L — LOW (ref 3.5–5.3)
POTASSIUM SERPL-SCNC: 3 MMOL/L — LOW (ref 3.5–5.3)
PROT SERPL-MCNC: 5.1 G/DL — LOW (ref 6–8.3)
RBC # BLD: 2.63 M/UL — LOW (ref 3.8–5.2)
RBC # FLD: 15 % — HIGH (ref 10.3–14.5)
RBC BLD AUTO: SIGNIFICANT CHANGE UP
RH IG SCN BLD-IMP: POSITIVE — SIGNIFICANT CHANGE UP
SODIUM SERPL-SCNC: 146 MMOL/L — HIGH (ref 135–145)
SPECIMEN SOURCE: SIGNIFICANT CHANGE UP
WBC # BLD: 39.3 K/UL — HIGH (ref 3.8–10.5)
WBC # FLD AUTO: 39.3 K/UL — HIGH (ref 3.8–10.5)

## 2019-03-01 PROCEDURE — 99291 CRITICAL CARE FIRST HOUR: CPT

## 2019-03-01 RX ORDER — MAGNESIUM SULFATE 500 MG/ML
2 VIAL (ML) INJECTION ONCE
Qty: 0 | Refills: 0 | Status: COMPLETED | OUTPATIENT
Start: 2019-03-01 | End: 2019-03-01

## 2019-03-01 RX ORDER — FUROSEMIDE 40 MG
40 TABLET ORAL ONCE
Qty: 0 | Refills: 0 | Status: COMPLETED | OUTPATIENT
Start: 2019-03-01 | End: 2019-03-01

## 2019-03-01 RX ORDER — POTASSIUM CHLORIDE 20 MEQ
20 PACKET (EA) ORAL
Qty: 0 | Refills: 0 | Status: COMPLETED | OUTPATIENT
Start: 2019-03-01 | End: 2019-03-01

## 2019-03-01 RX ADMIN — URSODIOL 300 MILLIGRAM(S): 250 TABLET, FILM COATED ORAL at 07:35

## 2019-03-01 RX ADMIN — Medication 50 MILLIEQUIVALENT(S): at 11:09

## 2019-03-01 RX ADMIN — Medication 5 MILLILITER(S): at 16:27

## 2019-03-01 RX ADMIN — Medication 400 MILLIGRAM(S): at 22:36

## 2019-03-01 RX ADMIN — Medication 1 LOZENGE: at 11:14

## 2019-03-01 RX ADMIN — PANTOPRAZOLE SODIUM 40 MILLIGRAM(S): 20 TABLET, DELAYED RELEASE ORAL at 06:28

## 2019-03-01 RX ADMIN — NYSTATIN CREAM 1 APPLICATION(S): 100000 CREAM TOPICAL at 13:30

## 2019-03-01 RX ADMIN — FLUCONAZOLE 400 MILLIGRAM(S): 150 TABLET ORAL at 11:11

## 2019-03-01 RX ADMIN — Medication 50 MILLIEQUIVALENT(S): at 16:13

## 2019-03-01 RX ADMIN — Medication 400 MILLIGRAM(S): at 13:29

## 2019-03-01 RX ADMIN — Medication 10 MILLILITER(S): at 16:14

## 2019-03-01 RX ADMIN — Medication 5 MILLILITER(S): at 11:11

## 2019-03-01 RX ADMIN — Medication 4740 MICROGRAM(S): at 16:14

## 2019-03-01 RX ADMIN — SODIUM CHLORIDE 150 MILLILITER(S): 9 INJECTION, SOLUTION INTRAVENOUS at 11:14

## 2019-03-01 RX ADMIN — Medication 1 LOZENGE: at 16:27

## 2019-03-01 RX ADMIN — NYSTATIN CREAM 1 APPLICATION(S): 100000 CREAM TOPICAL at 22:39

## 2019-03-01 RX ADMIN — Medication 5 MILLILITER(S): at 20:56

## 2019-03-01 RX ADMIN — Medication 10 MILLILITER(S): at 11:14

## 2019-03-01 RX ADMIN — URSODIOL 300 MILLIGRAM(S): 250 TABLET, FILM COATED ORAL at 18:22

## 2019-03-01 RX ADMIN — Medication 50 MILLIEQUIVALENT(S): at 13:28

## 2019-03-01 RX ADMIN — Medication 1 TABLET(S): at 11:11

## 2019-03-01 RX ADMIN — Medication 10 MILLILITER(S): at 20:56

## 2019-03-01 RX ADMIN — Medication 1 LOZENGE: at 20:55

## 2019-03-01 RX ADMIN — NYSTATIN CREAM 1 APPLICATION(S): 100000 CREAM TOPICAL at 06:35

## 2019-03-01 RX ADMIN — Medication 1 MILLIGRAM(S): at 11:11

## 2019-03-01 RX ADMIN — Medication 40 MILLIGRAM(S): at 11:10

## 2019-03-01 RX ADMIN — Medication 480 MICROGRAM(S): at 16:14

## 2019-03-01 RX ADMIN — Medication 1 LOZENGE: at 07:35

## 2019-03-01 RX ADMIN — Medication 10 MILLILITER(S): at 07:35

## 2019-03-01 RX ADMIN — SODIUM CHLORIDE 150 MILLILITER(S): 9 INJECTION, SOLUTION INTRAVENOUS at 05:00

## 2019-03-01 RX ADMIN — Medication 50 GRAM(S): at 09:11

## 2019-03-01 RX ADMIN — Medication 5 MILLILITER(S): at 07:35

## 2019-03-01 RX ADMIN — Medication 400 MILLIGRAM(S): at 06:28

## 2019-03-01 NOTE — PROGRESS NOTE ADULT - SUBJECTIVE AND OBJECTIVE BOX
[facility] Transplant Team                                                      Critical / Counseling Time Provided: 30 minutes                                                                                                                                                        Chief Complaint: admitted for autologous transplant for treatment of multiple myeloma     S: Patient seen and examined with [facility] Transplant Team:   + intermittent nausea  + constipation x 2 days  + mild dry cough    Denies mouth / tongue / throat pain, dyspnea,  vomiting, diarrhea    O: Vitals:   Vital Signs Last 24 Hrs  T(C): 37.1 (01 Mar 2019 04:51), Max: 37.1 (01 Mar 2019 04:51)  T(F): 98.7 (01 Mar 2019 04:51), Max: 98.7 (01 Mar 2019 04:51)  HR: 102 (01 Mar 2019 04:51) (83 - 102)  BP: 100/59 (01 Mar 2019 04:51) (98/61 - 132/77)  RR: 18 (01 Mar 2019 04:51) (18 - 20)  SpO2: 97% (01 Mar 2019 04:51) (96% - 98%)    Admit weight: 79.2 kg  Daily     Daily Weight in k.1 (2019 09:33)    Intake / Output:    @ 07: @ 07:00  --------------------------------------------------------  IN: 5472.3 mL / OUT: 8000 mL / NET: -2527.7 mL     @ 07:01   @ 07:46  --------------------------------------------------------  IN: 165.2 mL / OUT: 0 mL / NET: 165.2 mL        PE:   Oropharynx: no erythema or ulcers, kepivance coating  Oral Mucositis: n/a                                                       Grade:   CVS: S1S2, RRR  Lungs: clear bilaterally  Abdomen: soft, NT, ND, normoactive BS x 4Q  Extremities: no edema  Gastric Mucositis: n/a                                                Grade:   Intestinal Mucositis: n/a                                             Grade:   Skin: Kepivance rash neck, axilla - resolving   TLC: covered  Neuro: nonfocal   Pain: 0        Labs:   CBC Full  -  ( 01 Mar 2019 07:02 )  WBC Count : 39.3 K/uL  Hemoglobin : 8.0 g/dL  Hematocrit : 24.9 %  Platelet Count - Automated : 372 K/uL  Mean Cell Volume : 94.6 fl  Mean Cell Hemoglobin : 30.5 pg  Mean Cell Hemoglobin Concentration : 32.3 gm/dL  Auto Neutrophil # : x  Auto Lymphocyte # : x  Auto Monocyte # : x  Auto Eosinophil # : x  Auto Basophil # : x  Auto Neutrophil % : x  Auto Lymphocyte % : x  Auto Monocyte % : x  Auto Eosinophil % : x  Auto Basophil % : x                          8.0    39.3  )-----------( 372      ( 01 Mar 2019 07:02 )             24.9       Cultures:         Radiology:       Meds:   Antimicrobials:   acyclovir   Oral Tab/Cap 400 milliGRAM(s) Oral every 8 hours  clotrimazole Lozenge 1 Lozenge Oral five times a day  fluconAZOLE   Tablet 400 milliGRAM(s) Oral daily      Heme / Onc:   heparin  Infusion 330 Unit(s)/Hr IV Continuous <Continuous>      GI:  docusate sodium 100 milliGRAM(s) Oral three times a day PRN  pantoprazole    Tablet 40 milliGRAM(s) Oral before breakfast  senna 1 Tablet(s) Oral at bedtime PRN  sodium bicarbonate Mouth Rinse 10 milliLiter(s) Swish and Spit five times a day  ursodiol Capsule 300 milliGRAM(s) Oral two times a day with meals      Cardiovascular:   amLODIPine   Tablet 5 milliGRAM(s) Oral daily  furosemide   Injectable 20 milliGRAM(s) IV Push every 24 hours PRN      Immunologic:   filgrastim-sndz Injectable 480 MICROGram(s) SubCutaneous daily      Other medications:   acetaminophen   Tablet .. 650 milliGRAM(s) Oral every 6 hours  Biotene Dry Mouth Oral Rinse 5 milliLiter(s) Swish and Spit five times a day  dextrose 5% + sodium chloride 0.9%. 1000 milliLiter(s) IV Continuous <Continuous>  folic acid 1 milliGRAM(s) Oral daily  lidocaine/prilocaine Cream 1 Application(s) Topical daily  multivitamin 1 Tablet(s) Oral daily  nystatin Powder 1 Application(s) Topical three times a day  palifermin Injectable  (eMAR) 4740 MICROGram(s) IV Push every 24 hours  sodium chloride 0.45% 1000 milliLiter(s) IV Continuous <Continuous>  sodium chloride 0.9%. 1000 milliLiter(s) IV Continuous <Continuous>      PRN:   acetaminophen   Tablet .. 650 milliGRAM(s) Oral every 6 hours PRN  diphenhydrAMINE   Injectable 25 milliGRAM(s) IV Push every 4 hours PRN  docusate sodium 100 milliGRAM(s) Oral three times a day PRN  furosemide   Injectable 20 milliGRAM(s) IV Push every 24 hours PRN  LORazepam   Injectable 1 milliGRAM(s) IV Push every 6 hours PRN  LORazepam   Injectable 1 milliGRAM(s) IV Push every 6 hours PRN  metoclopramide Injectable 10 milliGRAM(s) IV Push every 6 hours PRN  senna 1 Tablet(s) Oral at bedtime PRN      A/P:   68 year old female with a history of IgG kappa Multiple Myeloma.  Pre Autologous PBSCT,  day +1  Completed conditioning regimen with Melphalan  Mildly hypotensive, Norvasc held  Lasix 60 mg BID today  K+, phos supplementation    1. Infectious Disease:   clotrimazole Lozenge 1 Lozenge Oral five times a day  fluconAZOLE   Tablet 400 milliGRAM(s) Oral daily  acyclovir   Oral Tab/Cap 400 milliGRAM(s) Oral every 8 hours    2. VOD Prophylaxis: Actigall, Glutamine, Heparin (dosed at 100 units / kg / day)     3. GI Prophylaxis:  Protonix    4. Mouth care - NS / NaHCO3 rinses, Mycelex, Biotene; Skin care     5. GVHD prophylaxis: n/a    6. Transfuse & replete electrolytes prn     7. IV hydration, daily weights, strict I&O, prn diuresis     8. PO intake as tolerated, nutrition follow up as needed, MVI, folic acid     9. Antiemetics, anti-diarrhea medications:  Dexamethasone  Emend  Zofran  Ativan   Reglan      10. OOB as tolerated, physical therapy consult if needed     11. Monitor coags / fibrinogen 2x week, vitamin K as needed     12. Monitor closely for clinical changes, monitor for fevers     13. Emotional support provided, plan of care discussed and questions addressed     14. Patient education done regarding chemotherapy prep, plan of care, restrictions and discharge planning     15. Continue regular social work input     I have written the above note for Dr. Rahman who performed service with me in the room.   Nancy Sinha NP-C ([phone])    I have seen and examined patient with NP, I agree with above note as scribed. [facility] Transplant Team                                                      Critical / Counseling Time Provided: 30 minutes                                                                                                                                                        Chief Complaint: admitted for autologous transplant for treatment of multiple myeloma     S: Patient seen and examined with [facility] Transplant Team:   + general weakness  + intermittent nausea  + dry mouth    Denies  tongue / throat pain, dyspnea,  vomiting, diarrhea    O: Vitals:   Vital Signs Last 24 Hrs  T(C): 37.1 (01 Mar 2019 04:51), Max: 37.1 (01 Mar 2019 04:51)  T(F): 98.7 (01 Mar 2019 04:51), Max: 98.7 (01 Mar 2019 04:51)  HR: 102 (01 Mar 2019 04:51) (83 - 102)  BP: 100/59 (01 Mar 2019 04:51) (98/61 - 132/77)  RR: 18 (01 Mar 2019 04:51) (18 - 20)  SpO2: 97% (01 Mar 2019 04:51) (96% - 98%)    Admit weight: 79.2 kg  Daily Weight in k.1 (2019 09:33)  Today's weight: 83.1 kg    Intake / Output:    @ 07: @ 07:00  --------------------------------------------------------  IN: 5472.3 mL / OUT: 8000 mL / NET: -2527.7 mL     @ 07: @ 07:46  --------------------------------------------------------  IN: 165.2 mL / OUT: 0 mL / NET: 165.2 mL        PE:   Oropharynx: no erythema or ulcers, kepivance coating  Oral Mucositis: n/a                                                       Grade:   CVS: S1S2, RRR  Lungs: clear bilaterally  Abdomen: soft, NT, ND, normoactive BS x 4Q  Extremities: no edema  Gastric Mucositis: n/a                                                Grade:   Intestinal Mucositis: n/a                                             Grade:   Skin: Kepivance rash back,  neck, axilla - resolving   TLC: covered  Neuro: nonfocal   Pain: 0        Labs:     CBC Full  -  ( 01 Mar 2019 07:02 )  WBC Count : 39.3 K/uL  Hemoglobin : 8.0 g/dL  Hematocrit : 24.9 %  Platelet Count - Automated : 372 K/uL  Mean Cell Volume : 94.6 fl  Mean Cell Hemoglobin : 30.5 pg  Mean Cell Hemoglobin Concentration : 32.3 gm/dL  Auto Neutrophil # : x  Auto Lymphocyte # : x  Auto Monocyte # : x  Auto Eosinophil # : x  Auto Basophil # : x  Auto Neutrophil % : x  Auto Lymphocyte % : x  Auto Monocyte % : x  Auto Eosinophil % : x  Auto Basophil % : x                          8.0    39.3  )-----------( 372      ( 01 Mar 2019 07:02 )             24.9     01 Mar 2019 07:02    146    |  104    |  7      ----------------------------<  59     3.0     |  26     |  0.56     Ca    7.7        01 Mar 2019 07:02  Phos  2.5       01 Mar 2019 07:02  Mg     1.7       01 Mar 2019 07:02    TPro  5.1    /  Alb  3.3    /  TBili  0.3    /  DBili  <0.1   /  AST  54     /  ALT  77     /  AlkPhos  69     01 Mar 2019 07:02    PT/INR - ( 2019 06:57 )   PT: 11.3 sec;   INR: 0.99 ratio    PTT - ( 2019 06:57 )  PTT:34.5 sec      LIVER FUNCTIONS - ( 01 Mar 2019 07:02 )  Alb: 3.3 g/dL / Pro: 5.1 g/dL / ALK PHOS: 69 U/L / ALT: 77 U/L / AST: 54 U/L / GGT: x             Cultures:     Culture - Sterility Check (02.15.19 @ 23:04)    Culture Results:   No growth    Specimen Source: STER [id number]      Meds:   Antimicrobials:   acyclovir   Oral Tab/Cap 400 milliGRAM(s) Oral every 8 hours  clotrimazole Lozenge 1 Lozenge Oral five times a day  fluconAZOLE   Tablet 400 milliGRAM(s) Oral daily      Heme / Onc:   heparin  Infusion 330 Unit(s)/Hr IV Continuous <Continuous>      GI:  docusate sodium 100 milliGRAM(s) Oral three times a day PRN  pantoprazole    Tablet 40 milliGRAM(s) Oral before breakfast  senna 1 Tablet(s) Oral at bedtime PRN  sodium bicarbonate Mouth Rinse 10 milliLiter(s) Swish and Spit five times a day  ursodiol Capsule 300 milliGRAM(s) Oral two times a day with meals      Cardiovascular:   amLODIPine   Tablet 5 milliGRAM(s) Oral daily  furosemide   Injectable 20 milliGRAM(s) IV Push every 24 hours PRN      Immunologic:   filgrastim-sndz Injectable 480 MICROGram(s) SubCutaneous daily      Other medications:   acetaminophen   Tablet .. 650 milliGRAM(s) Oral every 6 hours  Biotene Dry Mouth Oral Rinse 5 milliLiter(s) Swish and Spit five times a day  dextrose 5% + sodium chloride 0.9%. 1000 milliLiter(s) IV Continuous <Continuous>  folic acid 1 milliGRAM(s) Oral daily  lidocaine/prilocaine Cream 1 Application(s) Topical daily  multivitamin 1 Tablet(s) Oral daily  nystatin Powder 1 Application(s) Topical three times a day  palifermin Injectable  (eMAR) 4740 MICROGram(s) IV Push every 24 hours  sodium chloride 0.45% 1000 milliLiter(s) IV Continuous <Continuous>  sodium chloride 0.9%. 1000 milliLiter(s) IV Continuous <Continuous>      PRN:   acetaminophen   Tablet .. 650 milliGRAM(s) Oral every 6 hours PRN  diphenhydrAMINE   Injectable 25 milliGRAM(s) IV Push every 4 hours PRN  docusate sodium 100 milliGRAM(s) Oral three times a day PRN  furosemide   Injectable 20 milliGRAM(s) IV Push every 24 hours PRN  LORazepam   Injectable 1 milliGRAM(s) IV Push every 6 hours PRN  LORazepam   Injectable 1 milliGRAM(s) IV Push every 6 hours PRN  metoclopramide Injectable 10 milliGRAM(s) IV Push every 6 hours PRN  senna 1 Tablet(s) Oral at bedtime PRN      A/P:   68 year old female with a history of IgG kappa Multiple Myeloma.  Pre Autologous PBSCT,  day +1  Completed conditioning regimen with Melphalan  Mildly hypotensive, Norvasc held  Lasix 60 mg BID  -2.5 L net output  K+, phos supplementation    1. Infectious Disease:   clotrimazole Lozenge 1 Lozenge Oral five times a day  fluconAZOLE   Tablet 400 milliGRAM(s) Oral daily  acyclovir   Oral Tab/Cap 400 milliGRAM(s) Oral every 8 hours    2. VOD Prophylaxis: Actigall, Glutamine, Heparin (dosed at 100 units / kg / day)     3. GI Prophylaxis:  Protonix    4. Mouth care - NS / NaHCO3 rinses, Mycelex, Biotene; Skin care     5. GVHD prophylaxis: n/a    6. Transfuse & replete electrolytes prn     7. IV hydration, daily weights, strict I&O, prn diuresis     8. PO intake as tolerated, nutrition follow up as needed, MVI, folic acid     9. Antiemetics, anti-diarrhea medications:  Dexamethasone  Emend  Zofran  Ativan   Reglan      10. OOB as tolerated, physical therapy consult if needed     11. Monitor coags / fibrinogen 2x week, vitamin K as needed     12. Monitor closely for clinical changes, monitor for fevers     13. Emotional support provided, plan of care discussed and questions addressed     14. Patient education done regarding chemotherapy prep, plan of care, restrictions and discharge planning     15. Continue regular social work input     I have written the above note for Dr. Rahman who performed service with me in the room.   Nancy Sinha NP-C ([phone])    I have seen and examined patient with NP, I agree with above note as scribed. [facility] Transplant Team                                                      Critical / Counseling Time Provided: 30 minutes                                                                                                                                                        Chief Complaint: admitted for autologous transplant for treatment of multiple myeloma     S: Patient seen and examined with [facility] Transplant Team:   + general weakness  + intermittent nausea  + dry mouth    Denies mouth/tongue/ throat pain, cough, dyspnea, vomiting, diarrhea    O: Vitals:   Vital Signs Last 24 Hrs  T(C): 37.1 (01 Mar 2019 04:51), Max: 37.1 (01 Mar 2019 04:51)  T(F): 98.7 (01 Mar 2019 04:51), Max: 98.7 (01 Mar 2019 04:51)  HR: 102 (01 Mar 2019 04:51) (83 - 102)  BP: 100/59 (01 Mar 2019 04:51) (98/61 - 132/77)  RR: 18 (01 Mar 2019 04:51) (18 - 20)  SpO2: 97% (01 Mar 2019 04:51) (96% - 98%)    Admit weight: 79.2 kg  Daily Weight in k.1 (2019 09:33)  Today's weight: 83.1 kg    Intake / Output:    @ 07: @ 07:00  --------------------------------------------------------  IN: 5472.3 mL / OUT: 8000 mL / NET: -2527.7 mL     @ 07: @ 07:46  --------------------------------------------------------  IN: 165.2 mL / OUT: 0 mL / NET: 165.2 mL        PE:   Oropharynx: no erythema or ulcers, kepivance coating  Oral Mucositis: n/a                                                       Grade:   CVS: S1S2, RRR  Lungs: clear bilaterally  Abdomen: soft, NT, ND, normoactive BS x 4Q  Extremities: no edema  Gastric Mucositis: n/a                                                Grade:   Intestinal Mucositis: n/a                                             Grade:   Skin: Kepivance rash back,  neck, axilla - resolving   TLC: covered  Neuro: nonfocal   Pain: 0        Labs:     CBC Full  -  ( 01 Mar 2019 07:02 )  WBC Count : 39.3 K/uL  Hemoglobin : 8.0 g/dL  Hematocrit : 24.9 %  Platelet Count - Automated : 372 K/uL  Mean Cell Volume : 94.6 fl  Mean Cell Hemoglobin : 30.5 pg  Mean Cell Hemoglobin Concentration : 32.3 gm/dL  Auto Neutrophil # : x  Auto Lymphocyte # : x  Auto Monocyte # : x  Auto Eosinophil # : x  Auto Basophil # : x  Auto Neutrophil % : x  Auto Lymphocyte % : x  Auto Monocyte % : x  Auto Eosinophil % : x  Auto Basophil % : x                          8.0    39.3  )-----------( 372      ( 01 Mar 2019 07:02 )             24.9     01 Mar 2019 07:02    146    |  104    |  7      ----------------------------<  59     3.0     |  26     |  0.56     Ca    7.7        01 Mar 2019 07:02  Phos  2.5       01 Mar 2019 07:02  Mg     1.7       01 Mar 2019 07:02    TPro  5.1    /  Alb  3.3    /  TBili  0.3    /  DBili  <0.1   /  AST  54     /  ALT  77     /  AlkPhos  69     01 Mar 2019 07:02    PT/INR - ( 2019 06:57 )   PT: 11.3 sec;   INR: 0.99 ratio    PTT - ( 2019 06:57 )  PTT:34.5 sec      LIVER FUNCTIONS - ( 01 Mar 2019 07:02 )  Alb: 3.3 g/dL / Pro: 5.1 g/dL / ALK PHOS: 69 U/L / ALT: 77 U/L / AST: 54 U/L / GGT: x             Cultures:     Culture - Sterility Check (02.15.19 @ 23:04)    Culture Results:   No growth    Specimen Source: STER [id number]      Meds:   Antimicrobials:   acyclovir   Oral Tab/Cap 400 milliGRAM(s) Oral every 8 hours  clotrimazole Lozenge 1 Lozenge Oral five times a day  fluconAZOLE   Tablet 400 milliGRAM(s) Oral daily      Heme / Onc:   heparin  Infusion 330 Unit(s)/Hr IV Continuous <Continuous>      GI:  docusate sodium 100 milliGRAM(s) Oral three times a day PRN  pantoprazole    Tablet 40 milliGRAM(s) Oral before breakfast  senna 1 Tablet(s) Oral at bedtime PRN  sodium bicarbonate Mouth Rinse 10 milliLiter(s) Swish and Spit five times a day  ursodiol Capsule 300 milliGRAM(s) Oral two times a day with meals      Cardiovascular:   amLODIPine   Tablet 5 milliGRAM(s) Oral daily  furosemide   Injectable 20 milliGRAM(s) IV Push every 24 hours PRN      Immunologic:   filgrastim-sndz Injectable 480 MICROGram(s) SubCutaneous daily      Other medications:   acetaminophen   Tablet .. 650 milliGRAM(s) Oral every 6 hours  Biotene Dry Mouth Oral Rinse 5 milliLiter(s) Swish and Spit five times a day  dextrose 5% + sodium chloride 0.9%. 1000 milliLiter(s) IV Continuous <Continuous>  folic acid 1 milliGRAM(s) Oral daily  lidocaine/prilocaine Cream 1 Application(s) Topical daily  multivitamin 1 Tablet(s) Oral daily  nystatin Powder 1 Application(s) Topical three times a day  palifermin Injectable  (eMAR) 4740 MICROGram(s) IV Push every 24 hours  sodium chloride 0.45% 1000 milliLiter(s) IV Continuous <Continuous>  sodium chloride 0.9%. 1000 milliLiter(s) IV Continuous <Continuous>      PRN:   acetaminophen   Tablet .. 650 milliGRAM(s) Oral every 6 hours PRN  diphenhydrAMINE   Injectable 25 milliGRAM(s) IV Push every 4 hours PRN  docusate sodium 100 milliGRAM(s) Oral three times a day PRN  furosemide   Injectable 20 milliGRAM(s) IV Push every 24 hours PRN  LORazepam   Injectable 1 milliGRAM(s) IV Push every 6 hours PRN  LORazepam   Injectable 1 milliGRAM(s) IV Push every 6 hours PRN  metoclopramide Injectable 10 milliGRAM(s) IV Push every 6 hours PRN  senna 1 Tablet(s) Oral at bedtime PRN      A/P:   68 year old female with a history of IgG kappa Multiple Myeloma.  Completed conditioning regimen with Melphalan  Post Autologous PBSCT,  day +1  Continue Zarxio  Mildly hypotensive, Norvasc held  Lasix 60 mg BID  -2.5 L net output  K+, phos supplementation    1. Infectious Disease:   clotrimazole Lozenge 1 Lozenge Oral five times a day  fluconAZOLE   Tablet 400 milliGRAM(s) Oral daily  acyclovir   Oral Tab/Cap 400 milliGRAM(s) Oral every 8 hours    2. VOD Prophylaxis: Actigall, Glutamine, Heparin (dosed at 100 units / kg / day)     3. GI Prophylaxis:  Protonix    4. Mouth care - NS / NaHCO3 rinses, Mycelex, Biotene; Skin care     5. GVHD prophylaxis: n/a    6. Transfuse & replete electrolytes prn     7. IV hydration, daily weights, strict I&O, prn diuresis     8. PO intake as tolerated, nutrition follow up as needed, MVI, folic acid     9. Antiemetics, anti-diarrhea medications:  Dexamethasone  Emend  Zofran  Ativan   Reglan      10. OOB as tolerated, physical therapy consult if needed     11. Monitor coags / fibrinogen 2x week, vitamin K as needed     12. Monitor closely for clinical changes, monitor for fevers     13. Emotional support provided, plan of care discussed and questions addressed     14. Patient education done regarding chemotherapy prep, plan of care, restrictions and discharge planning     15. Continue regular social work input     I have written the above note for Dr. Rahman who performed service with me in the room.   Nancy Sinha NP-C ([phone])    I have seen and examined patient with NP, I agree with above note as scribed.

## 2019-03-01 NOTE — PROGRESS NOTE ADULT - ATTENDING COMMENTS
70 y/o F w/ IgG kappa myeloma admitted for autologous pbsct w/ Melphalan prep (100mg/m2 x 2)   Day 0- HPC transplant today; begin Zarxio 4 hrs post transplant  IV hydration, strict I&O, daily weights, prn diuresis   Antiemetics, mouth care skin care   Continue Acyclovir and Fluconazole prophylaxis  Supplement lytes  Transaminitis- mild, monitor LFT's  VOD prophylaxis  OOB, ambulate 68 y/o F w/ IgG kappa myeloma admitted for autologous pbsct w/ Melphalan prep (100mg/m2 x 2)   Day +1- s/p [facility]PC transplant; continue Zarxio   IV hydration, strict I&O, daily weights, prn diuresis   Antiemetics, mouth care skin care   Continue Acyclovir and Fluconazole prophylaxis  Supplement lytes  Transaminitis- mild, monitor LFT's  VOD prophylaxis  OOB, ambulate

## 2019-03-02 LAB
ALBUMIN SERPL ELPH-MCNC: 3.1 G/DL — LOW (ref 3.3–5)
ALP SERPL-CCNC: 82 U/L — SIGNIFICANT CHANGE UP (ref 40–120)
ALT FLD-CCNC: 73 U/L — HIGH (ref 10–45)
ANION GAP SERPL CALC-SCNC: 11 MMOL/L — SIGNIFICANT CHANGE UP (ref 5–17)
AST SERPL-CCNC: 53 U/L — HIGH (ref 10–40)
BASOPHILS # BLD AUTO: 0 K/UL — SIGNIFICANT CHANGE UP (ref 0–0.2)
BASOPHILS NFR BLD AUTO: 0 % — SIGNIFICANT CHANGE UP (ref 0–2)
BILIRUB SERPL-MCNC: 0.3 MG/DL — SIGNIFICANT CHANGE UP (ref 0.2–1.2)
BUN SERPL-MCNC: 6 MG/DL — LOW (ref 7–23)
CALCIUM SERPL-MCNC: 7.7 MG/DL — LOW (ref 8.4–10.5)
CHLORIDE SERPL-SCNC: 106 MMOL/L — SIGNIFICANT CHANGE UP (ref 96–108)
CO2 SERPL-SCNC: 28 MMOL/L — SIGNIFICANT CHANGE UP (ref 22–31)
CREAT SERPL-MCNC: 0.53 MG/DL — SIGNIFICANT CHANGE UP (ref 0.5–1.3)
EOSINOPHIL # BLD AUTO: 0 K/UL — SIGNIFICANT CHANGE UP (ref 0–0.5)
EOSINOPHIL NFR BLD AUTO: 0.2 % — SIGNIFICANT CHANGE UP (ref 0–6)
GLUCOSE SERPL-MCNC: 95 MG/DL — SIGNIFICANT CHANGE UP (ref 70–99)
HCT VFR BLD CALC: 22.8 % — LOW (ref 34.5–45)
HGB BLD-MCNC: 7.8 G/DL — LOW (ref 11.5–15.5)
LDH SERPL L TO P-CCNC: 275 U/L — HIGH (ref 50–242)
LYMPHOCYTES # BLD AUTO: 0.1 K/UL — LOW (ref 1–3.3)
LYMPHOCYTES # BLD AUTO: 0.7 % — LOW (ref 13–44)
MAGNESIUM SERPL-MCNC: 2.3 MG/DL — SIGNIFICANT CHANGE UP (ref 1.6–2.6)
MCHC RBC-ENTMCNC: 32.3 PG — SIGNIFICANT CHANGE UP (ref 27–34)
MCHC RBC-ENTMCNC: 34.1 GM/DL — SIGNIFICANT CHANGE UP (ref 32–36)
MCV RBC AUTO: 94.7 FL — SIGNIFICANT CHANGE UP (ref 80–100)
MONOCYTES # BLD AUTO: 0 K/UL — SIGNIFICANT CHANGE UP (ref 0–0.9)
MONOCYTES NFR BLD AUTO: 0.1 % — LOW (ref 2–14)
NEUTROPHILS # BLD AUTO: 19.7 K/UL — HIGH (ref 1.8–7.4)
NEUTROPHILS NFR BLD AUTO: 99 % — HIGH (ref 43–77)
PHOSPHATE SERPL-MCNC: 3.1 MG/DL — SIGNIFICANT CHANGE UP (ref 2.5–4.5)
PLAT MORPH BLD: NORMAL — SIGNIFICANT CHANGE UP
PLATELET # BLD AUTO: 280 K/UL — SIGNIFICANT CHANGE UP (ref 150–400)
POTASSIUM SERPL-MCNC: 3.4 MMOL/L — LOW (ref 3.5–5.3)
POTASSIUM SERPL-SCNC: 3.4 MMOL/L — LOW (ref 3.5–5.3)
PROT SERPL-MCNC: 4.6 G/DL — LOW (ref 6–8.3)
RBC # BLD: 2.41 M/UL — LOW (ref 3.8–5.2)
RBC # FLD: 14.6 % — HIGH (ref 10.3–14.5)
RBC BLD AUTO: SIGNIFICANT CHANGE UP
SODIUM SERPL-SCNC: 145 MMOL/L — SIGNIFICANT CHANGE UP (ref 135–145)
WBC # BLD: 19.9 K/UL — HIGH (ref 3.8–10.5)
WBC # FLD AUTO: 19.9 K/UL — HIGH (ref 3.8–10.5)

## 2019-03-02 PROCEDURE — 99291 CRITICAL CARE FIRST HOUR: CPT

## 2019-03-02 RX ORDER — FUROSEMIDE 40 MG
40 TABLET ORAL ONCE
Qty: 0 | Refills: 0 | Status: COMPLETED | OUTPATIENT
Start: 2019-03-02 | End: 2019-03-02

## 2019-03-02 RX ORDER — POTASSIUM CHLORIDE 20 MEQ
20 PACKET (EA) ORAL
Qty: 0 | Refills: 0 | Status: COMPLETED | OUTPATIENT
Start: 2019-03-02 | End: 2019-03-02

## 2019-03-02 RX ADMIN — Medication 400 MILLIGRAM(S): at 22:45

## 2019-03-02 RX ADMIN — Medication 1 LOZENGE: at 07:37

## 2019-03-02 RX ADMIN — Medication 400 MILLIGRAM(S): at 14:20

## 2019-03-02 RX ADMIN — NYSTATIN CREAM 1 APPLICATION(S): 100000 CREAM TOPICAL at 14:21

## 2019-03-02 RX ADMIN — FLUCONAZOLE 400 MILLIGRAM(S): 150 TABLET ORAL at 11:05

## 2019-03-02 RX ADMIN — Medication 10 MILLILITER(S): at 11:04

## 2019-03-02 RX ADMIN — URSODIOL 300 MILLIGRAM(S): 250 TABLET, FILM COATED ORAL at 07:39

## 2019-03-02 RX ADMIN — Medication 1 LOZENGE: at 17:32

## 2019-03-02 RX ADMIN — Medication 1 LOZENGE: at 11:04

## 2019-03-02 RX ADMIN — Medication 50 MILLIEQUIVALENT(S): at 11:53

## 2019-03-02 RX ADMIN — Medication 1 MILLIGRAM(S): at 22:57

## 2019-03-02 RX ADMIN — Medication 1 LOZENGE: at 20:39

## 2019-03-02 RX ADMIN — Medication 10 MILLILITER(S): at 17:32

## 2019-03-02 RX ADMIN — Medication 480 MICROGRAM(S): at 14:17

## 2019-03-02 RX ADMIN — PANTOPRAZOLE SODIUM 40 MILLIGRAM(S): 20 TABLET, DELAYED RELEASE ORAL at 07:24

## 2019-03-02 RX ADMIN — URSODIOL 300 MILLIGRAM(S): 250 TABLET, FILM COATED ORAL at 17:38

## 2019-03-02 RX ADMIN — NYSTATIN CREAM 1 APPLICATION(S): 100000 CREAM TOPICAL at 05:32

## 2019-03-02 RX ADMIN — Medication 5 MILLILITER(S): at 00:51

## 2019-03-02 RX ADMIN — Medication 1 MILLIGRAM(S): at 11:05

## 2019-03-02 RX ADMIN — Medication 10 MILLILITER(S): at 00:51

## 2019-03-02 RX ADMIN — Medication 1 LOZENGE: at 00:51

## 2019-03-02 RX ADMIN — Medication 100 MILLIGRAM(S): at 05:28

## 2019-03-02 RX ADMIN — Medication 40 MILLIGRAM(S): at 11:04

## 2019-03-02 RX ADMIN — Medication 400 MILLIGRAM(S): at 05:28

## 2019-03-02 RX ADMIN — Medication 10 MILLILITER(S): at 07:37

## 2019-03-02 RX ADMIN — Medication 10 MILLIGRAM(S): at 09:02

## 2019-03-02 RX ADMIN — Medication 5 MILLILITER(S): at 20:39

## 2019-03-02 RX ADMIN — Medication 10 MILLILITER(S): at 20:39

## 2019-03-02 RX ADMIN — Medication 1 TABLET(S): at 11:05

## 2019-03-02 RX ADMIN — Medication 50 MILLIEQUIVALENT(S): at 14:17

## 2019-03-02 RX ADMIN — Medication 5 MILLILITER(S): at 11:04

## 2019-03-02 RX ADMIN — Medication 5 MILLILITER(S): at 17:32

## 2019-03-02 RX ADMIN — HEPARIN SODIUM 3.3 UNIT(S)/HR: 5000 INJECTION INTRAVENOUS; SUBCUTANEOUS at 17:34

## 2019-03-02 RX ADMIN — Medication 5 MILLILITER(S): at 07:38

## 2019-03-02 RX ADMIN — Medication 50 MILLIEQUIVALENT(S): at 09:21

## 2019-03-02 NOTE — PROGRESS NOTE ADULT - SUBJECTIVE AND OBJECTIVE BOX
HPC Transplant Team                                                      Critical / Counseling Time Provided: 30 minutes                                                                                                                                                        Chief Complaint:     S: Patient seen and examined with [facility] Transplant Team:   Denies mouth / tongue / throat pain, dyspnea, cough, nausea, vomiting, diarrhea, abdominal pain     O: Vitals:   Vital Signs Last 24 Hrs  T(C): 36.8 (02 Mar 2019 05:29), Max: 36.9 (01 Mar 2019 21:20)  T(F): 98.3 (02 Mar 2019 05:29), Max: 98.4 (01 Mar 2019 21:20)  HR: 96 (02 Mar 2019 05:29) (85 - 96)  BP: 106/68 (02 Mar 2019 05:29) (100/61 - 109/70)  BP(mean): --  RR: 18 (02 Mar 2019 05:29) (18 - 20)  SpO2: 99% (02 Mar 2019 05:29) (87% - 99%)    Admit weight:   Daily     Daily Weight in k.1 (01 Mar 2019 09:00)    Intake / Output:    @ 07: @ 07:00  --------------------------------------------------------  IN: 2564 mL / OUT: 5200 mL / NET: -2636 mL     @ 07:01  02 @ 07:52  --------------------------------------------------------  IN: 44.6 mL / OUT: 0 mL / NET: 44.6 mL          PE:   Oropharynx:   Oral Mucositis:                                                        Grade:   CVS:   Lungs:   Abdomen:  Extremities:   Gastric Mucositis:                                                  Grade:   Intestinal Mucositis:                                              Grade:   Skin:   TLC:   Neuro:   Pain:     Labs:   CBC Full  -  ( 01 Mar 2019 07:02 )  WBC Count : 39.3 K/uL  Hemoglobin : 8.0 g/dL  Hematocrit : 24.9 %  Platelet Count - Automated : 372 K/uL  Mean Cell Volume : 94.6 fl  Mean Cell Hemoglobin : 30.5 pg  Mean Cell Hemoglobin Concentration : 32.3 gm/dL  Auto Neutrophil # : 38.9 K/uL  Auto Lymphocyte # : 0.2 K/uL  Auto Monocyte # : 0.0 K/uL  Auto Eosinophil # : 0.2 K/uL  Auto Basophil # : 0.0 K/uL  Auto Neutrophil % : 99.0 %  Auto Lymphocyte % : 0.5 %  Auto Monocyte % : 0.1 %  Auto Eosinophil % : 0.4 %  Auto Basophil % : 0.0 %                          8.0    39.3  )-----------( 372      ( 01 Mar 2019 07:02 )             24.9     03-02    145  |  106  |  6<L>  ----------------------------<  95  3.4<L>   |  28  |  0.53    Ca    7.7<L>      02 Mar 2019 07:02  Phos  3.1       Mg     2.3         TPro  4.6<L>  /  Alb  3.1<L>  /  TBili  0.3  /  DBili  x   /  AST  53<H>  /  ALT  73<H>  /  AlkPhos  82        LIVER FUNCTIONS - ( 02 Mar 2019 07:02 )  Alb: 3.1 g/dL / Pro: 4.6 g/dL / ALK PHOS: 82 U/L / ALT: 73 U/L / AST: 53 U/L / GGT: x           Lactate Dehydrogenase, Serum: 275 U/L ( @ 07:02)          Karnofsky / Lansky Scale:   GVHD:   Skin:   Liver:   Gut:   Overall Grade:       Cultures:         Radiology:       Meds:   Antimicrobials:   acyclovir   Oral Tab/Cap 400 milliGRAM(s) Oral every 8 hours  clotrimazole Lozenge 1 Lozenge Oral five times a day  fluconAZOLE   Tablet 400 milliGRAM(s) Oral daily      Heme / Onc:   heparin  Infusion 330 Unit(s)/Hr IV Continuous <Continuous>      GI:  docusate sodium 100 milliGRAM(s) Oral three times a day PRN  pantoprazole    Tablet 40 milliGRAM(s) Oral before breakfast  senna 1 Tablet(s) Oral at bedtime PRN  sodium bicarbonate Mouth Rinse 10 milliLiter(s) Swish and Spit five times a day  ursodiol Capsule 300 milliGRAM(s) Oral two times a day with meals      Cardiovascular:   amLODIPine   Tablet 5 milliGRAM(s) Oral daily  furosemide   Injectable 20 milliGRAM(s) IV Push every 24 hours PRN      Immunologic:   filgrastim-sndz Injectable 480 MICROGram(s) SubCutaneous daily      Other medications:   acetaminophen   Tablet .. 650 milliGRAM(s) Oral every 6 hours  Biotene Dry Mouth Oral Rinse 5 milliLiter(s) Swish and Spit five times a day  dextrose 5% + sodium chloride 0.9%. 1000 milliLiter(s) IV Continuous <Continuous>  folic acid 1 milliGRAM(s) Oral daily  lidocaine/prilocaine Cream 1 Application(s) Topical daily  multivitamin 1 Tablet(s) Oral daily  nystatin Powder 1 Application(s) Topical three times a day  palifermin Injectable  (eMAR) 4740 MICROGram(s) IV Push every 24 hours  sodium chloride 0.45% 1000 milliLiter(s) IV Continuous <Continuous>  sodium chloride 0.9%. 1000 milliLiter(s) IV Continuous <Continuous>      PRN:   acetaminophen   Tablet .. 650 milliGRAM(s) Oral every 6 hours PRN  diphenhydrAMINE   Injectable 25 milliGRAM(s) IV Push every 4 hours PRN  docusate sodium 100 milliGRAM(s) Oral three times a day PRN  furosemide   Injectable 20 milliGRAM(s) IV Push every 24 hours PRN  LORazepam   Injectable 1 milliGRAM(s) IV Push every 6 hours PRN  LORazepam   Injectable 1 milliGRAM(s) IV Push every 6 hours PRN  metoclopramide Injectable 10 milliGRAM(s) IV Push every 6 hours PRN  senna 1 Tablet(s) Oral at bedtime PRN      A/P:   ___ year old ___  with a history of ______________________  Pre / Status Post :  Autologous / Allogeneic PBSCT / BMT day ____________    1. Infectious Disease:   Fluconazole, Acyclovir     2. VOD Prophylaxis: Actigall, Glutamine, Heparin (dosed at 100 units / kg / day)     3. GI Prophylaxis:  Protonix    4. Mouthcare - NS / NaHCO3 rinses, Mycelex, Caphosol, skin care     5. GVHD prophylaxis     6. Transfuse & replete electrolytes prn     7. IV hydration, daily weights, strict I&O, prn diuresis     8. PO intake as tolerated, nutrition follow up as needed, MVI, folic acid     9. Antiemetics, anti-diarrhea medications:   Reglan, Ativan    10. OOB as tolerated, physical therapy consult if needed     11. Monitor coags / fibrinogen 2x week, vitamin K as needed     12. Monitor closely for clinical changes, monitor for fevers     13. Emotional support provided, plan of care discussed with patient and family, questions addressed     14. Patient education done regarding chemotherapy prep, plan of care, restrictions and discharge planning     15. Continue regular social work input     I have written the above note for Dr. Rahman who performed service with me in the room.   Christiano Herrera  NP-C ([phone])    I have seen and examined patient with NP, I agree with above note as scribed. HPC Transplant Team                                                      Critical / Counseling Time Provided: 30 minutes                                                                                                                                                        Chief Complaint:     S: Patient seen and examined with [facility] Transplant Team:   + general weakness  + Decreased appetite  + taste alteration  + intermittent nausea  + generalized swelling 2/2 Kepivance    Denies mouth, tongue, throat pain, cough, dyspnea, vomiting, diarrhea    O: Vitals:   Vital Signs Last 24 Hrs  T(C): 36.8 (02 Mar 2019 05:29), Max: 36.9 (01 Mar 2019 21:20)  T(F): 98.3 (02 Mar 2019 05:29), Max: 98.4 (01 Mar 2019 21:20)  HR: 96 (02 Mar 2019 05:29) (85 - 96)  BP: 106/68 (02 Mar 2019 05:29) (100/61 - 109/70)  BP(mean): --  RR: 18 (02 Mar 2019 05:29) (18 - 20)  SpO2: 99% (02 Mar 2019 05:29) (87% - 99%)    Admit weight:   Daily     Daily Weight in k.1 (01 Mar 2019 09:00)    Intake / Output:    @ 07:01   @ 07:00  --------------------------------------------------------  IN: 2564 mL / OUT: 5200 mL / NET: -2636 mL     @ 07:01  02 @ 07:52  --------------------------------------------------------  IN: 44.6 mL / OUT: 0 mL / NET: 44.6 mL    PE:   Oropharynx: no erythema or ulcers, Kepivance coating, facial, lip swelling 2/2 Kepivance.  Oral Mucositis: n/a                                                       Grade:   CVS: S1S2, RRR  Lungs: CTA B/L  Abdomen: soft, NT, ND, normoactive BS x 4Q  Extremities: BLE +1 edema, facial edema  Gastric Mucositis: n/a                                                Grade:   Intestinal Mucositis: n/a                                             Grade:   Skin: generalized edema, Kepivance rash back,  neck, and upper chest,   TLC: CDI  Neuro: nonfocal   Pain: Denies    Labs:   CBC Full  -  ( 01 Mar 2019 07:02 )  WBC Count : 39.3 K/uL  Hemoglobin : 8.0 g/dL  Hematocrit : 24.9 %  Platelet Count - Automated : 372 K/uL  Mean Cell Volume : 94.6 fl  Mean Cell Hemoglobin : 30.5 pg  Mean Cell Hemoglobin Concentration : 32.3 gm/dL  Auto Neutrophil # : 38.9 K/uL  Auto Lymphocyte # : 0.2 K/uL  Auto Monocyte # : 0.0 K/uL  Auto Eosinophil # : 0.2 K/uL  Auto Basophil # : 0.0 K/uL  Auto Neutrophil % : 99.0 %  Auto Lymphocyte % : 0.5 %  Auto Monocyte % : 0.1 %  Auto Eosinophil % : 0.4 %  Auto Basophil % : 0.0 %                          8.0    39.3  )-----------( 372      ( 01 Mar 2019 07:02 )             24.9     03-02    145  |  106  |  6<L>  ----------------------------<  95  3.4<L>   |  28  |  0.53    Ca    7.7<L>      02 Mar 2019 07:02  Phos  3.1     03-  Mg     2.3         TPro  4.6<L>  /  Alb  3.1<L>  /  TBili  0.3  /  DBili  x   /  AST  53<H>  /  ALT  73<H>  /  AlkPhos  82  03-02      LIVER FUNCTIONS - ( 02 Mar 2019 07:02 )  Alb: 3.1 g/dL / Pro: 4.6 g/dL / ALK PHOS: 82 U/L / ALT: 73 U/L / AST: 53 U/L / GGT: x           Lactate Dehydrogenase, Serum: 275 U/L ( @ 07:02)          Karnofsky / Lansky Scale:   GVHD:   Skin:   Liver:   Gut:   Overall Grade:       Cultures:         Radiology:       Meds:   Antimicrobials:   acyclovir   Oral Tab/Cap 400 milliGRAM(s) Oral every 8 hours  clotrimazole Lozenge 1 Lozenge Oral five times a day  fluconAZOLE   Tablet 400 milliGRAM(s) Oral daily      Heme / Onc:   heparin  Infusion 330 Unit(s)/Hr IV Continuous <Continuous>      GI:  docusate sodium 100 milliGRAM(s) Oral three times a day PRN  pantoprazole    Tablet 40 milliGRAM(s) Oral before breakfast  senna 1 Tablet(s) Oral at bedtime PRN  sodium bicarbonate Mouth Rinse 10 milliLiter(s) Swish and Spit five times a day  ursodiol Capsule 300 milliGRAM(s) Oral two times a day with meals      Cardiovascular:   amLODIPine   Tablet 5 milliGRAM(s) Oral daily  furosemide   Injectable 20 milliGRAM(s) IV Push every 24 hours PRN      Immunologic:   filgrastim-sndz Injectable 480 MICROGram(s) SubCutaneous daily      Other medications:   acetaminophen   Tablet .. 650 milliGRAM(s) Oral every 6 hours  Biotene Dry Mouth Oral Rinse 5 milliLiter(s) Swish and Spit five times a day  dextrose 5% + sodium chloride 0.9%. 1000 milliLiter(s) IV Continuous <Continuous>  folic acid 1 milliGRAM(s) Oral daily  lidocaine/prilocaine Cream 1 Application(s) Topical daily  multivitamin 1 Tablet(s) Oral daily  nystatin Powder 1 Application(s) Topical three times a day  palifermin Injectable  (eMAR) 4740 MICROGram(s) IV Push every 24 hours  sodium chloride 0.45% 1000 milliLiter(s) IV Continuous <Continuous>  sodium chloride 0.9%. 1000 milliLiter(s) IV Continuous <Continuous>      PRN:   acetaminophen   Tablet .. 650 milliGRAM(s) Oral every 6 hours PRN  diphenhydrAMINE   Injectable 25 milliGRAM(s) IV Push every 4 hours PRN  docusate sodium 100 milliGRAM(s) Oral three times a day PRN  furosemide   Injectable 20 milliGRAM(s) IV Push every 24 hours PRN  LORazepam   Injectable 1 milliGRAM(s) IV Push every 6 hours PRN  LORazepam   Injectable 1 milliGRAM(s) IV Push every 6 hours PRN  metoclopramide Injectable 10 milliGRAM(s) IV Push every 6 hours PRN  senna 1 Tablet(s) Oral at bedtime PRN      A/P:   ___ year old ___  with a history of ______________________  Pre / Status Post :  Autologous / Allogeneic PBSCT / BMT day ____________    1. Infectious Disease:   Fluconazole, Acyclovir     2. VOD Prophylaxis: Actigall, Glutamine, Heparin (dosed at 100 units / kg / day)     3. GI Prophylaxis:  Protonix    4. Mouthcare - NS / NaHCO3 rinses, Mycelex, Caphosol, skin care     5. GVHD prophylaxis     6. Transfuse & replete electrolytes prn     7. IV hydration, daily weights, strict I&O, prn diuresis     8. PO intake as tolerated, nutrition follow up as needed, MVI, folic acid     9. Antiemetics, anti-diarrhea medications:   Reglan, Ativan    10. OOB as tolerated, physical therapy consult if needed     11. Monitor coags / fibrinogen 2x week, vitamin K as needed     12. Monitor closely for clinical changes, monitor for fevers     13. Emotional support provided, plan of care discussed with patient and family, questions addressed     14. Patient education done regarding chemotherapy prep, plan of care, restrictions and discharge planning     15. Continue regular social work input     I have written the above note for Dr. Rahman who performed service with me in the room.   Christiano Herrera  NP-C ([phone])    I have seen and examined patient with NP, I agree with above note as scribed. [facility]PC Transplant Team                                                      Critical / Counseling Time Provided: 30 minutes                                                                                                                                                        Chief Complaint:     S: Patient seen and examined with [facility] Transplant Team:   + general weakness  + Decreased appetite  + taste alteration  + intermittent nausea  + generalized swelling 2/2 Kepivance    Denies mouth, tongue, throat pain, cough, dyspnea, vomiting, diarrhea    O: Vitals:   Vital Signs Last 24 Hrs  T(C): 36.8 (02 Mar 2019 05:29), Max: 36.9 (01 Mar 2019 21:20)  T(F): 98.3 (02 Mar 2019 05:29), Max: 98.4 (01 Mar 2019 21:20)  HR: 96 (02 Mar 2019 05:29) (85 - 96)  BP: 106/68 (02 Mar 2019 05:29) (100/61 - 109/70)  BP(mean): --  RR: 18 (02 Mar 2019 05:29) (18 - 20)  SpO2: 99% (02 Mar 2019 05:29) (87% - 99%)    Admit weight:   Daily     Daily Weight in k.1 (01 Mar 2019 09:00)    Intake / Output:    @ 07:01   @ 07:00  --------------------------------------------------------  IN: 2564 mL / OUT: 5200 mL / NET: -2636 mL     @ 07:01  02 @ 07:52  --------------------------------------------------------  IN: 44.6 mL / OUT: 0 mL / NET: 44.6 mL    PE:   Oropharynx: no erythema or ulcers, Kepivance coating, facial, lip swelling 2/2 Kepivance.  Oral Mucositis: n/a                                                       Grade:   CVS: S1S2, RRR  Lungs: CTA B/L  Abdomen: soft, NT, ND, normoactive BS x 4Q  Extremities: BLE +1 edema, facial edema  Gastric Mucositis: n/a                                                Grade:   Intestinal Mucositis: n/a                                             Grade:   Skin: generalized edema, Kepivance rash back,  neck, and upper chest,   TLC: CDI  Neuro: nonfocal   Pain: Denies    Labs:   CBC Full  -  ( 01 Mar 2019 07:02 )  WBC Count : 39.3 K/uL  Hemoglobin : 8.0 g/dL  Hematocrit : 24.9 %  Platelet Count - Automated : 372 K/uL  Mean Cell Volume : 94.6 fl  Mean Cell Hemoglobin : 30.5 pg  Mean Cell Hemoglobin Concentration : 32.3 gm/dL  Auto Neutrophil # : 38.9 K/uL  Auto Lymphocyte # : 0.2 K/uL  Auto Monocyte # : 0.0 K/uL  Auto Eosinophil # : 0.2 K/uL  Auto Basophil # : 0.0 K/uL  Auto Neutrophil % : 99.0 %  Auto Lymphocyte % : 0.5 %  Auto Monocyte % : 0.1 %  Auto Eosinophil % : 0.4 %  Auto Basophil % : 0.0 %                          8.0    39.3  )-----------( 372      ( 01 Mar 2019 07:02 )             24.9     03-02    145  |  106  |  6<L>  ----------------------------<  95  3.4<L>   |  28  |  0.53    Ca    7.7<L>      02 Mar 2019 07:02  Phos  3.1     03-  Mg     2.3         TPro  4.6<L>  /  Alb  3.1<L>  /  TBili  0.3  /  DBili  x   /  AST  53<H>  /  ALT  73<H>  /  AlkPhos  82  03-02      LIVER FUNCTIONS - ( 02 Mar 2019 07:02 )  Alb: 3.1 g/dL / Pro: 4.6 g/dL / ALK PHOS: 82 U/L / ALT: 73 U/L / AST: 53 U/L / GGT: x           Lactate Dehydrogenase, Serum: 275 U/L ( @ 07:02)          Karnofsky / Lansky Scale:   GVHD:   Skin:   Liver:   Gut:   Overall Grade:       Cultures:         Radiology:       Meds:   Antimicrobials:   acyclovir   Oral Tab/Cap 400 milliGRAM(s) Oral every 8 hours  clotrimazole Lozenge 1 Lozenge Oral five times a day  fluconAZOLE   Tablet 400 milliGRAM(s) Oral daily      Heme / Onc:   heparin  Infusion 330 Unit(s)/Hr IV Continuous <Continuous>      GI:  docusate sodium 100 milliGRAM(s) Oral three times a day PRN  pantoprazole    Tablet 40 milliGRAM(s) Oral before breakfast  senna 1 Tablet(s) Oral at bedtime PRN  sodium bicarbonate Mouth Rinse 10 milliLiter(s) Swish and Spit five times a day  ursodiol Capsule 300 milliGRAM(s) Oral two times a day with meals      Cardiovascular:   amLODIPine   Tablet 5 milliGRAM(s) Oral daily  furosemide   Injectable 20 milliGRAM(s) IV Push every 24 hours PRN      Immunologic:   filgrastim-sndz Injectable 480 MICROGram(s) SubCutaneous daily      Other medications:   acetaminophen   Tablet .. 650 milliGRAM(s) Oral every 6 hours  Biotene Dry Mouth Oral Rinse 5 milliLiter(s) Swish and Spit five times a day  dextrose 5% + sodium chloride 0.9%. 1000 milliLiter(s) IV Continuous <Continuous>  folic acid 1 milliGRAM(s) Oral daily  lidocaine/prilocaine Cream 1 Application(s) Topical daily  multivitamin 1 Tablet(s) Oral daily  nystatin Powder 1 Application(s) Topical three times a day  palifermin Injectable  (eMAR) 4740 MICROGram(s) IV Push every 24 hours  sodium chloride 0.45% 1000 milliLiter(s) IV Continuous <Continuous>  sodium chloride 0.9%. 1000 milliLiter(s) IV Continuous <Continuous>      PRN:   acetaminophen   Tablet .. 650 milliGRAM(s) Oral every 6 hours PRN  diphenhydrAMINE   Injectable 25 milliGRAM(s) IV Push every 4 hours PRN  docusate sodium 100 milliGRAM(s) Oral three times a day PRN  furosemide   Injectable 20 milliGRAM(s) IV Push every 24 hours PRN  LORazepam   Injectable 1 milliGRAM(s) IV Push every 6 hours PRN  LORazepam   Injectable 1 milliGRAM(s) IV Push every 6 hours PRN  metoclopramide Injectable 10 milliGRAM(s) IV Push every 6 hours PRN  senna 1 Tablet(s) Oral at bedtime PRN      A/P:   68 year old female with a history of IgG kappa Multiple Myeloma.  Completed conditioning regimen with Melphalan  Post Autologous PBSCT,  day +2  Continue Zarxio  Mildly hypotensive, Norvasc held  Lasix 40mg IV x 1 dose today.    1. Infectious Disease:   clotrimazole Lozenge 1 Lozenge Oral five times a day  fluconAZOLE   Tablet 400 milliGRAM(s) Oral daily  acyclovir   Oral Tab/Cap 400 milliGRAM(s) Oral every 8 hours    2. VOD Prophylaxis: Actigall, Glutamine, Heparin (dosed at 100 units / kg / day)     3. GI Prophylaxis:  Protonix    4. Mouth care - NS / NaHCO3 rinses, Mycelex, Biotene; Skin care     5. GVHD prophylaxis: n/a    6. Transfuse & replete electrolytes prn     7. IV hydration, daily weights, strict I&O, prn diuresis     8. PO intake as tolerated, nutrition follow up as needed, MVI, folic acid     9. Antiemetics, anti-diarrhea medications:  Dexamethasone  Emend  Zofran  Ativan   Reglan      10. OOB as tolerated, physical therapy consult if needed     11. Monitor coags / fibrinogen 2x week, vitamin K as needed     12. Monitor closely for clinical changes, monitor for fevers     13. Emotional support provided, plan of care discussed and questions addressed     14. Patient education done regarding chemotherapy prep, plan of care, restrictions and discharge planning     15. Continue regular social work input         I have written the above note for Dr. Rahman who performed service with me in the room.   Christiano Herrera  NP-C ([phone])    I have seen and examined patient with NP, I agree with above note as scribed. [facility] Transplant Team                                                      Critical / Counseling Time Provided: 30 minutes                                                                                                                                                        Chief Complaint:     S: Patient seen and examined with [facility] Transplant Team:   + general weakness  + Decreased appetite  + taste alteration  + intermittent nausea  + generalized swelling 2/2 Kepivance    Denies mouth, tongue, throat pain, cough, dyspnea, vomiting, diarrhea    O: Vitals:   Vital Signs Last 24 Hrs  T(C): 36.8 (02 Mar 2019 05:29), Max: 36.9 (01 Mar 2019 21:20)  T(F): 98.3 (02 Mar 2019 05:29), Max: 98.4 (01 Mar 2019 21:20)  HR: 96 (02 Mar 2019 05:29) (85 - 96)  BP: 106/68 (02 Mar 2019 05:29) (100/61 - 109/70)  BP(mean): --  RR: 18 (02 Mar 2019 05:29) (18 - 20)  SpO2: 99% (02 Mar 2019 05:29) (87% - 99%)    Admit weight:   Daily     Daily Weight in k.1 (01 Mar 2019 09:00)    Intake / Output:    @ 07:01   @ 07:00  --------------------------------------------------------  IN: 2564 mL / OUT: 5200 mL / NET: -2636 mL     @ 07:01  02 @ 07:52  --------------------------------------------------------  IN: 44.6 mL / OUT: 0 mL / NET: 44.6 mL    PE:   Oropharynx: no erythema or ulcers; Kepivance coating on mucosal surfaces; facial, lip swelling 2/2 Kepivance.  Oral Mucositis: n/a                                                       Grade:   CVS: S1S2, RRR  Lungs: CTA B/L  Abdomen: soft, NT, ND, normoactive BS x 4Q  Extremities: BLE +1 edema, facial edema  Gastric Mucositis: n/a                                                Grade:   Intestinal Mucositis: n/a                                             Grade:   Skin: generalized edema, Kepivance rash back,  neck, and upper chest   TLC: CDI  Neuro: nonfocal   Pain: Denies    Labs:   CBC Full  -  ( 01 Mar 2019 07:02 )  WBC Count : 39.3 K/uL  Hemoglobin : 8.0 g/dL  Hematocrit : 24.9 %  Platelet Count - Automated : 372 K/uL  Mean Cell Volume : 94.6 fl  Mean Cell Hemoglobin : 30.5 pg  Mean Cell Hemoglobin Concentration : 32.3 gm/dL  Auto Neutrophil # : 38.9 K/uL  Auto Lymphocyte # : 0.2 K/uL  Auto Monocyte # : 0.0 K/uL  Auto Eosinophil # : 0.2 K/uL  Auto Basophil # : 0.0 K/uL  Auto Neutrophil % : 99.0 %  Auto Lymphocyte % : 0.5 %  Auto Monocyte % : 0.1 %  Auto Eosinophil % : 0.4 %  Auto Basophil % : 0.0 %                          8.0    39.3  )-----------( 372      ( 01 Mar 2019 07:02 )             24.9     03-02    145  |  106  |  6<L>  ----------------------------<  95  3.4<L>   |  28  |  0.53    Ca    7.7<L>      02 Mar 2019 07:02  Phos  3.1     03-  Mg     2.3         TPro  4.6<L>  /  Alb  3.1<L>  /  TBili  0.3  /  DBili  x   /  AST  53<H>  /  ALT  73<H>  /  AlkPhos  82  0302      LIVER FUNCTIONS - ( 02 Mar 2019 07:02 )  Alb: 3.1 g/dL / Pro: 4.6 g/dL / ALK PHOS: 82 U/L / ALT: 73 U/L / AST: 53 U/L / GGT: x           Lactate Dehydrogenase, Serum: 275 U/L ( @ 07:02)          Karnofsky / Lansky Scale:   GVHD:   Skin:   Liver:   Gut:   Overall Grade:       Cultures:         Radiology:       Meds:   Antimicrobials:   acyclovir   Oral Tab/Cap 400 milliGRAM(s) Oral every 8 hours  clotrimazole Lozenge 1 Lozenge Oral five times a day  fluconAZOLE   Tablet 400 milliGRAM(s) Oral daily      Heme / Onc:   heparin  Infusion 330 Unit(s)/Hr IV Continuous <Continuous>      GI:  docusate sodium 100 milliGRAM(s) Oral three times a day PRN  pantoprazole    Tablet 40 milliGRAM(s) Oral before breakfast  senna 1 Tablet(s) Oral at bedtime PRN  sodium bicarbonate Mouth Rinse 10 milliLiter(s) Swish and Spit five times a day  ursodiol Capsule 300 milliGRAM(s) Oral two times a day with meals      Cardiovascular:   amLODIPine   Tablet 5 milliGRAM(s) Oral daily  furosemide   Injectable 20 milliGRAM(s) IV Push every 24 hours PRN      Immunologic:   filgrastim-sndz Injectable 480 MICROGram(s) SubCutaneous daily      Other medications:   acetaminophen   Tablet .. 650 milliGRAM(s) Oral every 6 hours  Biotene Dry Mouth Oral Rinse 5 milliLiter(s) Swish and Spit five times a day  dextrose 5% + sodium chloride 0.9%. 1000 milliLiter(s) IV Continuous <Continuous>  folic acid 1 milliGRAM(s) Oral daily  lidocaine/prilocaine Cream 1 Application(s) Topical daily  multivitamin 1 Tablet(s) Oral daily  nystatin Powder 1 Application(s) Topical three times a day  palifermin Injectable  (eMAR) 4740 MICROGram(s) IV Push every 24 hours  sodium chloride 0.45% 1000 milliLiter(s) IV Continuous <Continuous>  sodium chloride 0.9%. 1000 milliLiter(s) IV Continuous <Continuous>      PRN:   acetaminophen   Tablet .. 650 milliGRAM(s) Oral every 6 hours PRN  diphenhydrAMINE   Injectable 25 milliGRAM(s) IV Push every 4 hours PRN  docusate sodium 100 milliGRAM(s) Oral three times a day PRN  furosemide   Injectable 20 milliGRAM(s) IV Push every 24 hours PRN  LORazepam   Injectable 1 milliGRAM(s) IV Push every 6 hours PRN  LORazepam   Injectable 1 milliGRAM(s) IV Push every 6 hours PRN  metoclopramide Injectable 10 milliGRAM(s) IV Push every 6 hours PRN  senna 1 Tablet(s) Oral at bedtime PRN      A/P:   68 year old female with a history of IgG kappa Multiple Myeloma.  Completed conditioning regimen with Melphalan  Post Autologous PBSCT,  day +2  Continue Zarxio  Mildly hypotensive, Norvasc held  Lasix 40mg IV x 1 dose today.    1. Infectious Disease:   clotrimazole Lozenge 1 Lozenge Oral five times a day  fluconAZOLE   Tablet 400 milliGRAM(s) Oral daily  acyclovir   Oral Tab/Cap 400 milliGRAM(s) Oral every 8 hours    2. VOD Prophylaxis: Actigall, Glutamine, Heparin (dosed at 100 units / kg / day)     3. GI Prophylaxis:  Protonix    4. Mouth care - NS / NaHCO3 rinses, Mycelex, Biotene; Skin care     5. GVHD prophylaxis: n/a    6. Transfuse & replete electrolytes prn     7. IV hydration, daily weights, strict I&O, prn diuresis     8. PO intake as tolerated, nutrition follow up as needed, MVI, folic acid     9. Antiemetics, anti-diarrhea medications:  Dexamethasone  Emend  Zofran  Ativan   Reglan      10. OOB as tolerated, physical therapy consult if needed     11. Monitor coags / fibrinogen 2x week, vitamin K as needed     12. Monitor closely for clinical changes, monitor for fevers     13. Emotional support provided, plan of care discussed and questions addressed     14. Patient education done regarding chemotherapy prep, plan of care, restrictions and discharge planning     15. Continue regular social work input         I have written the above note for Dr. Rahman who performed service with me in the room.   Christiano Herrera  NP-C ([phone])    I have seen and examined patient with NP, I agree with above note as scribed. [facility] Transplant Team                                                      Critical / Counseling Time Provided: 30 minutes                                                                                                                                                        Chief Complaint: admitted for autologous transplant for treatment of multiple myeloma     S: Patient seen and examined with [facility] Transplant Team:   + general weakness  + Decreased appetite  + taste alteration  + intermittent nausea  + generalized swelling 2/2 Kepivance    Denies mouth, tongue, throat pain, cough, dyspnea, vomiting, diarrhea    O: Vitals:   Vital Signs Last 24 Hrs  T(C): 36.8 (02 Mar 2019 05:29), Max: 36.9 (01 Mar 2019 21:20)  T(F): 98.3 (02 Mar 2019 05:29), Max: 98.4 (01 Mar 2019 21:20)  HR: 96 (02 Mar 2019 05:29) (85 - 96)  BP: 106/68 (02 Mar 2019 05:29) (100/61 - 109/70)  BP(mean): --  RR: 18 (02 Mar 2019 05:29) (18 - 20)  SpO2: 99% (02 Mar 2019 05:29) (87% - 99%)    Admit weight: 79.2kg  Daily Weight in k.6 (02 Mar 2019 09:00)    Intake / Output:    @ 07:  -   @ 07:00  --------------------------------------------------------  IN: 2564 mL / OUT: 5200 mL / NET: -2636 mL     @ 07: @ 07:52  --------------------------------------------------------  IN: 44.6 mL / OUT: 0 mL / NET: 44.6 mL    PE:   Oropharynx: no erythema or ulcers; Kepivance coating on mucosal surfaces; facial, lip swelling 2/2 Kepivance.  Oral Mucositis: n/a                                                       Grade:   CVS: S1S2, RRR  Lungs: CTA B/L  Abdomen: soft, NT, ND, normoactive BS x 4Q  Extremities: BLE +1 edema, facial edema  Gastric Mucositis: n/a                                                Grade:   Intestinal Mucositis: n/a                                             Grade:   Skin: generalized edema, Kepivance rash back,  neck, and upper chest   TLC: CDI  Neuro: nonfocal   Pain: Denies    Labs:   CBC Full  -  ( 01 Mar 2019 07:02 )  WBC Count : 39.3 K/uL  Hemoglobin : 8.0 g/dL  Hematocrit : 24.9 %  Platelet Count - Automated : 372 K/uL  Mean Cell Volume : 94.6 fl  Mean Cell Hemoglobin : 30.5 pg  Mean Cell Hemoglobin Concentration : 32.3 gm/dL  Auto Neutrophil # : 38.9 K/uL  Auto Lymphocyte # : 0.2 K/uL  Auto Monocyte # : 0.0 K/uL  Auto Eosinophil # : 0.2 K/uL  Auto Basophil # : 0.0 K/uL  Auto Neutrophil % : 99.0 %  Auto Lymphocyte % : 0.5 %  Auto Monocyte % : 0.1 %  Auto Eosinophil % : 0.4 %  Auto Basophil % : 0.0 %                          8.0    39.3  )-----------( 372      ( 01 Mar 2019 07:02 )             24.9     03-02    145  |  106  |  6<L>  ----------------------------<  95  3.4<L>   |  28  |  0.53    Ca    7.7<L>      02 Mar 2019 07:02  Phos  3.1     03-02  Mg     2.3     -02    TPro  4.6<L>  /  Alb  3.1<L>  /  TBili  0.3  /  DBili  x   /  AST  53<H>  /  ALT  73<H>  /  AlkPhos  82  03-02      LIVER FUNCTIONS - ( 02 Mar 2019 07:02 )  Alb: 3.1 g/dL / Pro: 4.6 g/dL / ALK PHOS: 82 U/L / ALT: 73 U/L / AST: 53 U/L / GGT: x           Lactate Dehydrogenase, Serum: 275 U/L ( @ 07:02)    Meds:   Antimicrobials:   acyclovir   Oral Tab/Cap 400 milliGRAM(s) Oral every 8 hours  clotrimazole Lozenge 1 Lozenge Oral five times a day  fluconAZOLE   Tablet 400 milliGRAM(s) Oral daily      Heme / Onc:   heparin  Infusion 330 Unit(s)/Hr IV Continuous <Continuous>    GI:  docusate sodium 100 milliGRAM(s) Oral three times a day PRN  pantoprazole    Tablet 40 milliGRAM(s) Oral before breakfast  senna 1 Tablet(s) Oral at bedtime PRN  sodium bicarbonate Mouth Rinse 10 milliLiter(s) Swish and Spit five times a day  ursodiol Capsule 300 milliGRAM(s) Oral two times a day with meals    Cardiovascular:   amLODIPine   Tablet 5 milliGRAM(s) Oral daily  furosemide   Injectable 20 milliGRAM(s) IV Push every 24 hours PRN    Immunologic:   [id number] Injectable 480 MICROGram(s) SubCutaneous daily    Other medications:   acetaminophen   Tablet .. 650 milliGRAM(s) Oral every 6 hours  Biotene Dry Mouth Oral Rinse 5 milliLiter(s) Swish and Spit five times a day  dextrose 5% + sodium chloride 0.9%. 1000 milliLiter(s) IV Continuous <Continuous>  folic acid 1 milliGRAM(s) Oral daily  lidocaine/prilocaine Cream 1 Application(s) Topical daily  multivitamin 1 Tablet(s) Oral daily  nystatin Powder 1 Application(s) Topical three times a day  palifermin Injectable  (eMAR) 4740 MICROGram(s) IV Push every 24 hours  sodium chloride 0.45% 1000 milliLiter(s) IV Continuous <Continuous>  sodium chloride 0.9%. 1000 milliLiter(s) IV Continuous <Continuous>    PRN:   acetaminophen   Tablet .. 650 milliGRAM(s) Oral every 6 hours PRN  diphenhydrAMINE   Injectable 25 milliGRAM(s) IV Push every 4 hours PRN  docusate sodium 100 milliGRAM(s) Oral three times a day PRN  furosemide   Injectable 20 milliGRAM(s) IV Push every 24 hours PRN  LORazepam   Injectable 1 milliGRAM(s) IV Push every 6 hours PRN  LORazepam   Injectable 1 milliGRAM(s) IV Push every 6 hours PRN  metoclopramide Injectable 10 milliGRAM(s) IV Push every 6 hours PRN  senna 1 Tablet(s) Oral at bedtime PRN      A/P:   68 year old female with a history of IgG kappa Multiple Myeloma.  Completed conditioning regimen with Melphalan  Post Autologous PBSCT,  day +2  Continue Zarxio  Mildly hypotensive, Norvasc held  Lasix 40mg IV x 1 dose today.  Generalized, facial edema , held 3rd dose of Kepivance.  Mild transaminitis, will monitor.    1. Infectious Disease:   clotrimazole Lozenge 1 Lozenge Oral five times a day  fluconAZOLE   Tablet 400 milliGRAM(s) Oral daily  acyclovir   Oral Tab/Cap 400 milliGRAM(s) Oral every 8 hours    2. VOD Prophylaxis: Actigall, Glutamine, Heparin (dosed at 100 units / kg / day)     3. GI Prophylaxis:  Protonix    4. Mouth care - NS / NaHCO3 rinses, Mycelex, Biotene; Skin care     5. GVHD prophylaxis: n/a    6. Transfuse & replete electrolytes prn     7. IV hydration, daily weights, strict I&O, prn diuresis     8. PO intake as tolerated, nutrition follow up as needed, MVI, folic acid     9. Antiemetics, anti-diarrhea medications:  Dexamethasone  Emend, Zofran, Ativan  and Reglan      10. OOB as tolerated, physical therapy consult if needed     11. Monitor coags / fibrinogen 2x week, vitamin K as needed     12. Monitor closely for clinical changes, monitor for fevers     13. Emotional support provided, plan of care discussed and questions addressed     14. Patient education done regarding chemotherapy prep, plan of care, restrictions and discharge planning     15. Continue regular social work input     I have written the above note for Dr. Rahman who performed service with me in the room.   Christiano Herrera  NP-C ([phone])    I have seen and examined patient with NP, I agree with above note as scribed.

## 2019-03-02 NOTE — PROGRESS NOTE ADULT - ATTENDING COMMENTS
70 y/o F w/ IgG kappa myeloma admitted for autologous pbsct w/ Melphalan prep (100mg/m2 x 2)   Day +1- s/p [facility] transplant; continue Zarxio   IV hydration, strict I&O, daily weights, prn diuresis   Antiemetics, mouth care skin care   Continue Acyclovir and Fluconazole prophylaxis  Supplement lytes  Transaminitis- mild, monitor LFT's  VOD prophylaxis  OOB, ambulate 68 y/o F w/ IgG kappa myeloma admitted for autologous pbsct w/ Melphalan prep (100mg/m2 x 2)   Day +2- s/p [facility] transplant; continue Zarxio   Strict I&O, daily weights, prn diuresis   Antiemetics, mouth care, skin care   Continue Acyclovir and Fluconazole prophylaxis  Supplement lytes  Transaminitis- mild, monitor LFT's  VOD prophylaxis  Will D/C last dose of Kepivance due to side effects.  OOB, ambulate

## 2019-03-03 LAB
ALBUMIN SERPL ELPH-MCNC: 3.1 G/DL — LOW (ref 3.3–5)
ALP SERPL-CCNC: 77 U/L — SIGNIFICANT CHANGE UP (ref 40–120)
ALT FLD-CCNC: 83 U/L — HIGH (ref 10–45)
ANION GAP SERPL CALC-SCNC: 12 MMOL/L — SIGNIFICANT CHANGE UP (ref 5–17)
AST SERPL-CCNC: 59 U/L — HIGH (ref 10–40)
BASOPHILS # BLD AUTO: 0 K/UL — SIGNIFICANT CHANGE UP (ref 0–0.2)
BASOPHILS NFR BLD AUTO: 0 % — SIGNIFICANT CHANGE UP (ref 0–2)
BILIRUB SERPL-MCNC: 0.4 MG/DL — SIGNIFICANT CHANGE UP (ref 0.2–1.2)
BUN SERPL-MCNC: 7 MG/DL — SIGNIFICANT CHANGE UP (ref 7–23)
CALCIUM SERPL-MCNC: 7.9 MG/DL — LOW (ref 8.4–10.5)
CHLORIDE SERPL-SCNC: 106 MMOL/L — SIGNIFICANT CHANGE UP (ref 96–108)
CO2 SERPL-SCNC: 25 MMOL/L — SIGNIFICANT CHANGE UP (ref 22–31)
CREAT SERPL-MCNC: 0.44 MG/DL — LOW (ref 0.5–1.3)
EOSINOPHIL # BLD AUTO: 0 K/UL — SIGNIFICANT CHANGE UP (ref 0–0.5)
EOSINOPHIL NFR BLD AUTO: 1 % — SIGNIFICANT CHANGE UP (ref 0–6)
GLUCOSE SERPL-MCNC: 99 MG/DL — SIGNIFICANT CHANGE UP (ref 70–99)
HCT VFR BLD CALC: 22.1 % — LOW (ref 34.5–45)
HGB BLD-MCNC: 7.4 G/DL — LOW (ref 11.5–15.5)
LDH SERPL L TO P-CCNC: 213 U/L — SIGNIFICANT CHANGE UP (ref 50–242)
LYMPHOCYTES # BLD AUTO: 0.1 K/UL — LOW (ref 1–3.3)
LYMPHOCYTES # BLD AUTO: 2.2 % — LOW (ref 13–44)
MAGNESIUM SERPL-MCNC: 2 MG/DL — SIGNIFICANT CHANGE UP (ref 1.6–2.6)
MCHC RBC-ENTMCNC: 31.3 PG — SIGNIFICANT CHANGE UP (ref 27–34)
MCHC RBC-ENTMCNC: 33.4 GM/DL — SIGNIFICANT CHANGE UP (ref 32–36)
MCV RBC AUTO: 93.7 FL — SIGNIFICANT CHANGE UP (ref 80–100)
MONOCYTES # BLD AUTO: 0 K/UL — SIGNIFICANT CHANGE UP (ref 0–0.9)
MONOCYTES NFR BLD AUTO: 0.5 % — LOW (ref 2–14)
NEUTROPHILS # BLD AUTO: 2.9 K/UL — SIGNIFICANT CHANGE UP (ref 1.8–7.4)
NEUTROPHILS NFR BLD AUTO: 96.3 % — HIGH (ref 43–77)
PHOSPHATE SERPL-MCNC: 2.8 MG/DL — SIGNIFICANT CHANGE UP (ref 2.5–4.5)
PLAT MORPH BLD: NORMAL — SIGNIFICANT CHANGE UP
PLATELET # BLD AUTO: 219 K/UL — SIGNIFICANT CHANGE UP (ref 150–400)
POTASSIUM SERPL-MCNC: 3.4 MMOL/L — LOW (ref 3.5–5.3)
POTASSIUM SERPL-SCNC: 3.4 MMOL/L — LOW (ref 3.5–5.3)
PROT SERPL-MCNC: 4.8 G/DL — LOW (ref 6–8.3)
RBC # BLD: 2.36 M/UL — LOW (ref 3.8–5.2)
RBC # FLD: 14.4 % — SIGNIFICANT CHANGE UP (ref 10.3–14.5)
RBC BLD AUTO: SIGNIFICANT CHANGE UP
SODIUM SERPL-SCNC: 143 MMOL/L — SIGNIFICANT CHANGE UP (ref 135–145)
WBC # BLD: 3 K/UL — LOW (ref 3.8–10.5)
WBC # FLD AUTO: 3 K/UL — LOW (ref 3.8–10.5)

## 2019-03-03 PROCEDURE — 99291 CRITICAL CARE FIRST HOUR: CPT

## 2019-03-03 RX ORDER — FLUCONAZOLE 150 MG/1
200 TABLET ORAL DAILY
Qty: 0 | Refills: 0 | Status: DISCONTINUED | OUTPATIENT
Start: 2019-03-03 | End: 2019-03-04

## 2019-03-03 RX ORDER — POTASSIUM CHLORIDE 20 MEQ
20 PACKET (EA) ORAL
Qty: 0 | Refills: 0 | Status: COMPLETED | OUTPATIENT
Start: 2019-03-03 | End: 2019-03-03

## 2019-03-03 RX ORDER — LORATADINE 10 MG/1
10 TABLET ORAL DAILY
Qty: 0 | Refills: 0 | Status: DISCONTINUED | OUTPATIENT
Start: 2019-03-03 | End: 2019-03-15

## 2019-03-03 RX ADMIN — Medication 5 MILLILITER(S): at 11:29

## 2019-03-03 RX ADMIN — Medication 1 LOZENGE: at 20:52

## 2019-03-03 RX ADMIN — Medication 400 MILLIGRAM(S): at 13:45

## 2019-03-03 RX ADMIN — FLUCONAZOLE 200 MILLIGRAM(S): 150 TABLET ORAL at 11:29

## 2019-03-03 RX ADMIN — Medication 1 LOZENGE: at 07:19

## 2019-03-03 RX ADMIN — Medication 10 MILLILITER(S): at 20:52

## 2019-03-03 RX ADMIN — Medication 10 MILLILITER(S): at 00:57

## 2019-03-03 RX ADMIN — NYSTATIN CREAM 1 APPLICATION(S): 100000 CREAM TOPICAL at 13:45

## 2019-03-03 RX ADMIN — Medication 400 MILLIGRAM(S): at 06:02

## 2019-03-03 RX ADMIN — Medication 400 MILLIGRAM(S): at 21:11

## 2019-03-03 RX ADMIN — Medication 50 MILLIEQUIVALENT(S): at 09:05

## 2019-03-03 RX ADMIN — Medication 5 MILLILITER(S): at 20:52

## 2019-03-03 RX ADMIN — Medication 5 MILLILITER(S): at 16:34

## 2019-03-03 RX ADMIN — Medication 10 MILLILITER(S): at 16:34

## 2019-03-03 RX ADMIN — Medication 5 MILLILITER(S): at 00:57

## 2019-03-03 RX ADMIN — NYSTATIN CREAM 1 APPLICATION(S): 100000 CREAM TOPICAL at 06:03

## 2019-03-03 RX ADMIN — Medication 1 LOZENGE: at 11:31

## 2019-03-03 RX ADMIN — Medication 10 MILLILITER(S): at 11:29

## 2019-03-03 RX ADMIN — Medication 480 MICROGRAM(S): at 13:37

## 2019-03-03 RX ADMIN — Medication 5 MILLILITER(S): at 07:19

## 2019-03-03 RX ADMIN — LORATADINE 10 MILLIGRAM(S): 10 TABLET ORAL at 16:34

## 2019-03-03 RX ADMIN — Medication 1 LOZENGE: at 00:57

## 2019-03-03 RX ADMIN — Medication 10 MILLILITER(S): at 07:19

## 2019-03-03 RX ADMIN — Medication 50 MILLIEQUIVALENT(S): at 11:32

## 2019-03-03 RX ADMIN — Medication 1 TABLET(S): at 11:29

## 2019-03-03 RX ADMIN — Medication 1 MILLIGRAM(S): at 11:29

## 2019-03-03 RX ADMIN — URSODIOL 300 MILLIGRAM(S): 250 TABLET, FILM COATED ORAL at 07:19

## 2019-03-03 RX ADMIN — URSODIOL 300 MILLIGRAM(S): 250 TABLET, FILM COATED ORAL at 16:34

## 2019-03-03 RX ADMIN — Medication 1 LOZENGE: at 16:34

## 2019-03-03 RX ADMIN — HEPARIN SODIUM 3.3 UNIT(S)/HR: 5000 INJECTION INTRAVENOUS; SUBCUTANEOUS at 13:38

## 2019-03-03 RX ADMIN — PANTOPRAZOLE SODIUM 40 MILLIGRAM(S): 20 TABLET, DELAYED RELEASE ORAL at 06:02

## 2019-03-03 NOTE — PROGRESS NOTE ADULT - SUBJECTIVE AND OBJECTIVE BOX
[facility] Transplant Team                                                      Critical / Counseling Time Provided: 30 minutes                                                                                                                                                        Chief Complaint:     S: Patient seen and examined with [facility] Transplant Team:   Denies mouth / tongue / throat pain, dyspnea, cough, nausea, vomiting, diarrhea, abdominal pain     O: Vitals:   Vital Signs Last 24 Hrs  T(C): 36.7 (03 Mar 2019 05:39), Max: 36.9 (02 Mar 2019 22:17)  T(F): 98.1 (03 Mar 2019 05:39), Max: 98.4 (02 Mar 2019 22:17)  HR: 96 (03 Mar 2019 05:39) (89 - 99)  BP: 111/71 (03 Mar 2019 05:39) (100/63 - 112/68)  BP(mean): --  RR: 16 (03 Mar 2019 05:39) (16 - 18)  SpO2: 100% (03 Mar 2019 05:39) (97% - 100%)    Admit weight:   Daily     Daily Weight in k.6 (02 Mar 2019 09:05)    Intake / Output:    @ 07: @ 07:00  --------------------------------------------------------  IN: 1530 mL / OUT: 2650 mL / NET: -1120 mL     @ 07:01   @ 07:21  --------------------------------------------------------  IN: 31.5 mL / OUT: 0 mL / NET: 31.5 mL          PE:   Oropharynx:   Oral Mucositis:                                                        Grade:   CVS:   Lungs:   Abdomen:  Extremities:   Gastric Mucositis:                                                  Grade:   Intestinal Mucositis:                                              Grade:   Skin:   TLC:   Neuro:   Pain:     Labs:   CBC Full  -  ( 02 Mar 2019 07:22 )  WBC Count : 19.9 K/uL  Hemoglobin : 7.8 g/dL  Hematocrit : 22.8 %  Platelet Count - Automated : 280 K/uL  Mean Cell Volume : 94.7 fl  Mean Cell Hemoglobin : 32.3 pg  Mean Cell Hemoglobin Concentration : 34.1 gm/dL  Auto Neutrophil # : 19.7 K/uL  Auto Lymphocyte # : 0.1 K/uL  Auto Monocyte # : 0.0 K/uL  Auto Eosinophil # : 0.0 K/uL  Auto Basophil # : 0.0 K/uL  Auto Neutrophil % : 99.0 %  Auto Lymphocyte % : 0.7 %  Auto Monocyte % : 0.1 %  Auto Eosinophil % : 0.2 %  Auto Basophil % : 0.0 %                          7.8    19.9  )-----------( 280      ( 02 Mar 2019 07:22 )             22.8     03-02    145  |  106  |  6<L>  ----------------------------<  95  3.4<L>   |  28  |  0.53    Ca    7.7<L>      02 Mar 2019 07:02  Phos  3.1     03-  Mg     2.3     -02    TPro  4.6<L>  /  Alb  3.1<L>  /  TBili  0.3  /  DBili  x   /  AST  53<H>  /  ALT  73<H>  /  AlkPhos  82  0302      LIVER FUNCTIONS - ( 02 Mar 2019 07:02 )  Alb: 3.1 g/dL / Pro: 4.6 g/dL / ALK PHOS: 82 U/L / ALT: 73 U/L / AST: 53 U/L / GGT: x                   Karnofsky / Lansky Scale:   GVHD:   Skin:   Liver:   Gut:   Overall Grade:       Cultures:         Radiology:       Meds:   Antimicrobials:   acyclovir   Oral Tab/Cap 400 milliGRAM(s) Oral every 8 hours  clotrimazole Lozenge 1 Lozenge Oral five times a day  fluconAZOLE   Tablet 400 milliGRAM(s) Oral daily      Heme / Onc:   heparin  Infusion 330 Unit(s)/Hr IV Continuous <Continuous>      GI:  docusate sodium 100 milliGRAM(s) Oral three times a day PRN  pantoprazole    Tablet 40 milliGRAM(s) Oral before breakfast  senna 1 Tablet(s) Oral at bedtime PRN  sodium bicarbonate Mouth Rinse 10 milliLiter(s) Swish and Spit five times a day  ursodiol Capsule 300 milliGRAM(s) Oral two times a day with meals      Cardiovascular:   amLODIPine   Tablet 5 milliGRAM(s) Oral daily  furosemide   Injectable 20 milliGRAM(s) IV Push every 24 hours PRN      Immunologic:   filgrastim-sndz Injectable 480 MICROGram(s) SubCutaneous daily      Other medications:   acetaminophen   Tablet .. 650 milliGRAM(s) Oral every 6 hours  Biotene Dry Mouth Oral Rinse 5 milliLiter(s) Swish and Spit five times a day  folic acid 1 milliGRAM(s) Oral daily  lidocaine/prilocaine Cream 1 Application(s) Topical daily  multivitamin 1 Tablet(s) Oral daily  nystatin Powder 1 Application(s) Topical three times a day  sodium chloride 0.9%. 1000 milliLiter(s) IV Continuous <Continuous>      PRN:   acetaminophen   Tablet .. 650 milliGRAM(s) Oral every 6 hours PRN  diphenhydrAMINE   Injectable 25 milliGRAM(s) IV Push every 4 hours PRN  docusate sodium 100 milliGRAM(s) Oral three times a day PRN  furosemide   Injectable 20 milliGRAM(s) IV Push every 24 hours PRN  LORazepam   Injectable 1 milliGRAM(s) IV Push every 6 hours PRN  LORazepam   Injectable 1 milliGRAM(s) IV Push every 6 hours PRN  metoclopramide Injectable 10 milliGRAM(s) IV Push every 6 hours PRN  senna 1 Tablet(s) Oral at bedtime PRN      A/P:   ___ year old ___  with a history of ______________________  Pre / Status Post :  Autologous / Allogeneic PBSCT / BMT day ____________    1. Infectious Disease:   Fluconazole, Acyclovir     2. VOD Prophylaxis: Actigall, Glutamine, Heparin (dosed at 100 units / kg / day)     3. GI Prophylaxis:  Protonix    4. Mouthcare - NS / NaHCO3 rinses, Mycelex, Caphosol, skin care     5. GVHD prophylaxis     6. Transfuse & replete electrolytes prn     7. IV hydration, daily weights, strict I&O, prn diuresis     8. PO intake as tolerated, nutrition follow up as needed, MVI, folic acid     9. Antiemetics, anti-diarrhea medications:   Reglan, Ativan    10. OOB as tolerated, physical therapy consult if needed     11. Monitor coags / fibrinogen 2x week, vitamin K as needed     12. Monitor closely for clinical changes, monitor for fevers     13. Emotional support provided, plan of care discussed with patient and family, questions addressed     14. Patient education done regarding chemotherapy prep, plan of care, restrictions and discharge planning     15. Continue regular social work input     I have written the above note for Dr. Rahman who performed service with me in the room.   Christiano Herrera  NP-C ([phone])    I have seen and examined patient with NP, I agree with above note as scribed. [facility] Transplant Team                                                      Critical / Counseling Time Provided: 30 minutes                                                                                                                                                        Chief Complaint:     S: Patient seen and examined with [facility] Transplant Team:     Feel better today.  appetite improved.  + Intermittent nausea  + loose BM x2 yesterday.  Less swelling in face and extremities.    Denies mouth, tongue, throat pain, dyspnea, cough, vomiting, diarrhea, abdominal pain     O: Vitals:   Vital Signs Last 24 Hrs  T(C): 36.7 (03 Mar 2019 05:39), Max: 36.9 (02 Mar 2019 22:17)  T(F): 98.1 (03 Mar 2019 05:39), Max: 98.4 (02 Mar 2019 22:17)  HR: 96 (03 Mar 2019 05:39) (89 - 99)  BP: 111/71 (03 Mar 2019 05:39) (100/63 - 112/68)  BP(mean): --  RR: 16 (03 Mar 2019 05:39) (16 - 18)  SpO2: 100% (03 Mar 2019 05:39) (97% - 100%)    Admit weight:   Daily     Daily Weight in k.6 (02 Mar 2019 09:05)    Intake / Output:    @ 07:  -   @ 07:00  --------------------------------------------------------  IN: 1530 mL / OUT: 2650 mL / NET: -1120 mL     @ 07: @ 07:21  --------------------------------------------------------  IN: 31.5 mL / OUT: 0 mL / NET: 31.5 mL    PE:   Oropharynx: no erythema or ulcers; Kepivance coating on mucosal surfaces; facial, lip swelling 2/2 Kepivance.  Oral Mucositis: n/a                                                       Grade:   CVS: S1S2, RRR  Lungs: CTA B/L  Abdomen: soft, NT, ND, normoactive BS x 4Q  Extremities: BLE trace edema, facial edema  Gastric Mucositis: n/a                                                Grade:   Intestinal Mucositis: n/a                                             Grade:   Skin: Kepivance rash back,  neck, and upper chest improving   TLC: CDI  Neuro: nonfocal   Pain: Denies         Labs:   CBC Full  -  ( 02 Mar 2019 07:22 )  WBC Count : 19.9 K/uL  Hemoglobin : 7.8 g/dL  Hematocrit : 22.8 %  Platelet Count - Automated : 280 K/uL  Mean Cell Volume : 94.7 fl  Mean Cell Hemoglobin : 32.3 pg  Mean Cell Hemoglobin Concentration : 34.1 gm/dL  Auto Neutrophil # : 19.7 K/uL  Auto Lymphocyte # : 0.1 K/uL  Auto Monocyte # : 0.0 K/uL  Auto Eosinophil # : 0.0 K/uL  Auto Basophil # : 0.0 K/uL  Auto Neutrophil % : 99.0 %  Auto Lymphocyte % : 0.7 %  Auto Monocyte % : 0.1 %  Auto Eosinophil % : 0.2 %  Auto Basophil % : 0.0 %                          7.8    19.9  )-----------( 280      ( 02 Mar 2019 07:22 )             22.8     03-02    145  |  106  |  6<L>  ----------------------------<  95  3.4<L>   |  28  |  0.53    Ca    7.7<L>      02 Mar 2019 07:02  Phos  3.1     03-02  Mg     2.3     03-02    TPro  4.6<L>  /  Alb  3.1<L>  /  TBili  0.3  /  DBili  x   /  AST  53<H>  /  ALT  73<H>  /  AlkPhos  82  03-02      LIVER FUNCTIONS - ( 02 Mar 2019 07:02 )  Alb: 3.1 g/dL / Pro: 4.6 g/dL / ALK PHOS: 82 U/L / ALT: 73 U/L / AST: 53 U/L / GGT: x                   Karnofsky / Lansky Scale:   GVHD:   Skin:   Liver:   Gut:   Overall Grade:       Cultures:         Radiology:       Meds:   Antimicrobials:   acyclovir   Oral Tab/Cap 400 milliGRAM(s) Oral every 8 hours  clotrimazole Lozenge 1 Lozenge Oral five times a day  fluconAZOLE   Tablet 400 milliGRAM(s) Oral daily      Heme / Onc:   heparin  Infusion 330 Unit(s)/Hr IV Continuous <Continuous>      GI:  docusate sodium 100 milliGRAM(s) Oral three times a day PRN  pantoprazole    Tablet 40 milliGRAM(s) Oral before breakfast  senna 1 Tablet(s) Oral at bedtime PRN  sodium bicarbonate Mouth Rinse 10 milliLiter(s) Swish and Spit five times a day  ursodiol Capsule 300 milliGRAM(s) Oral two times a day with meals      Cardiovascular:   amLODIPine   Tablet 5 milliGRAM(s) Oral daily  furosemide   Injectable 20 milliGRAM(s) IV Push every 24 hours PRN      Immunologic:   filgrastim-sndz Injectable 480 MICROGram(s) SubCutaneous daily      Other medications:   acetaminophen   Tablet .. 650 milliGRAM(s) Oral every 6 hours  Biotene Dry Mouth Oral Rinse 5 milliLiter(s) Swish and Spit five times a day  folic acid 1 milliGRAM(s) Oral daily  lidocaine/prilocaine Cream 1 Application(s) Topical daily  multivitamin 1 Tablet(s) Oral daily  nystatin Powder 1 Application(s) Topical three times a day  sodium chloride 0.9%. 1000 milliLiter(s) IV Continuous <Continuous>      PRN:   acetaminophen   Tablet .. 650 milliGRAM(s) Oral every 6 hours PRN  diphenhydrAMINE   Injectable 25 milliGRAM(s) IV Push every 4 hours PRN  docusate sodium 100 milliGRAM(s) Oral three times a day PRN  furosemide   Injectable 20 milliGRAM(s) IV Push every 24 hours PRN  LORazepam   Injectable 1 milliGRAM(s) IV Push every 6 hours PRN  LORazepam   Injectable 1 milliGRAM(s) IV Push every 6 hours PRN  metoclopramide Injectable 10 milliGRAM(s) IV Push every 6 hours PRN  senna 1 Tablet(s) Oral at bedtime PRN      A/P:   ___ year old ___  with a history of ______________________  Pre / Status Post :  Autologous / Allogeneic PBSCT / BMT day ____________    1. Infectious Disease:   Fluconazole, Acyclovir     2. VOD Prophylaxis: Actigall, Glutamine, Heparin (dosed at 100 units / kg / day)     3. GI Prophylaxis:  Protonix    4. Mouthcare - NS / NaHCO3 rinses, Mycelex, Caphosol, skin care     5. GVHD prophylaxis     6. Transfuse & replete electrolytes prn     7. IV hydration, daily weights, strict I&O, prn diuresis     8. PO intake as tolerated, nutrition follow up as needed, MVI, folic acid     9. Antiemetics, anti-diarrhea medications:   Reglan, Ativan    10. OOB as tolerated, physical therapy consult if needed     11. Monitor coags / fibrinogen 2x week, vitamin K as needed     12. Monitor closely for clinical changes, monitor for fevers     13. Emotional support provided, plan of care discussed with patient and family, questions addressed     14. Patient education done regarding chemotherapy prep, plan of care, restrictions and discharge planning     15. Continue regular social work input     I have written the above note for Dr. Rahman who performed service with me in the room.   Christiano Herrera  NP-C ([phone])    I have seen and examined patient with NP, I agree with above note as scribed. [facility] Transplant Team                                                      Critical / Counseling Time Provided: 30 minutes                                                                                                                                                        Chief Complaint: admitted for autologous transplant for treatment of multiple myeloma     S: Patient seen and examined with [facility] Transplant Team:   Feel better today.  appetite improved.  + Intermittent nausea  + loose BM x2 yesterday.  Less swelling in face and extremities.    Denies mouth, tongue, throat pain, dyspnea, cough, vomiting, diarrhea, abdominal pain     O: Vitals:   Vital Signs Last 24 Hrs  T(C): 36.7 (03 Mar 2019 05:39), Max: 36.9 (02 Mar 2019 22:17)  T(F): 98.1 (03 Mar 2019 05:39), Max: 98.4 (02 Mar 2019 22:17)  HR: 96 (03 Mar 2019 05:39) (89 - 99)  BP: 111/71 (03 Mar 2019 05:39) (100/63 - 112/68)  BP(mean): --  RR: 16 (03 Mar 2019 05:39) (16 - 18)  SpO2: 100% (03 Mar 2019 05:39) (97% - 100%)    Admit weight: 79.2kg  Daily Weight in k.9  (2019 09:05)    Intake / Output:    @ 07:01  -   @ 07:00  --------------------------------------------------------  IN: 1530 mL / OUT: 2650 mL / NET: -1120 mL     @ 07:01   @ 07:21  --------------------------------------------------------  IN: 31.5 mL / OUT: 0 mL / NET: 31.5 mL    PE:   Oropharynx: no erythema or ulcers; Kepivance coating on mucosal surfaces; facial, lip swelling 2/2 Kepivance.  Oral Mucositis: n/a                                                       Grade:   CVS: S1S2, RRR  Lungs: CTA B/L  Abdomen: soft, NT, ND, normoactive BS x 4Q  Extremities: BLE trace edema, facial edema  Gastric Mucositis: n/a                                                Grade:   Intestinal Mucositis: n/a                                             Grade:   Skin: Kepivance rash back,  neck, and upper chest improving   TLC: CDI  Neuro: nonfocal   Pain: Denies    LABS:                          7.4    3.0   )-----------( 219      ( 03 Mar 2019 07:08 )             22.1     Mean Cell Volume : 93.7 fl  Mean Cell Hemoglobin : 31.3 pg  Mean Cell Hemoglobin Concentration : 33.4 gm/dL  Auto Neutrophil # : 2.9 K/uL  Auto Lymphocyte # : 0.1 K/uL  Auto Monocyte # : 0.0 K/uL  Auto Eosinophil # : 0.0 K/uL  Auto Basophil # : 0.0 K/uL  Auto Neutrophil % : 96.3 %  Auto Lymphocyte % : 2.2 %  Auto Monocyte % : 0.5 %  Auto Eosinophil % : 1.0 %  Auto Basophil % : 0.0 %      03-03    143  |  106  |  7   ----------------------------<  99  3.4<L>   |  25  |  0.44<L>    Ca    7.9<L>      03 Mar 2019 07:04  Phos  2.8     03-03  Mg     2.0     03-03    TPro  4.8<L>  /  Alb  3.1<L>  /  TBili  0.4  /  DBili  x   /  AST  59<H>  /  ALT  83<H>  /  AlkPhos  77  03-03      Mg 2.0  Phos 2.8    Uric Acid --    Meds:   Antimicrobials:   acyclovir   Oral Tab/Cap 400 milliGRAM(s) Oral every 8 hours  clotrimazole Lozenge 1 Lozenge Oral five times a day  fluconAZOLE   Tablet 400 milliGRAM(s) Oral daily      Heme / Onc:   heparin  Infusion 330 Unit(s)/Hr IV Continuous <Continuous>      GI:  docusate sodium 100 milliGRAM(s) Oral three times a day PRN  pantoprazole    Tablet 40 milliGRAM(s) Oral before breakfast  senna 1 Tablet(s) Oral at bedtime PRN  sodium bicarbonate Mouth Rinse 10 milliLiter(s) Swish and Spit five times a day  ursodiol Capsule 300 milliGRAM(s) Oral two times a day with meals      Cardiovascular:   amLODIPine   Tablet 5 milliGRAM(s) Oral daily  furosemide   Injectable 20 milliGRAM(s) IV Push every 24 hours PRN      Immunologic:   filgrastim-sndz Injectable 480 MICROGram(s) SubCutaneous daily      Other medications:   acetaminophen   Tablet .. 650 milliGRAM(s) Oral every 6 hours  Biotene Dry Mouth Oral Rinse 5 milliLiter(s) Swish and Spit five times a day  folic acid 1 milliGRAM(s) Oral daily  lidocaine/prilocaine Cream 1 Application(s) Topical daily  multivitamin 1 Tablet(s) Oral daily  nystatin Powder 1 Application(s) Topical three times a day  sodium chloride 0.9%. 1000 milliLiter(s) IV Continuous <Continuous>      PRN:   acetaminophen   Tablet .. 650 milliGRAM(s) Oral every 6 hours PRN  diphenhydrAMINE   Injectable 25 milliGRAM(s) IV Push every 4 hours PRN  docusate sodium 100 milliGRAM(s) Oral three times a day PRN  furosemide   Injectable 20 milliGRAM(s) IV Push every 24 hours PRN  LORazepam   Injectable 1 milliGRAM(s) IV Push every 6 hours PRN  LORazepam   Injectable 1 milliGRAM(s) IV Push every 6 hours PRN  metoclopramide Injectable 10 milliGRAM(s) IV Push every 6 hours PRN  senna 1 Tablet(s) Oral at bedtime PRN      A/P:   68 year old female with a history of IgG kappa Multiple Myeloma.  Completed conditioning regimen with Melphalan  Post Autologous PBSCT,  day +3  Continue Zarxio  Mildly hypotensive, Norvasc held  held 3rd dose of Kepivance due to facial edema.  Mild transaminitis, decreased Diflucan to 200mg daily.    1. Infectious Disease:   clotrimazole Lozenge 1 Lozenge Oral five times a day  fluconAZOLE   Tablet 200milliGRAM(s) Oral daily  acyclovir   Oral Tab/Cap 400 milliGRAM(s) Oral every 8 hours    2. VOD Prophylaxis: Actigall, Glutamine, Heparin (dosed at 100 units / kg / day)     3. GI Prophylaxis:  Protonix    4. Mouth care - NS / NaHCO3 rinses, Mycelex, Biotene; Skin care     5. GVHD prophylaxis: n/a    6. Transfuse & replete electrolytes prn   Hypokalemia supplement K+.    7. IV hydration, daily weights, strict I&O, prn diuresis     8. PO intake as tolerated, nutrition follow up as needed, MVI, folic acid     9. Antiemetics, anti-diarrhea medications:  Emend, Zofran, Ativan  and Reglan      10. OOB as tolerated, physical therapy consult if needed     11. Monitor coags / fibrinogen 2x week, vitamin K as needed     12. Monitor closely for clinical changes, monitor for fevers     13. Emotional support provided, plan of care discussed and questions addressed     14. Patient education done regarding chemotherapy prep, plan of care, restrictions and discharge planning     15. Continue regular social work input     I have written the above note for Dr. Rahman who performed service with me in the room.   Christiano Herrera  NP-C ([phone])    I have seen and examined patient with NP, I agree with above note as scribed. [facility] Transplant Team                                                      Critical / Counseling Time Provided: 30 minutes                                                                                                                                                        Chief Complaint: admitted for autologous transplant for treatment of multiple myeloma     S: Patient seen and examined with [facility] Transplant Team:   Feel better today.  appetite improved.  + Intermittent nausea  + loose BM x2 yesterday.  Less swelling in face and extremities.    Denies mouth, tongue, throat pain, dyspnea, cough, vomiting, diarrhea, abdominal pain     O: Vitals:   Vital Signs Last 24 Hrs  T(C): 36.7 (03 Mar 2019 05:39), Max: 36.9 (02 Mar 2019 22:17)  T(F): 98.1 (03 Mar 2019 05:39), Max: 98.4 (02 Mar 2019 22:17)  HR: 96 (03 Mar 2019 05:39) (89 - 99)  BP: 111/71 (03 Mar 2019 05:39) (100/63 - 112/68)  BP(mean): --  RR: 16 (03 Mar 2019 05:39) (16 - 18)  SpO2: 100% (03 Mar 2019 05:39) (97% - 100%)    Admit weight: 79.2kg  Daily Weight in k.9  (2019 09:05)    Intake / Output:    @ 07:01  -   @ 07:00  --------------------------------------------------------  IN: 1530 mL / OUT: 2650 mL / NET: -1120 mL     @ 07:01   @ 07:21  --------------------------------------------------------  IN: 31.5 mL / OUT: 0 mL / NET: 31.5 mL    PE:   Oropharynx: no erythema or ulcers; Kepivance coating on mucosal surfaces; facial, lip swelling 2/2 Kepivance.  Oral Mucositis: n/a                                                       Grade:   CVS: S1S2, RRR  Lungs: CTA B/L  Abdomen: soft, NT, ND, normoactive BS x 4Q  Extremities: BLE trace edema, facial edema  Gastric Mucositis: n/a                                                Grade:   Intestinal Mucositis: n/a                                             Grade:   Skin: Kepivance rash back,  neck, and upper chest improving   TLC: CDI  Neuro: nonfocal   Pain: Denies    LABS:                          7.4    3.0   )-----------( 219      ( 03 Mar 2019 07:08 )             22.1     Mean Cell Volume : 93.7 fl  Mean Cell Hemoglobin : 31.3 pg  Mean Cell Hemoglobin Concentration : 33.4 gm/dL  Auto Neutrophil # : 2.9 K/uL  Auto Lymphocyte # : 0.1 K/uL  Auto Monocyte # : 0.0 K/uL  Auto Eosinophil # : 0.0 K/uL  Auto Basophil # : 0.0 K/uL  Auto Neutrophil % : 96.3 %  Auto Lymphocyte % : 2.2 %  Auto Monocyte % : 0.5 %  Auto Eosinophil % : 1.0 %  Auto Basophil % : 0.0 %      03-03    143  |  106  |  7   ----------------------------<  99  3.4<L>   |  25  |  0.44<L>    Ca    7.9<L>      03 Mar 2019 07:04  Phos  2.8     03-03  Mg     2.0     03-03    TPro  4.8<L>  /  Alb  3.1<L>  /  TBili  0.4  /  DBili  x   /  AST  59<H>  /  ALT  83<H>  /  AlkPhos  77  03-03      Mg 2.0  Phos 2.8    Uric Acid --    Meds:   Antimicrobials:   acyclovir   Oral Tab/Cap 400 milliGRAM(s) Oral every 8 hours  clotrimazole Lozenge 1 Lozenge Oral five times a day  fluconAZOLE   Tablet 400 milliGRAM(s) Oral daily      Heme / Onc:   heparin  Infusion 330 Unit(s)/Hr IV Continuous <Continuous>      GI:  docusate sodium 100 milliGRAM(s) Oral three times a day PRN  pantoprazole    Tablet 40 milliGRAM(s) Oral before breakfast  senna 1 Tablet(s) Oral at bedtime PRN  sodium bicarbonate Mouth Rinse 10 milliLiter(s) Swish and Spit five times a day  ursodiol Capsule 300 milliGRAM(s) Oral two times a day with meals      Cardiovascular:   amLODIPine   Tablet 5 milliGRAM(s) Oral daily  furosemide   Injectable 20 milliGRAM(s) IV Push every 24 hours PRN      Immunologic:   filgrastim-sndz Injectable 480 MICROGram(s) SubCutaneous daily      Other medications:   acetaminophen   Tablet .. 650 milliGRAM(s) Oral every 6 hours  Biotene Dry Mouth Oral Rinse 5 milliLiter(s) Swish and Spit five times a day  folic acid 1 milliGRAM(s) Oral daily  lidocaine/prilocaine Cream 1 Application(s) Topical daily  multivitamin 1 Tablet(s) Oral daily  nystatin Powder 1 Application(s) Topical three times a day  sodium chloride 0.9%. 1000 milliLiter(s) IV Continuous <Continuous>      PRN:   acetaminophen   Tablet .. 650 milliGRAM(s) Oral every 6 hours PRN  diphenhydrAMINE   Injectable 25 milliGRAM(s) IV Push every 4 hours PRN  docusate sodium 100 milliGRAM(s) Oral three times a day PRN  furosemide   Injectable 20 milliGRAM(s) IV Push every 24 hours PRN  LORazepam   Injectable 1 milliGRAM(s) IV Push every 6 hours PRN  LORazepam   Injectable 1 milliGRAM(s) IV Push every 6 hours PRN  metoclopramide Injectable 10 milliGRAM(s) IV Push every 6 hours PRN  senna 1 Tablet(s) Oral at bedtime PRN      A/P:   68 year old female with a history of IgG kappa Multiple Myeloma.  Completed conditioning regimen with Melphalan  Post Autologous PBSCT,  day +3  Continue Zarxio  Mildly hypotensive, Norvasc held  held 3rd dose of Kepivance due to facial edema.  Mild transaminitis, decreased Diflucan to 200mg daily.  Start Cipro when ANC is less than 1000.    1. Infectious Disease:   clotrimazole Lozenge 1 Lozenge Oral five times a day  fluconAZOLE   Tablet 200milliGRAM(s) Oral daily  acyclovir   Oral Tab/Cap 400 milliGRAM(s) Oral every 8 hours    2. VOD Prophylaxis: Actigall, Glutamine, Heparin (dosed at 100 units / kg / day)     3. GI Prophylaxis:  Protonix    4. Mouth care - NS / NaHCO3 rinses, Mycelex, Biotene; Skin care     5. GVHD prophylaxis: n/a    6. Transfuse & replete electrolytes prn   Hypokalemia supplement K+.    7. IV hydration, daily weights, strict I&O, prn diuresis     8. PO intake as tolerated, nutrition follow up as needed, MVI, folic acid     9. Antiemetics, anti-diarrhea medications:  Emend, Zofran, Ativan  and Reglan      10. OOB as tolerated, physical therapy consult if needed     11. Monitor coags / fibrinogen 2x week, vitamin K as needed     12. Monitor closely for clinical changes, monitor for fevers     13. Emotional support provided, plan of care discussed and questions addressed     14. Patient education done regarding chemotherapy prep, plan of care, restrictions and discharge planning     15. Continue regular social work input     I have written the above note for Dr. Rahman who performed service with me in the room.   Christiano Herrera  NP-C ([phone])    I have seen and examined patient with NP, I agree with above note as scribed.

## 2019-03-03 NOTE — PROGRESS NOTE ADULT - ATTENDING COMMENTS
70 y/o F w/ IgG kappa myeloma admitted for autologous pbsct w/ Melphalan prep (100mg/m2 x 2)   Day +2- s/p HPC transplant; continue Zarxio   Strict I&O, daily weights, prn diuresis   Antiemetics, mouth care, skin care   Continue Acyclovir and Fluconazole prophylaxis  Supplement lytes  Transaminitis- mild, monitor LFT's  VOD prophylaxis  Will D/C last dose of Kepivance due to side effects.  OOB, ambulate 68 y/o F w/ IgG kappa myeloma admitted for autologous pbsct w/ Melphalan prep (100mg/m2 x 2)   Day +3- s/p HPC transplant; continue Zarxio   Strict I&O, daily weights, prn diuresis   Antiemetics, mouth care, skin care   Continue Acyclovir and Fluconazole(adjust dose to 200 mg) prophylaxis  Supplement lytes  Transaminitis- mild, monitor LFT's- adjust Diflucan dose  VOD prophylaxis  Held last dose of Kepivance due to side effects.  OOB, ambulate

## 2019-03-04 LAB
ALBUMIN SERPL ELPH-MCNC: 3.3 G/DL — SIGNIFICANT CHANGE UP (ref 3.3–5)
ALP SERPL-CCNC: 81 U/L — SIGNIFICANT CHANGE UP (ref 40–120)
ALT FLD-CCNC: 162 U/L — HIGH (ref 10–45)
ANION GAP SERPL CALC-SCNC: 10 MMOL/L — SIGNIFICANT CHANGE UP (ref 5–17)
APTT BLD: 31.7 SEC — SIGNIFICANT CHANGE UP (ref 27.5–36.3)
AST SERPL-CCNC: 142 U/L — HIGH (ref 10–40)
BILIRUB DIRECT SERPL-MCNC: 0.2 MG/DL — SIGNIFICANT CHANGE UP (ref 0–0.2)
BILIRUB INDIRECT FLD-MCNC: 0.2 MG/DL — SIGNIFICANT CHANGE UP (ref 0.2–1)
BILIRUB SERPL-MCNC: 0.4 MG/DL — SIGNIFICANT CHANGE UP (ref 0.2–1.2)
BLD GP AB SCN SERPL QL: NEGATIVE — SIGNIFICANT CHANGE UP
BUN SERPL-MCNC: 7 MG/DL — SIGNIFICANT CHANGE UP (ref 7–23)
C DIFF GDH STL QL: NEGATIVE — SIGNIFICANT CHANGE UP
C DIFF GDH STL QL: SIGNIFICANT CHANGE UP
CALCIUM SERPL-MCNC: 8.5 MG/DL — SIGNIFICANT CHANGE UP (ref 8.4–10.5)
CHLORIDE SERPL-SCNC: 109 MMOL/L — HIGH (ref 96–108)
CO2 SERPL-SCNC: 24 MMOL/L — SIGNIFICANT CHANGE UP (ref 22–31)
CREAT SERPL-MCNC: 0.43 MG/DL — LOW (ref 0.5–1.3)
FIBRINOGEN PPP-MCNC: 466 MG/DL — SIGNIFICANT CHANGE UP (ref 350–510)
GLUCOSE SERPL-MCNC: 98 MG/DL — SIGNIFICANT CHANGE UP (ref 70–99)
HCT VFR BLD CALC: 21.3 % — LOW (ref 34.5–45)
HGB BLD-MCNC: 7.1 G/DL — LOW (ref 11.5–15.5)
INR BLD: 0.97 RATIO — SIGNIFICANT CHANGE UP (ref 0.88–1.16)
LDH SERPL L TO P-CCNC: 234 U/L — SIGNIFICANT CHANGE UP (ref 50–242)
MAGNESIUM SERPL-MCNC: 2 MG/DL — SIGNIFICANT CHANGE UP (ref 1.6–2.6)
MCHC RBC-ENTMCNC: 31.3 PG — SIGNIFICANT CHANGE UP (ref 27–34)
MCHC RBC-ENTMCNC: 33.5 GM/DL — SIGNIFICANT CHANGE UP (ref 32–36)
MCV RBC AUTO: 93.4 FL — SIGNIFICANT CHANGE UP (ref 80–100)
PHOSPHATE SERPL-MCNC: 2.7 MG/DL — SIGNIFICANT CHANGE UP (ref 2.5–4.5)
PLATELET # BLD AUTO: 149 K/UL — LOW (ref 150–400)
POTASSIUM SERPL-MCNC: 3.7 MMOL/L — SIGNIFICANT CHANGE UP (ref 3.5–5.3)
POTASSIUM SERPL-SCNC: 3.7 MMOL/L — SIGNIFICANT CHANGE UP (ref 3.5–5.3)
PROT SERPL-MCNC: 5 G/DL — LOW (ref 6–8.3)
PROTHROM AB SERPL-ACNC: 11.1 SEC — SIGNIFICANT CHANGE UP (ref 10–12.9)
RBC # BLD: 2.28 M/UL — LOW (ref 3.8–5.2)
RBC # FLD: 14.3 % — SIGNIFICANT CHANGE UP (ref 10.3–14.5)
RH IG SCN BLD-IMP: POSITIVE — SIGNIFICANT CHANGE UP
SODIUM SERPL-SCNC: 143 MMOL/L — SIGNIFICANT CHANGE UP (ref 135–145)
WBC # BLD: 0.1 K/UL — CRITICAL LOW (ref 3.8–10.5)
WBC # FLD AUTO: 0.1 K/UL — CRITICAL LOW (ref 3.8–10.5)

## 2019-03-04 PROCEDURE — 99291 CRITICAL CARE FIRST HOUR: CPT

## 2019-03-04 RX ORDER — CIPROFLOXACIN LACTATE 400MG/40ML
500 VIAL (ML) INTRAVENOUS EVERY 12 HOURS
Qty: 0 | Refills: 0 | Status: COMPLETED | OUTPATIENT
Start: 2019-03-04 | End: 2019-03-11

## 2019-03-04 RX ORDER — LOPERAMIDE HCL 2 MG
2 TABLET ORAL
Qty: 0 | Refills: 0 | Status: DISCONTINUED | OUTPATIENT
Start: 2019-03-04 | End: 2019-03-15

## 2019-03-04 RX ORDER — ONDANSETRON 8 MG/1
8 TABLET, FILM COATED ORAL EVERY 8 HOURS
Qty: 0 | Refills: 0 | Status: DISCONTINUED | OUTPATIENT
Start: 2019-03-04 | End: 2019-03-15

## 2019-03-04 RX ADMIN — Medication 1 LOZENGE: at 09:16

## 2019-03-04 RX ADMIN — SODIUM CHLORIDE 20 MILLILITER(S): 9 INJECTION INTRAMUSCULAR; INTRAVENOUS; SUBCUTANEOUS at 05:35

## 2019-03-04 RX ADMIN — HEPARIN SODIUM 3.3 UNIT(S)/HR: 5000 INJECTION INTRAVENOUS; SUBCUTANEOUS at 17:23

## 2019-03-04 RX ADMIN — Medication 1 TABLET(S): at 12:11

## 2019-03-04 RX ADMIN — Medication 5 MILLILITER(S): at 23:29

## 2019-03-04 RX ADMIN — Medication 10 MILLILITER(S): at 09:15

## 2019-03-04 RX ADMIN — Medication 5 MILLILITER(S): at 01:01

## 2019-03-04 RX ADMIN — Medication 2 MILLIGRAM(S): at 17:23

## 2019-03-04 RX ADMIN — Medication 5 MILLILITER(S): at 12:11

## 2019-03-04 RX ADMIN — Medication 10 MILLILITER(S): at 01:02

## 2019-03-04 RX ADMIN — Medication 5 MILLILITER(S): at 20:34

## 2019-03-04 RX ADMIN — Medication 480 MICROGRAM(S): at 13:15

## 2019-03-04 RX ADMIN — URSODIOL 300 MILLIGRAM(S): 250 TABLET, FILM COATED ORAL at 09:16

## 2019-03-04 RX ADMIN — NYSTATIN CREAM 1 APPLICATION(S): 100000 CREAM TOPICAL at 13:15

## 2019-03-04 RX ADMIN — Medication 1 LOZENGE: at 20:34

## 2019-03-04 RX ADMIN — Medication 10 MILLILITER(S): at 17:23

## 2019-03-04 RX ADMIN — Medication 400 MILLIGRAM(S): at 13:15

## 2019-03-04 RX ADMIN — Medication 1 LOZENGE: at 01:02

## 2019-03-04 RX ADMIN — Medication 5 MILLILITER(S): at 17:22

## 2019-03-04 RX ADMIN — Medication 1 MILLIGRAM(S): at 12:11

## 2019-03-04 RX ADMIN — Medication 1 LOZENGE: at 17:22

## 2019-03-04 RX ADMIN — LORATADINE 10 MILLIGRAM(S): 10 TABLET ORAL at 12:14

## 2019-03-04 RX ADMIN — AMLODIPINE BESYLATE 5 MILLIGRAM(S): 2.5 TABLET ORAL at 05:34

## 2019-03-04 RX ADMIN — NYSTATIN CREAM 1 APPLICATION(S): 100000 CREAM TOPICAL at 05:34

## 2019-03-04 RX ADMIN — NYSTATIN CREAM 1 APPLICATION(S): 100000 CREAM TOPICAL at 21:36

## 2019-03-04 RX ADMIN — URSODIOL 300 MILLIGRAM(S): 250 TABLET, FILM COATED ORAL at 17:24

## 2019-03-04 RX ADMIN — Medication 10 MILLILITER(S): at 23:29

## 2019-03-04 RX ADMIN — Medication 500 MILLIGRAM(S): at 17:23

## 2019-03-04 RX ADMIN — Medication 10 MILLILITER(S): at 12:11

## 2019-03-04 RX ADMIN — Medication 1 LOZENGE: at 23:29

## 2019-03-04 RX ADMIN — Medication 10 MILLILITER(S): at 20:34

## 2019-03-04 RX ADMIN — Medication 400 MILLIGRAM(S): at 21:36

## 2019-03-04 RX ADMIN — Medication 400 MILLIGRAM(S): at 05:34

## 2019-03-04 RX ADMIN — PANTOPRAZOLE SODIUM 40 MILLIGRAM(S): 20 TABLET, DELAYED RELEASE ORAL at 06:42

## 2019-03-04 RX ADMIN — Medication 5 MILLILITER(S): at 09:15

## 2019-03-04 RX ADMIN — Medication 1 LOZENGE: at 12:11

## 2019-03-04 NOTE — PROGRESS NOTE ADULT - SUBJECTIVE AND OBJECTIVE BOX
[facility]PC Transplant Team                                                      Critical / Counseling Time Provided: 30 minutes                                                                                                                                                        Chief Complaint: admitted for autologous transplant for treatment of multiple myeloma     S: Patient seen and examined with HPC Transplant Team:   Feel better today.  appetite improved.  + Intermittent nausea  + loose BM x2 yesterday.  Less swelling in face and extremities.    Denies mouth, tongue, throat pain, dyspnea, cough, vomiting, diarrhea, abdominal pain     Vital Signs Last 24 Hrs  T(C): 37.1 (04 Mar 2019 05:33), Max: 37.1 (03 Mar 2019 22:15)  T(F): 98.8 (04 Mar 2019 05:33), Max: 98.8 (04 Mar 2019 01:01)  HR: 108 (04 Mar 2019 05:33) (94 - 108)  BP: 126/75 (04 Mar 2019 05:33) (117/74 - 126/75)  BP(mean): --  RR: 16 (04 Mar 2019 05:33) (16 - 18)  SpO2: 99% (04 Mar 2019 05:33) (95% - 99%)    Admit weight: 79.2kg  Daily Weight in kG:     I&O's Summary    03 Mar 2019 07:01  -  04 Mar 2019 07:00  --------------------------------------------------------  IN: 1458.3 mL / OUT: 2000 mL / NET: -541.7 mL    PE:   Oropharynx: no erythema or ulcers; Kepivance coating on mucosal surfaces; facial, lip swelling 2/2 Kepivance.  Oral Mucositis: n/a                                                       Grade:   CVS: S1S2, RRR  Lungs: CTA B/L  Abdomen: soft, NT, ND, normoactive BS x 4Q  Extremities: BLE trace edema, facial edema  Gastric Mucositis: n/a                                                Grade:   Intestinal Mucositis: n/a                                             Grade:   Skin: Kepivance rash back,  neck, and upper chest improving   TLC: CDI  Neuro: nonfocal   Pain: Denies    LABS:                        7.1    0.1   )-----------( 149      ( 04 Mar 2019 06:46 )             21.3     03-04    143  |  109<H>  |  7   ----------------------------<  98  3.7   |  24  |  0.43<L>    Ca    8.5      04 Mar 2019 06:46  Phos  2.7     03-04  Mg     2.0     03-04    TPro  5.0<L>  /  Alb  3.3  /  TBili  0.4  /  DBili  0.2  /  AST  142<H>  /  ALT  162<H>  /  AlkPhos  81  03-04    CAPILLARY BLOOD GLUCOSE    PT/INR - ( 04 Mar 2019 06:46 )   PT: 11.1 sec;   INR: 0.97 ratio      PTT - ( 04 Mar 2019 06:46 )  PTT:31.7 sec    MEDICATIONS  (STANDING):  acetaminophen   Tablet .. 650 milliGRAM(s) Oral every 6 hours  acyclovir   Oral Tab/Cap 400 milliGRAM(s) Oral every 8 hours  amLODIPine   Tablet 5 milliGRAM(s) Oral daily  Biotene Dry Mouth Oral Rinse 5 milliLiter(s) Swish and Spit five times a day  clotrimazole Lozenge 1 Lozenge Oral five times a day  filgrastim-sndz Injectable 480 MICROGram(s) SubCutaneous daily  fluconAZOLE   Tablet 200 milliGRAM(s) Oral daily  folic acid 1 milliGRAM(s) Oral daily  heparin  Infusion 330 Unit(s)/Hr (3.3 mL/Hr) IV Continuous <Continuous>  lidocaine/prilocaine Cream 1 Application(s) Topical daily  loratadine 10 milliGRAM(s) Oral daily  multivitamin 1 Tablet(s) Oral daily  nystatin Powder 1 Application(s) Topical three times a day  pantoprazole    Tablet 40 milliGRAM(s) Oral before breakfast  sodium bicarbonate Mouth Rinse 10 milliLiter(s) Swish and Spit five times a day  sodium chloride 0.9%. 1000 milliLiter(s) (20 mL/Hr) IV Continuous <Continuous>  ursodiol Capsule 300 milliGRAM(s) Oral two times a day with meals    MEDICATIONS  (PRN):  acetaminophen   Tablet .. 650 milliGRAM(s) Oral every 6 hours PRN Temp greater or equal to 38C (100.4F), Mild Pain (1 - 3)  diphenhydrAMINE   Injectable 25 milliGRAM(s) IV Push every 4 hours PRN Allergy symptoms  docusate sodium 100 milliGRAM(s) Oral three times a day PRN Constipation  furosemide   Injectable 20 milliGRAM(s) IV Push every 24 hours PRN if urine output< 100 ml/hr x 2 hours  LORazepam   Injectable 1 milliGRAM(s) IV Push every 6 hours PRN Anxiety  LORazepam   Injectable 1 milliGRAM(s) IV Push every 6 hours PRN Nausea and/or Vomiting  metoclopramide Injectable 10 milliGRAM(s) IV Push every 6 hours PRN Nausea and/or Vomiting  senna 1 Tablet(s) Oral at bedtime PRN Constipation    A/P:   68 year old female with a history of IgG kappa Multiple Myeloma.  Completed conditioning regimen with Melphalan  Post Autologous PBSCT,  day +4  Continue Zarxio  Mildly hypotensive, Norvasc held  held 3rd dose of Kepivance due to facial edema.  Mild transaminitis, decreased Diflucan to 200mg daily.  Start Cipro, neutropenic  Loose stool this AM, consider sending CDiff    1. Infectious Disease:   clotrimazole Lozenge 1 Lozenge Oral five times a day  fluconAZOLE   Tablet 200milliGRAM(s) Oral daily  acyclovir   Oral Tab/Cap 400 milliGRAM(s) Oral every 8 hours    2. VOD Prophylaxis: Actigall, Glutamine, Heparin (dosed at 100 units / kg / day)     3. GI Prophylaxis:  Protonix    4. Mouth care - NS / NaHCO3 rinses, Mycelex, Biotene; Skin care     5. GVHD prophylaxis: n/a    6. Transfuse & replete electrolytes prn     7. IV hydration, daily weights, strict I&O, prn diuresis     8. PO intake as tolerated, nutrition follow up as needed, MVI, folic acid     9. Antiemetics, anti-diarrhea medications:  Emend, Zofran, Ativan  and Reglan      10. OOB as tolerated, physical therapy consult if needed     11. Monitor coags / fibrinogen 2x week, vitamin K as needed     12. Monitor closely for clinical changes, monitor for fevers     13. Emotional support provided, plan of care discussed and questions addressed     14. Patient education done regarding chemotherapy prep, plan of care, restrictions and discharge planning     15. Continue regular social work input     I have written the above note for Dr. Rahman who performed service with me in the room.   Lisa Dukes, ANP-BC ([phone])    I have seen and examined patient with NP, I agree with above note as scribed. [facility] Transplant Team                                                      Critical / Counseling Time Provided: 30 minutes                                                                                                                                                        Chief Complaint: admitted for autologous transplant for treatment of multiple myeloma     S: Patient seen and examined with HPC Transplant Team:   Feel better today.  appetite improved.  + Intermittent nausea  + loose BM x2 yesterday.  Less swelling in face and extremities.    Denies mouth, tongue, throat pain, dyspnea, cough, vomiting, diarrhea, abdominal pain     Vital Signs Last 24 Hrs  T(C): 37.1 (04 Mar 2019 05:33), Max: 37.1 (03 Mar 2019 22:15)  T(F): 98.8 (04 Mar 2019 05:33), Max: 98.8 (04 Mar 2019 01:01)  HR: 108 (04 Mar 2019 05:33) (94 - 108)  BP: 126/75 (04 Mar 2019 05:33) (117/74 - 126/75)  BP(mean): --  RR: 16 (04 Mar 2019 05:33) (16 - 18)  SpO2: 99% (04 Mar 2019 05:33) (95% - 99%)    Admit weight: 79.2kg  Daily Weight in k.1 kg    I&O's Summary    03 Mar 2019 07:01  -  04 Mar 2019 07:00  --------------------------------------------------------  IN: 1458.3 mL / OUT: 2000 mL / NET: -541.7 mL    PE:   Oropharynx: no erythema or ulcers; Kepivance coating on mucosal surfaces; facial, lip swelling 2/2 Kepivance.  Oral Mucositis: n/a                                                       Grade:   CVS: S1S2, RRR  Lungs: CTA B/L  Abdomen: soft, NT, ND, normoactive BS x 4Q  Extremities: BLE trace edema, facial edema  Gastric Mucositis: n/a                                                Grade:   Intestinal Mucositis: n/a                                             Grade:   Skin: Kepivance rash back,  neck, and upper chest improving   TLC: CDI  Neuro: nonfocal   Pain: Denies    LABS:                        7.1    0.1   )-----------( 149      ( 04 Mar 2019 06:46 )             21.3     03-04    143  |  109<H>  |  7   ----------------------------<  98  3.7   |  24  |  0.43<L>    Ca    8.5      04 Mar 2019 06:46  Phos  2.7     03-04  Mg     2.0     03-04    TPro  5.0<L>  /  Alb  3.3  /  TBili  0.4  /  DBili  0.2  /  AST  142<H>  /  ALT  162<H>  /  AlkPhos  81  03-04    CAPILLARY BLOOD GLUCOSE    PT/INR - ( 04 Mar 2019 06:46 )   PT: 11.1 sec;   INR: 0.97 ratio      PTT - ( 04 Mar 2019 06:46 )  PTT:31.7 sec    MEDICATIONS  (STANDING):  acetaminophen   Tablet .. 650 milliGRAM(s) Oral every 6 hours  acyclovir   Oral Tab/Cap 400 milliGRAM(s) Oral every 8 hours  amLODIPine   Tablet 5 milliGRAM(s) Oral daily  Biotene Dry Mouth Oral Rinse 5 milliLiter(s) Swish and Spit five times a day  clotrimazole Lozenge 1 Lozenge Oral five times a day  filgrastim-sndz Injectable 480 MICROGram(s) SubCutaneous daily  fluconAZOLE   Tablet 200 milliGRAM(s) Oral daily  folic acid 1 milliGRAM(s) Oral daily  heparin  Infusion 330 Unit(s)/Hr (3.3 mL/Hr) IV Continuous <Continuous>  lidocaine/prilocaine Cream 1 Application(s) Topical daily  loratadine 10 milliGRAM(s) Oral daily  multivitamin 1 Tablet(s) Oral daily  nystatin Powder 1 Application(s) Topical three times a day  pantoprazole    Tablet 40 milliGRAM(s) Oral before breakfast  sodium bicarbonate Mouth Rinse 10 milliLiter(s) Swish and Spit five times a day  sodium chloride 0.9%. 1000 milliLiter(s) (20 mL/Hr) IV Continuous <Continuous>  ursodiol Capsule 300 milliGRAM(s) Oral two times a day with meals    MEDICATIONS  (PRN):  acetaminophen   Tablet .. 650 milliGRAM(s) Oral every 6 hours PRN Temp greater or equal to 38C (100.4F), Mild Pain (1 - 3)  diphenhydrAMINE   Injectable 25 milliGRAM(s) IV Push every 4 hours PRN Allergy symptoms  docusate sodium 100 milliGRAM(s) Oral three times a day PRN Constipation  furosemide   Injectable 20 milliGRAM(s) IV Push every 24 hours PRN if urine output< 100 ml/hr x 2 hours  LORazepam   Injectable 1 milliGRAM(s) IV Push every 6 hours PRN Anxiety  LORazepam   Injectable 1 milliGRAM(s) IV Push every 6 hours PRN Nausea and/or Vomiting  metoclopramide Injectable 10 milliGRAM(s) IV Push every 6 hours PRN Nausea and/or Vomiting  senna 1 Tablet(s) Oral at bedtime PRN Constipation    A/P:   68 year old female with a history of IgG kappa Multiple Myeloma.  Completed conditioning regimen with Melphalan  Post Autologous PBSCT,  day +4  Continue Zarxio  Mildly hypotensive, Norvasc held  held 3rd dose of Kepivance due to facial edema.  Mild transaminitis, decreased Diflucan to 200mg daily.  Start Cipro, neutropenic  Loose stool this AM, consider sending CDiff    1. Infectious Disease:   clotrimazole Lozenge 1 Lozenge Oral five times a day  fluconAZOLE   Tablet 200milliGRAM(s) Oral daily  acyclovir   Oral Tab/Cap 400 milliGRAM(s) Oral every 8 hours    2. VOD Prophylaxis: Actigall, Glutamine, Heparin (dosed at 100 units / kg / day)     3. GI Prophylaxis:  Protonix    4. Mouth care - NS / NaHCO3 rinses, Mycelex, Biotene; Skin care     5. GVHD prophylaxis: n/a    6. Transfuse & replete electrolytes prn     7. IV hydration, daily weights, strict I&O, prn diuresis     8. PO intake as tolerated, nutrition follow up as needed, MVI, folic acid     9. Antiemetics, anti-diarrhea medications:  Emend, Zofran, Ativan  and Reglan      10. OOB as tolerated, physical therapy consult if needed     11. Monitor coags / fibrinogen 2x week, vitamin K as needed     12. Monitor closely for clinical changes, monitor for fevers     13. Emotional support provided, plan of care discussed and questions addressed     14. Patient education done regarding chemotherapy prep, plan of care, restrictions and discharge planning     15. Continue regular social work input     I have written the above note for Dr. Rahman who performed service with me in the room.   Lisa Dukes, ANP-BC ([phone])    I have seen and examined patient with NP, I agree with above note as scribed. [facility] Transplant Team                                                      Critical / Counseling Time Provided: 30 minutes                                                                                                                                                        Chief Complaint: admitted for autologous transplant for treatment of multiple myeloma     S: Patient seen and examined with [facility]PC Transplant Team:     + Intermittent nausea  + loose BM x2 yesterday. and 1x today    Less swelling in face and extremities.    Denies mouth, tongue, throat pain, dyspnea, cough, vomiting, diarrhea, abdominal pain     Vital Signs Last 24 Hrs  T(C): 37.1 (04 Mar 2019 05:33), Max: 37.1 (03 Mar 2019 22:15)  T(F): 98.8 (04 Mar 2019 05:33), Max: 98.8 (04 Mar 2019 01:01)  HR: 108 (04 Mar 2019 05:33) (94 - 108)  BP: 126/75 (04 Mar 2019 05:33) (117/74 - 126/75)  BP(mean): --  RR: 16 (04 Mar 2019 05:33) (16 - 18)  SpO2: 99% (04 Mar 2019 05:33) (95% - 99%)    Admit weight: 79.2kg  Daily Weight in k.1 kg    I&O's Summary    03 Mar 2019 07:01  -  04 Mar 2019 07:00  --------------------------------------------------------  IN: 1458.3 mL / OUT: 2000 mL / NET: -541.7 mL    PE:   Oropharynx: no erythema or ulcers; Kepivance coating on mucosal surfaces; facial, lip swelling 2/2 Kepivance.  Oral Mucositis: n/a                                                       Grade:   CVS: S1S2, RRR  Lungs: CTA B/L  Abdomen: soft, NT, ND, hyperactive BS x 4Q  Extremities: BLE trace edema, facial edema much improved, hand swelling nearly resolved  Gastric Mucositis: n/a                                                Grade:   Intestinal Mucositis: n/a                                             Grade:   Skin: Kepivance rash back,  neck, and upper chest improving  TLC: CDI  Neuro: nonfocal   Pain: Denies    LABS:                        7.1    0.1   )-----------( 149      ( 04 Mar 2019 06:46 )             21.3     03-04    143  |  109<H>  |  7   ----------------------------<  98  3.7   |  24  |  0.43<L>    Ca    8.5      04 Mar 2019 06:46  Phos  2.7     03-04  Mg     2.0     03-04    TPro  5.0<L>  /  Alb  3.3  /  TBili  0.4  /  DBili  0.2  /  AST  142<H>  /  ALT  162<H>  /  AlkPhos  81  03-04    CAPILLARY BLOOD GLUCOSE    PT/INR - ( 04 Mar 2019 06:46 )   PT: 11.1 sec;   INR: 0.97 ratio      PTT - ( 04 Mar 2019 06:46 )  PTT:31.7 sec    MEDICATIONS  (STANDING):  acetaminophen   Tablet .. 650 milliGRAM(s) Oral every 6 hours  acyclovir   Oral Tab/Cap 400 milliGRAM(s) Oral every 8 hours  amLODIPine   Tablet 5 milliGRAM(s) Oral daily  Biotene Dry Mouth Oral Rinse 5 milliLiter(s) Swish and Spit five times a day  clotrimazole Lozenge 1 Lozenge Oral five times a day  filgrastim-sndz Injectable 480 MICROGram(s) SubCutaneous daily  fluconAZOLE   Tablet 200 milliGRAM(s) Oral daily  folic acid 1 milliGRAM(s) Oral daily  heparin  Infusion 330 Unit(s)/Hr (3.3 mL/Hr) IV Continuous <Continuous>  lidocaine/prilocaine Cream 1 Application(s) Topical daily  loratadine 10 milliGRAM(s) Oral daily  multivitamin 1 Tablet(s) Oral daily  nystatin Powder 1 Application(s) Topical three times a day  pantoprazole    Tablet 40 milliGRAM(s) Oral before breakfast  sodium bicarbonate Mouth Rinse 10 milliLiter(s) Swish and Spit five times a day  sodium chloride 0.9%. 1000 milliLiter(s) (20 mL/Hr) IV Continuous <Continuous>  ursodiol Capsule 300 milliGRAM(s) Oral two times a day with meals    MEDICATIONS  (PRN):  acetaminophen   Tablet .. 650 milliGRAM(s) Oral every 6 hours PRN Temp greater or equal to 38C (100.4F), Mild Pain (1 - 3)  diphenhydrAMINE   Injectable 25 milliGRAM(s) IV Push every 4 hours PRN Allergy symptoms  docusate sodium 100 milliGRAM(s) Oral three times a day PRN Constipation  furosemide   Injectable 20 milliGRAM(s) IV Push every 24 hours PRN if urine output< 100 ml/hr x 2 hours  LORazepam   Injectable 1 milliGRAM(s) IV Push every 6 hours PRN Anxiety  LORazepam   Injectable 1 milliGRAM(s) IV Push every 6 hours PRN Nausea and/or Vomiting  metoclopramide Injectable 10 milliGRAM(s) IV Push every 6 hours PRN Nausea and/or Vomiting  senna 1 Tablet(s) Oral at bedtime PRN Constipation    A/P:   68 year old female with a history of IgG kappa Multiple Myeloma.  Completed conditioning regimen with Melphalan  Post Autologous PBSCT,  day +4  Continue Zarxio  Mildly hypotensive, Norvasc held  Held 3rd dose of Kepivance due to facial edema.  Mild transaminitis, decreased Diflucan to 200mg daily.  Start Cipro, neutropenic  Loose stools, sent for CDiff if negative start Imodium.    1. Infectious Disease:   clotrimazole Lozenge 1 Lozenge Oral five times a day  fluconAZOLE   Tablet 200milliGRAM(s) Oral daily  acyclovir   Oral Tab/Cap 400 milliGRAM(s) Oral every 8 hours    2. VOD Prophylaxis: Actigall, Glutamine, Heparin (dosed at 100 units / kg / day)     3. GI Prophylaxis:  Protonix    4. Mouth care - NS / NaHCO3 rinses, Mycelex, Biotene; Skin care     5. GVHD prophylaxis: n/a    6. Transfuse & replete electrolytes prn     7. IV hydration, daily weights, strict I&O, prn diuresis     8. PO intake as tolerated, nutrition follow up as needed, MVI, folic acid     9. Antiemetics, anti-diarrhea medications:  Emend, Zofran, Ativan  and Reglan      10. OOB as tolerated, physical therapy consult if needed     11. Monitor coags / fibrinogen 2x week, vitamin K as needed     12. Monitor closely for clinical changes, monitor for fevers     13. Emotional support provided, plan of care discussed and questions addressed     14. Patient education done regarding chemotherapy prep, plan of care, restrictions and discharge planning     15. Continue regular social work input     I have written the above note for Dr. Rahman who performed service with me in the room.   Lisa Dukes, ANP-BC ([phone])    I have seen and examined patient with NP, I agree with above note as scribed. [facility] Transplant Team                                                      Critical / Counseling Time Provided: 30 minutes                                                                                                                                                        Chief Complaint: admitted for autologous transplant for treatment of multiple myeloma     S: Patient seen and examined with [facility]PC Transplant Team:     + Intermittent nausea  + loose BM x2 yesterday. and 1x today    Less swelling in face and extremities.    Denies mouth, tongue, throat pain, dyspnea, cough, vomiting, diarrhea, abdominal pain     Vital Signs Last 24 Hrs  T(C): 37.1 (04 Mar 2019 05:33), Max: 37.1 (03 Mar 2019 22:15)  T(F): 98.8 (04 Mar 2019 05:33), Max: 98.8 (04 Mar 2019 01:01)  HR: 108 (04 Mar 2019 05:33) (94 - 108)  BP: 126/75 (04 Mar 2019 05:33) (117/74 - 126/75)  BP(mean): --  RR: 16 (04 Mar 2019 05:33) (16 - 18)  SpO2: 99% (04 Mar 2019 05:33) (95% - 99%)    Admit weight: 79.2kg  Daily Weight in k.1 kg    I&O's Summary    03 Mar 2019 07:01  -  04 Mar 2019 07:00  --------------------------------------------------------  IN: 1458.3 mL / OUT: 2000 mL / NET: -541.7 mL    PE:   Oropharynx: no erythema or ulcers; Kepivance coating on mucosal surfaces; facial, lip swelling 2/2 Kepivance.  Oral Mucositis: n/a                                                       Grade:   CVS: S1S2, RRR  Lungs: CTA B/L  Abdomen: soft, NT, ND, hyperactive BS x 4Q  Extremities: BLE trace edema, facial edema much improved, hand swelling nearly resolved  Gastric Mucositis: n/a                                                Grade:   Intestinal Mucositis: n/a                                             Grade:   Skin: Kepivance rash back,  neck, and upper chest improving  TLC: CDI  Neuro: nonfocal   Pain: Denies    LABS:                        7.1    0.1   )-----------( 149      ( 04 Mar 2019 06:46 )             21.3     03-04    143  |  109<H>  |  7   ----------------------------<  98  3.7   |  24  |  0.43<L>    Ca    8.5      04 Mar 2019 06:46  Phos  2.7     03-04  Mg     2.0     03-04    TPro  5.0<L>  /  Alb  3.3  /  TBili  0.4  /  DBili  0.2  /  AST  142<H>  /  ALT  162<H>  /  AlkPhos  81  03-04    CAPILLARY BLOOD GLUCOSE    PT/INR - ( 04 Mar 2019 06:46 )   PT: 11.1 sec;   INR: 0.97 ratio      PTT - ( 04 Mar 2019 06:46 )  PTT:31.7 sec    MEDICATIONS  (STANDING):  acetaminophen   Tablet .. 650 milliGRAM(s) Oral every 6 hours  acyclovir   Oral Tab/Cap 400 milliGRAM(s) Oral every 8 hours  amLODIPine   Tablet 5 milliGRAM(s) Oral daily  Biotene Dry Mouth Oral Rinse 5 milliLiter(s) Swish and Spit five times a day  clotrimazole Lozenge 1 Lozenge Oral five times a day  filgrastim-sndz Injectable 480 MICROGram(s) SubCutaneous daily  fluconAZOLE   Tablet 200 milliGRAM(s) Oral daily  folic acid 1 milliGRAM(s) Oral daily  heparin  Infusion 330 Unit(s)/Hr (3.3 mL/Hr) IV Continuous <Continuous>  lidocaine/prilocaine Cream 1 Application(s) Topical daily  loratadine 10 milliGRAM(s) Oral daily  multivitamin 1 Tablet(s) Oral daily  nystatin Powder 1 Application(s) Topical three times a day  pantoprazole    Tablet 40 milliGRAM(s) Oral before breakfast  sodium bicarbonate Mouth Rinse 10 milliLiter(s) Swish and Spit five times a day  sodium chloride 0.9%. 1000 milliLiter(s) (20 mL/Hr) IV Continuous <Continuous>  ursodiol Capsule 300 milliGRAM(s) Oral two times a day with meals    MEDICATIONS  (PRN):  acetaminophen   Tablet .. 650 milliGRAM(s) Oral every 6 hours PRN Temp greater or equal to 38C (100.4F), Mild Pain (1 - 3)  diphenhydrAMINE   Injectable 25 milliGRAM(s) IV Push every 4 hours PRN Allergy symptoms  docusate sodium 100 milliGRAM(s) Oral three times a day PRN Constipation  furosemide   Injectable 20 milliGRAM(s) IV Push every 24 hours PRN if urine output< 100 ml/hr x 2 hours  LORazepam   Injectable 1 milliGRAM(s) IV Push every 6 hours PRN Anxiety  LORazepam   Injectable 1 milliGRAM(s) IV Push every 6 hours PRN Nausea and/or Vomiting  metoclopramide Injectable 10 milliGRAM(s) IV Push every 6 hours PRN Nausea and/or Vomiting  senna 1 Tablet(s) Oral at bedtime PRN Constipation    A/P:   68 year old female with a history of IgG kappa Multiple Myeloma.  Completed conditioning regimen with Melphalan  Post Autologous PBSCT,  day +4  Continue Zarxio  Mildly hypotensive, Norvasc held  Held 3rd dose of Kepivance due to facial edema.  Mild transaminitis, decreased Diflucan to 200mg daily.  Start Cipro, neutropenic  Loose stools, sent for CDiff if negative start Imodium.  Nutrition consult     1. Infectious Disease:   clotrimazole Lozenge 1 Lozenge Oral five times a day  fluconAZOLE   Tablet 200milliGRAM(s) Oral daily  acyclovir   Oral Tab/Cap 400 milliGRAM(s) Oral every 8 hours    2. VOD Prophylaxis: Actigall, Glutamine, Heparin (dosed at 100 units / kg / day)     3. GI Prophylaxis:  Protonix    4. Mouth care - NS / NaHCO3 rinses, Mycelex, Biotene; Skin care     5. GVHD prophylaxis: n/a    6. Transfuse & replete electrolytes prn     7. IV hydration, daily weights, strict I&O, prn diuresis     8. PO intake as tolerated, nutrition follow up as needed, MVI, folic acid     9. Antiemetics, anti-diarrhea medications:  Emend, Zofran, Ativan  and Reglan      10. OOB as tolerated, physical therapy consult if needed     11. Monitor coags / fibrinogen 2x week, vitamin K as needed     12. Monitor closely for clinical changes, monitor for fevers     13. Emotional support provided, plan of care discussed and questions addressed     14. Patient education done regarding chemotherapy prep, plan of care, restrictions and discharge planning     15. Continue regular social work input     I have written the above note for Dr. Rahman who performed service with me in the room.   Lisa Dukes, ANP-BC ([phone])    I have seen and examined patient with NP, I agree with above note as scribed. [facility] Transplant Team                                                      Critical / Counseling Time Provided: 30 minutes                                                                                                                                                        Chief Complaint: admitted for autologous transplant for treatment of multiple myeloma     S: Patient seen and examined with HPC Transplant Team:     + Intermittent nausea  + loose BM x2 yesterday. and diarrhea 1x today    Less swelling in face and extremities.    Denies mouth, tongue, throat pain; dyspnea, cough, vomiting, abdominal pain     Vital Signs Last 24 Hrs  T(C): 37.1 (04 Mar 2019 05:33), Max: 37.1 (03 Mar 2019 22:15)  T(F): 98.8 (04 Mar 2019 05:33), Max: 98.8 (04 Mar 2019 01:01)  HR: 108 (04 Mar 2019 05:33) (94 - 108)  BP: 126/75 (04 Mar 2019 05:33) (117/74 - 126/75)  BP(mean): --  RR: 16 (04 Mar 2019 05:33) (16 - 18)  SpO2: 99% (04 Mar 2019 05:33) (95% - 99%)    Admit weight: 79.2kg  Daily Weight in k.1 kg    I&O's Summary    03 Mar 2019 07:01  -  04 Mar 2019 07:00  --------------------------------------------------------  IN: 1458.3 mL / OUT: 2000 mL / NET: -541.7 mL    PE:   Oropharynx: no erythema or ulcers; Kepivance coating on mucosal surfaces; facial, lip swelling 2/2 Kepivance is resolving.  Oral Mucositis: n/a                                                       Grade:   CVS: S1S2, RRR  Lungs: CTA B/L  Abdomen: soft, NT, ND, hyperactive BS x 4Q  Extremities: BLE trace edema, facial edema much improved, hand swelling nearly resolved  Gastric Mucositis: n/a                                                Grade:   Intestinal Mucositis: n/a                                             Grade:   Skin: Kepivance rash back,  neck, and upper chest improving  TLC: CDI  Neuro: nonfocal   Pain: Denies    LABS:                        7.1    0.1   )-----------( 149      ( 04 Mar 2019 06:46 )             21.3     03-04    143  |  109<H>  |  7   ----------------------------<  98  3.7   |  24  |  0.43<L>    Ca    8.5      04 Mar 2019 06:46  Phos  2.7     03-04  Mg     2.0     03-04    TPro  5.0<L>  /  Alb  3.3  /  TBili  0.4  /  DBili  0.2  /  AST  142<H>  /  ALT  162<H>  /  AlkPhos  81  03-04    CAPILLARY BLOOD GLUCOSE    PT/INR - ( 04 Mar 2019 06:46 )   PT: 11.1 sec;   INR: 0.97 ratio      PTT - ( 04 Mar 2019 06:46 )  PTT:31.7 sec    MEDICATIONS  (STANDING):  acetaminophen   Tablet .. 650 milliGRAM(s) Oral every 6 hours  acyclovir   Oral Tab/Cap 400 milliGRAM(s) Oral every 8 hours  amLODIPine   Tablet 5 milliGRAM(s) Oral daily  Biotene Dry Mouth Oral Rinse 5 milliLiter(s) Swish and Spit five times a day  clotrimazole Lozenge 1 Lozenge Oral five times a day  filgrastim-sndz Injectable 480 MICROGram(s) SubCutaneous daily  fluconAZOLE   Tablet 200 milliGRAM(s) Oral daily  folic acid 1 milliGRAM(s) Oral daily  heparin  Infusion 330 Unit(s)/Hr (3.3 mL/Hr) IV Continuous <Continuous>  lidocaine/prilocaine Cream 1 Application(s) Topical daily  loratadine 10 milliGRAM(s) Oral daily  multivitamin 1 Tablet(s) Oral daily  nystatin Powder 1 Application(s) Topical three times a day  pantoprazole    Tablet 40 milliGRAM(s) Oral before breakfast  sodium bicarbonate Mouth Rinse 10 milliLiter(s) Swish and Spit five times a day  sodium chloride 0.9%. 1000 milliLiter(s) (20 mL/Hr) IV Continuous <Continuous>  ursodiol Capsule 300 milliGRAM(s) Oral two times a day with meals    MEDICATIONS  (PRN):  acetaminophen   Tablet .. 650 milliGRAM(s) Oral every 6 hours PRN Temp greater or equal to 38C (100.4F), Mild Pain (1 - 3)  diphenhydrAMINE   Injectable 25 milliGRAM(s) IV Push every 4 hours PRN Allergy symptoms  docusate sodium 100 milliGRAM(s) Oral three times a day PRN Constipation  furosemide   Injectable 20 milliGRAM(s) IV Push every 24 hours PRN if urine output< 100 ml/hr x 2 hours  LORazepam   Injectable 1 milliGRAM(s) IV Push every 6 hours PRN Anxiety  LORazepam   Injectable 1 milliGRAM(s) IV Push every 6 hours PRN Nausea and/or Vomiting  metoclopramide Injectable 10 milliGRAM(s) IV Push every 6 hours PRN Nausea and/or Vomiting  senna 1 Tablet(s) Oral at bedtime PRN Constipation    A/P:   68 year old female with a history of IgG kappa Multiple Myeloma.  Completed conditioning regimen with Melphalan  Post Autologous PBSCT,  day +4  Continue Zarxio  Mildly hypotensive, Norvasc held  Held 3rd dose of Kepivance due to facial edema.  Transaminitis- increasing; hold Diflucan  Start Cipro, neutropenic  Diarrhea- sent for CDiff if negative start Imodium.  Nutrition consult     1. Infectious Disease:   clotrimazole Lozenge 1 Lozenge Oral five times a day  fluconAZOLE   Tablet 200milliGRAM(s) Oral daily  acyclovir   Oral Tab/Cap 400 milliGRAM(s) Oral every 8 hours    2. VOD Prophylaxis: Actigall, Glutamine, Heparin (dosed at 100 units / kg / day)     3. GI Prophylaxis:  Protonix    4. Mouth care - NS / NaHCO3 rinses, Mycelex, Biotene; Skin care     5. GVHD prophylaxis: n/a    6. Transfuse & replete electrolytes prn     7. IV hydration, daily weights, strict I&O, prn diuresis     8. PO intake as tolerated, nutrition follow up as needed, MVI, folic acid     9. Antiemetics, anti-diarrhea medications:  Emend, Zofran, Ativan  and Reglan      10. OOB as tolerated, physical therapy consult if needed     11. Monitor coags / fibrinogen 2x week, vitamin K as needed     12. Monitor closely for clinical changes, monitor for fevers     13. Emotional support provided, plan of care discussed and questions addressed     14. Patient education done regarding chemotherapy prep, plan of care, restrictions and discharge planning     15. Continue regular social work input     I have written the above note for Dr. Rahman who performed service with me in the room.   Lisa Dukes, ANP-BC ([phone])    I have seen and examined patient with NP, I agree with above note as scribed.

## 2019-03-04 NOTE — PROGRESS NOTE ADULT - ATTENDING COMMENTS
68 y/o F w/ IgG kappa myeloma admitted for autologous pbsct w/ Melphalan prep (100mg/m2 x 2)   Day +3- s/p HPC transplant; continue Zarxio   Strict I&O, daily weights, prn diuresis   Antiemetics, mouth care, skin care   Continue Acyclovir and Fluconazole(adjust dose to 200 mg) prophylaxis  Supplement lytes  Transaminitis- mild, monitor LFT's- adjust Diflucan dose  VOD prophylaxis  Held last dose of Kepivance due to side effects.  OOB, ambulate 70 y/o F w/ IgG kappa myeloma admitted for autologous pbsct w/ Melphalan prep (100mg/m2 x 2)   Day +4- s/p HPC transplant; continue Zarxio   Strict I&O, daily weights, prn diuresis   Antiemetics, mouth care, skin care   Continue Acyclovir prophylaxis, hold Diflucan; begin Cipro as neutropenic  Supplement lytes  Transaminitis- increasing LFT's- hold Diflucan   VOD prophylaxis  Held last dose of Kepivance due to side effects.  OOB, ambulate

## 2019-03-04 NOTE — CHART NOTE - NSCHARTNOTEFT_GEN_A_CORE
Nutrition Follow Up Note  Patient seen for: consult for "low appetite"    Pt c multiple myeloma, S/P autologous PBSCT day +4.     Source: pt and RN     Diet :  Regular diet c Glutasolve x 1 packet daily and Ensure Clear x 2 daily     Patient reports: she has been trying to eat as much as tolerated, had some nausea this morning and received Zofran and was able to tolerate breakfast this morning- consumed all of hot cereal, juice and hot chocolate. Reports her mouth feels "like cardboard." Discussed trying citrus and chocolate flavors as she may still be able to taste these foods- pt confirms chocolate has been working well. Reports BMs are loose- as per RN, stool to be tested for C. diff. Reports she tried Ensure Clear in apple and did not like it too much, would be willing to try the berry flavor.       Daily Weight: 2/25: 174.6-> 3/4: 176.5 pounds, stable at this time- noted pt c some edema at this time     Pertinent Medications: MEDICATIONS  (STANDING):  acetaminophen   Tablet .. 650 milliGRAM(s) Oral every 6 hours  acyclovir   Oral Tab/Cap 400 milliGRAM(s) Oral every 8 hours  amLODIPine   Tablet 5 milliGRAM(s) Oral daily  Biotene Dry Mouth Oral Rinse 5 milliLiter(s) Swish and Spit five times a day  ciprofloxacin     Tablet 500 milliGRAM(s) Oral every 12 hours  clotrimazole Lozenge 1 Lozenge Oral five times a day  filgrastim-sndz Injectable 480 MICROGram(s) SubCutaneous daily  folic acid 1 milliGRAM(s) Oral daily  heparin  Infusion 330 Unit(s)/Hr (3.3 mL/Hr) IV Continuous <Continuous>  lidocaine/prilocaine Cream 1 Application(s) Topical daily  loratadine 10 milliGRAM(s) Oral daily  multivitamin 1 Tablet(s) Oral daily  nystatin Powder 1 Application(s) Topical three times a day  pantoprazole    Tablet 40 milliGRAM(s) Oral before breakfast  sodium bicarbonate Mouth Rinse 10 milliLiter(s) Swish and Spit five times a day  sodium chloride 0.9%. 1000 milliLiter(s) (20 mL/Hr) IV Continuous <Continuous>  ursodiol Capsule 300 milliGRAM(s) Oral two times a day with meals    MEDICATIONS  (PRN):  acetaminophen   Tablet .. 650 milliGRAM(s) Oral every 6 hours PRN Temp greater or equal to 38C (100.4F), Mild Pain (1 - 3)  diphenhydrAMINE   Injectable 25 milliGRAM(s) IV Push every 4 hours PRN Allergy symptoms  docusate sodium 100 milliGRAM(s) Oral three times a day PRN Constipation  furosemide   Injectable 20 milliGRAM(s) IV Push every 24 hours PRN if urine output< 100 ml/hr x 2 hours  LORazepam   Injectable 1 milliGRAM(s) IV Push every 6 hours PRN Anxiety  LORazepam   Injectable 1 milliGRAM(s) IV Push every 6 hours PRN Nausea and/or Vomiting  metoclopramide Injectable 10 milliGRAM(s) IV Push every 6 hours PRN Nausea and/or Vomiting  ondansetron Injectable 8 milliGRAM(s) IV Push every 8 hours PRN Nausea and/or Vomiting  senna 1 Tablet(s) Oral at bedtime PRN Constipation    Pertinent Labs: 03-04 @ 06:46: Na 143, BUN 7, Cr 0.43<L>, BG 98, K+ 3.7, Phos 2.7, Mg 2.0, Alk Phos 81, ALT/SGPT 162<H>, AST/SGOT 142<H>, HbA1c --    Finger Sticks: None pertinent to address at this time.       Skin per nursing documentation: intact  Edema: +1 generalized edema at this time, would monitor     Estimated Needs:   [x] no change since previous assessment      Previous Nutrition Diagnosis: Predicted suboptimal energy intake   Nutrition Diagnosis now transitioned to diagnosis below    New Nutrition Diagnosis: inadequate PO intake   Related to: suboptimal appetite, nausea   As evidenced by: reports of decreased PO intake      Interventions: supplements, discussed small, frequent meals     Recommend  1) Continue c current diet.   2) Continue c Ensure Clear-will provide berry flavor for trial.   3) Encouraged PO intake-small, frequent meals and nutrient dense snacks. In house, encouraged pt to order nutrient dense snacks c meals to consume in between meals to mimic small, frequent meals. Discussed protein rich foods available on menu. Discussed consuming protein first at meal times and then eating the other foods.     Monitoring and Evaluation:     Continue to monitor Nutritional intake, Tolerance to diet prescription, weights, labs, skin integrity    RD remains available upon request and will follow up per protocol  Jenni Cortez, MS, RD, , [facility] #[phone]

## 2019-03-05 LAB
ALBUMIN SERPL ELPH-MCNC: 3.4 G/DL — SIGNIFICANT CHANGE UP (ref 3.3–5)
ALP SERPL-CCNC: 89 U/L — SIGNIFICANT CHANGE UP (ref 40–120)
ALT FLD-CCNC: 224 U/L — HIGH (ref 10–45)
ANION GAP SERPL CALC-SCNC: 11 MMOL/L — SIGNIFICANT CHANGE UP (ref 5–17)
AST SERPL-CCNC: 157 U/L — HIGH (ref 10–40)
BILIRUB SERPL-MCNC: 0.3 MG/DL — SIGNIFICANT CHANGE UP (ref 0.2–1.2)
BUN SERPL-MCNC: 5 MG/DL — LOW (ref 7–23)
CALCIUM SERPL-MCNC: 8.8 MG/DL — SIGNIFICANT CHANGE UP (ref 8.4–10.5)
CHLORIDE SERPL-SCNC: 108 MMOL/L — SIGNIFICANT CHANGE UP (ref 96–108)
CO2 SERPL-SCNC: 25 MMOL/L — SIGNIFICANT CHANGE UP (ref 22–31)
CREAT SERPL-MCNC: 0.43 MG/DL — LOW (ref 0.5–1.3)
GLUCOSE SERPL-MCNC: 98 MG/DL — SIGNIFICANT CHANGE UP (ref 70–99)
HCT VFR BLD CALC: 20.7 % — CRITICAL LOW (ref 34.5–45)
HGB BLD-MCNC: 7.1 G/DL — LOW (ref 11.5–15.5)
LDH SERPL L TO P-CCNC: 200 U/L — SIGNIFICANT CHANGE UP (ref 50–242)
MAGNESIUM SERPL-MCNC: 2 MG/DL — SIGNIFICANT CHANGE UP (ref 1.6–2.6)
MCHC RBC-ENTMCNC: 32.1 PG — SIGNIFICANT CHANGE UP (ref 27–34)
MCHC RBC-ENTMCNC: 34.5 GM/DL — SIGNIFICANT CHANGE UP (ref 32–36)
MCV RBC AUTO: 93.2 FL — SIGNIFICANT CHANGE UP (ref 80–100)
PHOSPHATE SERPL-MCNC: 3.1 MG/DL — SIGNIFICANT CHANGE UP (ref 2.5–4.5)
PLATELET # BLD AUTO: 87 K/UL — LOW (ref 150–400)
POTASSIUM SERPL-MCNC: 3.4 MMOL/L — LOW (ref 3.5–5.3)
POTASSIUM SERPL-SCNC: 3.4 MMOL/L — LOW (ref 3.5–5.3)
PROT SERPL-MCNC: 5 G/DL — LOW (ref 6–8.3)
RBC # BLD: 2.22 M/UL — LOW (ref 3.8–5.2)
RBC # FLD: 13.9 % — SIGNIFICANT CHANGE UP (ref 10.3–14.5)
SODIUM SERPL-SCNC: 144 MMOL/L — SIGNIFICANT CHANGE UP (ref 135–145)
WBC # BLD: 0 K/UL — CRITICAL LOW (ref 3.8–10.5)
WBC # FLD AUTO: 0 K/UL — CRITICAL LOW (ref 3.8–10.5)

## 2019-03-05 PROCEDURE — 99291 CRITICAL CARE FIRST HOUR: CPT

## 2019-03-05 RX ORDER — POTASSIUM CHLORIDE 20 MEQ
20 PACKET (EA) ORAL
Qty: 0 | Refills: 0 | Status: COMPLETED | OUTPATIENT
Start: 2019-03-05 | End: 2019-03-05

## 2019-03-05 RX ORDER — MICAFUNGIN SODIUM 100 MG/1
100 INJECTION, POWDER, LYOPHILIZED, FOR SOLUTION INTRAVENOUS EVERY 24 HOURS
Qty: 0 | Refills: 0 | Status: DISCONTINUED | OUTPATIENT
Start: 2019-03-06 | End: 2019-03-11

## 2019-03-05 RX ORDER — MICAFUNGIN SODIUM 100 MG/1
INJECTION, POWDER, LYOPHILIZED, FOR SOLUTION INTRAVENOUS
Qty: 0 | Refills: 0 | Status: DISCONTINUED | OUTPATIENT
Start: 2019-03-05 | End: 2019-03-11

## 2019-03-05 RX ORDER — MICAFUNGIN SODIUM 100 MG/1
100 INJECTION, POWDER, LYOPHILIZED, FOR SOLUTION INTRAVENOUS ONCE
Qty: 0 | Refills: 0 | Status: COMPLETED | OUTPATIENT
Start: 2019-03-05 | End: 2019-03-05

## 2019-03-05 RX ADMIN — Medication 5 MILLILITER(S): at 08:47

## 2019-03-05 RX ADMIN — Medication 1 LOZENGE: at 19:40

## 2019-03-05 RX ADMIN — Medication 500 MILLIGRAM(S): at 05:21

## 2019-03-05 RX ADMIN — URSODIOL 300 MILLIGRAM(S): 250 TABLET, FILM COATED ORAL at 08:48

## 2019-03-05 RX ADMIN — PANTOPRAZOLE SODIUM 40 MILLIGRAM(S): 20 TABLET, DELAYED RELEASE ORAL at 05:21

## 2019-03-05 RX ADMIN — HEPARIN SODIUM 3.3 UNIT(S)/HR: 5000 INJECTION INTRAVENOUS; SUBCUTANEOUS at 17:11

## 2019-03-05 RX ADMIN — SODIUM CHLORIDE 20 MILLILITER(S): 9 INJECTION INTRAMUSCULAR; INTRAVENOUS; SUBCUTANEOUS at 17:11

## 2019-03-05 RX ADMIN — Medication 50 MILLIEQUIVALENT(S): at 10:45

## 2019-03-05 RX ADMIN — NYSTATIN CREAM 1 APPLICATION(S): 100000 CREAM TOPICAL at 13:52

## 2019-03-05 RX ADMIN — Medication 1 TABLET(S): at 11:54

## 2019-03-05 RX ADMIN — LORATADINE 10 MILLIGRAM(S): 10 TABLET ORAL at 11:54

## 2019-03-05 RX ADMIN — Medication 10 MILLILITER(S): at 19:40

## 2019-03-05 RX ADMIN — Medication 50 MILLIEQUIVALENT(S): at 16:34

## 2019-03-05 RX ADMIN — Medication 1 LOZENGE: at 11:54

## 2019-03-05 RX ADMIN — Medication 10 MILLILITER(S): at 15:07

## 2019-03-05 RX ADMIN — Medication 1 MILLIGRAM(S): at 11:54

## 2019-03-05 RX ADMIN — NYSTATIN CREAM 1 APPLICATION(S): 100000 CREAM TOPICAL at 05:22

## 2019-03-05 RX ADMIN — SODIUM CHLORIDE 20 MILLILITER(S): 9 INJECTION INTRAMUSCULAR; INTRAVENOUS; SUBCUTANEOUS at 05:22

## 2019-03-05 RX ADMIN — Medication 5 MILLILITER(S): at 15:07

## 2019-03-05 RX ADMIN — Medication 480 MICROGRAM(S): at 15:07

## 2019-03-05 RX ADMIN — Medication 10 MILLILITER(S): at 11:54

## 2019-03-05 RX ADMIN — Medication 10 MILLILITER(S): at 08:48

## 2019-03-05 RX ADMIN — Medication 1 LOZENGE: at 08:47

## 2019-03-05 RX ADMIN — Medication 400 MILLIGRAM(S): at 05:21

## 2019-03-05 RX ADMIN — Medication 400 MILLIGRAM(S): at 13:52

## 2019-03-05 RX ADMIN — Medication 400 MILLIGRAM(S): at 21:38

## 2019-03-05 RX ADMIN — MICAFUNGIN SODIUM 105 MILLIGRAM(S): 100 INJECTION, POWDER, LYOPHILIZED, FOR SOLUTION INTRAVENOUS at 13:52

## 2019-03-05 RX ADMIN — Medication 5 MILLILITER(S): at 11:53

## 2019-03-05 RX ADMIN — Medication 50 MILLIEQUIVALENT(S): at 08:48

## 2019-03-05 RX ADMIN — Medication 1 LOZENGE: at 15:07

## 2019-03-05 RX ADMIN — NYSTATIN CREAM 1 APPLICATION(S): 100000 CREAM TOPICAL at 21:37

## 2019-03-05 RX ADMIN — Medication 5 MILLILITER(S): at 19:40

## 2019-03-05 RX ADMIN — Medication 500 MILLIGRAM(S): at 17:10

## 2019-03-05 RX ADMIN — URSODIOL 300 MILLIGRAM(S): 250 TABLET, FILM COATED ORAL at 17:10

## 2019-03-05 NOTE — PROGRESS NOTE ADULT - SUBJECTIVE AND OBJECTIVE BOX
HPC Transplant Team                                                      Critical / Counseling Time Provided: 30 minutes                                                                                                                                                        Chief Complaint: admitted for autologous transplant for treatment of multiple myeloma     S: Patient seen and examined with [facility]PC Transplant Team:     + Intermittent nausea  + loose BM x2 yesterday. and diarrhea 1x today    Less swelling in face and extremities.    Denies mouth, tongue, throat pain; dyspnea, cough, vomiting, abdominal pain     Vital Signs Last 24 Hrs  T(C): 37.2 (05 Mar 2019 04:42), Max: 37.3 (04 Mar 2019 09:47)  T(F): 99 (05 Mar 2019 04:42), Max: 99.1 (04 Mar 2019 09:47)  HR: 92 (05 Mar 2019 04:42) (92 - 110)  BP: 102/65 (05 Mar 2019 04:42) (102/65 - 132/79)  BP(mean): --  RR: 18 (05 Mar 2019 04:42) (18 - 18)  SpO2: 98% (05 Mar 2019 04:42) (97% - 98%)    Admit weight: 79.2kg  Daily Weight in kG:     I&O's Summary    04 Mar 2019 07:01  -  05 Mar 2019 07:00  --------------------------------------------------------  IN: 1643 mL / OUT: 2850 mL / NET: -1207 mL    PE:   Oropharynx: no erythema or ulcers; Kepivance coating on mucosal surfaces; facial, lip swelling 2/2 Kepivance is resolving.  Oral Mucositis: n/a                                                       Grade:   CVS: S1S2, RRR  Lungs: CTA B/L  Abdomen: soft, NT, ND, hyperactive BS x 4Q  Extremities: BLE trace edema, facial edema much improved, hand swelling nearly resolved  Gastric Mucositis: n/a                                                Grade:   Intestinal Mucositis: n/a                                             Grade:   Skin: Kepivance rash back,  neck, and upper chest improving  TLC: CDI  Neuro: nonfocal   Pain: Denies    LABS:                        7.1    0.0   )-----------( 87       ( 05 Mar 2019 07:10 )             20.7     03-05    144  |  108  |  5<L>  ----------------------------<  98  3.4<L>   |  25  |  0.43<L>    Ca    8.8      05 Mar 2019 07:10  Phos  3.1     03-05  Mg     2.0     03-05    TPro  5.0<L>  /  Alb  3.4  /  TBili  0.3  /  DBili  x   /  AST  157<H>  /  ALT  224<H>  /  AlkPhos  89  03-05    CAPILLARY BLOOD GLUCOSE    PT/INR - ( 04 Mar 2019 06:46 )   PT: 11.1 sec;   INR: 0.97 ratio      PTT - ( 04 Mar 2019 06:46 )  PTT:31.7 sec                          7.1    0.0   )-----------( 87       ( 05 Mar 2019 07:10 )             20.7     03-05    144  |  108  |  5<L>  ----------------------------<  98  3.4<L>   |  25  |  0.43<L>    Ca    8.8      05 Mar 2019 07:10  Phos  3.1     03-05  Mg     2.0     03-05    TPro  5.0<L>  /  Alb  3.4  /  TBili  0.3  /  DBili  x   /  AST  157<H>  /  ALT  224<H>  /  AlkPhos  89  03-05    CAPILLARY BLOOD GLUCOSE    PT/INR - ( 04 Mar 2019 06:46 )   PT: 11.1 sec;   INR: 0.97 ratio      PTT - ( 04 Mar 2019 06:46 )  PTT:31.7 sec    A/P:   68 year old female with a history of IgG kappa Multiple Myeloma.  Completed conditioning regimen with Melphalan  Post Autologous PBSCT,  day +5  Continue Zarxio  Held 3rd dose of Kepivance due to facial edema.  Transaminitis- increasing; hold Diflucan  Continue Cipro, neutropenic  Diarrhea- CDiff negative, continue Imodium prn  Nutrition consult     1. Infectious Disease:   clotrimazole Lozenge 1 Lozenge Oral five times a day  fluconAZOLE   Tablet 200milliGRAM(s) Oral daily  acyclovir   Oral Tab/Cap 400 milliGRAM(s) Oral every 8 hours    2. VOD Prophylaxis: Actigall, Glutamine, Heparin (dosed at 100 units / kg / day)     3. GI Prophylaxis:  Protonix    4. Mouth care - NS / NaHCO3 rinses, Mycelex, Biotene; Skin care     5. GVHD prophylaxis: n/a    6. Transfuse & replete electrolytes prn   KCl 20 meq IV x 3 doses    7. IV hydration, daily weights, strict I&O, prn diuresis     8. PO intake as tolerated, nutrition follow up as needed, MVI, folic acid     9. Antiemetics, anti-diarrhea medications:  Emend, Zofran, Ativan  and Reglan      10. OOB as tolerated, physical therapy consult if needed     11. Monitor coags / fibrinogen 2x week, vitamin K as needed     12. Monitor closely for clinical changes, monitor for fevers     13. Emotional support provided, plan of care discussed and questions addressed     14. Patient education done regarding chemotherapy prep, plan of care, restrictions and discharge planning     15. Continue regular social work input     I have written the above note for Dr. Rahman who performed service with me in the room.   Lisa Dukes, ANP-BC ([phone])    I have seen and examined patient with NP, I agree with above note as scribed. [facility] Transplant Team                                                      Critical / Counseling Time Provided: 30 minutes                                                                                                                                                        Chief Complaint: admitted for autologous transplant for treatment of multiple myeloma     S: Patient seen and examined with [facility] Transplant Team:    Feels better today  Diarrhea resolved with imodium therapy  No further nausea    Denies mouth, tongue, throat pain; dyspnea, cough, vomiting, abdominal pain     Vital Signs Last 24 Hrs  T(C): 37.2 (05 Mar 2019 04:42), Max: 37.3 (04 Mar 2019 09:47)  T(F): 99 (05 Mar 2019 04:42), Max: 99.1 (04 Mar 2019 09:47)  HR: 92 (05 Mar 2019 04:42) (92 - 110)  BP: 102/65 (05 Mar 2019 04:42) (102/65 - 132/79)  BP(mean): --  RR: 18 (05 Mar 2019 04:42) (18 - 18)  SpO2: 98% (05 Mar 2019 04:42) (97% - 98%)    Admit weight: 79.2kg  Daily Weight in k.2 kg    I&O's Summary    04 Mar 2019 07:01  -  05 Mar 2019 07:00  --------------------------------------------------------  IN: 1643 mL / OUT: 2850 mL / NET: -1207 mL    PE:   Oropharynx: no erythema or ulcers; Kepivance coating on mucosal surfaces; facial, lip swelling 2/2 Kepivance is resolved  Oral Mucositis: n/a                                                       Grade:   CVS: S1S2, RRR  Lungs: CTA B/L  Abdomen: soft, NT, ND, hyperactive BS x 4Q  Extremities: BLE trace edema, facial edema much improved, hand swelling nearly resolved  Gastric Mucositis: n/a                                                Grade:   Intestinal Mucositis: n/a                                             Grade:   Skin: Kepivance rash back,  neck, and upper chest nearly resolved  TLC: CDI  Neuro: nonfocal   Pain: Denies    LABS:                        7.1    0.0   )-----------( 87       ( 05 Mar 2019 07:10 )             20.7     03-05    144  |  108  |  5<L>  ----------------------------<  98  3.4<L>   |  25  |  0.43<L>    Ca    8.8      05 Mar 2019 07:10  Phos  3.1     03-05  Mg     2.0     03-05    TPro  5.0<L>  /  Alb  3.4  /  TBili  0.3  /  DBili  x   /  AST  157<H>  /  ALT  224<H>  /  AlkPhos  89  03-05    CAPILLARY BLOOD GLUCOSE    PT/INR - ( 04 Mar 2019 06:46 )   PT: 11.1 sec;   INR: 0.97 ratio      PTT - ( 04 Mar 2019 06:46 )  PTT:31.7 sec                          7.1    0.0   )-----------( 87       ( 05 Mar 2019 07:10 )             20.7     03-05    144  |  108  |  5<L>  ----------------------------<  98  3.4<L>   |  25  |  0.43<L>    Ca    8.8      05 Mar 2019 07:10  Phos  3.1     03-05  Mg     2.0     03-05    TPro  5.0<L>  /  Alb  3.4  /  TBili  0.3  /  DBili  x   /  AST  157<H>  /  ALT  224<H>  /  AlkPhos  89  03-05    CAPILLARY BLOOD GLUCOSE    PT/INR - ( 04 Mar 2019 06:46 )   PT: 11.1 sec;   INR: 0.97 ratio      PTT - ( 04 Mar 2019 06:46 )  PTT:31.7 sec    A/P:   68 year old female with a history of IgG kappa Multiple Myeloma.  Completed conditioning regimen with Melphalan  Post Autologous PBSCT,  day +5  Continue Zarxio  Held 3rd dose of Kepivance due to facial edema  Transaminitis- increasing; start Micafungin  Continue Cipro, neutropenic  Diarrhea- CDiff negative, continue Imodium prn  Nutrition consult     1. Infectious Disease:   clotrimazole Lozenge 1 Lozenge Oral five times a day  fluconAZOLE   Tablet 200milliGRAM(s) Oral daily  acyclovir   Oral Tab/Cap 400 milliGRAM(s) Oral every 8 hours    2. VOD Prophylaxis: Actigall, Glutamine, Heparin (dosed at 100 units / kg / day)     3. GI Prophylaxis:  Protonix    4. Mouth care - NS / NaHCO3 rinses, Mycelex, Biotene; Skin care     5. GVHD prophylaxis: n/a    6. Transfuse & replete electrolytes prn   KCl 20 meq IV x 3 doses    7. IV hydration, daily weights, strict I&O, prn diuresis     8. PO intake as tolerated, nutrition follow up as needed, MVI, folic acid     9. Antiemetics, anti-diarrhea medications:  Emend, Zofran, Ativan  and Reglan      10. OOB as tolerated, physical therapy consult if needed     11. Monitor coags / fibrinogen 2x week, vitamin K as needed     12. Monitor closely for clinical changes, monitor for fevers     13. Emotional support provided, plan of care discussed and questions addressed     14. Patient education done regarding chemotherapy prep, plan of care, restrictions and discharge planning     15. Continue regular social work input     I have written the above note for Dr. Rahman who performed service with me in the room.   Lisa Dukes, ANP-BC ([phone])    I have seen and examined patient with NP, I agree with above note as scribed. [facility] Transplant Team                                                      Critical / Counseling Time Provided: 30 minutes                                                                                                                                                        Chief Complaint: admitted for autologous transplant for treatment of multiple myeloma     S: Patient seen and examined with [facility] Transplant Team:    Feels better today  Diarrhea resolved with imodium therapy  No further nausea    Denies mouth, tongue, throat pain; dyspnea, cough, vomiting, abdominal pain     Vital Signs Last 24 Hrs  T(C): 37.2 (05 Mar 2019 04:42), Max: 37.3 (04 Mar 2019 09:47)  T(F): 99 (05 Mar 2019 04:42), Max: 99.1 (04 Mar 2019 09:47)  HR: 92 (05 Mar 2019 04:42) (92 - 110)  BP: 102/65 (05 Mar 2019 04:42) (102/65 - 132/79)  BP(mean): --  RR: 18 (05 Mar 2019 04:42) (18 - 18)  SpO2: 98% (05 Mar 2019 04:42) (97% - 98%)    Admit weight: 79.2kg  Daily Weight in k.2 kg    I&O's Summary    04 Mar 2019 07:01  -  05 Mar 2019 07:00  --------------------------------------------------------  IN: 1643 mL / OUT: 2850 mL / NET: -1207 mL    PE:   Oropharynx: no erythema or ulcers; Kepivance coating on mucosal surfaces; facial, lip swelling 2/2 Kepivance is resolved  Oral Mucositis: n/a                                                       Grade:   CVS: S1S2, RRR  Lungs: CTA B/L  Abdomen: soft, NT, ND, hyperactive BS x 4Q  Extremities: BLE trace edema, facial edema resolved, hand swelling nearly resolved  Gastric Mucositis: n/a                                                Grade:   Intestinal Mucositis: n/a                                             Grade:   Skin: Kepivance rash back,  neck, and upper chest nearly resolved  TLC: CDI  Neuro: nonfocal   Pain: Denies    LABS:                        7.1    0.0   )-----------( 87       ( 05 Mar 2019 07:10 )             20.7     03-05    144  |  108  |  5<L>  ----------------------------<  98  3.4<L>   |  25  |  0.43<L>    Ca    8.8      05 Mar 2019 07:10  Phos  3.1     03-05  Mg     2.0     03-05    TPro  5.0<L>  /  Alb  3.4  /  TBili  0.3  /  DBili  x   /  AST  157<H>  /  ALT  224<H>  /  AlkPhos  89  03-05    CAPILLARY BLOOD GLUCOSE    PT/INR - ( 04 Mar 2019 06:46 )   PT: 11.1 sec;   INR: 0.97 ratio      PTT - ( 04 Mar 2019 06:46 )  PTT:31.7 sec                          7.1    0.0   )-----------( 87       ( 05 Mar 2019 07:10 )             20.7     03-05    144  |  108  |  5<L>  ----------------------------<  98  3.4<L>   |  25  |  0.43<L>    Ca    8.8      05 Mar 2019 07:10  Phos  3.1     03-05  Mg     2.0     03-05    TPro  5.0<L>  /  Alb  3.4  /  TBili  0.3  /  DBili  x   /  AST  157<H>  /  ALT  224<H>  /  AlkPhos  89  03-05    CAPILLARY BLOOD GLUCOSE    PT/INR - ( 04 Mar 2019 06:46 )   PT: 11.1 sec;   INR: 0.97 ratio      PTT - ( 04 Mar 2019 06:46 )  PTT:31.7 sec    A/P:   68 year old female with a history of IgG kappa Multiple Myeloma.  Completed conditioning regimen with Melphalan  Post Autologous PBSCT,  day +5  Continue Zarxio  Held 3rd dose of Kepivance due to facial edema  Transaminitis- increasing; start Micafungin  Continue Cipro, neutropenic  Diarrhea- CDiff negative, continue Imodium prn  Nutrition consult     1. Infectious Disease:   clotrimazole Lozenge 1 Lozenge Oral five times a day  acyclovir   Oral Tab/Cap 400 milliGRAM(s) Oral every 8 hours  Ciprofloxacin 500 mg po q 12 hrs    2. VOD Prophylaxis: Actigall, Glutamine, Heparin (dosed at 100 units / kg / day)     3. GI Prophylaxis:  Protonix    4. Mouth care - NS / NaHCO3 rinses, Mycelex, Biotene; Skin care     5. GVHD prophylaxis: n/a    6. Transfuse & replete electrolytes prn   KCl 20 meq IV x 3 doses    7. IV hydration, daily weights, strict I&O, prn diuresis     8. PO intake as tolerated, nutrition follow up as needed, MVI, folic acid     9. Antiemetics, anti-diarrhea medications:  Emend, Zofran, Ativan  and Reglan      10. OOB as tolerated, physical therapy consult if needed     11. Monitor coags / fibrinogen 2x week, vitamin K as needed     12. Monitor closely for clinical changes, monitor for fevers     13. Emotional support provided, plan of care discussed and questions addressed     14. Patient education done regarding chemotherapy prep, plan of care, restrictions and discharge planning     15. Continue regular social work input     I have written the above note for Dr. Rahman who performed service with me in the room.   Lisa Dukes, ANP-BC ([phone])    I have seen and examined patient with NP, I agree with above note as scribed.

## 2019-03-05 NOTE — HISTORY OF PRESENT ILLNESS
[de-identified] : Ms. Calero is a 68 year old female who was initially referred for monoclonal gammopathy.\par She was noted to have proteinuria and then had a 24 hour urine protein which showed non-nephrotic range proteinuria of 510 mg/24 hours. She was referred to Dr. Lesa Rendon of nephrology. SPEP showed faint M-spike 0.2 g/dL in gammaglobulin region, immunofixation showed this to be a IgG kappa monoclonal protein. UPEP showed 2 monoclonal protein bands, 61%, and 0.7%. \par \par Subsequent work up:\par 6/19/18 M-protein 0.2 g/dL, Kappa FLC 71, lambda FLC 0.36, FLC ratio 198.61\par , Beta 2 microglobulin 1.5 mg/L\par \par She had a bone marrow biopsy on 6/22/18. Pathology: plasma cell neoplasm, plasma cells comprise 20-25% of marrow cells. Trilineage hematopoiesis present with maturation, increased iron stores. Congo red stain negative for amyloid. Karyotype 46, XX. FISH panel - positive for CCND1/IGH fusion - a/w favorable prognosis. PET CT (7/12/18) did not show any bone lesions. Began VRd on 7/19/18. \par \par On C4 of VRd. FLC ratio initially decreasing -->198 --> 126 --> 67, however has increased to 115 with urine studies positive for Bence Brooks protein. For refractory disease, she was switched to KRd for a deeper response.\par \par  [de-identified] : Since initial consult visit on 11/26/18, the patient is currently on cycle 4 KRd(started 1/2/19), with day 21 of Revlimid completed on 1/22/19. She underwent pre-transplant testing on 12/21/18. She has moderate fatigue and good appetite. She denies fever, shortness of breath, abdominal pain. Today, I again did comprehensive review of consent forms and after all questions addressed, the patient signed consent forms.\par \par On 2/11/19, patient presents for a pre stem cell collection visit. Anahi received cytoxan on 2/5/19 followed by 12 mg of neulasta on 2/6/19. Overall patient is well and offers no acute concerns today. Denies fever, chills, nausea, vomiting or diarrhea. Denies SOB, chest pain or B/L LE edema. Patient is currently on ciprofloxacin for prophylaxis. Denies any pain at this time. \par \par On 2/13/19 visit, patient presents for a pre stem cell collection visit. Patient offers no acute concerns. Currently taking ciprofloxacin 250 mg BID for 10 days as prophylaxis. Denies fever, chills, nausea, vomiting,diarrhea, rash or dysuria. Denies SOB, chest pain or B/L LE edema. Currently not on any blood thinners. \par \par On 2/21/19 visit, patient presents for a pre admission visit. Tolerated stem cell mobilization and is scheduled for kepivance today, tomorrow and on 2/23/19. Completed ciprofloxacin. Denies fever, chills, nausea, vomiting, diarrhea, rash, mouth sores or dysuria. Denies SOB, chest pain or B/L LE edema or pain.

## 2019-03-05 NOTE — CONSULT LETTER
[Dear  ___] : Dear  [unfilled], [Consult Letter:] : I had the pleasure of evaluating your patient, [unfilled]. [Please see my note below.] : Please see my note below. [Consult Closing:] : Thank you very much for allowing me to participate in the care of this patient.  If you have any questions, please do not hesitate to contact me. [Sincerely,] : Sincerely, [FreeTextEntry2] : Marshall De Oliveira MD\par Medical Oncology/Hematology\par [facility] [address]\par [facility] \par [address]\par [place], [place]   [id number]\par \par  [FreeTextEntry3] : \par Analisa Rahman M.D.\par \par  of Medicine\par [facility] [employer]\par [facility] \par [facility] [place] [facility]\par [address] [place] [address] \[place] [address] \par ph: [phone]\par fax: [phone]\par

## 2019-03-05 NOTE — PHYSICAL EXAM
[Normal] : affect appropriate [Ulcers] : no ulcers [Thrush] : no thrush [de-identified] : RRR, normal S1S2 [de-identified] : warm, no edema [de-identified] : no rash [de-identified] : A & O x 4

## 2019-03-05 NOTE — PROGRESS NOTE ADULT - ATTENDING COMMENTS
70 y/o F w/ IgG kappa myeloma admitted for autologous pbsct w/ Melphalan prep (100mg/m2 x 2)   Day +4- s/p HPC transplant; continue Zarxio   Strict I&O, daily weights, prn diuresis   Antiemetics, mouth care, skin care   Continue Acyclovir prophylaxis, hold Diflucan; begin Cipro as neutropenic  Supplement lytes  Transaminitis- increasing LFT's- hold Diflucan   VOD prophylaxis  Held last dose of Kepivance due to side effects.  OOB, ambulate 68 y/o F w/ IgG kappa myeloma admitted for autologous pbsct w/ Melphalan prep (100mg/m2 x 2)   Day +5- s/p HPC transplant; continue Zarxio   Strict I&O, daily weights, prn diuresis   Antiemetics, mouth care, skin care   Continue Acyclovir, Cipro prophylaxis; begin Mycamine as Diflucan discontinued secondary to transaminitis.  Supplement lytes  Transaminitis- increasing LFT's- off Diflucan, begin Mycamine  VOD prophylaxis  Held last dose of Kepivance due to side effects.  OOB, ambulate

## 2019-03-05 NOTE — ASSESSMENT
[FreeTextEntry1] : IgG kappa myeloma, currently on KRd to re-discuss high-dose chemotherapy followed by autologous peripheral stem cell transplant.\par Plan- \par Cytoxan 3 gm/m2 IV with IV hydration, and MESNA on 2/5/19(day 1)\par Drink plenty of water for several days post Cytoxan as directed\par Neulasta 12 mg sq on 2/6/19(day 2)\par Antiemetics\par Discontinue aspirin on day 1 with Cytoxan\par Begin Cipro 250 mg po 2X/day on day +5, until WBC recovery post iris count\par Monitor CBC with diff, CD34 count as directed\par \par 2/11/19\par IgG kappa myeloma s/p cytoxan on 2/5/19 and 12 mg of neulasta on 2/6/19. \par Patient to proceed with autologous peripheral stem cell transplant.\par \par Peripheral blood work reviewed. Type and cross sent today.\par CBC results: WBC 0.6, H/H 10.1/29.5, platelets 144. \par Patient aware it can take 7-10 days post neulasta to mobilize stem cells\par Continue ciprofloxacin 250 mg BID for 10 days\par Continue to monitor cbc with diff, cd 34\par Patient currently not on any blood thinners or NSAIDS\par Questions and concerns addressed\par Patient to follow up on Wednesday 2/13/19 with NUNU Fu. Tentative catheter appointment on 2/13/19.\par \par 2/13/19\par IgG kappa myeloma s/p cytoxan on 2/5/19 and 12 mg of neulasta on 2/6/19. \par Patient to proceed with autologous peripheral stem cell transplant.\par \par Peripheral blood work reviewed\par CBC results: WBC 0.6, H/H 9.1/27.9, platelets 120.\par Canceled 1 PRBC and platelets for today. \par Continue ciprofloxacin 250 mg BID for a total of 10 days and medications listed in medication list\par Patient is not taking any blood thinners\par Will cancel catheter appointment as patient has good peripheral venous access\par Continue to monitor cbc with diff, cd 34\par Questions and concerns addressed\par Follow up tomorrow morning for cbc with differential and CD 34. Follow up with NP Nickie \par Follow up on Friday 2/15/19 with NP Nancy\par \par 2/21/19\par IgG kappa myeloma s/p cytoxan on 2/5/19 and 12 mg of neulasta on 2/6/19. \par Stem cells collected and patient to proceed with autologous peripheral stem cell transplant.\par \par Peripheral blood work reviewed and remains stable\par Follow up on admission labs sent today\par Kepivance scheduled for today, tomorrow and 2/23/19.\par Patient will be admitted to [facility] on 2/25/19 for an autologous peripheral stem cell transplant.\par Patient currently not on any blood thinners\par Questions and concerns addressed\par Patient aware to call immediately if any acute concerns arise. \par

## 2019-03-06 LAB
ALBUMIN SERPL ELPH-MCNC: 3.3 G/DL — SIGNIFICANT CHANGE UP (ref 3.3–5)
ALP SERPL-CCNC: 90 U/L — SIGNIFICANT CHANGE UP (ref 40–120)
ALT FLD-CCNC: 211 U/L — HIGH (ref 10–45)
ANION GAP SERPL CALC-SCNC: 10 MMOL/L — SIGNIFICANT CHANGE UP (ref 5–17)
AST SERPL-CCNC: 117 U/L — HIGH (ref 10–40)
BILIRUB DIRECT SERPL-MCNC: <0.1 MG/DL — SIGNIFICANT CHANGE UP (ref 0–0.2)
BILIRUB INDIRECT FLD-MCNC: >0.2 MG/DL — SIGNIFICANT CHANGE UP (ref 0.2–1)
BILIRUB SERPL-MCNC: 0.3 MG/DL — SIGNIFICANT CHANGE UP (ref 0.2–1.2)
BLD GP AB SCN SERPL QL: NEGATIVE — SIGNIFICANT CHANGE UP
BUN SERPL-MCNC: 5 MG/DL — LOW (ref 7–23)
CALCIUM SERPL-MCNC: 9.1 MG/DL — SIGNIFICANT CHANGE UP (ref 8.4–10.5)
CHLORIDE SERPL-SCNC: 110 MMOL/L — HIGH (ref 96–108)
CO2 SERPL-SCNC: 24 MMOL/L — SIGNIFICANT CHANGE UP (ref 22–31)
CREAT SERPL-MCNC: 0.41 MG/DL — LOW (ref 0.5–1.3)
GLUCOSE SERPL-MCNC: 91 MG/DL — SIGNIFICANT CHANGE UP (ref 70–99)
HCT VFR BLD CALC: 21.1 % — LOW (ref 34.5–45)
HGB BLD-MCNC: 7 G/DL — CRITICAL LOW (ref 11.5–15.5)
LDH SERPL L TO P-CCNC: 168 U/L — SIGNIFICANT CHANGE UP (ref 50–242)
MAGNESIUM SERPL-MCNC: 2 MG/DL — SIGNIFICANT CHANGE UP (ref 1.6–2.6)
MCHC RBC-ENTMCNC: 31 PG — SIGNIFICANT CHANGE UP (ref 27–34)
MCHC RBC-ENTMCNC: 33.5 GM/DL — SIGNIFICANT CHANGE UP (ref 32–36)
MCV RBC AUTO: 92.6 FL — SIGNIFICANT CHANGE UP (ref 80–100)
PHOSPHATE SERPL-MCNC: 3.1 MG/DL — SIGNIFICANT CHANGE UP (ref 2.5–4.5)
PLATELET # BLD AUTO: 44 K/UL — LOW (ref 150–400)
POTASSIUM SERPL-MCNC: 3.7 MMOL/L — SIGNIFICANT CHANGE UP (ref 3.5–5.3)
POTASSIUM SERPL-SCNC: 3.7 MMOL/L — SIGNIFICANT CHANGE UP (ref 3.5–5.3)
PROT SERPL-MCNC: 4.9 G/DL — LOW (ref 6–8.3)
RBC # BLD: 2.27 M/UL — LOW (ref 3.8–5.2)
RBC # FLD: 13.8 % — SIGNIFICANT CHANGE UP (ref 10.3–14.5)
RH IG SCN BLD-IMP: POSITIVE — SIGNIFICANT CHANGE UP
SODIUM SERPL-SCNC: 144 MMOL/L — SIGNIFICANT CHANGE UP (ref 135–145)
WBC # BLD: 0.1 K/UL — CRITICAL LOW (ref 3.8–10.5)
WBC # FLD AUTO: 0.1 K/UL — CRITICAL LOW (ref 3.8–10.5)

## 2019-03-06 PROCEDURE — 99291 CRITICAL CARE FIRST HOUR: CPT

## 2019-03-06 RX ADMIN — Medication 1 MILLIGRAM(S): at 12:41

## 2019-03-06 RX ADMIN — SODIUM CHLORIDE 20 MILLILITER(S): 9 INJECTION INTRAMUSCULAR; INTRAVENOUS; SUBCUTANEOUS at 23:28

## 2019-03-06 RX ADMIN — Medication 400 MILLIGRAM(S): at 21:17

## 2019-03-06 RX ADMIN — HEPARIN SODIUM 3.3 UNIT(S)/HR: 5000 INJECTION INTRAVENOUS; SUBCUTANEOUS at 23:27

## 2019-03-06 RX ADMIN — Medication 5 MILLILITER(S): at 23:27

## 2019-03-06 RX ADMIN — URSODIOL 300 MILLIGRAM(S): 250 TABLET, FILM COATED ORAL at 08:36

## 2019-03-06 RX ADMIN — Medication 10 MILLILITER(S): at 23:27

## 2019-03-06 RX ADMIN — MICAFUNGIN SODIUM 105 MILLIGRAM(S): 100 INJECTION, POWDER, LYOPHILIZED, FOR SOLUTION INTRAVENOUS at 10:30

## 2019-03-06 RX ADMIN — Medication 500 MILLIGRAM(S): at 06:21

## 2019-03-06 RX ADMIN — Medication 1 LOZENGE: at 11:28

## 2019-03-06 RX ADMIN — LORATADINE 10 MILLIGRAM(S): 10 TABLET ORAL at 12:43

## 2019-03-06 RX ADMIN — Medication 10 MILLILITER(S): at 11:28

## 2019-03-06 RX ADMIN — Medication 1 LOZENGE: at 08:36

## 2019-03-06 RX ADMIN — Medication 480 MICROGRAM(S): at 16:31

## 2019-03-06 RX ADMIN — Medication 5 MILLILITER(S): at 11:29

## 2019-03-06 RX ADMIN — Medication 2 MILLIGRAM(S): at 17:20

## 2019-03-06 RX ADMIN — Medication 10 MILLILITER(S): at 08:36

## 2019-03-06 RX ADMIN — PANTOPRAZOLE SODIUM 40 MILLIGRAM(S): 20 TABLET, DELAYED RELEASE ORAL at 06:21

## 2019-03-06 RX ADMIN — NYSTATIN CREAM 1 APPLICATION(S): 100000 CREAM TOPICAL at 21:17

## 2019-03-06 RX ADMIN — Medication 1 LOZENGE: at 16:32

## 2019-03-06 RX ADMIN — URSODIOL 300 MILLIGRAM(S): 250 TABLET, FILM COATED ORAL at 16:31

## 2019-03-06 RX ADMIN — Medication 500 MILLIGRAM(S): at 17:12

## 2019-03-06 RX ADMIN — Medication 1 LOZENGE: at 23:27

## 2019-03-06 RX ADMIN — ONDANSETRON 8 MILLIGRAM(S): 8 TABLET, FILM COATED ORAL at 07:04

## 2019-03-06 RX ADMIN — Medication 5 MILLILITER(S): at 20:13

## 2019-03-06 RX ADMIN — Medication 400 MILLIGRAM(S): at 14:48

## 2019-03-06 RX ADMIN — Medication 10 MILLILITER(S): at 16:32

## 2019-03-06 RX ADMIN — Medication 5 MILLILITER(S): at 16:32

## 2019-03-06 RX ADMIN — Medication 2 MILLIGRAM(S): at 14:48

## 2019-03-06 RX ADMIN — NYSTATIN CREAM 1 APPLICATION(S): 100000 CREAM TOPICAL at 14:48

## 2019-03-06 RX ADMIN — AMLODIPINE BESYLATE 5 MILLIGRAM(S): 2.5 TABLET ORAL at 06:21

## 2019-03-06 RX ADMIN — Medication 5 MILLILITER(S): at 00:07

## 2019-03-06 RX ADMIN — Medication 1 LOZENGE: at 00:07

## 2019-03-06 RX ADMIN — Medication 1 LOZENGE: at 20:13

## 2019-03-06 RX ADMIN — Medication 5 MILLILITER(S): at 08:36

## 2019-03-06 RX ADMIN — NYSTATIN CREAM 1 APPLICATION(S): 100000 CREAM TOPICAL at 06:22

## 2019-03-06 RX ADMIN — Medication 10 MILLILITER(S): at 00:07

## 2019-03-06 RX ADMIN — Medication 1 TABLET(S): at 12:42

## 2019-03-06 RX ADMIN — Medication 400 MILLIGRAM(S): at 06:21

## 2019-03-06 RX ADMIN — Medication 10 MILLILITER(S): at 20:13

## 2019-03-06 RX ADMIN — Medication 2 MILLIGRAM(S): at 11:28

## 2019-03-06 NOTE — PROGRESS NOTE ADULT - ATTENDING COMMENTS
68 y/o F w/ IgG kappa myeloma admitted for autologous pbsct w/ Melphalan prep (100mg/m2 x 2)   Day +5- s/p HPC transplant; continue Zarxio   Strict I&O, daily weights, prn diuresis   Antiemetics, mouth care, skin care   Continue Acyclovir, Cipro prophylaxis; begin Mycamine as Diflucan discontinued secondary to transaminitis.  Supplement lytes  Transaminitis- increasing LFT's- off Diflucan, begin Mycamine  VOD prophylaxis  Held last dose of Kepivance due to side effects.  OOB, ambulate 70 y/o F w/ IgG kappa myeloma admitted for autologous pbsct w/ Melphalan prep (100mg/m2 x 2)   Day +6 - s/p HPC transplant; continue Zarxio   Strict I&O, daily weights, prn diuresis   Antiemetics, mouth care, skin care   Continue Acyclovir, Cipro prophylaxis; begin Mycamine as Diflucan discontinued secondary to transaminitis.  Supplement lytes  Transaminitis- increasing LFT's- off Diflucan, begin Mycamine  VOD prophylaxis  Held last dose of Kepivance due to side effects.  OOB, ambulate 70 y/o F w/ IgG kappa myeloma admitted for autologous pbsct w/ Melphalan prep (100mg/m2 x 2)   Day +6 - s/p HPC transplant; continue Zarxio   Strict I&O, daily weights, prn diuresis   Antiemetics, mouth care, skin care   Continue Acyclovir, Cipro prophylaxis; continue Mycamine as Diflucan discontinued secondary to transaminitis.  Supplement lytes  Transaminitis- increasing LFT's- off Diflucan, continue Mycamine  VOD prophylaxis  Imodium prn diarrhea  OOB, ambulate

## 2019-03-06 NOTE — PROGRESS NOTE ADULT - SUBJECTIVE AND OBJECTIVE BOX
[facility]PC Transplant Team                                                      Critical / Counseling Time Provided: 30 minutes                                                                                                                                                        Chief Complaint:     S: Patient seen and examined with HPC Transplant Team:   Denies mouth / tongue / throat pain, dyspnea, cough, nausea, vomiting, diarrhea, abdominal pain     O: Vitals:   Vital Signs Last 24 Hrs  T(C): 37 (06 Mar 2019 06:36), Max: 37.1 (05 Mar 2019 09:05)  T(F): 98.6 (06 Mar 2019 06:36), Max: 98.8 (05 Mar 2019 09:05)  HR: 98 (06 Mar 2019 06:36) (89 - 110)  BP: 126/78 (06 Mar 2019 06:36) (110/67 - 149/73)  BP(mean): --  RR: 18 (06 Mar 2019 06:36) (18 - 18)  SpO2: 97% (06 Mar 2019 06:36) (96% - 100%)    Admit weight:   Daily     Daily Weight in k.2 (05 Mar 2019 09:05)    Intake / Output:   - @ 07:01  -  03-06 @ 07:00  --------------------------------------------------------  IN: 1083.2 mL / OUT: 1850 mL / NET: -766.8 mL          PE:   Oropharynx:   Oral Mucositis:                                                        Grade:   CVS:   Lungs:   Abdomen:  Extremities:   Gastric Mucositis:                                                  Grade:   Intestinal Mucositis:                                              Grade:   Skin:   TLC:   Neuro:   Pain:     Labs:   CBC Full  -  ( 05 Mar 2019 07:10 )  WBC Count : 0.0 K/uL  Hemoglobin : 7.1 g/dL  Hematocrit : 20.7 %  Platelet Count - Automated : 87 K/uL  Mean Cell Volume : 93.2 fl  Mean Cell Hemoglobin : 32.1 pg  Mean Cell Hemoglobin Concentration : 34.5 gm/dL  Auto Neutrophil # : x  Auto Lymphocyte # : x  Auto Monocyte # : x  Auto Eosinophil # : x  Auto Basophil # : x  Auto Neutrophil % : x  Auto Lymphocyte % : x  Auto Monocyte % : x  Auto Eosinophil % : x  Auto Basophil % : x                          7.1    0.0   )-----------( 87       ( 05 Mar 2019 07:10 )             20.7     03-05    144  |  108  |  5<L>  ----------------------------<  98  3.4<L>   |  25  |  0.43<L>    Ca    8.8      05 Mar 2019 07:10  Phos  3.1     -  Mg     2.0     -    TPro  5.0<L>  /  Alb  3.4  /  TBili  0.3  /  DBili  x   /  AST  157<H>  /  ALT  224<H>  /  AlkPhos  89  -05      LIVER FUNCTIONS - ( 05 Mar 2019 07:10 )  Alb: 3.4 g/dL / Pro: 5.0 g/dL / ALK PHOS: 89 U/L / ALT: 224 U/L / AST: 157 U/L / GGT: x                   Karnofsky / Lansky Scale:   GVHD:   Skin:   Liver:   Gut:   Overall Grade:       Cultures:         Radiology:       Meds:   Antimicrobials:   acyclovir   Oral Tab/Cap 400 milliGRAM(s) Oral every 8 hours  ciprofloxacin     Tablet 500 milliGRAM(s) Oral every 12 hours  clotrimazole Lozenge 1 Lozenge Oral five times a day  micafungin IVPB      micafungin IVPB 100 milliGRAM(s) IV Intermittent every 24 hours      Heme / Onc:   heparin  Infusion 330 Unit(s)/Hr IV Continuous <Continuous>      GI:  docusate sodium 100 milliGRAM(s) Oral three times a day PRN  loperamide 2 milliGRAM(s) Oral every 3 hours PRN  pantoprazole    Tablet 40 milliGRAM(s) Oral before breakfast  senna 1 Tablet(s) Oral at bedtime PRN  sodium bicarbonate Mouth Rinse 10 milliLiter(s) Swish and Spit five times a day  ursodiol Capsule 300 milliGRAM(s) Oral two times a day with meals      Cardiovascular:   amLODIPine   Tablet 5 milliGRAM(s) Oral daily  furosemide   Injectable 20 milliGRAM(s) IV Push every 24 hours PRN      Immunologic:   filgrastim-sndz Injectable 480 MICROGram(s) SubCutaneous daily      Other medications:   acetaminophen   Tablet .. 650 milliGRAM(s) Oral every 6 hours  Biotene Dry Mouth Oral Rinse 5 milliLiter(s) Swish and Spit five times a day  folic acid 1 milliGRAM(s) Oral daily  lidocaine/prilocaine Cream 1 Application(s) Topical daily  loratadine 10 milliGRAM(s) Oral daily  multivitamin 1 Tablet(s) Oral daily  nystatin Powder 1 Application(s) Topical three times a day  sodium chloride 0.9%. 1000 milliLiter(s) IV Continuous <Continuous>      PRN:   acetaminophen   Tablet .. 650 milliGRAM(s) Oral every 6 hours PRN  diphenhydrAMINE   Injectable 25 milliGRAM(s) IV Push every 4 hours PRN  docusate sodium 100 milliGRAM(s) Oral three times a day PRN  furosemide   Injectable 20 milliGRAM(s) IV Push every 24 hours PRN  loperamide 2 milliGRAM(s) Oral every 3 hours PRN  LORazepam   Injectable 1 milliGRAM(s) IV Push every 6 hours PRN  LORazepam   Injectable 1 milliGRAM(s) IV Push every 6 hours PRN  metoclopramide Injectable 10 milliGRAM(s) IV Push every 6 hours PRN  ondansetron Injectable 8 milliGRAM(s) IV Push every 8 hours PRN  senna 1 Tablet(s) Oral at bedtime PRN      A/P:   ___ year old ___  with a history of ______________________  Pre / Status Post :  Autologous / Allogeneic PBSCT / BMT day ____________    1. Infectious Disease:   Fluconazole, Acyclovir     2. VOD Prophylaxis: Actigall, Glutamine, Heparin (dosed at 100 units / kg / day)     3. GI Prophylaxis:  Protonix    4. Mouthcare - NS / NaHCO3 rinses, Mycelex, Caphosol, skin care     5. GVHD prophylaxis     6. Transfuse & replete electrolytes prn     7. IV hydration, daily weights, strict I&O, prn diuresis     8. PO intake as tolerated, nutrition follow up as needed, MVI, folic acid     9. Antiemetics, anti-diarrhea medications:   Reglan, Ativan    10. OOB as tolerated, physical therapy consult if needed     11. Monitor coags / fibrinogen 2x week, vitamin K as needed     12. Monitor closely for clinical changes, monitor for fevers     13. Emotional support provided, plan of care discussed with patient and family, questions addressed     14. Patient education done regarding chemotherapy prep, plan of care, restrictions and discharge planning     15. Continue regular social work input     I have written the above note for Dr. Rahman who performed service with me in the room.   Christiano Herrera  NP-C ([phone])    I have seen and examined patient with NP, I agree with above note as scribed. [facility] Transplant Team                                                      Critical / Counseling Time Provided: 30 minutes                                                                                                                                                        Chief Complaint: autologous transplant for treatment of multiple myeloma     S: Patient seen and examined with HPC Transplant Team:   Feels better today  Diarrhea resolved with imodium therapy  No further nausea    Denies mouth / tongue / throat pain, dyspnea, cough, nausea, vomiting, diarrhea, abdominal pain     O: Vitals:   Vital Signs Last 24 Hrs  T(C): 37 (06 Mar 2019 06:36), Max: 37.1 (05 Mar 2019 09:05)  T(F): 98.6 (06 Mar 2019 06:36), Max: 98.8 (05 Mar 2019 09:05)  HR: 98 (06 Mar 2019 06:36) (89 - 110)  BP: 126/78 (06 Mar 2019 06:36) (110/67 - 149/73)  BP(mean): --  RR: 18 (06 Mar 2019 06:36) (18 - 18)  SpO2: 97% (06 Mar 2019 06:36) (96% - 100%)    Admit weight:   Daily     Daily Weight in k.2 (05 Mar 2019 09:05)    Intake / Output:   03-05 @ 07:01  -  03-06 @ 07:00  --------------------------------------------------------  IN: 1083.2 mL / OUT: 1850 mL / NET: -766.8 mL          PE:   Oropharynx: no erythema or ulcers; Kepivance coating on mucosal surfaces; facial, lip swelling 2/2 Kepivance is resolved  Oral Mucositis: n/a                                                       Grade:   CVS: S1S2, RRR  Lungs: CTA B/L  Abdomen: soft, NT, ND, hyperactive BS x 4Q  Extremities: BLE trace edema, facial edema resolved, hand swelling nearly resolved  Gastric Mucositis: n/a                                                Grade:   Intestinal Mucositis: n/a                                             Grade:   Skin: Kepivance rash back,  neck, and upper chest nearly resolved  TLC: CDI  Neuro: nonfocal   Pain: Denies      Labs:   CBC Full  -  ( 05 Mar 2019 07:10 )  WBC Count : 0.0 K/uL  Hemoglobin : 7.1 g/dL  Hematocrit : 20.7 %  Platelet Count - Automated : 87 K/uL  Mean Cell Volume : 93.2 fl  Mean Cell Hemoglobin : 32.1 pg  Mean Cell Hemoglobin Concentration : 34.5 gm/dL  Auto Neutrophil # : x  Auto Lymphocyte # : x  Auto Monocyte # : x  Auto Eosinophil # : x  Auto Basophil # : x  Auto Neutrophil % : x  Auto Lymphocyte % : x  Auto Monocyte % : x  Auto Eosinophil % : x  Auto Basophil % : x                          7.1    0.0   )-----------( 87       ( 05 Mar 2019 07:10 )             20.7     03-    144  |  108  |  5<L>  ----------------------------<  98  3.4<L>   |  25  |  0.43<L>    Ca    8.8      05 Mar 2019 07:10  Phos  3.1     -  Mg     2.0     -    TPro  5.0<L>  /  Alb  3.4  /  TBili  0.3  /  DBili  x   /  AST  157<H>  /  ALT  224<H>  /  AlkPhos  89  -05      LIVER FUNCTIONS - ( 05 Mar 2019 07:10 )  Alb: 3.4 g/dL / Pro: 5.0 g/dL / ALK PHOS: 89 U/L / ALT: 224 U/L / AST: 157 U/L / GGT: x                   Karnofsky / Lansky Scale:   GVHD:   Skin:   Liver:   Gut:   Overall Grade:       Cultures:         Radiology:       Meds:   Antimicrobials:   acyclovir   Oral Tab/Cap 400 milliGRAM(s) Oral every 8 hours  ciprofloxacin     Tablet 500 milliGRAM(s) Oral every 12 hours  clotrimazole Lozenge 1 Lozenge Oral five times a day  micafungin IVPB      micafungin IVPB 100 milliGRAM(s) IV Intermittent every 24 hours      Heme / Onc:   heparin  Infusion 330 Unit(s)/Hr IV Continuous <Continuous>      GI:  docusate sodium 100 milliGRAM(s) Oral three times a day PRN  loperamide 2 milliGRAM(s) Oral every 3 hours PRN  pantoprazole    Tablet 40 milliGRAM(s) Oral before breakfast  senna 1 Tablet(s) Oral at bedtime PRN  sodium bicarbonate Mouth Rinse 10 milliLiter(s) Swish and Spit five times a day  ursodiol Capsule 300 milliGRAM(s) Oral two times a day with meals      Cardiovascular:   amLODIPine   Tablet 5 milliGRAM(s) Oral daily  furosemide   Injectable 20 milliGRAM(s) IV Push every 24 hours PRN      Immunologic:   filgrastim-sndz Injectable 480 MICROGram(s) SubCutaneous daily      Other medications:   acetaminophen   Tablet .. 650 milliGRAM(s) Oral every 6 hours  Biotene Dry Mouth Oral Rinse 5 milliLiter(s) Swish and Spit five times a day  folic acid 1 milliGRAM(s) Oral daily  lidocaine/prilocaine Cream 1 Application(s) Topical daily  loratadine 10 milliGRAM(s) Oral daily  multivitamin 1 Tablet(s) Oral daily  nystatin Powder 1 Application(s) Topical three times a day  sodium chloride 0.9%. 1000 milliLiter(s) IV Continuous <Continuous>      PRN:   acetaminophen   Tablet .. 650 milliGRAM(s) Oral every 6 hours PRN  diphenhydrAMINE   Injectable 25 milliGRAM(s) IV Push every 4 hours PRN  docusate sodium 100 milliGRAM(s) Oral three times a day PRN  furosemide   Injectable 20 milliGRAM(s) IV Push every 24 hours PRN  loperamide 2 milliGRAM(s) Oral every 3 hours PRN  LORazepam   Injectable 1 milliGRAM(s) IV Push every 6 hours PRN  LORazepam   Injectable 1 milliGRAM(s) IV Push every 6 hours PRN  metoclopramide Injectable 10 milliGRAM(s) IV Push every 6 hours PRN  ondansetron Injectable 8 milliGRAM(s) IV Push every 8 hours PRN  senna 1 Tablet(s) Oral at bedtime PRN      A/P:  68 year old female with a history of IgG kappa Multiple Myeloma.  Completed conditioning regimen with Melphalan  Post Autologous PBSCT,  day +6  Continue Zarxio  Held 3rd dose of Kepivance due to facial edema  Transaminitis- increasing; start Micafungin  Continue Cipro, neutropenic  Diarrhea- CDiff negative, continue Imodium prn  Nutrition consult     1. Infectious Disease:   clotrimazole Lozenge 1 Lozenge Oral five times a day  acyclovir   Oral Tab/Cap 400 milliGRAM(s) Oral every 8 hours  Ciprofloxacin 500 mg po q 12 hrs    2. VOD Prophylaxis: Actigall, Glutamine, Heparin (dosed at 100 units / kg / day)     3. GI Prophylaxis:  Protonix    4. Mouth care - NS / NaHCO3 rinses, Mycelex, Biotene; Skin care     5. GVHD prophylaxis: n/a    6. Transfuse & replete electrolytes prn   KCl 20 meq IV x 3 doses    7. IV hydration, daily weights, strict I&O, prn diuresis     8. PO intake as tolerated, nutrition follow up as needed, MVI, folic acid     9. Antiemetics, anti-diarrhea medications:  Emend, Zofran, Ativan  and Reglan      10. OOB as tolerated, physical therapy consult if needed     11. Monitor coags / fibrinogen 2x week, vitamin K as needed     12. Monitor closely for clinical changes, monitor for fevers     13. Emotional support provided, plan of care discussed and questions addressed     14. Patient education done regarding chemotherapy prep, plan of care, restrictions and discharge planning     15. Continue regular social work input     I have written the above note for Dr. Rahman who performed service with me in the room.   Christiano Herrera  NP-C ([phone])    I have seen and examined patient with NP, I agree with above note as scribed. [facility] Transplant Team                                                      Critical / Counseling Time Provided: 30 minutes                                                                                                                                                        Chief Complaint: autologous transplant for treatment of multiple myeloma     S: Patient seen and examined with [facility]PC Transplant Team:   Feels better today  Diarrhea resolved with imodium therapy  No further nausea    Denies mouth / tongue / throat pain, dyspnea, cough, nausea, vomiting, diarrhea, abdominal pain     O: Vitals:   Vital Signs Last 24 Hrs  T(C): 37 (06 Mar 2019 06:36), Max: 37.1 (05 Mar 2019 09:05)  T(F): 98.6 (06 Mar 2019 06:36), Max: 98.8 (05 Mar 2019 09:05)  HR: 98 (06 Mar 2019 06:36) (89 - 110)  BP: 126/78 (06 Mar 2019 06:36) (110/67 - 149/73)  BP(mean): --  RR: 18 (06 Mar 2019 06:36) (18 - 18)  SpO2: 97% (06 Mar 2019 06:36) (96% - 100%)    Admit weight: 79.2kg  Daily Weight in k.2 (05 Mar 2019 09:05)  Today's weight:     Intake / Output:   03-05 @ 07:01  -  03-06 @ 07:00  --------------------------------------------------------  IN: 1083.2 mL / OUT: 1850 mL / NET: -766.8 mL      PE:   Oropharynx: no erythema or ulcers; Kepivance coating on mucosal surfaces; facial, lip swelling 2/2 Kepivance is resolved  Oral Mucositis: n/a                                                       Grade:   CVS: S1S2, RRR  Lungs: CTA B/L  Abdomen: soft, NT, ND, hyperactive BS x 4Q  Extremities: BLE trace edema, facial edema resolved, hand swelling nearly resolved  Gastric Mucositis: n/a                                                Grade:   Intestinal Mucositis: n/a                                             Grade:   Skin: Kepivance rash back,  neck, and upper chest nearly resolved  TLC: CDI  Neuro: nonfocal   Pain: Denies      Labs:                         7.0    0.1   )-----------( 44       ( 06 Mar 2019 07:06 )             21.1     03-06    144  |  110<H>  |  5<L>  ----------------------------<  91  3.7   |  24  |  0.41<L>    Ca    9.1      06 Mar 2019 07:06  Phos  3.1     03-06  Mg     2.0     03-06    TPro  4.9<L>  /  Alb  3.3  /  TBili  0.3  /  DBili  <0.1  /  AST  117<H>  /  ALT  211<H>  /  AlkPhos  90  03-06    Meds:   Antimicrobials:   acyclovir   Oral Tab/Cap 400 milliGRAM(s) Oral every 8 hours  ciprofloxacin     Tablet 500 milliGRAM(s) Oral every 12 hours  clotrimazole Lozenge 1 Lozenge Oral five times a day  micafungin IVPB      micafungin IVPB 100 milliGRAM(s) IV Intermittent every 24 hours      Heme / Onc:   heparin  Infusion 330 Unit(s)/Hr IV Continuous <Continuous>      GI:  docusate sodium 100 milliGRAM(s) Oral three times a day PRN  loperamide 2 milliGRAM(s) Oral every 3 hours PRN  pantoprazole    Tablet 40 milliGRAM(s) Oral before breakfast  senna 1 Tablet(s) Oral at bedtime PRN  sodium bicarbonate Mouth Rinse 10 milliLiter(s) Swish and Spit five times a day  ursodiol Capsule 300 milliGRAM(s) Oral two times a day with meals      Cardiovascular:   amLODIPine   Tablet 5 milliGRAM(s) Oral daily  furosemide   Injectable 20 milliGRAM(s) IV Push every 24 hours PRN      Immunologic:   filgrastim-sndz Injectable 480 MICROGram(s) SubCutaneous daily      Other medications:   acetaminophen   Tablet .. 650 milliGRAM(s) Oral every 6 hours  Biotene Dry Mouth Oral Rinse 5 milliLiter(s) Swish and Spit five times a day  folic acid 1 milliGRAM(s) Oral daily  lidocaine/prilocaine Cream 1 Application(s) Topical daily  loratadine 10 milliGRAM(s) Oral daily  multivitamin 1 Tablet(s) Oral daily  nystatin Powder 1 Application(s) Topical three times a day  sodium chloride 0.9%. 1000 milliLiter(s) IV Continuous <Continuous>      PRN:   acetaminophen   Tablet .. 650 milliGRAM(s) Oral every 6 hours PRN  diphenhydrAMINE   Injectable 25 milliGRAM(s) IV Push every 4 hours PRN  docusate sodium 100 milliGRAM(s) Oral three times a day PRN  furosemide   Injectable 20 milliGRAM(s) IV Push every 24 hours PRN  loperamide 2 milliGRAM(s) Oral every 3 hours PRN  LORazepam   Injectable 1 milliGRAM(s) IV Push every 6 hours PRN  LORazepam   Injectable 1 milliGRAM(s) IV Push every 6 hours PRN  metoclopramide Injectable 10 milliGRAM(s) IV Push every 6 hours PRN  ondansetron Injectable 8 milliGRAM(s) IV Push every 8 hours PRN  senna 1 Tablet(s) Oral at bedtime PRN      A/P:  68 year old female with a history of IgG kappa Multiple Myeloma.  Completed conditioning regimen with Melphalan  Post Autologous PBSCT,  day +6  Transaminitis- increasing; start Micafungin  Continue Cipro, neutropenic - if T >/= 38C, pan cx, CXR and change cipro to cefepime 2g IV q 8   Diarrhea- CDiff negative, continue Imodium prn  Nutrition consult     1. Infectious Disease:   clotrimazole Lozenge 1 Lozenge Oral five times a day  acyclovir   Oral Tab/Cap 400 milliGRAM(s) Oral every 8 hours  Ciprofloxacin 500 mg po q 12 hrs    2. VOD Prophylaxis: Actigall, Glutamine, Heparin (dosed at 100 units / kg / day)     3. GI Prophylaxis:    pantoprazole    Tablet 40 milliGRAM(s) Oral before breakfast    4. Mouth care - NS / NaHCO3 rinses, Mycelex, Biotene; Skin care     5. GVHD prophylaxis: n/a    6. Transfuse & replete electrolytes prn     7. IV hydration, daily weights, strict I&O, prn diuresis     8. PO intake as tolerated, nutrition follow up as needed, MVI, folic acid     9. Antiemetics, anti-diarrhea medications:  LORazepam   Injectable 1 milliGRAM(s) IV Push every 6 hours PRN  metoclopramide Injectable 10 milliGRAM(s) IV Push every 6 hours PRN  ondansetron Injectable 8 milliGRAM(s) IV Push every 8 hours PRN     10. OOB as tolerated, physical therapy consult if needed     11. Monitor coags / fibrinogen 2x week, vitamin K as needed     12. Monitor closely for clinical changes, monitor for fevers     13. Emotional support provided, plan of care discussed and questions addressed     14. Patient education done regarding plan of care, restrictions and discharge planning     15. Continue regular social work input     I have written the above note for Dr. Rahman who performed service with me in the room.   Christiano Herrera  NP-C ([phone])    I have seen and examined patient with NP, I agree with above note as scribed. [facility] Transplant Team                                                      Critical / Counseling Time Provided: 30 minutes                                                                                                                                                        Chief Complaint: autologous transplant for treatment of multiple myeloma     S: Patient seen and examined with [facility]PC Transplant Team:   Feels better today  Diarrhea resolved with imodium therapy  No further nausea    Denies mouth / tongue / throat pain, dyspnea, cough, nausea, vomiting, diarrhea, abdominal pain     O: Vitals:   Vital Signs Last 24 Hrs  T(C): 37 (06 Mar 2019 06:36), Max: 37.1 (05 Mar 2019 09:05)  T(F): 98.6 (06 Mar 2019 06:36), Max: 98.8 (05 Mar 2019 09:05)  HR: 98 (06 Mar 2019 06:36) (89 - 110)  BP: 126/78 (06 Mar 2019 06:36) (110/67 - 149/73)  BP(mean): --  RR: 18 (06 Mar 2019 06:36) (18 - 18)  SpO2: 97% (06 Mar 2019 06:36) (96% - 100%)    Admit weight: 79.2kg  Daily Weight in k.2 (05 Mar 2019 09:05)  Today's weight: 78.5kg     Intake / Output:   - @ 07:01  -  -06 @ 07:00  --------------------------------------------------------  IN: 1083.2 mL / OUT: 1850 mL / NET: -766.8 mL      PE:   Oropharynx: no erythema or ulcers; Kepivance coating on mucosal surfaces; facial, lip swelling 2/2 Kepivance is resolved  Oral Mucositis: n/a                                                       Grade:   CVS: S1S2, RRR  Lungs: CTA B/L  Abdomen: soft, NT, ND, hyperactive BS x 4Q  Extremities: BLE trace edema, facial edema resolved, hand swelling nearly resolved  Gastric Mucositis: n/a                                                Grade:   Intestinal Mucositis: n/a                                             Grade:   Skin: Kepivance rash back,  neck, and upper chest nearly resolved  TLC: CDI  Neuro: nonfocal   Pain: Denies      Labs:                         7.0    0.1   )-----------( 44       ( 06 Mar 2019 07:06 )             21.1     03-06    144  |  110<H>  |  5<L>  ----------------------------<  91  3.7   |  24  |  0.41<L>    Ca    9.1      06 Mar 2019 07:06  Phos  3.1     03-06  Mg     2.0     03-06    TPro  4.9<L>  /  Alb  3.3  /  TBili  0.3  /  DBili  <0.1  /  AST  117<H>  /  ALT  211<H>  /  AlkPhos  90  03-06    Meds:   Antimicrobials:   acyclovir   Oral Tab/Cap 400 milliGRAM(s) Oral every 8 hours  ciprofloxacin     Tablet 500 milliGRAM(s) Oral every 12 hours  clotrimazole Lozenge 1 Lozenge Oral five times a day  micafungin IVPB      micafungin IVPB 100 milliGRAM(s) IV Intermittent every 24 hours      Heme / Onc:   heparin  Infusion 330 Unit(s)/Hr IV Continuous <Continuous>      GI:  docusate sodium 100 milliGRAM(s) Oral three times a day PRN  loperamide 2 milliGRAM(s) Oral every 3 hours PRN  pantoprazole    Tablet 40 milliGRAM(s) Oral before breakfast  senna 1 Tablet(s) Oral at bedtime PRN  sodium bicarbonate Mouth Rinse 10 milliLiter(s) Swish and Spit five times a day  ursodiol Capsule 300 milliGRAM(s) Oral two times a day with meals      Cardiovascular:   amLODIPine   Tablet 5 milliGRAM(s) Oral daily  furosemide   Injectable 20 milliGRAM(s) IV Push every 24 hours PRN      Immunologic:   filgrastim-sndz Injectable 480 MICROGram(s) SubCutaneous daily      Other medications:   acetaminophen   Tablet .. 650 milliGRAM(s) Oral every 6 hours  Biotene Dry Mouth Oral Rinse 5 milliLiter(s) Swish and Spit five times a day  folic acid 1 milliGRAM(s) Oral daily  lidocaine/prilocaine Cream 1 Application(s) Topical daily  loratadine 10 milliGRAM(s) Oral daily  multivitamin 1 Tablet(s) Oral daily  nystatin Powder 1 Application(s) Topical three times a day  sodium chloride 0.9%. 1000 milliLiter(s) IV Continuous <Continuous>      PRN:   acetaminophen   Tablet .. 650 milliGRAM(s) Oral every 6 hours PRN  diphenhydrAMINE   Injectable 25 milliGRAM(s) IV Push every 4 hours PRN  docusate sodium 100 milliGRAM(s) Oral three times a day PRN  furosemide   Injectable 20 milliGRAM(s) IV Push every 24 hours PRN  loperamide 2 milliGRAM(s) Oral every 3 hours PRN  LORazepam   Injectable 1 milliGRAM(s) IV Push every 6 hours PRN  LORazepam   Injectable 1 milliGRAM(s) IV Push every 6 hours PRN  metoclopramide Injectable 10 milliGRAM(s) IV Push every 6 hours PRN  ondansetron Injectable 8 milliGRAM(s) IV Push every 8 hours PRN  senna 1 Tablet(s) Oral at bedtime PRN      A/P:  68 year old female with a history of IgG kappa Multiple Myeloma.  Completed conditioning regimen with Melphalan  Post Autologous PBSCT,  day +6  Transaminitis- increasing; start Micafungin  Continue Cipro, neutropenic - if T >/= 38C, pan cx, CXR and change cipro to cefepime 2g IV q 8   Diarrhea- CDiff negative, continue Imodium prn  Nutrition consult     1. Infectious Disease:   clotrimazole Lozenge 1 Lozenge Oral five times a day  acyclovir   Oral Tab/Cap 400 milliGRAM(s) Oral every 8 hours  Ciprofloxacin 500 mg po q 12 hrs    2. VOD Prophylaxis: Actigall, Glutamine, Heparin (dosed at 100 units / kg / day)     3. GI Prophylaxis:    pantoprazole    Tablet 40 milliGRAM(s) Oral before breakfast    4. Mouth care - NS / NaHCO3 rinses, Mycelex, Biotene; Skin care     5. GVHD prophylaxis: n/a    6. Transfuse & replete electrolytes prn     7. IV hydration, daily weights, strict I&O, prn diuresis     8. PO intake as tolerated, nutrition follow up as needed, MVI, folic acid     9. Antiemetics, anti-diarrhea medications:  LORazepam   Injectable 1 milliGRAM(s) IV Push every 6 hours PRN  metoclopramide Injectable 10 milliGRAM(s) IV Push every 6 hours PRN  ondansetron Injectable 8 milliGRAM(s) IV Push every 8 hours PRN     10. OOB as tolerated, physical therapy consult if needed     11. Monitor coags / fibrinogen 2x week, vitamin K as needed     12. Monitor closely for clinical changes, monitor for fevers     13. Emotional support provided, plan of care discussed and questions addressed     14. Patient education done regarding plan of care, restrictions and discharge planning     15. Continue regular social work input     I have written the above note for Dr. Rahman who performed service with me in the room.   Christiano Herrera  NP-C ([phone])    I have seen and examined patient with NP, I agree with above note as scribed. [facility] Transplant Team                                                      Critical / Counseling Time Provided: 30 minutes                                                                                                                                                        Chief Complaint: autologous transplant for treatment of multiple myeloma     S: Patient seen and examined with [facility] Transplant Team:   Diarrhea 3 times since this am  Intermittent nausea, vomited x 1 today.    Denies mouth, tongue, throat pain, dyspnea, cough, abdominal pain     O: Vitals:   Vital Signs Last 24 Hrs  T(C): 37 (06 Mar 2019 06:36), Max: 37.1 (05 Mar 2019 09:05)  T(F): 98.6 (06 Mar 2019 06:36), Max: 98.8 (05 Mar 2019 09:05)  HR: 98 (06 Mar 2019 06:36) (89 - 110)  BP: 126/78 (06 Mar 2019 06:36) (110/67 - 149/73)  BP(mean): --  RR: 18 (06 Mar 2019 06:36) (18 - 18)  SpO2: 97% (06 Mar 2019 06:36) (96% - 100%)    Admit weight: 79.2kg  Daily Weight in k.2 (05 Mar 2019 09:05)  Today's weight: 78.5kg     Intake / Output:   - @ 07:01  -  -06 @ 07:00  --------------------------------------------------------  IN: 1083.2 mL / OUT: 1850 mL / NET: -766.8 mL      PE:   Oropharynx: no erythema or ulcers; Kepivance coating on mucosal surfaces; facial, lip swelling 2/2 Kepivance is resolved  Oral Mucositis: n/a                                                       Grade:   CVS: S1S2, RRR  Lungs: CTA B/L  Abdomen: soft, NT, ND, hyperactive BS x 4Q  Extremities: No edema in BLE  Gastric Mucositis: n/a                                                Grade:   Intestinal Mucositis: n/a                                             Grade:   Skin: Kepivance rash back,  neck, and upper chest nearly resolved  TLC: CDI  Neuro: nonfocal   Pain: Denies      Labs:                         7.0    0.1   )-----------( 44       ( 06 Mar 2019 07:06 )             21.1     03-06    144  |  110<H>  |  5<L>  ----------------------------<  91  3.7   |  24  |  0.41<L>    Ca    9.1      06 Mar 2019 07:06  Phos  3.1     03-06  Mg     2.0     03-06    TPro  4.9<L>  /  Alb  3.3  /  TBili  0.3  /  DBili  <0.1  /  AST  117<H>  /  ALT  211<H>  /  AlkPhos  90  03-    Meds:   Antimicrobials:   acyclovir   Oral Tab/Cap 400 milliGRAM(s) Oral every 8 hours  ciprofloxacin     Tablet 500 milliGRAM(s) Oral every 12 hours  clotrimazole Lozenge 1 Lozenge Oral five times a day  micafungin IVPB      micafungin IVPB 100 milliGRAM(s) IV Intermittent every 24 hours      Heme / Onc:   heparin  Infusion 330 Unit(s)/Hr IV Continuous <Continuous>      GI:  docusate sodium 100 milliGRAM(s) Oral three times a day PRN  loperamide 2 milliGRAM(s) Oral every 3 hours PRN  pantoprazole    Tablet 40 milliGRAM(s) Oral before breakfast  senna 1 Tablet(s) Oral at bedtime PRN  sodium bicarbonate Mouth Rinse 10 milliLiter(s) Swish and Spit five times a day  ursodiol Capsule 300 milliGRAM(s) Oral two times a day with meals      Cardiovascular:   amLODIPine   Tablet 5 milliGRAM(s) Oral daily  furosemide   Injectable 20 milliGRAM(s) IV Push every 24 hours PRN      Immunologic:   filgrastim-sndz Injectable 480 MICROGram(s) SubCutaneous daily      Other medications:   acetaminophen   Tablet .. 650 milliGRAM(s) Oral every 6 hours  Biotene Dry Mouth Oral Rinse 5 milliLiter(s) Swish and Spit five times a day  folic acid 1 milliGRAM(s) Oral daily  lidocaine/prilocaine Cream 1 Application(s) Topical daily  loratadine 10 milliGRAM(s) Oral daily  multivitamin 1 Tablet(s) Oral daily  nystatin Powder 1 Application(s) Topical three times a day  sodium chloride 0.9%. 1000 milliLiter(s) IV Continuous <Continuous>      PRN:   acetaminophen   Tablet .. 650 milliGRAM(s) Oral every 6 hours PRN  diphenhydrAMINE   Injectable 25 milliGRAM(s) IV Push every 4 hours PRN  docusate sodium 100 milliGRAM(s) Oral three times a day PRN  furosemide   Injectable 20 milliGRAM(s) IV Push every 24 hours PRN  loperamide 2 milliGRAM(s) Oral every 3 hours PRN  LORazepam   Injectable 1 milliGRAM(s) IV Push every 6 hours PRN  LORazepam   Injectable 1 milliGRAM(s) IV Push every 6 hours PRN  metoclopramide Injectable 10 milliGRAM(s) IV Push every 6 hours PRN  ondansetron Injectable 8 milliGRAM(s) IV Push every 8 hours PRN  senna 1 Tablet(s) Oral at bedtime PRN      A/P:  68 year old female with a history of IgG kappa Multiple Myeloma.  Completed conditioning regimen with Melphalan  Post Autologous PBSCT,  day +6  Transaminitis- increasing; start Micafungin  Continue Cipro, neutropenic - if T >/= 38C, pan cx, CXR and change cipro to cefepime 2g IV q 8   Diarrhea- CDiff negative, continue Imodium prn  Nutrition consult     1. Infectious Disease:   clotrimazole Lozenge 1 Lozenge Oral five times a day  acyclovir   Oral Tab/Cap 400 milliGRAM(s) Oral every 8 hours  Ciprofloxacin 500 mg po q 12 hrs    2. VOD Prophylaxis: Actigall, Glutamine, Heparin (dosed at 100 units / kg / day)     3. GI Prophylaxis:    pantoprazole    Tablet 40 milliGRAM(s) Oral before breakfast    4. Mouth care - NS / NaHCO3 rinses, Mycelex, Biotene; Skin care     5. GVHD prophylaxis: n/a    6. Transfuse & replete electrolytes prn     7. IV hydration, daily weights, strict I&O, prn diuresis     8. PO intake as tolerated, nutrition follow up as needed, MVI, folic acid     9. Antiemetics, anti-diarrhea medications:  LORazepam   Injectable 1 milliGRAM(s) IV Push every 6 hours PRN  metoclopramide Injectable 10 milliGRAM(s) IV Push every 6 hours PRN  ondansetron Injectable 8 milliGRAM(s) IV Push every 8 hours PRN     10. OOB as tolerated, physical therapy consult if needed     11. Monitor coags / fibrinogen 2x week, vitamin K as needed     12. Monitor closely for clinical changes, monitor for fevers     13. Emotional support provided, plan of care discussed and questions addressed     14. Patient education done regarding plan of care, restrictions and discharge planning     15. Continue regular social work input     I have written the above note for Dr. Rahman who performed service with me in the room.   Christiano Herrera  NP-C ([phone])    I have seen and examined patient with NP, I agree with above note as scribed. [facility] Transplant Team                                                      Critical / Counseling Time Provided: 30 minutes                                                                                                                                                        Chief Complaint: autologous transplant for treatment of multiple myeloma     S: Patient seen and examined with [facility] Transplant Team:   Diarrhea 3 times since this am  Intermittent nausea, vomited x 1 today.    Denies mouth, tongue, throat pain; dyspnea, cough, abdominal pain     O: Vitals:   Vital Signs Last 24 Hrs  T(C): 37 (06 Mar 2019 06:36), Max: 37.1 (05 Mar 2019 09:05)  T(F): 98.6 (06 Mar 2019 06:36), Max: 98.8 (05 Mar 2019 09:05)  HR: 98 (06 Mar 2019 06:36) (89 - 110)  BP: 126/78 (06 Mar 2019 06:36) (110/67 - 149/73)  BP(mean): --  RR: 18 (06 Mar 2019 06:36) (18 - 18)  SpO2: 97% (06 Mar 2019 06:36) (96% - 100%)    Admit weight: 79.2kg  Daily Weight in k.2 (05 Mar 2019 09:05)  Today's weight: 78.5kg     Intake / Output:   - @ 07:01  -  -06 @ 07:00  --------------------------------------------------------  IN: 1083.2 mL / OUT: 1850 mL / NET: -766.8 mL      PE:   Oropharynx: no erythema or ulcers; Kepivance coating on mucosal surfaces; facial, lip swelling 2/2 Kepivance is resolved  Oral Mucositis: n/a                                                       Grade:   CVS: S1S2, RRR  Lungs: CTA B/L  Abdomen: soft, NT, ND, hyperactive BS x 4Q  Extremities: No edema in BLE  Gastric Mucositis: n/a                                                Grade:   Intestinal Mucositis: n/a                                             Grade:   Skin: Kepivance rash back,  neck, and upper chest nearly resolved  TLC: CDI  Neuro: nonfocal   Pain: Denies      Labs:                         7.0    0.1   )-----------( 44       ( 06 Mar 2019 07:06 )             21.1     03-06    144  |  110<H>  |  5<L>  ----------------------------<  91  3.7   |  24  |  0.41<L>    Ca    9.1      06 Mar 2019 07:06  Phos  3.1     03-06  Mg     2.0     03-06    TPro  4.9<L>  /  Alb  3.3  /  TBili  0.3  /  DBili  <0.1  /  AST  117<H>  /  ALT  211<H>  /  AlkPhos  90  03-    Meds:   Antimicrobials:   acyclovir   Oral Tab/Cap 400 milliGRAM(s) Oral every 8 hours  ciprofloxacin     Tablet 500 milliGRAM(s) Oral every 12 hours  clotrimazole Lozenge 1 Lozenge Oral five times a day  micafungin IVPB      micafungin IVPB 100 milliGRAM(s) IV Intermittent every 24 hours      Heme / Onc:   heparin  Infusion 330 Unit(s)/Hr IV Continuous <Continuous>      GI:  docusate sodium 100 milliGRAM(s) Oral three times a day PRN  loperamide 2 milliGRAM(s) Oral every 3 hours PRN  pantoprazole    Tablet 40 milliGRAM(s) Oral before breakfast  senna 1 Tablet(s) Oral at bedtime PRN  sodium bicarbonate Mouth Rinse 10 milliLiter(s) Swish and Spit five times a day  ursodiol Capsule 300 milliGRAM(s) Oral two times a day with meals      Cardiovascular:   amLODIPine   Tablet 5 milliGRAM(s) Oral daily  furosemide   Injectable 20 milliGRAM(s) IV Push every 24 hours PRN      Immunologic:   filgrastim-sndz Injectable 480 MICROGram(s) SubCutaneous daily      Other medications:   acetaminophen   Tablet .. 650 milliGRAM(s) Oral every 6 hours  Biotene Dry Mouth Oral Rinse 5 milliLiter(s) Swish and Spit five times a day  folic acid 1 milliGRAM(s) Oral daily  lidocaine/prilocaine Cream 1 Application(s) Topical daily  loratadine 10 milliGRAM(s) Oral daily  multivitamin 1 Tablet(s) Oral daily  nystatin Powder 1 Application(s) Topical three times a day  sodium chloride 0.9%. 1000 milliLiter(s) IV Continuous <Continuous>      PRN:   acetaminophen   Tablet .. 650 milliGRAM(s) Oral every 6 hours PRN  diphenhydrAMINE   Injectable 25 milliGRAM(s) IV Push every 4 hours PRN  docusate sodium 100 milliGRAM(s) Oral three times a day PRN  furosemide   Injectable 20 milliGRAM(s) IV Push every 24 hours PRN  loperamide 2 milliGRAM(s) Oral every 3 hours PRN  LORazepam   Injectable 1 milliGRAM(s) IV Push every 6 hours PRN  LORazepam   Injectable 1 milliGRAM(s) IV Push every 6 hours PRN  metoclopramide Injectable 10 milliGRAM(s) IV Push every 6 hours PRN  ondansetron Injectable 8 milliGRAM(s) IV Push every 8 hours PRN  senna 1 Tablet(s) Oral at bedtime PRN      A/P:  68 year old female with a history of IgG kappa Multiple Myeloma.  Completed conditioning regimen with Melphalan  Post Autologous PBSCT,  day +6  Transaminitis- increasing; start Micafungin- now improving  Continue Cipro, neutropenic - if T >/= 38C, pan cx, CXR and change cipro to cefepime 2g IV q 8   Diarrhea- CDiff negative, continue Imodium prn  Nutrition consult     1. Infectious Disease:   clotrimazole Lozenge 1 Lozenge Oral five times a day  acyclovir   Oral Tab/Cap 400 milliGRAM(s) Oral every 8 hours  ciprofloxacin     Tablet 500 milliGRAM(s) Oral every 12 hours  micafungin IVPB 100 milliGRAM(s) IV Intermittent every 24 hours    2. VOD Prophylaxis: Actigall, Glutamine, Heparin (dosed at 100 units / kg / day)     3. GI Prophylaxis:    pantoprazole    Tablet 40 milliGRAM(s) Oral before breakfast    4. Mouth care - NS / NaHCO3 rinses, Mycelex, Biotene; Skin care     5. GVHD prophylaxis: n/a    6. Transfuse & replete electrolytes prn     7. IV hydration, daily weights, strict I&O, prn diuresis     8. PO intake as tolerated, nutrition follow up as needed, MVI, folic acid     9. Antiemetics, anti-diarrhea medications:  LORazepam   Injectable 1 milliGRAM(s) IV Push every 6 hours PRN  metoclopramide Injectable 10 milliGRAM(s) IV Push every 6 hours PRN  ondansetron Injectable 8 milliGRAM(s) IV Push every 8 hours PRN     10. OOB as tolerated, physical therapy consult if needed     11. Monitor coags / fibrinogen 2x week, vitamin K as needed     12. Monitor closely for clinical changes, monitor for fevers     13. Emotional support provided, plan of care discussed and questions addressed     14. Patient education done regarding plan of care, restrictions and discharge planning     15. Continue regular social work input     I have written the above note for Dr. Rahman who performed service with me in the room.   Christiano Herrera  NP-C ([phone])    I have seen and examined patient with NP, I agree with above note as scribed.

## 2019-03-07 LAB
ALBUMIN SERPL ELPH-MCNC: 3.2 G/DL — LOW (ref 3.3–5)
ALP SERPL-CCNC: 87 U/L — SIGNIFICANT CHANGE UP (ref 40–120)
ALT FLD-CCNC: 170 U/L — HIGH (ref 10–45)
ANION GAP SERPL CALC-SCNC: 9 MMOL/L — SIGNIFICANT CHANGE UP (ref 5–17)
APTT BLD: 31.3 SEC — SIGNIFICANT CHANGE UP (ref 27.5–36.3)
AST SERPL-CCNC: 69 U/L — HIGH (ref 10–40)
BILIRUB SERPL-MCNC: 0.2 MG/DL — SIGNIFICANT CHANGE UP (ref 0.2–1.2)
BUN SERPL-MCNC: 6 MG/DL — LOW (ref 7–23)
CALCIUM SERPL-MCNC: 8.8 MG/DL — SIGNIFICANT CHANGE UP (ref 8.4–10.5)
CHLORIDE SERPL-SCNC: 107 MMOL/L — SIGNIFICANT CHANGE UP (ref 96–108)
CO2 SERPL-SCNC: 24 MMOL/L — SIGNIFICANT CHANGE UP (ref 22–31)
CREAT SERPL-MCNC: 0.45 MG/DL — LOW (ref 0.5–1.3)
FIBRINOGEN PPP-MCNC: 601 MG/DL — HIGH (ref 350–510)
GLUCOSE SERPL-MCNC: 95 MG/DL — SIGNIFICANT CHANGE UP (ref 70–99)
HCT VFR BLD CALC: 19.8 % — CRITICAL LOW (ref 34.5–45)
HGB BLD-MCNC: 7.1 G/DL — LOW (ref 11.5–15.5)
INR BLD: 0.99 RATIO — SIGNIFICANT CHANGE UP (ref 0.88–1.16)
LDH SERPL L TO P-CCNC: 137 U/L — SIGNIFICANT CHANGE UP (ref 50–242)
MAGNESIUM SERPL-MCNC: 1.9 MG/DL — SIGNIFICANT CHANGE UP (ref 1.6–2.6)
MCHC RBC-ENTMCNC: 32.9 PG — SIGNIFICANT CHANGE UP (ref 27–34)
MCHC RBC-ENTMCNC: 35.7 GM/DL — SIGNIFICANT CHANGE UP (ref 32–36)
MCV RBC AUTO: 92.1 FL — SIGNIFICANT CHANGE UP (ref 80–100)
PHOSPHATE SERPL-MCNC: 3.6 MG/DL — SIGNIFICANT CHANGE UP (ref 2.5–4.5)
PLATELET # BLD AUTO: 19 K/UL — CRITICAL LOW (ref 150–400)
POTASSIUM SERPL-MCNC: 3.3 MMOL/L — LOW (ref 3.5–5.3)
POTASSIUM SERPL-SCNC: 3.3 MMOL/L — LOW (ref 3.5–5.3)
PROT SERPL-MCNC: 5 G/DL — LOW (ref 6–8.3)
PROTHROM AB SERPL-ACNC: 11.3 SEC — SIGNIFICANT CHANGE UP (ref 10–12.9)
RBC # BLD: 2.15 M/UL — LOW (ref 3.8–5.2)
RBC # FLD: 13.8 % — SIGNIFICANT CHANGE UP (ref 10.3–14.5)
SODIUM SERPL-SCNC: 140 MMOL/L — SIGNIFICANT CHANGE UP (ref 135–145)
WBC # BLD: 0.1 K/UL — CRITICAL LOW (ref 3.8–10.5)
WBC # FLD AUTO: 0.1 K/UL — CRITICAL LOW (ref 3.8–10.5)

## 2019-03-07 PROCEDURE — 99291 CRITICAL CARE FIRST HOUR: CPT

## 2019-03-07 RX ORDER — POTASSIUM CHLORIDE 20 MEQ
20 PACKET (EA) ORAL
Qty: 0 | Refills: 0 | Status: COMPLETED | OUTPATIENT
Start: 2019-03-07 | End: 2019-03-07

## 2019-03-07 RX ORDER — METOCLOPRAMIDE HCL 10 MG
1 TABLET ORAL
Qty: 60 | Refills: 0
Start: 2019-03-07 | End: 2019-03-21

## 2019-03-07 RX ORDER — AMLODIPINE BESYLATE 2.5 MG/1
1 TABLET ORAL
Qty: 30 | Refills: 3
Start: 2019-03-07 | End: 2019-07-04

## 2019-03-07 RX ORDER — FLUCONAZOLE 150 MG/1
2 TABLET ORAL
Qty: 60 | Refills: 3
Start: 2019-03-07 | End: 2019-07-04

## 2019-03-07 RX ORDER — FOLIC ACID 0.8 MG
1 TABLET ORAL
Qty: 30 | Refills: 3
Start: 2019-03-07 | End: 2019-07-04

## 2019-03-07 RX ORDER — ACYCLOVIR SODIUM 500 MG
1 VIAL (EA) INTRAVENOUS
Qty: 90 | Refills: 3
Start: 2019-03-07 | End: 2019-07-04

## 2019-03-07 RX ORDER — PANTOPRAZOLE SODIUM 20 MG/1
1 TABLET, DELAYED RELEASE ORAL
Qty: 30 | Refills: 3
Start: 2019-03-07 | End: 2019-07-04

## 2019-03-07 RX ORDER — ONDANSETRON 8 MG/1
1 TABLET, FILM COATED ORAL
Qty: 45 | Refills: 3
Start: 2019-03-07 | End: 2019-05-05

## 2019-03-07 RX ORDER — ATOVAQUONE 750 MG/5ML
5 SUSPENSION ORAL
Qty: 300 | Refills: 3
Start: 2019-03-07 | End: 2019-07-04

## 2019-03-07 RX ADMIN — Medication 50 MILLIEQUIVALENT(S): at 10:30

## 2019-03-07 RX ADMIN — Medication 1 LOZENGE: at 09:07

## 2019-03-07 RX ADMIN — Medication 500 MILLIGRAM(S): at 05:43

## 2019-03-07 RX ADMIN — NYSTATIN CREAM 1 APPLICATION(S): 100000 CREAM TOPICAL at 05:44

## 2019-03-07 RX ADMIN — Medication 1 LOZENGE: at 18:13

## 2019-03-07 RX ADMIN — Medication 50 MILLIEQUIVALENT(S): at 17:00

## 2019-03-07 RX ADMIN — NYSTATIN CREAM 1 APPLICATION(S): 100000 CREAM TOPICAL at 14:00

## 2019-03-07 RX ADMIN — Medication 1 LOZENGE: at 20:00

## 2019-03-07 RX ADMIN — Medication 5 MILLILITER(S): at 13:13

## 2019-03-07 RX ADMIN — MICAFUNGIN SODIUM 105 MILLIGRAM(S): 100 INJECTION, POWDER, LYOPHILIZED, FOR SOLUTION INTRAVENOUS at 11:45

## 2019-03-07 RX ADMIN — Medication 10 MILLILITER(S): at 13:13

## 2019-03-07 RX ADMIN — PANTOPRAZOLE SODIUM 40 MILLIGRAM(S): 20 TABLET, DELAYED RELEASE ORAL at 06:02

## 2019-03-07 RX ADMIN — Medication 400 MILLIGRAM(S): at 22:11

## 2019-03-07 RX ADMIN — Medication 500 MILLIGRAM(S): at 18:15

## 2019-03-07 RX ADMIN — Medication 5 MILLILITER(S): at 20:00

## 2019-03-07 RX ADMIN — URSODIOL 300 MILLIGRAM(S): 250 TABLET, FILM COATED ORAL at 18:16

## 2019-03-07 RX ADMIN — Medication 10 MILLILITER(S): at 20:00

## 2019-03-07 RX ADMIN — Medication 5 MILLILITER(S): at 09:07

## 2019-03-07 RX ADMIN — URSODIOL 300 MILLIGRAM(S): 250 TABLET, FILM COATED ORAL at 09:07

## 2019-03-07 RX ADMIN — Medication 400 MILLIGRAM(S): at 05:43

## 2019-03-07 RX ADMIN — Medication 50 MILLIEQUIVALENT(S): at 13:13

## 2019-03-07 RX ADMIN — LORATADINE 10 MILLIGRAM(S): 10 TABLET ORAL at 13:13

## 2019-03-07 RX ADMIN — Medication 1 MILLIGRAM(S): at 13:13

## 2019-03-07 RX ADMIN — Medication 10 MILLILITER(S): at 18:13

## 2019-03-07 RX ADMIN — Medication 1 TABLET(S): at 13:13

## 2019-03-07 RX ADMIN — AMLODIPINE BESYLATE 5 MILLIGRAM(S): 2.5 TABLET ORAL at 05:43

## 2019-03-07 RX ADMIN — Medication 400 MILLIGRAM(S): at 14:31

## 2019-03-07 RX ADMIN — ONDANSETRON 8 MILLIGRAM(S): 8 TABLET, FILM COATED ORAL at 13:15

## 2019-03-07 RX ADMIN — Medication 5 MILLILITER(S): at 18:12

## 2019-03-07 RX ADMIN — Medication 1 LOZENGE: at 13:13

## 2019-03-07 RX ADMIN — NYSTATIN CREAM 1 APPLICATION(S): 100000 CREAM TOPICAL at 22:12

## 2019-03-07 RX ADMIN — Medication 480 MICROGRAM(S): at 18:16

## 2019-03-07 RX ADMIN — Medication 10 MILLILITER(S): at 09:07

## 2019-03-07 NOTE — PROGRESS NOTE ADULT - ATTENDING COMMENTS
68 y/o F w/ IgG kappa myeloma admitted for autologous pbsct w/ Melphalan prep (100mg/m2 x 2)   Day +6 - s/p [facility] transplant; continue Zarxio   Strict I&O, daily weights, prn diuresis   Antiemetics, mouth care, skin care   Continue Acyclovir, Cipro prophylaxis; continue Mycamine as Diflucan discontinued secondary to transaminitis.  Supplement lytes  Transaminitis- increasing LFT's- off Diflucan, continue Mycamine  VOD prophylaxis  Imodium prn diarrhea  OOB, ambulate 68 y/o F w/ IgG kappa myeloma admitted for autologous pbsct w/ Melphalan prep (100mg/m2 x 2)   Day +7 - s/p [facility] transplant; continue Zarxio   Strict I&O, daily weights, prn diuresis   Antiemetics, mouth care, skin care   Continue Acyclovir, Cipro prophylaxis; continue Mycamine as Diflucan discontinued secondary to transaminitis.  Supplement lytes  Transaminitis- increasing LFT's- off Diflucan, continue Mycamine  VOD prophylaxis  Imodium prn diarrhea  OOB, ambulate 68 y/o F w/ IgG kappa myeloma admitted for autologous pbsct w/ Melphalan prep (100mg/m2 x 2)   Day +7 - s/p [facility] transplant; continue Zarxio   Strict I&O, daily weights, prn diuresis   Antiemetics, mouth care, skin care   Continue Acyclovir, Cipro prophylaxis; continue Mycamine as Diflucan discontinued secondary to transaminitis.  Supplement lytes  Transaminitis- now decreasing LFT's- off Diflucan, continue Mycamine  VOD prophylaxis  Imodium prn diarrhea  OOB, ambulate

## 2019-03-07 NOTE — PROGRESS NOTE ADULT - SUBJECTIVE AND OBJECTIVE BOX
[facility]PC Transplant Team                                                      Critical / Counseling Time Provided: 30 minutes                                                                                                                                                        Chief Complaint:     S: Patient seen and examined with HPC Transplant Team:   Denies mouth / tongue / throat pain, dyspnea, cough, nausea, vomiting, diarrhea, abdominal pain     O: Vitals:   Vital Signs Last 24 Hrs  T(C): 37.6 (07 Mar 2019 05:45), Max: 37.6 (07 Mar 2019 05:45)  T(F): 99.7 (07 Mar 2019 05:45), Max: 99.7 (07 Mar 2019 05:45)  HR: 99 (07 Mar 2019 05:45) (91 - 104)  BP: 120/71 (07 Mar 2019 05:45) (100/63 - 120/71)  BP(mean): --  RR: 18 (07 Mar 2019 05:45) (18 - 20)  SpO2: 98% (07 Mar 2019 05:45) (97% - 99%)    Admit weight:   Daily     Daily Weight in k.5 (06 Mar 2019 10:19)    Intake / Output:   -06 @ 07:01  -  -07 @ 07:00  --------------------------------------------------------  IN: 2346 mL / OUT: 2725 mL / NET: -379 mL          PE:   Oropharynx:   Oral Mucositis:                                                        Grade:   CVS:   Lungs:   Abdomen:  Extremities:   Gastric Mucositis:                                                  Grade:   Intestinal Mucositis:                                              Grade:   Skin:   TLC:   Neuro:   Pain:     Labs:   CBC Full  -  ( 06 Mar 2019 07:06 )  WBC Count : 0.1 K/uL  Hemoglobin : 7.0 g/dL  Hematocrit : 21.1 %  Platelet Count - Automated : 44 K/uL  Mean Cell Volume : 92.6 fl  Mean Cell Hemoglobin : 31.0 pg  Mean Cell Hemoglobin Concentration : 33.5 gm/dL  Auto Neutrophil # : x  Auto Lymphocyte # : x  Auto Monocyte # : x  Auto Eosinophil # : x  Auto Basophil # : x  Auto Neutrophil % : x  Auto Lymphocyte % : x  Auto Monocyte % : x  Auto Eosinophil % : x  Auto Basophil % : x                          7.0    0.1   )-----------( 44       ( 06 Mar 2019 07:06 )             21.1     03-06    144  |  110<H>  |  5<L>  ----------------------------<  91  3.7   |  24  |  0.41<L>    Ca    9.1      06 Mar 2019 07:06  Phos  3.1     03-06  Mg     2.0     03-06    TPro  4.9<L>  /  Alb  3.3  /  TBili  0.3  /  DBili  <0.1  /  AST  117<H>  /  ALT  211<H>  /  AlkPhos  90  03-06      LIVER FUNCTIONS - ( 06 Mar 2019 07:06 )  Alb: 3.3 g/dL / Pro: 4.9 g/dL / ALK PHOS: 90 U/L / ALT: 211 U/L / AST: 117 U/L / GGT: x                   Karnofsky / Lansky Scale:   GVHD:   Skin:   Liver:   Gut:   Overall Grade:       Cultures:         Radiology:       Meds:   Antimicrobials:   acyclovir   Oral Tab/Cap 400 milliGRAM(s) Oral every 8 hours  ciprofloxacin     Tablet 500 milliGRAM(s) Oral every 12 hours  clotrimazole Lozenge 1 Lozenge Oral five times a day  micafungin IVPB      micafungin IVPB 100 milliGRAM(s) IV Intermittent every 24 hours      Heme / Onc:   heparin  Infusion 330 Unit(s)/Hr IV Continuous <Continuous>      GI:  docusate sodium 100 milliGRAM(s) Oral three times a day PRN  loperamide 2 milliGRAM(s) Oral every 3 hours PRN  pantoprazole    Tablet 40 milliGRAM(s) Oral before breakfast  senna 1 Tablet(s) Oral at bedtime PRN  sodium bicarbonate Mouth Rinse 10 milliLiter(s) Swish and Spit five times a day  ursodiol Capsule 300 milliGRAM(s) Oral two times a day with meals      Cardiovascular:   amLODIPine   Tablet 5 milliGRAM(s) Oral daily  furosemide   Injectable 20 milliGRAM(s) IV Push every 24 hours PRN      Immunologic:   filgrastim-sndz Injectable 480 MICROGram(s) SubCutaneous daily      Other medications:   acetaminophen   Tablet .. 650 milliGRAM(s) Oral every 6 hours  Biotene Dry Mouth Oral Rinse 5 milliLiter(s) Swish and Spit five times a day  folic acid 1 milliGRAM(s) Oral daily  lidocaine/prilocaine Cream 1 Application(s) Topical daily  loratadine 10 milliGRAM(s) Oral daily  multivitamin 1 Tablet(s) Oral daily  nystatin Powder 1 Application(s) Topical three times a day  sodium chloride 0.9%. 1000 milliLiter(s) IV Continuous <Continuous>      PRN:   acetaminophen   Tablet .. 650 milliGRAM(s) Oral every 6 hours PRN  diphenhydrAMINE   Injectable 25 milliGRAM(s) IV Push every 4 hours PRN  docusate sodium 100 milliGRAM(s) Oral three times a day PRN  furosemide   Injectable 20 milliGRAM(s) IV Push every 24 hours PRN  loperamide 2 milliGRAM(s) Oral every 3 hours PRN  LORazepam   Injectable 1 milliGRAM(s) IV Push every 6 hours PRN  LORazepam   Injectable 1 milliGRAM(s) IV Push every 6 hours PRN  metoclopramide Injectable 10 milliGRAM(s) IV Push every 6 hours PRN  ondansetron Injectable 8 milliGRAM(s) IV Push every 8 hours PRN  senna 1 Tablet(s) Oral at bedtime PRN      A/P:   ___ year old ___  with a history of ______________________  Pre / Status Post :  Autologous / Allogeneic PBSCT / BMT day ____________    1. Infectious Disease:   Fluconazole, Acyclovir     2. VOD Prophylaxis: Actigall, Glutamine, Heparin (dosed at 100 units / kg / day)     3. GI Prophylaxis:  Protonix    4. Mouthcare - NS / NaHCO3 rinses, Mycelex, Caphosol, skin care     5. GVHD prophylaxis     6. Transfuse & replete electrolytes prn     7. IV hydration, daily weights, strict I&O, prn diuresis     8. PO intake as tolerated, nutrition follow up as needed, MVI, folic acid     9. Antiemetics, anti-diarrhea medications:   Reglan, Ativan    10. OOB as tolerated, physical therapy consult if needed     11. Monitor coags / fibrinogen 2x week, vitamin K as needed     12. Monitor closely for clinical changes, monitor for fevers     13. Emotional support provided, plan of care discussed with patient and family, questions addressed     14. Patient education done regarding chemotherapy prep, plan of care, restrictions and discharge planning     15. Continue regular social work input     I have written the above note for Dr. Rahman who performed service with me in the room.   Christiano Herrera  NP-C ([phone])    I have seen and examined patient with NP, I agree with above note as scribed. [facility] Transplant Team                                                      Critical / Counseling Time Provided: 30 minutes                                                                                                                                                        Chief Complaint: autologous transplant for treatment of multiple myeloma       S: Patient seen and examined with HPC Transplant Team:   Diarrhea 3 times since this am  Intermittent nausea, vomited x 1 today.    Denies mouth, tongue, throat pain; dyspnea, cough, abdominal pain          O: Vitals:   Vital Signs Last 24 Hrs  T(C): 37.6 (07 Mar 2019 05:45), Max: 37.6 (07 Mar 2019 05:45)  T(F): 99.7 (07 Mar 2019 05:45), Max: 99.7 (07 Mar 2019 05:45)  HR: 99 (07 Mar 2019 05:45) (91 - 104)  BP: 120/71 (07 Mar 2019 05:45) (100/63 - 120/71)  BP(mean): --  RR: 18 (07 Mar 2019 05:45) (18 - 20)  SpO2: 98% (07 Mar 2019 05:45) (97% - 99%)    Admit weight:   Daily     Daily Weight in k.5 (06 Mar 2019 10:19)    Intake / Output:   03-06 @ 07:01  -  -07 @ 07:00  --------------------------------------------------------  IN: 2346 mL / OUT: 2725 mL / NET: -379 mL    PE:   Oropharynx: no erythema or ulcers; Kepivance coating on mucosal surfaces; facial, lip swelling 2/2 Kepivance is resolved  Oral Mucositis: n/a                                                       Grade:   CVS: S1S2, RRR  Lungs: CTA B/L  Abdomen: soft, NT, ND, hyperactive BS x 4Q  Extremities: No edema in BLE  Gastric Mucositis: n/a                                                Grade:   Intestinal Mucositis: n/a                                             Grade:   Skin: Kepivance rash back,  neck, and upper chest nearly resolved  TLC: CDI  Neuro: nonfocal   Pain: Denies        Labs:   CBC Full  -  ( 06 Mar 2019 07:06 )  WBC Count : 0.1 K/uL  Hemoglobin : 7.0 g/dL  Hematocrit : 21.1 %  Platelet Count - Automated : 44 K/uL  Mean Cell Volume : 92.6 fl  Mean Cell Hemoglobin : 31.0 pg  Mean Cell Hemoglobin Concentration : 33.5 gm/dL  Auto Neutrophil # : x  Auto Lymphocyte # : x  Auto Monocyte # : x  Auto Eosinophil # : x  Auto Basophil # : x  Auto Neutrophil % : x  Auto Lymphocyte % : x  Auto Monocyte % : x  Auto Eosinophil % : x  Auto Basophil % : x                          7.0    0.1   )-----------( 44       ( 06 Mar 2019 07:06 )             21.1     03-06    144  |  110<H>  |  5<L>  ----------------------------<  91  3.7   |  24  |  0.41<L>    Ca    9.1      06 Mar 2019 07:06  Phos  3.1     03-06  Mg     2.0     03-06    TPro  4.9<L>  /  Alb  3.3  /  TBili  0.3  /  DBili  <0.1  /  AST  117<H>  /  ALT  211<H>  /  AlkPhos  90  03-06      LIVER FUNCTIONS - ( 06 Mar 2019 07:06 )  Alb: 3.3 g/dL / Pro: 4.9 g/dL / ALK PHOS: 90 U/L / ALT: 211 U/L / AST: 117 U/L / GGT: x                   Karnofsky / Lansky Scale:   GVHD:   Skin:   Liver:   Gut:   Overall Grade:       Cultures:         Radiology:       Meds:   Antimicrobials:   acyclovir   Oral Tab/Cap 400 milliGRAM(s) Oral every 8 hours  ciprofloxacin     Tablet 500 milliGRAM(s) Oral every 12 hours  clotrimazole Lozenge 1 Lozenge Oral five times a day  micafungin IVPB      micafungin IVPB 100 milliGRAM(s) IV Intermittent every 24 hours      Heme / Onc:   heparin  Infusion 330 Unit(s)/Hr IV Continuous <Continuous>      GI:  docusate sodium 100 milliGRAM(s) Oral three times a day PRN  loperamide 2 milliGRAM(s) Oral every 3 hours PRN  pantoprazole    Tablet 40 milliGRAM(s) Oral before breakfast  senna 1 Tablet(s) Oral at bedtime PRN  sodium bicarbonate Mouth Rinse 10 milliLiter(s) Swish and Spit five times a day  ursodiol Capsule 300 milliGRAM(s) Oral two times a day with meals      Cardiovascular:   amLODIPine   Tablet 5 milliGRAM(s) Oral daily  furosemide   Injectable 20 milliGRAM(s) IV Push every 24 hours PRN      Immunologic:   filgrastim-sndz Injectable 480 MICROGram(s) SubCutaneous daily      Other medications:   acetaminophen   Tablet .. 650 milliGRAM(s) Oral every 6 hours  Biotene Dry Mouth Oral Rinse 5 milliLiter(s) Swish and Spit five times a day  folic acid 1 milliGRAM(s) Oral daily  lidocaine/prilocaine Cream 1 Application(s) Topical daily  loratadine 10 milliGRAM(s) Oral daily  multivitamin 1 Tablet(s) Oral daily  nystatin Powder 1 Application(s) Topical three times a day  sodium chloride 0.9%. 1000 milliLiter(s) IV Continuous <Continuous>      PRN:   acetaminophen   Tablet .. 650 milliGRAM(s) Oral every 6 hours PRN  diphenhydrAMINE   Injectable 25 milliGRAM(s) IV Push every 4 hours PRN  docusate sodium 100 milliGRAM(s) Oral three times a day PRN  furosemide   Injectable 20 milliGRAM(s) IV Push every 24 hours PRN  loperamide 2 milliGRAM(s) Oral every 3 hours PRN  LORazepam   Injectable 1 milliGRAM(s) IV Push every 6 hours PRN  LORazepam   Injectable 1 milliGRAM(s) IV Push every 6 hours PRN  metoclopramide Injectable 10 milliGRAM(s) IV Push every 6 hours PRN  ondansetron Injectable 8 milliGRAM(s) IV Push every 8 hours PRN  senna 1 Tablet(s) Oral at bedtime PRN      A/P:  68 year old female with a history of IgG kappa Multiple Myeloma.  Completed conditioning regimen with Melphalan  Post Autologous PBSCT,  day +7  Transaminitis- increasing; start Micafungin- now improving  Continue Cipro, neutropenic - if T >/= 38C, pan cx, CXR and change cipro to cefepime 2g IV q 8   Diarrhea- CDiff negative, continue Imodium prn  Nutrition consult     1. Infectious Disease:   clotrimazole Lozenge 1 Lozenge Oral five times a day  acyclovir   Oral Tab/Cap 400 milliGRAM(s) Oral every 8 hours  ciprofloxacin     Tablet 500 milliGRAM(s) Oral every 12 hours  micafungin IVPB 100 milliGRAM(s) IV Intermittent every 24 hours    2. VOD Prophylaxis: Actigall, Glutamine, Heparin (dosed at 100 units / kg / day)     3. GI Prophylaxis:    pantoprazole    Tablet 40 milliGRAM(s) Oral before breakfast    4. Mouth care - NS / NaHCO3 rinses, Mycelex, Biotene; Skin care     5. GVHD prophylaxis: n/a    6. Transfuse & replete electrolytes prn     7. IV hydration, daily weights, strict I&O, prn diuresis     8. PO intake as tolerated, nutrition follow up as needed, MVI, folic acid     9. Antiemetics, anti-diarrhea medications:  LORazepam   Injectable 1 milliGRAM(s) IV Push every 6 hours PRN  metoclopramide Injectable 10 milliGRAM(s) IV Push every 6 hours PRN  ondansetron Injectable 8 milliGRAM(s) IV Push every 8 hours PRN     10. OOB as tolerated, physical therapy consult if needed     11. Monitor coags / fibrinogen 2x week, vitamin K as needed     12. Monitor closely for clinical changes, monitor for fevers     13. Emotional support provided, plan of care discussed and questions addressed     14. Patient education done regarding plan of care, restrictions and discharge planning     15. Continue regular social work input     I have written the above note for Dr. Rahman who performed service with me in the room.   Christiano Herrera  NP-C ([phone])    I have seen and examined patient with NP, I agree with above note as scribed. [facility] Transplant Team                                                      Critical / Counseling Time Provided: 30 minutes                                                                                                                                                        Chief Complaint: autologous transplant for treatment of multiple myeloma       S: Patient seen and examined with [facility]PC Transplant Team:   Diarrhea 3 times since this am  Intermittent nausea, vomited x 1 today.    Denies mouth, tongue, throat pain; dyspnea, cough, abdominal pain          O: Vitals:   Vital Signs Last 24 Hrs  T(C): 37.6 (07 Mar 2019 05:45), Max: 37.6 (07 Mar 2019 05:45)  T(F): 99.7 (07 Mar 2019 05:45), Max: 99.7 (07 Mar 2019 05:45)  HR: 99 (07 Mar 2019 05:45) (91 - 104)  BP: 120/71 (07 Mar 2019 05:45) (100/63 - 120/71)  BP(mean): --  RR: 18 (07 Mar 2019 05:45) (18 - 20)  SpO2: 98% (07 Mar 2019 05:45) (97% - 99%)    Admit weight: 79.2kg  Daily Weight in k.5 (06 Mar 2019 10:19)  Today's weight:     Intake / Output:   03-06 @ 07:01  -  - @ 07:00  --------------------------------------------------------  IN: 2346 mL / OUT: 2725 mL / NET: -379 mL    PE:   Oropharynx: no erythema or ulcers; Kepivance coating on mucosal surfaces; facial, lip swelling 2/2 Kepivance is resolved  Oral Mucositis: n/a                                                       Grade:   CVS: S1S2, RRR  Lungs: CTA B/L  Abdomen: soft, NT, ND, hyperactive BS x 4Q  Extremities: No edema in BLE  Gastric Mucositis: n/a                                                Grade:   Intestinal Mucositis: n/a                                             Grade:   Skin: Kepivance rash back,  neck, and upper chest nearly resolved  TLC: CDI  Neuro: nonfocal   Pain: Denies        Labs:   CBC Full  -  ( 06 Mar 2019 07:06 )  WBC Count : 0.1 K/uL  Hemoglobin : 7.0 g/dL  Hematocrit : 21.1 %  Platelet Count - Automated : 44 K/uL  Mean Cell Volume : 92.6 fl  Mean Cell Hemoglobin : 31.0 pg  Mean Cell Hemoglobin Concentration : 33.5 gm/dL  Auto Neutrophil # : x  Auto Lymphocyte # : x  Auto Monocyte # : x  Auto Eosinophil # : x  Auto Basophil # : x  Auto Neutrophil % : x  Auto Lymphocyte % : x  Auto Monocyte % : x  Auto Eosinophil % : x  Auto Basophil % : x                          7.0    0.1   )-----------( 44       ( 06 Mar 2019 07:06 )             21.1     03-06    144  |  110<H>  |  5<L>  ----------------------------<  91  3.7   |  24  |  0.41<L>    Ca    9.1      06 Mar 2019 07:06  Phos  3.1     03-06  Mg     2.0     03-06    TPro  4.9<L>  /  Alb  3.3  /  TBili  0.3  /  DBili  <0.1  /  AST  117<H>  /  ALT  211<H>  /  AlkPhos  90  03-06      LIVER FUNCTIONS - ( 06 Mar 2019 07:06 )  Alb: 3.3 g/dL / Pro: 4.9 g/dL / ALK PHOS: 90 U/L / ALT: 211 U/L / AST: 117 U/L / GGT: x           Meds:   Antimicrobials:   acyclovir   Oral Tab/Cap 400 milliGRAM(s) Oral every 8 hours  ciprofloxacin     Tablet 500 milliGRAM(s) Oral every 12 hours  clotrimazole Lozenge 1 Lozenge Oral five times a day  micafungin IVPB      micafungin IVPB 100 milliGRAM(s) IV Intermittent every 24 hours      Heme / Onc:   heparin  Infusion 330 Unit(s)/Hr IV Continuous <Continuous>      GI:  docusate sodium 100 milliGRAM(s) Oral three times a day PRN  loperamide 2 milliGRAM(s) Oral every 3 hours PRN  pantoprazole    Tablet 40 milliGRAM(s) Oral before breakfast  senna 1 Tablet(s) Oral at bedtime PRN  sodium bicarbonate Mouth Rinse 10 milliLiter(s) Swish and Spit five times a day  ursodiol Capsule 300 milliGRAM(s) Oral two times a day with meals      Cardiovascular:   amLODIPine   Tablet 5 milliGRAM(s) Oral daily  furosemide   Injectable 20 milliGRAM(s) IV Push every 24 hours PRN      Immunologic:   filgrastim-sndz Injectable 480 MICROGram(s) SubCutaneous daily      Other medications:   acetaminophen   Tablet .. 650 milliGRAM(s) Oral every 6 hours  Biotene Dry Mouth Oral Rinse 5 milliLiter(s) Swish and Spit five times a day  folic acid 1 milliGRAM(s) Oral daily  lidocaine/prilocaine Cream 1 Application(s) Topical daily  loratadine 10 milliGRAM(s) Oral daily  multivitamin 1 Tablet(s) Oral daily  nystatin Powder 1 Application(s) Topical three times a day  sodium chloride 0.9%. 1000 milliLiter(s) IV Continuous <Continuous>      PRN:   acetaminophen   Tablet .. 650 milliGRAM(s) Oral every 6 hours PRN  diphenhydrAMINE   Injectable 25 milliGRAM(s) IV Push every 4 hours PRN  docusate sodium 100 milliGRAM(s) Oral three times a day PRN  furosemide   Injectable 20 milliGRAM(s) IV Push every 24 hours PRN  loperamide 2 milliGRAM(s) Oral every 3 hours PRN  LORazepam   Injectable 1 milliGRAM(s) IV Push every 6 hours PRN  LORazepam   Injectable 1 milliGRAM(s) IV Push every 6 hours PRN  metoclopramide Injectable 10 milliGRAM(s) IV Push every 6 hours PRN  ondansetron Injectable 8 milliGRAM(s) IV Push every 8 hours PRN  senna 1 Tablet(s) Oral at bedtime PRN      A/P:  68 year old female with a history of IgG kappa Multiple Myeloma.  Completed conditioning regimen with Melphalan  Post Autologous PBSCT,  day +7  Transaminitis stable - contine mycafungin for now   Continue Cipro, neutropenic - if T >/= 38C, pan cx, CXR and change cipro to cefepime 2g IV q 8   Diarrhea- CDiff negative, continue Imodium prn  Nutrition consult     1. Infectious Disease:   clotrimazole Lozenge 1 Lozenge Oral five times a day  acyclovir   Oral Tab/Cap 400 milliGRAM(s) Oral every 8 hours  ciprofloxacin     Tablet 500 milliGRAM(s) Oral every 12 hours  micafungin IVPB 100 milliGRAM(s) IV Intermittent every 24 hours    2. VOD Prophylaxis: Actigall, Glutamine, Heparin (dosed at 100 units / kg / day)     3. GI Prophylaxis:    pantoprazole    Tablet 40 milliGRAM(s) Oral before breakfast    4. Mouth care - NS / NaHCO3 rinses, Mycelex, Biotene; Skin care     5. GVHD prophylaxis: n/a    6. Transfuse & replete electrolytes prn     7. IV hydration, daily weights, strict I&O, prn diuresis     8. PO intake as tolerated, nutrition follow up as needed, MVI, folic acid     9. Antiemetics, anti-diarrhea medications:  LORazepam   Injectable 1 milliGRAM(s) IV Push every 6 hours PRN  metoclopramide Injectable 10 milliGRAM(s) IV Push every 6 hours PRN  ondansetron Injectable 8 milliGRAM(s) IV Push every 8 hours PRN     10. OOB as tolerated, physical therapy consult if needed     11. Monitor coags / fibrinogen 2x week, vitamin K as needed     12. Monitor closely for clinical changes, monitor for fevers     13. Emotional support provided, plan of care discussed and questions addressed     14. Patient education done regarding plan of care, restrictions and discharge planning     15. Continue regular social work input     I have written the above note for Dr. Rahman who performed service with me in the room.   Christiano Herrera  NP-C ([phone])    I have seen and examined patient with NP, I agree with above note as scribed. [facility] Transplant Team                                                      Critical / Counseling Time Provided: 30 minutes                                                                                                                                                        Chief Complaint: autologous transplant for treatment of multiple myeloma       S: Patient seen and examined with [facility]PC Transplant Team:   Diarrhea 3 times since this am  Intermittent nausea, vomited x 1 today.    Denies mouth, tongue, throat pain; dyspnea, cough, abdominal pain          O: Vitals:   Vital Signs Last 24 Hrs  T(C): 37.6 (07 Mar 2019 05:45), Max: 37.6 (07 Mar 2019 05:45)  T(F): 99.7 (07 Mar 2019 05:45), Max: 99.7 (07 Mar 2019 05:45)  HR: 99 (07 Mar 2019 05:45) (91 - 104)  BP: 120/71 (07 Mar 2019 05:45) (100/63 - 120/71)  BP(mean): --  RR: 18 (07 Mar 2019 05:45) (18 - 20)  SpO2: 98% (07 Mar 2019 05:45) (97% - 99%)    Admit weight: 79.2kg  Daily Weight in k.5 (06 Mar 2019 10:19)  Today's weight: 78.1kg     Intake / Output:   - @ 07:01  -  - @ 07:00  --------------------------------------------------------  IN: 2346 mL / OUT: 2725 mL / NET: -379 mL    PE:   Oropharynx: no erythema or ulcers; Kepivance coating on mucosal surfaces; facial, lip swelling 2/2 Kepivance is resolved  Oral Mucositis: n/a                                                       Grade:   CVS: S1S2, RRR  Lungs: CTA B/L  Abdomen: soft, NT, ND, hyperactive BS x 4Q  Extremities: No edema in BLE  Gastric Mucositis: n/a                                                Grade:   Intestinal Mucositis: n/a                                             Grade:   Skin: Kepivance rash back,  neck, and upper chest nearly resolved  TLC: CDI  Neuro: nonfocal   Pain: Denies        Labs:   CBC Full  -  ( 06 Mar 2019 07:06 )  WBC Count : 0.1 K/uL  Hemoglobin : 7.0 g/dL  Hematocrit : 21.1 %  Platelet Count - Automated : 44 K/uL  Mean Cell Volume : 92.6 fl  Mean Cell Hemoglobin : 31.0 pg  Mean Cell Hemoglobin Concentration : 33.5 gm/dL  Auto Neutrophil # : x  Auto Lymphocyte # : x  Auto Monocyte # : x  Auto Eosinophil # : x  Auto Basophil # : x  Auto Neutrophil % : x  Auto Lymphocyte % : x  Auto Monocyte % : x  Auto Eosinophil % : x  Auto Basophil % : x                          7.0    0.1   )-----------( 44       ( 06 Mar 2019 07:06 )             21.1     03-06    144  |  110<H>  |  5<L>  ----------------------------<  91  3.7   |  24  |  0.41<L>    Ca    9.1      06 Mar 2019 07:06  Phos  3.1     03-06  Mg     2.0     03-06    TPro  4.9<L>  /  Alb  3.3  /  TBili  0.3  /  DBili  <0.1  /  AST  117<H>  /  ALT  211<H>  /  AlkPhos  90  03-06      LIVER FUNCTIONS - ( 06 Mar 2019 07:06 )  Alb: 3.3 g/dL / Pro: 4.9 g/dL / ALK PHOS: 90 U/L / ALT: 211 U/L / AST: 117 U/L / GGT: x           Meds:   Antimicrobials:   acyclovir   Oral Tab/Cap 400 milliGRAM(s) Oral every 8 hours  ciprofloxacin     Tablet 500 milliGRAM(s) Oral every 12 hours  clotrimazole Lozenge 1 Lozenge Oral five times a day  micafungin IVPB      micafungin IVPB 100 milliGRAM(s) IV Intermittent every 24 hours      Heme / Onc:   heparin  Infusion 330 Unit(s)/Hr IV Continuous <Continuous>      GI:  docusate sodium 100 milliGRAM(s) Oral three times a day PRN  loperamide 2 milliGRAM(s) Oral every 3 hours PRN  pantoprazole    Tablet 40 milliGRAM(s) Oral before breakfast  senna 1 Tablet(s) Oral at bedtime PRN  sodium bicarbonate Mouth Rinse 10 milliLiter(s) Swish and Spit five times a day  ursodiol Capsule 300 milliGRAM(s) Oral two times a day with meals      Cardiovascular:   amLODIPine   Tablet 5 milliGRAM(s) Oral daily  furosemide   Injectable 20 milliGRAM(s) IV Push every 24 hours PRN      Immunologic:   filgrastim-sndz Injectable 480 MICROGram(s) SubCutaneous daily      Other medications:   acetaminophen   Tablet .. 650 milliGRAM(s) Oral every 6 hours  Biotene Dry Mouth Oral Rinse 5 milliLiter(s) Swish and Spit five times a day  folic acid 1 milliGRAM(s) Oral daily  lidocaine/prilocaine Cream 1 Application(s) Topical daily  loratadine 10 milliGRAM(s) Oral daily  multivitamin 1 Tablet(s) Oral daily  nystatin Powder 1 Application(s) Topical three times a day  sodium chloride 0.9%. 1000 milliLiter(s) IV Continuous <Continuous>      PRN:   acetaminophen   Tablet .. 650 milliGRAM(s) Oral every 6 hours PRN  diphenhydrAMINE   Injectable 25 milliGRAM(s) IV Push every 4 hours PRN  docusate sodium 100 milliGRAM(s) Oral three times a day PRN  furosemide   Injectable 20 milliGRAM(s) IV Push every 24 hours PRN  loperamide 2 milliGRAM(s) Oral every 3 hours PRN  LORazepam   Injectable 1 milliGRAM(s) IV Push every 6 hours PRN  LORazepam   Injectable 1 milliGRAM(s) IV Push every 6 hours PRN  metoclopramide Injectable 10 milliGRAM(s) IV Push every 6 hours PRN  ondansetron Injectable 8 milliGRAM(s) IV Push every 8 hours PRN  senna 1 Tablet(s) Oral at bedtime PRN      A/P:  68 year old female with a history of IgG kappa Multiple Myeloma.  Completed conditioning regimen with Melphalan  Post Autologous PBSCT,  day +7  Transaminitis stable - contine mycafungin for now   Continue Cipro, neutropenic - if T >/= 38C, pan cx, CXR and change cipro to cefepime 2g IV q 8   Diarrhea- CDiff negative, continue Imodium prn  Nutrition consult     1. Infectious Disease:   clotrimazole Lozenge 1 Lozenge Oral five times a day  acyclovir   Oral Tab/Cap 400 milliGRAM(s) Oral every 8 hours  ciprofloxacin     Tablet 500 milliGRAM(s) Oral every 12 hours  micafungin IVPB 100 milliGRAM(s) IV Intermittent every 24 hours    2. VOD Prophylaxis: Actigall, Glutamine, Heparin (dosed at 100 units / kg / day)     3. GI Prophylaxis:    pantoprazole    Tablet 40 milliGRAM(s) Oral before breakfast    4. Mouth care - NS / NaHCO3 rinses, Mycelex, Biotene; Skin care     5. GVHD prophylaxis: n/a    6. Transfuse & replete electrolytes prn     7. IV hydration, daily weights, strict I&O, prn diuresis     8. PO intake as tolerated, nutrition follow up as needed, MVI, folic acid     9. Antiemetics, anti-diarrhea medications:  LORazepam   Injectable 1 milliGRAM(s) IV Push every 6 hours PRN  metoclopramide Injectable 10 milliGRAM(s) IV Push every 6 hours PRN  ondansetron Injectable 8 milliGRAM(s) IV Push every 8 hours PRN     10. OOB as tolerated, physical therapy consult if needed     11. Monitor coags / fibrinogen 2x week, vitamin K as needed     12. Monitor closely for clinical changes, monitor for fevers     13. Emotional support provided, plan of care discussed and questions addressed     14. Patient education done regarding plan of care, restrictions and discharge planning     15. Continue regular social work input     I have written the above note for Dr. Rahman who performed service with me in the room.   Christiano Herrera  NP-C ([phone])    I have seen and examined patient with NP, I agree with above note as scribed. [facility] Transplant Team                                                      Critical / Counseling Time Provided: 30 minutes                                                                                                                                                        Chief Complaint: autologous transplant for treatment of multiple myeloma     S: Patient seen and examined with [facility]PC Transplant Team:   Feel better today.  Diarrhea 2 times yesterday  Denies mouth, tongue, throat pain; dyspnea, cough, nausea, vomiting or abdominal pain      O: Vitals:   Vital Signs Last 24 Hrs  T(C): 37.6 (07 Mar 2019 05:45), Max: 37.6 (07 Mar 2019 05:45)  T(F): 99.7 (07 Mar 2019 05:45), Max: 99.7 (07 Mar 2019 05:45)  HR: 99 (07 Mar 2019 05:45) (91 - 104)  BP: 120/71 (07 Mar 2019 05:45) (100/63 - 120/71)  BP(mean): --  RR: 18 (07 Mar 2019 05:45) (18 - 20)  SpO2: 98% (07 Mar 2019 05:45) (97% - 99%)    Admit weight: 79.2kg  Daily Weight in k.5 (06 Mar 2019 10:19)  Today's weight: 78.1kg     Intake / Output:   - @ 07:01  -  - @ 07:00  --------------------------------------------------------  IN: 2346 mL / OUT: 2725 mL / NET: -379 mL    PE:   Oropharynx: no erythema or ulcers; Kepivance coating on mucosal surfaces; facial, lip swelling 2/2 Kepivance is resolved  Oral Mucositis: n/a                                                       Grade:   CVS: S1S2, RRR  Lungs: CTA B/L  Abdomen: soft, NT, ND, hyperactive BS x 4Q  Extremities: No edema in BLE  Gastric Mucositis: n/a                                                Grade:   Intestinal Mucositis: n/a                                             Grade:   Skin: Kepivance rash back,  neck, and upper chest nearly resolved  TLC: CDI  Neuro: nonfocal   Pain: Denies    Labs:   CBC Full  -  ( 06 Mar 2019 07:06 )  WBC Count : 0.1 K/uL  Hemoglobin : 7.0 g/dL  Hematocrit : 21.1 %  Platelet Count - Automated : 44 K/uL  Mean Cell Volume : 92.6 fl  Mean Cell Hemoglobin : 31.0 pg  Mean Cell Hemoglobin Concentration : 33.5 gm/dL  Auto Neutrophil # : x  Auto Lymphocyte # : x  Auto Monocyte # : x  Auto Eosinophil # : x  Auto Basophil # : x  Auto Neutrophil % : x  Auto Lymphocyte % : x  Auto Monocyte % : x  Auto Eosinophil % : x  Auto Basophil % : x                          7.0    0.1   )-----------( 44       ( 06 Mar 2019 07:06 )             21.1     03-06    144  |  110<H>  |  5<L>  ----------------------------<  91  3.7   |  24  |  0.41<L>    Ca    9.1      06 Mar 2019 07:06  Phos  3.1     03-06  Mg     2.0     03-06    TPro  4.9<L>  /  Alb  3.3  /  TBili  0.3  /  DBili  <0.1  /  AST  117<H>  /  ALT  211<H>  /  AlkPhos  90  03-06      LIVER FUNCTIONS - ( 06 Mar 2019 07:06 )  Alb: 3.3 g/dL / Pro: 4.9 g/dL / ALK PHOS: 90 U/L / ALT: 211 U/L / AST: 117 U/L / GGT: x           Meds:   Antimicrobials:   acyclovir   Oral Tab/Cap 400 milliGRAM(s) Oral every 8 hours  ciprofloxacin     Tablet 500 milliGRAM(s) Oral every 12 hours  clotrimazole Lozenge 1 Lozenge Oral five times a day  micafungin IVPB      micafungin IVPB 100 milliGRAM(s) IV Intermittent every 24 hours      Heme / Onc:   heparin  Infusion 330 Unit(s)/Hr IV Continuous <Continuous>      GI:  docusate sodium 100 milliGRAM(s) Oral three times a day PRN  loperamide 2 milliGRAM(s) Oral every 3 hours PRN  pantoprazole    Tablet 40 milliGRAM(s) Oral before breakfast  senna 1 Tablet(s) Oral at bedtime PRN  sodium bicarbonate Mouth Rinse 10 milliLiter(s) Swish and Spit five times a day  ursodiol Capsule 300 milliGRAM(s) Oral two times a day with meals      Cardiovascular:   amLODIPine   Tablet 5 milliGRAM(s) Oral daily  furosemide   Injectable 20 milliGRAM(s) IV Push every 24 hours PRN      Immunologic:   filgrastim-sndz Injectable 480 MICROGram(s) SubCutaneous daily      Other medications:   acetaminophen   Tablet .. 650 milliGRAM(s) Oral every 6 hours  Biotene Dry Mouth Oral Rinse 5 milliLiter(s) Swish and Spit five times a day  folic acid 1 milliGRAM(s) Oral daily  lidocaine/prilocaine Cream 1 Application(s) Topical daily  loratadine 10 milliGRAM(s) Oral daily  multivitamin 1 Tablet(s) Oral daily  nystatin Powder 1 Application(s) Topical three times a day  sodium chloride 0.9%. 1000 milliLiter(s) IV Continuous <Continuous>      PRN:   acetaminophen   Tablet .. 650 milliGRAM(s) Oral every 6 hours PRN  diphenhydrAMINE   Injectable 25 milliGRAM(s) IV Push every 4 hours PRN  docusate sodium 100 milliGRAM(s) Oral three times a day PRN  furosemide   Injectable 20 milliGRAM(s) IV Push every 24 hours PRN  loperamide 2 milliGRAM(s) Oral every 3 hours PRN  LORazepam   Injectable 1 milliGRAM(s) IV Push every 6 hours PRN  LORazepam   Injectable 1 milliGRAM(s) IV Push every 6 hours PRN  metoclopramide Injectable 10 milliGRAM(s) IV Push every 6 hours PRN  ondansetron Injectable 8 milliGRAM(s) IV Push every 8 hours PRN  senna 1 Tablet(s) Oral at bedtime PRN      A/P:  68 year old female with a history of IgG kappa Multiple Myeloma.  Completed conditioning regimen with Melphalan  Post Autologous PBSCT,  day +7  Transaminitis stable - contine mycafungin for now   Continue Cipro, neutropenic - if T >/= 38C, pan cx, CXR and change cipro to cefepime 2g IV q 8   Diarrhea- CDiff negative, continue Imodium prn  Nutrition consult     1. Infectious Disease:   clotrimazole Lozenge 1 Lozenge Oral five times a day  acyclovir   Oral Tab/Cap 400 milliGRAM(s) Oral every 8 hours  ciprofloxacin     Tablet 500 milliGRAM(s) Oral every 12 hours  micafungin IVPB 100 milliGRAM(s) IV Intermittent every 24 hours    2. VOD Prophylaxis: Actigall, Glutamine, Heparin (dosed at 100 units / kg / day)     3. GI Prophylaxis:    pantoprazole    Tablet 40 milliGRAM(s) Oral before breakfast    4. Mouth care - NS / NaHCO3 rinses, Mycelex, Biotene; Skin care     5. GVHD prophylaxis: n/a    6. Transfuse & replete electrolytes prn     7. IV hydration, daily weights, strict I&O, prn diuresis     8. PO intake as tolerated, nutrition follow up as needed, MVI, folic acid     9. Antiemetics, anti-diarrhea medications:  LORazepam   Injectable 1 milliGRAM(s) IV Push every 6 hours PRN  metoclopramide Injectable 10 milliGRAM(s) IV Push every 6 hours PRN  ondansetron Injectable 8 milliGRAM(s) IV Push every 8 hours PRN     10. OOB as tolerated, physical therapy consult if needed     11. Monitor coags / fibrinogen 2x week, vitamin K as needed     12. Monitor closely for clinical changes, monitor for fevers     13. Emotional support provided, plan of care discussed and questions addressed     14. Patient education done regarding plan of care, restrictions and discharge planning     15. Continue regular social work input     I have written the above note for Dr. Rahman who performed service with me in the room.   Christiano Herrera  NP-C ([phone])    I have seen and examined patient with NP, I agree with above note as scribed. [facility] Transplant Team                                                      Critical / Counseling Time Provided: 30 minutes                                                                                                                                                        Chief Complaint: autologous transplant for treatment of multiple myeloma     S: Patient seen and examined with [facility] Transplant Team:   Feel better today.  Diarrhea 2 times yesterday  Denies mouth, tongue, throat pain; dyspnea, cough, nausea, vomiting or abdominal pain      O: Vitals:   Vital Signs Last 24 Hrs  T(C): 37.6 (07 Mar 2019 05:45), Max: 37.6 (07 Mar 2019 05:45)  T(F): 99.7 (07 Mar 2019 05:45), Max: 99.7 (07 Mar 2019 05:45)  HR: 99 (07 Mar 2019 05:45) (91 - 104)  BP: 120/71 (07 Mar 2019 05:45) (100/63 - 120/71)  BP(mean): --  RR: 18 (07 Mar 2019 05:45) (18 - 20)  SpO2: 98% (07 Mar 2019 05:45) (97% - 99%)    Admit weight: 58.5 kg  Daily Weight in k.2kg    Intake / Output:   03-06 @ 07:01  -  03-07 @ 07:00  --------------------------------------------------------  IN: 2346 mL / OUT: 2725 mL / NET: -379 mL    PE:   Oropharynx: no erythema or ulcers; Kepivance coating on mucosal surfaces; facial, lip swelling 2/2 Kepivance is resolved  Oral Mucositis: n/a                                                       Grade:   CVS: S1S2, RRR  Lungs: CTA B/L  Abdomen: soft, NT, ND, hyperactive BS x 4Q  Extremities: No edema in BLE  Gastric Mucositis: n/a                                                Grade:   Intestinal Mucositis: n/a                                             Grade:   Skin: Kepivance rash back,  neck, and upper chest nearly resolved  TLC: CDI  Neuro: nonfocal   Pain: Denies    LABS:                         7.1    0.1   )-----------( 19       ( 07 Mar 2019 07:14 )             19.8         Mean Cell Volume : 92.1 fl  Mean Cell Hemoglobin : 32.9 pg  Mean Cell Hemoglobin Concentration : 35.7 gm/dL  Auto Neutrophil # : x  Auto Lymphocyte # : x  Auto Monocyte # : x  Auto Eosinophil # : x  Auto Basophil # : x  Auto Neutrophil % : x  Auto Lymphocyte % : x  Auto Monocyte % : x  Auto Eosinophil % : x  Auto Basophil % : x          140  |  107  |  6<L>  ----------------------------<  95  3.3<L>   |  24  |  0.45<L>    Ca    8.8      07 Mar 2019 07:12  Phos  3.6     03-07  Mg     1.9     03-07    TPro  5.0<L>  /  Alb  3.2<L>  /  TBili  0.2  /  DBili  x   /  AST  69<H>  /  ALT  170<H>  /  AlkPhos  87  03-07      Mg 1.9  Phos 3.6  PT/INR - ( 07 Mar 2019 07:19 )   PT: 11.3 sec;   INR: 0.99 ratio    PTT - ( 07 Mar 2019 07:19 )  PTT:31.3 sec    Uric Acid --    Meds:   Antimicrobials:   acyclovir   Oral Tab/Cap 400 milliGRAM(s) Oral every 8 hours  ciprofloxacin     Tablet 500 milliGRAM(s) Oral every 12 hours  clotrimazole Lozenge 1 Lozenge Oral five times a day  micafungin IVPB      micafungin IVPB 100 milliGRAM(s) IV Intermittent every 24 hours      Heme / Onc:   heparin  Infusion 330 Unit(s)/Hr IV Continuous <Continuous>      GI:  docusate sodium 100 milliGRAM(s) Oral three times a day PRN  loperamide 2 milliGRAM(s) Oral every 3 hours PRN  pantoprazole    Tablet 40 milliGRAM(s) Oral before breakfast  senna 1 Tablet(s) Oral at bedtime PRN  sodium bicarbonate Mouth Rinse 10 milliLiter(s) Swish and Spit five times a day  ursodiol Capsule 300 milliGRAM(s) Oral two times a day with meals      Cardiovascular:   amLODIPine   Tablet 5 milliGRAM(s) Oral daily  furosemide   Injectable 20 milliGRAM(s) IV Push every 24 hours PRN      Immunologic:   filgrastim-sndz Injectable 480 MICROGram(s) SubCutaneous daily      Other medications:   acetaminophen   Tablet .. 650 milliGRAM(s) Oral every 6 hours  Biotene Dry Mouth Oral Rinse 5 milliLiter(s) Swish and Spit five times a day  folic acid 1 milliGRAM(s) Oral daily  lidocaine/prilocaine Cream 1 Application(s) Topical daily  loratadine 10 milliGRAM(s) Oral daily  multivitamin 1 Tablet(s) Oral daily  nystatin Powder 1 Application(s) Topical three times a day  sodium chloride 0.9%. 1000 milliLiter(s) IV Continuous <Continuous>      PRN:   acetaminophen   Tablet .. 650 milliGRAM(s) Oral every 6 hours PRN  diphenhydrAMINE   Injectable 25 milliGRAM(s) IV Push every 4 hours PRN  docusate sodium 100 milliGRAM(s) Oral three times a day PRN  furosemide   Injectable 20 milliGRAM(s) IV Push every 24 hours PRN  loperamide 2 milliGRAM(s) Oral every 3 hours PRN  LORazepam   Injectable 1 milliGRAM(s) IV Push every 6 hours PRN  LORazepam   Injectable 1 milliGRAM(s) IV Push every 6 hours PRN  metoclopramide Injectable 10 milliGRAM(s) IV Push every 6 hours PRN  ondansetron Injectable 8 milliGRAM(s) IV Push every 8 hours PRN  senna 1 Tablet(s) Oral at bedtime PRN      A/P:  68 year old female with a history of IgG kappa Multiple Myeloma.  Completed conditioning regimen with Melphalan  Post Autologous PBSCT,  day +7  Transaminitis stable - contine mycafungin for now   Continue Cipro, neutropenic - if T >/= 38C, pan cx, CXR and change cipro to cefepime 2g IV q 8   Diarrhea- CDiff negative, continue Imodium prn  Nutrition consult .  S/P fall O/N, (unwitnessed) CT head (-), bilateral knee and hip X rays  (-).    1. Infectious Disease:   clotrimazole Lozenge 1 Lozenge Oral five times a day  acyclovir   Oral Tab/Cap 400 milliGRAM(s) Oral every 8 hours  ciprofloxacin     Tablet 500 milliGRAM(s) Oral every 12 hours  micafungin IVPB 100 milliGRAM(s) IV Intermittent every 24 hours    2. VOD Prophylaxis: Actigall, Glutamine, Heparin (dosed at 100 units / kg / day)     3. GI Prophylaxis:    pantoprazole    Tablet 40 milliGRAM(s) Oral before breakfast    4. Mouth care - NS / NaHCO3 rinses, Mycelex, Biotene; Skin care     5. GVHD prophylaxis: n/a    6. Transfuse & replete electrolytes prn     7. IV hydration, daily weights, strict I&O, prn diuresis     8. PO intake as tolerated, nutrition follow up as needed, MVI, folic acid     9. Antiemetics, anti-diarrhea medications:  LORazepam   Injectable 1 milliGRAM(s) IV Push every 6 hours PRN  metoclopramide Injectable 10 milliGRAM(s) IV Push every 6 hours PRN  ondansetron Injectable 8 milliGRAM(s) IV Push every 8 hours PRN     10. OOB as tolerated, physical therapy consult if needed     11. Monitor coags / fibrinogen 2x week, vitamin K as needed     12. Monitor closely for clinical changes, monitor for fevers     13. Emotional support provided, plan of care discussed and questions addressed     14. Patient education done regarding plan of care, restrictions and discharge planning     15. Continue regular social work input     I have written the above note for Dr. Rahman who performed service with me in the room.   Christiano Herrera  NP-C ([phone])    I have seen and examined patient with NP, I agree with above note as scribed. [facility] Transplant Team                                                      Critical / Counseling Time Provided: 30 minutes                                                                                                                                                        Chief Complaint: autologous transplant for treatment of multiple myeloma     S: Patient seen and examined with [facility] Transplant Team:   Feel better today.  Diarrhea 2 times yesterday  Denies mouth, tongue, throat pain; dyspnea, cough, nausea, vomiting or abdominal pain      O: Vitals:   Vital Signs Last 24 Hrs  T(C): 37.6 (07 Mar 2019 05:45), Max: 37.6 (07 Mar 2019 05:45)  T(F): 99.7 (07 Mar 2019 05:45), Max: 99.7 (07 Mar 2019 05:45)  HR: 99 (07 Mar 2019 05:45) (91 - 104)  BP: 120/71 (07 Mar 2019 05:45) (100/63 - 120/71)  BP(mean): --  RR: 18 (07 Mar 2019 05:45) (18 - 20)  SpO2: 98% (07 Mar 2019 05:45) (97% - 99%)    Admit weight: 58.5 kg  Daily Weight in k.2kg    Intake / Output:   03-06 @ 07:01  -  03-07 @ 07:00  --------------------------------------------------------  IN: 2346 mL / OUT: 2725 mL / NET: -379 mL    PE:   Oropharynx: no erythema or ulcers; Kepivance coating on mucosal surfaces; facial, lip swelling 2/2 Kepivance is resolved  Oral Mucositis: n/a                                                       Grade:   CVS: S1S2, RRR  Lungs: CTA B/L  Abdomen: soft, NT, ND, hyperactive BS x 4Q  Extremities: No edema in BLE  Gastric Mucositis: n/a                                                Grade:   Intestinal Mucositis: n/a                                             Grade:   Skin: Kepivance rash back,  neck, and upper chest nearly resolved  TLC: CDI  Neuro: nonfocal   Pain: Denies    LABS:                         7.1    0.1   )-----------( 19       ( 07 Mar 2019 07:14 )             19.8         Mean Cell Volume : 92.1 fl  Mean Cell Hemoglobin : 32.9 pg  Mean Cell Hemoglobin Concentration : 35.7 gm/dL  Auto Neutrophil # : x  Auto Lymphocyte # : x  Auto Monocyte # : x  Auto Eosinophil # : x  Auto Basophil # : x  Auto Neutrophil % : x  Auto Lymphocyte % : x  Auto Monocyte % : x  Auto Eosinophil % : x  Auto Basophil % : x          140  |  107  |  6<L>  ----------------------------<  95  3.3<L>   |  24  |  0.45<L>    Ca    8.8      07 Mar 2019 07:12  Phos  3.6     03-07  Mg     1.9     03-07    TPro  5.0<L>  /  Alb  3.2<L>  /  TBili  0.2  /  DBili  x   /  AST  69<H>  /  ALT  170<H>  /  AlkPhos  87  03-07      Mg 1.9  Phos 3.6  PT/INR - ( 07 Mar 2019 07:19 )   PT: 11.3 sec;   INR: 0.99 ratio    PTT - ( 07 Mar 2019 07:19 )  PTT:31.3 sec    Uric Acid --    Meds:   Antimicrobials:   acyclovir   Oral Tab/Cap 400 milliGRAM(s) Oral every 8 hours  ciprofloxacin     Tablet 500 milliGRAM(s) Oral every 12 hours  clotrimazole Lozenge 1 Lozenge Oral five times a day  micafungin IVPB      micafungin IVPB 100 milliGRAM(s) IV Intermittent every 24 hours      Heme / Onc:   heparin  Infusion 330 Unit(s)/Hr IV Continuous <Continuous>      GI:  docusate sodium 100 milliGRAM(s) Oral three times a day PRN  loperamide 2 milliGRAM(s) Oral every 3 hours PRN  pantoprazole    Tablet 40 milliGRAM(s) Oral before breakfast  senna 1 Tablet(s) Oral at bedtime PRN  sodium bicarbonate Mouth Rinse 10 milliLiter(s) Swish and Spit five times a day  ursodiol Capsule 300 milliGRAM(s) Oral two times a day with meals      Cardiovascular:   amLODIPine   Tablet 5 milliGRAM(s) Oral daily  furosemide   Injectable 20 milliGRAM(s) IV Push every 24 hours PRN      Immunologic:   filgrastim-sndz Injectable 480 MICROGram(s) SubCutaneous daily      Other medications:   acetaminophen   Tablet .. 650 milliGRAM(s) Oral every 6 hours  Biotene Dry Mouth Oral Rinse 5 milliLiter(s) Swish and Spit five times a day  folic acid 1 milliGRAM(s) Oral daily  lidocaine/prilocaine Cream 1 Application(s) Topical daily  loratadine 10 milliGRAM(s) Oral daily  multivitamin 1 Tablet(s) Oral daily  nystatin Powder 1 Application(s) Topical three times a day  sodium chloride 0.9%. 1000 milliLiter(s) IV Continuous <Continuous>      PRN:   acetaminophen   Tablet .. 650 milliGRAM(s) Oral every 6 hours PRN  diphenhydrAMINE   Injectable 25 milliGRAM(s) IV Push every 4 hours PRN  docusate sodium 100 milliGRAM(s) Oral three times a day PRN  furosemide   Injectable 20 milliGRAM(s) IV Push every 24 hours PRN  loperamide 2 milliGRAM(s) Oral every 3 hours PRN  LORazepam   Injectable 1 milliGRAM(s) IV Push every 6 hours PRN  LORazepam   Injectable 1 milliGRAM(s) IV Push every 6 hours PRN  metoclopramide Injectable 10 milliGRAM(s) IV Push every 6 hours PRN  ondansetron Injectable 8 milliGRAM(s) IV Push every 8 hours PRN  senna 1 Tablet(s) Oral at bedtime PRN      A/P:  68 year old female with a history of IgG kappa Multiple Myeloma.  Completed conditioning regimen with Melphalan  Post Autologous PBSCT,  day +7  Transaminitis improving - contine mycafungin for now   Continue Cipro, neutropenic - if T >/= 38C, pan cx, CXR and change cipro to cefepime 2g IV q 8   Diarrhea- CDiff negative, continue Imodium prn  Nutrition consult .    1. Infectious Disease:   clotrimazole Lozenge 1 Lozenge Oral five times a day  acyclovir   Oral Tab/Cap 400 milliGRAM(s) Oral every 8 hours  ciprofloxacin     Tablet 500 milliGRAM(s) Oral every 12 hours  micafungin IVPB 100 milliGRAM(s) IV Intermittent every 24 hours    2. VOD Prophylaxis: Actigall, Glutamine, Heparin (dosed at 100 units / kg / day)     3. GI Prophylaxis:    pantoprazole    Tablet 40 milliGRAM(s) Oral before breakfast    4. Mouth care - NS / NaHCO3 rinses, Mycelex, Biotene; Skin care     5. GVHD prophylaxis: n/a    6. Transfuse & replete electrolytes prn     7. IV hydration, daily weights, strict I&O, prn diuresis     8. PO intake as tolerated, nutrition follow up as needed, MVI, folic acid     9. Antiemetics, anti-diarrhea medications:  LORazepam   Injectable 1 milliGRAM(s) IV Push every 6 hours PRN  metoclopramide Injectable 10 milliGRAM(s) IV Push every 6 hours PRN  ondansetron Injectable 8 milliGRAM(s) IV Push every 8 hours PRN     10. OOB as tolerated, physical therapy consult if needed     11. Monitor coags / fibrinogen 2x week, vitamin K as needed     12. Monitor closely for clinical changes, monitor for fevers     13. Emotional support provided, plan of care discussed and questions addressed     14. Patient education done regarding plan of care, restrictions and discharge planning     15. Continue regular social work input     I have written the above note for Dr. Rahman who performed service with me in the room.   Christiano Herrera  NP-C ([phone])    I have seen and examined patient with NP, I agree with above note as scribed.

## 2019-03-08 LAB
ALBUMIN SERPL ELPH-MCNC: 3.2 G/DL — LOW (ref 3.3–5)
ALP SERPL-CCNC: 85 U/L — SIGNIFICANT CHANGE UP (ref 40–120)
ALT FLD-CCNC: 131 U/L — HIGH (ref 10–45)
ANION GAP SERPL CALC-SCNC: 10 MMOL/L — SIGNIFICANT CHANGE UP (ref 5–17)
AST SERPL-CCNC: 44 U/L — HIGH (ref 10–40)
BILIRUB DIRECT SERPL-MCNC: <0.1 MG/DL — SIGNIFICANT CHANGE UP (ref 0–0.2)
BILIRUB INDIRECT FLD-MCNC: >0.1 MG/DL — LOW (ref 0.2–1)
BILIRUB SERPL-MCNC: 0.2 MG/DL — SIGNIFICANT CHANGE UP (ref 0.2–1.2)
BLD GP AB SCN SERPL QL: NEGATIVE — SIGNIFICANT CHANGE UP
BUN SERPL-MCNC: 6 MG/DL — LOW (ref 7–23)
CALCIUM SERPL-MCNC: 8.8 MG/DL — SIGNIFICANT CHANGE UP (ref 8.4–10.5)
CHLORIDE SERPL-SCNC: 109 MMOL/L — HIGH (ref 96–108)
CO2 SERPL-SCNC: 23 MMOL/L — SIGNIFICANT CHANGE UP (ref 22–31)
CREAT SERPL-MCNC: 0.46 MG/DL — LOW (ref 0.5–1.3)
GLUCOSE SERPL-MCNC: 97 MG/DL — SIGNIFICANT CHANGE UP (ref 70–99)
HCT VFR BLD CALC: 17.7 % — CRITICAL LOW (ref 34.5–45)
HGB BLD-MCNC: 6.6 G/DL — CRITICAL LOW (ref 11.5–15.5)
LDH SERPL L TO P-CCNC: 122 U/L — SIGNIFICANT CHANGE UP (ref 50–242)
MAGNESIUM SERPL-MCNC: 1.9 MG/DL — SIGNIFICANT CHANGE UP (ref 1.6–2.6)
MCHC RBC-ENTMCNC: 33.5 PG — SIGNIFICANT CHANGE UP (ref 27–34)
MCHC RBC-ENTMCNC: 37 GM/DL — HIGH (ref 32–36)
MCV RBC AUTO: 90.6 FL — SIGNIFICANT CHANGE UP (ref 80–100)
PHOSPHATE SERPL-MCNC: 3.2 MG/DL — SIGNIFICANT CHANGE UP (ref 2.5–4.5)
PLATELET # BLD AUTO: 7 K/UL — CRITICAL LOW (ref 150–400)
POTASSIUM SERPL-MCNC: 3.6 MMOL/L — SIGNIFICANT CHANGE UP (ref 3.5–5.3)
POTASSIUM SERPL-SCNC: 3.6 MMOL/L — SIGNIFICANT CHANGE UP (ref 3.5–5.3)
PROT SERPL-MCNC: 4.9 G/DL — LOW (ref 6–8.3)
RBC # BLD: 1.96 M/UL — LOW (ref 3.8–5.2)
RBC # FLD: 13.4 % — SIGNIFICANT CHANGE UP (ref 10.3–14.5)
RH IG SCN BLD-IMP: POSITIVE — SIGNIFICANT CHANGE UP
SODIUM SERPL-SCNC: 142 MMOL/L — SIGNIFICANT CHANGE UP (ref 135–145)
WBC # BLD: 0.1 K/UL — CRITICAL LOW (ref 3.8–10.5)
WBC # FLD AUTO: 0.1 K/UL — CRITICAL LOW (ref 3.8–10.5)

## 2019-03-08 PROCEDURE — 99291 CRITICAL CARE FIRST HOUR: CPT

## 2019-03-08 RX ORDER — HYDROMORPHONE HYDROCHLORIDE 2 MG/ML
0.5 INJECTION INTRAMUSCULAR; INTRAVENOUS; SUBCUTANEOUS EVERY 4 HOURS
Qty: 0 | Refills: 0 | Status: DISCONTINUED | OUTPATIENT
Start: 2019-03-08 | End: 2019-03-12

## 2019-03-08 RX ORDER — BENZOCAINE AND MENTHOL 5; 1 G/100ML; G/100ML
1 LIQUID ORAL EVERY 4 HOURS
Qty: 0 | Refills: 0 | Status: DISCONTINUED | OUTPATIENT
Start: 2019-03-08 | End: 2019-03-15

## 2019-03-08 RX ADMIN — NYSTATIN CREAM 1 APPLICATION(S): 100000 CREAM TOPICAL at 21:11

## 2019-03-08 RX ADMIN — Medication 1 LOZENGE: at 16:09

## 2019-03-08 RX ADMIN — Medication 1 LOZENGE: at 00:57

## 2019-03-08 RX ADMIN — Medication 1 LOZENGE: at 19:51

## 2019-03-08 RX ADMIN — LORATADINE 10 MILLIGRAM(S): 10 TABLET ORAL at 12:28

## 2019-03-08 RX ADMIN — NYSTATIN CREAM 1 APPLICATION(S): 100000 CREAM TOPICAL at 14:55

## 2019-03-08 RX ADMIN — Medication 1 MILLIGRAM(S): at 12:29

## 2019-03-08 RX ADMIN — BENZOCAINE AND MENTHOL 1 LOZENGE: 5; 1 LIQUID ORAL at 21:00

## 2019-03-08 RX ADMIN — Medication 10 MILLILITER(S): at 00:57

## 2019-03-08 RX ADMIN — Medication 5 MILLILITER(S): at 23:28

## 2019-03-08 RX ADMIN — Medication 650 MILLIGRAM(S): at 08:40

## 2019-03-08 RX ADMIN — Medication 500 MILLIGRAM(S): at 17:20

## 2019-03-08 RX ADMIN — Medication 1 LOZENGE: at 08:56

## 2019-03-08 RX ADMIN — Medication 500 MILLIGRAM(S): at 05:00

## 2019-03-08 RX ADMIN — NYSTATIN CREAM 1 APPLICATION(S): 100000 CREAM TOPICAL at 05:01

## 2019-03-08 RX ADMIN — Medication 1 LOZENGE: at 12:30

## 2019-03-08 RX ADMIN — HEPARIN SODIUM 3.3 UNIT(S)/HR: 5000 INJECTION INTRAVENOUS; SUBCUTANEOUS at 04:57

## 2019-03-08 RX ADMIN — Medication 10 MILLILITER(S): at 08:56

## 2019-03-08 RX ADMIN — PANTOPRAZOLE SODIUM 40 MILLIGRAM(S): 20 TABLET, DELAYED RELEASE ORAL at 06:09

## 2019-03-08 RX ADMIN — Medication 480 MICROGRAM(S): at 14:54

## 2019-03-08 RX ADMIN — Medication 10 MILLILITER(S): at 16:09

## 2019-03-08 RX ADMIN — Medication 10 MILLILITER(S): at 19:51

## 2019-03-08 RX ADMIN — Medication 1 LOZENGE: at 23:28

## 2019-03-08 RX ADMIN — Medication 5 MILLILITER(S): at 19:51

## 2019-03-08 RX ADMIN — SODIUM CHLORIDE 20 MILLILITER(S): 9 INJECTION INTRAMUSCULAR; INTRAVENOUS; SUBCUTANEOUS at 04:58

## 2019-03-08 RX ADMIN — Medication 5 MILLILITER(S): at 12:30

## 2019-03-08 RX ADMIN — BENZOCAINE AND MENTHOL 1 LOZENGE: 5; 1 LIQUID ORAL at 12:00

## 2019-03-08 RX ADMIN — Medication 5 MILLILITER(S): at 08:56

## 2019-03-08 RX ADMIN — Medication 1 TABLET(S): at 12:29

## 2019-03-08 RX ADMIN — Medication 400 MILLIGRAM(S): at 23:28

## 2019-03-08 RX ADMIN — Medication 5 MILLILITER(S): at 00:57

## 2019-03-08 RX ADMIN — MICAFUNGIN SODIUM 105 MILLIGRAM(S): 100 INJECTION, POWDER, LYOPHILIZED, FOR SOLUTION INTRAVENOUS at 12:00

## 2019-03-08 RX ADMIN — Medication 25 MILLIGRAM(S): at 08:40

## 2019-03-08 RX ADMIN — HEPARIN SODIUM 3.3 UNIT(S)/HR: 5000 INJECTION INTRAVENOUS; SUBCUTANEOUS at 23:29

## 2019-03-08 RX ADMIN — Medication 5 MILLILITER(S): at 16:09

## 2019-03-08 RX ADMIN — Medication 400 MILLIGRAM(S): at 14:54

## 2019-03-08 RX ADMIN — Medication 400 MILLIGRAM(S): at 05:00

## 2019-03-08 RX ADMIN — URSODIOL 300 MILLIGRAM(S): 250 TABLET, FILM COATED ORAL at 17:20

## 2019-03-08 RX ADMIN — URSODIOL 300 MILLIGRAM(S): 250 TABLET, FILM COATED ORAL at 08:56

## 2019-03-08 RX ADMIN — Medication 10 MILLILITER(S): at 23:28

## 2019-03-08 RX ADMIN — Medication 10 MILLILITER(S): at 12:30

## 2019-03-08 NOTE — PROGRESS NOTE ADULT - ATTENDING COMMENTS
70 y/o F w/ IgG kappa myeloma admitted for autologous pbsct w/ Melphalan prep (100mg/m2 x 2)   Day +7 - s/p HPC transplant; continue Zarxio   Strict I&O, daily weights, prn diuresis   Antiemetics, mouth care, skin care   Continue Acyclovir, Cipro prophylaxis; continue Mycamine as Diflucan discontinued secondary to transaminitis.  Supplement lytes  Transaminitis- now decreasing LFT's- off Diflucan, continue Mycamine  VOD prophylaxis  Imodium prn diarrhea  OOB, ambulate 68 y/o F w/ IgG kappa myeloma admitted for autologous pbsct w/ Melphalan prep (100mg/m2 x 2)   Day +8 - s/p HPC transplant; continue Zarxio   Strict I&O, daily weights, prn diuresis   Antiemetics, mouth care, skin care   Mucositis secondary to antineoplastic chemotherapy- continue mouth care, pain control  Continue Acyclovir, Cipro prophylaxis; continue Mycamine as Diflucan discontinued secondary to transaminitis.  Supplement lytes  Transaminitis- now decreasing LFT's- off Diflucan, continue Mycamine  VOD prophylaxis  Imodium prn diarrhea  OOB, ambulate

## 2019-03-08 NOTE — PROGRESS NOTE ADULT - SUBJECTIVE AND OBJECTIVE BOX
[facility] Transplant Team                                                      Critical / Counseling Time Provided: 30 minutes                                                                                                                                                        Chief Complaint: autologous transplant for treatment of multiple myeloma     S: Patient seen and examined with [facility] Transplant Team:   Feel better today.  Diarrhea 2 times yesterday  Denies mouth, tongue, throat pain; dyspnea, cough, nausea, vomiting or abdominal pain     Vital Signs Last 24 Hrs  T(C): 37.4 (08 Mar 2019 06:00), Max: 37.4 (07 Mar 2019 17:08)  T(F): 99.3 (08 Mar 2019 06:00), Max: 99.3 (07 Mar 2019 17:08)  HR: 91 (08 Mar 2019 06:00) (91 - 101)  BP: 103/66 (08 Mar 2019 06:00) (101/64 - 110/71)  BP(mean): --  RR: 18 (08 Mar 2019 06:00) (18 - 18)  SpO2: 96% (08 Mar 2019 06:00) (96% - 98%)    Admit weight: 58.5 kg  Daily Weight in kG:     I&O's Summary    07 Mar 2019 07:01  -  08 Mar 2019 07:00  --------------------------------------------------------  IN: 2030 mL / OUT: 1900 mL / NET: 130 mL    PE:   Oropharynx: no erythema or ulcers; Kepivance coating on mucosal surfaces; facial, lip swelling 2/2 Kepivance is resolved  Oral Mucositis: n/a                                                       Grade:   CVS: S1S2, RRR  Lungs: CTA B/L  Abdomen: soft, NT, ND, hyperactive BS x 4Q  Extremities: No edema in BLE  Gastric Mucositis: n/a                                                Grade:   Intestinal Mucositis: n/a                                             Grade:   Skin: Kepivance rash back,  neck, and upper chest nearly resolved  TLC: CDI  Neuro: nonfocal   Pain: Denies    LABS:                        6.6    0.1   )-----------( 7        ( 08 Mar 2019 06:48 )             17.7     03-08    142  |  109<H>  |  6<L>  ----------------------------<  97  3.6   |  23  |  0.46<L>    Ca    8.8      08 Mar 2019 06:48  Phos  3.2     03-08  Mg     1.9     03-08    TPro  4.9<L>  /  Alb  3.2<L>  /  TBili  0.2  /  DBili  <0.1  /  AST  44<H>  /  ALT  131<H>  /  AlkPhos  85  03-08    PT/INR - ( 07 Mar 2019 07:19 )   PT: 11.3 sec;   INR: 0.99 ratio      PTT - ( 07 Mar 2019 07:19 )  PTT:31.3 sec    MEDICATIONS  (STANDING):  acetaminophen   Tablet .. 650 milliGRAM(s) Oral every 6 hours  acyclovir   Oral Tab/Cap 400 milliGRAM(s) Oral every 8 hours  amLODIPine   Tablet 5 milliGRAM(s) Oral daily  Biotene Dry Mouth Oral Rinse 5 milliLiter(s) Swish and Spit five times a day  ciprofloxacin     Tablet 500 milliGRAM(s) Oral every 12 hours  clotrimazole Lozenge 1 Lozenge Oral five times a day  filgrastim-sndz Injectable 480 MICROGram(s) SubCutaneous daily  folic acid 1 milliGRAM(s) Oral daily  heparin  Infusion 330 Unit(s)/Hr (3.3 mL/Hr) IV Continuous <Continuous>  lidocaine/prilocaine Cream 1 Application(s) Topical daily  loratadine 10 milliGRAM(s) Oral daily  micafungin IVPB      micafungin IVPB 100 milliGRAM(s) IV Intermittent every 24 hours  multivitamin 1 Tablet(s) Oral daily  nystatin Powder 1 Application(s) Topical three times a day  pantoprazole    Tablet 40 milliGRAM(s) Oral before breakfast  sodium bicarbonate Mouth Rinse 10 milliLiter(s) Swish and Spit five times a day  sodium chloride 0.9%. 1000 milliLiter(s) (20 mL/Hr) IV Continuous <Continuous>  ursodiol Capsule 300 milliGRAM(s) Oral two times a day with meals    MEDICATIONS  (PRN):  acetaminophen   Tablet .. 650 milliGRAM(s) Oral every 6 hours PRN Temp greater or equal to 38C (100.4F), Mild Pain (1 - 3)  diphenhydrAMINE   Injectable 25 milliGRAM(s) IV Push every 4 hours PRN Allergy symptoms  docusate sodium 100 milliGRAM(s) Oral three times a day PRN Constipation  furosemide   Injectable 20 milliGRAM(s) IV Push every 24 hours PRN if urine output< 100 ml/hr x 2 hours  loperamide 2 milliGRAM(s) Oral every 3 hours PRN Diarrhea  LORazepam   Injectable 1 milliGRAM(s) IV Push every 6 hours PRN Anxiety  LORazepam   Injectable 1 milliGRAM(s) IV Push every 6 hours PRN Nausea and/or Vomiting  metoclopramide Injectable 10 milliGRAM(s) IV Push every 6 hours PRN Nausea and/or Vomiting  ondansetron Injectable 8 milliGRAM(s) IV Push every 8 hours PRN Nausea and/or Vomiting  senna 1 Tablet(s) Oral at bedtime PRN Constipation    A/P:  68 year old female with a history of IgG kappa Multiple Myeloma.  Completed conditioning regimen with Melphalan  Post Autologous PBSCT,  day +8  Transaminitis improving - continue Micafungin for now   Continue Cipro, neutropenic - if T >/= 38C, pan cx, CXR and change cipro to cefepime 2g IV q 8   Diarrhea- CDiff negative, continue Imodium prn  Nutrition consult .    1. Infectious Disease:   clotrimazole Lozenge 1 Lozenge Oral five times a day  acyclovir   Oral Tab/Cap 400 milliGRAM(s) Oral every 8 hours  ciprofloxacin     Tablet 500 milliGRAM(s) Oral every 12 hours  micafungin IVPB 100 milliGRAM(s) IV Intermittent every 24 hours    2. VOD Prophylaxis: Actigall, Glutamine, Heparin (dosed at 100 units / kg / day)     3. GI Prophylaxis:    pantoprazole    Tablet 40 milliGRAM(s) Oral before breakfast    4. Mouth care - NS / NaHCO3 rinses, Mycelex, Biotene; Skin care     5. GVHD prophylaxis: n/a    6. Transfuse & replete electrolytes prn   Transfuse 1U PRBCs, infuse 1 unit platelets    7. IV hydration, daily weights, strict I&O, prn diuresis     8. PO intake as tolerated, nutrition follow up as needed, MVI, folic acid     9. Antiemetics, anti-diarrhea medications:  LORazepam   Injectable 1 milliGRAM(s) IV Push every 6 hours PRN  metoclopramide Injectable 10 milliGRAM(s) IV Push every 6 hours PRN  ondansetron Injectable 8 milliGRAM(s) IV Push every 8 hours PRN     10. OOB as tolerated, physical therapy consult if needed     11. Monitor coags / fibrinogen 2x week, vitamin K as needed     12. Monitor closely for clinical changes, monitor for fevers     13. Emotional support provided, plan of care discussed and questions addressed     14. Patient education done regarding plan of care, restrictions and discharge planning     15. Continue regular social work input     I have written the above note for Dr. Rahman who performed service with me in the room.   Lisa Dukes, ANP-BC ([phone])    I have seen and examined patient with NP, I agree with above note as scribed. [facility] Transplant Team                                                      Critical / Counseling Time Provided: 30 minutes                                                                                                                                                        Chief Complaint: autologous transplant for treatment of multiple myeloma     S: Patient seen and examined with [facility] Transplant Team:     Feel better today.  Diarrhea 2 times yesterday    Denies mouth, tongue, throat pain; dyspnea, cough, nausea, vomiting or abdominal pain     Vital Signs Last 24 Hrs  T(C): 37.4 (08 Mar 2019 06:00), Max: 37.4 (07 Mar 2019 17:08)  T(F): 99.3 (08 Mar 2019 06:00), Max: 99.3 (07 Mar 2019 17:08)  HR: 91 (08 Mar 2019 06:00) (91 - 101)  BP: 103/66 (08 Mar 2019 06:00) (101/64 - 110/71)  BP(mean): --  RR: 18 (08 Mar 2019 06:00) (18 - 18)  SpO2: 96% (08 Mar 2019 06:00) (96% - 98%)    Admit weight: 58.5 kg  Daily Weight in kG:     I&O's Summary    07 Mar 2019 07:01  -  08 Mar 2019 07:00  --------------------------------------------------------  IN: 2030 mL / OUT: 1900 mL / NET: 130 mL    PE:   Oropharynx: no erythema or ulcers; Kepivance coating on mucosal surfaces; facial, lip swelling 2/2 Kepivance is resolved  Oral Mucositis: n/a                                                       Grade:   CVS: S1S2, RRR  Lungs: CTA B/L  Abdomen: soft, NT, ND, hyperactive BS x 4Q  Extremities: No edema in BLE  Gastric Mucositis: n/a                                                Grade:   Intestinal Mucositis: n/a                                             Grade:   Skin: Kepivance rash back,  neck, and upper chest nearly resolved  TLC: CDI  Neuro: nonfocal   Pain: Denies    LABS:                        6.6    0.1   )-----------( 7        ( 08 Mar 2019 06:48 )             17.7     03-08    142  |  109<H>  |  6<L>  ----------------------------<  97  3.6   |  23  |  0.46<L>    Ca    8.8      08 Mar 2019 06:48  Phos  3.2     03-08  Mg     1.9     03-08    TPro  4.9<L>  /  Alb  3.2<L>  /  TBili  0.2  /  DBili  <0.1  /  AST  44<H>  /  ALT  131<H>  /  AlkPhos  85  03-08    PT/INR - ( 07 Mar 2019 07:19 )   PT: 11.3 sec;   INR: 0.99 ratio      PTT - ( 07 Mar 2019 07:19 )  PTT:31.3 sec    MEDICATIONS  (STANDING):  acetaminophen   Tablet .. 650 milliGRAM(s) Oral every 6 hours  acyclovir   Oral Tab/Cap 400 milliGRAM(s) Oral every 8 hours  amLODIPine   Tablet 5 milliGRAM(s) Oral daily  Biotene Dry Mouth Oral Rinse 5 milliLiter(s) Swish and Spit five times a day  ciprofloxacin     Tablet 500 milliGRAM(s) Oral every 12 hours  clotrimazole Lozenge 1 Lozenge Oral five times a day  filgrastim-sndz Injectable 480 MICROGram(s) SubCutaneous daily  folic acid 1 milliGRAM(s) Oral daily  heparin  Infusion 330 Unit(s)/Hr (3.3 mL/Hr) IV Continuous <Continuous>  lidocaine/prilocaine Cream 1 Application(s) Topical daily  loratadine 10 milliGRAM(s) Oral daily  micafungin IVPB      micafungin IVPB 100 milliGRAM(s) IV Intermittent every 24 hours  multivitamin 1 Tablet(s) Oral daily  nystatin Powder 1 Application(s) Topical three times a day  pantoprazole    Tablet 40 milliGRAM(s) Oral before breakfast  sodium bicarbonate Mouth Rinse 10 milliLiter(s) Swish and Spit five times a day  sodium chloride 0.9%. 1000 milliLiter(s) (20 mL/Hr) IV Continuous <Continuous>  ursodiol Capsule 300 milliGRAM(s) Oral two times a day with meals    MEDICATIONS  (PRN):  acetaminophen   Tablet .. 650 milliGRAM(s) Oral every 6 hours PRN Temp greater or equal to 38C (100.4F), Mild Pain (1 - 3)  diphenhydrAMINE   Injectable 25 milliGRAM(s) IV Push every 4 hours PRN Allergy symptoms  docusate sodium 100 milliGRAM(s) Oral three times a day PRN Constipation  furosemide   Injectable 20 milliGRAM(s) IV Push every 24 hours PRN if urine output< 100 ml/hr x 2 hours  loperamide 2 milliGRAM(s) Oral every 3 hours PRN Diarrhea  LORazepam   Injectable 1 milliGRAM(s) IV Push every 6 hours PRN Anxiety  LORazepam   Injectable 1 milliGRAM(s) IV Push every 6 hours PRN Nausea and/or Vomiting  metoclopramide Injectable 10 milliGRAM(s) IV Push every 6 hours PRN Nausea and/or Vomiting  ondansetron Injectable 8 milliGRAM(s) IV Push every 8 hours PRN Nausea and/or Vomiting  senna 1 Tablet(s) Oral at bedtime PRN Constipation    A/P:  68 year old female with a history of IgG kappa Multiple Myeloma.  Completed conditioning regimen with Melphalan  Post Autologous PBSCT,  day +8  Transaminitis improving - continue Micafungin for now   Continue Cipro, neutropenic - if T >/= 38C, pan cx, CXR and change cipro to cefepime 2g IV q 8   Diarrhea- CDiff negative, continue Imodium prn  Nutrition consult .    1. Infectious Disease:   clotrimazole Lozenge 1 Lozenge Oral five times a day  acyclovir   Oral Tab/Cap 400 milliGRAM(s) Oral every 8 hours  ciprofloxacin     Tablet 500 milliGRAM(s) Oral every 12 hours  micafungin IVPB 100 milliGRAM(s) IV Intermittent every 24 hours    2. VOD Prophylaxis: Actigall, Glutamine, Heparin (dosed at 100 units / kg / day)     3. GI Prophylaxis:    pantoprazole    Tablet 40 milliGRAM(s) Oral before breakfast    4. Mouth care - NS / NaHCO3 rinses, Mycelex, Biotene; Skin care     5. GVHD prophylaxis: n/a    6. Transfuse & replete electrolytes prn   Transfuse 1U PRBCs, infuse 1 unit platelets    7. IV hydration, daily weights, strict I&O, prn diuresis     8. PO intake as tolerated, nutrition follow up as needed, MVI, folic acid     9. Antiemetics, anti-diarrhea medications:  LORazepam   Injectable 1 milliGRAM(s) IV Push every 6 hours PRN  metoclopramide Injectable 10 milliGRAM(s) IV Push every 6 hours PRN  ondansetron Injectable 8 milliGRAM(s) IV Push every 8 hours PRN     10. OOB as tolerated, physical therapy consult if needed     11. Monitor coags / fibrinogen 2x week, vitamin K as needed     12. Monitor closely for clinical changes, monitor for fevers     13. Emotional support provided, plan of care discussed and questions addressed     14. Patient education done regarding plan of care, restrictions and discharge planning     15. Continue regular social work input     I have written the above note for Dr. Rahman who performed service with me in the room.   Lisa Dukes, ANP-BC ([phone])    I have seen and examined patient with NP, I agree with above note as scribed. [facility] Transplant Team                                                      Critical / Counseling Time Provided: 30 minutes                                                                                                                                                        Chief Complaint: autologous transplant for treatment of multiple myeloma     S: Patient seen and examined with [facility] Transplant Team:     + right sided throat pain started last night, 5/10  + loose stools, 6x in last 24 hours    Denies dyspnea, cough, nausea, vomiting or abdominal pain     Vital Signs Last 24 Hrs  T(C): 37.4 (08 Mar 2019 06:00), Max: 37.4 (07 Mar 2019 17:08)  T(F): 99.3 (08 Mar 2019 06:00), Max: 99.3 (07 Mar 2019 17:08)  HR: 91 (08 Mar 2019 06:00) (91 - 101)  BP: 103/66 (08 Mar 2019 06:00) (101/64 - 110/71)  BP(mean): --  RR: 18 (08 Mar 2019 06:00) (18 - 18)  SpO2: 96% (08 Mar 2019 06:00) (96% - 98%)    Admit weight: 79.2 kg  Daily Weight in k.7 kg    I&O's Summary    07 Mar 2019 07:01  -  08 Mar 2019 07:00  --------------------------------------------------------  IN: 2030 mL / OUT: 1900 mL / NET: 130 mL    PE:   Oropharynx: no erythema or ulcers  Oral Mucositis: n/a                                                       Grade:   CVS: S1S2, RRR  Lungs: CTA B/L  Abdomen: soft, NT, ND, hyperactive BS x 4Q  Extremities: No edema in BLE  Gastric Mucositis: n/a                                                Grade:   Intestinal Mucositis: n/a                                             Grade:   Skin: hyperpigmentation 2/2 Kepivance on neck, and upper chest   TLC: CDI  Neuro: nonfocal   Pain: 5/10 throat pain on swallow    LABS:                        6.6    0.1   )-----------( 7        ( 08 Mar 2019 06:48 )             17.7     03-08    142  |  109<H>  |  6<L>  ----------------------------<  97  3.6   |  23  |  0.46<L>    Ca    8.8      08 Mar 2019 06:48  Phos  3.2     03-08  Mg     1.9     03-08    TPro  4.9<L>  /  Alb  3.2<L>  /  TBili  0.2  /  DBili  <0.1  /  AST  44<H>  /  ALT  131<H>  /  AlkPhos  85  03-08    PT/INR - ( 07 Mar 2019 07:19 )   PT: 11.3 sec;   INR: 0.99 ratio      PTT - ( 07 Mar 2019 07:19 )  PTT:31.3 sec    MEDICATIONS  (STANDING):  acetaminophen   Tablet .. 650 milliGRAM(s) Oral every 6 hours  acyclovir   Oral Tab/Cap 400 milliGRAM(s) Oral every 8 hours  amLODIPine   Tablet 5 milliGRAM(s) Oral daily  Biotene Dry Mouth Oral Rinse 5 milliLiter(s) Swish and Spit five times a day  ciprofloxacin     Tablet 500 milliGRAM(s) Oral every 12 hours  clotrimazole Lozenge 1 Lozenge Oral five times a day  filgrastim-sndz Injectable 480 MICROGram(s) SubCutaneous daily  folic acid 1 milliGRAM(s) Oral daily  heparin  Infusion 330 Unit(s)/Hr (3.3 mL/Hr) IV Continuous <Continuous>  lidocaine/prilocaine Cream 1 Application(s) Topical daily  loratadine 10 milliGRAM(s) Oral daily  micafungin IVPB      micafungin IVPB 100 milliGRAM(s) IV Intermittent every 24 hours  multivitamin 1 Tablet(s) Oral daily  nystatin Powder 1 Application(s) Topical three times a day  pantoprazole    Tablet 40 milliGRAM(s) Oral before breakfast  sodium bicarbonate Mouth Rinse 10 milliLiter(s) Swish and Spit five times a day  sodium chloride 0.9%. 1000 milliLiter(s) (20 mL/Hr) IV Continuous <Continuous>  ursodiol Capsule 300 milliGRAM(s) Oral two times a day with meals    MEDICATIONS  (PRN):  acetaminophen   Tablet .. 650 milliGRAM(s) Oral every 6 hours PRN Temp greater or equal to 38C (100.4F), Mild Pain (1 - 3)  diphenhydrAMINE   Injectable 25 milliGRAM(s) IV Push every 4 hours PRN Allergy symptoms  docusate sodium 100 milliGRAM(s) Oral three times a day PRN Constipation  furosemide   Injectable 20 milliGRAM(s) IV Push every 24 hours PRN if urine output< 100 ml/hr x 2 hours  loperamide 2 milliGRAM(s) Oral every 3 hours PRN Diarrhea  LORazepam   Injectable 1 milliGRAM(s) IV Push every 6 hours PRN Anxiety  LORazepam   Injectable 1 milliGRAM(s) IV Push every 6 hours PRN Nausea and/or Vomiting  metoclopramide Injectable 10 milliGRAM(s) IV Push every 6 hours PRN Nausea and/or Vomiting  ondansetron Injectable 8 milliGRAM(s) IV Push every 8 hours PRN Nausea and/or Vomiting  senna 1 Tablet(s) Oral at bedtime PRN Constipation    A/P:  68 year old female with a history of IgG kappa Multiple Myeloma.  Completed conditioning regimen with Melphalan  Post Autologous PBSCT,  day +8  Transaminitis improving - continue Micafungin for now   Continue Cipro, neutropenic - if T >/= 38C, pan cx, CXR and change cipro to cefepime 2g IV q 8   Diarrhea- CDiff negative, encourage Imodium prn  Nutrition consult   Mouthpain 5/10 on swallow, Dilaudid ordered PRN    1. Infectious Disease:   clotrimazole Lozenge 1 Lozenge Oral five times a day  acyclovir   Oral Tab/Cap 400 milliGRAM(s) Oral every 8 hours  ciprofloxacin     Tablet 500 milliGRAM(s) Oral every 12 hours  micafungin IVPB 100 milliGRAM(s) IV Intermittent every 24 hours    2. VOD Prophylaxis: Actigall, Glutamine, Heparin (dosed at 100 units / kg / day)     3. GI Prophylaxis:    pantoprazole    Tablet 40 milliGRAM(s) Oral before breakfast    4. Mouth care - NS / NaHCO3 rinses, Mycelex, Biotene; Skin care     5. GVHD prophylaxis: n/a    6. Transfuse & replete electrolytes prn   Transfuse 1U PRBCs, infuse 1 unit platelets    7. IV hydration, daily weights, strict I&O, prn diuresis     8. PO intake as tolerated, nutrition follow up as needed, MVI, folic acid     9. Antiemetics, anti-diarrhea medications:  LORazepam   Injectable 1 milliGRAM(s) IV Push every 6 hours PRN  metoclopramide Injectable 10 milliGRAM(s) IV Push every 6 hours PRN  ondansetron Injectable 8 milliGRAM(s) IV Push every 8 hours PRN     10. OOB as tolerated, physical therapy consult if needed     11. Monitor coags / fibrinogen 2x week, vitamin K as needed     12. Monitor closely for clinical changes, monitor for fevers     13. Emotional support provided, plan of care discussed and questions addressed     14. Patient education done regarding plan of care, restrictions and discharge planning     15. Continue regular social work input     I have written the above note for Dr. Rahman who performed service with me in the room.   Lisa Dukes, ANP-BC ([phone])    I have seen and examined patient with NP, I agree with above note as scribed.

## 2019-03-09 LAB
ALBUMIN SERPL ELPH-MCNC: 3.2 G/DL — LOW (ref 3.3–5)
ALP SERPL-CCNC: 83 U/L — SIGNIFICANT CHANGE UP (ref 40–120)
ALT FLD-CCNC: 108 U/L — HIGH (ref 10–45)
ANION GAP SERPL CALC-SCNC: 13 MMOL/L — SIGNIFICANT CHANGE UP (ref 5–17)
AST SERPL-CCNC: 36 U/L — SIGNIFICANT CHANGE UP (ref 10–40)
BILIRUB SERPL-MCNC: 0.3 MG/DL — SIGNIFICANT CHANGE UP (ref 0.2–1.2)
BUN SERPL-MCNC: 4 MG/DL — LOW (ref 7–23)
CALCIUM SERPL-MCNC: 8.5 MG/DL — SIGNIFICANT CHANGE UP (ref 8.4–10.5)
CHLORIDE SERPL-SCNC: 108 MMOL/L — SIGNIFICANT CHANGE UP (ref 96–108)
CO2 SERPL-SCNC: 23 MMOL/L — SIGNIFICANT CHANGE UP (ref 22–31)
CREAT SERPL-MCNC: 0.41 MG/DL — LOW (ref 0.5–1.3)
GLUCOSE SERPL-MCNC: 84 MG/DL — SIGNIFICANT CHANGE UP (ref 70–99)
HCT VFR BLD CALC: 21.5 % — LOW (ref 34.5–45)
HGB BLD-MCNC: 7.8 G/DL — LOW (ref 11.5–15.5)
LDH SERPL L TO P-CCNC: 133 U/L — SIGNIFICANT CHANGE UP (ref 50–242)
MAGNESIUM SERPL-MCNC: 1.9 MG/DL — SIGNIFICANT CHANGE UP (ref 1.6–2.6)
MCHC RBC-ENTMCNC: 32.7 PG — SIGNIFICANT CHANGE UP (ref 27–34)
MCHC RBC-ENTMCNC: 36.1 GM/DL — HIGH (ref 32–36)
MCV RBC AUTO: 90.7 FL — SIGNIFICANT CHANGE UP (ref 80–100)
PHOSPHATE SERPL-MCNC: 2.9 MG/DL — SIGNIFICANT CHANGE UP (ref 2.5–4.5)
PLATELET # BLD AUTO: 24 K/UL — LOW (ref 150–400)
POTASSIUM SERPL-MCNC: 3.3 MMOL/L — LOW (ref 3.5–5.3)
POTASSIUM SERPL-SCNC: 3.3 MMOL/L — LOW (ref 3.5–5.3)
PROT SERPL-MCNC: 4.9 G/DL — LOW (ref 6–8.3)
RBC # BLD: 2.37 M/UL — LOW (ref 3.8–5.2)
RBC # FLD: 13 % — SIGNIFICANT CHANGE UP (ref 10.3–14.5)
SODIUM SERPL-SCNC: 144 MMOL/L — SIGNIFICANT CHANGE UP (ref 135–145)
WBC # BLD: 0.1 K/UL — CRITICAL LOW (ref 3.8–10.5)
WBC # FLD AUTO: 0.1 K/UL — CRITICAL LOW (ref 3.8–10.5)

## 2019-03-09 PROCEDURE — 99291 CRITICAL CARE FIRST HOUR: CPT

## 2019-03-09 RX ORDER — DIPHENHYDRAMINE HYDROCHLORIDE AND LIDOCAINE HYDROCHLORIDE AND ALUMINUM HYDROXIDE AND MAGNESIUM HYDRO
5 KIT ONCE
Qty: 0 | Refills: 0 | Status: COMPLETED | OUTPATIENT
Start: 2019-03-09 | End: 2019-03-09

## 2019-03-09 RX ORDER — POTASSIUM CHLORIDE 20 MEQ
20 PACKET (EA) ORAL
Qty: 0 | Refills: 0 | Status: COMPLETED | OUTPATIENT
Start: 2019-03-09 | End: 2019-03-09

## 2019-03-09 RX ADMIN — Medication 400 MILLIGRAM(S): at 05:59

## 2019-03-09 RX ADMIN — URSODIOL 300 MILLIGRAM(S): 250 TABLET, FILM COATED ORAL at 17:05

## 2019-03-09 RX ADMIN — Medication 500 MILLIGRAM(S): at 05:59

## 2019-03-09 RX ADMIN — Medication 1 LOZENGE: at 19:51

## 2019-03-09 RX ADMIN — Medication 1 TABLET(S): at 12:23

## 2019-03-09 RX ADMIN — URSODIOL 300 MILLIGRAM(S): 250 TABLET, FILM COATED ORAL at 08:49

## 2019-03-09 RX ADMIN — MICAFUNGIN SODIUM 105 MILLIGRAM(S): 100 INJECTION, POWDER, LYOPHILIZED, FOR SOLUTION INTRAVENOUS at 10:07

## 2019-03-09 RX ADMIN — Medication 1 LOZENGE: at 23:15

## 2019-03-09 RX ADMIN — LORATADINE 10 MILLIGRAM(S): 10 TABLET ORAL at 12:23

## 2019-03-09 RX ADMIN — Medication 1 LOZENGE: at 08:49

## 2019-03-09 RX ADMIN — Medication 2 MILLIGRAM(S): at 09:13

## 2019-03-09 RX ADMIN — Medication 50 MILLIEQUIVALENT(S): at 10:08

## 2019-03-09 RX ADMIN — Medication 2 MILLIGRAM(S): at 21:06

## 2019-03-09 RX ADMIN — Medication 10 MILLILITER(S): at 19:51

## 2019-03-09 RX ADMIN — Medication 5 MILLILITER(S): at 23:15

## 2019-03-09 RX ADMIN — Medication 5 MILLILITER(S): at 19:51

## 2019-03-09 RX ADMIN — Medication 400 MILLIGRAM(S): at 13:56

## 2019-03-09 RX ADMIN — Medication 1 LOZENGE: at 12:23

## 2019-03-09 RX ADMIN — Medication 50 MILLIEQUIVALENT(S): at 08:49

## 2019-03-09 RX ADMIN — DIPHENHYDRAMINE HYDROCHLORIDE AND LIDOCAINE HYDROCHLORIDE AND ALUMINUM HYDROXIDE AND MAGNESIUM HYDRO 5 MILLILITER(S): KIT at 06:33

## 2019-03-09 RX ADMIN — NYSTATIN CREAM 1 APPLICATION(S): 100000 CREAM TOPICAL at 21:02

## 2019-03-09 RX ADMIN — AMLODIPINE BESYLATE 5 MILLIGRAM(S): 2.5 TABLET ORAL at 05:59

## 2019-03-09 RX ADMIN — PANTOPRAZOLE SODIUM 40 MILLIGRAM(S): 20 TABLET, DELAYED RELEASE ORAL at 06:00

## 2019-03-09 RX ADMIN — Medication 1 LOZENGE: at 17:06

## 2019-03-09 RX ADMIN — Medication 1 MILLIGRAM(S): at 12:23

## 2019-03-09 RX ADMIN — Medication 5 MILLILITER(S): at 17:06

## 2019-03-09 RX ADMIN — Medication 10 MILLILITER(S): at 12:23

## 2019-03-09 RX ADMIN — Medication 10 MILLILITER(S): at 23:15

## 2019-03-09 RX ADMIN — HEPARIN SODIUM 3.3 UNIT(S)/HR: 5000 INJECTION INTRAVENOUS; SUBCUTANEOUS at 23:16

## 2019-03-09 RX ADMIN — Medication 480 MICROGRAM(S): at 17:05

## 2019-03-09 RX ADMIN — Medication 50 MILLIEQUIVALENT(S): at 12:23

## 2019-03-09 RX ADMIN — Medication 10 MILLILITER(S): at 08:49

## 2019-03-09 RX ADMIN — Medication 5 MILLILITER(S): at 12:23

## 2019-03-09 RX ADMIN — NYSTATIN CREAM 1 APPLICATION(S): 100000 CREAM TOPICAL at 05:59

## 2019-03-09 RX ADMIN — Medication 500 MILLIGRAM(S): at 17:05

## 2019-03-09 RX ADMIN — SODIUM CHLORIDE 20 MILLILITER(S): 9 INJECTION INTRAMUSCULAR; INTRAVENOUS; SUBCUTANEOUS at 23:16

## 2019-03-09 RX ADMIN — Medication 10 MILLILITER(S): at 17:07

## 2019-03-09 RX ADMIN — NYSTATIN CREAM 1 APPLICATION(S): 100000 CREAM TOPICAL at 17:06

## 2019-03-09 RX ADMIN — Medication 5 MILLILITER(S): at 08:49

## 2019-03-09 RX ADMIN — Medication 10 MILLIGRAM(S): at 09:13

## 2019-03-09 RX ADMIN — Medication 400 MILLIGRAM(S): at 21:01

## 2019-03-09 NOTE — PROGRESS NOTE ADULT - ATTENDING COMMENTS
68 y/o F w/ IgG kappa myeloma admitted for autologous pbsct w/ Melphalan prep (100mg/m2 x 2)   Day +9 - s/p [facility] transplant; continue Zarxio   Strict I&O, daily weights, prn diuresis   Antiemetics, mouth care, skin care   Mucositis secondary to antineoplastic chemotherapy- continue mouth care, pain control  Continue Acyclovir, Cipro prophylaxis; continue Mycamine as Diflucan discontinued secondary to transaminitis.  Supplement lytes  Transaminitis- now decreasing LFT's- off Diflucan, continue Mycamine  VOD prophylaxis  Imodium prn diarrhea  OOB, ambulate 68 y/o F w/ IgG kappa myeloma admitted for autologous pbsct w/ Melphalan prep (100mg/m2 x 2)   Day +9 s/p [facility] transplant; continue Zarxio   Strict I&O, daily weights, prn diuresis   Antiemetics, mouth care, skin care   Mucositis secondary to antineoplastic chemotherapy- continue mouth care, pain control  Continue Acyclovir, Cipro prophylaxis; continue Mycamine as Diflucan discontinued secondary to transaminitis.  Supplement lytes   Transaminitis- now decreasing LFT's- off Diflucan, continue Mycamine  VOD prophylaxis  Imodium prn diarrhea  OOB, ambulate

## 2019-03-09 NOTE — PROGRESS NOTE ADULT - SUBJECTIVE AND OBJECTIVE BOX
[facility] Transplant Team                                                      Critical / Counseling Time Provided: 30 minutes                                                                                                                                                        Chief Complaint: autologous transplant for treatment of multiple myeloma     S: Patient seen and examined with [facility] [place] Transplant Team:     + right sided throat pain started last night, 5/10  + loose stools, 6x in last 24 hours    Denies dyspnea, cough, nausea, vomiting or abdominal pain       O: Vitals:   Vital Signs Last 24 Hrs  T(C): 37.7 (09 Mar 2019 05:55), Max: 37.7 (09 Mar 2019 05:55)  T(F): 99.9 (09 Mar 2019 05:55), Max: 99.9 (09 Mar 2019 05:55)  HR: 96 (09 Mar 2019 05:55) (93 - 102)  BP: 118/72 (09 Mar 2019 05:55) (101/66 - 128/78)  BP(mean): --  RR: 18 (09 Mar 2019 05:55) (16 - 18)  SpO2: 98% (09 Mar 2019 05:55) (97% - 100%)    Admit weight: 79.2 kG  Daily Weight:      Intake / Output:   03-08 @ 07:01  -  03-09 @ 07:00  --------------------------------------------------------  IN: 2038 mL / OUT: 2450 mL / NET: -412 mL        PE:   Oropharynx: no erythema or ulcers  Oral Mucositis: n/a                                                       Grade:   CVS: S1S2, RRR  Lungs: CTA B/L  Abdomen: soft, NT, ND, hyperactive BS x 4Q  Extremities: No edema in BLE  Gastric Mucositis: n/a                                                Grade:   Intestinal Mucositis: n/a                                             Grade:   Skin: hyperpigmentation 2/2 Kepivance on neck, and upper chest   TLC: CDI  Neuro: nonfocal   Pain: 5/10 throat pain on swallow        Labs:   CBC Full  -  ( 09 Mar 2019 07:16 )  WBC Count : 0.1 K/uL  Hemoglobin : 7.8 g/dL  Hematocrit : 21.5 %  Platelet Count - Automated : 24 K/uL  Mean Cell Volume : 90.7 fl  Mean Cell Hemoglobin : 32.7 pg  Mean Cell Hemoglobin Concentration : 36.1 gm/dL  Auto Neutrophil # : x  Auto Lymphocyte # : x  Auto Monocyte # : x  Auto Eosinophil # : x  Auto Basophil # : x  Auto Neutrophil % : x  Auto Lymphocyte % : x  Auto Monocyte % : x  Auto Eosinophil % : x  Auto Basophil % : x                          7.8    0.1   )-----------( 24       ( 09 Mar 2019 07:16 )             21.5     03-09    144  |  108  |  4<L>  ----------------------------<  84  3.3<L>   |  23  |  0.41<L>    Ca    8.5      09 Mar 2019 07:16  Phos  2.9     03-09  Mg     1.9     03-09    TPro  4.9<L>  /  Alb  3.2<L>  /  TBili  0.3  /  DBili  x   /  AST  36  /  ALT  108<H>  /  AlkPhos  83  03-09      LIVER FUNCTIONS - ( 09 Mar 2019 07:16 )  Alb: 3.2 g/dL / Pro: 4.9 g/dL / ALK PHOS: 83 U/L / ALT: 108 U/L / AST: 36 U/L / GGT: x           Lactate Dehydrogenase, Serum: 133 U/L (03-09 @ 07:16)        Meds:   Antimicrobials:   acyclovir   Oral Tab/Cap 400 milliGRAM(s) Oral every 8 hours  ciprofloxacin     Tablet 500 milliGRAM(s) Oral every 12 hours  clotrimazole Lozenge 1 Lozenge Oral five times a day  micafungin IVPB 100 milliGRAM(s) IV Intermittent every 24 hours      Heme / Onc:   heparin  Infusion 330 Unit(s)/Hr IV Continuous <Continuous>      GI:  docusate sodium 100 milliGRAM(s) Oral three times a day PRN  loperamide 2 milliGRAM(s) Oral every 3 hours PRN  pantoprazole    Tablet 40 milliGRAM(s) Oral before breakfast  senna 1 Tablet(s) Oral at bedtime PRN  sodium bicarbonate Mouth Rinse 10 milliLiter(s) Swish and Spit five times a day  ursodiol Capsule 300 milliGRAM(s) Oral two times a day with meals      Cardiovascular:   amLODIPine   Tablet 5 milliGRAM(s) Oral daily  furosemide   Injectable 20 milliGRAM(s) IV Push every 24 hours PRN      Immunologic:   filgrastim-sndz Injectable 480 MICROGram(s) SubCutaneous daily      Other medications:   acetaminophen   Tablet .. 650 milliGRAM(s) Oral every 6 hours  Biotene Dry Mouth Oral Rinse 5 milliLiter(s) Swish and Spit five times a day  folic acid 1 milliGRAM(s) Oral daily  lidocaine/prilocaine Cream 1 Application(s) Topical daily  loratadine 10 milliGRAM(s) Oral daily  multivitamin 1 Tablet(s) Oral daily  nystatin Powder 1 Application(s) Topical three times a day  potassium chloride  20 mEq/100 mL IVPB 20 milliEquivalent(s) IV Intermittent every 2 hours  sodium chloride 0.9%. 1000 milliLiter(s) IV Continuous <Continuous>      PRN:   acetaminophen   Tablet .. 650 milliGRAM(s) Oral every 6 hours PRN  benzocaine 15 mG/menthol 3.6 mG Lozenge 1 Lozenge Oral every 4 hours PRN  diphenhydrAMINE   Injectable 25 milliGRAM(s) IV Push every 4 hours PRN  docusate sodium 100 milliGRAM(s) Oral three times a day PRN  furosemide   Injectable 20 milliGRAM(s) IV Push every 24 hours PRN  HYDROmorphone  Injectable 0.5 milliGRAM(s) IV Push every 4 hours PRN  loperamide 2 milliGRAM(s) Oral every 3 hours PRN  metoclopramide Injectable 10 milliGRAM(s) IV Push every 6 hours PRN  ondansetron Injectable 8 milliGRAM(s) IV Push every 8 hours PRN  senna 1 Tablet(s) Oral at bedtime PRN      A/P:  68 year old female with a history of IgG kappa Multiple Myeloma.  Completed conditioning regimen with Melphalan  Post Autologous PBSCT,  day +9  Transaminitis improving - continue Micafungin for now   Continue Cipro, neutropenic - if T >/= 38C, pan cx, CXR and change cipro to cefepime 2g IV q 8   Diarrhea- CDiff negative, encourage Imodium prn  Nutrition consult   Mouthpain 5/10 on swallow, Dilaudid ordered PRN    1. Infectious Disease:   clotrimazole Lozenge 1 Lozenge Oral five times a day  acyclovir   Oral Tab/Cap 400 milliGRAM(s) Oral every 8 hours  ciprofloxacin     Tablet 500 milliGRAM(s) Oral every 12 hours  micafungin IVPB 100 milliGRAM(s) IV Intermittent every 24 hours    2. VOD Prophylaxis: Actigall, Glutamine, Heparin (dosed at 100 units / kg / day)     3. GI Prophylaxis:    pantoprazole    Tablet 40 milliGRAM(s) Oral before breakfast    4. Mouth care - NS / NaHCO3 rinses, Mycelex, Biotene; Skin care     5. GVHD prophylaxis: n/a    6. Transfuse & replete electrolytes prn   Hypokalemia: KCL 20 mEq IV x 3    7. IV hydration, daily weights, strict I&O, prn diuresis     8. PO intake as tolerated, nutrition follow up as needed, MVI, folic acid     9. Antiemetics, anti-diarrhea medications:  LORazepam   Injectable 1 milliGRAM(s) IV Push every 6 hours PRN  metoclopramide Injectable 10 milliGRAM(s) IV Push every 6 hours PRN  ondansetron Injectable 8 milliGRAM(s) IV Push every 8 hours PRN     10. OOB as tolerated, physical therapy consult if needed     11. Monitor coags / fibrinogen 2x week, vitamin K as needed     12. Monitor closely for clinical changes, monitor for fevers     13. Emotional support provided, plan of care discussed and questions addressed     14. Patient education done regarding plan of care, restrictions and discharge planning     15. Continue regular social work input     I have written the above note for Dr. Lange who performed service with me in the room.   Maria Luisa Garcia NP-C ([phone])    I have seen and examined patient with NP, I agree with above note as scribed. [facility] Transplant Team                                                      Critical / Counseling Time Provided: 30 minutes                                                                                                                                                        Chief Complaint: autologous transplant for treatment of multiple myeloma     S: Patient seen and examined with [facility] Transplant Team:     + right sided throat pain started last night, 5/10  + loose stools, 6x in last 24 hours    Denies dyspnea, cough, nausea, vomiting or abdominal pain       O: Vitals:   Vital Signs Last 24 Hrs  T(C): 37.7 (09 Mar 2019 05:55), Max: 37.7 (09 Mar 2019 05:55)  T(F): 99.9 (09 Mar 2019 05:55), Max: 99.9 (09 Mar 2019 05:55)  HR: 96 (09 Mar 2019 05:55) (93 - 102)  BP: 118/72 (09 Mar 2019 05:55) (101/66 - 128/78)  BP(mean): --  RR: 18 (09 Mar 2019 05:55) (16 - 18)  SpO2: 98% (09 Mar 2019 05:55) (97% - 100%)    Admit weight: 79.2 kG  Daily Weight:      Intake / Output:   03-08 @ 07:01  -  03-09 @ 07:00  --------------------------------------------------------  IN: 2038 mL / OUT: 2450 mL / NET: -412 mL        PE:   Oropharynx: no erythema or ulcers  Oral Mucositis: +                                                       stGstrstastdstest:st st1st CVS: S1S2, RRR  Lungs: CTA B/L  Abdomen: soft, NT, ND, hyperactive BS x 4Q  Extremities: No edema in BLE  Gastric Mucositis: n/a                                                Grade:   Intestinal Mucositis: n/a                                             Grade:   Skin: hyperpigmentation 2/2 Kepivance on neck, and upper chest   TLC: CDI  Neuro: nonfocal   Pain: 5/10 throat pain on swallow        Labs:   CBC Full  -  ( 09 Mar 2019 07:16 )  WBC Count : 0.1 K/uL  Hemoglobin : 7.8 g/dL  Hematocrit : 21.5 %  Platelet Count - Automated : 24 K/uL  Mean Cell Volume : 90.7 fl  Mean Cell Hemoglobin : 32.7 pg  Mean Cell Hemoglobin Concentration : 36.1 gm/dL  Auto Neutrophil # : x  Auto Lymphocyte # : x  Auto Monocyte # : x  Auto Eosinophil # : x  Auto Basophil # : x  Auto Neutrophil % : x  Auto Lymphocyte % : x  Auto Monocyte % : x  Auto Eosinophil % : x  Auto Basophil % : x                          7.8    0.1   )-----------( 24       ( 09 Mar 2019 07:16 )             21.5     03-09    144  |  108  |  4<L>  ----------------------------<  84  3.3<L>   |  23  |  0.41<L>    Ca    8.5      09 Mar 2019 07:16  Phos  2.9     03-09  Mg     1.9     03-09    TPro  4.9<L>  /  Alb  3.2<L>  /  TBili  0.3  /  DBili  x   /  AST  36  /  ALT  108<H>  /  AlkPhos  83  03-09      LIVER FUNCTIONS - ( 09 Mar 2019 07:16 )  Alb: 3.2 g/dL / Pro: 4.9 g/dL / ALK PHOS: 83 U/L / ALT: 108 U/L / AST: 36 U/L / GGT: x           Lactate Dehydrogenase, Serum: 133 U/L (03-09 @ 07:16)        Meds:   Antimicrobials:   acyclovir   Oral Tab/Cap 400 milliGRAM(s) Oral every 8 hours  ciprofloxacin     Tablet 500 milliGRAM(s) Oral every 12 hours  clotrimazole Lozenge 1 Lozenge Oral five times a day  micafungin IVPB 100 milliGRAM(s) IV Intermittent every 24 hours      Heme / Onc:   heparin  Infusion 330 Unit(s)/Hr IV Continuous <Continuous>      GI:  docusate sodium 100 milliGRAM(s) Oral three times a day PRN  loperamide 2 milliGRAM(s) Oral every 3 hours PRN  pantoprazole    Tablet 40 milliGRAM(s) Oral before breakfast  senna 1 Tablet(s) Oral at bedtime PRN  sodium bicarbonate Mouth Rinse 10 milliLiter(s) Swish and Spit five times a day  ursodiol Capsule 300 milliGRAM(s) Oral two times a day with meals      Cardiovascular:   amLODIPine   Tablet 5 milliGRAM(s) Oral daily  furosemide   Injectable 20 milliGRAM(s) IV Push every 24 hours PRN      Immunologic:   filgrastim-sndz Injectable 480 MICROGram(s) SubCutaneous daily      Other medications:   acetaminophen   Tablet .. 650 milliGRAM(s) Oral every 6 hours  Biotene Dry Mouth Oral Rinse 5 milliLiter(s) Swish and Spit five times a day  folic acid 1 milliGRAM(s) Oral daily  lidocaine/prilocaine Cream 1 Application(s) Topical daily  loratadine 10 milliGRAM(s) Oral daily  multivitamin 1 Tablet(s) Oral daily  nystatin Powder 1 Application(s) Topical three times a day  potassium chloride  20 mEq/100 mL IVPB 20 milliEquivalent(s) IV Intermittent every 2 hours  sodium chloride 0.9%. 1000 milliLiter(s) IV Continuous <Continuous>      PRN:   acetaminophen   Tablet .. 650 milliGRAM(s) Oral every 6 hours PRN  benzocaine 15 mG/menthol 3.6 mG Lozenge 1 Lozenge Oral every 4 hours PRN  diphenhydrAMINE   Injectable 25 milliGRAM(s) IV Push every 4 hours PRN  docusate sodium 100 milliGRAM(s) Oral three times a day PRN  furosemide   Injectable 20 milliGRAM(s) IV Push every 24 hours PRN  HYDROmorphone  Injectable 0.5 milliGRAM(s) IV Push every 4 hours PRN  loperamide 2 milliGRAM(s) Oral every 3 hours PRN  metoclopramide Injectable 10 milliGRAM(s) IV Push every 6 hours PRN  ondansetron Injectable 8 milliGRAM(s) IV Push every 8 hours PRN  senna 1 Tablet(s) Oral at bedtime PRN      A/P:  68 year old female with a history of IgG kappa Multiple Myeloma.  Status Post Autologous PBSCT,  Day +9  Transaminitis improving - continue Micafungin for now   Patient is neutropenic, continue Cipro. If T >/= 38C, pan cx, CXR and change Cipro to Cefepime 2g IV q 8   Diarrhea- CDiff negative, encourage Imodium prn  Mucositis secondary to antineoplastic chemotherapy- continue mouth care, pain control  Nutrition consult       1. Infectious Disease:   acyclovir   Oral Tab/Cap 400 milliGRAM(s) Oral every 8 hours  ciprofloxacin     Tablet 500 milliGRAM(s) Oral every 12 hours  clotrimazole Lozenge 1 Lozenge Oral five times a day  micafungin IVPB 100 milliGRAM(s) IV Intermittent every 24 hours      2. VOD Prophylaxis: Actigall, Glutamine, Heparin (dosed at 100 units / kg / day)     3. GI Prophylaxis:    pantoprazole    Tablet 40 milliGRAM(s) Oral before breakfast    4. Mouth care - NS / NaHCO3 rinses, Mycelex, Biotene; Skin care     5. GVHD prophylaxis: n/a    6. Transfuse & replete electrolytes prn   Hypokalemia: KCL 20 mEq IV x 3    7. IV hydration, daily weights, strict I&O, prn diuresis     8. PO intake as tolerated, nutrition follow up as needed, MVI, folic acid     9. Antiemetics, anti-diarrhea medications:  LORazepam   Injectable 1 milliGRAM(s) IV Push every 6 hours PRN  metoclopramide Injectable 10 milliGRAM(s) IV Push every 6 hours PRN  ondansetron Injectable 8 milliGRAM(s) IV Push every 8 hours PRN     10. OOB as tolerated, physical therapy consult if needed     11. Monitor coags / fibrinogen 2x week, vitamin K as needed     12. Monitor closely for clinical changes, monitor for fevers     13. Emotional support provided, plan of care discussed and questions addressed     14. Patient education done regarding plan of care, restrictions and discharge planning     15. Continue regular social work input     I have written the above note for Dr. Lange who performed service with me in the room.   Maria Luisa Garcia NP-C ([phone])    I have seen and examined patient with NP, I agree with above note as scribed. [facility] Transplant Team                                                      Critical / Counseling Time Provided: 30 minutes                                                                                                                                                        Chief Complaint: autologous transplant for treatment of multiple myeloma     S: Patient seen and examined with [facility] Transplant Team:     + throat pain improved to 3/10  + soft stools, 4 x in last 24 hours  + nausea/vomiting  + post nasal drip    Denies headache, CP, cough, SOB or abdominal pain       O: Vitals:   Vital Signs Last 24 Hrs  T(C): 37.7 (09 Mar 2019 05:55), Max: 37.7 (09 Mar 2019 05:55)  T(F): 99.9 (09 Mar 2019 05:55), Max: 99.9 (09 Mar 2019 05:55)  HR: 96 (09 Mar 2019 05:55) (93 - 102)  BP: 118/72 (09 Mar 2019 05:55) (101/66 - 128/78)  BP(mean): --  RR: 18 (09 Mar 2019 05:55) (16 - 18)  SpO2: 98% (09 Mar 2019 05:55) (97% - 100%)    Admit weight: 79.2 kG  Daily Weight:       Intake / Output:   03-08 @ 07:01  -  03-09 @ 07:00  --------------------------------------------------------  IN: 2038 mL / OUT: 2450 mL / NET: -412 mL        PE:   Oropharynx: no erythema or ulcers  Oral Mucositis: +                                                       ndGndrndanddndend:nd nd2nd CVS: (+) S1/S2, RRR  Lungs: CTA B/L  Abdomen: soft, NT/ND, (+) BS   Extremities: No edema in B/L LE  Gastric Mucositis: n/a                                                Grade:   Intestinal Mucositis: n/a                                             Grade:   Skin: hyperpigmentation 2/2 Kepivance on neck, and upper chest   TLC: C/D/I  Neuro: nonfocal   Pain: 3/10 throat pain on swallow        Labs:   CBC Full  -  ( 09 Mar 2019 07:16 )  WBC Count : 0.1 K/uL  Hemoglobin : 7.8 g/dL  Hematocrit : 21.5 %  Platelet Count - Automated : 24 K/uL  Mean Cell Volume : 90.7 fl  Mean Cell Hemoglobin : 32.7 pg  Mean Cell Hemoglobin Concentration : 36.1 gm/dL  Auto Neutrophil # : x  Auto Lymphocyte # : x  Auto Monocyte # : x  Auto Eosinophil # : x  Auto Basophil # : x  Auto Neutrophil % : x  Auto Lymphocyte % : x  Auto Monocyte % : x  Auto Eosinophil % : x  Auto Basophil % : x                          7.8    0.1   )-----------( 24       ( 09 Mar 2019 07:16 )             21.5     03-09    144  |  108  |  4<L>  ----------------------------<  84  3.3<L>   |  23  |  0.41<L>    Ca    8.5      09 Mar 2019 07:16  Phos  2.9     03-09  Mg     1.9     03-09    TPro  4.9<L>  /  Alb  3.2<L>  /  TBili  0.3  /  DBili  x   /  AST  36  /  ALT  108<H>  /  AlkPhos  83  03-09      LIVER FUNCTIONS - ( 09 Mar 2019 07:16 )  Alb: 3.2 g/dL / Pro: 4.9 g/dL / ALK PHOS: 83 U/L / ALT: 108 U/L / AST: 36 U/L / GGT: x           Lactate Dehydrogenase, Serum: 133 U/L (03-09 @ 07:16)        Meds:   Antimicrobials:   acyclovir   Oral Tab/Cap 400 milliGRAM(s) Oral every 8 hours  ciprofloxacin     Tablet 500 milliGRAM(s) Oral every 12 hours  clotrimazole Lozenge 1 Lozenge Oral five times a day  micafungin IVPB 100 milliGRAM(s) IV Intermittent every 24 hours      Heme / Onc:   heparin  Infusion 330 Unit(s)/Hr IV Continuous <Continuous>      GI:  docusate sodium 100 milliGRAM(s) Oral three times a day PRN  loperamide 2 milliGRAM(s) Oral every 3 hours PRN  pantoprazole    Tablet 40 milliGRAM(s) Oral before breakfast  senna 1 Tablet(s) Oral at bedtime PRN  sodium bicarbonate Mouth Rinse 10 milliLiter(s) Swish and Spit five times a day  ursodiol Capsule 300 milliGRAM(s) Oral two times a day with meals      Cardiovascular:   amLODIPine   Tablet 5 milliGRAM(s) Oral daily  furosemide   Injectable 20 milliGRAM(s) IV Push every 24 hours PRN      Immunologic:   filgrastim-sndz Injectable 480 MICROGram(s) SubCutaneous daily      Other medications:   acetaminophen   Tablet .. 650 milliGRAM(s) Oral every 6 hours  Biotene Dry Mouth Oral Rinse 5 milliLiter(s) Swish and Spit five times a day  folic acid 1 milliGRAM(s) Oral daily  lidocaine/prilocaine Cream 1 Application(s) Topical daily  loratadine 10 milliGRAM(s) Oral daily  multivitamin 1 Tablet(s) Oral daily  nystatin Powder 1 Application(s) Topical three times a day  potassium chloride  20 mEq/100 mL IVPB 20 milliEquivalent(s) IV Intermittent every 2 hours  sodium chloride 0.9%. 1000 milliLiter(s) IV Continuous <Continuous>      PRN:   acetaminophen   Tablet .. 650 milliGRAM(s) Oral every 6 hours PRN  benzocaine 15 mG/menthol 3.6 mG Lozenge 1 Lozenge Oral every 4 hours PRN  diphenhydrAMINE   Injectable 25 milliGRAM(s) IV Push every 4 hours PRN  docusate sodium 100 milliGRAM(s) Oral three times a day PRN  furosemide   Injectable 20 milliGRAM(s) IV Push every 24 hours PRN  HYDROmorphone  Injectable 0.5 milliGRAM(s) IV Push every 4 hours PRN  loperamide 2 milliGRAM(s) Oral every 3 hours PRN  metoclopramide Injectable 10 milliGRAM(s) IV Push every 6 hours PRN  ondansetron Injectable 8 milliGRAM(s) IV Push every 8 hours PRN  senna 1 Tablet(s) Oral at bedtime PRN      A/P:  68 year old female with a history of IgG kappa Multiple Myeloma.  Status Post Autologous PBSCT,  Day +9  Transaminitis improving - continue Micafungin for now   Patient is neutropenic, continue Cipro. If T >/= 38C, pan cx, CXR and change Cipro to Cefepime 2g IV q 8   Diarrhea improved- CDiff negative, encourage Imodium prn  Mucositis secondary to antineoplastic chemotherapy- continue mouth care, pain control  Nutrition consult       1. Infectious Disease:   acyclovir   Oral Tab/Cap 400 milliGRAM(s) Oral every 8 hours  ciprofloxacin     Tablet 500 milliGRAM(s) Oral every 12 hours  clotrimazole Lozenge 1 Lozenge Oral five times a day  micafungin IVPB 100 milliGRAM(s) IV Intermittent every 24 hours      2. VOD Prophylaxis: Actigall, Glutamine, Heparin (dosed at 100 units / kg / day)     3. GI Prophylaxis:    pantoprazole    Tablet 40 milliGRAM(s) Oral before breakfast    4. Mouth care - NS / NaHCO3 rinses, Mycelex, Biotene; Skin care     5. GVHD prophylaxis: n/a    6. Transfuse & replete electrolytes prn   Hypokalemia: KCL 20 mEq IV x 3    7. IV hydration, daily weights, strict I&O, prn diuresis     8. PO intake as tolerated, nutrition follow up as needed, MVI, folic acid     9. Antiemetics, anti-diarrhea medications:  LORazepam   Injectable 1 milliGRAM(s) IV Push every 6 hours PRN  metoclopramide Injectable 10 milliGRAM(s) IV Push every 6 hours PRN  ondansetron Injectable 8 milliGRAM(s) IV Push every 8 hours PRN     10. OOB as tolerated, physical therapy consult if needed     11. Monitor coags / fibrinogen 2x week, vitamin K as needed     12. Monitor closely for clinical changes, monitor for fevers     13. Emotional support provided, plan of care discussed and questions addressed     14. Patient education done regarding plan of care, restrictions and discharge planning     15. Continue regular social work input     I have written the above note for Dr. Lange who performed service with me in the room.   Maria Luisa Garcia NP-C ([phone])    I have seen and examined patient with NP, I agree with above note as scribed. [facility] Transplant Team                                                      Critical / Counseling Time Provided: 30 minutes                                                                                                                                                        Chief Complaint: autologous transplant for treatment of multiple myeloma     S: Patient seen and examined with [facility] Transplant Team:     + throat pain improved to 3/10  + soft stools, 4 x in last 24 hours  + nausea/vomiting  + post nasal drip    Denies headache, CP, cough, SOB or abdominal pain       O: Vitals:   Vital Signs Last 24 Hrs  T(C): 37.7 (09 Mar 2019 05:55), Max: 37.7 (09 Mar 2019 05:55)  T(F): 99.9 (09 Mar 2019 05:55), Max: 99.9 (09 Mar 2019 05:55)  HR: 96 (09 Mar 2019 05:55) (93 - 102)  BP: 118/72 (09 Mar 2019 05:55) (101/66 - 128/78)  BP(mean): --  RR: 18 (09 Mar 2019 05:55) (16 - 18)  SpO2: 98% (09 Mar 2019 05:55) (97% - 100%)    Admit weight: 79.2 kG  Daily Weight: 78.1 kG      Intake / Output:   03-08 @ 07:01  -  03-09 @ 07:00  --------------------------------------------------------  IN: 2038 mL / OUT: 2450 mL / NET: -412 mL        PE:   Oropharynx: no erythema or ulcers  Oral Mucositis: +                                                       ndGndrndanddndend:nd nd2nd CVS: (+) S1/S2, RRR  Lungs: CTA B/L  Abdomen: soft, NT/ND, (+) BS   Extremities: No edema in B/L LE  Gastric Mucositis: n/a                                                Grade:   Intestinal Mucositis: n/a                                             Grade:   Skin: hyperpigmentation 2/2 Kepivance on neck, and upper chest   TLC: C/D/I  Neuro: nonfocal   Pain: 3/10 throat pain on swallow        Labs:   CBC Full  -  ( 09 Mar 2019 07:16 )  WBC Count : 0.1 K/uL  Hemoglobin : 7.8 g/dL  Hematocrit : 21.5 %  Platelet Count - Automated : 24 K/uL  Mean Cell Volume : 90.7 fl  Mean Cell Hemoglobin : 32.7 pg  Mean Cell Hemoglobin Concentration : 36.1 gm/dL  Auto Neutrophil # : x  Auto Lymphocyte # : x  Auto Monocyte # : x  Auto Eosinophil # : x  Auto Basophil # : x  Auto Neutrophil % : x  Auto Lymphocyte % : x  Auto Monocyte % : x  Auto Eosinophil % : x  Auto Basophil % : x                          7.8    0.1   )-----------( 24       ( 09 Mar 2019 07:16 )             21.5     03-09    144  |  108  |  4<L>  ----------------------------<  84  3.3<L>   |  23  |  0.41<L>    Ca    8.5      09 Mar 2019 07:16  Phos  2.9     03-09  Mg     1.9     03-09    TPro  4.9<L>  /  Alb  3.2<L>  /  TBili  0.3  /  DBili  x   /  AST  36  /  ALT  108<H>  /  AlkPhos  83  03-09      LIVER FUNCTIONS - ( 09 Mar 2019 07:16 )  Alb: 3.2 g/dL / Pro: 4.9 g/dL / ALK PHOS: 83 U/L / ALT: 108 U/L / AST: 36 U/L / GGT: x           Lactate Dehydrogenase, Serum: 133 U/L (03-09 @ 07:16)        Meds:   Antimicrobials:   acyclovir   Oral Tab/Cap 400 milliGRAM(s) Oral every 8 hours  ciprofloxacin     Tablet 500 milliGRAM(s) Oral every 12 hours  clotrimazole Lozenge 1 Lozenge Oral five times a day  micafungin IVPB 100 milliGRAM(s) IV Intermittent every 24 hours      Heme / Onc:   heparin  Infusion 330 Unit(s)/Hr IV Continuous <Continuous>      GI:  docusate sodium 100 milliGRAM(s) Oral three times a day PRN  loperamide 2 milliGRAM(s) Oral every 3 hours PRN  pantoprazole    Tablet 40 milliGRAM(s) Oral before breakfast  senna 1 Tablet(s) Oral at bedtime PRN  sodium bicarbonate Mouth Rinse 10 milliLiter(s) Swish and Spit five times a day  ursodiol Capsule 300 milliGRAM(s) Oral two times a day with meals      Cardiovascular:   amLODIPine   Tablet 5 milliGRAM(s) Oral daily  furosemide   Injectable 20 milliGRAM(s) IV Push every 24 hours PRN      Immunologic:   [id number] Injectable 480 MICROGram(s) SubCutaneous daily      Other medications:   acetaminophen   Tablet .. 650 milliGRAM(s) Oral every 6 hours  Biotene Dry Mouth Oral Rinse 5 milliLiter(s) Swish and Spit five times a day  folic acid 1 milliGRAM(s) Oral daily  lidocaine/prilocaine Cream 1 Application(s) Topical daily  loratadine 10 milliGRAM(s) Oral daily  multivitamin 1 Tablet(s) Oral daily  nystatin Powder 1 Application(s) Topical three times a day  potassium chloride  20 mEq/100 mL IVPB 20 milliEquivalent(s) IV Intermittent every 2 hours  sodium chloride 0.9%. 1000 milliLiter(s) IV Continuous <Continuous>      PRN:   acetaminophen   Tablet .. 650 milliGRAM(s) Oral every 6 hours PRN  benzocaine 15 mG/menthol 3.6 mG Lozenge 1 Lozenge Oral every 4 hours PRN  diphenhydrAMINE   Injectable 25 milliGRAM(s) IV Push every 4 hours PRN  docusate sodium 100 milliGRAM(s) Oral three times a day PRN  furosemide   Injectable 20 milliGRAM(s) IV Push every 24 hours PRN  HYDROmorphone  Injectable 0.5 milliGRAM(s) IV Push every 4 hours PRN  loperamide 2 milliGRAM(s) Oral every 3 hours PRN  metoclopramide Injectable 10 milliGRAM(s) IV Push every 6 hours PRN  ondansetron Injectable 8 milliGRAM(s) IV Push every 8 hours PRN  senna 1 Tablet(s) Oral at bedtime PRN      A/P:  68 year old female with a history of IgG kappa Multiple Myeloma.  Status Post Autologous PBSCT,  Day +9  Transaminitis improving - continue Micafungin for now   Patient is neutropenic, continue Cipro. If T >/= 38C, pan cx, CXR and change Cipro to Cefepime 2g IV q 8   Diarrhea improved- CDiff negative, encourage Imodium prn  Mucositis secondary to antineoplastic chemotherapy- continue mouth care, pain control  Nutrition consult       1. Infectious Disease:   acyclovir   Oral Tab/Cap 400 milliGRAM(s) Oral every 8 hours  ciprofloxacin     Tablet 500 milliGRAM(s) Oral every 12 hours  clotrimazole Lozenge 1 Lozenge Oral five times a day  micafungin IVPB 100 milliGRAM(s) IV Intermittent every 24 hours      2. VOD Prophylaxis: Actigall, Glutamine, Heparin (dosed at 100 units / kg / day)     3. GI Prophylaxis:    pantoprazole    Tablet 40 milliGRAM(s) Oral before breakfast    4. Mouth care - NS / NaHCO3 rinses, Mycelex, Biotene; Skin care     5. GVHD prophylaxis: n/a    6. Transfuse & replete electrolytes prn   Hypokalemia: KCL 20 mEq IV x 3    7. IV hydration, daily weights, strict I&O, prn diuresis     8. PO intake as tolerated, nutrition follow up as needed, MVI, folic acid     9. Antiemetics, anti-diarrhea medications:  LORazepam   Injectable 1 milliGRAM(s) IV Push every 6 hours PRN  metoclopramide Injectable 10 milliGRAM(s) IV Push every 6 hours PRN  ondansetron Injectable 8 milliGRAM(s) IV Push every 8 hours PRN     10. OOB as tolerated, physical therapy consult if needed     11. Monitor coags / fibrinogen 2x week, vitamin K as needed     12. Monitor closely for clinical changes, monitor for fevers     13. Emotional support provided, plan of care discussed and questions addressed     14. Patient education done regarding plan of care, restrictions and discharge planning     15. Continue regular social work input     I have written the above note for Dr. Lange who performed service with me in the room.   Maria Luisa Garcia NP-C ([phone])    I have seen and examined patient with NP, I agree with above note as scribed.

## 2019-03-10 LAB
ALBUMIN SERPL ELPH-MCNC: 3.1 G/DL — LOW (ref 3.3–5)
ALP SERPL-CCNC: 80 U/L — SIGNIFICANT CHANGE UP (ref 40–120)
ALT FLD-CCNC: 87 U/L — HIGH (ref 10–45)
ANION GAP SERPL CALC-SCNC: 11 MMOL/L — SIGNIFICANT CHANGE UP (ref 5–17)
AST SERPL-CCNC: 27 U/L — SIGNIFICANT CHANGE UP (ref 10–40)
BILIRUB SERPL-MCNC: 0.2 MG/DL — SIGNIFICANT CHANGE UP (ref 0.2–1.2)
BUN SERPL-MCNC: 5 MG/DL — LOW (ref 7–23)
CALCIUM SERPL-MCNC: 8.7 MG/DL — SIGNIFICANT CHANGE UP (ref 8.4–10.5)
CHLORIDE SERPL-SCNC: 110 MMOL/L — HIGH (ref 96–108)
CO2 SERPL-SCNC: 23 MMOL/L — SIGNIFICANT CHANGE UP (ref 22–31)
CREAT SERPL-MCNC: 0.44 MG/DL — LOW (ref 0.5–1.3)
GLUCOSE SERPL-MCNC: 83 MG/DL — SIGNIFICANT CHANGE UP (ref 70–99)
HCT VFR BLD CALC: 20.2 % — CRITICAL LOW (ref 34.5–45)
HGB BLD-MCNC: 7.3 G/DL — LOW (ref 11.5–15.5)
LDH SERPL L TO P-CCNC: 120 U/L — SIGNIFICANT CHANGE UP (ref 50–242)
MAGNESIUM SERPL-MCNC: 1.8 MG/DL — SIGNIFICANT CHANGE UP (ref 1.6–2.6)
MCHC RBC-ENTMCNC: 32.7 PG — SIGNIFICANT CHANGE UP (ref 27–34)
MCHC RBC-ENTMCNC: 36.1 GM/DL — HIGH (ref 32–36)
MCV RBC AUTO: 90.8 FL — SIGNIFICANT CHANGE UP (ref 80–100)
PHOSPHATE SERPL-MCNC: 3.6 MG/DL — SIGNIFICANT CHANGE UP (ref 2.5–4.5)
PLATELET # BLD AUTO: 19 K/UL — CRITICAL LOW (ref 150–400)
POTASSIUM SERPL-MCNC: 3.2 MMOL/L — LOW (ref 3.5–5.3)
POTASSIUM SERPL-SCNC: 3.2 MMOL/L — LOW (ref 3.5–5.3)
PROT SERPL-MCNC: 4.9 G/DL — LOW (ref 6–8.3)
RBC # BLD: 2.23 M/UL — LOW (ref 3.8–5.2)
RBC # FLD: 13 % — SIGNIFICANT CHANGE UP (ref 10.3–14.5)
SODIUM SERPL-SCNC: 144 MMOL/L — SIGNIFICANT CHANGE UP (ref 135–145)
WBC # BLD: 0.3 K/UL — CRITICAL LOW (ref 3.8–10.5)
WBC # FLD AUTO: 0.3 K/UL — CRITICAL LOW (ref 3.8–10.5)

## 2019-03-10 PROCEDURE — 99291 CRITICAL CARE FIRST HOUR: CPT

## 2019-03-10 RX ORDER — POTASSIUM CHLORIDE 20 MEQ
20 PACKET (EA) ORAL
Qty: 0 | Refills: 0 | Status: COMPLETED | OUTPATIENT
Start: 2019-03-10 | End: 2019-03-10

## 2019-03-10 RX ADMIN — NYSTATIN CREAM 1 APPLICATION(S): 100000 CREAM TOPICAL at 21:33

## 2019-03-10 RX ADMIN — Medication 5 MILLILITER(S): at 17:10

## 2019-03-10 RX ADMIN — Medication 1 LOZENGE: at 20:12

## 2019-03-10 RX ADMIN — MICAFUNGIN SODIUM 105 MILLIGRAM(S): 100 INJECTION, POWDER, LYOPHILIZED, FOR SOLUTION INTRAVENOUS at 10:09

## 2019-03-10 RX ADMIN — LORATADINE 10 MILLIGRAM(S): 10 TABLET ORAL at 12:26

## 2019-03-10 RX ADMIN — Medication 500 MILLIGRAM(S): at 17:10

## 2019-03-10 RX ADMIN — Medication 1 MILLIGRAM(S): at 12:27

## 2019-03-10 RX ADMIN — PANTOPRAZOLE SODIUM 40 MILLIGRAM(S): 20 TABLET, DELAYED RELEASE ORAL at 06:39

## 2019-03-10 RX ADMIN — Medication 480 MICROGRAM(S): at 17:09

## 2019-03-10 RX ADMIN — NYSTATIN CREAM 1 APPLICATION(S): 100000 CREAM TOPICAL at 14:38

## 2019-03-10 RX ADMIN — URSODIOL 300 MILLIGRAM(S): 250 TABLET, FILM COATED ORAL at 07:54

## 2019-03-10 RX ADMIN — URSODIOL 300 MILLIGRAM(S): 250 TABLET, FILM COATED ORAL at 17:10

## 2019-03-10 RX ADMIN — NYSTATIN CREAM 1 APPLICATION(S): 100000 CREAM TOPICAL at 06:39

## 2019-03-10 RX ADMIN — Medication 10 MILLILITER(S): at 20:12

## 2019-03-10 RX ADMIN — Medication 400 MILLIGRAM(S): at 06:39

## 2019-03-10 RX ADMIN — Medication 10 MILLILITER(S): at 07:53

## 2019-03-10 RX ADMIN — AMLODIPINE BESYLATE 5 MILLIGRAM(S): 2.5 TABLET ORAL at 06:39

## 2019-03-10 RX ADMIN — Medication 10 MILLILITER(S): at 17:10

## 2019-03-10 RX ADMIN — Medication 1 LOZENGE: at 07:53

## 2019-03-10 RX ADMIN — Medication 50 MILLIEQUIVALENT(S): at 11:10

## 2019-03-10 RX ADMIN — Medication 1 LOZENGE: at 11:10

## 2019-03-10 RX ADMIN — Medication 1 LOZENGE: at 17:10

## 2019-03-10 RX ADMIN — Medication 500 MILLIGRAM(S): at 06:39

## 2019-03-10 RX ADMIN — Medication 50 MILLIEQUIVALENT(S): at 14:37

## 2019-03-10 RX ADMIN — Medication 400 MILLIGRAM(S): at 21:33

## 2019-03-10 RX ADMIN — Medication 5 MILLILITER(S): at 11:10

## 2019-03-10 RX ADMIN — Medication 400 MILLIGRAM(S): at 14:37

## 2019-03-10 RX ADMIN — Medication 50 MILLIEQUIVALENT(S): at 12:27

## 2019-03-10 RX ADMIN — Medication 1 TABLET(S): at 12:26

## 2019-03-10 RX ADMIN — Medication 5 MILLILITER(S): at 07:53

## 2019-03-10 RX ADMIN — Medication 5 MILLILITER(S): at 20:12

## 2019-03-10 RX ADMIN — Medication 10 MILLILITER(S): at 11:10

## 2019-03-10 NOTE — PROGRESS NOTE ADULT - SUBJECTIVE AND OBJECTIVE BOX
[facility] Transplant Team                                                      Critical / Counseling Time Provided: 30 minutes                                                                                                                                                        Chief Complaint: autologous transplant for treatment of multiple myeloma     S: Patient seen and examined with [facility] Transplant Team:     + throat pain improved to 3/10  + soft stools, 4 x in last 24 hours  + nausea/vomiting  + post nasal drip    Denies headache, CP, cough, SOB or abdominal pain       O: Vitals:   Vital Signs Last 24 Hrs  T(C): 37.1 (10 Mar 2019 05:45), Max: 37.2 (09 Mar 2019 13:18)  T(F): 98.8 (10 Mar 2019 05:45), Max: 98.9 (09 Mar 2019 13:18)  HR: 95 (10 Mar 2019 05:45) (90 - 104)  BP: 120/69 (10 Mar 2019 05:45) (101/65 - 120/69)  BP(mean): --  RR: 18 (10 Mar 2019 05:45) (18 - 18)  SpO2: 97% (10 Mar 2019 05:45) (97% - 100%)    Admit weight: 79.2 kG  Daily Weight:    Intake / Output:   03-09 @ 06:01  -  03-10 @ 07:00  --------------------------------------------------------  IN: 2100 mL / OUT: 1975 mL / NET: 125 mL        PE:   Oropharynx: no erythema or ulcers  Oral Mucositis: +                                                       ndGndrndanddndend:nd nd2nd CVS: (+) S1/S2, RRR  Lungs: CTA B/L  Abdomen: soft, NT/ND, (+) BS   Extremities: No edema in B/L LE  Gastric Mucositis: n/a                                                Grade:   Intestinal Mucositis: n/a                                             Grade:   Skin: hyperpigmentation 2/2 Kepivance on neck, and upper chest   TLC: C/D/I  Neuro: nonfocal   Pain: 3/10 throat pain on swallow        Labs:           Meds:   Antimicrobials:   acyclovir   Oral Tab/Cap 400 milliGRAM(s) Oral every 8 hours  ciprofloxacin     Tablet 500 milliGRAM(s) Oral every 12 hours  clotrimazole Lozenge 1 Lozenge Oral five times a day  micafungin IVPB      micafungin IVPB 100 milliGRAM(s) IV Intermittent every 24 hours      Heme / Onc:   heparin  Infusion 330 Unit(s)/Hr IV Continuous <Continuous>      GI:  docusate sodium 100 milliGRAM(s) Oral three times a day PRN  loperamide 2 milliGRAM(s) Oral every 3 hours PRN  pantoprazole    Tablet 40 milliGRAM(s) Oral before breakfast  senna 1 Tablet(s) Oral at bedtime PRN  sodium bicarbonate Mouth Rinse 10 milliLiter(s) Swish and Spit five times a day  ursodiol Capsule 300 milliGRAM(s) Oral two times a day with meals      Cardiovascular:   amLODIPine   Tablet 5 milliGRAM(s) Oral daily  furosemide   Injectable 20 milliGRAM(s) IV Push every 24 hours PRN      Immunologic:   filgrastim-sndz Injectable 480 MICROGram(s) SubCutaneous daily      Other medications:   acetaminophen   Tablet .. 650 milliGRAM(s) Oral every 6 hours  Biotene Dry Mouth Oral Rinse 5 milliLiter(s) Swish and Spit five times a day  folic acid 1 milliGRAM(s) Oral daily  lidocaine/prilocaine Cream 1 Application(s) Topical daily  loratadine 10 milliGRAM(s) Oral daily  multivitamin 1 Tablet(s) Oral daily  nystatin Powder 1 Application(s) Topical three times a day  sodium chloride 0.9%. 1000 milliLiter(s) IV Continuous <Continuous>      PRN:   acetaminophen   Tablet .. 650 milliGRAM(s) Oral every 6 hours PRN  benzocaine 15 mG/menthol 3.6 mG Lozenge 1 Lozenge Oral every 4 hours PRN  diphenhydrAMINE   Injectable 25 milliGRAM(s) IV Push every 4 hours PRN  docusate sodium 100 milliGRAM(s) Oral three times a day PRN  furosemide   Injectable 20 milliGRAM(s) IV Push every 24 hours PRN  HYDROmorphone  Injectable 0.5 milliGRAM(s) IV Push every 4 hours PRN  loperamide 2 milliGRAM(s) Oral every 3 hours PRN  metoclopramide Injectable 10 milliGRAM(s) IV Push every 6 hours PRN  ondansetron Injectable 8 milliGRAM(s) IV Push every 8 hours PRN  senna 1 Tablet(s) Oral at bedtime PRN        A/P:  68 year old female with a history of IgG kappa Multiple Myeloma.  Status Post Autologous PBSCT,  Day +10  Transaminitis improving - continue Micafungin for now   Patient is neutropenic, continue Cipro. If T >/= 38C, pan cx, CXR and change Cipro to Cefepime 2g IV q 8   Diarrhea improved- CDiff negative, encourage Imodium prn  Mucositis secondary to antineoplastic chemotherapy- continue mouth care, pain control  Nutrition consult       1. Infectious Disease:   acyclovir   Oral Tab/Cap 400 milliGRAM(s) Oral every 8 hours  ciprofloxacin     Tablet 500 milliGRAM(s) Oral every 12 hours  clotrimazole Lozenge 1 Lozenge Oral five times a day  micafungin IVPB 100 milliGRAM(s) IV Intermittent every 24 hours      2. VOD Prophylaxis: Actigall, Glutamine, Heparin (dosed at 100 units / kg / day)     3. GI Prophylaxis:    pantoprazole    Tablet 40 milliGRAM(s) Oral before breakfast    4. Mouth care - NS / NaHCO3 rinses, Mycelex, Biotene; Skin care     5. GVHD prophylaxis: n/a    6. Transfuse & replete electrolytes prn   Hypokalemia: KCL 20 mEq IV x 3    7. IV hydration, daily weights, strict I&O, prn diuresis     8. PO intake as tolerated, nutrition follow up as needed, MVI, folic acid     9. Antiemetics, anti-diarrhea medications:  LORazepam   Injectable 1 milliGRAM(s) IV Push every 6 hours PRN  metoclopramide Injectable 10 milliGRAM(s) IV Push every 6 hours PRN  ondansetron Injectable 8 milliGRAM(s) IV Push every 8 hours PRN     10. OOB as tolerated, physical therapy consult if needed     11. Monitor coags / fibrinogen 2x week, vitamin K as needed     12. Monitor closely for clinical changes, monitor for fevers     13. Emotional support provided, plan of care discussed and questions addressed     14. Patient education done regarding plan of care, restrictions and discharge planning     15. Continue regular social work input       I have written the above note for Dr. Lange who performed service with me in the room.   Maria Luisa Garcia NP-C ([phone])    I have seen and examined patient with NP, I agree with above note as scribed. [facility] Transplant Team                                                      Critical / Counseling Time Provided: 30 minutes                                                                                                                                                        Chief Complaint: autologous transplant for treatment of multiple myeloma     S: Patient seen and examined with [facility] Transplant Team:     + throat pain improved to 3/10  + soft stools, 4 x in last 24 hours  + nausea/vomiting  + post nasal drip    Denies headache, CP, cough, SOB or abdominal pain       O: Vitals:   Vital Signs Last 24 Hrs  T(C): 37.1 (10 Mar 2019 05:45), Max: 37.2 (09 Mar 2019 13:18)  T(F): 98.8 (10 Mar 2019 05:45), Max: 98.9 (09 Mar 2019 13:18)  HR: 95 (10 Mar 2019 05:45) (90 - 104)  BP: 120/69 (10 Mar 2019 05:45) (101/65 - 120/69)  BP(mean): --  RR: 18 (10 Mar 2019 05:45) (18 - 18)  SpO2: 97% (10 Mar 2019 05:45) (97% - 100%)    Admit weight: 79.2 kG  Daily Weight:    Intake / Output:   03-09 @ 06:01  -  03-10 @ 07:00  --------------------------------------------------------  IN: 2100 mL / OUT: 1975 mL / NET: 125 mL        PE:   Oropharynx: no erythema or ulcers  Oral Mucositis: +                                                       ndGndrndanddndend:nd nd2nd CVS: (+) S1/S2, RRR  Lungs: CTA B/L  Abdomen: soft, NT/ND, (+) BS   Extremities: No edema in B/L LE  Gastric Mucositis: n/a                                                Grade:   Intestinal Mucositis: n/a                                             Grade:   Skin: hyperpigmentation 2/2 Kepivance on neck, and upper chest   TLC: C/D/I  Neuro: nonfocal   Pain: 3/10 throat pain on swallow        Labs:                         7.3    0.3   )-----------( 19       ( 10 Mar 2019 07:01 )             20.2         Mean Cell Volume : 90.8 fl  Mean Cell Hemoglobin : 32.7 pg  Mean Cell Hemoglobin Concentration : 36.1 gm/dL  Auto Neutrophil # : x  Auto Lymphocyte # : x  Auto Monocyte # : x  Auto Eosinophil # : x  Auto Basophil # : x  Auto Neutrophil % : x  Auto Lymphocyte % : x  Auto Monocyte % : x  Auto Eosinophil % : x  Auto Basophil % : x      03-10    144  |  110<H>  |  5<L>  ----------------------------<  83  3.2<L>   |  23  |  0.44<L>    Ca    8.7      10 Mar 2019 07:01  Phos  3.6     03-10  Mg     1.8     03-10    TPro  4.9<L>  /  Alb  3.1<L>  /  TBili  0.2  /  DBili  x   /  AST  27  /  ALT  87<H>  /  AlkPhos  80  03-10      Mg 1.8  Phos 3.6          Meds:   Antimicrobials:   acyclovir   Oral Tab/Cap 400 milliGRAM(s) Oral every 8 hours  ciprofloxacin     Tablet 500 milliGRAM(s) Oral every 12 hours  clotrimazole Lozenge 1 Lozenge Oral five times a day  micafungin IVPB      micafungin IVPB 100 milliGRAM(s) IV Intermittent every 24 hours      Heme / Onc:   heparin  Infusion 330 Unit(s)/Hr IV Continuous <Continuous>      GI:  docusate sodium 100 milliGRAM(s) Oral three times a day PRN  loperamide 2 milliGRAM(s) Oral every 3 hours PRN  pantoprazole    Tablet 40 milliGRAM(s) Oral before breakfast  senna 1 Tablet(s) Oral at bedtime PRN  sodium bicarbonate Mouth Rinse 10 milliLiter(s) Swish and Spit five times a day  ursodiol Capsule 300 milliGRAM(s) Oral two times a day with meals      Cardiovascular:   amLODIPine   Tablet 5 milliGRAM(s) Oral daily  furosemide   Injectable 20 milliGRAM(s) IV Push every 24 hours PRN      Immunologic:   filgrastim-sndz Injectable 480 MICROGram(s) SubCutaneous daily      Other medications:   acetaminophen   Tablet .. 650 milliGRAM(s) Oral every 6 hours  Biotene Dry Mouth Oral Rinse 5 milliLiter(s) Swish and Spit five times a day  folic acid 1 milliGRAM(s) Oral daily  lidocaine/prilocaine Cream 1 Application(s) Topical daily  loratadine 10 milliGRAM(s) Oral daily  multivitamin 1 Tablet(s) Oral daily  nystatin Powder 1 Application(s) Topical three times a day  sodium chloride 0.9%. 1000 milliLiter(s) IV Continuous <Continuous>      PRN:   acetaminophen   Tablet .. 650 milliGRAM(s) Oral every 6 hours PRN  benzocaine 15 mG/menthol 3.6 mG Lozenge 1 Lozenge Oral every 4 hours PRN  diphenhydrAMINE   Injectable 25 milliGRAM(s) IV Push every 4 hours PRN  docusate sodium 100 milliGRAM(s) Oral three times a day PRN  furosemide   Injectable 20 milliGRAM(s) IV Push every 24 hours PRN  HYDROmorphone  Injectable 0.5 milliGRAM(s) IV Push every 4 hours PRN  loperamide 2 milliGRAM(s) Oral every 3 hours PRN  metoclopramide Injectable 10 milliGRAM(s) IV Push every 6 hours PRN  ondansetron Injectable 8 milliGRAM(s) IV Push every 8 hours PRN  senna 1 Tablet(s) Oral at bedtime PRN        A/P:  68 year old female with a history of IgG kappa Multiple Myeloma.  Status Post Autologous PBSCT,  Day +10  Transaminitis improving - continue Micafungin for now   Patient is neutropenic, continue Cipro. If T >/= 38C, pan cx, CXR and change Cipro to Cefepime 2g IV q 8   Diarrhea improved- CDiff negative, encourage Imodium prn  Mucositis secondary to antineoplastic chemotherapy- continue mouth care, pain control  Nutrition consult       1. Infectious Disease:   acyclovir   Oral Tab/Cap 400 milliGRAM(s) Oral every 8 hours  ciprofloxacin     Tablet 500 milliGRAM(s) Oral every 12 hours  clotrimazole Lozenge 1 Lozenge Oral five times a day  micafungin IVPB 100 milliGRAM(s) IV Intermittent every 24 hours      2. VOD Prophylaxis: Actigall, Glutamine, Heparin (dosed at 100 units / kg / day)     3. GI Prophylaxis:    pantoprazole    Tablet 40 milliGRAM(s) Oral before breakfast    4. Mouth care - NS / NaHCO3 rinses, Mycelex, Biotene; Skin care     5. GVHD prophylaxis: n/a    6. Transfuse & replete electrolytes prn   Hypokalemia: KCL 20 mEq IV x 3    7. IV hydration, daily weights, strict I&O, prn diuresis     8. PO intake as tolerated, nutrition follow up as needed, MVI, folic acid     9. Antiemetics, anti-diarrhea medications:  LORazepam   Injectable 1 milliGRAM(s) IV Push every 6 hours PRN  metoclopramide Injectable 10 milliGRAM(s) IV Push every 6 hours PRN  ondansetron Injectable 8 milliGRAM(s) IV Push every 8 hours PRN     10. OOB as tolerated, physical therapy consult if needed     11. Monitor coags / fibrinogen 2x week, vitamin K as needed     12. Monitor closely for clinical changes, monitor for fevers     13. Emotional support provided, plan of care discussed and questions addressed     14. Patient education done regarding plan of care, restrictions and discharge planning     15. Continue regular social work input       I have written the above note for Dr. Lange who performed service with me in the room.   Maria Luisa Garcia NP-C ([phone])    I have seen and examined patient with NP, I agree with above note as scribed. [facility] Transplant Team                                                      Critical / Counseling Time Provided: 30 minutes                                                                                                                                                        Chief Complaint: autologous transplant for treatment of multiple myeloma     S: Patient seen and examined with [facility] Transplant Team:     + throat pain improved to 3/10  + soft stools, 4 x in last 24 hours  + nausea/vomiting  + post nasal drip    Denies headache, CP, cough, SOB or abdominal pain       O: Vitals:   Vital Signs Last 24 Hrs  T(C): 37.1 (10 Mar 2019 05:45), Max: 37.2 (09 Mar 2019 13:18)  T(F): 98.8 (10 Mar 2019 05:45), Max: 98.9 (09 Mar 2019 13:18)  HR: 95 (10 Mar 2019 05:45) (90 - 104)  BP: 120/69 (10 Mar 2019 05:45) (101/65 - 120/69)  BP(mean): --  RR: 18 (10 Mar 2019 05:45) (18 - 18)  SpO2: 97% (10 Mar 2019 05:45) (97% - 100%)    Admit weight: 79.2 kG  Daily Weight: 77.7 kG    Intake / Output:   03-09 @ 06:01  -  03-10 @ 07:00  --------------------------------------------------------  IN: 2100 mL / OUT: 1975 mL / NET: 125 mL        PE:   Oropharynx: no erythema or ulcers  Oral Mucositis: +                                                       ndGndrndanddndend:nd nd2nd CVS: (+) S1/S2, RRR  Lungs: CTA B/L  Abdomen: soft, NT/ND, (+) BS   Extremities: No edema in B/L LE  Gastric Mucositis: n/a                                                Grade:   Intestinal Mucositis: n/a                                             Grade:   Skin: hyperpigmentation 2/2 Kepivance on neck, and upper chest   TLC: C/D/I  Neuro: nonfocal   Pain: 3/10 throat pain on swallow        Labs:                         7.3    0.3   )-----------( 19       ( 10 Mar 2019 07:01 )             20.2         Mean Cell Volume : 90.8 fl  Mean Cell Hemoglobin : 32.7 pg  Mean Cell Hemoglobin Concentration : 36.1 gm/dL  Auto Neutrophil # : x  Auto Lymphocyte # : x  Auto Monocyte # : x  Auto Eosinophil # : x  Auto Basophil # : x  Auto Neutrophil % : x  Auto Lymphocyte % : x  Auto Monocyte % : x  Auto Eosinophil % : x  Auto Basophil % : x      03-10    144  |  110<H>  |  5<L>  ----------------------------<  83  3.2<L>   |  23  |  0.44<L>    Ca    8.7      10 Mar 2019 07:01  Phos  3.6     03-10  Mg     1.8     03-10    TPro  4.9<L>  /  Alb  3.1<L>  /  TBili  0.2  /  DBili  x   /  AST  27  /  ALT  87<H>  /  AlkPhos  80  03-10      Mg 1.8  Phos 3.6          Meds:   Antimicrobials:   acyclovir   Oral Tab/Cap 400 milliGRAM(s) Oral every 8 hours  ciprofloxacin     Tablet 500 milliGRAM(s) Oral every 12 hours  clotrimazole Lozenge 1 Lozenge Oral five times a day  micafungin IVPB      micafungin IVPB 100 milliGRAM(s) IV Intermittent every 24 hours      Heme / Onc:   heparin  Infusion 330 Unit(s)/Hr IV Continuous <Continuous>      GI:  docusate sodium 100 milliGRAM(s) Oral three times a day PRN  loperamide 2 milliGRAM(s) Oral every 3 hours PRN  pantoprazole    Tablet 40 milliGRAM(s) Oral before breakfast  senna 1 Tablet(s) Oral at bedtime PRN  sodium bicarbonate Mouth Rinse 10 milliLiter(s) Swish and Spit five times a day  ursodiol Capsule 300 milliGRAM(s) Oral two times a day with meals      Cardiovascular:   amLODIPine   Tablet 5 milliGRAM(s) Oral daily  furosemide   Injectable 20 milliGRAM(s) IV Push every 24 hours PRN      Immunologic:   filgrastim-sndz Injectable 480 MICROGram(s) SubCutaneous daily      Other medications:   acetaminophen   Tablet .. 650 milliGRAM(s) Oral every 6 hours  Biotene Dry Mouth Oral Rinse 5 milliLiter(s) Swish and Spit five times a day  folic acid 1 milliGRAM(s) Oral daily  lidocaine/prilocaine Cream 1 Application(s) Topical daily  loratadine 10 milliGRAM(s) Oral daily  multivitamin 1 Tablet(s) Oral daily  nystatin Powder 1 Application(s) Topical three times a day  sodium chloride 0.9%. 1000 milliLiter(s) IV Continuous <Continuous>      PRN:   acetaminophen   Tablet .. 650 milliGRAM(s) Oral every 6 hours PRN  benzocaine 15 mG/menthol 3.6 mG Lozenge 1 Lozenge Oral every 4 hours PRN  diphenhydrAMINE   Injectable 25 milliGRAM(s) IV Push every 4 hours PRN  docusate sodium 100 milliGRAM(s) Oral three times a day PRN  furosemide   Injectable 20 milliGRAM(s) IV Push every 24 hours PRN  HYDROmorphone  Injectable 0.5 milliGRAM(s) IV Push every 4 hours PRN  loperamide 2 milliGRAM(s) Oral every 3 hours PRN  metoclopramide Injectable 10 milliGRAM(s) IV Push every 6 hours PRN  ondansetron Injectable 8 milliGRAM(s) IV Push every 8 hours PRN  senna 1 Tablet(s) Oral at bedtime PRN        A/P:  68 year old female with a history of IgG kappa Multiple Myeloma.  Status Post Autologous PBSCT,  Day +10  Transaminitis improving - continue Micafungin for now   Patient is neutropenic, continue Cipro. If T >/= 38C, pan cx, CXR and change Cipro to Cefepime 2g IV q 8   Diarrhea improved- CDiff negative, encourage Imodium prn  Mucositis secondary to antineoplastic chemotherapy- continue mouth care, pain control  Nutrition consult       1. Infectious Disease:   acyclovir   Oral Tab/Cap 400 milliGRAM(s) Oral every 8 hours  ciprofloxacin     Tablet 500 milliGRAM(s) Oral every 12 hours  clotrimazole Lozenge 1 Lozenge Oral five times a day  micafungin IVPB 100 milliGRAM(s) IV Intermittent every 24 hours      2. VOD Prophylaxis: Actigall, Glutamine, Heparin (dosed at 100 units / kg / day)     3. GI Prophylaxis:    pantoprazole    Tablet 40 milliGRAM(s) Oral before breakfast    4. Mouth care - NS / NaHCO3 rinses, Mycelex, Biotene; Skin care     5. GVHD prophylaxis: n/a    6. Transfuse & replete electrolytes prn   Hypokalemia: KCL 20 mEq IV x 3    7. IV hydration, daily weights, strict I&O, prn diuresis     8. PO intake as tolerated, nutrition follow up as needed, MVI, folic acid     9. Antiemetics, anti-diarrhea medications:  LORazepam   Injectable 1 milliGRAM(s) IV Push every 6 hours PRN  metoclopramide Injectable 10 milliGRAM(s) IV Push every 6 hours PRN  ondansetron Injectable 8 milliGRAM(s) IV Push every 8 hours PRN     10. OOB as tolerated, physical therapy consult if needed     11. Monitor coags / fibrinogen 2x week, vitamin K as needed     12. Monitor closely for clinical changes, monitor for fevers     13. Emotional support provided, plan of care discussed and questions addressed     14. Patient education done regarding plan of care, restrictions and discharge planning     15. Continue regular social work input       I have written the above note for Dr. Lange who performed service with me in the room.   Maria Luisa Garcia NP-C ([phone])    I have seen and examined patient with NP, I agree with above note as scribed. [facility]plant [facility]                                                      Critical / Counseling Time Provided: 30 minutes                                                                                                                                                        Chief Complaint: autologous transplant for treatment of multiple myeloma     S: Patient seen and examined with [facility]plant Team:     throat pain resolved  + soft stools, 2 x in last 24 hours  + post nasal drip    Denies headache, CP, cough, SOB, nausea, vomiting or abdominal pain       O: Vitals:   Vital Signs Last 24 Hrs  T(C): 37.1 (10 Mar 2019 05:45), Max: 37.2 (09 Mar 2019 13:18)  T(F): 98.8 (10 Mar 2019 05:45), Max: 98.9 (09 Mar 2019 13:18)  HR: 95 (10 Mar 2019 05:45) (90 - 104)  BP: 120/69 (10 Mar 2019 05:45) (101/65 - 120/69)  BP(mean): --  RR: 18 (10 Mar 2019 05:45) (18 - 18)  SpO2: 97% (10 Mar 2019 05:45) (97% - 100%)    Admit weight: 79.2 kG  Daily Weight: 77.7 kG    Intake / Output:   03-09 @ 06:01  -  03-10 @ 07:00  --------------------------------------------------------  IN: 2100 mL / OUT: 1975 mL / NET: 125 mL        PE:   Oropharynx: no erythema or ulcers  Oral Mucositis: +                                                       ndGndrndanddndend:nd nd2nd CVS: (+) S1/S2, RRR  Lungs: CTA B/L  Abdomen: soft, NT/ND, (+) BS   Extremities: No edema in B/L LE  Gastric Mucositis: n/a                                                Grade:   Intestinal Mucositis: n/a                                             Grade:   Skin: hyperpigmentation 2/2 Kepivance on neck, and upper chest   TLC: C/D/I  Neuro: nonfocal   Pain: 3/10 throat pain on swallow        Labs:                         7.3    0.3   )-----------( 19       ( 10 Mar 2019 07:01 )             20.2         Mean Cell Volume : 90.8 fl  Mean Cell Hemoglobin : 32.7 pg  Mean Cell Hemoglobin Concentration : 36.1 gm/dL  Auto Neutrophil # : x  Auto Lymphocyte # : x  Auto Monocyte # : x  Auto Eosinophil # : x  Auto Basophil # : x  Auto Neutrophil % : x  Auto Lymphocyte % : x  Auto Monocyte % : x  Auto Eosinophil % : x  Auto Basophil % : x      03-10    144  |  110<H>  |  5<L>  ----------------------------<  83  3.2<L>   |  23  |  0.44<L>    Ca    8.7      10 Mar 2019 07:01  Phos  3.6     03-10  Mg     1.8     03-10    TPro  4.9<L>  /  Alb  3.1<L>  /  TBili  0.2  /  DBili  x   /  AST  27  /  ALT  87<H>  /  AlkPhos  80  03-10      Mg 1.8  Phos 3.6          Meds:   Antimicrobials:   acyclovir   Oral Tab/Cap 400 milliGRAM(s) Oral every 8 hours  ciprofloxacin     Tablet 500 milliGRAM(s) Oral every 12 hours  clotrimazole Lozenge 1 Lozenge Oral five times a day  micafungin IVPB      micafungin IVPB 100 milliGRAM(s) IV Intermittent every 24 hours      Heme / Onc:   heparin  Infusion 330 Unit(s)/Hr IV Continuous <Continuous>      GI:  docusate sodium 100 milliGRAM(s) Oral three times a day PRN  loperamide 2 milliGRAM(s) Oral every 3 hours PRN  pantoprazole    Tablet 40 milliGRAM(s) Oral before breakfast  senna 1 Tablet(s) Oral at bedtime PRN  sodium bicarbonate Mouth Rinse 10 milliLiter(s) Swish and Spit five times a day  ursodiol Capsule 300 milliGRAM(s) Oral two times a day with meals      Cardiovascular:   amLODIPine   Tablet 5 milliGRAM(s) Oral daily  furosemide   Injectable 20 milliGRAM(s) IV Push every 24 hours PRN      Immunologic:   filgrastim-sndz Injectable 480 MICROGram(s) SubCutaneous daily      Other medications:   acetaminophen   Tablet .. 650 milliGRAM(s) Oral every 6 hours  Biotene Dry Mouth Oral Rinse 5 milliLiter(s) Swish and Spit five times a day  folic acid 1 milliGRAM(s) Oral daily  lidocaine/prilocaine Cream 1 Application(s) Topical daily  loratadine 10 milliGRAM(s) Oral daily  multivitamin 1 Tablet(s) Oral daily  nystatin Powder 1 Application(s) Topical three times a day  sodium chloride 0.9%. 1000 milliLiter(s) IV Continuous <Continuous>      PRN:   acetaminophen   Tablet .. 650 milliGRAM(s) Oral every 6 hours PRN  benzocaine 15 mG/menthol 3.6 mG Lozenge 1 Lozenge Oral every 4 hours PRN  diphenhydrAMINE   Injectable 25 milliGRAM(s) IV Push every 4 hours PRN  docusate sodium 100 milliGRAM(s) Oral three times a day PRN  furosemide   Injectable 20 milliGRAM(s) IV Push every 24 hours PRN  HYDROmorphone  Injectable 0.5 milliGRAM(s) IV Push every 4 hours PRN  loperamide 2 milliGRAM(s) Oral every 3 hours PRN  metoclopramide Injectable 10 milliGRAM(s) IV Push every 6 hours PRN  ondansetron Injectable 8 milliGRAM(s) IV Push every 8 hours PRN  senna 1 Tablet(s) Oral at bedtime PRN        A/P:  68 year old female with a history of IgG kappa Multiple Myeloma.  Status Post Autologous PBSCT,  Day +10  Transaminitis improving - continue Micafungin for now   Patient is neutropenic, continue Cipro. If T >/= 38C, pan cx, CXR and change Cipro to Cefepime 2g IV q 8   Diarrhea improved- CDiff negative, encourage Imodium prn  Mucositis secondary to antineoplastic chemotherapy- continue mouth care, pain control  Nutrition consult       1. Infectious Disease:   acyclovir   Oral Tab/Cap 400 milliGRAM(s) Oral every 8 hours  ciprofloxacin     Tablet 500 milliGRAM(s) Oral every 12 hours  clotrimazole Lozenge 1 Lozenge Oral five times a day  micafungin IVPB 100 milliGRAM(s) IV Intermittent every 24 hours      2. VOD Prophylaxis: Actigall, Glutamine, Heparin (dosed at 100 units / kg / day)     3. GI Prophylaxis:    pantoprazole    Tablet 40 milliGRAM(s) Oral before breakfast    4. Mouth care - NS / NaHCO3 rinses, Mycelex, Biotene; Skin care     5. GVHD prophylaxis: n/a    6. Transfuse & replete electrolytes prn   Hypokalemia: KCL 20 mEq IV x 3    7. IV hydration, daily weights, strict I&O, prn diuresis     8. PO intake as tolerated, nutrition follow up as needed, MVI, folic acid     9. Antiemetics, anti-diarrhea medications:  LORazepam   Injectable 1 milliGRAM(s) IV Push every 6 hours PRN  metoclopramide Injectable 10 milliGRAM(s) IV Push every 6 hours PRN  ondansetron Injectable 8 milliGRAM(s) IV Push every 8 hours PRN     10. OOB as tolerated, physical therapy consult if needed     11. Monitor coags / fibrinogen 2x week, vitamin K as needed     12. Monitor closely for clinical changes, monitor for fevers     13. Emotional support provided, plan of care discussed and questions addressed     14. Patient education done regarding plan of care, restrictions and discharge planning     15. Continue regular social work input       I have written the above note for Dr. Lange who performed service with me in the room.   Maria Luisa Garcia NP-C ([phone])    I have seen and examined patient with NP, I agree with above note as scribed.

## 2019-03-10 NOTE — PROGRESS NOTE ADULT - ATTENDING COMMENTS
70 y/o F w/ IgG kappa myeloma admitted for autologous pbsct w/ Melphalan prep (100mg/m2 x 2)   Day +10 s/p HPC transplant; continue Zarxio   Strict I&O, daily weights, prn diuresis   Antiemetics, mouth care, skin care   Mucositis secondary to antineoplastic chemotherapy- continue mouth care, pain control  Continue Acyclovir, Cipro prophylaxis; continue Mycamine as Diflucan discontinued secondary to transaminitis.  Supplement lytes   Transaminitis- now decreasing LFT's- off Diflucan, continue Mycamine  VOD prophylaxis  Imodium prn diarrhea  OOB, ambulate 70 y/o F w/ IgG kappa myeloma admitted for autologous pbsct w/ Melphalan prep (100mg/m2 x 2)   Day +10 s/p HPC transplant; continue Zarxio   Strict I&O, daily weights, prn diuresis   Antiemetics, mouth care, skin care   Mucositis secondary to antineoplastic chemotherapy- continue mouth care, pain control. Improved.  Continue Acyclovir, Cipro prophylaxis; continue Mycamine as Diflucan discontinued secondary to transaminitis.  Transaminitis- now decreasing LFT's- off Diflucan, continue Mycamine  Supplement lytes   VOD prophylaxis  Imodium prn diarrhea  OOB, ambulate

## 2019-03-11 LAB
ALBUMIN SERPL ELPH-MCNC: 2.9 G/DL — LOW (ref 3.3–5)
ALP SERPL-CCNC: 85 U/L — SIGNIFICANT CHANGE UP (ref 40–120)
ALT FLD-CCNC: 77 U/L — HIGH (ref 10–45)
ANION GAP SERPL CALC-SCNC: 9 MMOL/L — SIGNIFICANT CHANGE UP (ref 5–17)
APTT BLD: 31 SEC — SIGNIFICANT CHANGE UP (ref 27.5–36.3)
AST SERPL-CCNC: 31 U/L — SIGNIFICANT CHANGE UP (ref 10–40)
BASOPHILS # BLD AUTO: 0 K/UL — SIGNIFICANT CHANGE UP (ref 0–0.2)
BILIRUB DIRECT SERPL-MCNC: <0.1 MG/DL — SIGNIFICANT CHANGE UP (ref 0–0.2)
BILIRUB INDIRECT FLD-MCNC: >0 MG/DL — LOW (ref 0.2–1)
BILIRUB SERPL-MCNC: 0.1 MG/DL — LOW (ref 0.2–1.2)
BLD GP AB SCN SERPL QL: NEGATIVE — SIGNIFICANT CHANGE UP
BUN SERPL-MCNC: 4 MG/DL — LOW (ref 7–23)
CALCIUM SERPL-MCNC: 8.7 MG/DL — SIGNIFICANT CHANGE UP (ref 8.4–10.5)
CHLORIDE SERPL-SCNC: 110 MMOL/L — HIGH (ref 96–108)
CO2 SERPL-SCNC: 24 MMOL/L — SIGNIFICANT CHANGE UP (ref 22–31)
CREAT SERPL-MCNC: 0.44 MG/DL — LOW (ref 0.5–1.3)
EOSINOPHIL # BLD AUTO: 0 K/UL — SIGNIFICANT CHANGE UP (ref 0–0.5)
FIBRINOGEN PPP-MCNC: 563 MG/DL — HIGH (ref 350–510)
GLUCOSE SERPL-MCNC: 85 MG/DL — SIGNIFICANT CHANGE UP (ref 70–99)
HCT VFR BLD CALC: 20.8 % — CRITICAL LOW (ref 34.5–45)
HGB BLD-MCNC: 7.3 G/DL — LOW (ref 11.5–15.5)
INR BLD: 1.1 RATIO — SIGNIFICANT CHANGE UP (ref 0.88–1.16)
LDH SERPL L TO P-CCNC: 137 U/L — SIGNIFICANT CHANGE UP (ref 50–242)
LYMPHOCYTES # BLD AUTO: 0.2 K/UL — LOW (ref 1–3.3)
LYMPHOCYTES # BLD AUTO: 12 % — LOW (ref 13–44)
MAGNESIUM SERPL-MCNC: 1.7 MG/DL — SIGNIFICANT CHANGE UP (ref 1.6–2.6)
MCHC RBC-ENTMCNC: 32.1 PG — SIGNIFICANT CHANGE UP (ref 27–34)
MCHC RBC-ENTMCNC: 35 GM/DL — SIGNIFICANT CHANGE UP (ref 32–36)
MCV RBC AUTO: 91.7 FL — SIGNIFICANT CHANGE UP (ref 80–100)
MONOCYTES # BLD AUTO: 0.2 K/UL — SIGNIFICANT CHANGE UP (ref 0–0.9)
MONOCYTES NFR BLD AUTO: 12 % — SIGNIFICANT CHANGE UP (ref 2–14)
MYELOCYTES NFR BLD: 4 % — HIGH (ref 0–0)
NEUTROPHILS # BLD AUTO: 1.1 K/UL — LOW (ref 1.8–7.4)
NEUTROPHILS NFR BLD AUTO: 52 % — SIGNIFICANT CHANGE UP (ref 43–77)
NEUTS BAND # BLD: 20 % — HIGH (ref 0–8)
PHOSPHATE SERPL-MCNC: 3.1 MG/DL — SIGNIFICANT CHANGE UP (ref 2.5–4.5)
PLAT MORPH BLD: NORMAL — SIGNIFICANT CHANGE UP
PLATELET # BLD AUTO: 20 K/UL — CRITICAL LOW (ref 150–400)
POTASSIUM SERPL-MCNC: 3.5 MMOL/L — SIGNIFICANT CHANGE UP (ref 3.5–5.3)
POTASSIUM SERPL-SCNC: 3.5 MMOL/L — SIGNIFICANT CHANGE UP (ref 3.5–5.3)
PROT SERPL-MCNC: 4.7 G/DL — LOW (ref 6–8.3)
PROTHROM AB SERPL-ACNC: 12.7 SEC — SIGNIFICANT CHANGE UP (ref 10–12.9)
RBC # BLD: 2.26 M/UL — LOW (ref 3.8–5.2)
RBC # FLD: 13.1 % — SIGNIFICANT CHANGE UP (ref 10.3–14.5)
RBC BLD AUTO: SIGNIFICANT CHANGE UP
RH IG SCN BLD-IMP: POSITIVE — SIGNIFICANT CHANGE UP
SODIUM SERPL-SCNC: 143 MMOL/L — SIGNIFICANT CHANGE UP (ref 135–145)
WBC # BLD: 1.4 K/UL — LOW (ref 3.8–10.5)
WBC # FLD AUTO: 1.4 K/UL — LOW (ref 3.8–10.5)

## 2019-03-11 PROCEDURE — 99291 CRITICAL CARE FIRST HOUR: CPT

## 2019-03-11 RX ORDER — FLUCONAZOLE 150 MG/1
400 TABLET ORAL DAILY
Qty: 0 | Refills: 0 | Status: DISCONTINUED | OUTPATIENT
Start: 2019-03-12 | End: 2019-03-13

## 2019-03-11 RX ADMIN — Medication 5 MILLILITER(S): at 00:38

## 2019-03-11 RX ADMIN — HYDROMORPHONE HYDROCHLORIDE 0.5 MILLIGRAM(S): 2 INJECTION INTRAMUSCULAR; INTRAVENOUS; SUBCUTANEOUS at 04:00

## 2019-03-11 RX ADMIN — LORATADINE 10 MILLIGRAM(S): 10 TABLET ORAL at 11:35

## 2019-03-11 RX ADMIN — AMLODIPINE BESYLATE 5 MILLIGRAM(S): 2.5 TABLET ORAL at 06:59

## 2019-03-11 RX ADMIN — Medication 1 LOZENGE: at 15:42

## 2019-03-11 RX ADMIN — NYSTATIN CREAM 1 APPLICATION(S): 100000 CREAM TOPICAL at 13:15

## 2019-03-11 RX ADMIN — Medication 5 MILLILITER(S): at 08:07

## 2019-03-11 RX ADMIN — Medication 5 MILLILITER(S): at 11:35

## 2019-03-11 RX ADMIN — Medication 10 MILLILITER(S): at 08:07

## 2019-03-11 RX ADMIN — NYSTATIN CREAM 1 APPLICATION(S): 100000 CREAM TOPICAL at 21:20

## 2019-03-11 RX ADMIN — URSODIOL 300 MILLIGRAM(S): 250 TABLET, FILM COATED ORAL at 17:13

## 2019-03-11 RX ADMIN — Medication 400 MILLIGRAM(S): at 21:20

## 2019-03-11 RX ADMIN — Medication 10 MILLILITER(S): at 15:43

## 2019-03-11 RX ADMIN — URSODIOL 300 MILLIGRAM(S): 250 TABLET, FILM COATED ORAL at 08:07

## 2019-03-11 RX ADMIN — MICAFUNGIN SODIUM 105 MILLIGRAM(S): 100 INJECTION, POWDER, LYOPHILIZED, FOR SOLUTION INTRAVENOUS at 09:57

## 2019-03-11 RX ADMIN — Medication 400 MILLIGRAM(S): at 07:00

## 2019-03-11 RX ADMIN — Medication 400 MILLIGRAM(S): at 13:14

## 2019-03-11 RX ADMIN — Medication 10 MILLILITER(S): at 00:38

## 2019-03-11 RX ADMIN — Medication 10 MILLILITER(S): at 11:57

## 2019-03-11 RX ADMIN — PANTOPRAZOLE SODIUM 40 MILLIGRAM(S): 20 TABLET, DELAYED RELEASE ORAL at 06:59

## 2019-03-11 RX ADMIN — NYSTATIN CREAM 1 APPLICATION(S): 100000 CREAM TOPICAL at 07:00

## 2019-03-11 RX ADMIN — Medication 5 MILLILITER(S): at 20:04

## 2019-03-11 RX ADMIN — Medication 5 MILLILITER(S): at 15:42

## 2019-03-11 RX ADMIN — Medication 1 LOZENGE: at 08:07

## 2019-03-11 RX ADMIN — Medication 10 MILLILITER(S): at 20:04

## 2019-03-11 RX ADMIN — Medication 500 MILLIGRAM(S): at 17:13

## 2019-03-11 RX ADMIN — Medication 1 LOZENGE: at 20:05

## 2019-03-11 RX ADMIN — HYDROMORPHONE HYDROCHLORIDE 0.5 MILLIGRAM(S): 2 INJECTION INTRAMUSCULAR; INTRAVENOUS; SUBCUTANEOUS at 03:25

## 2019-03-11 RX ADMIN — Medication 1 LOZENGE: at 11:37

## 2019-03-11 RX ADMIN — Medication 1 TABLET(S): at 11:35

## 2019-03-11 RX ADMIN — Medication 1 LOZENGE: at 00:38

## 2019-03-11 RX ADMIN — Medication 480 MICROGRAM(S): at 13:14

## 2019-03-11 RX ADMIN — Medication 1 MILLIGRAM(S): at 11:35

## 2019-03-11 RX ADMIN — Medication 500 MILLIGRAM(S): at 06:59

## 2019-03-11 NOTE — PROVIDER CONTACT NOTE (CRITICAL VALUE NOTIFICATION) - ASSESSMENT
asymptomatic
aol x 4, vs stable and afebrile,
vss, no s/s of active bleeding
vss, no s/s of active bleeding, patient reports feeling fatigued

## 2019-03-11 NOTE — PROVIDER CONTACT NOTE (CRITICAL VALUE NOTIFICATION) - RECOMMENDATIONS
none
continue to monitor
no transfusion at this time. monitor pt
tx 1 unit prbc, TX 1 Bag of platelets

## 2019-03-11 NOTE — CHART NOTE - NSCHARTNOTEFT_GEN_A_CORE
Pt seen for nutrition follow up. Pt with multiple myeloma day +11 S/P autologous PBSCT, noted with grade 1 oral mucositis.     Source: Patient [ x]    Family [ ]     other [ ]    Diet : Regular diet with ensure clear 2 x daily and glutasolve 1 packet daily       Patient denies N+V, pt had been experiencing diarrhea over the weekend, stated pt would have an episode every time she would urinate, no diarrhea thus far today-noted pt ordered for imodium, pt denies mouth discomfort at this time     PO intake: Pt reports appetite has begun to improve over the past couple of days, pt will eat three times per day increasing quantities of foods, pt estimates 75% meal completion, today for breakfast pt had cream of wheat, torres and homefries, pt has been taking ensure clear intermittently, will honor flavor preference. Pt will regularly take milkshakes however did not take one last Friday due to diarrhea, may try milkshake today.       Current Weight:  174.6 lbs (2/25), 176.5 lbs (3/4), 173.5 lbs (3/11), weight slightly decreased   % Weight Change    Pertinent Medications: MEDICATIONS  (STANDING):  acetaminophen   Tablet .. 650 milliGRAM(s) Oral every 6 hours  acyclovir   Oral Tab/Cap 400 milliGRAM(s) Oral every 8 hours  amLODIPine   Tablet 5 milliGRAM(s) Oral daily  Biotene Dry Mouth Oral Rinse 5 milliLiter(s) Swish and Spit five times a day  ciprofloxacin     Tablet 500 milliGRAM(s) Oral every 12 hours  clotrimazole Lozenge 1 Lozenge Oral five times a day  [id number] Injectable 480 MICROGram(s) SubCutaneous daily  folic acid 1 milliGRAM(s) Oral daily  heparin  Infusion 330 Unit(s)/Hr (3.3 mL/Hr) IV Continuous <Continuous>  lidocaine/prilocaine Cream 1 Application(s) Topical daily  loratadine 10 milliGRAM(s) Oral daily  micafungin IVPB      micafungin IVPB 100 milliGRAM(s) IV Intermittent every 24 hours  multivitamin 1 Tablet(s) Oral daily  nystatin Powder 1 Application(s) Topical three times a day  pantoprazole    Tablet 40 milliGRAM(s) Oral before breakfast  sodium bicarbonate Mouth Rinse 10 milliLiter(s) Swish and Spit five times a day  sodium chloride 0.9%. 1000 milliLiter(s) (20 mL/Hr) IV Continuous <Continuous>  ursodiol Capsule 300 milliGRAM(s) Oral two times a day with meals    MEDICATIONS  (PRN):  acetaminophen   Tablet .. 650 milliGRAM(s) Oral every 6 hours PRN Temp greater or equal to 38C (100.4F), Mild Pain (1 - 3)  benzocaine 15 mG/menthol 3.6 mG Lozenge 1 Lozenge Oral every 4 hours PRN Sore Throat  diphenhydrAMINE   Injectable 25 milliGRAM(s) IV Push every 4 hours PRN Allergy symptoms  docusate sodium 100 milliGRAM(s) Oral three times a day PRN Constipation  furosemide   Injectable 20 milliGRAM(s) IV Push every 24 hours PRN if urine output< 100 ml/hr x 2 hours  HYDROmorphone  Injectable 0.5 milliGRAM(s) IV Push every 4 hours PRN Moderate Pain (4 - 6)  loperamide 2 milliGRAM(s) Oral every 3 hours PRN Diarrhea  metoclopramide Injectable 10 milliGRAM(s) IV Push every 6 hours PRN Nausea and/or Vomiting  ondansetron Injectable 8 milliGRAM(s) IV Push every 8 hours PRN Nausea and/or Vomiting  senna 1 Tablet(s) Oral at bedtime PRN Constipation    Pertinent Labs:  03-11 Na143 mmol/L Glu 85 mg/dL K+ 3.5 mmol/L Cr  0.44 mg/dL<L> BUN 4 mg/dL<L> 03-11 Phos 3.1 mg/dL 03-11 Alb 2.9 g/dL<L>      Skin: No edema, skin intact    Estimated Needs:   [ x] no change since previous assessment  [ ] recalculated:       Previous Nutrition Diagnosis:     [ x] Inadequate Oral Intake        Nutrition Diagnosis is [x ] ongoing, improving and addressed with oral supplement, milkshakes though [employer] program          New Nutrition Diagnosis: [ x] not applicable         Interventions:     Recommend    1. Continue regular diet with ensure clear 2 x daily and glutasolve 1 packet daily as ordered  2. Continue to encourage po intake and obtain/honor food preferences as able, reinforced consuming foods with protein first and working around the plate, taking ensure clear between meals as needed       Monitoring and Evaluation:     [x ] PO intake [x ] Tolerance to diet prescription [ x] weights [ x] follow up per protocol    [ ] other:

## 2019-03-11 NOTE — PROGRESS NOTE ADULT - ATTENDING COMMENTS
68 y/o F w/ IgG kappa myeloma admitted for autologous pbsct w/ Melphalan prep (100mg/m2 x 2)   Day +11 s/p HPC transplant; continue Zarxio   Strict I&O, daily weights, prn diuresis   Antiemetics, mouth care, skin care   Mucositis secondary to antineoplastic chemotherapy- continue mouth care, pain control. Improved.  Continue Acyclovir, Cipro prophylaxis; continue Mycamine as Diflucan discontinued secondary to transaminitis.  Transaminitis- now decreasing LFT's- off Diflucan, continue Mycamine  Supplement lytes   VOD prophylaxis  Imodium prn diarrhea  OOB, ambulate 70 y/o F w/ IgG kappa myeloma admitted for autologous pbsct w/ Melphalan prep (100mg/m2 x 2)   Day +11 s/p HPC transplant; continue Zarxio   Strict I&O, daily weights, prn diuresis   Antiemetics, mouth care, skin care   Mucositis secondary to antineoplastic chemotherapy- continue mouth care, pain control. Improved.  Continue Acyclovir, Cipro prophylaxis; continue Mycamine as Diflucan discontinued secondary to transaminitis.  Transaminitis- now decreasing LFT's- off Diflucan, continue Mycamine.....will consider change back to fluconazole in next day or so  Supplement lytes   VOD prophylaxis  Imodium prn diarrhea  OOB, ambulate

## 2019-03-11 NOTE — PROVIDER CONTACT NOTE (CRITICAL VALUE NOTIFICATION) - BACKGROUND
s/p stem cell
6 days post auto transplant
day +7 for auto transplant for MM
8 days post auto transplant

## 2019-03-11 NOTE — PROGRESS NOTE ADULT - SUBJECTIVE AND OBJECTIVE BOX
HPC Transplant Team                                                      Critical / Counseling Time Provided: 30 minutes                                                                                                                                                        Chief Complaint: Autologous peripheral blood stem cell transplant for treatment of multiple myeloma    S: Patient seen and examined with HPC Transplant Team:     Denies mouth / tongue / throat pain, dyspnea, cough, nausea, vomiting, diarrhea, abdominal pain     O: Vitals:   Vital Signs Last 24 Hrs  T(C): 37.1 (11 Mar 2019 06:22), Max: 37.3 (11 Mar 2019 02:11)  T(F): 98.8 (11 Mar 2019 06:22), Max: 99.1 (11 Mar 2019 02:11)  HR: 92 (11 Mar 2019 06:22) (92 - 109)  BP: 108/64 (11 Mar 2019 06:22) (106/62 - 119/72)  BP(mean): --  RR: 18 (11 Mar 2019 06:22) (18 - 20)  SpO2: 99% (10 Mar 2019 21:17) (99% - 100%)    Admit weight: 79.2 kG  Daily Weight in k.7 (10 Mar 2019 10:07)    Intake / Output:   03-10 @ 07:01  -  03-11 @ 07:00  --------------------------------------------------------  IN: 1892 mL / OUT: 1475 mL / NET: 417 mL      PE:   Oropharynx: no erythema or ulcers  Oral Mucositis: +                                                       ndGndrndanddndend:nd nd2nd CVS: (+) S1/S2, RRR  Lungs: CTA B/L  Abdomen: soft, NT/ND, (+) BS   Extremities: No edema in B/L LE  Gastric Mucositis: n/a                                                Grade:   Intestinal Mucositis: n/a                                             Grade:   Skin: hyperpigmentation 2/2 Kepivance on neck, and upper chest   TLC: C/D/I  Neuro: nonfocal   Pain: 3/10 throat pain on swallow      Labs:   CBC Full  -  ( 11 Mar 2019 07:16 )  WBC Count : 1.4 K/uL  Hemoglobin : 7.3 g/dL  Hematocrit : 20.8 %  Platelet Count - Automated : 20 K/uL  Mean Cell Volume : 91.7 fl  Mean Cell Hemoglobin : 32.1 pg  Mean Cell Hemoglobin Concentration : 35.0 gm/dL  Auto Neutrophil # : x  Auto Lymphocyte # : x  Auto Monocyte # : x  Auto Eosinophil # : x  Auto Basophil # : x  Auto Neutrophil % : x  Auto Lymphocyte % : x  Auto Monocyte % : x  Auto Eosinophil % : x  Auto Basophil % : x                          7.3    1.4   )-----------( 20       ( 11 Mar 2019 07:16 )             20.8     03-11    143  |  110<H>  |  4<L>  ----------------------------<  85  3.5   |  24  |  0.44<L>    Ca    8.7      11 Mar 2019 07:15  Phos  3.1       Mg     1.7         TPro  4.7<L>  /  Alb  2.9<L>  /  TBili  0.1<L>  /  DBili  <0.1  /  AST  31  /  ALT  77<H>  /  AlkPhos  85      PT/INR - ( 11 Mar 2019 07:17 )   PT: 12.7 sec;   INR: 1.10 ratio         PTT - ( 11 Mar 2019 07:17 )  PTT:31.0 sec  LIVER FUNCTIONS - ( 11 Mar 2019 07:15 )  Alb: 2.9 g/dL / Pro: 4.7 g/dL / ALK PHOS: 85 U/L / ALT: 77 U/L / AST: 31 U/L / GGT: x           Lactate Dehydrogenase, Serum: 137 U/L ( @ 07:15)    Meds:   Antimicrobials:   acyclovir   Oral Tab/Cap 400 milliGRAM(s) Oral every 8 hours  ciprofloxacin     Tablet 500 milliGRAM(s) Oral every 12 hours  clotrimazole Lozenge 1 Lozenge Oral five times a day  micafungin IVPB      micafungin IVPB 100 milliGRAM(s) IV Intermittent every 24 hours      Heme / Onc:   heparin  Infusion 330 Unit(s)/Hr IV Continuous <Continuous>      GI:  docusate sodium 100 milliGRAM(s) Oral three times a day PRN  loperamide 2 milliGRAM(s) Oral every 3 hours PRN  pantoprazole    Tablet 40 milliGRAM(s) Oral before breakfast  senna 1 Tablet(s) Oral at bedtime PRN  sodium bicarbonate Mouth Rinse 10 milliLiter(s) Swish and Spit five times a day  ursodiol Capsule 300 milliGRAM(s) Oral two times a day with meals      Cardiovascular:   amLODIPine   Tablet 5 milliGRAM(s) Oral daily  furosemide   Injectable 20 milliGRAM(s) IV Push every 24 hours PRN      Immunologic:   filgrastim-sndz Injectable 480 MICROGram(s) SubCutaneous daily      Other medications:   acetaminophen   Tablet .. 650 milliGRAM(s) Oral every 6 hours  Biotene Dry Mouth Oral Rinse 5 milliLiter(s) Swish and Spit five times a day  folic acid 1 milliGRAM(s) Oral daily  lidocaine/prilocaine Cream 1 Application(s) Topical daily  loratadine 10 milliGRAM(s) Oral daily  multivitamin 1 Tablet(s) Oral daily  nystatin Powder 1 Application(s) Topical three times a day  sodium chloride 0.9%. 1000 milliLiter(s) IV Continuous <Continuous>      PRN:   acetaminophen   Tablet .. 650 milliGRAM(s) Oral every 6 hours PRN  benzocaine 15 mG/menthol 3.6 mG Lozenge 1 Lozenge Oral every 4 hours PRN  diphenhydrAMINE   Injectable 25 milliGRAM(s) IV Push every 4 hours PRN  docusate sodium 100 milliGRAM(s) Oral three times a day PRN  furosemide   Injectable 20 milliGRAM(s) IV Push every 24 hours PRN  HYDROmorphone  Injectable 0.5 milliGRAM(s) IV Push every 4 hours PRN  loperamide 2 milliGRAM(s) Oral every 3 hours PRN  metoclopramide Injectable 10 milliGRAM(s) IV Push every 6 hours PRN  ondansetron Injectable 8 milliGRAM(s) IV Push every 8 hours PRN  senna 1 Tablet(s) Oral at bedtime PRN    A/P:  68 year old female with a history of IgG kappa Multiple Myeloma.  Status Post Autologous PBSCT,  Day +11  Transaminitis improving - continue Micafungin for now   Patient is neutropenic, continue Cipro. If T >/= 38C, pan cx, CXR and change Cipro to Cefepime 2g IV q 8   Diarrhea improved- CDiff negative, encourage Imodium prn  Mucositis secondary to antineoplastic chemotherapy- continue mouth care, pain control  Nutrition consult     1. Infectious Disease:   acyclovir   Oral Tab/Cap 400 milliGRAM(s) Oral every 8 hours  ciprofloxacin     Tablet 500 milliGRAM(s) Oral every 12 hours  clotrimazole Lozenge 1 Lozenge Oral five times a day  micafungin IVPB      micafungin IVPB 100 milliGRAM(s) IV Intermittent every 24 hours    2. VOD Prophylaxis: Actigall, Glutamine, Heparin (dosed at 100 units / kg / day)     3. GI Prophylaxis:    pantoprazole    Tablet 40 milliGRAM(s) Oral before breakfast    4. Mouthcare - NS / NaHCO3 rinses, Mycelex, Biotene; Skin care     5. GVHD prophylaxis - n/a     6. Transfuse & replete electrolytes prn     7. IV hydration, daily weights, strict I&O, prn diuresis     8. PO intake as tolerated, nutrition follow up as needed, MVI, folic acid     9. Antiemetics, anti-diarrhea medications:   loperamide 2 milliGRAM(s) Oral every 3 hours PRN  metoclopramide Injectable 10 milliGRAM(s) IV Push every 6 hours PRN  ondansetron Injectable 8 milliGRAM(s) IV Push every 8 hours PRN    10. OOB as tolerated, physical therapy consult if needed     11. Monitor coags / fibrinogen 2x week, vitamin K as needed     12. Monitor closely for clinical changes, monitor for fevers     13. Emotional support provided, plan of care discussed and questions addressed     14. Patient education done regarding  plan of care, restrictions and discharge planning     15. Continue regular social work input     I have written the above note for Dr. Kumari who performed service with me in the room.   Nancy Hallman NP-C ([phone])    I have seen and examined patient with NP, I agree with above note as scribed. HPC Transplant Team                                                      Critical / Counseling Time Provided: 30 minutes                                                                                                                                                        Chief Complaint: Autologous peripheral blood stem cell transplant for treatment of multiple myeloma    S: Patient seen and examined with HPC Transplant Team:   leg cramps  insomnia  appetite improved    Denies mouth / tongue / throat pain, dyspnea, cough, nausea, vomiting, diarrhea, abdominal pain     O: Vitals:   Vital Signs Last 24 Hrs  T(C): 37.1 (11 Mar 2019 06:22), Max: 37.3 (11 Mar 2019 02:11)  T(F): 98.8 (11 Mar 2019 06:22), Max: 99.1 (11 Mar 2019 02:11)  HR: 92 (11 Mar 2019 06:22) (92 - 109)  BP: 108/64 (11 Mar 2019 06:22) (106/62 - 119/72)  BP(mean): --  RR: 18 (11 Mar 2019 06:22) (18 - 20)  SpO2: 99% (10 Mar 2019 21:17) (99% - 100%)    Admit weight: 79.2 kG  Daily Weight in k.7 (10 Mar 2019 10:07)    Intake / Output:   -10 @ 07:01  -  03-11 @ 07:00  --------------------------------------------------------  IN: 1892 mL / OUT: 1475 mL / NET: 417 mL      PE:   Oropharynx: no erythema or ulcers  Oral Mucositis: +                                                       ndGndrndanddndend:nd nd2nd CVS: (+) S1/S2, RRR  Lungs: CTA B/L  Abdomen: soft, NT/ND, (+) BS mildly hyperactive  Extremities: No edema in B/L LE  Gastric Mucositis: n/a                                                Grade:   Intestinal Mucositis: n/a                                             Grade:   Skin: hyperpigmentation 2/2 Kepivance on neck, and upper chest   TLC: C/D/I  Neuro: nonfocal   Pain: 3/10 throat pain on swallow      Labs:   CBC Full  -  ( 11 Mar 2019 07:16 )  WBC Count : 1.4 K/uL  Hemoglobin : 7.3 g/dL  Hematocrit : 20.8 %  Platelet Count - Automated : 20 K/uL  Mean Cell Volume : 91.7 fl  Mean Cell Hemoglobin : 32.1 pg  Mean Cell Hemoglobin Concentration : 35.0 gm/dL  Auto Neutrophil # : x  Auto Lymphocyte # : x  Auto Monocyte # : x  Auto Eosinophil # : x  Auto Basophil # : x  Auto Neutrophil % : x  Auto Lymphocyte % : x  Auto Monocyte % : x  Auto Eosinophil % : x  Auto Basophil % : x                          7.3    1.4   )-----------( 20       ( 11 Mar 2019 07:16 )             20.8         143  |  110<H>  |  4<L>  ----------------------------<  85  3.5   |  24  |  0.44<L>    Ca    8.7      11 Mar 2019 07:15  Phos  3.1       Mg     1.7         TPro  4.7<L>  /  Alb  2.9<L>  /  TBili  0.1<L>  /  DBili  <0.1  /  AST  31  /  ALT  77<H>  /  AlkPhos  85      PT/INR - ( 11 Mar 2019 07:17 )   PT: 12.7 sec;   INR: 1.10 ratio         PTT - ( 11 Mar 2019 07:17 )  PTT:31.0 sec  LIVER FUNCTIONS - ( 11 Mar 2019 07:15 )  Alb: 2.9 g/dL / Pro: 4.7 g/dL / ALK PHOS: 85 U/L / ALT: 77 U/L / AST: 31 U/L / GGT: x           Lactate Dehydrogenase, Serum: 137 U/L ( @ 07:15)    Meds:   Antimicrobials:   acyclovir   Oral Tab/Cap 400 milliGRAM(s) Oral every 8 hours  ciprofloxacin     Tablet 500 milliGRAM(s) Oral every 12 hours  clotrimazole Lozenge 1 Lozenge Oral five times a day  micafungin IVPB      micafungin IVPB 100 milliGRAM(s) IV Intermittent every 24 hours      Heme / Onc:   heparin  Infusion 330 Unit(s)/Hr IV Continuous <Continuous>      GI:  docusate sodium 100 milliGRAM(s) Oral three times a day PRN  loperamide 2 milliGRAM(s) Oral every 3 hours PRN  pantoprazole    Tablet 40 milliGRAM(s) Oral before breakfast  senna 1 Tablet(s) Oral at bedtime PRN  sodium bicarbonate Mouth Rinse 10 milliLiter(s) Swish and Spit five times a day  ursodiol Capsule 300 milliGRAM(s) Oral two times a day with meals      Cardiovascular:   amLODIPine   Tablet 5 milliGRAM(s) Oral daily  furosemide   Injectable 20 milliGRAM(s) IV Push every 24 hours PRN      Immunologic:   filgrastim-sndz Injectable 480 MICROGram(s) SubCutaneous daily      Other medications:   acetaminophen   Tablet .. 650 milliGRAM(s) Oral every 6 hours  Biotene Dry Mouth Oral Rinse 5 milliLiter(s) Swish and Spit five times a day  folic acid 1 milliGRAM(s) Oral daily  lidocaine/prilocaine Cream 1 Application(s) Topical daily  loratadine 10 milliGRAM(s) Oral daily  multivitamin 1 Tablet(s) Oral daily  nystatin Powder 1 Application(s) Topical three times a day  sodium chloride 0.9%. 1000 milliLiter(s) IV Continuous <Continuous>      PRN:   acetaminophen   Tablet .. 650 milliGRAM(s) Oral every 6 hours PRN  benzocaine 15 mG/menthol 3.6 mG Lozenge 1 Lozenge Oral every 4 hours PRN  diphenhydrAMINE   Injectable 25 milliGRAM(s) IV Push every 4 hours PRN  docusate sodium 100 milliGRAM(s) Oral three times a day PRN  furosemide   Injectable 20 milliGRAM(s) IV Push every 24 hours PRN  HYDROmorphone  Injectable 0.5 milliGRAM(s) IV Push every 4 hours PRN  loperamide 2 milliGRAM(s) Oral every 3 hours PRN  metoclopramide Injectable 10 milliGRAM(s) IV Push every 6 hours PRN  ondansetron Injectable 8 milliGRAM(s) IV Push every 8 hours PRN  senna 1 Tablet(s) Oral at bedtime PRN    A/P:  68 year old female with a history of IgG kappa Multiple Myeloma.  Status Post Autologous PBSCT,  Day +11  Transaminitis improving - continue Micafungin for now   Patient is neutropenic, continue Cipro. If T >/= 38C, pan cx, CXR and change Cipro to Cefepime 2g IV q 8   Diarrhea improved- CDiff negative, encourage Imodium prn  Mucositis secondary to antineoplastic chemotherapy- continue mouth care, pain control  Nutrition consult     1. Infectious Disease:   acyclovir   Oral Tab/Cap 400 milliGRAM(s) Oral every 8 hours  ciprofloxacin     Tablet 500 milliGRAM(s) Oral every 12 hours  clotrimazole Lozenge 1 Lozenge Oral five times a day  micafungin IVPB      micafungin IVPB 100 milliGRAM(s) IV Intermittent every 24 hours    2. VOD Prophylaxis: Actigall, Glutamine, Heparin (dosed at 100 units / kg / day)     3. GI Prophylaxis:    pantoprazole    Tablet 40 milliGRAM(s) Oral before breakfast    4. Mouthcare - NS / NaHCO3 rinses, Mycelex, Biotene; Skin care     5. GVHD prophylaxis - n/a     6. Transfuse & replete electrolytes prn     7. IV hydration, daily weights, strict I&O, prn diuresis     8. PO intake as tolerated, nutrition follow up as needed, MVI, folic acid     9. Antiemetics, anti-diarrhea medications:   loperamide 2 milliGRAM(s) Oral every 3 hours PRN  metoclopramide Injectable 10 milliGRAM(s) IV Push every 6 hours PRN  ondansetron Injectable 8 milliGRAM(s) IV Push every 8 hours PRN    10. OOB as tolerated, physical therapy consult if needed     11. Monitor coags / fibrinogen 2x week, vitamin K as needed     12. Monitor closely for clinical changes, monitor for fevers     13. Emotional support provided, plan of care discussed and questions addressed     14. Patient education done regarding  plan of care, restrictions and discharge planning     15. Continue regular social work input     I have written the above note for Dr. Kumari who performed service with me in the room.   Nancy Hallman NP-C ([phone])    I have seen and examined patient with NP, I agree with above note as scribed. HPC Transplant Team                                                      Critical / Counseling Time Provided: 30 minutes                                                                                                                                                        Chief Complaint: Autologous peripheral blood stem cell transplant for treatment of multiple myeloma    S: Patient seen and examined with HPC Transplant Team:   leg cramps  insomnia  appetite improved    Denies mouth / tongue / throat pain, dyspnea, cough, nausea, vomiting, diarrhea, abdominal pain     O: Vitals:   Vital Signs Last 24 Hrs  T(C): 37.1 (11 Mar 2019 06:22), Max: 37.3 (11 Mar 2019 02:11)  T(F): 98.8 (11 Mar 2019 06:22), Max: 99.1 (11 Mar 2019 02:11)  HR: 92 (11 Mar 2019 06:22) (92 - 109)  BP: 108/64 (11 Mar 2019 06:22) (106/62 - 119/72)  BP(mean): --  RR: 18 (11 Mar 2019 06:22) (18 - 20)  SpO2: 99% (10 Mar 2019 21:17) (99% - 100%)    Admit weight: 79.2 kG  Daily Weight in k.7 (10 Mar 2019 10:07)  Today's weight: 78.7kg     Intake / Output:   03-10 @ 07:01  -  03-11 @ 07:00  --------------------------------------------------------  IN: 1892 mL / OUT: 1475 mL / NET: 417 mL      PE:   Oropharynx: no erythema or ulcers  Oral Mucositis: +                                                       ndGndrndanddndend:nd nd2nd CVS: (+) S1/S2, RRR  Lungs: CTA B/L  Abdomen: soft, NT/ND, (+) BS mildly hyperactive  Extremities: No edema in B/L LE  Gastric Mucositis: n/a                                                Grade:   Intestinal Mucositis: n/a                                             Grade:   Skin: hyperpigmentation 2/2 Kepivance on neck, and upper chest   TLC: C/D/I  Neuro: nonfocal   Pain: 3/10 throat pain on swallow      Labs:   CBC Full  -  ( 11 Mar 2019 07:16 )  WBC Count : 1.4 K/uL  Hemoglobin : 7.3 g/dL  Hematocrit : 20.8 %  Platelet Count - Automated : 20 K/uL  Mean Cell Volume : 91.7 fl  Mean Cell Hemoglobin : 32.1 pg  Mean Cell Hemoglobin Concentration : 35.0 gm/dL  Auto Neutrophil # : x  Auto Lymphocyte # : x  Auto Monocyte # : x  Auto Eosinophil # : x  Auto Basophil # : x  Auto Neutrophil % : x  Auto Lymphocyte % : x  Auto Monocyte % : x  Auto Eosinophil % : x  Auto Basophil % : x                          7.3    1.4   )-----------( 20       ( 11 Mar 2019 07:16 )             20.8         143  |  110<H>  |  4<L>  ----------------------------<  85  3.5   |  24  |  0.44<L>    Ca    8.7      11 Mar 2019 07:15  Phos  3.1       Mg     1.7         TPro  4.7<L>  /  Alb  2.9<L>  /  TBili  0.1<L>  /  DBili  <0.1  /  AST  31  /  ALT  77<H>  /  AlkPhos  85      PT/INR - ( 11 Mar 2019 07:17 )   PT: 12.7 sec;   INR: 1.10 ratio         PTT - ( 11 Mar 2019 07:17 )  PTT:31.0 sec  LIVER FUNCTIONS - ( 11 Mar 2019 07:15 )  Alb: 2.9 g/dL / Pro: 4.7 g/dL / ALK PHOS: 85 U/L / ALT: 77 U/L / AST: 31 U/L / GGT: x           Lactate Dehydrogenase, Serum: 137 U/L ( @ 07:15)    Meds:   Antimicrobials:   acyclovir   Oral Tab/Cap 400 milliGRAM(s) Oral every 8 hours  ciprofloxacin     Tablet 500 milliGRAM(s) Oral every 12 hours  clotrimazole Lozenge 1 Lozenge Oral five times a day  micafungin IVPB      micafungin IVPB 100 milliGRAM(s) IV Intermittent every 24 hours      Heme / Onc:   heparin  Infusion 330 Unit(s)/Hr IV Continuous <Continuous>      GI:  docusate sodium 100 milliGRAM(s) Oral three times a day PRN  loperamide 2 milliGRAM(s) Oral every 3 hours PRN  pantoprazole    Tablet 40 milliGRAM(s) Oral before breakfast  senna 1 Tablet(s) Oral at bedtime PRN  sodium bicarbonate Mouth Rinse 10 milliLiter(s) Swish and Spit five times a day  ursodiol Capsule 300 milliGRAM(s) Oral two times a day with meals      Cardiovascular:   amLODIPine   Tablet 5 milliGRAM(s) Oral daily  furosemide   Injectable 20 milliGRAM(s) IV Push every 24 hours PRN      Immunologic:   filgrastim-sndz Injectable 480 MICROGram(s) SubCutaneous daily      Other medications:   acetaminophen   Tablet .. 650 milliGRAM(s) Oral every 6 hours  Biotene Dry Mouth Oral Rinse 5 milliLiter(s) Swish and Spit five times a day  folic acid 1 milliGRAM(s) Oral daily  lidocaine/prilocaine Cream 1 Application(s) Topical daily  loratadine 10 milliGRAM(s) Oral daily  multivitamin 1 Tablet(s) Oral daily  nystatin Powder 1 Application(s) Topical three times a day  sodium chloride 0.9%. 1000 milliLiter(s) IV Continuous <Continuous>      PRN:   acetaminophen   Tablet .. 650 milliGRAM(s) Oral every 6 hours PRN  benzocaine 15 mG/menthol 3.6 mG Lozenge 1 Lozenge Oral every 4 hours PRN  diphenhydrAMINE   Injectable 25 milliGRAM(s) IV Push every 4 hours PRN  docusate sodium 100 milliGRAM(s) Oral three times a day PRN  furosemide   Injectable 20 milliGRAM(s) IV Push every 24 hours PRN  HYDROmorphone  Injectable 0.5 milliGRAM(s) IV Push every 4 hours PRN  loperamide 2 milliGRAM(s) Oral every 3 hours PRN  metoclopramide Injectable 10 milliGRAM(s) IV Push every 6 hours PRN  ondansetron Injectable 8 milliGRAM(s) IV Push every 8 hours PRN  senna 1 Tablet(s) Oral at bedtime PRN    A/P:  68 year old female with a history of IgG kappa Multiple Myeloma.  Status Post Autologous PBSCT,  Day +11  Transaminitis improving - continue Micafungin for now   Patient is neutropenic, continue Cipro. If T >/= 38C, pan cx, CXR and change Cipro to Cefepime 2g IV q 8   Diarrhea improved- CDiff negative, encourage Imodium prn  Mucositis secondary to antineoplastic chemotherapy- continue mouth care, pain control  Nutrition consult     1. Infectious Disease:   acyclovir   Oral Tab/Cap 400 milliGRAM(s) Oral every 8 hours  ciprofloxacin     Tablet 500 milliGRAM(s) Oral every 12 hours  clotrimazole Lozenge 1 Lozenge Oral five times a day  micafungin IVPB      micafungin IVPB 100 milliGRAM(s) IV Intermittent every 24 hours    2. VOD Prophylaxis: Actigall, Glutamine, Heparin (dosed at 100 units / kg / day)     3. GI Prophylaxis:    pantoprazole    Tablet 40 milliGRAM(s) Oral before breakfast    4. Mouthcare - NS / NaHCO3 rinses, Mycelex, Biotene; Skin care     5. GVHD prophylaxis - n/a     6. Transfuse & replete electrolytes prn     7. IV hydration, daily weights, strict I&O, prn diuresis     8. PO intake as tolerated, nutrition follow up as needed, MVI, folic acid     9. Antiemetics, anti-diarrhea medications:   loperamide 2 milliGRAM(s) Oral every 3 hours PRN  metoclopramide Injectable 10 milliGRAM(s) IV Push every 6 hours PRN  ondansetron Injectable 8 milliGRAM(s) IV Push every 8 hours PRN    10. OOB as tolerated, physical therapy consult if needed     11. Monitor coags / fibrinogen 2x week, vitamin K as needed     12. Monitor closely for clinical changes, monitor for fevers     13. Emotional support provided, plan of care discussed and questions addressed     14. Patient education done regarding  plan of care, restrictions and discharge planning     15. Continue regular social work input     I have written the above note for Dr. Kumari who performed service with me in the room.   Nancy Hallman NP-C ([phone])    I have seen and examined patient with NP, I agree with above note as scribed.

## 2019-03-12 LAB
ALBUMIN SERPL ELPH-MCNC: 3 G/DL — LOW (ref 3.3–5)
ALP SERPL-CCNC: 91 U/L — SIGNIFICANT CHANGE UP (ref 40–120)
ALT FLD-CCNC: 69 U/L — HIGH (ref 10–45)
ANION GAP SERPL CALC-SCNC: 14 MMOL/L — SIGNIFICANT CHANGE UP (ref 5–17)
AST SERPL-CCNC: 36 U/L — SIGNIFICANT CHANGE UP (ref 10–40)
BASOPHILS # BLD AUTO: 0 K/UL — SIGNIFICANT CHANGE UP (ref 0–0.2)
BILIRUB SERPL-MCNC: 0.1 MG/DL — LOW (ref 0.2–1.2)
BUN SERPL-MCNC: <4 MG/DL — LOW (ref 7–23)
CALCIUM SERPL-MCNC: 8.6 MG/DL — SIGNIFICANT CHANGE UP (ref 8.4–10.5)
CHLORIDE SERPL-SCNC: 109 MMOL/L — HIGH (ref 96–108)
CO2 SERPL-SCNC: 22 MMOL/L — SIGNIFICANT CHANGE UP (ref 22–31)
CREAT SERPL-MCNC: 0.46 MG/DL — LOW (ref 0.5–1.3)
EOSINOPHIL # BLD AUTO: 0 K/UL — SIGNIFICANT CHANGE UP (ref 0–0.5)
GLUCOSE SERPL-MCNC: 84 MG/DL — SIGNIFICANT CHANGE UP (ref 70–99)
HCT VFR BLD CALC: 21.1 % — LOW (ref 34.5–45)
HGB BLD-MCNC: 7.4 G/DL — LOW (ref 11.5–15.5)
LDH SERPL L TO P-CCNC: 205 U/L — SIGNIFICANT CHANGE UP (ref 50–242)
LYMPHOCYTES # BLD AUTO: 0.5 K/UL — LOW (ref 1–3.3)
LYMPHOCYTES # BLD AUTO: 4 % — LOW (ref 13–44)
MAGNESIUM SERPL-MCNC: 1.5 MG/DL — LOW (ref 1.6–2.6)
MCHC RBC-ENTMCNC: 32.1 PG — SIGNIFICANT CHANGE UP (ref 27–34)
MCHC RBC-ENTMCNC: 35.2 GM/DL — SIGNIFICANT CHANGE UP (ref 32–36)
MCV RBC AUTO: 91.2 FL — SIGNIFICANT CHANGE UP (ref 80–100)
MONOCYTES # BLD AUTO: 0.8 K/UL — SIGNIFICANT CHANGE UP (ref 0–0.9)
MONOCYTES NFR BLD AUTO: 10 % — SIGNIFICANT CHANGE UP (ref 2–14)
MYELOCYTES NFR BLD: 2 % — HIGH (ref 0–0)
NEUTROPHILS # BLD AUTO: 5 K/UL — SIGNIFICANT CHANGE UP (ref 1.8–7.4)
NEUTROPHILS NFR BLD AUTO: 63 % — SIGNIFICANT CHANGE UP (ref 43–77)
NEUTS BAND # BLD: 20 % — HIGH (ref 0–8)
PHOSPHATE SERPL-MCNC: 3.3 MG/DL — SIGNIFICANT CHANGE UP (ref 2.5–4.5)
PLAT MORPH BLD: NORMAL — SIGNIFICANT CHANGE UP
PLATELET # BLD AUTO: 27 K/UL — LOW (ref 150–400)
POTASSIUM SERPL-MCNC: 3.2 MMOL/L — LOW (ref 3.5–5.3)
POTASSIUM SERPL-SCNC: 3.2 MMOL/L — LOW (ref 3.5–5.3)
PROT SERPL-MCNC: 4.7 G/DL — LOW (ref 6–8.3)
RBC # BLD: 2.31 M/UL — LOW (ref 3.8–5.2)
RBC # FLD: 13.3 % — SIGNIFICANT CHANGE UP (ref 10.3–14.5)
RBC BLD AUTO: SIGNIFICANT CHANGE UP
SODIUM SERPL-SCNC: 145 MMOL/L — SIGNIFICANT CHANGE UP (ref 135–145)
VARIANT LYMPHS # BLD: 1 % — SIGNIFICANT CHANGE UP (ref 0–6)
WBC # BLD: 6.2 K/UL — SIGNIFICANT CHANGE UP (ref 3.8–10.5)
WBC # FLD AUTO: 6.2 K/UL — SIGNIFICANT CHANGE UP (ref 3.8–10.5)

## 2019-03-12 PROCEDURE — 99291 CRITICAL CARE FIRST HOUR: CPT

## 2019-03-12 RX ORDER — POTASSIUM CHLORIDE 20 MEQ
20 PACKET (EA) ORAL
Qty: 0 | Refills: 0 | Status: COMPLETED | OUTPATIENT
Start: 2019-03-12 | End: 2019-03-12

## 2019-03-12 RX ORDER — MAGNESIUM SULFATE 500 MG/ML
2 VIAL (ML) INJECTION ONCE
Qty: 0 | Refills: 0 | Status: COMPLETED | OUTPATIENT
Start: 2019-03-12 | End: 2019-03-12

## 2019-03-12 RX ADMIN — Medication 10 MILLILITER(S): at 08:24

## 2019-03-12 RX ADMIN — Medication 10 MILLILITER(S): at 23:48

## 2019-03-12 RX ADMIN — HYDROMORPHONE HYDROCHLORIDE 0.5 MILLIGRAM(S): 2 INJECTION INTRAMUSCULAR; INTRAVENOUS; SUBCUTANEOUS at 01:00

## 2019-03-12 RX ADMIN — Medication 1 LOZENGE: at 08:23

## 2019-03-12 RX ADMIN — Medication 1 LOZENGE: at 23:48

## 2019-03-12 RX ADMIN — Medication 10 MILLILITER(S): at 12:15

## 2019-03-12 RX ADMIN — Medication 480 MICROGRAM(S): at 15:42

## 2019-03-12 RX ADMIN — Medication 50 MILLIEQUIVALENT(S): at 12:12

## 2019-03-12 RX ADMIN — Medication 5 MILLILITER(S): at 20:02

## 2019-03-12 RX ADMIN — FLUCONAZOLE 400 MILLIGRAM(S): 150 TABLET ORAL at 12:11

## 2019-03-12 RX ADMIN — Medication 1 MILLIGRAM(S): at 12:12

## 2019-03-12 RX ADMIN — Medication 1 LOZENGE: at 15:42

## 2019-03-12 RX ADMIN — Medication 50 GRAM(S): at 09:13

## 2019-03-12 RX ADMIN — Medication 400 MILLIGRAM(S): at 22:29

## 2019-03-12 RX ADMIN — Medication 10 MILLILITER(S): at 00:27

## 2019-03-12 RX ADMIN — Medication 5 MILLILITER(S): at 23:48

## 2019-03-12 RX ADMIN — Medication 400 MILLIGRAM(S): at 15:41

## 2019-03-12 RX ADMIN — Medication 50 MILLIEQUIVALENT(S): at 14:41

## 2019-03-12 RX ADMIN — Medication 1 LOZENGE: at 00:27

## 2019-03-12 RX ADMIN — LORATADINE 10 MILLIGRAM(S): 10 TABLET ORAL at 12:12

## 2019-03-12 RX ADMIN — AMLODIPINE BESYLATE 5 MILLIGRAM(S): 2.5 TABLET ORAL at 07:01

## 2019-03-12 RX ADMIN — URSODIOL 300 MILLIGRAM(S): 250 TABLET, FILM COATED ORAL at 08:24

## 2019-03-12 RX ADMIN — Medication 50 MILLIEQUIVALENT(S): at 10:52

## 2019-03-12 RX ADMIN — Medication 10 MILLILITER(S): at 20:02

## 2019-03-12 RX ADMIN — Medication 1 LOZENGE: at 20:02

## 2019-03-12 RX ADMIN — PANTOPRAZOLE SODIUM 40 MILLIGRAM(S): 20 TABLET, DELAYED RELEASE ORAL at 07:03

## 2019-03-12 RX ADMIN — Medication 5 MILLILITER(S): at 08:23

## 2019-03-12 RX ADMIN — Medication 1 LOZENGE: at 12:10

## 2019-03-12 RX ADMIN — Medication 1 TABLET(S): at 12:12

## 2019-03-12 RX ADMIN — Medication 5 MILLILITER(S): at 00:27

## 2019-03-12 RX ADMIN — Medication 400 MILLIGRAM(S): at 07:01

## 2019-03-12 RX ADMIN — NYSTATIN CREAM 1 APPLICATION(S): 100000 CREAM TOPICAL at 07:01

## 2019-03-12 RX ADMIN — NYSTATIN CREAM 1 APPLICATION(S): 100000 CREAM TOPICAL at 15:43

## 2019-03-12 RX ADMIN — HYDROMORPHONE HYDROCHLORIDE 0.5 MILLIGRAM(S): 2 INJECTION INTRAMUSCULAR; INTRAVENOUS; SUBCUTANEOUS at 00:31

## 2019-03-12 RX ADMIN — Medication 10 MILLILITER(S): at 15:43

## 2019-03-12 RX ADMIN — Medication 5 MILLILITER(S): at 12:10

## 2019-03-12 RX ADMIN — Medication 5 MILLILITER(S): at 15:40

## 2019-03-12 RX ADMIN — NYSTATIN CREAM 1 APPLICATION(S): 100000 CREAM TOPICAL at 22:29

## 2019-03-12 RX ADMIN — URSODIOL 300 MILLIGRAM(S): 250 TABLET, FILM COATED ORAL at 17:33

## 2019-03-12 NOTE — PROGRESS NOTE ADULT - SUBJECTIVE AND OBJECTIVE BOX
HPC Transplant Team                                                      Critical / Counseling Time Provided: 30 minutes                                                                                                                                                        Chief Complaint: Autologous peripheral blood stem cell transplant for treatment of multiple myeloma     S: Patient seen and examined with HPC Transplant Team:     Denies mouth / tongue / throat pain, dyspnea, cough, nausea, vomiting, diarrhea, abdominal pain     O: Vitals:   Vital Signs Last 24 Hrs  T(C): 37.3 (12 Mar 2019 06:09), Max: 37.8 (11 Mar 2019 21:03)  T(F): 99.1 (12 Mar 2019 06:09), Max: 100 (11 Mar 2019 21:03)  HR: 104 (12 Mar 2019 06:09) (95 - 106)  BP: 130/64 (12 Mar 2019 06:09) (106/69 - 130/64)  BP(mean): --  RR: 18 (12 Mar 2019 06:09) (18 - 18)  SpO2: 99% (11 Mar 2019 21:03) (98% - 100%)    Admit weight: 79.2 kG  Daily Weight in k.7 (11 Mar 2019 09:24)    Intake / Output:    @ 07: @ 07:00  --------------------------------------------------------  IN: 1969 mL / OUT: 1925 mL / NET: 44 mL     @ 07:01   @ 08:35  --------------------------------------------------------  IN: 49 mL / OUT: 0 mL / NET: 49 mL      PE:   Oropharynx: no erythema or ulcers  Oral Mucositis: +                                                       ndGndrndanddndend:nd nd2nd CVS: (+) S1/S2, RRR  Lungs: CTA B/L  Abdomen: soft, NT/ND, (+) BS mildly hyperactive  Extremities: No edema in B/L LE  Gastric Mucositis: n/a                                                Grade:   Intestinal Mucositis: n/a                                             Grade:   Skin: hyperpigmentation 2/2 Kepivance on neck, and upper chest   TLC: C/D/I  Neuro: nonfocal   Pain: 3/10 throat pain on swallow    Labs:   CBC Full  -  ( 12 Mar 2019 07:10 )  WBC Count : 6.2 K/uL  Hemoglobin : 7.4 g/dL  Hematocrit : 21.1 %  Platelet Count - Automated : 27 K/uL  Mean Cell Volume : 91.2 fl  Mean Cell Hemoglobin : 32.1 pg  Mean Cell Hemoglobin Concentration : 35.2 gm/dL  Auto Neutrophil # : x  Auto Lymphocyte # : x  Auto Monocyte # : x  Auto Eosinophil # : x  Auto Basophil # : x  Auto Neutrophil % : x  Auto Lymphocyte % : x  Auto Monocyte % : x  Auto Eosinophil % : x  Auto Basophil % : x                          7.4    6.2   )-----------( 27       ( 12 Mar 2019 07:10 )             21.1         145  |  109<H>  |  <4<L>  ----------------------------<  84  3.2<L>   |  22  |  0.46<L>    Ca    8.6      12 Mar 2019 07:10  Phos  3.3       Mg     1.5         TPro  4.7<L>  /  Alb  3.0<L>  /  TBili  0.1<L>  /  DBili  x   /  AST  36  /  ALT  69<H>  /  AlkPhos  91      PT/INR - ( 11 Mar 2019 07:17 )   PT: 12.7 sec;   INR: 1.10 ratio         PTT - ( 11 Mar 2019 07:17 )  PTT:31.0 sec  LIVER FUNCTIONS - ( 12 Mar 2019 07:10 )  Alb: 3.0 g/dL / Pro: 4.7 g/dL / ALK PHOS: 91 U/L / ALT: 69 U/L / AST: 36 U/L / GGT: x           Lactate Dehydrogenase, Serum: 205 U/L ( @ 07:10)    Meds:   Antimicrobials:   acyclovir   Oral Tab/Cap 400 milliGRAM(s) Oral every 8 hours  clotrimazole Lozenge 1 Lozenge Oral five times a day  fluconAZOLE   Tablet 400 milliGRAM(s) Oral daily      Heme / Onc:   heparin  Infusion 330 Unit(s)/Hr IV Continuous <Continuous>      GI:  docusate sodium 100 milliGRAM(s) Oral three times a day PRN  loperamide 2 milliGRAM(s) Oral every 3 hours PRN  pantoprazole    Tablet 40 milliGRAM(s) Oral before breakfast  senna 1 Tablet(s) Oral at bedtime PRN  sodium bicarbonate Mouth Rinse 10 milliLiter(s) Swish and Spit five times a day  ursodiol Capsule 300 milliGRAM(s) Oral two times a day with meals      Cardiovascular:   amLODIPine   Tablet 5 milliGRAM(s) Oral daily  furosemide   Injectable 20 milliGRAM(s) IV Push every 24 hours PRN      Immunologic:   filgrastim-sndz Injectable 480 MICROGram(s) SubCutaneous daily      Other medications:   acetaminophen   Tablet .. 650 milliGRAM(s) Oral every 6 hours  Biotene Dry Mouth Oral Rinse 5 milliLiter(s) Swish and Spit five times a day  folic acid 1 milliGRAM(s) Oral daily  lidocaine/prilocaine Cream 1 Application(s) Topical daily  loratadine 10 milliGRAM(s) Oral daily  magnesium sulfate  IVPB 2 Gram(s) IV Intermittent once  multivitamin 1 Tablet(s) Oral daily  nystatin Powder 1 Application(s) Topical three times a day  potassium chloride  20 mEq/100 mL IVPB 20 milliEquivalent(s) IV Intermittent every 2 hours  sodium chloride 0.9%. 1000 milliLiter(s) IV Continuous <Continuous>      PRN:   acetaminophen   Tablet .. 650 milliGRAM(s) Oral every 6 hours PRN  benzocaine 15 mG/menthol 3.6 mG Lozenge 1 Lozenge Oral every 4 hours PRN  diphenhydrAMINE   Injectable 25 milliGRAM(s) IV Push every 4 hours PRN  docusate sodium 100 milliGRAM(s) Oral three times a day PRN  furosemide   Injectable 20 milliGRAM(s) IV Push every 24 hours PRN  HYDROmorphone  Injectable 0.5 milliGRAM(s) IV Push every 4 hours PRN  loperamide 2 milliGRAM(s) Oral every 3 hours PRN  metoclopramide Injectable 10 milliGRAM(s) IV Push every 6 hours PRN  ondansetron Injectable 8 milliGRAM(s) IV Push every 8 hours PRN  senna 1 Tablet(s) Oral at bedtime PRN    A/P:  68 year old female with a history of IgG kappa Multiple Myeloma.  Status Post Autologous PBSCT,  Day +12  Transaminitis resolved - changed mycamine to fluconazole  Engrafted - d/c zarxio after today's dose, d/c cipro   Diarrhea improved- CDiff negative, encourage Imodium prn  Mucositis secondary to antineoplastic chemotherapy- continue mouth care, pain control  Nutrition consult     1. Infectious Disease:   acyclovir   Oral Tab/Cap 400 milliGRAM(s) Oral every 8 hours  clotrimazole Lozenge 1 Lozenge Oral five times a day  fluconAZOLE   Tablet 400 milliGRAM(s) Oral daily    2. VOD Prophylaxis: Actigall, Glutamine, Heparin (dosed at 100 units / kg / day)     3. GI Prophylaxis:    pantoprazole    Tablet 40 milliGRAM(s) Oral before breakfast    4. Mouthcare - NS / NaHCO3 rinses, Mycelex, Caphosol; Skin care     5. GVHD prophylaxis - n/a     6. Transfuse & replete electrolytes prn   magnesium sulfate  IVPB 2 Gram(s) IV Intermittent once  potassium chloride  20 mEq/100 mL IVPB 20 milliEquivalent(s) IV Intermittent every 2 hours    7. IV hydration, daily weights, strict I&O, prn diuresis     8. PO intake as tolerated, nutrition follow up as needed, MVI, folic acid     9. Antiemetics, anti-diarrhea medications:   loperamide 2 milliGRAM(s) Oral every 3 hours PRN  metoclopramide Injectable 10 milliGRAM(s) IV Push every 6 hours PRN  ondansetron Injectable 8 milliGRAM(s) IV Push every 8 hours PRN    10. OOB as tolerated, physical therapy consult if needed     11. Monitor coags / fibrinogen 2x week, vitamin K as needed     12. Monitor closely for clinical changes, monitor for fevers     13. Emotional support provided, plan of care discussed and questions addressed     14. Patient education done regarding plan of care, restrictions and discharge planning     15. Continue regular social work input     I have written the above note for Dr. Kumari who performed service with me in the room.   Nancy Hallman NP-C ([phone])    I have seen and examined patient with NP, I agree with above note as scribed. HPC Transplant Team                                                      Critical / Counseling Time Provided: 30 minutes                                                                                                                                                        Chief Complaint: Autologous peripheral blood stem cell transplant for treatment of multiple myeloma     S: Patient seen and examined with HPC Transplant Team:     No acute complaints    Denies mouth / tongue / throat pain, dyspnea, cough, nausea, vomiting, diarrhea, abdominal pain     O: Vitals:   Vital Signs Last 24 Hrs  T(C): 37.3 (12 Mar 2019 06:09), Max: 37.8 (11 Mar 2019 21:03)  T(F): 99.1 (12 Mar 2019 06:09), Max: 100 (11 Mar 2019 21:03)  HR: 104 (12 Mar 2019 06:09) (95 - 106)  BP: 130/64 (12 Mar 2019 06:09) (106/69 - 130/64)  BP(mean): --  RR: 18 (12 Mar 2019 06:09) (18 - 18)  SpO2: 99% (11 Mar 2019 21:03) (98% - 100%)    Admit weight: 79.2 kG  Daily Weight in k.8 kg    Intake / Output:    @ 07: @ 07:00  --------------------------------------------------------  IN: 1969 mL / OUT: 1925 mL / NET: 44 mL     @ 07:01   @ 08:35  --------------------------------------------------------  IN: 49 mL / OUT: 0 mL / NET: 49 mL      PE:   Oropharynx: no erythema or ulcers  Oral Mucositis: +                                                       ndGndrndanddndend:nd nd2nd CVS: (+) S1/S2, RRR  Lungs: CTA B/L  Abdomen: soft, NT/ND, (+) BS mildly hyperactive  Extremities: No edema in B/L LE  Gastric Mucositis: n/a                                                Grade:   Intestinal Mucositis: n/a                                             Grade:   Skin: hyperpigmentation 2/2 Kepivance on neck, and upper chest   TLC: C/D/I  Neuro: nonfocal   Pain: 3/10 throat pain on swallow    Labs:   CBC Full  -  ( 12 Mar 2019 07:10 )  WBC Count : 6.2 K/uL  Hemoglobin : 7.4 g/dL  Hematocrit : 21.1 %  Platelet Count - Automated : 27 K/uL  Mean Cell Volume : 91.2 fl  Mean Cell Hemoglobin : 32.1 pg  Mean Cell Hemoglobin Concentration : 35.2 gm/dL  Auto Neutrophil # : x  Auto Lymphocyte # : x  Auto Monocyte # : x  Auto Eosinophil # : x  Auto Basophil # : x  Auto Neutrophil % : x  Auto Lymphocyte % : x  Auto Monocyte % : x  Auto Eosinophil % : x  Auto Basophil % : x                          7.4    6.2   )-----------( 27       ( 12 Mar 2019 07:10 )             21.1     03-    145  |  109<H>  |  <4<L>  ----------------------------<  84  3.2<L>   |  22  |  0.46<L>    Ca    8.6      12 Mar 2019 07:10  Phos  3.3       Mg     1.5         TPro  4.7<L>  /  Alb  3.0<L>  /  TBili  0.1<L>  /  DBili  x   /  AST  36  /  ALT  69<H>  /  AlkPhos  91      PT/INR - ( 11 Mar 2019 07:17 )   PT: 12.7 sec;   INR: 1.10 ratio         PTT - ( 11 Mar 2019 07:17 )  PTT:31.0 sec  LIVER FUNCTIONS - ( 12 Mar 2019 07:10 )  Alb: 3.0 g/dL / Pro: 4.7 g/dL / ALK PHOS: 91 U/L / ALT: 69 U/L / AST: 36 U/L / GGT: x           Lactate Dehydrogenase, Serum: 205 U/L ( @ 07:10)    Meds:   Antimicrobials:   acyclovir   Oral Tab/Cap 400 milliGRAM(s) Oral every 8 hours  clotrimazole Lozenge 1 Lozenge Oral five times a day  fluconAZOLE   Tablet 400 milliGRAM(s) Oral daily      Heme / Onc:   heparin  Infusion 330 Unit(s)/Hr IV Continuous <Continuous>      GI:  docusate sodium 100 milliGRAM(s) Oral three times a day PRN  loperamide 2 milliGRAM(s) Oral every 3 hours PRN  pantoprazole    Tablet 40 milliGRAM(s) Oral before breakfast  senna 1 Tablet(s) Oral at bedtime PRN  sodium bicarbonate Mouth Rinse 10 milliLiter(s) Swish and Spit five times a day  ursodiol Capsule 300 milliGRAM(s) Oral two times a day with meals      Cardiovascular:   amLODIPine   Tablet 5 milliGRAM(s) Oral daily  furosemide   Injectable 20 milliGRAM(s) IV Push every 24 hours PRN      Immunologic:   filgrastim-sndz Injectable 480 MICROGram(s) SubCutaneous daily      Other medications:   acetaminophen   Tablet .. 650 milliGRAM(s) Oral every 6 hours  Biotene Dry Mouth Oral Rinse 5 milliLiter(s) Swish and Spit five times a day  folic acid 1 milliGRAM(s) Oral daily  lidocaine/prilocaine Cream 1 Application(s) Topical daily  loratadine 10 milliGRAM(s) Oral daily  magnesium sulfate  IVPB 2 Gram(s) IV Intermittent once  multivitamin 1 Tablet(s) Oral daily  nystatin Powder 1 Application(s) Topical three times a day  potassium chloride  20 mEq/100 mL IVPB 20 milliEquivalent(s) IV Intermittent every 2 hours  sodium chloride 0.9%. 1000 milliLiter(s) IV Continuous <Continuous>      PRN:   acetaminophen   Tablet .. 650 milliGRAM(s) Oral every 6 hours PRN  benzocaine 15 mG/menthol 3.6 mG Lozenge 1 Lozenge Oral every 4 hours PRN  diphenhydrAMINE   Injectable 25 milliGRAM(s) IV Push every 4 hours PRN  docusate sodium 100 milliGRAM(s) Oral three times a day PRN  furosemide   Injectable 20 milliGRAM(s) IV Push every 24 hours PRN  HYDROmorphone  Injectable 0.5 milliGRAM(s) IV Push every 4 hours PRN  loperamide 2 milliGRAM(s) Oral every 3 hours PRN  metoclopramide Injectable 10 milliGRAM(s) IV Push every 6 hours PRN  ondansetron Injectable 8 milliGRAM(s) IV Push every 8 hours PRN  senna 1 Tablet(s) Oral at bedtime PRN    A/P:  68 year old female with a history of IgG kappa Multiple Myeloma.  Status Post Autologous PBSCT,  Day +12  Transaminitis resolved - changed mycamine to fluconazole  Engrafted - d/c zarxio after today's dose, d/c cipro   Diarrhea improved- CDiff negative, encourage Imodium prn  Mucositis secondary to antineoplastic chemotherapy- continue mouth care, pain control  Nutrition consult     1. Infectious Disease:   acyclovir   Oral Tab/Cap 400 milliGRAM(s) Oral every 8 hours  clotrimazole Lozenge 1 Lozenge Oral five times a day  fluconAZOLE   Tablet 400 milliGRAM(s) Oral daily    2. VOD Prophylaxis: Actigall, Glutamine, Heparin (dosed at 100 units / kg / day)     3. GI Prophylaxis:    pantoprazole    Tablet 40 milliGRAM(s) Oral before breakfast    4. Mouthcare - NS / NaHCO3 rinses, Mycelex, Caphosol; Skin care     5. GVHD prophylaxis - n/a     6. Transfuse & replete electrolytes prn   magnesium sulfate  IVPB 2 Gram(s) IV Intermittent once  potassium chloride  20 mEq/100 mL IVPB 20 milliEquivalent(s) IV Intermittent every 2 hours    7. IV hydration, daily weights, strict I&O, prn diuresis     8. PO intake as tolerated, nutrition follow up as needed, MVI, folic acid     9. Antiemetics, anti-diarrhea medications:   loperamide 2 milliGRAM(s) Oral every 3 hours PRN  metoclopramide Injectable 10 milliGRAM(s) IV Push every 6 hours PRN  ondansetron Injectable 8 milliGRAM(s) IV Push every 8 hours PRN    10. OOB as tolerated, physical therapy consult if needed     11. Monitor coags / fibrinogen 2x week, vitamin K as needed     12. Monitor closely for clinical changes, monitor for fevers     13. Emotional support provided, plan of care discussed and questions addressed     14. Patient education done regarding plan of care, restrictions and discharge planning     15. Continue regular social work input     I have written the above note for Dr. Kumari who performed service with me in the room.   Nancy Hallman NP-C ([phone])    I have seen and examined patient with NP, I agree with above note as scribed.

## 2019-03-12 NOTE — PROGRESS NOTE ADULT - ATTENDING COMMENTS
70 y/o F w/ IgG kappa myeloma admitted for autologous pbsct w/ Melphalan prep (100mg/m2 x 2)   Day +12 s/p HPC transplant; continue Zarxio through today   Strict I&O, daily weights, prn diuresis   Antiemetics, mouth care, skin care   Mucositis secondary to antineoplastic chemotherapy- continue mouth care, pain control. Improved.  Continue Acyclovir, d/c Cipro prophylaxis;   Transaminitis- now decreasing LFT's- d/c Mycamine, restart fluconazole for normal LFT   Supplement lytes   VOD prophylaxis  Imodium prn diarrhea  OOB, ambulate 68 y/o F w/ IgG kappa myeloma admitted for autologous pbsct w/ Melphalan prep (100mg/m2 x 2)   Day +12 s/p HPC transplant; continue Zarxio through today ..  Strict I&O, daily weights, prn diuresis   Antiemetics, mouth care, skin care   Mucositis secondary to antineoplastic chemotherapy- continue mouth care, pain control. Improved.  Continue Acyclovir, d/c Cipro prophylaxis;   Transaminitis- now decreasing LFT's- d/c Mycamine, restart fluconazole for normal LFT   Supplement lytes   VOD prophylaxis  Imodium prn diarrhea  OOB, ambulate  d/c friday...instruction started

## 2019-03-13 PROBLEM — N83.299 OTHER OVARIAN CYST, UNSPECIFIED SIDE: Chronic | Status: ACTIVE | Noted: 2019-02-25

## 2019-03-13 PROBLEM — Z33.2 ENCOUNTER FOR ELECTIVE TERMINATION OF PREGNANCY: Chronic | Status: ACTIVE | Noted: 2019-02-25

## 2019-03-13 PROBLEM — E78.5 HYPERLIPIDEMIA, UNSPECIFIED: Chronic | Status: ACTIVE | Noted: 2019-02-25

## 2019-03-13 PROBLEM — N76.3 SUBACUTE AND CHRONIC VULVITIS: Chronic | Status: ACTIVE | Noted: 2019-02-25

## 2019-03-13 PROBLEM — N94.9 UNSPECIFIED CONDITION ASSOCIATED WITH FEMALE GENITAL ORGANS AND MENSTRUAL CYCLE: Chronic | Status: ACTIVE | Noted: 2019-02-25

## 2019-03-13 LAB
ALBUMIN SERPL ELPH-MCNC: 3.3 G/DL — SIGNIFICANT CHANGE UP (ref 3.3–5)
ALP SERPL-CCNC: 115 U/L — SIGNIFICANT CHANGE UP (ref 40–120)
ALT FLD-CCNC: 76 U/L — HIGH (ref 10–45)
ANION GAP SERPL CALC-SCNC: 10 MMOL/L — SIGNIFICANT CHANGE UP (ref 5–17)
ANISOCYTOSIS BLD QL: SLIGHT — SIGNIFICANT CHANGE UP
APPEARANCE UR: CLEAR — SIGNIFICANT CHANGE UP
AST SERPL-CCNC: 51 U/L — HIGH (ref 10–40)
BASOPHILS # BLD AUTO: 0.1 K/UL — SIGNIFICANT CHANGE UP (ref 0–0.2)
BASOPHILS NFR BLD AUTO: 0 % — SIGNIFICANT CHANGE UP (ref 0–2)
BILIRUB DIRECT SERPL-MCNC: <0.1 MG/DL — SIGNIFICANT CHANGE UP (ref 0–0.2)
BILIRUB INDIRECT FLD-MCNC: >0 MG/DL — LOW (ref 0.2–1)
BILIRUB SERPL-MCNC: 0.1 MG/DL — LOW (ref 0.2–1.2)
BILIRUB UR-MCNC: NEGATIVE — SIGNIFICANT CHANGE UP
BLASTS # FLD: 2 % — HIGH (ref 0–0)
BLD GP AB SCN SERPL QL: NEGATIVE — SIGNIFICANT CHANGE UP
BUN SERPL-MCNC: <4 MG/DL — LOW (ref 7–23)
CALCIUM SERPL-MCNC: 8.8 MG/DL — SIGNIFICANT CHANGE UP (ref 8.4–10.5)
CHLORIDE SERPL-SCNC: 109 MMOL/L — HIGH (ref 96–108)
CO2 SERPL-SCNC: 24 MMOL/L — SIGNIFICANT CHANGE UP (ref 22–31)
COLOR SPEC: SIGNIFICANT CHANGE UP
CREAT SERPL-MCNC: 0.49 MG/DL — LOW (ref 0.5–1.3)
DACRYOCYTES BLD QL SMEAR: SLIGHT — SIGNIFICANT CHANGE UP
DIFF PNL FLD: NEGATIVE — SIGNIFICANT CHANGE UP
DOHLE BOD BLD QL SMEAR: PRESENT — SIGNIFICANT CHANGE UP
ELLIPTOCYTES BLD QL SMEAR: SLIGHT — SIGNIFICANT CHANGE UP
EOSINOPHIL # BLD AUTO: 0 K/UL — SIGNIFICANT CHANGE UP (ref 0–0.5)
EOSINOPHIL NFR BLD AUTO: 0 % — SIGNIFICANT CHANGE UP (ref 0–6)
GLUCOSE SERPL-MCNC: 84 MG/DL — SIGNIFICANT CHANGE UP (ref 70–99)
GLUCOSE UR QL: NEGATIVE — SIGNIFICANT CHANGE UP
HCT VFR BLD CALC: 22.2 % — LOW (ref 34.5–45)
HGB BLD-MCNC: 7.7 G/DL — LOW (ref 11.5–15.5)
HYPOCHROMIA BLD QL: SLIGHT — SIGNIFICANT CHANGE UP
KETONES UR-MCNC: NEGATIVE — SIGNIFICANT CHANGE UP
LDH SERPL L TO P-CCNC: 382 U/L — HIGH (ref 50–242)
LEUKOCYTE ESTERASE UR-ACNC: NEGATIVE — SIGNIFICANT CHANGE UP
LYMPHOCYTES # BLD AUTO: 0.8 K/UL — LOW (ref 1–3.3)
LYMPHOCYTES # BLD AUTO: 1 % — LOW (ref 13–44)
MAGNESIUM SERPL-MCNC: 2.1 MG/DL — SIGNIFICANT CHANGE UP (ref 1.6–2.6)
MANUAL DIF COMMENT BLD-IMP: SIGNIFICANT CHANGE UP
MCHC RBC-ENTMCNC: 31.8 PG — SIGNIFICANT CHANGE UP (ref 27–34)
MCHC RBC-ENTMCNC: 34.7 GM/DL — SIGNIFICANT CHANGE UP (ref 32–36)
MCV RBC AUTO: 91.7 FL — SIGNIFICANT CHANGE UP (ref 80–100)
METAMYELOCYTES # FLD: 3 % — HIGH (ref 0–0)
MONOCYTES # BLD AUTO: 2.3 K/UL — HIGH (ref 0–0.9)
MONOCYTES NFR BLD AUTO: 18 % — HIGH (ref 2–14)
MYELOCYTES NFR BLD: 6 % — HIGH (ref 0–0)
NEUTROPHILS # BLD AUTO: 15.4 K/UL — HIGH (ref 1.8–7.4)
NEUTROPHILS NFR BLD AUTO: 65 % — SIGNIFICANT CHANGE UP (ref 43–77)
NEUTS BAND # BLD: 3 % — SIGNIFICANT CHANGE UP (ref 0–8)
NITRITE UR-MCNC: NEGATIVE — SIGNIFICANT CHANGE UP
OVALOCYTES BLD QL SMEAR: SLIGHT — SIGNIFICANT CHANGE UP
PH UR: 7.5 — SIGNIFICANT CHANGE UP (ref 5–8)
PHOSPHATE SERPL-MCNC: 2.7 MG/DL — SIGNIFICANT CHANGE UP (ref 2.5–4.5)
PLAT MORPH BLD: NORMAL — SIGNIFICANT CHANGE UP
PLATELET # BLD AUTO: 41 K/UL — LOW (ref 150–400)
POIKILOCYTOSIS BLD QL AUTO: SLIGHT — SIGNIFICANT CHANGE UP
POLYCHROMASIA BLD QL SMEAR: SLIGHT — SIGNIFICANT CHANGE UP
POTASSIUM SERPL-MCNC: 3.9 MMOL/L — SIGNIFICANT CHANGE UP (ref 3.5–5.3)
POTASSIUM SERPL-SCNC: 3.9 MMOL/L — SIGNIFICANT CHANGE UP (ref 3.5–5.3)
PROMYELOCYTES # FLD: 2 % — HIGH (ref 0–0)
PROT SERPL-MCNC: 5 G/DL — LOW (ref 6–8.3)
PROT UR-MCNC: NEGATIVE — SIGNIFICANT CHANGE UP
RBC # BLD: 2.42 M/UL — LOW (ref 3.8–5.2)
RBC # FLD: 13.9 % — SIGNIFICANT CHANGE UP (ref 10.3–14.5)
RBC BLD AUTO: ABNORMAL
RH IG SCN BLD-IMP: POSITIVE — SIGNIFICANT CHANGE UP
SCHISTOCYTES BLD QL AUTO: SLIGHT — SIGNIFICANT CHANGE UP
SODIUM SERPL-SCNC: 143 MMOL/L — SIGNIFICANT CHANGE UP (ref 135–145)
SP GR SPEC: 1.01 — LOW (ref 1.01–1.02)
TOXIC GRANULES BLD QL SMEAR: PRESENT — SIGNIFICANT CHANGE UP
UROBILINOGEN FLD QL: SIGNIFICANT CHANGE UP
WBC # BLD: 18.7 K/UL — HIGH (ref 3.8–10.5)
WBC # FLD AUTO: 18.7 K/UL — HIGH (ref 3.8–10.5)

## 2019-03-13 PROCEDURE — 99291 CRITICAL CARE FIRST HOUR: CPT

## 2019-03-13 PROCEDURE — 71045 X-RAY EXAM CHEST 1 VIEW: CPT | Mod: 26

## 2019-03-13 PROCEDURE — 85060 BLOOD SMEAR INTERPRETATION: CPT

## 2019-03-13 RX ORDER — CEFEPIME 1 G/1
2000 INJECTION, POWDER, FOR SOLUTION INTRAMUSCULAR; INTRAVENOUS ONCE
Qty: 0 | Refills: 0 | Status: DISCONTINUED | OUTPATIENT
Start: 2019-03-13 | End: 2019-03-13

## 2019-03-13 RX ORDER — CEFEPIME 1 G/1
2000 INJECTION, POWDER, FOR SOLUTION INTRAMUSCULAR; INTRAVENOUS ONCE
Qty: 0 | Refills: 0 | Status: COMPLETED | OUTPATIENT
Start: 2019-03-13 | End: 2019-03-13

## 2019-03-13 RX ORDER — CEFEPIME 1 G/1
2000 INJECTION, POWDER, FOR SOLUTION INTRAMUSCULAR; INTRAVENOUS EVERY 8 HOURS
Qty: 0 | Refills: 0 | Status: DISCONTINUED | OUTPATIENT
Start: 2019-03-13 | End: 2019-03-13

## 2019-03-13 RX ORDER — FLUCONAZOLE 150 MG/1
200 TABLET ORAL DAILY
Qty: 0 | Refills: 0 | Status: DISCONTINUED | OUTPATIENT
Start: 2019-03-13 | End: 2019-03-15

## 2019-03-13 RX ORDER — CEFEPIME 1 G/1
INJECTION, POWDER, FOR SOLUTION INTRAMUSCULAR; INTRAVENOUS
Qty: 0 | Refills: 0 | Status: DISCONTINUED | OUTPATIENT
Start: 2019-03-13 | End: 2019-03-13

## 2019-03-13 RX ORDER — CEFEPIME 1 G/1
2000 INJECTION, POWDER, FOR SOLUTION INTRAMUSCULAR; INTRAVENOUS EVERY 12 HOURS
Qty: 0 | Refills: 0 | Status: DISCONTINUED | OUTPATIENT
Start: 2019-03-13 | End: 2019-03-13

## 2019-03-13 RX ADMIN — HEPARIN SODIUM 3.3 UNIT(S)/HR: 5000 INJECTION INTRAVENOUS; SUBCUTANEOUS at 23:38

## 2019-03-13 RX ADMIN — Medication 10 MILLILITER(S): at 08:39

## 2019-03-13 RX ADMIN — Medication 1 LOZENGE: at 16:00

## 2019-03-13 RX ADMIN — Medication 5 MILLILITER(S): at 23:38

## 2019-03-13 RX ADMIN — Medication 400 MILLIGRAM(S): at 06:43

## 2019-03-13 RX ADMIN — NYSTATIN CREAM 1 APPLICATION(S): 100000 CREAM TOPICAL at 21:31

## 2019-03-13 RX ADMIN — CEFEPIME 100 MILLIGRAM(S): 1 INJECTION, POWDER, FOR SOLUTION INTRAMUSCULAR; INTRAVENOUS at 03:41

## 2019-03-13 RX ADMIN — Medication 5 MILLILITER(S): at 16:00

## 2019-03-13 RX ADMIN — Medication 10 MILLILITER(S): at 20:05

## 2019-03-13 RX ADMIN — NYSTATIN CREAM 1 APPLICATION(S): 100000 CREAM TOPICAL at 14:00

## 2019-03-13 RX ADMIN — Medication 5 MILLILITER(S): at 08:39

## 2019-03-13 RX ADMIN — ONDANSETRON 8 MILLIGRAM(S): 8 TABLET, FILM COATED ORAL at 19:30

## 2019-03-13 RX ADMIN — Medication 1 LOZENGE: at 08:39

## 2019-03-13 RX ADMIN — Medication 1 LOZENGE: at 20:05

## 2019-03-13 RX ADMIN — URSODIOL 300 MILLIGRAM(S): 250 TABLET, FILM COATED ORAL at 18:22

## 2019-03-13 RX ADMIN — Medication 5 MILLILITER(S): at 12:19

## 2019-03-13 RX ADMIN — Medication 5 MILLILITER(S): at 20:05

## 2019-03-13 RX ADMIN — Medication 1 MILLIGRAM(S): at 12:21

## 2019-03-13 RX ADMIN — Medication 400 MILLIGRAM(S): at 21:31

## 2019-03-13 RX ADMIN — LORATADINE 10 MILLIGRAM(S): 10 TABLET ORAL at 12:21

## 2019-03-13 RX ADMIN — URSODIOL 300 MILLIGRAM(S): 250 TABLET, FILM COATED ORAL at 08:39

## 2019-03-13 RX ADMIN — Medication 10 MILLILITER(S): at 16:00

## 2019-03-13 RX ADMIN — SODIUM CHLORIDE 20 MILLILITER(S): 9 INJECTION INTRAMUSCULAR; INTRAVENOUS; SUBCUTANEOUS at 23:38

## 2019-03-13 RX ADMIN — Medication 1 TABLET(S): at 12:21

## 2019-03-13 RX ADMIN — Medication 10 MILLILITER(S): at 23:38

## 2019-03-13 RX ADMIN — Medication 1 LOZENGE: at 23:38

## 2019-03-13 RX ADMIN — Medication 400 MILLIGRAM(S): at 14:00

## 2019-03-13 RX ADMIN — FLUCONAZOLE 200 MILLIGRAM(S): 150 TABLET ORAL at 12:20

## 2019-03-13 RX ADMIN — Medication 10 MILLILITER(S): at 12:19

## 2019-03-13 RX ADMIN — PANTOPRAZOLE SODIUM 40 MILLIGRAM(S): 20 TABLET, DELAYED RELEASE ORAL at 06:43

## 2019-03-13 RX ADMIN — Medication 1 LOZENGE: at 12:19

## 2019-03-13 RX ADMIN — NYSTATIN CREAM 1 APPLICATION(S): 100000 CREAM TOPICAL at 06:43

## 2019-03-13 NOTE — PROGRESS NOTE ADULT - SUBJECTIVE AND OBJECTIVE BOX
HPC Transplant Team                                                      Critical / Counseling Time Provided: 30 minutes                                                                                                                                                        Chief Complaint: Autologous peripheral blood stem cell transplant for treatment of multiple myeloma     S: Patient seen and examined with HPC Transplant Team:     No acute complaints    Denies mouth / tongue / throat pain, dyspnea, cough, nausea, vomiting, diarrhea, abdominal pain     Vital Signs Last 24 Hrs  T(C): 37.3 (13 Mar 2019 06:24), Max: 38 (13 Mar 2019 02:10)  T(F): 99.1 (13 Mar 2019 06:24), Max: 100.4 (13 Mar 2019 02:10)  HR: 102 (13 Mar 2019 06:24) (102 - 111)  BP: 108/66 (13 Mar 2019 06:24) (102/63 - 120/66)  BP(mean): --  RR: 18 (13 Mar 2019 06:24) (18 - 20)  SpO2: 99% (12 Mar 2019 20:53) (97% - 100%)    Admit weight: 79.2 kG  Daily Weight in kG:     I&O's Summary    12 Mar 2019 07:01  -  13 Mar 2019 07:00  --------------------------------------------------------  IN: 2164 mL / OUT: 2950 mL / NET: -786 mL    PE:   Oropharynx: no erythema or ulcers  Oral Mucositis: +                                                       ndGndrndanddndend:nd nd2nd CVS: (+) S1/S2, RRR  Lungs: CTA B/L  Abdomen: soft, NT/ND, (+) BS mildly hyperactive  Extremities: No edema in B/L LE  Gastric Mucositis: n/a                                                Grade:   Intestinal Mucositis: n/a                                             Grade:   Skin: hyperpigmentation 2/2 Kepivance on neck, and upper chest   TLC: C/D/I  Neuro: nonfocal   Pain: 3/10 throat pain on swallow    Labs:                         7.7    18.7  )-----------( 41       ( 13 Mar 2019 07:17 )             22.2     03-13    143  |  109<H>  |  <4<L>  ----------------------------<  84  3.9   |  24  |  0.49<L>    Ca    8.8      13 Mar 2019 07:17  Phos  2.7     03-13  Mg     2.1     03-13    TPro  5.0<L>  /  Alb  3.3  /  TBili  0.1<L>  /  DBili  <0.1  /  AST  51<H>  /  ALT  76<H>  /  AlkPhos  115  03-13    CAPILLARY BLOOD GLUCOSE    MEDICATIONS  (STANDING):  acetaminophen   Tablet .. 650 milliGRAM(s) Oral every 6 hours  acyclovir   Oral Tab/Cap 400 milliGRAM(s) Oral every 8 hours  amLODIPine   Tablet 5 milliGRAM(s) Oral daily  Biotene Dry Mouth Oral Rinse 5 milliLiter(s) Swish and Spit five times a day  clotrimazole Lozenge 1 Lozenge Oral five times a day  fluconAZOLE   Tablet 400 milliGRAM(s) Oral daily  folic acid 1 milliGRAM(s) Oral daily  heparin  Infusion 330 Unit(s)/Hr (3.3 mL/Hr) IV Continuous <Continuous>  lidocaine/prilocaine Cream 1 Application(s) Topical daily  loratadine 10 milliGRAM(s) Oral daily  multivitamin 1 Tablet(s) Oral daily  nystatin Powder 1 Application(s) Topical three times a day  pantoprazole    Tablet 40 milliGRAM(s) Oral before breakfast  sodium bicarbonate Mouth Rinse 10 milliLiter(s) Swish and Spit five times a day  sodium chloride 0.9%. 1000 milliLiter(s) (20 mL/Hr) IV Continuous <Continuous>  ursodiol Capsule 300 milliGRAM(s) Oral two times a day with meals    MEDICATIONS  (PRN):  acetaminophen   Tablet .. 650 milliGRAM(s) Oral every 6 hours PRN Temp greater or equal to 38C (100.4F), Mild Pain (1 - 3)  benzocaine 15 mG/menthol 3.6 mG Lozenge 1 Lozenge Oral every 4 hours PRN Sore Throat  diphenhydrAMINE   Injectable 25 milliGRAM(s) IV Push every 4 hours PRN Allergy symptoms  docusate sodium 100 milliGRAM(s) Oral three times a day PRN Constipation  furosemide   Injectable 20 milliGRAM(s) IV Push every 24 hours PRN if urine output< 100 ml/hr x 2 hours  HYDROmorphone  Injectable 0.5 milliGRAM(s) IV Push every 4 hours PRN Moderate Pain (4 - 6)  loperamide 2 milliGRAM(s) Oral every 3 hours PRN Diarrhea  metoclopramide Injectable 10 milliGRAM(s) IV Push every 6 hours PRN Nausea and/or Vomiting  ondansetron Injectable 8 milliGRAM(s) IV Push every 8 hours PRN Nausea and/or Vomiting  senna 1 Tablet(s) Oral at bedtime PRN Constipation    A/P:  68 year old female with a history of IgG kappa Multiple Myeloma.  Status Post Autologous PBSCT,  Day +13  Transaminitis resolved - changed mycamine to fluconazole  Engrafted - d/c zarxio after today's dose, d/c cipro   Diarrhea improved- CDiff negative, encourage Imodium prn  Mucositis secondary to antineoplastic chemotherapy- continue mouth care, pain control  Nutrition consult   LG fever overnight, likley 2/2 engraftment.  D/C Cefepime, F/U cultures.      1. Infectious Disease:   acyclovir   Oral Tab/Cap 400 milliGRAM(s) Oral every 8 hours  clotrimazole Lozenge 1 Lozenge Oral five times a day  fluconAZOLE   Tablet 400 milliGRAM(s) Oral daily    2. VOD Prophylaxis: Actigall, Glutamine, Heparin (dosed at 100 units / kg / day)     3. GI Prophylaxis:    pantoprazole    Tablet 40 milliGRAM(s) Oral before breakfast    4. Mouthcare - NS / NaHCO3 rinses, Mycelex, Caphosol; Skin care     5. GVHD prophylaxis - n/a     6. Transfuse & replete electrolytes prn     7. IV hydration, daily weights, strict I&O, prn diuresis     8. PO intake as tolerated, nutrition follow up as needed, MVI, folic acid     9. Antiemetics, anti-diarrhea medications:   loperamide 2 milliGRAM(s) Oral every 3 hours PRN  metoclopramide Injectable 10 milliGRAM(s) IV Push every 6 hours PRN  ondansetron Injectable 8 milliGRAM(s) IV Push every 8 hours PRN    10. OOB as tolerated, physical therapy consult if needed     11. Monitor coags / fibrinogen 2x week, vitamin K as needed     12. Monitor closely for clinical changes, monitor for fevers     13. Emotional support provided, plan of care discussed and questions addressed     14. Patient education done regarding plan of care, restrictions and discharge planning     15. Continue regular social work input     I have written the above note for Dr. Kumari who performed service with me in the room.   Lisa Wylie, ANP-BC ([phone])    I have seen and examined patient with NP, I agree with above note as scribed. HPC Transplant Team                                                      Critical / Counseling Time Provided: 30 minutes                                                                                                                                                        Chief Complaint: Autologous peripheral blood stem cell transplant for treatment of multiple myeloma     S: Patient seen and examined with HPC Transplant Team:     No acute complaints    Denies mouth / tongue / throat pain, dyspnea, cough, nausea, vomiting, diarrhea, abdominal pain     Vital Signs Last 24 Hrs  T(C): 37.3 (13 Mar 2019 06:24), Max: 38 (13 Mar 2019 02:10)  T(F): 99.1 (13 Mar 2019 06:24), Max: 100.4 (13 Mar 2019 02:10)  HR: 102 (13 Mar 2019 06:24) (102 - 111)  BP: 108/66 (13 Mar 2019 06:24) (102/63 - 120/66)  BP(mean): --  RR: 18 (13 Mar 2019 06:24) (18 - 20)  SpO2: 99% (12 Mar 2019 20:53) (97% - 100%)    Admit weight: 79.2 kG  Daily Weight in kG:     I&O's Summary    12 Mar 2019 07:01  -  13 Mar 2019 07:00  --------------------------------------------------------  IN: 2164 mL / OUT: 2950 mL / NET: -786 mL    PE:   Oropharynx: no erythema or ulcers  Oral Mucositis: +                                                       ndGndrndanddndend:nd nd2nd CVS: (+) S1/S2, RRR  Lungs: CTA B/L  Abdomen: soft, NT/ND, (+) BS mildly hyperactive  Extremities: No edema in B/L LE  Gastric Mucositis: n/a                                                Grade:   Intestinal Mucositis: n/a                                             Grade:   Skin: hyperpigmentation 2/2 Kepivance on neck, and upper chest   TLC: C/D/I  Neuro: nonfocal   Pain: 3/10 throat pain on swallow    Karnofsky / Lansky Scale: 60  GVHD: autologous SCT, not applicable  Skin: 0  Liver: 0  Gut: 0  Overall stGstrstastdstest:st st1st Labs:                         7.7    18.7  )-----------( 41       ( 13 Mar 2019 07:17 )             22.2     03-13    143  |  109<H>  |  <4<L>  ----------------------------<  84  3.9   |  24  |  0.49<L>    Ca    8.8      13 Mar 2019 07:17  Phos  2.7     03-13  Mg     2.1     03-13    TPro  5.0<L>  /  Alb  3.3  /  TBili  0.1<L>  /  DBili  <0.1  /  AST  51<H>  /  ALT  76<H>  /  AlkPhos  115  03-13    CAPILLARY BLOOD GLUCOSE    MEDICATIONS  (STANDING):  acetaminophen   Tablet .. 650 milliGRAM(s) Oral every 6 hours  acyclovir   Oral Tab/Cap 400 milliGRAM(s) Oral every 8 hours  amLODIPine   Tablet 5 milliGRAM(s) Oral daily  Biotene Dry Mouth Oral Rinse 5 milliLiter(s) Swish and Spit five times a day  clotrimazole Lozenge 1 Lozenge Oral five times a day  fluconAZOLE   Tablet 400 milliGRAM(s) Oral daily  folic acid 1 milliGRAM(s) Oral daily  heparin  Infusion 330 Unit(s)/Hr (3.3 mL/Hr) IV Continuous <Continuous>  lidocaine/prilocaine Cream 1 Application(s) Topical daily  loratadine 10 milliGRAM(s) Oral daily  multivitamin 1 Tablet(s) Oral daily  nystatin Powder 1 Application(s) Topical three times a day  pantoprazole    Tablet 40 milliGRAM(s) Oral before breakfast  sodium bicarbonate Mouth Rinse 10 milliLiter(s) Swish and Spit five times a day  sodium chloride 0.9%. 1000 milliLiter(s) (20 mL/Hr) IV Continuous <Continuous>  ursodiol Capsule 300 milliGRAM(s) Oral two times a day with meals    MEDICATIONS  (PRN):  acetaminophen   Tablet .. 650 milliGRAM(s) Oral every 6 hours PRN Temp greater or equal to 38C (100.4F), Mild Pain (1 - 3)  benzocaine 15 mG/menthol 3.6 mG Lozenge 1 Lozenge Oral every 4 hours PRN Sore Throat  diphenhydrAMINE   Injectable 25 milliGRAM(s) IV Push every 4 hours PRN Allergy symptoms  docusate sodium 100 milliGRAM(s) Oral three times a day PRN Constipation  furosemide   Injectable 20 milliGRAM(s) IV Push every 24 hours PRN if urine output< 100 ml/hr x 2 hours  HYDROmorphone  Injectable 0.5 milliGRAM(s) IV Push every 4 hours PRN Moderate Pain (4 - 6)  loperamide 2 milliGRAM(s) Oral every 3 hours PRN Diarrhea  metoclopramide Injectable 10 milliGRAM(s) IV Push every 6 hours PRN Nausea and/or Vomiting  ondansetron Injectable 8 milliGRAM(s) IV Push every 8 hours PRN Nausea and/or Vomiting  senna 1 Tablet(s) Oral at bedtime PRN Constipation    A/P:  68 year old female with a history of IgG kappa Multiple Myeloma.  Status Post Autologous PBSCT,  Day +13  Transaminitis resolved - changed mycamine to fluconazole  Engrafted - d/c zarxio after today's dose, d/c cipro   Diarrhea improved- CDiff negative, encourage Imodium prn  Mucositis secondary to antineoplastic chemotherapy- continue mouth care, pain control  Nutrition consult   LG fever overnight, likley 2/2 engraftment.  D/C Cefepime, F/U cultures.      1. Infectious Disease:   acyclovir   Oral Tab/Cap 400 milliGRAM(s) Oral every 8 hours  clotrimazole Lozenge 1 Lozenge Oral five times a day  fluconAZOLE   Tablet 400 milliGRAM(s) Oral daily    2. VOD Prophylaxis: Actigall, Glutamine, Heparin (dosed at 100 units / kg / day)     3. GI Prophylaxis:    pantoprazole    Tablet 40 milliGRAM(s) Oral before breakfast    4. Mouthcare - NS / NaHCO3 rinses, Mycelex, Caphosol; Skin care     5. GVHD prophylaxis - n/a     6. Transfuse & replete electrolytes prn     7. IV hydration, daily weights, strict I&O, prn diuresis     8. PO intake as tolerated, nutrition follow up as needed, MVI, folic acid     9. Antiemetics, anti-diarrhea medications:   loperamide 2 milliGRAM(s) Oral every 3 hours PRN  metoclopramide Injectable 10 milliGRAM(s) IV Push every 6 hours PRN  ondansetron Injectable 8 milliGRAM(s) IV Push every 8 hours PRN    10. OOB as tolerated, physical therapy consult if needed     11. Monitor coags / fibrinogen 2x week, vitamin K as needed     12. Monitor closely for clinical changes, monitor for fevers     13. Emotional support provided, plan of care discussed and questions addressed     14. Patient education done regarding plan of care, restrictions and discharge planning     15. Continue regular social work input     I have written the above note for Dr. Kumari who performed service with me in the room.   Lisa Wylie, ANP-BC ([phone])    I have seen and examined patient with NP, I agree with above note as scribed. HPC Transplant Team                                                      Critical / Counseling Time Provided: 30 minutes                                                                                                                                                        Chief Complaint: Autologous peripheral blood stem cell transplant for treatment of multiple myeloma     S: Patient seen and examined with HPC Transplant Team:     No acute complaints    Denies mouth / tongue / throat pain, dyspnea, cough, nausea, vomiting, diarrhea, abdominal pain     Vital Signs Last 24 Hrs  T(C): 37.3 (13 Mar 2019 06:24), Max: 38 (13 Mar 2019 02:10)  T(F): 99.1 (13 Mar 2019 06:24), Max: 100.4 (13 Mar 2019 02:10)  HR: 102 (13 Mar 2019 06:24) (102 - 111)  BP: 108/66 (13 Mar 2019 06:24) (102/63 - 120/66)  BP(mean): --  RR: 18 (13 Mar 2019 06:24) (18 - 20)  SpO2: 99% (12 Mar 2019 20:53) (97% - 100%)    Admit weight: 79.2 kG  Daily Weight in k.6 kg    I&O's Summary    12 Mar 2019 07:01  -  13 Mar 2019 07:00  --------------------------------------------------------  IN: 2164 mL / OUT: 2950 mL / NET: -786 mL    PE:   Oropharynx: very mild erythema posterior oropharynx, no ulcers  Oral Mucositis: +                                                       ndGndrndanddndend:nd nd2nd CVS: (+) S1/S2, RRR  Lungs: CTA B/L  Abdomen: soft, NT/ND, (+) BS mildly hyperactive  Extremities: No edema in B/L LE  Gastric Mucositis: n/a                                                Grade:   Intestinal Mucositis: n/a                                             Grade:   Skin: hyperpigmentation 2/2 Kepivance on neck, and upper chest   TLC: C/D/I  Neuro: nonfocal   Pain: 3/10 throat pain on swallow    Karnofsky / Lansky Scale: 60  GVHD: autologous SCT, not applicable  Skin: 0  Liver: 0  Gut: 0  Overall stGstrstastdstest:st st1st Labs:                         7.7    18.7  )-----------( 41       ( 13 Mar 2019 07:17 )             22.2     03-13    143  |  109<H>  |  <4<L>  ----------------------------<  84  3.9   |  24  |  0.49<L>    Ca    8.8      13 Mar 2019 07:17  Phos  2.7     -  Mg     2.1         TPro  5.0<L>  /  Alb  3.3  /  TBili  0.1<L>  /  DBili  <0.1  /  AST  51<H>  /  ALT  76<H>  /  AlkPhos  115      CAPILLARY BLOOD GLUCOSE    MEDICATIONS  (STANDING):  acetaminophen   Tablet .. 650 milliGRAM(s) Oral every 6 hours  acyclovir   Oral Tab/Cap 400 milliGRAM(s) Oral every 8 hours  amLODIPine   Tablet 5 milliGRAM(s) Oral daily  Biotene Dry Mouth Oral Rinse 5 milliLiter(s) Swish and Spit five times a day  clotrimazole Lozenge 1 Lozenge Oral five times a day  fluconAZOLE   Tablet 400 milliGRAM(s) Oral daily  folic acid 1 milliGRAM(s) Oral daily  heparin  Infusion 330 Unit(s)/Hr (3.3 mL/Hr) IV Continuous <Continuous>  lidocaine/prilocaine Cream 1 Application(s) Topical daily  loratadine 10 milliGRAM(s) Oral daily  multivitamin 1 Tablet(s) Oral daily  nystatin Powder 1 Application(s) Topical three times a day  pantoprazole    Tablet 40 milliGRAM(s) Oral before breakfast  sodium bicarbonate Mouth Rinse 10 milliLiter(s) Swish and Spit five times a day  sodium chloride 0.9%. 1000 milliLiter(s) (20 mL/Hr) IV Continuous <Continuous>  ursodiol Capsule 300 milliGRAM(s) Oral two times a day with meals    MEDICATIONS  (PRN):  acetaminophen   Tablet .. 650 milliGRAM(s) Oral every 6 hours PRN Temp greater or equal to 38C (100.4F), Mild Pain (1 - 3)  benzocaine 15 mG/menthol 3.6 mG Lozenge 1 Lozenge Oral every 4 hours PRN Sore Throat  diphenhydrAMINE   Injectable 25 milliGRAM(s) IV Push every 4 hours PRN Allergy symptoms  docusate sodium 100 milliGRAM(s) Oral three times a day PRN Constipation  furosemide   Injectable 20 milliGRAM(s) IV Push every 24 hours PRN if urine output< 100 ml/hr x 2 hours  HYDROmorphone  Injectable 0.5 milliGRAM(s) IV Push every 4 hours PRN Moderate Pain (4 - 6)  loperamide 2 milliGRAM(s) Oral every 3 hours PRN Diarrhea  metoclopramide Injectable 10 milliGRAM(s) IV Push every 6 hours PRN Nausea and/or Vomiting  ondansetron Injectable 8 milliGRAM(s) IV Push every 8 hours PRN Nausea and/or Vomiting  senna 1 Tablet(s) Oral at bedtime PRN Constipation    A/P:  68 year old female with a history of IgG kappa Multiple Myeloma.  Status Post Autologous PBSCT,  Day +13  Transaminitis resolved - changed mycamine to fluconazole  Engrafted - d/c zarxio after today's dose, d/c cipro   Diarrhea improved- CDiff negative, encourage Imodium prn  Mucositis secondary to antineoplastic chemotherapy- continue mouth care, pain control  Nutrition consult   LG fever overnight, likley 2/2 engraftment.  D/C Cefepime, F/U cultures.      1. Infectious Disease:   acyclovir   Oral Tab/Cap 400 milliGRAM(s) Oral every 8 hours  clotrimazole Lozenge 1 Lozenge Oral five times a day  fluconAZOLE   Tablet 400 milliGRAM(s) Oral daily    2. VOD Prophylaxis: Actigall, Glutamine, Heparin (dosed at 100 units / kg / day)     3. GI Prophylaxis:    pantoprazole    Tablet 40 milliGRAM(s) Oral before breakfast    4. Mouthcare - NS / NaHCO3 rinses, Mycelex, Caphosol; Skin care     5. GVHD prophylaxis - n/a     6. Transfuse & replete electrolytes prn     7. IV hydration, daily weights, strict I&O, prn diuresis     8. PO intake as tolerated, nutrition follow up as needed, MVI, folic acid     9. Antiemetics, anti-diarrhea medications:   loperamide 2 milliGRAM(s) Oral every 3 hours PRN  metoclopramide Injectable 10 milliGRAM(s) IV Push every 6 hours PRN  ondansetron Injectable 8 milliGRAM(s) IV Push every 8 hours PRN    10. OOB as tolerated, physical therapy consult if needed     11. Monitor coags / fibrinogen 2x week, vitamin K as needed     12. Monitor closely for clinical changes, monitor for fevers     13. Emotional support provided, plan of care discussed and questions addressed     14. Patient education done regarding plan of care, restrictions and discharge planning     15. Continue regular social work input     I have written the above note for Dr. Kumari who performed service with me in the room.   Lisa Wylie, ANP-BC ([phone])    I have seen and examined patient with NP, I agree with above note as scribed. HPC Transplant Team                                                      Critical / Counseling Time Provided: 30 minutes                                                                                                                                                        Chief Complaint: Autologous peripheral blood stem cell transplant for treatment of multiple myeloma     S: Patient seen and examined with HPC Transplant Team:     No acute complaints  LG fever overnight    Denies mouth / tongue / throat pain, dyspnea, cough, nausea, vomiting, diarrhea, abdominal pain     Vital Signs Last 24 Hrs  T(C): 37.3 (13 Mar 2019 06:24), Max: 38 (13 Mar 2019 02:10)  T(F): 99.1 (13 Mar 2019 06:24), Max: 100.4 (13 Mar 2019 02:10)  HR: 102 (13 Mar 2019 06:24) (102 - 111)  BP: 108/66 (13 Mar 2019 06:24) (102/63 - 120/66)  BP(mean): --  RR: 18 (13 Mar 2019 06:24) (18 - 20)  SpO2: 99% (12 Mar 2019 20:53) (97% - 100%)    Admit weight: 79.2 kG  Daily Weight in k.6 kg    I&O's Summary    12 Mar 2019 07:01  -  13 Mar 2019 07:00  --------------------------------------------------------  IN: 2164 mL / OUT: 2950 mL / NET: -786 mL    PE:   Oropharynx: very mild erythema posterior oropharynx, no ulcers  Oral Mucositis: +                                                       ndGndrndanddndend:nd nd2nd CVS: (+) S1/S2, RRR  Lungs: CTA B/L  Abdomen: soft, NT/ND, (+) BS mildly hyperactive  Extremities: No edema in B/L LE  Gastric Mucositis: n/a                                                Grade:   Intestinal Mucositis: n/a                                             Grade:   Skin: hyperpigmentation 2/2 Kepivance on neck, and upper chest   TLC: C/D/I  Neuro: nonfocal   Pain: 3/10 throat pain on swallow    Karnofsky / Lansky Scale: 60  GVHD: autologous SCT, not applicable  Skin: 0  Liver: 0  Gut: 0  Overall stGstrstastdstest:st st1st Labs:                         7.7    18.7  )-----------( 41       ( 13 Mar 2019 07:17 )             22.2     03-13    143  |  109<H>  |  <4<L>  ----------------------------<  84  3.9   |  24  |  0.49<L>    Ca    8.8      13 Mar 2019 07:17  Phos  2.7     03-13  Mg     2.1         TPro  5.0<L>  /  Alb  3.3  /  TBili  0.1<L>  /  DBili  <0.1  /  AST  51<H>  /  ALT  76<H>  /  AlkPhos  115  -    CAPILLARY BLOOD GLUCOSE    MEDICATIONS  (STANDING):  acetaminophen   Tablet .. 650 milliGRAM(s) Oral every 6 hours  acyclovir   Oral Tab/Cap 400 milliGRAM(s) Oral every 8 hours  amLODIPine   Tablet 5 milliGRAM(s) Oral daily  Biotene Dry Mouth Oral Rinse 5 milliLiter(s) Swish and Spit five times a day  clotrimazole Lozenge 1 Lozenge Oral five times a day  fluconAZOLE   Tablet 400 milliGRAM(s) Oral daily  folic acid 1 milliGRAM(s) Oral daily  heparin  Infusion 330 Unit(s)/Hr (3.3 mL/Hr) IV Continuous <Continuous>  lidocaine/prilocaine Cream 1 Application(s) Topical daily  loratadine 10 milliGRAM(s) Oral daily  multivitamin 1 Tablet(s) Oral daily  nystatin Powder 1 Application(s) Topical three times a day  pantoprazole    Tablet 40 milliGRAM(s) Oral before breakfast  sodium bicarbonate Mouth Rinse 10 milliLiter(s) Swish and Spit five times a day  sodium chloride 0.9%. 1000 milliLiter(s) (20 mL/Hr) IV Continuous <Continuous>  ursodiol Capsule 300 milliGRAM(s) Oral two times a day with meals    MEDICATIONS  (PRN):  acetaminophen   Tablet .. 650 milliGRAM(s) Oral every 6 hours PRN Temp greater or equal to 38C (100.4F), Mild Pain (1 - 3)  benzocaine 15 mG/menthol 3.6 mG Lozenge 1 Lozenge Oral every 4 hours PRN Sore Throat  diphenhydrAMINE   Injectable 25 milliGRAM(s) IV Push every 4 hours PRN Allergy symptoms  docusate sodium 100 milliGRAM(s) Oral three times a day PRN Constipation  furosemide   Injectable 20 milliGRAM(s) IV Push every 24 hours PRN if urine output< 100 ml/hr x 2 hours  HYDROmorphone  Injectable 0.5 milliGRAM(s) IV Push every 4 hours PRN Moderate Pain (4 - 6)  loperamide 2 milliGRAM(s) Oral every 3 hours PRN Diarrhea  metoclopramide Injectable 10 milliGRAM(s) IV Push every 6 hours PRN Nausea and/or Vomiting  ondansetron Injectable 8 milliGRAM(s) IV Push every 8 hours PRN Nausea and/or Vomiting  senna 1 Tablet(s) Oral at bedtime PRN Constipation    A/P:  68 year old female with a history of IgG kappa Multiple Myeloma.  Status Post Autologous PBSCT,  Day +13  Transaminitis resolved - changed mycamine to fluconazole  Engrafted - d/c zarxio after today's dose, d/c cipro   Diarrhea improved- CDiff negative, encourage Imodium prn  Mucositis secondary to antineoplastic chemotherapy- continue mouth care, pain control  LG fever overnight, likely 2/2 engraftment.  D/C Cefepime, F/U cultures.    Elevated WBC 2/2 Zarxio, would continue to hold.  Decrease Fluconazole to 200 mg PO daily 2/2 recurrent mild transaminitis.    1. Infectious Disease:   acyclovir   Oral Tab/Cap 400 milliGRAM(s) Oral every 8 hours  clotrimazole Lozenge 1 Lozenge Oral five times a day  fluconAZOLE   Tablet 400 milliGRAM(s) Oral daily    2. VOD Prophylaxis: Actigall, Glutamine, Heparin (dosed at 100 units / kg / day)     3. GI Prophylaxis:    pantoprazole    Tablet 40 milliGRAM(s) Oral before breakfast    4. Mouthcare - NS / NaHCO3 rinses, Mycelex, Caphosol; Skin care     5. GVHD prophylaxis - n/a     6. Transfuse & replete electrolytes prn     7. IV hydration, daily weights, strict I&O, prn diuresis     8. PO intake as tolerated, nutrition follow up as needed, MVI, folic acid     9. Antiemetics, anti-diarrhea medications:   loperamide 2 milliGRAM(s) Oral every 3 hours PRN  metoclopramide Injectable 10 milliGRAM(s) IV Push every 6 hours PRN  ondansetron Injectable 8 milliGRAM(s) IV Push every 8 hours PRN    10. OOB as tolerated, physical therapy consult if needed     11. Monitor coags / fibrinogen 2x week, vitamin K as needed     12. Monitor closely for clinical changes, monitor for fevers     13. Emotional support provided, plan of care discussed and questions addressed     14. Patient education done regarding plan of care, restrictions and discharge planning     15. Continue regular social work input     I have written the above note for Dr. Kumari who performed service with me in the room.   Lisa Wylie, ANP-BC ([phone])    I have seen and examined patient with NP, I agree with above note as scribed.

## 2019-03-13 NOTE — PROGRESS NOTE ADULT - ATTENDING COMMENTS
68 y/o F w/ IgG kappa myeloma admitted for autologous pbsct w/ Melphalan prep (100mg/m2 x 2)   Day +12 s/p HPC transplant; continue Zarxio through today ..  Strict I&O, daily weights, prn diuresis   Antiemetics, mouth care, skin care   Mucositis secondary to antineoplastic chemotherapy- continue mouth care, pain control. Improved.  Continue Acyclovir, d/c Cipro prophylaxis;   Transaminitis- now decreasing LFT's- d/c Mycamine, restart fluconazole for normal LFT   Supplement lytes   VOD prophylaxis  Imodium prn diarrhea  OOB, ambulate  d/c friday...instruction started

## 2019-03-13 NOTE — CHART NOTE - NSCHARTNOTEFT_GEN_A_CORE
MEDICINE NP  Patient is a 69y old  Female who presents with a chief complaint of autologous stem cell transplant (12 Mar 2019 08:35)      Event Summary:   Notified by RN patient with fever, Temp-100.4 .   Patient seen and examined patient at bedside.    Patient is easily awakent, denies HA, visual changes, CP, SOB, cough, N/V, dysuria, or abd pain.    >VITAL SIGNS:  T(C): 38 (03-13-19 @ 02:10), Max: 38 (03-13-19 @ 02:10)  T(F): 100.4 (03-13-19 @ 02:10), Max: 100.4 (03-13-19 @ 02:10)  HR: 106 (03-13-19 @ 02:10) (104 - 111)  BP: 110/60 (03-13-19 @ 02:10) (102/63 - 130/64)  RR: 18 (03-13-19 @ 02:10) (18 - 20)  SpO2: 99% (03-12-19 @ 20:53) (97% - 100%)      >Review Of Systems:   CONSTITUTIONAL:  No fever.   No chills  EYES: No visual changes.    ENT:  No  throat pain.  No neck stiffness  RESPIRATORY: No cough, wheezing, hemoptysis; No shortness of breath  CARDIOVASCULAR: No chest pain or palpitations  GASTROINTESTINAL: No abdominal or epigastric pain.  No diarrhea.    GENITOURINARY: No dysuria, frequency or hematuria  NEUROLOGICAL: No numbness or weakness  SKIN: No itching, burning, rashes, or lesions       >PHYSICAL EXAM:  Constitutional: AOx3. NAD.  Neuro:  Grossly intact   Cardiovascular: +S1 S2. RRR. +Tachycardia.  No murmurs.  No LE edema.  Respiratory:  Even, unlabored.  Clear lungs bilaterally. No wheezing, rhonchi, or crackles.  Gastrointestinal: +BS X4 active. Soft. Nontender. Nondistended   Extremities/Vascular: +2 pulses bilaterally.   Skin:  Warm and dry to touch.  +RUE PICC clean/dry/intact.     >MEDICATIONS:    MEDICATIONS  (STANDING):  acetaminophen   Tablet .. 650 milliGRAM(s) Oral every 6 hours  acyclovir   Oral Tab/Cap 400 milliGRAM(s) Oral every 8 hours  Biotene Dry Mouth Oral Rinse 5 milliLiter(s) Swish and Spit five times a day  fluconAZOLE   Tablet 400 milliGRAM(s) Oral daily  sodium bicarbonate Mouth Rinse 10 milliLiter(s) Swish and Spit five times a day  sodium chloride 0.9%. 1000 milliLiter(s) (20 mL/Hr) IV Continuous <Continuous>  ursodiol Capsule 300 milliGRAM(s) Oral two times a day with meals      >LABORATORY:                          7.4    6.2   )-----------( 27       ( 12 Mar 2019 07:10 )             21.1       03-12    145  |  109<H>  |  <4<L>  ----------------------------<  84  3.2<L>   |  22  |  0.46<L>    Ca    8.6      12 Mar 2019 07:10  Phos  3.3     03-12  Mg     1.5     03-12    TPro  4.7<L>  /  Alb  3.0<L>  /  TBili  0.1<L>  /  DBili  x   /  AST  36  /  ALT  69<H>  /  AlkPhos  91  03-12          >MICROBIOLOGY:   Urine Culture:   Blood Culture:    >RADIOLOGY:  CXR:     >ASSESSMENT/PLAN:   HPI: 68 year old female with a history of IgG kappa Multiple Myeloma.  Admitted for autologous stem cell transplant with Melphalan conditioning.  Status Post Autologous PBSCT,  Day +13.  Hospitalization with Transaminitis improving on Micafungin.  Now with fever.         1) Fever - Recurrent   - Tylenol / Cooling measures prn for Pyrexia  - BC x2, UA/UC ordered   - CXR ordered   - Will start Cefepime 2G q8hr per BMT note  -c/w Monitoring closely   -Can consider ID    - F/U Primary  care team in AM     RASTA Simpson-BC  Medicine Department  #[id number]

## 2019-03-14 ENCOUNTER — TRANSCRIPTION ENCOUNTER (OUTPATIENT)
Age: 69
End: 2019-03-14

## 2019-03-14 LAB
ALBUMIN SERPL ELPH-MCNC: 3.1 G/DL — LOW (ref 3.3–5)
ALP SERPL-CCNC: 124 U/L — HIGH (ref 40–120)
ALT FLD-CCNC: 76 U/L — HIGH (ref 10–45)
ANION GAP SERPL CALC-SCNC: 12 MMOL/L — SIGNIFICANT CHANGE UP (ref 5–17)
APTT BLD: 33.2 SEC — SIGNIFICANT CHANGE UP (ref 27.5–36.3)
AST SERPL-CCNC: 55 U/L — HIGH (ref 10–40)
BASOPHILS # BLD AUTO: 0 K/UL — SIGNIFICANT CHANGE UP (ref 0–0.2)
BASOPHILS NFR BLD AUTO: 0 % — SIGNIFICANT CHANGE UP (ref 0–2)
BILIRUB SERPL-MCNC: 0.1 MG/DL — LOW (ref 0.2–1.2)
BLASTS # FLD: 2 % — HIGH (ref 0–0)
BUN SERPL-MCNC: <4 MG/DL — LOW (ref 7–23)
CALCIUM SERPL-MCNC: 8.7 MG/DL — SIGNIFICANT CHANGE UP (ref 8.4–10.5)
CHLORIDE SERPL-SCNC: 111 MMOL/L — HIGH (ref 96–108)
CO2 SERPL-SCNC: 24 MMOL/L — SIGNIFICANT CHANGE UP (ref 22–31)
CREAT SERPL-MCNC: 0.52 MG/DL — SIGNIFICANT CHANGE UP (ref 0.5–1.3)
CULTURE RESULTS: SIGNIFICANT CHANGE UP
EOSINOPHIL # BLD AUTO: 0 K/UL — SIGNIFICANT CHANGE UP (ref 0–0.5)
EOSINOPHIL NFR BLD AUTO: 0 % — SIGNIFICANT CHANGE UP (ref 0–6)
FIBRINOGEN PPP-MCNC: 583 MG/DL — HIGH (ref 350–510)
GLUCOSE SERPL-MCNC: 85 MG/DL — SIGNIFICANT CHANGE UP (ref 70–99)
HCT VFR BLD CALC: 21.3 % — LOW (ref 34.5–45)
HGB BLD-MCNC: 7.4 G/DL — LOW (ref 11.5–15.5)
INR BLD: 1.08 RATIO — SIGNIFICANT CHANGE UP (ref 0.88–1.16)
LDH SERPL L TO P-CCNC: 463 U/L — HIGH (ref 50–242)
LYMPHOCYTES # BLD AUTO: 1 K/UL — SIGNIFICANT CHANGE UP (ref 1–3.3)
LYMPHOCYTES # BLD AUTO: 4 % — LOW (ref 13–44)
MAGNESIUM SERPL-MCNC: 1.9 MG/DL — SIGNIFICANT CHANGE UP (ref 1.6–2.6)
MCHC RBC-ENTMCNC: 31.7 PG — SIGNIFICANT CHANGE UP (ref 27–34)
MCHC RBC-ENTMCNC: 34.6 GM/DL — SIGNIFICANT CHANGE UP (ref 32–36)
MCV RBC AUTO: 91.5 FL — SIGNIFICANT CHANGE UP (ref 80–100)
METAMYELOCYTES # FLD: 7 % — HIGH (ref 0–0)
MONOCYTES # BLD AUTO: 3.6 K/UL — HIGH (ref 0–0.9)
MONOCYTES NFR BLD AUTO: 12 % — SIGNIFICANT CHANGE UP (ref 2–14)
MYELOCYTES NFR BLD: 4 % — HIGH (ref 0–0)
NEUTROPHILS # BLD AUTO: 15 K/UL — HIGH (ref 1.8–7.4)
NEUTROPHILS NFR BLD AUTO: 65 % — SIGNIFICANT CHANGE UP (ref 43–77)
NEUTS BAND # BLD: 4 % — SIGNIFICANT CHANGE UP (ref 0–8)
PHOSPHATE SERPL-MCNC: 4.1 MG/DL — SIGNIFICANT CHANGE UP (ref 2.5–4.5)
PLAT MORPH BLD: NORMAL — SIGNIFICANT CHANGE UP
PLATELET # BLD AUTO: 66 K/UL — LOW (ref 150–400)
POTASSIUM SERPL-MCNC: 3.5 MMOL/L — SIGNIFICANT CHANGE UP (ref 3.5–5.3)
POTASSIUM SERPL-SCNC: 3.5 MMOL/L — SIGNIFICANT CHANGE UP (ref 3.5–5.3)
PROMYELOCYTES # FLD: 2 % — HIGH (ref 0–0)
PROT SERPL-MCNC: 5.1 G/DL — LOW (ref 6–8.3)
PROTHROM AB SERPL-ACNC: 12.4 SEC — SIGNIFICANT CHANGE UP (ref 10–12.9)
RBC # BLD: 2.33 M/UL — LOW (ref 3.8–5.2)
RBC # FLD: 14.2 % — SIGNIFICANT CHANGE UP (ref 10.3–14.5)
RBC BLD AUTO: SIGNIFICANT CHANGE UP
SODIUM SERPL-SCNC: 147 MMOL/L — HIGH (ref 135–145)
SPECIMEN SOURCE: SIGNIFICANT CHANGE UP
WBC # BLD: 19.7 K/UL — HIGH (ref 3.8–10.5)
WBC # FLD AUTO: 19.7 K/UL — HIGH (ref 3.8–10.5)

## 2019-03-14 PROCEDURE — 99291 CRITICAL CARE FIRST HOUR: CPT

## 2019-03-14 RX ORDER — ATOVAQUONE 750 MG/5ML
750 SUSPENSION ORAL EVERY 12 HOURS
Qty: 0 | Refills: 0 | Status: DISCONTINUED | OUTPATIENT
Start: 2019-03-14 | End: 2019-03-15

## 2019-03-14 RX ADMIN — Medication 5 MILLILITER(S): at 12:56

## 2019-03-14 RX ADMIN — FLUCONAZOLE 200 MILLIGRAM(S): 150 TABLET ORAL at 12:56

## 2019-03-14 RX ADMIN — NYSTATIN CREAM 1 APPLICATION(S): 100000 CREAM TOPICAL at 06:01

## 2019-03-14 RX ADMIN — Medication 650 MILLIGRAM(S): at 19:50

## 2019-03-14 RX ADMIN — LORATADINE 10 MILLIGRAM(S): 10 TABLET ORAL at 12:57

## 2019-03-14 RX ADMIN — PANTOPRAZOLE SODIUM 40 MILLIGRAM(S): 20 TABLET, DELAYED RELEASE ORAL at 06:01

## 2019-03-14 RX ADMIN — NYSTATIN CREAM 1 APPLICATION(S): 100000 CREAM TOPICAL at 21:09

## 2019-03-14 RX ADMIN — Medication 25 MILLIGRAM(S): at 19:36

## 2019-03-14 RX ADMIN — Medication 10 MILLILITER(S): at 08:48

## 2019-03-14 RX ADMIN — SODIUM CHLORIDE 20 MILLILITER(S): 9 INJECTION INTRAMUSCULAR; INTRAVENOUS; SUBCUTANEOUS at 19:23

## 2019-03-14 RX ADMIN — Medication 10 MILLILITER(S): at 12:55

## 2019-03-14 RX ADMIN — Medication 5 MILLILITER(S): at 23:17

## 2019-03-14 RX ADMIN — NYSTATIN CREAM 1 APPLICATION(S): 100000 CREAM TOPICAL at 14:13

## 2019-03-14 RX ADMIN — Medication 650 MILLIGRAM(S): at 19:35

## 2019-03-14 RX ADMIN — Medication 5 MILLILITER(S): at 08:48

## 2019-03-14 RX ADMIN — Medication 400 MILLIGRAM(S): at 21:08

## 2019-03-14 RX ADMIN — Medication 5 MILLILITER(S): at 16:42

## 2019-03-14 RX ADMIN — Medication 1 LOZENGE: at 12:55

## 2019-03-14 RX ADMIN — Medication 1 LOZENGE: at 19:23

## 2019-03-14 RX ADMIN — ATOVAQUONE 750 MILLIGRAM(S): 750 SUSPENSION ORAL at 17:41

## 2019-03-14 RX ADMIN — Medication 1 LOZENGE: at 16:42

## 2019-03-14 RX ADMIN — Medication 1 MILLIGRAM(S): at 12:56

## 2019-03-14 RX ADMIN — Medication 400 MILLIGRAM(S): at 14:13

## 2019-03-14 RX ADMIN — Medication 10 MILLILITER(S): at 23:17

## 2019-03-14 RX ADMIN — URSODIOL 300 MILLIGRAM(S): 250 TABLET, FILM COATED ORAL at 08:48

## 2019-03-14 RX ADMIN — Medication 5 MILLILITER(S): at 19:23

## 2019-03-14 RX ADMIN — Medication 1 LOZENGE: at 23:17

## 2019-03-14 RX ADMIN — Medication 1 LOZENGE: at 08:48

## 2019-03-14 RX ADMIN — Medication 400 MILLIGRAM(S): at 05:59

## 2019-03-14 RX ADMIN — Medication 10 MILLILITER(S): at 16:42

## 2019-03-14 RX ADMIN — Medication 1 TABLET(S): at 12:56

## 2019-03-14 RX ADMIN — URSODIOL 300 MILLIGRAM(S): 250 TABLET, FILM COATED ORAL at 17:40

## 2019-03-14 RX ADMIN — Medication 10 MILLILITER(S): at 19:23

## 2019-03-14 NOTE — DISCHARGE NOTE PROVIDER - NSDCCPCAREPLAN_GEN_ALL_CORE_FT
PRINCIPAL DISCHARGE DIAGNOSIS  Diagnosis: Stem cells transplant status  Assessment and Plan of Treatment: 1. Diet and activities as per [facility] discharge guidelines and safe food handling guidelines. NO RESTAURANT OR TAKE OUT FOOD AT THIS TIME, ONLY HOME COOKED PREPARED/FROZEN FOODS. You are allowed to have fresh baked pizza right out of the oven. This is the ONLY takeout food at this time.  2. Notify your physician if bleeding; swelling; persistent nausea and vomiting; unable to urinate; pain not relieved by medications; fever; numbness, tingling; excessive diarrhea, inability to tolerate liquids or foods; increased irritability or sluggishness, rash        SECONDARY DISCHARGE DIAGNOSES  Diagnosis: Need for prophylactic measure  Assessment and Plan of Treatment: Continue your prophylactic antibiotics as directed by Dr. Rahman   Acyclovir - antiviral prophylaxis   Mepron - PCP prophylaxis   Fluconazole - antifungal prophylaxis   Protonix - GI prophylaxis

## 2019-03-14 NOTE — DISCHARGE NOTE PROVIDER - INSTRUCTIONS
Diet and activities as per [facility] discharge guidelines and safe food handling guidelines. NO RESTAURANT OR TAKE OUT FOOD AT THIS TIME, ONLY HOME COOKED PREPARED/FROZEN FOODS. You are allowed to have fresh baked pizza right out of the oven. This is the ONLY takeout food at this time.

## 2019-03-14 NOTE — PROGRESS NOTE ADULT - SUBJECTIVE AND OBJECTIVE BOX
Triage Note: fever since yesterday afternoon, called GI dr on call since she had an endoscopy Friday and instructed to come to ER, Endoscopy was for celiac disease HPC Transplant Team                                                      Critical / Counseling Time Provided: 30 minutes                                                                                                                                                        Chief Complaint: Autologous peripheral blood stem cell transplant for treatment of multiple myeloma     S: Patient seen and examined with HPC Transplant Team:     Denies mouth / tongue / throat pain, dyspnea, cough, nausea, vomiting, diarrhea, abdominal pain     O: Vitals:   Vital Signs Last 24 Hrs  T(C): 37.8 (14 Mar 2019 05:45), Max: 37.9 (14 Mar 2019 00:57)  T(F): 100 (14 Mar 2019 05:45), Max: 100.2 (14 Mar 2019 00:57)  HR: 104 (14 Mar 2019 05:45) (104 - 115)  BP: 110/64 (14 Mar 2019 05:45) (105/57 - 116/71)  BP(mean): --  RR: 18 (14 Mar 2019 05:45) (18 - 18)  SpO2: 98% (14 Mar 2019 05:45) (97% - 99%)    Admit weight: 79.2 kG  Daily Weight in k.6 (13 Mar 2019 08:55)    Intake / Output:   03-13 @ 07:01  -  -14 @ 07:00  --------------------------------------------------------  IN: 1860 mL / OUT: 2125 mL / NET: -265 mL    PE:   Oropharynx: very mild erythema posterior oropharynx, no ulcers  Oral Mucositis: +                                                       ndGndrndanddndend:nd nd2nd CVS: (+) S1/S2, RRR  Lungs: CTA B/L  Abdomen: soft, NT/ND, (+) BS mildly hyperactive  Extremities: No edema in B/L LE  Gastric Mucositis: n/a                                                Grade:   Intestinal Mucositis: n/a                                             Grade:   Skin: hyperpigmentation 2/2 Kepivance on neck, and upper chest   TLC: C/D/I  Neuro: nonfocal   Pain: 3/10 throat pain on swallow    Labs:   CBC Full  -  ( 14 Mar 2019 06:57 )  WBC Count : 19.7 K/uL  Hemoglobin : 7.4 g/dL  Hematocrit : 21.3 %  Platelet Count - Automated : 66 K/uL  Mean Cell Volume : 91.5 fl  Mean Cell Hemoglobin : 31.7 pg  Mean Cell Hemoglobin Concentration : 34.6 gm/dL  Auto Neutrophil # : 15.0 K/uL  Auto Lymphocyte # : 1.0 K/uL  Auto Monocyte # : 3.6 K/uL  Auto Eosinophil # : 0.0 K/uL  Auto Basophil # : 0.0 K/uL  Auto Neutrophil % : 65.0 %  Auto Lymphocyte % : 4.0 %  Auto Monocyte % : 12.0 %  Auto Eosinophil % : 0.0 %  Auto Basophil % : 0.0 %                          7.4    19.7  )-----------( 66       ( 14 Mar 2019 06:57 )             21.3     03-14    147<H>  |  111<H>  |  <4<L>  ----------------------------<  85  3.5   |  24  |  0.52    Ca    8.7      14 Mar 2019 06:57  Phos  4.1       Mg     1.9         TPro  5.1<L>  /  Alb  3.1<L>  /  TBili  0.1<L>  /  DBili  x   /  AST  55<H>  /  ALT  76<H>  /  AlkPhos  124<H>      PT/INR - ( 14 Mar 2019 06:57 )   PT: 12.4 sec;   INR: 1.08 ratio         PTT - ( 14 Mar 2019 06:57 )  PTT:33.2 sec  LIVER FUNCTIONS - ( 14 Mar 2019 06:57 )  Alb: 3.1 g/dL / Pro: 5.1 g/dL / ALK PHOS: 124 U/L / ALT: 76 U/L / AST: 55 U/L / GGT: x           Lactate Dehydrogenase, Serum: 463 U/L ( @ 06:57)    Meds:   Antimicrobials:   acyclovir   Oral Tab/Cap 400 milliGRAM(s) Oral every 8 hours  clotrimazole Lozenge 1 Lozenge Oral five times a day  fluconAZOLE   Tablet 200 milliGRAM(s) Oral daily      Heme / Onc:   heparin  Infusion 330 Unit(s)/Hr IV Continuous <Continuous>      GI:  docusate sodium 100 milliGRAM(s) Oral three times a day PRN  loperamide 2 milliGRAM(s) Oral every 3 hours PRN  pantoprazole    Tablet 40 milliGRAM(s) Oral before breakfast  senna 1 Tablet(s) Oral at bedtime PRN  sodium bicarbonate Mouth Rinse 10 milliLiter(s) Swish and Spit five times a day  ursodiol Capsule 300 milliGRAM(s) Oral two times a day with meals      Cardiovascular:   amLODIPine   Tablet 5 milliGRAM(s) Oral daily  furosemide   Injectable 20 milliGRAM(s) IV Push every 24 hours PRN      Immunologic:       Other medications:   acetaminophen   Tablet .. 650 milliGRAM(s) Oral every 6 hours  Biotene Dry Mouth Oral Rinse 5 milliLiter(s) Swish and Spit five times a day  folic acid 1 milliGRAM(s) Oral daily  lidocaine/prilocaine Cream 1 Application(s) Topical daily  loratadine 10 milliGRAM(s) Oral daily  multivitamin 1 Tablet(s) Oral daily  nystatin Powder 1 Application(s) Topical three times a day  sodium chloride 0.9%. 1000 milliLiter(s) IV Continuous <Continuous>      PRN:   acetaminophen   Tablet .. 650 milliGRAM(s) Oral every 6 hours PRN  benzocaine 15 mG/menthol 3.6 mG Lozenge 1 Lozenge Oral every 4 hours PRN  diphenhydrAMINE   Injectable 25 milliGRAM(s) IV Push every 4 hours PRN  docusate sodium 100 milliGRAM(s) Oral three times a day PRN  furosemide   Injectable 20 milliGRAM(s) IV Push every 24 hours PRN  HYDROmorphone  Injectable 0.5 milliGRAM(s) IV Push every 4 hours PRN  loperamide 2 milliGRAM(s) Oral every 3 hours PRN  metoclopramide Injectable 10 milliGRAM(s) IV Push every 6 hours PRN  ondansetron Injectable 8 milliGRAM(s) IV Push every 8 hours PRN  senna 1 Tablet(s) Oral at bedtime PRN    A/P:  68 year old female with a history of IgG kappa Multiple Myeloma.  Status Post Autologous PBSCT,  Day +13  Transaminitis resolved - changed mycamine to fluconazole  Engrafted - off zarxio, off abx   Diarrhea improved- CDiff negative, encourage Imodium prn  Mucositis secondary to antineoplastic chemotherapy- continue mouth care, pain control  LG fever overnight, likely 2/2 engraftment.   Decrease Fluconazole to 200 mg PO daily 2/2 recurrent mild transaminitis.    1. Infectious Disease:   acyclovir   Oral Tab/Cap 400 milliGRAM(s) Oral every 8 hours  clotrimazole Lozenge 1 Lozenge Oral five times a day  fluconAZOLE   Tablet 200 milliGRAM(s) Oral daily    2. VOD Prophylaxis: Actigall, Glutamine, Heparin (dosed at 100 units / kg / day)     3. GI Prophylaxis:   pantoprazole    Tablet 40 milliGRAM(s) Oral before breakfast    4. Mouthcare - NS / NaHCO3 rinses, Mycelex, Caphosol; Skin care     5. GVHD prophylaxis - n/a     6. Transfuse & replete electrolytes prn     7. IV hydration, daily weights, strict I&O, prn diuresis     8. PO intake as tolerated, nutrition follow up as needed, MVI, folic acid     9. Antiemetics, anti-diarrhea medications:   loperamide 2 milliGRAM(s) Oral every 3 hours PRN  metoclopramide Injectable 10 milliGRAM(s) IV Push every 6 hours PRN  ondansetron Injectable 8 milliGRAM(s) IV Push every 8 hours PRN    10. OOB as tolerated, physical therapy consult if needed     11. Monitor coags / fibrinogen 2x week, vitamin K as needed     12. Monitor closely for clinical changes, monitor for fevers     13. Emotional support provided, plan of care discussed and questions addressed     14. Patient education done regarding plan of care, restrictions and discharge planning     15. Continue regular social work input     I have written the above note for Dr. Kumari  who performed service with me in the room.   Nancy Hallman NP-C ([phone])    I have seen and examined patient with NP, I agree with above note as scribed. HPC Transplant Team                                                      Critical / Counseling Time Provided: 30 minutes                                                                                                                                                        Chief Complaint: Autologous peripheral blood stem cell transplant for treatment of multiple myeloma     S: Patient seen and examined with HPC Transplant Team:   no complaints today   Denies mouth / tongue / throat pain, dyspnea, cough, nausea, vomiting, diarrhea, abdominal pain     O: Vitals:   Vital Signs Last 24 Hrs  T(C): 37.8 (14 Mar 2019 05:45), Max: 37.9 (14 Mar 2019 00:57)  T(F): 100 (14 Mar 2019 05:45), Max: 100.2 (14 Mar 2019 00:57)  HR: 104 (14 Mar 2019 05:45) (104 - 115)  BP: 110/64 (14 Mar 2019 05:45) (105/57 - 116/71)  BP(mean): --  RR: 18 (14 Mar 2019 05:45) (18 - 18)  SpO2: 98% (14 Mar 2019 05:45) (97% - 99%)    Admit weight: 79.2 kG  Daily Weight in k.6 (13 Mar 2019 08:55)  Today's weight: 76.7kg     Intake / Output:   03-13 @ 07:01  -  -14 @ 07:00  --------------------------------------------------------  IN: 1860 mL / OUT: 2125 mL / NET: -265 mL    PE:   Oropharynx: very mild erythema posterior oropharynx, no ulcers  Oral Mucositis: +                                                       ndGndrndanddndend:nd nd2nd CVS: (+) S1/S2, RRR  Lungs: CTA B/L  Abdomen: soft, NT/ND, (+) BS mildly hyperactive  Extremities: No edema in B/L LE  Gastric Mucositis: -                                               Grade: n/a  Intestinal Mucositis: -                                             Grade: n/a  Skin: hyperpigmentation 2/2 Kepivance on neck, and upper chest   TLC: C/D/I  Neuro: nonfocal   Pain: denies pain     Labs:   CBC Full  -  ( 14 Mar 2019 06:57 )  WBC Count : 19.7 K/uL  Hemoglobin : 7.4 g/dL  Hematocrit : 21.3 %  Platelet Count - Automated : 66 K/uL  Mean Cell Volume : 91.5 fl  Mean Cell Hemoglobin : 31.7 pg  Mean Cell Hemoglobin Concentration : 34.6 gm/dL  Auto Neutrophil # : 15.0 K/uL  Auto Lymphocyte # : 1.0 K/uL  Auto Monocyte # : 3.6 K/uL  Auto Eosinophil # : 0.0 K/uL  Auto Basophil # : 0.0 K/uL  Auto Neutrophil % : 65.0 %  Auto Lymphocyte % : 4.0 %  Auto Monocyte % : 12.0 %  Auto Eosinophil % : 0.0 %  Auto Basophil % : 0.0 %                          7.4    19.7  )-----------( 66       ( 14 Mar 2019 06:57 )             21.3     03-14    147<H>  |  111<H>  |  <4<L>  ----------------------------<  85  3.5   |  24  |  0.52    Ca    8.7      14 Mar 2019 06:57  Phos  4.1       Mg     1.9         TPro  5.1<L>  /  Alb  3.1<L>  /  TBili  0.1<L>  /  DBili  x   /  AST  55<H>  /  ALT  76<H>  /  AlkPhos  124<H>      PT/INR - ( 14 Mar 2019 06:57 )   PT: 12.4 sec;   INR: 1.08 ratio         PTT - ( 14 Mar 2019 06:57 )  PTT:33.2 sec  LIVER FUNCTIONS - ( 14 Mar 2019 06:57 )  Alb: 3.1 g/dL / Pro: 5.1 g/dL / ALK PHOS: 124 U/L / ALT: 76 U/L / AST: 55 U/L / GGT: x           Lactate Dehydrogenase, Serum: 463 U/L ( @ 06:57)    Meds:   Antimicrobials:   acyclovir   Oral Tab/Cap 400 milliGRAM(s) Oral every 8 hours  clotrimazole Lozenge 1 Lozenge Oral five times a day  fluconAZOLE   Tablet 200 milliGRAM(s) Oral daily      Heme / Onc:   heparin  Infusion 330 Unit(s)/Hr IV Continuous <Continuous>      GI:  docusate sodium 100 milliGRAM(s) Oral three times a day PRN  loperamide 2 milliGRAM(s) Oral every 3 hours PRN  pantoprazole    Tablet 40 milliGRAM(s) Oral before breakfast  senna 1 Tablet(s) Oral at bedtime PRN  sodium bicarbonate Mouth Rinse 10 milliLiter(s) Swish and Spit five times a day  ursodiol Capsule 300 milliGRAM(s) Oral two times a day with meals      Cardiovascular:   amLODIPine   Tablet 5 milliGRAM(s) Oral daily  furosemide   Injectable 20 milliGRAM(s) IV Push every 24 hours PRN      Immunologic:       Other medications:   acetaminophen   Tablet .. 650 milliGRAM(s) Oral every 6 hours  Biotene Dry Mouth Oral Rinse 5 milliLiter(s) Swish and Spit five times a day  folic acid 1 milliGRAM(s) Oral daily  lidocaine/prilocaine Cream 1 Application(s) Topical daily  loratadine 10 milliGRAM(s) Oral daily  multivitamin 1 Tablet(s) Oral daily  nystatin Powder 1 Application(s) Topical three times a day  sodium chloride 0.9%. 1000 milliLiter(s) IV Continuous <Continuous>      PRN:   acetaminophen   Tablet .. 650 milliGRAM(s) Oral every 6 hours PRN  benzocaine 15 mG/menthol 3.6 mG Lozenge 1 Lozenge Oral every 4 hours PRN  diphenhydrAMINE   Injectable 25 milliGRAM(s) IV Push every 4 hours PRN  docusate sodium 100 milliGRAM(s) Oral three times a day PRN  furosemide   Injectable 20 milliGRAM(s) IV Push every 24 hours PRN  HYDROmorphone  Injectable 0.5 milliGRAM(s) IV Push every 4 hours PRN  loperamide 2 milliGRAM(s) Oral every 3 hours PRN  metoclopramide Injectable 10 milliGRAM(s) IV Push every 6 hours PRN  ondansetron Injectable 8 milliGRAM(s) IV Push every 8 hours PRN  senna 1 Tablet(s) Oral at bedtime PRN    A/P:  68 year old female with a history of IgG kappa Multiple Myeloma.  Status Post Autologous PBSCT,  Day +13  Transaminitis resolved - changed mycamine to fluconazole  Engrafted - off zarxio, off abx   Diarrhea improved- CDiff negative, encourage Imodium prn  Mucositis secondary to antineoplastic chemotherapy- continue mouth care, pain control  LG fever overnight, likely 2/2 engraftment.   Decrease Fluconazole to 200 mg PO daily 2/2 recurrent mild transaminitis.    1. Infectious Disease:   acyclovir   Oral Tab/Cap 400 milliGRAM(s) Oral every 8 hours  clotrimazole Lozenge 1 Lozenge Oral five times a day  fluconAZOLE   Tablet 200 milliGRAM(s) Oral daily    2. VOD Prophylaxis: Actigall, Glutamine, Heparin (dosed at 100 units / kg / day)     3. GI Prophylaxis:   pantoprazole    Tablet 40 milliGRAM(s) Oral before breakfast    4. Mouthcare - NS / NaHCO3 rinses, Mycelex, Caphosol; Skin care     5. GVHD prophylaxis - n/a     6. Transfuse & replete electrolytes prn     7. IV hydration, daily weights, strict I&O, prn diuresis     8. PO intake as tolerated, nutrition follow up as needed, MVI, folic acid     9. Antiemetics, anti-diarrhea medications:   loperamide 2 milliGRAM(s) Oral every 3 hours PRN  metoclopramide Injectable 10 milliGRAM(s) IV Push every 6 hours PRN  ondansetron Injectable 8 milliGRAM(s) IV Push every 8 hours PRN    10. OOB as tolerated, physical therapy consult if needed     11. Monitor coags / fibrinogen 2x week, vitamin K as needed     12. Monitor closely for clinical changes, monitor for fevers     13. Emotional support provided, plan of care discussed and questions addressed     14. Patient education done regarding plan of care, restrictions and discharge planning     15. Continue regular social work input     I have written the above note for Dr. Kumari  who performed service with me in the room.   Nancy Hallman NP-C ([phone])    I have seen and examined patient with NP, I agree with above note as scribed.

## 2019-03-14 NOTE — CHART NOTE - NSCHARTNOTEFT_GEN_A_CORE
LATE ENTRY:     On 2/28/19, after pre-medication, Ms. Calero received 252ml of autologous, pooled, thawed, washed, mobilized, plasma reduced, HPC aphresis over approximately 1 hour. Cell counts as follows:   Tltal MNCs (x 10^8/kg) = 2.64  CD34+ cells (x 10^6/kg) = 10.04  Cell Viability (%)%= 82%    She tolerated the infusion well with no adverse events noted.   Nancy Hallman NP-C [phone]

## 2019-03-14 NOTE — DISCHARGE NOTE PROVIDER - HOSPITAL COURSE
Ms. Calero is a 68 year old female initially referred to hematology for monoclonal gammopathy. She was noted to have proteinuria. A 24 hour urine protein showed non-nephrotic range proteinuria of 510 mg/24 hours. She was referred to Dr. Lesa Rendon of nephrology. SPEP showed a faint M-spike 0.2 g/dl in gammaglobulin region, immunofixation showed this to be a IgG kappa monoclonal protein. UPEP showed 2  monoclonal protein bands, 61%, and 0.7%.         Ms. Calero had a bone marrow biopsy on 6/22/18. Pathology: plasma cell neoplasm, plasma cells comprise 20-25% of marrow cells. Trilineage hematopoiesis present with maturation, increased iron stores. Congo red stain negative for amyloid. Karyotype 46, XX. FISH panel - positive for CCND1/IGH fusion -  favorable prognosis. PET CT (7/12/18) did not show any bone lesions. Began VRd on 7/19/18.         On C4 of VRd. FLC ratio initially decreasing -->198 --> 126 --> 67, however has increased to 115 with urine studies positive for Bence Brooks protein. For refractory disease, she was switched to KRd for a deeper response.  Ms Calero was mobilized with Cytoxan and Neulasta and is now admitted for autologous stem cell transplant with Melphalan conditioning.        Upon admission, a TLC was placed in IR. Ms. Calero received IV hydration, pain management. antiemetics, Ms. Calero is a 68 year old female initially referred to hematology for monoclonal gammopathy. She was noted to have proteinuria. A 24 hour urine protein showed non-nephrotic range proteinuria of 510 mg/24 hours. She was referred to Dr. Lesa Rendon of nephrology. SPEP showed a faint M-spike 0.2 g/dl in gammaglobulin region, immunofixation showed this to be a IgG kappa monoclonal protein. UPEP showed 2  monoclonal protein bands, 61%, and 0.7%.         Ms. Calero had a bone marrow biopsy on 6/22/18. Pathology: plasma cell neoplasm, plasma cells comprise 20-25% of marrow cells. Trilineage hematopoiesis present with maturation, increased iron stores. Congo red stain negative for amyloid. Karyotype 46, XX. FISH panel - positive for CCND1/IGH fusion -  favorable prognosis. PET CT (7/12/18) did not show any bone lesions. Began VRd on 7/19/18.         On C4 of VRd. FLC ratio initially decreasing -->198 --> 126 --> 67, however has increased to 115 with urine studies positive for Bence Brooks protein. For refractory disease, she was switched to KRd for a deeper response.  Ms Calero was mobilized with Cytoxan and Neulasta and is now admitted for autologous stem cell transplant with Melphalan conditioning.        Upon admission, a TLC was placed in IR. Ms. Calero received IV hydration, pain management, antiemetics, anxiolysis, nutritional support and antibacterial / antiviral / antifungal / GI / PCP / VOD (SOS) prophylaxis. Labs were monitored on a daily basis, and she received transfusional support and electrolyte repletion as needed. When her ANC dropped below 1000, she was started on prophylactic ciprofloxacin.         On 2/28/19, Ms. Calero received 252ml of autologous, pooled, thawed, washed, mobilized, plasma reduced, HPC apheresis over approximately 1 hour. Cell counts as follows:     Total MNCs (x 10^8/kg) = 2.64    CD34+ cells (x 10^6/kg) = 10.04    Cell Viability (%) = 82    She tolerated the infusion well with no adverse reactions noted.         Engraftment was noted on 3/12/19, and the daily cipro and zarxio were discontinued.         While admitted, Ms. Calero experienced grade 1 GI mucositis related to the high dose chemotherapy preparative regimen. She also experienced diarrhea, which was treated with imodium after a sample was negative for c. diff. Ms. Calero also experienced pancytopenia related to the high dose chemotherapy preparative regimen.         Currently, Ms. Calero is stable for discharge home with outpatient follow up at the [facility].

## 2019-03-14 NOTE — PROGRESS NOTE ADULT - ATTENDING COMMENTS
68 y/o F w/ IgG kappa myeloma admitted for autologous pbsct w/ Melphalan prep (100mg/m2 x 2)   Day +13 s/p HPC transplant;   Engrafted- off zarxio, off abx   Strict I&O, daily weights, prn diuresis   Antiemetics, mouth care, skin care   Mucositis secondary to antineoplastic chemotherapy- continue mouth care, pain control. Improved.  Continue Acyclovir, d/c Cipro prophylaxis;   Transaminitis- now decreasing LFT's- fluconazole at reduced dose   Supplement lytes   VOD prophylaxis  Imodium prn diarrhea  OOB, ambulate  d/c friday...instruction started 70 y/o F w/ IgG kappa myeloma admitted for autologous pbsct w/ Melphalan prep (100mg/m2 x 2)   Day +13 s/p HPC transplant;   Engrafted- off zarxio, off abx   Strict I&O, daily weights, prn diuresis   Antiemetics, mouth care, skin care   Mucositis secondary to antineoplastic chemotherapy- continue mouth care, pain control. Improved.  Continue Acyclovir, d/c Cipro prophylaxis;   Transaminitis- now decreasing LFT's- fluconazole at reduced dose   Supplement lytes   VOD prophylaxis.  Imodium prn diarrhea  OOB, ambulate  d/c friday...instruction started

## 2019-03-14 NOTE — DISCHARGE NOTE PROVIDER - CARE PROVIDER_API CALL
Analisa Rahman)  Hematology; Medical Oncology  [address] [place] [address]  Phone: [phone]  Fax: [phone]  Follow Up Time:

## 2019-03-14 NOTE — DISCHARGE NOTE PROVIDER - NSDCFUADDAPPT_GEN_ALL_CORE_FT
You have a follow up appointment with Dr. Rahman at the [facility] on Thursday, 3/21/19 at 12pm. Please arrive 15 minutes early to have your blood drawn.   You have a follow up appointment with Dr. Rahman at the [facility] on Thursday, 3/28/19 at 12pm. Please arrive 15 minutes early to have your blood drawn.   You have a follow up appointment with Dr. Rahman at the [facility] on Thursday, 4/4/19 at 12pm. Please arrive 15 minutes early to have your blood drawn.

## 2019-03-15 ENCOUNTER — TRANSCRIPTION ENCOUNTER (OUTPATIENT)
Age: 69
End: 2019-03-15

## 2019-03-15 ENCOUNTER — OUTPATIENT (OUTPATIENT)
Dept: OUTPATIENT SERVICES | Facility: HOSPITAL | Age: 69
LOS: 1 days | Discharge: HOME | End: 2019-03-15

## 2019-03-15 VITALS
TEMPERATURE: 99 F | DIASTOLIC BLOOD PRESSURE: 80 MMHG | SYSTOLIC BLOOD PRESSURE: 132 MMHG | HEART RATE: 89 BPM | OXYGEN SATURATION: 98 % | RESPIRATION RATE: 18 BRPM

## 2019-03-15 DIAGNOSIS — Z98.51 TUBAL LIGATION STATUS: Chronic | ICD-10-CM

## 2019-03-15 DIAGNOSIS — Z01.818 ENCOUNTER FOR OTHER PREPROCEDURAL EXAMINATION: ICD-10-CM

## 2019-03-15 DIAGNOSIS — C90.00 MULTIPLE MYELOMA NOT HAVING ACHIEVED REMISSION: ICD-10-CM

## 2019-03-15 LAB
ALBUMIN SERPL ELPH-MCNC: 3.4 G/DL — SIGNIFICANT CHANGE UP (ref 3.3–5)
ALP SERPL-CCNC: 122 U/L — HIGH (ref 40–120)
ALT FLD-CCNC: 77 U/L — HIGH (ref 10–45)
ANION GAP SERPL CALC-SCNC: 12 MMOL/L — SIGNIFICANT CHANGE UP (ref 5–17)
APPEARANCE UR: CLEAR — SIGNIFICANT CHANGE UP
AST SERPL-CCNC: 55 U/L — HIGH (ref 10–40)
BACTERIA # UR AUTO: NEGATIVE — SIGNIFICANT CHANGE UP
BASOPHILS # BLD AUTO: 0 K/UL — SIGNIFICANT CHANGE UP (ref 0–0.2)
BILIRUB DIRECT SERPL-MCNC: <0.1 MG/DL — SIGNIFICANT CHANGE UP (ref 0–0.2)
BILIRUB INDIRECT FLD-MCNC: >0.1 MG/DL — LOW (ref 0.2–1)
BILIRUB SERPL-MCNC: 0.2 MG/DL — SIGNIFICANT CHANGE UP (ref 0.2–1.2)
BILIRUB UR-MCNC: NEGATIVE — SIGNIFICANT CHANGE UP
BLASTS # FLD: 1 % — HIGH (ref 0–0)
BLD GP AB SCN SERPL QL: NEGATIVE — SIGNIFICANT CHANGE UP
BUN SERPL-MCNC: 5 MG/DL — LOW (ref 7–23)
CALCIUM SERPL-MCNC: 9 MG/DL — SIGNIFICANT CHANGE UP (ref 8.4–10.5)
CHLORIDE SERPL-SCNC: 109 MMOL/L — HIGH (ref 96–108)
CO2 SERPL-SCNC: 24 MMOL/L — SIGNIFICANT CHANGE UP (ref 22–31)
COLOR SPEC: SIGNIFICANT CHANGE UP
CREAT SERPL-MCNC: 0.5 MG/DL — SIGNIFICANT CHANGE UP (ref 0.5–1.3)
CULTURE RESULTS: SIGNIFICANT CHANGE UP
DIFF PNL FLD: NEGATIVE — SIGNIFICANT CHANGE UP
EOSINOPHIL # BLD AUTO: 0.1 K/UL — SIGNIFICANT CHANGE UP (ref 0–0.5)
EPI CELLS # UR: 1 /HPF — SIGNIFICANT CHANGE UP
GLUCOSE SERPL-MCNC: 83 MG/DL — SIGNIFICANT CHANGE UP (ref 70–99)
GLUCOSE UR QL: NEGATIVE — SIGNIFICANT CHANGE UP
HCT VFR BLD CALC: 27.6 % — LOW (ref 34.5–45)
HGB BLD-MCNC: 8.9 G/DL — LOW (ref 11.5–15.5)
HYALINE CASTS # UR AUTO: 0 /LPF — SIGNIFICANT CHANGE UP (ref 0–2)
KETONES UR-MCNC: NEGATIVE — SIGNIFICANT CHANGE UP
LDH SERPL L TO P-CCNC: 414 U/L — HIGH (ref 50–242)
LEUKOCYTE ESTERASE UR-ACNC: NEGATIVE — SIGNIFICANT CHANGE UP
LYMPHOCYTES # BLD AUTO: 1.2 K/UL — SIGNIFICANT CHANGE UP (ref 1–3.3)
LYMPHOCYTES # BLD AUTO: 6 % — LOW (ref 13–44)
MAGNESIUM SERPL-MCNC: 1.9 MG/DL — SIGNIFICANT CHANGE UP (ref 1.6–2.6)
MCHC RBC-ENTMCNC: 30.6 PG — SIGNIFICANT CHANGE UP (ref 27–34)
MCHC RBC-ENTMCNC: 32.2 GM/DL — SIGNIFICANT CHANGE UP (ref 32–36)
MCV RBC AUTO: 94.9 FL — SIGNIFICANT CHANGE UP (ref 80–100)
METAMYELOCYTES # FLD: 6 % — HIGH (ref 0–0)
MONOCYTES # BLD AUTO: SIGNIFICANT CHANGE UP (ref 0–0.9)
MONOCYTES NFR BLD AUTO: 11 % — SIGNIFICANT CHANGE UP (ref 2–14)
MYELOCYTES NFR BLD: 5 % — HIGH (ref 0–0)
NEUTROPHILS # BLD AUTO: 11.4 K/UL — HIGH (ref 1.8–7.4)
NEUTROPHILS NFR BLD AUTO: 61 % — SIGNIFICANT CHANGE UP (ref 43–77)
NEUTS BAND # BLD: 8 % — SIGNIFICANT CHANGE UP (ref 0–8)
NITRITE UR-MCNC: NEGATIVE — SIGNIFICANT CHANGE UP
NRBC # BLD: 1 /100 — HIGH (ref 0–0)
PH UR: 7 — SIGNIFICANT CHANGE UP (ref 5–8)
PHOSPHATE SERPL-MCNC: 4.5 MG/DL — SIGNIFICANT CHANGE UP (ref 2.5–4.5)
PLAT MORPH BLD: NORMAL — SIGNIFICANT CHANGE UP
PLATELET # BLD AUTO: 86 K/UL — LOW (ref 150–400)
POTASSIUM SERPL-MCNC: 3.3 MMOL/L — LOW (ref 3.5–5.3)
POTASSIUM SERPL-SCNC: 3.3 MMOL/L — LOW (ref 3.5–5.3)
PROMYELOCYTES # FLD: 1 % — HIGH (ref 0–0)
PROT SERPL-MCNC: 5.1 G/DL — LOW (ref 6–8.3)
PROT UR-MCNC: SIGNIFICANT CHANGE UP
RBC # BLD: 2.91 M/UL — LOW (ref 3.8–5.2)
RBC # FLD: 13.6 % — SIGNIFICANT CHANGE UP (ref 10.3–14.5)
RBC BLD AUTO: SIGNIFICANT CHANGE UP
RBC CASTS # UR COMP ASSIST: 1 /HPF — SIGNIFICANT CHANGE UP (ref 0–4)
RH IG SCN BLD-IMP: POSITIVE — SIGNIFICANT CHANGE UP
SODIUM SERPL-SCNC: 145 MMOL/L — SIGNIFICANT CHANGE UP (ref 135–145)
SP GR SPEC: 1.01 — SIGNIFICANT CHANGE UP (ref 1.01–1.02)
SPECIMEN SOURCE: SIGNIFICANT CHANGE UP
UROBILINOGEN FLD QL: NEGATIVE — SIGNIFICANT CHANGE UP
VARIANT LYMPHS # BLD: 1 % — SIGNIFICANT CHANGE UP (ref 0–6)
WBC # BLD: 16.6 K/UL — HIGH (ref 3.8–10.5)
WBC # FLD AUTO: 16.6 K/UL — HIGH (ref 3.8–10.5)
WBC UR QL: 1 /HPF — SIGNIFICANT CHANGE UP (ref 0–5)

## 2019-03-15 PROCEDURE — 87086 URINE CULTURE/COLONY COUNT: CPT

## 2019-03-15 PROCEDURE — 86900 BLOOD TYPING SEROLOGIC ABO: CPT

## 2019-03-15 PROCEDURE — 82785 ASSAY OF IGE: CPT

## 2019-03-15 PROCEDURE — 86923 COMPATIBILITY TEST ELECTRIC: CPT

## 2019-03-15 PROCEDURE — 99291 CRITICAL CARE FIRST HOUR: CPT

## 2019-03-15 PROCEDURE — P9037: CPT

## 2019-03-15 PROCEDURE — 82784 ASSAY IGA/IGD/IGG/IGM EACH: CPT

## 2019-03-15 PROCEDURE — 36556 INSERT NON-TUNNEL CV CATH: CPT

## 2019-03-15 PROCEDURE — 82248 BILIRUBIN DIRECT: CPT

## 2019-03-15 PROCEDURE — 84100 ASSAY OF PHOSPHORUS: CPT

## 2019-03-15 PROCEDURE — C1751: CPT

## 2019-03-15 PROCEDURE — 85045 AUTOMATED RETICULOCYTE COUNT: CPT

## 2019-03-15 PROCEDURE — 85027 COMPLETE CBC AUTOMATED: CPT

## 2019-03-15 PROCEDURE — 81001 URINALYSIS AUTO W/SCOPE: CPT

## 2019-03-15 PROCEDURE — 81003 URINALYSIS AUTO W/O SCOPE: CPT

## 2019-03-15 PROCEDURE — 87040 BLOOD CULTURE FOR BACTERIA: CPT

## 2019-03-15 PROCEDURE — 83735 ASSAY OF MAGNESIUM: CPT

## 2019-03-15 PROCEDURE — 85610 PROTHROMBIN TIME: CPT

## 2019-03-15 PROCEDURE — 38209 WASH HARVEST STEM CELLS: CPT

## 2019-03-15 PROCEDURE — 83615 LACTATE (LD) (LDH) ENZYME: CPT

## 2019-03-15 PROCEDURE — 36430 TRANSFUSION BLD/BLD COMPNT: CPT

## 2019-03-15 PROCEDURE — 85730 THROMBOPLASTIN TIME PARTIAL: CPT

## 2019-03-15 PROCEDURE — 86803 HEPATITIS C AB TEST: CPT

## 2019-03-15 PROCEDURE — 86901 BLOOD TYPING SEROLOGIC RH(D): CPT

## 2019-03-15 PROCEDURE — 38207 CRYOPRESERVE STEM CELLS: CPT

## 2019-03-15 PROCEDURE — 93005 ELECTROCARDIOGRAM TRACING: CPT

## 2019-03-15 PROCEDURE — 77001 FLUOROGUIDE FOR VEIN DEVICE: CPT

## 2019-03-15 PROCEDURE — 80053 COMPREHEN METABOLIC PANEL: CPT

## 2019-03-15 PROCEDURE — C1769: CPT

## 2019-03-15 PROCEDURE — P9040: CPT

## 2019-03-15 PROCEDURE — 82955 ASSAY OF G6PD ENZYME: CPT

## 2019-03-15 PROCEDURE — 71045 X-RAY EXAM CHEST 1 VIEW: CPT

## 2019-03-15 PROCEDURE — 86850 RBC ANTIBODY SCREEN: CPT

## 2019-03-15 PROCEDURE — 85384 FIBRINOGEN ACTIVITY: CPT

## 2019-03-15 PROCEDURE — 87449 NOS EACH ORGANISM AG IA: CPT

## 2019-03-15 PROCEDURE — 87324 CLOSTRIDIUM AG IA: CPT

## 2019-03-15 PROCEDURE — 76937 US GUIDE VASCULAR ACCESS: CPT

## 2019-03-15 RX ORDER — CIPROFLOXACIN LACTATE 400MG/40ML
1 VIAL (ML) INTRAVENOUS
Qty: 10 | Refills: 0
Start: 2019-03-15 | End: 2019-03-19

## 2019-03-15 RX ORDER — POTASSIUM CHLORIDE 20 MEQ
40 PACKET (EA) ORAL ONCE
Qty: 0 | Refills: 0 | Status: COMPLETED | OUTPATIENT
Start: 2019-03-15 | End: 2019-03-15

## 2019-03-15 RX ORDER — CIPROFLOXACIN LACTATE 400MG/40ML
1 VIAL (ML) INTRAVENOUS
Qty: 10 | Refills: 0 | OUTPATIENT
Start: 2019-03-15 | End: 2019-03-19

## 2019-03-15 RX ORDER — MULTIVITAMIN
TABLET ORAL DAILY
Qty: 30 | Refills: 0 | Status: ACTIVE | COMMUNITY
Start: 2019-03-15

## 2019-03-15 RX ORDER — MELATONIN 3 MG
TABLET ORAL
Refills: 0 | Status: DISCONTINUED | COMMUNITY
End: 2019-03-15

## 2019-03-15 RX ORDER — POTASSIUM CHLORIDE 20 MEQ
20 PACKET (EA) ORAL
Qty: 0 | Refills: 0 | Status: DISCONTINUED | OUTPATIENT
Start: 2019-03-15 | End: 2019-03-15

## 2019-03-15 RX ORDER — ACYCLOVIR 400 MG/1
400 TABLET ORAL TWICE DAILY
Qty: 60 | Refills: 4 | Status: DISCONTINUED | COMMUNITY
Start: 2018-06-29 | End: 2019-03-15

## 2019-03-15 RX ADMIN — FLUCONAZOLE 200 MILLIGRAM(S): 150 TABLET ORAL at 11:32

## 2019-03-15 RX ADMIN — Medication 1 LOZENGE: at 08:31

## 2019-03-15 RX ADMIN — ATOVAQUONE 750 MILLIGRAM(S): 750 SUSPENSION ORAL at 05:22

## 2019-03-15 RX ADMIN — PANTOPRAZOLE SODIUM 40 MILLIGRAM(S): 20 TABLET, DELAYED RELEASE ORAL at 05:21

## 2019-03-15 RX ADMIN — Medication 400 MILLIGRAM(S): at 13:05

## 2019-03-15 RX ADMIN — Medication 1 TABLET(S): at 11:32

## 2019-03-15 RX ADMIN — Medication 5 MILLILITER(S): at 08:32

## 2019-03-15 RX ADMIN — NYSTATIN CREAM 1 APPLICATION(S): 100000 CREAM TOPICAL at 05:23

## 2019-03-15 RX ADMIN — Medication 1 MILLIGRAM(S): at 11:32

## 2019-03-15 RX ADMIN — AMLODIPINE BESYLATE 5 MILLIGRAM(S): 2.5 TABLET ORAL at 05:21

## 2019-03-15 RX ADMIN — Medication 400 MILLIGRAM(S): at 05:21

## 2019-03-15 RX ADMIN — Medication 40 MILLIEQUIVALENT(S): at 10:35

## 2019-03-15 RX ADMIN — Medication 5 MILLILITER(S): at 11:33

## 2019-03-15 RX ADMIN — URSODIOL 300 MILLIGRAM(S): 250 TABLET, FILM COATED ORAL at 08:30

## 2019-03-15 RX ADMIN — Medication 1 LOZENGE: at 11:31

## 2019-03-15 RX ADMIN — Medication 10 MILLILITER(S): at 11:33

## 2019-03-15 RX ADMIN — LORATADINE 10 MILLIGRAM(S): 10 TABLET ORAL at 11:31

## 2019-03-15 RX ADMIN — Medication 10 MILLILITER(S): at 08:29

## 2019-03-15 NOTE — PROGRESS NOTE ADULT - ATTENDING COMMENTS
70 y/o F w/ IgG kappa myeloma admitted for autologous pbsct w/ Melphalan prep (100mg/m2 x 2)   Day +14 s/p HPC transplant;   Engrafted- off zarxio, off abx   Strict I&O, daily weights, prn diuresis   Antiemetics, mouth care, skin care   Mucositis secondary to antineoplastic chemotherapy- continue mouth care, pain control. Improved.  Continue Acyclovir, d/c Cipro prophylaxis;   Transaminitis- now decreasing LFT's- fluconazole at reduced dose   Supplement lytes   VOD prophylaxis.  Imodium prn diarrhea  OOB, ambulate  d/c friday...instruction started 70 y/o F w/ IgG kappa myeloma admitted for autologous pbsct w/ Melphalan prep (100mg/m2 x 2)   Day +15 s/p HPC transplant;   Engrafted- off zarxio, off abx   Strict I&O, daily weights, prn diuresis   Antiemetics, mouth care, skin care   Mucositis secondary to antineoplastic chemotherapy- continue mouth care, pain control. Improved.  Continue Acyclovir, d/c Cipro prophylaxis;   Transaminitis- now decreasing LFT's- fluconazole at reduced dose   Supplement lytes   VOD prophylaxis.  Imodium prn diarrhea  OOB, ambulate  d/c friday...instruction started 70 y/o F w/ IgG kappa myeloma admitted for autologous pbsct w/ Melphalan prep (100mg/m2 x 2)   Day +15 s/p HPC transplant;   Engrafted- off zarxio, off abx   Strict I&O, daily weights, prn diuresis   Antiemetics, mouth care, skin care   Mucositis secondary to antineoplastic chemotherapy- continue mouth care, pain control. Improved.  Continue Acyclovir,  Cipro if fever or dysuria ....f/u urine cx sent..likely contaminent  Transaminitis- now decreasing LFT's- fluconazole at reduced dose   Supplement lytes   VOD prophylaxis.  Imodium prn diarrhea  OOB, ambulate  d/c home...instruction started

## 2019-03-15 NOTE — PROGRESS NOTE ADULT - REASON FOR ADMISSION
autologous stem cell transplant

## 2019-03-15 NOTE — DISCHARGE NOTE NURSING/CASE MANAGEMENT/SOCIAL WORK - NSDCFUADDAPPT_GEN_ALL_CORE_FT
You have a follow up appointment with Dr. Rahman at the [facility] on Thursday, 3/21/19 at 12pm. Please arrive 15 minutes early to have your blood drawn.   You have a follow up appointment with Dr. Rahman at the [facility] on Thursday, 3/28/19 at 12pm. Please arrive 15 minutes early to have your blood drawn.   You have a follow up appointment with Dr. Rahman at the [facility] on Thursday, 4/4/19 at 12pm. Please arrive 15 minutes early to have your blood drawn.

## 2019-03-15 NOTE — DISCHARGE NOTE NURSING/CASE MANAGEMENT/SOCIAL WORK - NSDCDPATPORTLINK_GEN_ALL_CORE
You can access the [facility] Portal, offered by [facility], by registering with the following website: [email or web address]

## 2019-03-15 NOTE — PROGRESS NOTE ADULT - PROVIDER SPECIALTY LIST ADULT
BMT/SCT
Intervent Radiology
BMT/SCT

## 2019-03-15 NOTE — PROGRESS NOTE ADULT - SUBJECTIVE AND OBJECTIVE BOX
HPC Transplant Team                                                      Critical / Counseling Time Provided: 30 minutes  Chief Complaint: Autologous peripheral blood stem cell transplant for treatment of multiple myeloma     S: Patient seen and examined with HPC Transplant Team:   no complaints today   Denies mouth / tongue / throat pain, dyspnea, cough, nausea, vomiting, diarrhea, abdominal pain           O: Vitals:   Vital Signs Last 24 Hrs  T(C): 37 (15 Mar 2019 05:36), Max: 37.7 (14 Mar 2019 17:16)  T(F): 98.6 (15 Mar 2019 05:36), Max: 99.8 (14 Mar 2019 17:16)  HR: 90 (15 Mar 2019 05:36) (90 - 103)  BP: 123/73 (15 Mar 2019 05:36) (114/68 - 123/73)  BP(mean): --  RR: 19 (15 Mar 2019 05:36) (18 - 21)  SpO2: 96% (15 Mar 2019 05:36) (96% - 100%)    Admit weight:   Daily     Daily Weight in k.7 (14 Mar 2019 09:15)    Intake / Output:   03-14 @ 07:01  -  -15 @ 07:00  --------------------------------------------------------  IN: 2109 mL / OUT: 1600 mL / NET: 509 mL          PE:   Oropharynx: very mild erythema posterior oropharynx, no ulcers  Oral Mucositis: +                                                       ndGndrndanddndend:nd nd2nd CVS: (+) S1/S2, RRR  Lungs: CTA B/L  Abdomen: soft, NT/ND, (+) BS mildly hyperactive  Extremities: No edema in B/L LE  Gastric Mucositis: -                                               Grade: n/a  Intestinal Mucositis: -                                             Grade: n/a  Skin: hyperpigmentation 2/2 Kepivance on neck, and upper chest   TLC: C/D/I  Neuro: nonfocal   Pain: denies pain         Labs:   CBC Full  -  ( 15 Mar 2019 07:05 )  WBC Count : 16.6 K/uL  Hemoglobin : 8.9 g/dL  Hematocrit : 27.6 %  Platelet Count - Automated : 86 K/uL  Mean Cell Volume : 94.9 fl  Mean Cell Hemoglobin : 30.6 pg  Mean Cell Hemoglobin Concentration : 32.2 gm/dL  Auto Neutrophil # : x  Auto Lymphocyte # : x  Auto Monocyte # : x  Auto Eosinophil # : x  Auto Basophil # : x  Auto Neutrophil % : x  Auto Lymphocyte % : x  Auto Monocyte % : x  Auto Eosinophil % : x  Auto Basophil % : x                          8.9    16.6  )-----------( 86       ( 15 Mar 2019 07:05 )             27.6     03-14    147<H>  |  111<H>  |  <4<L>  ----------------------------<  85  3.5   |  24  |  0.52    Ca    8.7      14 Mar 2019 06:57  Phos  4.1     -  Mg     1.9         TPro  5.1<L>  /  Alb  3.1<L>  /  TBili  0.1<L>  /  DBili  x   /  AST  55<H>  /  ALT  76<H>  /  AlkPhos  124<H>      PT/INR - ( 14 Mar 2019 06:57 )   PT: 12.4 sec;   INR: 1.08 ratio         PTT - ( 14 Mar 2019 06:57 )  PTT:33.2 sec  LIVER FUNCTIONS - ( 14 Mar 2019 06:57 )  Alb: 3.1 g/dL / Pro: 5.1 g/dL / ALK PHOS: 124 U/L / ALT: 76 U/L / AST: 55 U/L / GGT: x                   Karnofsky / Lansky Scale:   GVHD:   Skin:   Liver:   Gut:   Overall Grade:       Cultures:         Radiology:       Meds:   Antimicrobials:   acyclovir   Oral Tab/Cap 400 milliGRAM(s) Oral every 8 hours  atovaquone Suspension 750 milliGRAM(s) Oral every 12 hours  clotrimazole Lozenge 1 Lozenge Oral five times a day  fluconAZOLE   Tablet 200 milliGRAM(s) Oral daily      Heme / Onc:       GI:  docusate sodium 100 milliGRAM(s) Oral three times a day PRN  loperamide 2 milliGRAM(s) Oral every 3 hours PRN  pantoprazole    Tablet 40 milliGRAM(s) Oral before breakfast  senna 1 Tablet(s) Oral at bedtime PRN  sodium bicarbonate Mouth Rinse 10 milliLiter(s) Swish and Spit five times a day  ursodiol Capsule 300 milliGRAM(s) Oral two times a day with meals      Cardiovascular:   amLODIPine   Tablet 5 milliGRAM(s) Oral daily  furosemide   Injectable 20 milliGRAM(s) IV Push every 24 hours PRN      Immunologic:       Other medications:   acetaminophen   Tablet .. 650 milliGRAM(s) Oral every 6 hours  Biotene Dry Mouth Oral Rinse 5 milliLiter(s) Swish and Spit five times a day  folic acid 1 milliGRAM(s) Oral daily  lidocaine/prilocaine Cream 1 Application(s) Topical daily  loratadine 10 milliGRAM(s) Oral daily  multivitamin 1 Tablet(s) Oral daily  nystatin Powder 1 Application(s) Topical three times a day  sodium chloride 0.9%. 1000 milliLiter(s) IV Continuous <Continuous>      PRN:   acetaminophen   Tablet .. 650 milliGRAM(s) Oral every 6 hours PRN  benzocaine 15 mG/menthol 3.6 mG Lozenge 1 Lozenge Oral every 4 hours PRN  diphenhydrAMINE   Injectable 25 milliGRAM(s) IV Push every 4 hours PRN  docusate sodium 100 milliGRAM(s) Oral three times a day PRN  furosemide   Injectable 20 milliGRAM(s) IV Push every 24 hours PRN  HYDROmorphone  Injectable 0.5 milliGRAM(s) IV Push every 4 hours PRN  loperamide 2 milliGRAM(s) Oral every 3 hours PRN  metoclopramide Injectable 10 milliGRAM(s) IV Push every 6 hours PRN  ondansetron Injectable 8 milliGRAM(s) IV Push every 8 hours PRN  senna 1 Tablet(s) Oral at bedtime PRN     A/P:  68 year old female with a history of IgG kappa Multiple Myeloma.  Status Post Autologous PBSCT,  Day +14  Transaminitis resolved - changed mycamine to fluconazole  Engrafted - off zarxio, off abx   Diarrhea improved- CDiff negative, encourage Imodium prn  Mucositis secondary to antineoplastic chemotherapy- continue mouth care, pain control  LG fever overnight, likely 2/2 engraftment.   Decrease Fluconazole to 200 mg PO daily 2/2 recurrent mild transaminitis.    1. Infectious Disease:   acyclovir   Oral Tab/Cap 400 milliGRAM(s) Oral every 8 hours  clotrimazole Lozenge 1 Lozenge Oral five times a day  fluconAZOLE   Tablet 200 milliGRAM(s) Oral daily    2. VOD Prophylaxis: Actigall, Glutamine, Heparin (dosed at 100 units / kg / day)     3. GI Prophylaxis:   pantoprazole    Tablet 40 milliGRAM(s) Oral before breakfast    4. Mouthcare - NS / NaHCO3 rinses, Mycelex, Caphosol; Skin care     5. GVHD prophylaxis - n/a     6. Transfuse & replete electrolytes prn     7. IV hydration, daily weights, strict I&O, prn diuresis     8. PO intake as tolerated, nutrition follow up as needed, MVI, folic acid     9. Antiemetics, anti-diarrhea medications:   loperamide 2 milliGRAM(s) Oral every 3 hours PRN  metoclopramide Injectable 10 milliGRAM(s) IV Push every 6 hours PRN  ondansetron Injectable 8 milliGRAM(s) IV Push every 8 hours PRN    10. OOB as tolerated, physical therapy consult if needed   11. Monitor coags / fibrinogen 2x week, vitamin K as needed     12. Monitor closely for clinical changes, monitor for fevers     13. Emotional support provided, plan of care discussed and questions addressed     14. Patient education done regarding plan of care, restrictions and discharge planning     15. Continue regular social work input     I have written the above note for Dr. Kumari  who performed service with me in the room.   Rony Murguia NP-C ([phone])    I have seen and examined patient with NP, I agree with above note as scribed. HPC Transplant Team                                                      Critical / Counseling Time Provided: 30 minutes    Chief Complaint: Autologous peripheral blood stem cell transplant for treatment of multiple myeloma       S: Patient seen and examined with HPC Transplant Team:       Denies mouth / tongue / throat pain, dyspnea, cough, nausea, vomiting, diarrhea, abdominal pain           O: Vitals:   Vital Signs Last 24 Hrs  T(C): 37 (15 Mar 2019 05:36), Max: 37.7 (14 Mar 2019 17:16)  T(F): 98.6 (15 Mar 2019 05:36), Max: 99.8 (14 Mar 2019 17:16)  HR: 90 (15 Mar 2019 05:36) (90 - 103)  BP: 123/73 (15 Mar 2019 05:36) (114/68 - 123/73)  BP(mean): --  RR: 19 (15 Mar 2019 05:36) (18 - 21)  SpO2: 96% (15 Mar 2019 05:36) (96% - 100%)    Admit weight: 79.2 kG  Daily Weight in k.7 (14 Mar 2019 09:15)  Daily weight:     Intake / Output:   03-14 @ 07:01  -  03-15 @ 07:00  --------------------------------------------------------  IN: 2109 mL / OUT: 1600 mL / NET: 509 mL      PE:   Oropharynx: very mild erythema posterior oropharynx, no ulcers  Oral Mucositis: +                                                       ndGndrndanddndend:nd nd2nd CVS: (+) S1/S2, RRR  Lungs: CTA B/L  Abdomen: soft, NT/ND, (+) BS mildly hyperactive  Extremities: No edema in B/L LE  Gastric Mucositis: -                                               Grade: n/a  Intestinal Mucositis: -                                             Grade: n/a  Skin: hyperpigmentation 2/2 Kepivance on neck, and upper chest   TLC: C/D/I  Neuro: nonfocal   Pain: denies pain         Labs:   CBC Full  -  ( 15 Mar 2019 07:05 )  WBC Count : 16.6 K/uL  Hemoglobin : 8.9 g/dL  Hematocrit : 27.6 %  Platelet Count - Automated : 86 K/uL  Mean Cell Volume : 94.9 fl  Mean Cell Hemoglobin : 30.6 pg  Mean Cell Hemoglobin Concentration : 32.2 gm/dL  Auto Neutrophil # : x  Auto Lymphocyte # : x  Auto Monocyte # : x  Auto Eosinophil # : x  Auto Basophil # : x  Auto Neutrophil % : x  Auto Lymphocyte % : x  Auto Monocyte % : x  Auto Eosinophil % : x  Auto Basophil % : x                          8.9    16.6  )-----------( 86       ( 15 Mar 2019 07:05 )             27.6         147<H>  |  111<H>  |  <4<L>  ----------------------------<  85  3.5   |  24  |  0.52    Ca    8.7      14 Mar 2019 06:57  Phos  4.1       Mg     1.9         TPro  5.1<L>  /  Alb  3.1<L>  /  TBili  0.1<L>  /  DBili  x   /  AST  55<H>  /  ALT  76<H>  /  AlkPhos  124<H>      PT/INR - ( 14 Mar 2019 06:57 )   PT: 12.4 sec;   INR: 1.08 ratio         PTT - ( 14 Mar 2019 06:57 )  PTT:33.2 sec  LIVER FUNCTIONS - ( 14 Mar 2019 06:57 )  Alb: 3.1 g/dL / Pro: 5.1 g/dL / ALK PHOS: 124 U/L / ALT: 76 U/L / AST: 55 U/L / GGT: x           Cultures:   Culture Results: urine   50,000 - 99,000 CFU/mL Coag Negative Staphylococcus "Susceptibilities not  performed" (19 @ 13:15)    Culture Results: blood   No growth to date. (19 @ 12:23)    Culture Results: blood   No growth to date. (19 @ 12:23)    Radiology:   EXAM:  XR CHEST PORTABLE URGENT 1V                        PROCEDURE DATE:  2019    IMPRESSION: Clear lungs.      Meds:   Antimicrobials:   acyclovir   Oral Tab/Cap 400 milliGRAM(s) Oral every 8 hours  atovaquone Suspension 750 milliGRAM(s) Oral every 12 hours  clotrimazole Lozenge 1 Lozenge Oral five times a day  fluconAZOLE   Tablet 200 milliGRAM(s) Oral daily      Heme / Onc:       GI:  docusate sodium 100 milliGRAM(s) Oral three times a day PRN  loperamide 2 milliGRAM(s) Oral every 3 hours PRN  pantoprazole    Tablet 40 milliGRAM(s) Oral before breakfast  senna 1 Tablet(s) Oral at bedtime PRN  sodium bicarbonate Mouth Rinse 10 milliLiter(s) Swish and Spit five times a day  ursodiol Capsule 300 milliGRAM(s) Oral two times a day with meals      Cardiovascular:   amLODIPine   Tablet 5 milliGRAM(s) Oral daily  furosemide   Injectable 20 milliGRAM(s) IV Push every 24 hours PRN      Immunologic:       Other medications:   acetaminophen   Tablet .. 650 milliGRAM(s) Oral every 6 hours  Biotene Dry Mouth Oral Rinse 5 milliLiter(s) Swish and Spit five times a day  folic acid 1 milliGRAM(s) Oral daily  lidocaine/prilocaine Cream 1 Application(s) Topical daily  loratadine 10 milliGRAM(s) Oral daily  multivitamin 1 Tablet(s) Oral daily  nystatin Powder 1 Application(s) Topical three times a day  sodium chloride 0.9%. 1000 milliLiter(s) IV Continuous <Continuous>      PRN:   acetaminophen   Tablet .. 650 milliGRAM(s) Oral every 6 hours PRN  benzocaine 15 mG/menthol 3.6 mG Lozenge 1 Lozenge Oral every 4 hours PRN  diphenhydrAMINE   Injectable 25 milliGRAM(s) IV Push every 4 hours PRN  docusate sodium 100 milliGRAM(s) Oral three times a day PRN  furosemide   Injectable 20 milliGRAM(s) IV Push every 24 hours PRN  HYDROmorphone  Injectable 0.5 milliGRAM(s) IV Push every 4 hours PRN  loperamide 2 milliGRAM(s) Oral every 3 hours PRN  metoclopramide Injectable 10 milliGRAM(s) IV Push every 6 hours PRN  ondansetron Injectable 8 milliGRAM(s) IV Push every 8 hours PRN  senna 1 Tablet(s) Oral at bedtime PRN     A/P:  68 year old female with a history of IgG kappa Multiple Myeloma.  Status Post Autologous PBSCT,  Day +15  Transaminitis resolved - changed mycamine to fluconazole reduced dose   Engrafted - off zarxio, off abx   Diarrhea improved- CDiff negative, encourage Imodium prn  Mucositis secondary to antineoplastic chemotherapy- continue mouth care, pain control  Discharge planning     1. Infectious Disease:   acyclovir   Oral Tab/Cap 400 milliGRAM(s) Oral every 8 hours  clotrimazole Lozenge 1 Lozenge Oral five times a day  fluconAZOLE   Tablet 200 milliGRAM(s) Oral daily    2. VOD Prophylaxis: Actigall, Glutamine    3. GI Prophylaxis:   pantoprazole    Tablet 40 milliGRAM(s) Oral before breakfast    4. Mouthcare - NS / NaHCO3 rinses, Mycelex, Caphosol; Skin care     5. GVHD prophylaxis - n/a     6. Transfuse & replete electrolytes prn   KCL 20mEq IV x 2 doses   KCl 40mEq po x 1    7. IV hydration, daily weights, strict I&O, prn diuresis     8. PO intake as tolerated, nutrition follow up as needed, MVI, folic acid     9. Antiemetics, anti-diarrhea medications:   loperamide 2 milliGRAM(s) Oral every 3 hours PRN  metoclopramide Injectable 10 milliGRAM(s) IV Push every 6 hours PRN  ondansetron Injectable 8 milliGRAM(s) IV Push every 8 hours PRN    10. OOB as tolerated, physical therapy consult if needed     11. Monitor coags / fibrinogen 2x week, vitamin K as needed     12. Monitor closely for clinical changes, monitor for fevers     13. Emotional support provided, plan of care discussed and questions addressed     14. Patient education done regarding plan of care, restrictions and discharge planning     15. Continue regular social work input     I have written the above note for Dr. Kumari  who performed service with me in the room.   Rony Murguia NP-C ([phone])    I have seen and examined patient with NP, I agree with above note as scribed. HPC Transplant Team                                                      Critical / Counseling Time Provided: 30 minutes    Chief Complaint: Autologous peripheral blood stem cell transplant for treatment of multiple myeloma       S: Patient seen and examined with HPC Transplant Team:     No complaint   Denies mouth / tongue / throat pain, dyspnea, cough, nausea, vomiting, diarrhea, abdominal pain           O: Vitals:   Vital Signs Last 24 Hrs  T(C): 37 (15 Mar 2019 05:36), Max: 37.7 (14 Mar 2019 17:16)  T(F): 98.6 (15 Mar 2019 05:36), Max: 99.8 (14 Mar 2019 17:16)  HR: 90 (15 Mar 2019 05:36) (90 - 103)  BP: 123/73 (15 Mar 2019 05:36) (114/68 - 123/73)  BP(mean): --  RR: 19 (15 Mar 2019 05:36) (18 - 21)  SpO2: 96% (15 Mar 2019 05:36) (96% - 100%)    Admit weight: 79.2 kG  Daily Weight in k.7 (14 Mar 2019 09:15)  Daily weight: 76.9    Intake / Output:   03-14 @ 07:01  -  03-15 @ 07:00  --------------------------------------------------------  IN: 2109 mL / OUT: 1600 mL / NET: 509 mL      PE:   Oropharynx: very mild erythema posterior oropharynx, no ulcers  Oral Mucositis: +                                                       ndGndrndanddndend:nd nd2nd CVS: (+) S1/S2, RRR  Lungs: CTA B/L  Abdomen: soft, NT/ND, (+) BS mildly hyperactive  Extremities: No edema in B/L LE  Gastric Mucositis: -                                               Grade: n/a  Intestinal Mucositis: -                                             Grade: n/a  Skin: hyperpigmentation 2/2 Kepivance on neck, and upper chest   TLC: C/D/I  Neuro: nonfocal   Pain: denies pain         Labs:   CBC Full  -  ( 15 Mar 2019 07:05 )  WBC Count : 16.6 K/uL  Hemoglobin : 8.9 g/dL  Hematocrit : 27.6 %  Platelet Count - Automated : 86 K/uL  Mean Cell Volume : 94.9 fl  Mean Cell Hemoglobin : 30.6 pg  Mean Cell Hemoglobin Concentration : 32.2 gm/dL  Auto Neutrophil # : x  Auto Lymphocyte # : x  Auto Monocyte # : x  Auto Eosinophil # : x  Auto Basophil # : x  Auto Neutrophil % : x  Auto Lymphocyte % : x  Auto Monocyte % : x  Auto Eosinophil % : x  Auto Basophil % : x                          8.9    16.6  )-----------( 86       ( 15 Mar 2019 07:05 )             27.6         147<H>  |  111<H>  |  <4<L>  ----------------------------<  85  3.5   |  24  |  0.52    Ca    8.7      14 Mar 2019 06:57  Phos  4.1       Mg     1.9         TPro  5.1<L>  /  Alb  3.1<L>  /  TBili  0.1<L>  /  DBili  x   /  AST  55<H>  /  ALT  76<H>  /  AlkPhos  124<H>      PT/INR - ( 14 Mar 2019 06:57 )   PT: 12.4 sec;   INR: 1.08 ratio         PTT - ( 14 Mar 2019 06:57 )  PTT:33.2 sec  LIVER FUNCTIONS - ( 14 Mar 2019 06:57 )  Alb: 3.1 g/dL / Pro: 5.1 g/dL / ALK PHOS: 124 U/L / ALT: 76 U/L / AST: 55 U/L / GGT: x           Cultures:   Culture Results: urine   50,000 - 99,000 CFU/mL Coag Negative Staphylococcus "Susceptibilities not  performed" (19 @ 13:15)    Culture Results: blood   No growth to date. (19 @ 12:23)    Culture Results: blood   No growth to date. (19 @ 12:23)    Radiology:   EXAM:  XR CHEST PORTABLE URGENT 1V                        PROCEDURE DATE:  2019    IMPRESSION: Clear lungs.      Meds:   Antimicrobials:   acyclovir   Oral Tab/Cap 400 milliGRAM(s) Oral every 8 hours  atovaquone Suspension 750 milliGRAM(s) Oral every 12 hours  clotrimazole Lozenge 1 Lozenge Oral five times a day  fluconAZOLE   Tablet 200 milliGRAM(s) Oral daily      GI:  docusate sodium 100 milliGRAM(s) Oral three times a day PRN  loperamide 2 milliGRAM(s) Oral every 3 hours PRN  pantoprazole    Tablet 40 milliGRAM(s) Oral before breakfast  senna 1 Tablet(s) Oral at bedtime PRN  sodium bicarbonate Mouth Rinse 10 milliLiter(s) Swish and Spit five times a day  ursodiol Capsule 300 milliGRAM(s) Oral two times a day with meals      Cardiovascular:   amLODIPine   Tablet 5 milliGRAM(s) Oral daily  furosemide   Injectable 20 milliGRAM(s) IV Push every 24 hours PRN      Other medications:   acetaminophen   Tablet .. 650 milliGRAM(s) Oral every 6 hours  Biotene Dry Mouth Oral Rinse 5 milliLiter(s) Swish and Spit five times a day  folic acid 1 milliGRAM(s) Oral daily  lidocaine/prilocaine Cream 1 Application(s) Topical daily  loratadine 10 milliGRAM(s) Oral daily  multivitamin 1 Tablet(s) Oral daily  nystatin Powder 1 Application(s) Topical three times a day  sodium chloride 0.9%. 1000 milliLiter(s) IV Continuous <Continuous>      PRN:   acetaminophen   Tablet .. 650 milliGRAM(s) Oral every 6 hours PRN  benzocaine 15 mG/menthol 3.6 mG Lozenge 1 Lozenge Oral every 4 hours PRN  diphenhydrAMINE   Injectable 25 milliGRAM(s) IV Push every 4 hours PRN  docusate sodium 100 milliGRAM(s) Oral three times a day PRN  furosemide   Injectable 20 milliGRAM(s) IV Push every 24 hours PRN  HYDROmorphone  Injectable 0.5 milliGRAM(s) IV Push every 4 hours PRN  loperamide 2 milliGRAM(s) Oral every 3 hours PRN  metoclopramide Injectable 10 milliGRAM(s) IV Push every 6 hours PRN  ondansetron Injectable 8 milliGRAM(s) IV Push every 8 hours PRN  senna 1 Tablet(s) Oral at bedtime PRN     A/P:  68 year old female with a history of IgG kappa Multiple Myeloma.  Status Post Autologous PBSCT,  Day +15  Transaminitis resolved - changed mycamine to fluconazole reduced dose   Engrafted - off zarxio, off abx   Diarrhea resolved- CDiff negative, encourage Imodium prn  Mucositis secondary to antineoplastic chemotherapy, resolved- continue mouth care, pain control  Discharge today    1. Infectious Disease:   acyclovir   Oral Tab/Cap 400 milliGRAM(s) Oral every 8 hours  atovaquone Suspension 750 milliGRAM(s) Oral every 12 hours  clotrimazole Lozenge 1 Lozenge Oral five times a day  fluconAZOLE   Tablet 200 milliGRAM(s) Oral daily  Urine Culture with 50-99K CNS, asymptomatic. Cipro 250 mg po BID x 5 days if pt becomes symptomatic  Repeat UA and Urine culture today and follow up result as outpatient    2. VOD Prophylaxis: Actigall, Glutamine    3. GI Prophylaxis:   pantoprazole    Tablet 40 milliGRAM(s) Oral before breakfast    4. Mouthcare - NS / NaHCO3 rinses, Mycelex, Biotene; Skin care     5. GVHD prophylaxis - n/a     6. Transfuse & replete electrolytes prn   Hypokalemia: KCL 20mEq IV x 2 doses   KCl 40mEq po x 1    7. IV hydration, daily weights, strict I&O, prn diuresis     8. PO intake as tolerated, nutrition follow up as needed, MVI, folic acid     9. Antiemetics, anti-diarrhea medications:   loperamide 2 milliGRAM(s) Oral every 3 hours PRN  metoclopramide Injectable 10 milliGRAM(s) IV Push every 6 hours PRN  ondansetron Injectable 8 milliGRAM(s) IV Push every 8 hours PRN    10. OOB as tolerated, physical therapy consult if needed     11. Monitor coags / fibrinogen 2x week, vitamin K as needed     12. Monitor closely for clinical changes, monitor for fevers     13. Emotional support provided, plan of care discussed and questions addressed     14. Patient education done regarding plan of care, restrictions and discharge planning     15. Continue regular social work input     I have written the above note for Dr. Kumari  who performed service with me in the room.   Rony Murguia NP-C ([phone])    I have seen and examined patient with NP, I agree with above note as scribed.

## 2019-03-16 LAB
CULTURE RESULTS: SIGNIFICANT CHANGE UP
SPECIMEN SOURCE: SIGNIFICANT CHANGE UP

## 2019-03-18 LAB
CULTURE RESULTS: SIGNIFICANT CHANGE UP
CULTURE RESULTS: SIGNIFICANT CHANGE UP
SPECIMEN SOURCE: SIGNIFICANT CHANGE UP
SPECIMEN SOURCE: SIGNIFICANT CHANGE UP

## 2019-03-19 ENCOUNTER — TRANSCRIPTION ENCOUNTER (OUTPATIENT)
Age: 69
End: 2019-03-19

## 2019-03-19 ENCOUNTER — INBOUND DOCUMENT (OUTPATIENT)
Age: 69
End: 2019-03-19

## 2019-03-21 ENCOUNTER — APPOINTMENT (OUTPATIENT)
Dept: HEMATOLOGY ONCOLOGY | Facility: CLINIC | Age: 69
End: 2019-03-21
Payer: COMMERCIAL

## 2019-03-21 ENCOUNTER — RESULT REVIEW (OUTPATIENT)
Age: 69
End: 2019-03-21

## 2019-03-21 VITALS
BODY MASS INDEX: 29.8 KG/M2 | HEART RATE: 113 BPM | SYSTOLIC BLOOD PRESSURE: 107 MMHG | TEMPERATURE: 98.8 F | OXYGEN SATURATION: 98 % | WEIGHT: 168.21 LBS | DIASTOLIC BLOOD PRESSURE: 74 MMHG | RESPIRATION RATE: 17 BRPM

## 2019-03-21 DIAGNOSIS — Z01.818 ENCOUNTER FOR OTHER PREPROCEDURAL EXAMINATION: ICD-10-CM

## 2019-03-21 LAB
BASOPHILS # BLD AUTO: 0.1 K/UL — SIGNIFICANT CHANGE UP (ref 0–0.2)
BASOPHILS NFR BLD AUTO: 1 % — SIGNIFICANT CHANGE UP (ref 0–2)
BLASTS # FLD: 1 % — HIGH (ref 0–0)
HCT VFR BLD CALC: 30 % — LOW (ref 34.5–45)
HGB BLD-MCNC: 10.5 G/DL — LOW (ref 11.5–15.5)
LYMPHOCYTES # BLD AUTO: 1.8 K/UL — SIGNIFICANT CHANGE UP (ref 1–3.3)
LYMPHOCYTES # BLD AUTO: 28 % — SIGNIFICANT CHANGE UP (ref 13–44)
MCHC RBC-ENTMCNC: 32.2 PG — SIGNIFICANT CHANGE UP (ref 27–34)
MCHC RBC-ENTMCNC: 35 G/DL — SIGNIFICANT CHANGE UP (ref 32–36)
MCV RBC AUTO: 91.9 FL — SIGNIFICANT CHANGE UP (ref 80–100)
METAMYELOCYTES # FLD: 4 % — HIGH (ref 0–0)
MONOCYTES # BLD AUTO: 1.2 K/UL — HIGH (ref 0–0.9)
MONOCYTES NFR BLD AUTO: 26 % — HIGH (ref 2–14)
NEUTROPHILS # BLD AUTO: 2.6 K/UL — SIGNIFICANT CHANGE UP (ref 1.8–7.4)
NEUTROPHILS NFR BLD AUTO: 40 % — LOW (ref 43–77)
PLAT MORPH BLD: NORMAL — SIGNIFICANT CHANGE UP
PLATELET # BLD AUTO: 375 K/UL — SIGNIFICANT CHANGE UP (ref 150–400)
RBC # BLD: 3.26 M/UL — LOW (ref 3.8–5.2)
RBC # FLD: 14.1 % — SIGNIFICANT CHANGE UP (ref 10.3–14.5)
RBC BLD AUTO: SIGNIFICANT CHANGE UP
WBC # BLD: 5.7 K/UL — SIGNIFICANT CHANGE UP (ref 3.8–10.5)
WBC # FLD AUTO: 5.7 K/UL — SIGNIFICANT CHANGE UP (ref 3.8–10.5)

## 2019-03-21 PROCEDURE — 99215 OFFICE O/P EST HI 40 MIN: CPT

## 2019-03-22 LAB
ALBUMIN SERPL ELPH-MCNC: 3.9 G/DL
ALP BLD-CCNC: 98 U/L
ALT SERPL-CCNC: 66 U/L
ANION GAP SERPL CALC-SCNC: 13 MMOL/L
AST SERPL-CCNC: 46 U/L
BILIRUB SERPL-MCNC: <0.2 MG/DL
BUN SERPL-MCNC: 9 MG/DL
CALCIUM SERPL-MCNC: 9.6 MG/DL
CHLORIDE SERPL-SCNC: 108 MMOL/L
CO2 SERPL-SCNC: 24 MMOL/L
CREAT SERPL-MCNC: 0.58 MG/DL
GLUCOSE SERPL-MCNC: 109 MG/DL
LDH SERPL-CCNC: 292 U/L
MAGNESIUM SERPL-MCNC: 1.8 MG/DL
POTASSIUM SERPL-SCNC: 3.9 MMOL/L
PROT SERPL-MCNC: 5.6 G/DL
SODIUM SERPL-SCNC: 145 MMOL/L

## 2019-03-24 ENCOUNTER — RECORD ABSTRACTING (OUTPATIENT)
Age: 69
End: 2019-03-24

## 2019-03-28 ENCOUNTER — APPOINTMENT (OUTPATIENT)
Dept: HEMATOLOGY ONCOLOGY | Facility: CLINIC | Age: 69
End: 2019-03-28
Payer: COMMERCIAL

## 2019-03-28 ENCOUNTER — RESULT REVIEW (OUTPATIENT)
Age: 69
End: 2019-03-28

## 2019-03-28 VITALS
RESPIRATION RATE: 16 BRPM | BODY MASS INDEX: 29.8 KG/M2 | WEIGHT: 168.21 LBS | TEMPERATURE: 98.4 F | DIASTOLIC BLOOD PRESSURE: 72 MMHG | SYSTOLIC BLOOD PRESSURE: 113 MMHG | HEART RATE: 106 BPM | OXYGEN SATURATION: 100 %

## 2019-03-28 DIAGNOSIS — M25.559 PAIN IN UNSPECIFIED HIP: ICD-10-CM

## 2019-03-28 LAB
HCT VFR BLD CALC: 30.9 % — LOW (ref 34.5–45)
HGB BLD-MCNC: 10.6 G/DL — LOW (ref 11.5–15.5)
MCHC RBC-ENTMCNC: 31.6 PG — SIGNIFICANT CHANGE UP (ref 27–34)
MCHC RBC-ENTMCNC: 34.4 G/DL — SIGNIFICANT CHANGE UP (ref 32–36)
MCV RBC AUTO: 91.8 FL — SIGNIFICANT CHANGE UP (ref 80–100)
PLATELET # BLD AUTO: 339 K/UL — SIGNIFICANT CHANGE UP (ref 150–400)
RBC # BLD: 3.37 M/UL — LOW (ref 3.8–5.2)
RBC # FLD: 13.8 % — SIGNIFICANT CHANGE UP (ref 10.3–14.5)
WBC # BLD: 8.7 K/UL — SIGNIFICANT CHANGE UP (ref 3.8–10.5)
WBC # FLD AUTO: 8.7 K/UL — SIGNIFICANT CHANGE UP (ref 3.8–10.5)

## 2019-03-28 PROCEDURE — 99215 OFFICE O/P EST HI 40 MIN: CPT

## 2019-03-28 RX ORDER — CIPROFLOXACIN HYDROCHLORIDE 250 MG/1
250 TABLET, FILM COATED ORAL
Qty: 20 | Refills: 0 | Status: DISCONTINUED | COMMUNITY
Start: 2019-01-30 | End: 2019-03-28

## 2019-03-30 LAB
ALBUMIN SERPL ELPH-MCNC: 3.9 G/DL
ALP BLD-CCNC: 88 U/L
ALT SERPL-CCNC: 59 U/L
ANION GAP SERPL CALC-SCNC: 13 MMOL/L
AST SERPL-CCNC: 53 U/L
BILIRUB SERPL-MCNC: 0.2 MG/DL
BUN SERPL-MCNC: 10 MG/DL
CALCIUM SERPL-MCNC: 9.4 MG/DL
CHLORIDE SERPL-SCNC: 109 MMOL/L
CO2 SERPL-SCNC: 24 MMOL/L
CREAT SERPL-MCNC: 0.54 MG/DL
GLUCOSE SERPL-MCNC: 80 MG/DL
LDH SERPL-CCNC: 332 U/L
MAGNESIUM SERPL-MCNC: 1.7 MG/DL
POTASSIUM SERPL-SCNC: 3.7 MMOL/L
PROT SERPL-MCNC: 5.9 G/DL
SODIUM SERPL-SCNC: 146 MMOL/L

## 2019-04-03 NOTE — HISTORY OF PRESENT ILLNESS
[de-identified] : Ms. Calero is a 69 year old female who was initially referred for monoclonal gammopathy.\par She was noted to have proteinuria and then had a 24 hour urine protein which showed non-nephrotic range proteinuria of 510 mg/24 hours. She was referred to Dr. Lesa Rendon of nephrology. SPEP showed faint M-spike 0.2 g/dL in gammaglobulin region, immunofixation showed this to be a IgG kappa monoclonal protein. UPEP showed 2 monoclonal protein bands, 61%, and 0.7%. \par \par Subsequent work up:\par 6/19/18 M-protein 0.2 g/dL, Kappa FLC 71, lambda FLC 0.36, FLC ratio 198.61\par , Beta 2 microglobulin 1.5 mg/L\par \par She had a bone marrow biopsy on 6/22/18. Pathology: plasma cell neoplasm, plasma cells comprise 20-25% of marrow cells. Trilineage hematopoiesis present with maturation, increased iron stores. Congo red stain negative for amyloid. Karyotype 46, XX. FISH panel - positive for CCND1/IGH fusion - a/w favorable prognosis. PET CT (7/12/18) did not show any bone lesions. Began VRd on 7/19/18. \par \par On C4 of VRd. FLC ratio initially decreasing -->198 --> 126 --> 67, however has increased to 115 with urine studies positive for Bence Brooks protein. For refractory disease, she was switched to KRd for a deeper response.\par \par  [de-identified] : Since initial consult visit on 11/26/18, the patient is currently on cycle 4 KRd(started 1/2/19), with day 21 of Revlimid completed on 1/22/19. She underwent pre-transplant testing on 12/21/18. She has moderate fatigue and good appetite. She denies fever, shortness of breath, abdominal pain. Today, I again did comprehensive review of consent forms and after all questions addressed, the patient signed consent forms.\par \par On 2/11/19, patient presents for a pre stem cell collection visit. Anahi received cytoxan on 2/5/19 followed by 12 mg of neulasta on 2/6/19. Overall patient is well and offers no acute concerns today. Denies fever, chills, nausea, vomiting or diarrhea. Denies SOB, chest pain or B/L LE edema. Patient is currently on ciprofloxacin for prophylaxis. Denies any pain at this time. \par \par On 2/13/19 visit, patient presents for a pre stem cell collection visit. Patient offers no acute concerns. Currently taking ciprofloxacin 250 mg BID for 10 days as prophylaxis. Denies fever, chills, nausea, vomiting,diarrhea, rash or dysuria. Denies SOB, chest pain or B/L LE edema. Currently not on any blood thinners. \par \par On 2/21/19 visit, patient presents for a pre admission visit. Tolerated stem cell mobilization and is scheduled for kepivance today, tomorrow and on 2/23/19. Completed ciprofloxacin. Denies fever, chills, nausea, vomiting, diarrhea, rash, mouth sores or dysuria. Denies SOB, chest pain or B/L LE edema or pain. \par \par Since office visit on 2/21/19, patient was admitted to [facility]MTU on 2/25/19 and received Melphalan 100 mg/m2 IV x 2 consecutive days followed by autoPSCT(10.04 x 10^6/kg) on 2/28/19. Hospital course complicated by GI mucositis with diarrhea managed with Imodium; stool C. diff- negative. Engraftment(white cells) noted on 3/12/19. Repeat UA and culture both negative on 3/15/19. \par \par Since discharge to home on 3/15/19, the patient has moderate fatigue, fair appetite with slow improvement. She denies fever, chills, shortness of breath, diarrhea. \par \par Since last office visit on 3/21/19, the patient has moderate fatigue, and fair- good appetite. She has dry skin and on face she notes patchy rash. She has been using hydrocortisone cream 1% OTC during past week for 3 days. She denies fever, shortness of breath, diarrhea.

## 2019-04-03 NOTE — ASSESSMENT
[FreeTextEntry1] : 1) IgG kappa myeloma, s/p KRd, followed by autologous peripheral stem cell transplant on 2/28/19\par Plan- \par Continue current medications and restrictions as discussed prior to discharge from BMTU\par Continue Acyclovir, Mepron, Diflucan prophylaxis\par Continue MVI, Folate, PPI\par Continue antiemetics(Zofran, Reglan) as needed.\par Eat small frequent meals several times per day; avoid spicy and acidic foods\par Moisturizing cream to the skin several times per day\par Await lab results which include CMP, LDH, Mg\par Call immediately if fevers, chills, symptoms of infection. \par \par RTC in 1 week.\par \par

## 2019-04-03 NOTE — HISTORY OF PRESENT ILLNESS
[de-identified] : Ms. Calero is a 69 year old female who was initially referred for monoclonal gammopathy.\par She was noted to have proteinuria and then had a 24 hour urine protein which showed non-nephrotic range proteinuria of 510 mg/24 hours. She was referred to Dr. Lesa Rendon of nephrology. SPEP showed faint M-spike 0.2 g/dL in gammaglobulin region, immunofixation showed this to be a IgG kappa monoclonal protein. UPEP showed 2 monoclonal protein bands, 61%, and 0.7%. \par \par Subsequent work up:\par 6/19/18 M-protein 0.2 g/dL, Kappa FLC 71, lambda FLC 0.36, FLC ratio 198.61\par , Beta 2 microglobulin 1.5 mg/L\par \par She had a bone marrow biopsy on 6/22/18. Pathology: plasma cell neoplasm, plasma cells comprise 20-25% of marrow cells. Trilineage hematopoiesis present with maturation, increased iron stores. Congo red stain negative for amyloid. Karyotype 46, XX. FISH panel - positive for CCND1/IGH fusion - a/w favorable prognosis. PET CT (7/12/18) did not show any bone lesions. Began VRd on 7/19/18. \par \par On C4 of VRd. FLC ratio initially decreasing -->198 --> 126 --> 67, however has increased to 115 with urine studies positive for Bence Brooks protein. For refractory disease, she was switched to KRd for a deeper response.\par \par  [de-identified] : Since initial consult visit on 11/26/18, the patient is currently on cycle 4 KRd(started 1/2/19), with day 21 of Revlimid completed on 1/22/19. She underwent pre-transplant testing on 12/21/18. She has moderate fatigue and good appetite. She denies fever, shortness of breath, abdominal pain. Today, I again did comprehensive review of consent forms and after all questions addressed, the patient signed consent forms.\par \par On 2/11/19, patient presents for a pre stem cell collection visit. Anahi received cytoxan on 2/5/19 followed by 12 mg of neulasta on 2/6/19. Overall patient is well and offers no acute concerns today. Denies fever, chills, nausea, vomiting or diarrhea. Denies SOB, chest pain or B/L LE edema. Patient is currently on ciprofloxacin for prophylaxis. Denies any pain at this time. \par \par On 2/13/19 visit, patient presents for a pre stem cell collection visit. Patient offers no acute concerns. Currently taking ciprofloxacin 250 mg BID for 10 days as prophylaxis. Denies fever, chills, nausea, vomiting,diarrhea, rash or dysuria. Denies SOB, chest pain or B/L LE edema. Currently not on any blood thinners. \par \par On 2/21/19 visit, patient presents for a pre admission visit. Tolerated stem cell mobilization and is scheduled for kepivance today, tomorrow and on 2/23/19. Completed ciprofloxacin. Denies fever, chills, nausea, vomiting, diarrhea, rash, mouth sores or dysuria. Denies SOB, chest pain or B/L LE edema or pain. \par \par Since last office visit on 2/21/19, patient was admitted to BMTU on 2/25/19 and received Melphalan 100 mg/m2 IV x 2 consecutive days followed by autoPSCT(10.04 x 10^6/kg) on 2/28/19. Hospital course complicated by GI mucositis with diarrhea managed with Imodium; stool C. diff- negative. Engraftment(white cells) noted on 3/12/19. Repeat UA and culture both negative on 3/15/19. \par \par Since discharge to home on 3/15/19, the patient has moderate fatigue, fair appetite with slow improvement. She denies fever, chills, shortness of breath, diarrhea.

## 2019-04-03 NOTE — PHYSICAL EXAM
[Ambulatory and capable of all self care but unable to carry out any work activities] : Status 2- Ambulatory and capable of all self care but unable to carry out any work activities. Up and about more than 50% of waking hours [Normal] : affect appropriate [Ulcers] : no ulcers [Thrush] : no thrush [de-identified] : alopecia [de-identified] : anicteric [de-identified] : TachyRR, normal S1S2 [de-identified] : warm, no edema [de-identified] : patchy rash on face [de-identified] : A & O x 4

## 2019-04-03 NOTE — ASSESSMENT
[FreeTextEntry1] : 1) IgG kappa myeloma, s/p KRd, followed by autologous peripheral stem cell transplant on 2/28/19\par Plan- \par Continue current medications and restrictions as discussed prior to discharge from BMTU\par Continue Acyclovir, Mepron, Diflucan prophylaxis\par Continue MVI, Folate, PPI\par Continue antiemetics(Zofran, Reglan) as needed.\par Eat small frequent meals several times per day; avoid spicy and acidic foods\par Moisturizing cream to the skin several times per day\par Await lab results which include CMP, LDH, Mg\par Call immediately if fevers, chills, symptoms of infection. \par \par 2) Facial rash-\par Plan- trial of Desonide cream to face twice daily\par \par 3) Transaminitis- mild\par Plan- Diflucan adjusted to 200 mg po daily\par Monitor LFT's. \par \par RTC in 1 week.

## 2019-04-03 NOTE — PHYSICAL EXAM
[Normal] : affect appropriate [Ambulatory and capable of all self care but unable to carry out any work activities] : Status 2- Ambulatory and capable of all self care but unable to carry out any work activities. Up and about more than 50% of waking hours [Ulcers] : no ulcers [Thrush] : no thrush [de-identified] : alopecia [de-identified] : anicteric [de-identified] : TachyRR, normal S1S2 [de-identified] : warm, no edema [de-identified] : no rash [de-identified] : A & O x 4

## 2019-04-03 NOTE — REVIEW OF SYSTEMS
[Fatigue] : fatigue [Fever] : no fever [Chills] : no chills [Vision Problems] : no vision problems [Mucosal Pain] : no mucosal pain [Lower Ext Edema] : no lower extremity edema [Shortness Of Breath] : no shortness of breath [Cough] : no cough [Abdominal Pain] : no abdominal pain [Vomiting] : no vomiting [Diarrhea] : no diarrhea [Dysuria] : no dysuria [Dizziness] : no dizziness [Fainting] : no fainting [Easy Bleeding] : no tendency for easy bleeding [Easy Bruising] : no tendency for easy bruising [FreeTextEntry7] : no nausea [FreeTextEntry9] : no bone pain  [de-identified] : + dry skin with itching, patchy rash on face [de-identified] : no paresthesias

## 2019-04-03 NOTE — CONSULT LETTER
[Dear  ___] : Dear  [unfilled], [Consult Letter:] : I had the pleasure of evaluating your patient, [unfilled]. [Please see my note below.] : Please see my note below. [Consult Closing:] : Thank you very much for allowing me to participate in the care of this patient.  If you have any questions, please do not hesitate to contact me. [Sincerely,] : Sincerely, [FreeTextEntry2] : Marshall De Oliveira MD\par Medical Oncology/Hematology\par [facility] [place]\par [facility] \par [address] [facility]\[place], [place]   [address]\par \par  [FreeTextEntry3] : \par Analisa Rahman M.D.\par \par  of Medicine\par [facility]\par [facility] \par [facility] [place]\par [address] [place] [address] \[place] [address] \par ph: [phone]\par fax: [phone]\par

## 2019-04-03 NOTE — CONSULT LETTER
[Dear  ___] : Dear  [unfilled], [Consult Letter:] : I had the pleasure of evaluating your patient, [unfilled]. [Please see my note below.] : Please see my note below. [Consult Closing:] : Thank you very much for allowing me to participate in the care of this patient.  If you have any questions, please do not hesitate to contact me. [Sincerely,] : Sincerely, [FreeTextEntry2] : Marshall De Oliveira MD\par Medical Oncology/Hematology\par [facility] [place]\par [facility] \par [address] [facility]\[place]   [address]\par \par  [FreeTextEntry3] : \par Analisa Rahman M.D.\par \par  of Medicine\par [facility]\par [facility] \par [facility] [place]\par [address] [place] [address] \[place] [address] \par ph: [phone]\par fax: [phone]\par

## 2019-04-03 NOTE — REVIEW OF SYSTEMS
[Fatigue] : fatigue [Fever] : no fever [Chills] : no chills [Vision Problems] : no vision problems [Mucosal Pain] : no mucosal pain [Lower Ext Edema] : no lower extremity edema [Shortness Of Breath] : no shortness of breath [Cough] : no cough [Abdominal Pain] : no abdominal pain [Vomiting] : no vomiting [Diarrhea] : no diarrhea [Dysuria] : no dysuria [Skin Rash] : no skin rash [Dizziness] : no dizziness [Fainting] : no fainting [Easy Bleeding] : no tendency for easy bleeding [Easy Bruising] : no tendency for easy bruising [FreeTextEntry9] : no bone pain  [de-identified] : + dry skin [de-identified] : no paresthesias

## 2019-04-03 NOTE — REASON FOR VISIT
[Follow-Up Visit] : a follow-up visit for [Family Member] : family member [FreeTextEntry2] : IgG kappa myeloma s/p autologous peripheral stem cell transplant on 2/28/19.

## 2019-04-04 ENCOUNTER — RESULT REVIEW (OUTPATIENT)
Age: 69
End: 2019-04-04

## 2019-04-04 ENCOUNTER — APPOINTMENT (OUTPATIENT)
Dept: HEMATOLOGY ONCOLOGY | Facility: CLINIC | Age: 69
End: 2019-04-04
Payer: MEDICARE

## 2019-04-04 VITALS
OXYGEN SATURATION: 100 % | BODY MASS INDEX: 30.07 KG/M2 | SYSTOLIC BLOOD PRESSURE: 110 MMHG | WEIGHT: 169.76 LBS | DIASTOLIC BLOOD PRESSURE: 71 MMHG | HEART RATE: 101 BPM | RESPIRATION RATE: 22 BRPM | TEMPERATURE: 98.5 F

## 2019-04-04 LAB
ALBUMIN SERPL ELPH-MCNC: 4.1 G/DL
ALP BLD-CCNC: 77 U/L
ALT SERPL-CCNC: 57 U/L
ANION GAP SERPL CALC-SCNC: 12 MMOL/L
AST SERPL-CCNC: 49 U/L
BASOPHILS # BLD AUTO: 0.1 K/UL — SIGNIFICANT CHANGE UP (ref 0–0.2)
BASOPHILS NFR BLD AUTO: 1.3 % — SIGNIFICANT CHANGE UP (ref 0–2)
BILIRUB SERPL-MCNC: 0.2 MG/DL
BUN SERPL-MCNC: 11 MG/DL
CALCIUM SERPL-MCNC: 9.5 MG/DL
CHLORIDE SERPL-SCNC: 106 MMOL/L
CO2 SERPL-SCNC: 25 MMOL/L
CREAT SERPL-MCNC: 0.57 MG/DL
EOSINOPHIL # BLD AUTO: 0.2 K/UL — SIGNIFICANT CHANGE UP (ref 0–0.5)
EOSINOPHIL NFR BLD AUTO: 2.7 % — SIGNIFICANT CHANGE UP (ref 0–6)
GLUCOSE SERPL-MCNC: 81 MG/DL
HCT VFR BLD CALC: 30.7 % — LOW (ref 34.5–45)
HGB BLD-MCNC: 10.5 G/DL — LOW (ref 11.5–15.5)
LDH SERPL-CCNC: 298 U/L
LYMPHOCYTES # BLD AUTO: 4.4 K/UL — HIGH (ref 1–3.3)
LYMPHOCYTES # BLD AUTO: 51.8 % — HIGH (ref 13–44)
MAGNESIUM SERPL-MCNC: 1.7 MG/DL
MCHC RBC-ENTMCNC: 31.3 PG — SIGNIFICANT CHANGE UP (ref 27–34)
MCHC RBC-ENTMCNC: 34.3 G/DL — SIGNIFICANT CHANGE UP (ref 32–36)
MCV RBC AUTO: 91.2 FL — SIGNIFICANT CHANGE UP (ref 80–100)
MONOCYTES # BLD AUTO: 0.9 K/UL — SIGNIFICANT CHANGE UP (ref 0–0.9)
MONOCYTES NFR BLD AUTO: 10.5 % — SIGNIFICANT CHANGE UP (ref 2–14)
NEUTROPHILS # BLD AUTO: 2.9 K/UL — SIGNIFICANT CHANGE UP (ref 1.8–7.4)
NEUTROPHILS NFR BLD AUTO: 33.6 % — LOW (ref 43–77)
PLATELET # BLD AUTO: 220 K/UL — SIGNIFICANT CHANGE UP (ref 150–400)
POTASSIUM SERPL-SCNC: 4.1 MMOL/L
PROT SERPL-MCNC: 6.2 G/DL
RBC # BLD: 3.36 M/UL — LOW (ref 3.8–5.2)
RBC # FLD: 13.6 % — SIGNIFICANT CHANGE UP (ref 10.3–14.5)
SODIUM SERPL-SCNC: 143 MMOL/L
WBC # BLD: 8.5 K/UL — SIGNIFICANT CHANGE UP (ref 3.8–10.5)
WBC # FLD AUTO: 8.5 K/UL — SIGNIFICANT CHANGE UP (ref 3.8–10.5)

## 2019-04-04 PROCEDURE — 99214 OFFICE O/P EST MOD 30 MIN: CPT

## 2019-04-04 NOTE — HISTORY OF PRESENT ILLNESS
[de-identified] : Ms. Calero is a 69 year old female who was initially referred for monoclonal gammopathy.\par She was noted to have proteinuria and then had a 24 hour urine protein which showed non-nephrotic range proteinuria of 510 mg/24 hours. She was referred to Dr. Lesa Rendon of nephrology. SPEP showed faint M-spike 0.2 g/dL in gammaglobulin region, immunofixation showed this to be a IgG kappa monoclonal protein. UPEP showed 2 monoclonal protein bands, 61%, and 0.7%. \par \par Subsequent work up:\par 6/19/18 M-protein 0.2 g/dL, Kappa FLC 71, lambda FLC 0.36, FLC ratio 198.61\par , Beta 2 microglobulin 1.5 mg/L\par \par She had a bone marrow biopsy on 6/22/18. Pathology: plasma cell neoplasm, plasma cells comprise 20-25% of marrow cells. Trilineage hematopoiesis present with maturation, increased iron stores. Congo red stain negative for amyloid. Karyotype 46, XX. FISH panel - positive for CCND1/IGH fusion - a/w favorable prognosis. PET CT (7/12/18) did not show any bone lesions. Began VRd on 7/19/18. \par \par On C4 of VRd. FLC ratio initially decreasing -->198 --> 126 --> 67, however has increased to 115 with urine studies positive for Bence Brooks protein. For refractory disease, she was switched to KRd for a deeper response.\par \par  [de-identified] : Since initial consult visit on 11/26/18, the patient is currently on cycle 4 KRd(started 1/2/19), with day 21 of Revlimid completed on 1/22/19. She underwent pre-transplant testing on 12/21/18. She has moderate fatigue and good appetite. She denies fever, shortness of breath, abdominal pain. Today, I again did comprehensive review of consent forms and after all questions addressed, the patient signed consent forms.\par \par On 2/11/19, patient presents for a pre stem cell collection visit. Anahi received cytoxan on 2/5/19 followed by 12 mg of neulasta on 2/6/19. Overall patient is well and offers no acute concerns today. Denies fever, chills, nausea, vomiting or diarrhea. Denies SOB, chest pain or B/L LE edema. Patient is currently on ciprofloxacin for prophylaxis. Denies any pain at this time. \par \par On 2/13/19 visit, patient presents for a pre stem cell collection visit. Patient offers no acute concerns. Currently taking ciprofloxacin 250 mg BID for 10 days as prophylaxis. Denies fever, chills, nausea, vomiting,diarrhea, rash or dysuria. Denies SOB, chest pain or B/L LE edema. Currently not on any blood thinners. \par \par On 2/21/19 visit, patient presents for a pre admission visit. Tolerated stem cell mobilization and is scheduled for kepivance today, tomorrow and on 2/23/19. Completed ciprofloxacin. Denies fever, chills, nausea, vomiting, diarrhea, rash, mouth sores or dysuria. Denies SOB, chest pain or B/L LE edema or pain. \par \par Since office visit on 2/21/19, patient was admitted to BMTU on 2/25/19 and received Melphalan 100 mg/m2 IV x 2 consecutive days followed by autoPSCT(10.04 x 10^6/kg) on 2/28/19. Hospital course complicated by GI mucositis with diarrhea managed with Imodium; stool C. diff- negative. Engraftment(white cells) noted on 3/12/19. Repeat UA and culture both negative on 3/15/19. \par \par Since discharge to home on 3/15/19, the patient has moderate fatigue, fair appetite with slow improvement. She denies fever, chills, shortness of breath, diarrhea. \par \par Since office visit on 3/21/19, the patient has moderate fatigue, and fair- good appetite. She has dry skin and on face she notes patchy rash. She has been using hydrocortisone cream 1% OTC during past week for 3 days. She denies fever, shortness of breath, diarrhea. \par \par Since last office visit on 3/28/19, the patient called on 3/29 complaining of ongoing generalized pruritus without signs of rash/hives despite using moisturizer and hydrocortisone cream. Pt instructed to try daily long acting anti-histamine (i.e. Claritin, Allegra, or Zyrtec). May use PRN Benadryl for itching, educated regarding possible side effects including drowsiness, restlessness, dry mouth, etc. Pt instructed to call our office next week if symptoms persist/worsen. Pt verbalizes understanding and agrees with plan of care. \par She has only tried Benadryl at bedtime and didn't feel it really helped but overall she feels pruritus is better. She has moderate fatigue and normal appetite. She used Desonide cream for facial rash x 2 days with resolution, stopped use then rash recurred and put on once Monday 4/1/19 with resolution.  She denies fever, shortness of breath, abdominal pain, diarrhea. \par

## 2019-04-04 NOTE — PHYSICAL EXAM
[Ambulatory and capable of all self care but unable to carry out any work activities] : Status 2- Ambulatory and capable of all self care but unable to carry out any work activities. Up and about more than 50% of waking hours [Normal] : affect appropriate [Ulcers] : no ulcers [Thrush] : no thrush [de-identified] : alopecia [de-identified] : anicteric [de-identified] : TachyRR, normal S1S2 [de-identified] : warm, no edema [de-identified] : no rash [de-identified] : A & O x 4

## 2019-04-04 NOTE — CONSULT LETTER
[Dear  ___] : Dear  [unfilled], [Consult Letter:] : I had the pleasure of evaluating your patient, [unfilled]. [Please see my note below.] : Please see my note below. [Consult Closing:] : Thank you very much for allowing me to participate in the care of this patient.  If you have any questions, please do not hesitate to contact me. [Sincerely,] : Sincerely, [FreeTextEntry2] : Marshall De Oliveira MD\par Medical Oncology/Hematology\par [facility] [place]\par [facility] \par [address] [facility]\[place]   [address]\par \par  [FreeTextEntry3] : \par Analisa Rahman M.D.\par \par  of Medicine\par [facility]\par [facility] \par [facility] [place]\par [address] [place] [address] \[place] [address] \par ph: [phone]\par fax: [phone]\par

## 2019-04-04 NOTE — ASSESSMENT
[FreeTextEntry1] : 1) IgG kappa myeloma, s/p KRd, followed by autologous peripheral stem cell transplant on 2/28/19\par Plan- \par Continue current medications and restrictions as discussed prior to discharge from BMTU\par Continue Acyclovir, Mepron, Diflucan prophylaxis\par Continue MVI, Folate, PPI\par Continue antiemetics(Zofran, Reglan) as needed.\par Eat small frequent meals several times per day; avoid spicy and acidic foods\par Moisturizing cream to the skin several times per day\par Await lab results which include CMP, LDH, Mg\par Call immediately if fevers, chills, symptoms of infection. \par \par 2) Facial rash-\par Plan- continue Desonide cream to face twice daily if rash recurs\par Trial of Claritin for itching\par \par 3) Transaminitis- mild\par Plan- Diflucan adjusted to 200 mg po daily\par Monitor LFT's. \par \par RTC in 3 weeks.

## 2019-04-04 NOTE — REVIEW OF SYSTEMS
[Fatigue] : fatigue [Fever] : no fever [Chills] : no chills [Vision Problems] : no vision problems [Mucosal Pain] : no mucosal pain [Lower Ext Edema] : no lower extremity edema [Shortness Of Breath] : no shortness of breath [Cough] : no cough [Abdominal Pain] : no abdominal pain [Vomiting] : no vomiting [Diarrhea] : no diarrhea [Dysuria] : no dysuria [Dizziness] : no dizziness [Fainting] : no fainting [Easy Bleeding] : no tendency for easy bleeding [Easy Bruising] : no tendency for easy bruising [FreeTextEntry7] : no nausea [FreeTextEntry9] : no bone pain  [de-identified] : + dry skin with itching, patchy rash on face [de-identified] : no paresthesias

## 2019-04-22 ENCOUNTER — OUTPATIENT (OUTPATIENT)
Dept: OUTPATIENT SERVICES | Facility: HOSPITAL | Age: 69
LOS: 1 days | Discharge: HOME | End: 2019-04-22

## 2019-04-22 DIAGNOSIS — Z01.818 ENCOUNTER FOR OTHER PREPROCEDURAL EXAMINATION: ICD-10-CM

## 2019-04-22 DIAGNOSIS — C90.00 MULTIPLE MYELOMA NOT HAVING ACHIEVED REMISSION: ICD-10-CM

## 2019-04-22 DIAGNOSIS — Z98.51 TUBAL LIGATION STATUS: Chronic | ICD-10-CM

## 2019-04-25 ENCOUNTER — RESULT REVIEW (OUTPATIENT)
Age: 69
End: 2019-04-25

## 2019-04-25 ENCOUNTER — APPOINTMENT (OUTPATIENT)
Dept: HEMATOLOGY ONCOLOGY | Facility: CLINIC | Age: 69
End: 2019-04-25
Payer: MEDICARE

## 2019-04-25 VITALS
BODY MASS INDEX: 29.37 KG/M2 | TEMPERATURE: 98.4 F | OXYGEN SATURATION: 100 % | WEIGHT: 165.77 LBS | HEART RATE: 75 BPM | DIASTOLIC BLOOD PRESSURE: 71 MMHG | SYSTOLIC BLOOD PRESSURE: 115 MMHG | RESPIRATION RATE: 16 BRPM

## 2019-04-25 DIAGNOSIS — Z87.898 PERSONAL HISTORY OF OTHER SPECIFIED CONDITIONS: ICD-10-CM

## 2019-04-25 DIAGNOSIS — R21 RASH AND OTHER NONSPECIFIC SKIN ERUPTION: ICD-10-CM

## 2019-04-25 DIAGNOSIS — Z86.39 PERSONAL HISTORY OF OTHER ENDOCRINE, NUTRITIONAL AND METABOLIC DISEASE: ICD-10-CM

## 2019-04-25 LAB
HCT VFR BLD CALC: 31.4 % — LOW (ref 34.5–45)
HGB BLD-MCNC: 10.6 G/DL — LOW (ref 11.5–15.5)
MCHC RBC-ENTMCNC: 31 PG — SIGNIFICANT CHANGE UP (ref 27–34)
MCHC RBC-ENTMCNC: 33.9 G/DL — SIGNIFICANT CHANGE UP (ref 32–36)
MCV RBC AUTO: 91.3 FL — SIGNIFICANT CHANGE UP (ref 80–100)
PLATELET # BLD AUTO: 209 K/UL — SIGNIFICANT CHANGE UP (ref 150–400)
RBC # BLD: 3.44 M/UL — LOW (ref 3.8–5.2)
RBC # FLD: 12.8 % — SIGNIFICANT CHANGE UP (ref 10.3–14.5)
WBC # BLD: 5.4 K/UL — SIGNIFICANT CHANGE UP (ref 3.8–10.5)
WBC # FLD AUTO: 5.4 K/UL — SIGNIFICANT CHANGE UP (ref 3.8–10.5)

## 2019-04-25 PROCEDURE — 99214 OFFICE O/P EST MOD 30 MIN: CPT

## 2019-04-25 RX ORDER — PANTOPRAZOLE SODIUM 40 MG/1
40 TABLET, DELAYED RELEASE ORAL DAILY
Qty: 30 | Refills: 0 | Status: DISCONTINUED | COMMUNITY
Start: 2019-03-15 | End: 2019-04-25

## 2019-04-25 RX ORDER — FOLIC ACID 1 MG/1
1 TABLET ORAL DAILY
Qty: 30 | Refills: 0 | Status: DISCONTINUED | COMMUNITY
Start: 2019-03-15 | End: 2019-04-25

## 2019-04-26 LAB
BASOPHILS # BLD AUTO: 0.1 K/UL — SIGNIFICANT CHANGE UP (ref 0–0.2)
BASOPHILS NFR BLD AUTO: 1.8 % — SIGNIFICANT CHANGE UP (ref 0–2)
EOSINOPHIL # BLD AUTO: 0.2 K/UL — SIGNIFICANT CHANGE UP (ref 0–0.5)
EOSINOPHIL NFR BLD AUTO: 2.8 % — SIGNIFICANT CHANGE UP (ref 0–6)
LYMPHOCYTES # BLD AUTO: 2.3 K/UL — SIGNIFICANT CHANGE UP (ref 1–3.3)
LYMPHOCYTES # BLD AUTO: 42.9 % — SIGNIFICANT CHANGE UP (ref 13–44)
MONOCYTES # BLD AUTO: 0.5 K/UL — SIGNIFICANT CHANGE UP (ref 0–0.9)
MONOCYTES NFR BLD AUTO: 8.6 % — SIGNIFICANT CHANGE UP (ref 2–14)
NEUTROPHILS # BLD AUTO: 2.4 K/UL — SIGNIFICANT CHANGE UP (ref 1.8–7.4)
NEUTROPHILS NFR BLD AUTO: 43.8 % — SIGNIFICANT CHANGE UP (ref 43–77)

## 2019-04-30 LAB
25(OH)D3 SERPL-MCNC: 43.5 NG/ML
ALBUMIN MFR SERPL ELPH: 65.6 %
ALBUMIN SERPL ELPH-MCNC: 4.4 G/DL
ALBUMIN SERPL-MCNC: 4.2 G/DL
ALBUMIN/GLOB SERPL: 1.9 RATIO
ALP BLD-CCNC: 62 U/L
ALPHA1 GLOB MFR SERPL ELPH: 4.3 %
ALPHA1 GLOB SERPL ELPH-MCNC: 0.3 G/DL
ALPHA2 GLOB MFR SERPL ELPH: 11.4 %
ALPHA2 GLOB SERPL ELPH-MCNC: 0.7 G/DL
ALT SERPL-CCNC: 30 U/L
ANION GAP SERPL CALC-SCNC: 12 MMOL/L
AST SERPL-CCNC: 30 U/L
B-GLOBULIN MFR SERPL ELPH: 9.2 %
B-GLOBULIN SERPL ELPH-MCNC: 0.6 G/DL
BILIRUB SERPL-MCNC: 0.3 MG/DL
BUN SERPL-MCNC: 12 MG/DL
CALCIUM SERPL-MCNC: 9.7 MG/DL
CHLORIDE SERPL-SCNC: 104 MMOL/L
CO2 SERPL-SCNC: 25 MMOL/L
CREAT SERPL-MCNC: 0.6 MG/DL
DEPRECATED KAPPA LC FREE/LAMBDA SER: 195.68 RATIO
GAMMA GLOB FLD ELPH-MCNC: 0.6 G/DL
GAMMA GLOB MFR SERPL ELPH: 9.5 %
GLUCOSE SERPL-MCNC: 84 MG/DL
IGA SER QL IEP: 8 MG/DL
IGG SER QL IEP: 647 MG/DL
IGM SER QL IEP: 15 MG/DL
INTERPRETATION SERPL IEP-IMP: NORMAL
KAPPA LC CSF-MCNC: 0.25 MG/DL
KAPPA LC SERPL-MCNC: 48.92 MG/DL
M PROTEIN MFR SERPL ELPH: NORMAL
M PROTEIN SPEC IFE-MCNC: NORMAL
MONOCLON BAND OBS SERPL: NORMAL
POTASSIUM SERPL-SCNC: 4.5 MMOL/L
PROT SERPL-MCNC: 6.4 G/DL
SODIUM SERPL-SCNC: 141 MMOL/L

## 2019-04-30 NOTE — HISTORY OF PRESENT ILLNESS
[de-identified] : Since initial consult visit on 11/26/18, the patient is currently on cycle 4 KRd(started 1/2/19), with day 21 of Revlimid completed on 1/22/19. She underwent pre-transplant testing on 12/21/18. She has moderate fatigue and good appetite. She denies fever, shortness of breath, abdominal pain. Today, I again did comprehensive review of consent forms and after all questions addressed, the patient signed consent forms.\par \par On 2/11/19, patient presents for a pre stem cell collection visit. Anahi received cytoxan on 2/5/19 followed by 12 mg of neulasta on 2/6/19. Overall patient is well and offers no acute concerns today. Denies fever, chills, nausea, vomiting or diarrhea. Denies SOB, chest pain or B/L LE edema. Patient is currently on ciprofloxacin for prophylaxis. Denies any pain at this time. \par \par On 2/13/19 visit, patient presents for a pre stem cell collection visit. Patient offers no acute concerns. Currently taking ciprofloxacin 250 mg BID for 10 days as prophylaxis. Denies fever, chills, nausea, vomiting,diarrhea, rash or dysuria. Denies SOB, chest pain or B/L LE edema. Currently not on any blood thinners. \par \par On 2/21/19 visit, patient presents for a pre admission visit. Tolerated stem cell mobilization and is scheduled for kepivance today, tomorrow and on 2/23/19. Completed ciprofloxacin. Denies fever, chills, nausea, vomiting, diarrhea, rash, mouth sores or dysuria. Denies SOB, chest pain or B/L LE edema or pain. \par \par Since office visit on 2/21/19, patient was admitted to BMTU on 2/25/19 and received Melphalan 100 mg/m2 IV x 2 consecutive days followed by autoPSCT(10.04 x 10^6/kg) on 2/28/19. Hospital course complicated by GI mucositis with diarrhea managed with Imodium; stool C. diff- negative. Engraftment(white cells) noted on 3/12/19. Repeat UA and culture both negative on 3/15/19. \par \par Since discharge to home on 3/15/19, the patient has moderate fatigue, fair appetite with slow improvement. She denies fever, chills, shortness of breath, diarrhea. \par \par Since office visit on 3/21/19, the patient has moderate fatigue, and fair- good appetite. She has dry skin and on face she notes patchy rash. She has been using hydrocortisone cream 1% OTC during past week for 3 days. She denies fever, shortness of breath, diarrhea. \par \par Since office visit on 3/28/19, the patient called on 3/29 complaining of ongoing generalized pruritus without signs of rash/hives despite using moisturizer and hydrocortisone cream. Pt instructed to try daily long acting anti-histamine (i.e. Claritin, Allegra, or Zyrtec). May use PRN Benadryl for itching, educated regarding possible side effects including drowsiness, restlessness, dry mouth, etc. Pt instructed to call our office next week if symptoms persist/worsen. Pt verbalizes understanding and agrees with plan of care. \par She has only tried Benadryl at bedtime and didn't feel it really helped but overall she feels pruritus is better. She has moderate fatigue and normal appetite. She used Desonide cream for facial rash x 2 days with resolution, stopped use then rash recurred and put on once Monday 4/1/19 with resolution.  She denies fever, shortness of breath, abdominal pain, diarrhea.\par \par Since last office visit on 4/4/19, she describes good energy level and good appetite. She denies fever, shortness of breath, bone pain. \par  [de-identified] : Ms. Calero is a 69 year old female who was initially referred for monoclonal gammopathy.\par She was noted to have proteinuria and then had a 24 hour urine protein which showed non-nephrotic range proteinuria of 510 mg/24 hours. She was referred to Dr. Lesa Rendon of nephrology. SPEP showed faint M-spike 0.2 g/dL in gammaglobulin region, immunofixation showed this to be a IgG kappa monoclonal protein. UPEP showed 2 monoclonal protein bands, 61%, and 0.7%. \par \par Subsequent work up:\par 6/19/18 M-protein 0.2 g/dL, Kappa FLC 71, lambda FLC 0.36, FLC ratio 198.61\par , Beta 2 microglobulin 1.5 mg/L\par \par She had a bone marrow biopsy on 6/22/18. Pathology: plasma cell neoplasm, plasma cells comprise 20-25% of marrow cells. Trilineage hematopoiesis present with maturation, increased iron stores. Congo red stain negative for amyloid. Karyotype 46, XX. FISH panel - positive for CCND1/IGH fusion - a/w favorable prognosis. PET CT (7/12/18) did not show any bone lesions. Began VRd on 7/19/18. \par \par On C4 of VRd. FLC ratio initially decreasing -->198 --> 126 --> 67, however has increased to 115 with urine studies positive for Bence Brooks protein. For refractory disease, she was switched to KRd for a deeper response.\par \par

## 2019-04-30 NOTE — CONSULT LETTER
[Dear  ___] : Dear  [unfilled], [Consult Letter:] : I had the pleasure of evaluating your patient, [unfilled]. [Please see my note below.] : Please see my note below. [Consult Closing:] : Thank you very much for allowing me to participate in the care of this patient.  If you have any questions, please do not hesitate to contact me. [Sincerely,] : Sincerely, [FreeTextEntry2] : Marshall De Oliveira MD\par Medical Oncology/Hematology\par [facility] [place]\par [facility] \par [address]\[place], [place]   [address]\par \par  [FreeTextEntry3] : \par Analisa Rahman M.D.\par \par  of Medicine\par [facility]\par [facility] \par [facility] [place]\par [address] [place] [address] \[place] [address] \par ph: [phone]\par fax: [phone]\par

## 2019-04-30 NOTE — REVIEW OF SYSTEMS
[Fatigue] : no fatigue [Chills] : no chills [Fever] : no fever [Mucosal Pain] : no mucosal pain [Lower Ext Edema] : no lower extremity edema [Vision Problems] : no vision problems [Abdominal Pain] : no abdominal pain [Shortness Of Breath] : no shortness of breath [Cough] : no cough [Skin Rash] : no skin rash [Diarrhea] : no diarrhea [Vomiting] : no vomiting [Dysuria] : no dysuria [Easy Bleeding] : no tendency for easy bleeding [Fainting] : no fainting [Dizziness] : no dizziness [FreeTextEntry7] : no nausea [FreeTextEntry9] : no bone pain  [de-identified] : + dry skin better [Easy Bruising] : no tendency for easy bruising [de-identified] : no paresthesias

## 2019-04-30 NOTE — PHYSICAL EXAM
[Ambulatory and capable of all self care but unable to carry out any work activities] : Status 2- Ambulatory and capable of all self care but unable to carry out any work activities. Up and about more than 50% of waking hours [Normal] : affect appropriate [Ulcers] : no ulcers [Thrush] : no thrush [de-identified] : RRR, normal S1S2 [de-identified] : alopecia [de-identified] : anicteric [de-identified] : no rash [de-identified] : warm, no edema [de-identified] : A & O x 4

## 2019-05-01 ENCOUNTER — OUTPATIENT (OUTPATIENT)
Dept: OUTPATIENT SERVICES | Facility: HOSPITAL | Age: 69
LOS: 1 days | Discharge: HOME | End: 2019-05-01

## 2019-05-01 DIAGNOSIS — C90.00 MULTIPLE MYELOMA NOT HAVING ACHIEVED REMISSION: ICD-10-CM

## 2019-05-01 DIAGNOSIS — Z98.51 TUBAL LIGATION STATUS: Chronic | ICD-10-CM

## 2019-05-07 ENCOUNTER — APPOINTMENT (OUTPATIENT)
Dept: FAMILY MEDICINE | Facility: CLINIC | Age: 69
End: 2019-05-07

## 2019-05-08 ENCOUNTER — RESULT REVIEW (OUTPATIENT)
Age: 69
End: 2019-05-08

## 2019-05-08 ENCOUNTER — APPOINTMENT (OUTPATIENT)
Dept: HEMATOLOGY ONCOLOGY | Facility: CLINIC | Age: 69
End: 2019-05-08

## 2019-05-08 LAB
ANISOCYTOSIS BLD QL: SLIGHT — SIGNIFICANT CHANGE UP
BASOPHILS # BLD AUTO: 0.1 K/UL — SIGNIFICANT CHANGE UP (ref 0–0.2)
BASOPHILS NFR BLD AUTO: 1 % — SIGNIFICANT CHANGE UP (ref 0–2)
DACRYOCYTES BLD QL SMEAR: SLIGHT — SIGNIFICANT CHANGE UP
EOSINOPHIL # BLD AUTO: 0 K/UL — SIGNIFICANT CHANGE UP (ref 0–0.5)
HCT VFR BLD CALC: 34.6 % — SIGNIFICANT CHANGE UP (ref 34.5–45)
HGB BLD-MCNC: 10.8 G/DL — LOW (ref 11.5–15.5)
LYMPHOCYTES # BLD AUTO: 2.3 K/UL — SIGNIFICANT CHANGE UP (ref 1–3.3)
LYMPHOCYTES # BLD AUTO: 51 % — HIGH (ref 13–44)
MCHC RBC-ENTMCNC: 29 PG — SIGNIFICANT CHANGE UP (ref 27–34)
MCHC RBC-ENTMCNC: 31.1 G/DL — LOW (ref 32–36)
MCV RBC AUTO: 93.5 FL — SIGNIFICANT CHANGE UP (ref 80–100)
MONOCYTES # BLD AUTO: 0.4 K/UL — SIGNIFICANT CHANGE UP (ref 0–0.9)
MONOCYTES NFR BLD AUTO: 3 % — SIGNIFICANT CHANGE UP (ref 2–14)
NEUTROPHILS # BLD AUTO: 1.9 K/UL — SIGNIFICANT CHANGE UP (ref 1.8–7.4)
NEUTROPHILS NFR BLD AUTO: 45 % — SIGNIFICANT CHANGE UP (ref 43–77)
PLAT MORPH BLD: NORMAL — SIGNIFICANT CHANGE UP
PLATELET # BLD AUTO: 189 K/UL — SIGNIFICANT CHANGE UP (ref 150–400)
POIKILOCYTOSIS BLD QL AUTO: SLIGHT — SIGNIFICANT CHANGE UP
RBC # BLD: 3.71 M/UL — LOW (ref 3.8–5.2)
RBC # FLD: 13 % — SIGNIFICANT CHANGE UP (ref 10.3–14.5)
RBC BLD AUTO: ABNORMAL
WBC # BLD: 4.7 K/UL — SIGNIFICANT CHANGE UP (ref 3.8–10.5)
WBC # FLD AUTO: 4.7 K/UL — SIGNIFICANT CHANGE UP (ref 3.8–10.5)

## 2019-05-14 ENCOUNTER — RESULT REVIEW (OUTPATIENT)
Age: 69
End: 2019-05-14

## 2019-05-14 ENCOUNTER — APPOINTMENT (OUTPATIENT)
Dept: HEMATOLOGY ONCOLOGY | Facility: CLINIC | Age: 69
End: 2019-05-14
Payer: MEDICARE

## 2019-05-14 VITALS
RESPIRATION RATE: 16 BRPM | HEART RATE: 96 BPM | TEMPERATURE: 98.3 F | OXYGEN SATURATION: 96 % | BODY MASS INDEX: 29.29 KG/M2 | WEIGHT: 165.35 LBS | DIASTOLIC BLOOD PRESSURE: 91 MMHG | SYSTOLIC BLOOD PRESSURE: 148 MMHG

## 2019-05-14 DIAGNOSIS — R74.0 NONSPECIFIC ELEVATION OF LEVELS OF TRANSAMINASE AND LACTIC ACID DEHYDROGENASE [LDH]: ICD-10-CM

## 2019-05-14 LAB
HCT VFR BLD CALC: 31 % — LOW (ref 34.5–45)
HGB BLD-MCNC: 11.3 G/DL — LOW (ref 11.5–15.5)
MCHC RBC-ENTMCNC: 32.5 PG — SIGNIFICANT CHANGE UP (ref 27–34)
MCHC RBC-ENTMCNC: 36.4 G/DL — HIGH (ref 32–36)
MCV RBC AUTO: 89.2 FL — SIGNIFICANT CHANGE UP (ref 80–100)
PLATELET # BLD AUTO: 216 K/UL — SIGNIFICANT CHANGE UP (ref 150–400)
RBC # BLD: 3.47 M/UL — LOW (ref 3.8–5.2)
RBC # FLD: 12.2 % — SIGNIFICANT CHANGE UP (ref 10.3–14.5)
WBC # BLD: 6.7 K/UL — SIGNIFICANT CHANGE UP (ref 3.8–10.5)
WBC # FLD AUTO: 6.7 K/UL — SIGNIFICANT CHANGE UP (ref 3.8–10.5)

## 2019-05-14 PROCEDURE — 99215 OFFICE O/P EST HI 40 MIN: CPT

## 2019-05-14 RX ORDER — FLUCONAZOLE 200 MG/1
200 TABLET ORAL DAILY
Refills: 0 | Status: DISCONTINUED | COMMUNITY
Start: 2019-03-15 | End: 2019-05-14

## 2019-05-22 ENCOUNTER — APPOINTMENT (OUTPATIENT)
Dept: HEMATOLOGY ONCOLOGY | Facility: CLINIC | Age: 69
End: 2019-05-22
Payer: MEDICARE

## 2019-05-22 LAB
ALBUMIN SERPL ELPH-MCNC: 4.5 G/DL
ALP BLD-CCNC: 72 U/L
ALT SERPL-CCNC: 35 U/L
ANION GAP SERPL CALC-SCNC: 11 MMOL/L
AST SERPL-CCNC: 37 U/L
BILIRUB SERPL-MCNC: 0.2 MG/DL
BUN SERPL-MCNC: 14 MG/DL
CALCIUM SERPL-MCNC: 9.6 MG/DL
CHLORIDE SERPL-SCNC: 106 MMOL/L
CO2 SERPL-SCNC: 25 MMOL/L
CREAT SERPL-MCNC: 0.56 MG/DL
DEPRECATED KAPPA LC FREE/LAMBDA SER: 136 RATIO
GLUCOSE SERPL-MCNC: 78 MG/DL
KAPPA LC CSF-MCNC: 0.37 MG/DL
KAPPA LC SERPL-MCNC: 50.32 MG/DL
POTASSIUM SERPL-SCNC: 4.2 MMOL/L
PROT SERPL-MCNC: 6.5 G/DL
SODIUM SERPL-SCNC: 142 MMOL/L

## 2019-05-24 ENCOUNTER — RESULT REVIEW (OUTPATIENT)
Age: 69
End: 2019-05-24

## 2019-05-24 ENCOUNTER — APPOINTMENT (OUTPATIENT)
Dept: HEMATOLOGY ONCOLOGY | Facility: CLINIC | Age: 69
End: 2019-05-24

## 2019-05-24 DIAGNOSIS — D47.2 MONOCLONAL GAMMOPATHY: ICD-10-CM

## 2019-05-24 LAB
ANISOCYTOSIS BLD QL: SLIGHT — SIGNIFICANT CHANGE UP
BASOPHILS # BLD AUTO: 0 K/UL — SIGNIFICANT CHANGE UP (ref 0–0.2)
BASOPHILS NFR BLD AUTO: 0.8 % — SIGNIFICANT CHANGE UP (ref 0–2)
ELLIPTOCYTES BLD QL SMEAR: SLIGHT — SIGNIFICANT CHANGE UP
EOSINOPHIL # BLD AUTO: 0.1 K/UL — SIGNIFICANT CHANGE UP (ref 0–0.5)
EOSINOPHIL NFR BLD AUTO: 2.3 % — SIGNIFICANT CHANGE UP (ref 0–6)
GIANT PLATELETS BLD QL SMEAR: PRESENT — SIGNIFICANT CHANGE UP
HCT VFR BLD CALC: 29.8 % — LOW (ref 34.5–45)
HGB BLD-MCNC: 9.6 G/DL — LOW (ref 11.5–15.5)
HYPOCHROMIA BLD QL: SLIGHT — SIGNIFICANT CHANGE UP
LG PLATELETS BLD QL AUTO: SLIGHT — SIGNIFICANT CHANGE UP
LYMPHOCYTES # BLD AUTO: 1.3 K/UL — SIGNIFICANT CHANGE UP (ref 1–3.3)
LYMPHOCYTES # BLD AUTO: 30.5 % — SIGNIFICANT CHANGE UP (ref 13–44)
MACROCYTES BLD QL: SLIGHT — SIGNIFICANT CHANGE UP
MCHC RBC-ENTMCNC: 30.1 PG — SIGNIFICANT CHANGE UP (ref 27–34)
MCHC RBC-ENTMCNC: 32 G/DL — SIGNIFICANT CHANGE UP (ref 32–36)
MCV RBC AUTO: 94 FL — SIGNIFICANT CHANGE UP (ref 80–100)
MICROCYTES BLD QL: SLIGHT — SIGNIFICANT CHANGE UP
MONOCYTES # BLD AUTO: 0.2 K/UL — SIGNIFICANT CHANGE UP (ref 0–0.9)
MONOCYTES NFR BLD AUTO: 5.6 % — SIGNIFICANT CHANGE UP (ref 2–14)
NEUTROPHILS # BLD AUTO: 2.5 K/UL — SIGNIFICANT CHANGE UP (ref 1.8–7.4)
NEUTROPHILS NFR BLD AUTO: 60.8 % — SIGNIFICANT CHANGE UP (ref 43–77)
OVALOCYTES BLD QL SMEAR: SLIGHT — SIGNIFICANT CHANGE UP
PLAT MORPH BLD: NORMAL — SIGNIFICANT CHANGE UP
PLATELET # BLD AUTO: 204 K/UL — SIGNIFICANT CHANGE UP (ref 150–400)
POIKILOCYTOSIS BLD QL AUTO: SLIGHT — SIGNIFICANT CHANGE UP
POLYCHROMASIA BLD QL SMEAR: SLIGHT — SIGNIFICANT CHANGE UP
RBC # BLD: 3.17 M/UL — LOW (ref 3.8–5.2)
RBC # FLD: 13 % — SIGNIFICANT CHANGE UP (ref 10.3–14.5)
RBC BLD AUTO: SIGNIFICANT CHANGE UP
SCHISTOCYTES BLD QL AUTO: SLIGHT — SIGNIFICANT CHANGE UP
WBC # BLD: 4.1 K/UL — SIGNIFICANT CHANGE UP (ref 3.8–10.5)
WBC # FLD AUTO: 4.1 K/UL — SIGNIFICANT CHANGE UP (ref 3.8–10.5)

## 2019-05-29 LAB
ALBUMIN SERPL ELPH-MCNC: 4.3 G/DL
ALP BLD-CCNC: 76 U/L
ALT SERPL-CCNC: 37 U/L
ANION GAP SERPL CALC-SCNC: 12 MMOL/L
AST SERPL-CCNC: 30 U/L
BILIRUB SERPL-MCNC: 0.4 MG/DL
BUN SERPL-MCNC: 12 MG/DL
CALCIUM SERPL-MCNC: 9.6 MG/DL
CHLORIDE SERPL-SCNC: 108 MMOL/L
CO2 SERPL-SCNC: 23 MMOL/L
CREAT SERPL-MCNC: 0.56 MG/DL
GLUCOSE SERPL-MCNC: 74 MG/DL
POTASSIUM SERPL-SCNC: 3.8 MMOL/L
PROT SERPL-MCNC: 5.9 G/DL
SODIUM SERPL-SCNC: 143 MMOL/L

## 2019-06-03 ENCOUNTER — APPOINTMENT (OUTPATIENT)
Dept: HEMATOLOGY ONCOLOGY | Facility: CLINIC | Age: 69
End: 2019-06-03

## 2019-06-03 ENCOUNTER — RESULT REVIEW (OUTPATIENT)
Age: 69
End: 2019-06-03

## 2019-06-03 ENCOUNTER — MEDICATION RENEWAL (OUTPATIENT)
Age: 69
End: 2019-06-03

## 2019-06-03 PROBLEM — D47.2 MONOCLONAL GAMMOPATHY: Status: ACTIVE | Noted: 2018-06-19

## 2019-06-03 LAB
BASOPHILS # BLD AUTO: 0.1 K/UL — SIGNIFICANT CHANGE UP (ref 0–0.2)
BASOPHILS NFR BLD AUTO: 2.2 % — HIGH (ref 0–2)
EOSINOPHIL # BLD AUTO: 0.2 K/UL — SIGNIFICANT CHANGE UP (ref 0–0.5)
EOSINOPHIL NFR BLD AUTO: 3.8 % — SIGNIFICANT CHANGE UP (ref 0–6)
HCT VFR BLD CALC: 32.3 % — LOW (ref 34.5–45)
HGB BLD-MCNC: 10.3 G/DL — LOW (ref 11.5–15.5)
LYMPHOCYTES # BLD AUTO: 2 K/UL — SIGNIFICANT CHANGE UP (ref 1–3.3)
LYMPHOCYTES # BLD AUTO: 43.6 % — SIGNIFICANT CHANGE UP (ref 13–44)
MCHC RBC-ENTMCNC: 29.7 PG — SIGNIFICANT CHANGE UP (ref 27–34)
MCHC RBC-ENTMCNC: 31.8 G/DL — LOW (ref 32–36)
MCV RBC AUTO: 93.4 FL — SIGNIFICANT CHANGE UP (ref 80–100)
MONOCYTES # BLD AUTO: 0.6 K/UL — SIGNIFICANT CHANGE UP (ref 0–0.9)
MONOCYTES NFR BLD AUTO: 14.2 % — HIGH (ref 2–14)
NEUTROPHILS # BLD AUTO: 1.6 K/UL — LOW (ref 1.8–7.4)
NEUTROPHILS NFR BLD AUTO: 36.1 % — LOW (ref 43–77)
PLATELET # BLD AUTO: 229 K/UL — SIGNIFICANT CHANGE UP (ref 150–400)
RBC # BLD: 3.45 M/UL — LOW (ref 3.8–5.2)
RBC # FLD: 13.4 % — SIGNIFICANT CHANGE UP (ref 10.3–14.5)
WBC # BLD: 4.5 K/UL — SIGNIFICANT CHANGE UP (ref 3.8–10.5)
WBC # FLD AUTO: 4.5 K/UL — SIGNIFICANT CHANGE UP (ref 3.8–10.5)

## 2019-06-05 ENCOUNTER — OUTPATIENT (OUTPATIENT)
Dept: OUTPATIENT SERVICES | Facility: HOSPITAL | Age: 69
LOS: 1 days | Discharge: HOME | End: 2019-06-05

## 2019-06-05 DIAGNOSIS — C90.00 MULTIPLE MYELOMA NOT HAVING ACHIEVED REMISSION: ICD-10-CM

## 2019-06-05 DIAGNOSIS — Z98.51 TUBAL LIGATION STATUS: Chronic | ICD-10-CM

## 2019-06-05 DIAGNOSIS — Z01.818 ENCOUNTER FOR OTHER PREPROCEDURAL EXAMINATION: ICD-10-CM

## 2019-06-07 LAB
ALBUMIN SERPL ELPH-MCNC: 4.4 G/DL
ALP BLD-CCNC: 81 U/L
ALT SERPL-CCNC: 37 U/L
ANION GAP SERPL CALC-SCNC: 13 MMOL/L
AST SERPL-CCNC: 28 U/L
BILIRUB SERPL-MCNC: 0.4 MG/DL
BUN SERPL-MCNC: 13 MG/DL
CALCIUM SERPL-MCNC: 9.6 MG/DL
CHLORIDE SERPL-SCNC: 111 MMOL/L
CO2 SERPL-SCNC: 22 MMOL/L
CREAT SERPL-MCNC: 0.62 MG/DL
GLUCOSE SERPL-MCNC: 77 MG/DL
POTASSIUM SERPL-SCNC: 4.3 MMOL/L
PROT SERPL-MCNC: 6.1 G/DL
SODIUM SERPL-SCNC: 146 MMOL/L

## 2019-06-09 NOTE — REVIEW OF SYSTEMS
[Fever] : no fever [Vision Problems] : no vision problems [Lower Ext Edema] : no lower extremity edema [Mucosal Pain] : no mucosal pain [Cough] : no cough [Shortness Of Breath] : no shortness of breath [Abdominal Pain] : no abdominal pain [Dysuria] : no dysuria [Vomiting] : no vomiting [Diarrhea] : no diarrhea [Dizziness] : no dizziness [Fainting] : no fainting [Skin Rash] : no skin rash [FreeTextEntry2] : mild fatigue [Easy Bruising] : no tendency for easy bruising [Easy Bleeding] : no tendency for easy bleeding [FreeTextEntry9] : no bone pain  [FreeTextEntry7] : no nausea [de-identified] : + dry skin better [de-identified] : no paresthesias

## 2019-06-09 NOTE — ASSESSMENT
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 I discussed rationale of starting Pomalyst versus Revlimid therapy. I discussed potential side effects of Pomalyst, including but not limited to, risk of thrombosis, rash, gastrointestinal/hepatic, musculoskeletal, myelosuppression, risk of infection, secondary malignancy, fatigue. She agrees to begin Pomalyst.\par Begin Pomalyst 3 mg po daily(on 5/18/19), take for 21 days followed by 7 days off, then repeat\par Begin aspirin 81 mg po daily on 5/18/19\par Drink plenty of water daily\par Repeat lab studies to include CBC with diff, CMP on 5/24/19 at [facility]. \par Continue current medications as documented in medication history\par Discontinued restrictions on 4/25/19\par Continue Acyclovir, Mepron prophylaxis\par Continue MVI with folate\par Continue antiemetics(Zofran, Reglan) as needed.\par Hold Amlodipine if SBP <=110\par Moisturizing cream to the skin several times per day\par Call immediately if fevers, chills, symptoms of infection. \par \par RTC on 6/10/19

## 2019-06-09 NOTE — PHYSICAL EXAM
[Ambulatory and capable of all self care but unable to carry out any work activities] : Status 2- Ambulatory and capable of all self care but unable to carry out any work activities. Up and about more than 50% of waking hours [Normal] : affect appropriate [Ulcers] : no ulcers [Thrush] : no thrush [de-identified] : alopecia [de-identified] : anicteric [de-identified] : RRR, normal S1S2 [de-identified] : warm, no edema [de-identified] : A & O x 4 [de-identified] : no rash

## 2019-06-09 NOTE — CONSULT LETTER
[Dear  ___] : Dear  [unfilled], [Sincerely,] : Sincerely, [Consult Closing:] : Thank you very much for allowing me to participate in the care of this patient.  If you have any questions, please do not hesitate to contact me. [Please see my note below.] : Please see my note below. [Courtesy Letter:] : I had the pleasure of seeing your patient, [unfilled], in my office today. [FreeTextEntry2] : Marshall De Oliveira MD\par Medical Oncology/Hematology\par [facility] [place]\par [facility] \par [address]\par [place], [place]   [address]\par \par  [FreeTextEntry3] : \par Analisa Rahman M.D.\par \par  of Medicine\par [facility]\par [facility] \par [facility] [place]\par [address] [place] [address] \[place] [address] \par ph: [phone]\par fax: [phone]\par

## 2019-06-10 ENCOUNTER — APPOINTMENT (OUTPATIENT)
Dept: HEMATOLOGY ONCOLOGY | Facility: CLINIC | Age: 69
End: 2019-06-10
Payer: MEDICARE

## 2019-06-10 ENCOUNTER — RESULT REVIEW (OUTPATIENT)
Age: 69
End: 2019-06-10

## 2019-06-10 VITALS
SYSTOLIC BLOOD PRESSURE: 112 MMHG | HEART RATE: 72 BPM | DIASTOLIC BLOOD PRESSURE: 70 MMHG | WEIGHT: 173.94 LBS | RESPIRATION RATE: 15 BRPM | OXYGEN SATURATION: 100 % | BODY MASS INDEX: 30.81 KG/M2 | TEMPERATURE: 98.1 F

## 2019-06-10 LAB
BASOPHILS # BLD AUTO: 0 K/UL — SIGNIFICANT CHANGE UP (ref 0–0.2)
BASOPHILS NFR BLD AUTO: 1 % — SIGNIFICANT CHANGE UP (ref 0–2)
EOSINOPHIL # BLD AUTO: 0.1 K/UL — SIGNIFICANT CHANGE UP (ref 0–0.5)
HCT VFR BLD CALC: 29 % — LOW (ref 34.5–45)
HGB BLD-MCNC: 9.7 G/DL — LOW (ref 11.5–15.5)
LYMPHOCYTES # BLD AUTO: 2.3 K/UL — SIGNIFICANT CHANGE UP (ref 1–3.3)
LYMPHOCYTES # BLD AUTO: 64 % — HIGH (ref 13–44)
LYMPHOCYTES # SPEC AUTO: 1 % — HIGH (ref 0–0)
MCHC RBC-ENTMCNC: 30.7 PG — SIGNIFICANT CHANGE UP (ref 27–34)
MCHC RBC-ENTMCNC: 33.3 G/DL — SIGNIFICANT CHANGE UP (ref 32–36)
MCV RBC AUTO: 92.2 FL — SIGNIFICANT CHANGE UP (ref 80–100)
MONOCYTES # BLD AUTO: 0.7 K/UL — SIGNIFICANT CHANGE UP (ref 0–0.9)
MONOCYTES NFR BLD AUTO: 10 % — SIGNIFICANT CHANGE UP (ref 2–14)
NEUTROPHILS # BLD AUTO: 0.9 K/UL — LOW (ref 1.8–7.4)
NEUTROPHILS NFR BLD AUTO: 24 % — LOW (ref 43–77)
PLAT MORPH BLD: NORMAL — SIGNIFICANT CHANGE UP
PLATELET # BLD AUTO: 210 K/UL — SIGNIFICANT CHANGE UP (ref 150–400)
RBC # BLD: 3.15 M/UL — LOW (ref 3.8–5.2)
RBC # FLD: 13.2 % — SIGNIFICANT CHANGE UP (ref 10.3–14.5)
RBC BLD AUTO: SIGNIFICANT CHANGE UP
WBC # BLD: 4 K/UL — SIGNIFICANT CHANGE UP (ref 3.8–10.5)
WBC # FLD AUTO: 4 K/UL — SIGNIFICANT CHANGE UP (ref 3.8–10.5)

## 2019-06-10 PROCEDURE — 99214 OFFICE O/P EST MOD 30 MIN: CPT

## 2019-06-10 NOTE — PHYSICAL EXAM
[Ambulatory and capable of all self care but unable to carry out any work activities] : Status 2- Ambulatory and capable of all self care but unable to carry out any work activities. Up and about more than 50% of waking hours [Normal] : affect appropriate [Ulcers] : no ulcers [Thrush] : no thrush [de-identified] : alopecia [de-identified] : RRR, normal S1S2 [de-identified] : anicteric [de-identified] : A & O x 4 [de-identified] : no rash [de-identified] : warm, no edema

## 2019-06-10 NOTE — HISTORY OF PRESENT ILLNESS
[de-identified] : Ms. Calero is a 69 year old female who was initially referred for monoclonal gammopathy.\par She was noted to have proteinuria and then had a 24 hour urine protein which showed non-nephrotic range proteinuria of 510 mg/24 hours. She was referred to Dr. Lesa Rendon of nephrology. SPEP showed faint M-spike 0.2 g/dL in gammaglobulin region, immunofixation showed this to be a IgG kappa monoclonal protein. UPEP showed 2 monoclonal protein bands, 61%, and 0.7%. \par \par Subsequent work up:\par 6/19/18 M-protein 0.2 g/dL, Kappa FLC 71, lambda FLC 0.36, FLC ratio 198.61\par , Beta 2 microglobulin 1.5 mg/L\par \par She had a bone marrow biopsy on 6/22/18. Pathology: plasma cell neoplasm, plasma cells comprise 20-25% of marrow cells. Trilineage hematopoiesis present with maturation, increased iron stores. Congo red stain negative for amyloid. Karyotype 46, XX. FISH panel - positive for CCND1/IGH fusion - a/w favorable prognosis. PET CT (7/12/18) did not show any bone lesions. Began VRd on 7/19/18. \par \par On C4 of VRd. FLC ratio initially decreasing -->198 --> 126 --> 67, however has increased to 115 with urine studies positive for Bence Brooks protein. For refractory disease, she was switched to KRd for a deeper response.\par \par  [de-identified] : Since initial consult visit on 11/26/18, the patient is currently on cycle 4 KRd(started 1/2/19), with day 21 of Revlimid completed on 1/22/19. She underwent pre-transplant testing on 12/21/18. She has moderate fatigue and good appetite. She denies fever, shortness of breath, abdominal pain. Today, I again did comprehensive review of consent forms and after all questions addressed, the patient signed consent forms.\par \par On 2/11/19, patient presents for a pre stem cell collection visit. Anahi received cytoxan on 2/5/19 followed by 12 mg of neulasta on 2/6/19. Overall patient is well and offers no acute concerns today. Denies fever, chills, nausea, vomiting or diarrhea. Denies SOB, chest pain or B/L LE edema. Patient is currently on ciprofloxacin for prophylaxis. Denies any pain at this time. \par \par On 2/13/19 visit, patient presents for a pre stem cell collection visit. Patient offers no acute concerns. Currently taking ciprofloxacin 250 mg BID for 10 days as prophylaxis. Denies fever, chills, nausea, vomiting,diarrhea, rash or dysuria. Denies SOB, chest pain or B/L LE edema. Currently not on any blood thinners. \par \par On 2/21/19 visit, patient presents for a pre admission visit. Tolerated stem cell mobilization and is scheduled for kepivance today, tomorrow and on 2/23/19. Completed ciprofloxacin. Denies fever, chills, nausea, vomiting, diarrhea, rash, mouth sores or dysuria. Denies SOB, chest pain or B/L LE edema or pain. \par \par Since office visit on 2/21/19, patient was admitted to BMTU on 2/25/19 and received Melphalan 100 mg/m2 IV x 2 consecutive days followed by autoPSCT(10.04 x 10^6/kg) on 2/28/19. Hospital course complicated by GI mucositis with diarrhea managed with Imodium; stool C. diff- negative. Engraftment(white cells) noted on 3/12/19. Repeat UA and culture both negative on 3/15/19. \par \par Since discharge to home on 3/15/19, the patient has moderate fatigue, fair appetite with slow improvement. She denies fever, chills, shortness of breath, diarrhea. \par \par Since office visit on 3/21/19, the patient has moderate fatigue, and fair- good appetite. She has dry skin and on face she notes patchy rash. She has been using hydrocortisone cream 1% OTC during past week for 3 days. She denies fever, shortness of breath, diarrhea. \par \par Since office visit on 3/28/19, the patient called on 3/29 complaining of ongoing generalized pruritus without signs of rash/hives despite using moisturizer and hydrocortisone cream. Pt instructed to try daily long acting anti-histamine (i.e. Claritin, Allegra, or Zyrtec). May use PRN Benadryl for itching, educated regarding possible side effects including drowsiness, restlessness, dry mouth, etc. Pt instructed to call our office next week if symptoms persist/worsen. Pt verbalizes understanding and agrees with plan of care. \par She has only tried Benadryl at bedtime and didn't feel it really helped but overall she feels pruritus is better. She has moderate fatigue and normal appetite. She used Desonide cream for facial rash x 2 days with resolution, stopped use then rash recurred and put on once Monday 4/1/19 with resolution.  She denies fever, shortness of breath, abdominal pain, diarrhea.\par \par Since office visit on 4/4/19, she describes good energy level and good appetite. She denies fever, shortness of breath, bone pain. \par \par Since office visit on 4/25/19, serum immunoelectrophoresis(4/25) documents elevated KFLC- 48.92; LFLC- 0.25. Repeat labs on 5/8/19, with KFLC- 50.32, LFLC- 0.37; K/L FLC ratio- 136.00; CMP- within normal range. I discussed lab results with patient by phone conversation and recommendation to initiate immunomodulatory therapy with Pomalyst as she has recovered well post autoPSCT and has minimal symptoms. She has mild fatigue, and good appetite. She has mild muscle achiness near right hip since she started using a stationery bicycle. She denies fever, shortness of breath, abdominal pain. \par \par Since last office visit on 5/14/19, I spoke with Anahi on 5/17/19, and she informs me that Pomalyst has been delivered to her house. \par Plan-\par Begin Pomalyst 3 mg po daily on 5/18/19, take 21 days then 7 days off. C1D21- 6/7/19\par Begin aspirin 81 mg po daily on 5/18/19\par Drink plenty of water\par Repeat lab studies to include CBC with diff, CMP on 5/24 and 6/3/19 at [facility] [place]. \par Schedule followup office visit on 6/10/19. \par \par

## 2019-06-10 NOTE — REVIEW OF SYSTEMS
[Chills] : no chills [Fever] : no fever [Mucosal Pain] : no mucosal pain [Vision Problems] : no vision problems [Lower Ext Edema] : no lower extremity edema [Shortness Of Breath] : no shortness of breath [Cough] : no cough [Abdominal Pain] : no abdominal pain [Diarrhea] : no diarrhea [Vomiting] : no vomiting [Dysuria] : no dysuria [Skin Rash] : no skin rash [Dizziness] : no dizziness [Fainting] : no fainting [Easy Bruising] : no tendency for easy bruising [Easy Bleeding] : no tendency for easy bleeding [FreeTextEntry7] : no nausea [FreeTextEntry2] : mild fatigue [FreeTextEntry9] : no bone pain; right shoulder achiness [de-identified] : + dry skin better [de-identified] : no paresthesias

## 2019-06-10 NOTE — CONSULT LETTER
[Dear  ___] : Dear  [unfilled], [Courtesy Letter:] : I had the pleasure of seeing your patient, [unfilled], in my office today. [Please see my note below.] : Please see my note below. [Consult Closing:] : Thank you very much for allowing me to participate in the care of this patient.  If you have any questions, please do not hesitate to contact me. [Sincerely,] : Sincerely, [FreeTextEntry2] : Marshall De Oliveira MD\par Medical Oncology/Hematology\par [facility] [place]\par [facility] \par [address]\par [place], [place]   [address]\par \par  [FreeTextEntry3] : \par Analisa Rahman M.D.\par \par  of Medicine\par [facility] [employer] [facility]\par [facility] \par [facility] [place]\par [address] [place] [address] \[place] [address] \par ph: [phone]\par fax: [phone]\par

## 2019-06-10 NOTE — ASSESSMENT
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 I discussed rationale of starting Pomalyst versus Revlimid therapy. I discussed potential side effects of Pomalyst, including but not limited to, risk of thrombosis, rash, gastrointestinal/hepatic, musculoskeletal, myelosuppression, risk of infection, secondary malignancy, fatigue. She agrees to begin Pomalyst.\par Begin Pomalyst 3 mg po daily(on 5/18/19), take for 21 days followed by 7 days off, then repeat\par Begin aspirin 81 mg po daily on 5/18/19\par Drink plenty of water daily\par Repeat lab studies to include CBC with diff, CMP on 5/24/19 at [facility]. \par Continue current medications as documented in medication history\par Discontinued restrictions on 4/25/19\par Continue Acyclovir, Mepron prophylaxis\par Continue MVI with folate\par Continue antiemetics(Zofran, Reglan) as needed.\par Hold Amlodipine if SBP <=110\par Moisturizing cream to the skin several times per day\par Call immediately if fevers, chills, symptoms of infection. \par Patient will call on 6/12/19 to discuss lab results and plan for C2 Pomalyst.\par RTC in 1 month.

## 2019-06-10 NOTE — REASON FOR VISIT
[Follow-Up Visit] : a follow-up visit for [FreeTextEntry2] : IgG kappa myeloma s/p autologous peripheral stem cell transplant on 2/28/19.

## 2019-06-11 LAB
ALBUMIN SERPL ELPH-MCNC: 4.2 G/DL
ALP BLD-CCNC: 86 U/L
ALT SERPL-CCNC: 44 U/L
ANION GAP SERPL CALC-SCNC: 12 MMOL/L
AST SERPL-CCNC: 29 U/L
BILIRUB SERPL-MCNC: 0.4 MG/DL
BUN SERPL-MCNC: 16 MG/DL
CALCIUM SERPL-MCNC: 9.2 MG/DL
CHLORIDE SERPL-SCNC: 110 MMOL/L
CO2 SERPL-SCNC: 24 MMOL/L
CREAT SERPL-MCNC: 0.59 MG/DL
GLUCOSE SERPL-MCNC: 74 MG/DL
POTASSIUM SERPL-SCNC: 4.2 MMOL/L
PROT SERPL-MCNC: 5.9 G/DL
SODIUM SERPL-SCNC: 145 MMOL/L

## 2019-06-12 LAB
ALBUMIN MFR SERPL ELPH: 65.7 %
ALBUMIN SERPL-MCNC: 3.9 G/DL
ALBUMIN/GLOB SERPL: 2 RATIO
ALPHA1 GLOB MFR SERPL ELPH: 4.6 %
ALPHA1 GLOB SERPL ELPH-MCNC: 0.3 G/DL
ALPHA2 GLOB MFR SERPL ELPH: 10.8 %
ALPHA2 GLOB SERPL ELPH-MCNC: 0.6 G/DL
B-GLOBULIN MFR SERPL ELPH: 9.6 %
B-GLOBULIN SERPL ELPH-MCNC: 0.6 G/DL
DEPRECATED KAPPA LC FREE/LAMBDA SER: 42.6 RATIO
GAMMA GLOB FLD ELPH-MCNC: 0.5 G/DL
GAMMA GLOB MFR SERPL ELPH: 9.3 %
IGA SER QL IEP: 10 MG/DL
IGG SER QL IEP: 570 MG/DL
IGM SER QL IEP: 19 MG/DL
INTERPRETATION SERPL IEP-IMP: NORMAL
KAPPA LC CSF-MCNC: 0.81 MG/DL
KAPPA LC SERPL-MCNC: 34.51 MG/DL
M PROTEIN MFR SERPL ELPH: NORMAL
M PROTEIN SPEC IFE-MCNC: NORMAL
MONOCLON BAND OBS SERPL: NORMAL
PROT SERPL-MCNC: 5.9 G/DL
PROT SERPL-MCNC: 5.9 G/DL

## 2019-06-13 ENCOUNTER — RESULT REVIEW (OUTPATIENT)
Age: 69
End: 2019-06-13

## 2019-06-18 ENCOUNTER — OUTPATIENT (OUTPATIENT)
Dept: OUTPATIENT SERVICES | Facility: HOSPITAL | Age: 69
LOS: 1 days | Discharge: HOME | End: 2019-06-18

## 2019-06-18 DIAGNOSIS — C90.00 MULTIPLE MYELOMA NOT HAVING ACHIEVED REMISSION: ICD-10-CM

## 2019-06-18 DIAGNOSIS — Z98.51 TUBAL LIGATION STATUS: Chronic | ICD-10-CM

## 2019-06-20 ENCOUNTER — RESULT REVIEW (OUTPATIENT)
Age: 69
End: 2019-06-20

## 2019-06-20 ENCOUNTER — APPOINTMENT (OUTPATIENT)
Dept: HEMATOLOGY ONCOLOGY | Facility: CLINIC | Age: 69
End: 2019-06-20

## 2019-06-20 LAB
ANISOCYTOSIS BLD QL: SLIGHT — SIGNIFICANT CHANGE UP
BASOPHILS # BLD AUTO: 0.2 K/UL — SIGNIFICANT CHANGE UP (ref 0–0.2)
BASOPHILS NFR BLD AUTO: 2 % — SIGNIFICANT CHANGE UP (ref 0–2)
EOSINOPHIL # BLD AUTO: 0.2 K/UL — SIGNIFICANT CHANGE UP (ref 0–0.5)
EOSINOPHIL NFR BLD AUTO: 4 % — SIGNIFICANT CHANGE UP (ref 0–6)
HCT VFR BLD CALC: 31 % — LOW (ref 34.5–45)
HGB BLD-MCNC: 9.9 G/DL — LOW (ref 11.5–15.5)
LYMPHOCYTES # BLD AUTO: 2 K/UL — SIGNIFICANT CHANGE UP (ref 1–3.3)
LYMPHOCYTES # BLD AUTO: 47 % — HIGH (ref 13–44)
MACROCYTES BLD QL: SLIGHT — SIGNIFICANT CHANGE UP
MCHC RBC-ENTMCNC: 29.5 PG — SIGNIFICANT CHANGE UP (ref 27–34)
MCHC RBC-ENTMCNC: 31.8 G/DL — LOW (ref 32–36)
MCV RBC AUTO: 92.6 FL — SIGNIFICANT CHANGE UP (ref 80–100)
MICROCYTES BLD QL: SLIGHT — SIGNIFICANT CHANGE UP
MONOCYTES # BLD AUTO: 0.4 K/UL — SIGNIFICANT CHANGE UP (ref 0–0.9)
MONOCYTES NFR BLD AUTO: 16 % — HIGH (ref 2–14)
NEUTROPHILS # BLD AUTO: 1.3 K/UL — LOW (ref 1.8–7.4)
NEUTROPHILS NFR BLD AUTO: 30 % — LOW (ref 43–77)
NEUTS BAND # BLD: 1 % — SIGNIFICANT CHANGE UP (ref 0–8)
PLAT MORPH BLD: NORMAL — SIGNIFICANT CHANGE UP
PLATELET # BLD AUTO: 227 K/UL — SIGNIFICANT CHANGE UP (ref 150–400)
POIKILOCYTOSIS BLD QL AUTO: SLIGHT — SIGNIFICANT CHANGE UP
POLYCHROMASIA BLD QL SMEAR: SLIGHT — SIGNIFICANT CHANGE UP
RBC # BLD: 3.34 M/UL — LOW (ref 3.8–5.2)
RBC # FLD: 13.5 % — SIGNIFICANT CHANGE UP (ref 10.3–14.5)
RBC BLD AUTO: SIGNIFICANT CHANGE UP
WBC # BLD: 4.1 K/UL — SIGNIFICANT CHANGE UP (ref 3.8–10.5)
WBC # FLD AUTO: 4.1 K/UL — SIGNIFICANT CHANGE UP (ref 3.8–10.5)

## 2019-06-27 ENCOUNTER — RX RENEWAL (OUTPATIENT)
Age: 69
End: 2019-06-27

## 2019-07-05 ENCOUNTER — APPOINTMENT (OUTPATIENT)
Dept: HEMATOLOGY ONCOLOGY | Facility: CLINIC | Age: 69
End: 2019-07-05

## 2019-07-05 ENCOUNTER — RESULT REVIEW (OUTPATIENT)
Age: 69
End: 2019-07-05

## 2019-07-05 LAB
BASOPHILS # BLD AUTO: 0.1 K/UL — SIGNIFICANT CHANGE UP (ref 0–0.2)
BASOPHILS NFR BLD AUTO: 2 % — SIGNIFICANT CHANGE UP (ref 0–2)
EOSINOPHIL # BLD AUTO: 0.9 K/UL — HIGH (ref 0–0.5)
EOSINOPHIL NFR BLD AUTO: 19.6 % — HIGH (ref 0–6)
HCT VFR BLD CALC: 33.1 % — LOW (ref 34.5–45)
HGB BLD-MCNC: 11.9 G/DL — SIGNIFICANT CHANGE UP (ref 11.5–15.5)
LYMPHOCYTES # BLD AUTO: 1.8 K/UL — SIGNIFICANT CHANGE UP (ref 1–3.3)
LYMPHOCYTES # BLD AUTO: 39.3 % — SIGNIFICANT CHANGE UP (ref 13–44)
MCHC RBC-ENTMCNC: 31.8 PG — SIGNIFICANT CHANGE UP (ref 27–34)
MCHC RBC-ENTMCNC: 36 G/DL — SIGNIFICANT CHANGE UP (ref 32–36)
MCV RBC AUTO: 88.4 FL — SIGNIFICANT CHANGE UP (ref 80–100)
MONOCYTES # BLD AUTO: 0.5 K/UL — SIGNIFICANT CHANGE UP (ref 0–0.9)
MONOCYTES NFR BLD AUTO: 11.1 % — SIGNIFICANT CHANGE UP (ref 2–14)
NEUTROPHILS # BLD AUTO: 1.3 K/UL — LOW (ref 1.8–7.4)
NEUTROPHILS NFR BLD AUTO: 28 % — LOW (ref 43–77)
PLATELET # BLD AUTO: 307 K/UL — SIGNIFICANT CHANGE UP (ref 150–400)
RBC # BLD: 3.74 M/UL — LOW (ref 3.8–5.2)
RBC # FLD: 13.2 % — SIGNIFICANT CHANGE UP (ref 10.3–14.5)
WBC # BLD: 4.7 K/UL — SIGNIFICANT CHANGE UP (ref 3.8–10.5)
WBC # FLD AUTO: 4.7 K/UL — SIGNIFICANT CHANGE UP (ref 3.8–10.5)

## 2019-07-10 ENCOUNTER — OUTPATIENT (OUTPATIENT)
Dept: OUTPATIENT SERVICES | Facility: HOSPITAL | Age: 69
LOS: 1 days | Discharge: HOME | End: 2019-07-10

## 2019-07-10 DIAGNOSIS — Z01.818 ENCOUNTER FOR OTHER PREPROCEDURAL EXAMINATION: ICD-10-CM

## 2019-07-10 DIAGNOSIS — C90.00 MULTIPLE MYELOMA NOT HAVING ACHIEVED REMISSION: ICD-10-CM

## 2019-07-10 DIAGNOSIS — Z98.51 TUBAL LIGATION STATUS: Chronic | ICD-10-CM

## 2019-07-15 ENCOUNTER — RESULT REVIEW (OUTPATIENT)
Age: 69
End: 2019-07-15

## 2019-07-15 ENCOUNTER — LABORATORY RESULT (OUTPATIENT)
Age: 69
End: 2019-07-15

## 2019-07-15 ENCOUNTER — APPOINTMENT (OUTPATIENT)
Dept: HEMATOLOGY ONCOLOGY | Facility: CLINIC | Age: 69
End: 2019-07-15
Payer: MEDICARE

## 2019-07-15 VITALS
HEART RATE: 68 BPM | DIASTOLIC BLOOD PRESSURE: 78 MMHG | OXYGEN SATURATION: 100 % | TEMPERATURE: 98.7 F | RESPIRATION RATE: 16 BRPM | BODY MASS INDEX: 29.84 KG/M2 | WEIGHT: 168.43 LBS | SYSTOLIC BLOOD PRESSURE: 145 MMHG

## 2019-07-15 LAB
BASOPHILS # BLD AUTO: 0.1 K/UL — SIGNIFICANT CHANGE UP (ref 0–0.2)
BASOPHILS NFR BLD AUTO: 2.1 % — HIGH (ref 0–2)
EOSINOPHIL # BLD AUTO: 0.1 K/UL — SIGNIFICANT CHANGE UP (ref 0–0.5)
EOSINOPHIL NFR BLD AUTO: 1.2 % — SIGNIFICANT CHANGE UP (ref 0–6)
HCT VFR BLD CALC: 34.8 % — SIGNIFICANT CHANGE UP (ref 34.5–45)
HGB BLD-MCNC: 11.2 G/DL — LOW (ref 11.5–15.5)
LYMPHOCYTES # BLD AUTO: 2.4 K/UL — SIGNIFICANT CHANGE UP (ref 1–3.3)
LYMPHOCYTES # BLD AUTO: 48 % — HIGH (ref 13–44)
MCHC RBC-ENTMCNC: 29.2 PG — SIGNIFICANT CHANGE UP (ref 27–34)
MCHC RBC-ENTMCNC: 32.1 G/DL — SIGNIFICANT CHANGE UP (ref 32–36)
MCV RBC AUTO: 91 FL — SIGNIFICANT CHANGE UP (ref 80–100)
MONOCYTES # BLD AUTO: 0.5 K/UL — SIGNIFICANT CHANGE UP (ref 0–0.9)
MONOCYTES NFR BLD AUTO: 10.5 % — SIGNIFICANT CHANGE UP (ref 2–14)
NEUTROPHILS # BLD AUTO: 2 K/UL — SIGNIFICANT CHANGE UP (ref 1.8–7.4)
NEUTROPHILS NFR BLD AUTO: 38.2 % — LOW (ref 43–77)
PLATELET # BLD AUTO: 256 K/UL — SIGNIFICANT CHANGE UP (ref 150–400)
RBC # BLD: 3.83 M/UL — SIGNIFICANT CHANGE UP (ref 3.8–5.2)
RBC # FLD: 13.2 % — SIGNIFICANT CHANGE UP (ref 10.3–14.5)
WBC # BLD: 5.1 K/UL — SIGNIFICANT CHANGE UP (ref 3.8–10.5)
WBC # FLD AUTO: 5.1 K/UL — SIGNIFICANT CHANGE UP (ref 3.8–10.5)

## 2019-07-15 PROCEDURE — 99215 OFFICE O/P EST HI 40 MIN: CPT

## 2019-07-15 NOTE — ASSESSMENT
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 I discussed rationale of starting Pomalyst versus Revlimid therapy. I discussed potential side effects of Pomalyst, including but not limited to, risk of thrombosis, rash, gastrointestinal/hepatic, musculoskeletal, myelosuppression, risk of infection, secondary malignancy, fatigue. She agrees to begin Pomalyst. Begin Pomalyst 3 mg po daily on 5/18/19, take 21 days then 7 days off. C1D21- 6/7/19\par Begin aspirin 81 mg po daily on 5/18/19.\par 07/15/19:\par Continue Pomalyst 3 mg po every other day(Day 1 of Cycle 2 Pomalyst started on 6/15/19).\par Continue aspirin 81 mg po daily.\par Continue Acyclovir; off Mepron\par Drink plenty of water\par Await CMP, myeloma labs\par Continue current medications as documented in medication history\par Discontinued restrictions on 4/25/19\par Continue MVI with folate\par Continue antiemetics(Zofran, Reglan) as needed.\par Hold Amlodipine if SBP <=110\par Moisturizing cream to the skin several times per day\par Call immediately if fevers, chills, symptoms of infection. \par \par 2) Acute right shoulder pain-\par Plan- Xray of right shoulder ordered\par Review prior bisphosphonate therapy\par Dental evaluation prior to initiating post transplant\par \par RTC in 1 month.

## 2019-07-15 NOTE — HISTORY OF PRESENT ILLNESS
[de-identified] : Ms. Calero is a 69 year old female who was initially referred for monoclonal gammopathy.\par She was noted to have proteinuria and then had a 24 hour urine protein which showed non-nephrotic range proteinuria of 510 mg/24 hours. She was referred to Dr. Lesa Rendon of nephrology. SPEP showed faint M-spike 0.2 g/dL in gammaglobulin region, immunofixation showed this to be a IgG kappa monoclonal protein. UPEP showed 2 monoclonal protein bands, 61%, and 0.7%. \par \par Subsequent work up:\par 6/19/18 M-protein 0.2 g/dL, Kappa FLC 71, lambda FLC 0.36, FLC ratio 198.61\par , Beta 2 microglobulin 1.5 mg/L\par \par She had a bone marrow biopsy on 6/22/18. Pathology: plasma cell neoplasm, plasma cells comprise 20-25% of marrow cells. Trilineage hematopoiesis present with maturation, increased iron stores. Congo red stain negative for amyloid. Karyotype 46, XX. FISH panel - positive for CCND1/IGH fusion - a/w favorable prognosis. PET CT (7/12/18) did not show any bone lesions. Began VRd on 7/19/18. \par \par On C4 of VRd. FLC ratio initially decreasing -->198 --> 126 --> 67, however has increased to 115 with urine studies positive for Bence Brooks protein. For refractory disease, she was switched to KRd for a deeper response.\par \par  [de-identified] : Since initial consult visit on 11/26/18, the patient is currently on cycle 4 KRd(started 1/2/19), with day 21 of Revlimid completed on 1/22/19. She underwent pre-transplant testing on 12/21/18. She has moderate fatigue and good appetite. She denies fever, shortness of breath, abdominal pain. Today, I again did comprehensive review of consent forms and after all questions addressed, the patient signed consent forms.\par \par On 2/11/19, patient presents for a pre stem cell collection visit. Anahi received cytoxan on 2/5/19 followed by 12 mg of neulasta on 2/6/19. Overall patient is well and offers no acute concerns today. Denies fever, chills, nausea, vomiting or diarrhea. Denies SOB, chest pain or B/L LE edema. Patient is currently on ciprofloxacin for prophylaxis. Denies any pain at this time. \par \par On 2/13/19 visit, patient presents for a pre stem cell collection visit. Patient offers no acute concerns. Currently taking ciprofloxacin 250 mg BID for 10 days as prophylaxis. Denies fever, chills, nausea, vomiting,diarrhea, rash or dysuria. Denies SOB, chest pain or B/L LE edema. Currently not on any blood thinners. \par \par On 2/21/19 visit, patient presents for a pre admission visit. Tolerated stem cell mobilization and is scheduled for kepivance today, tomorrow and on 2/23/19. Completed ciprofloxacin. Denies fever, chills, nausea, vomiting, diarrhea, rash, mouth sores or dysuria. Denies SOB, chest pain or B/L LE edema or pain. \par \par Since office visit on 2/21/19, patient was admitted to BMTU on 2/25/19 and received Melphalan 100 mg/m2 IV x 2 consecutive days followed by autoPSCT(10.04 x 10^6/kg) on 2/28/19. Hospital course complicated by GI mucositis with diarrhea managed with Imodium; stool C. diff- negative. Engraftment(white cells) noted on 3/12/19. Repeat UA and culture both negative on 3/15/19. \par \par Since discharge to home on 3/15/19, the patient has moderate fatigue, fair appetite with slow improvement. She denies fever, chills, shortness of breath, diarrhea. \par \par Since office visit on 3/21/19, the patient has moderate fatigue, and fair- good appetite. She has dry skin and on face she notes patchy rash. She has been using hydrocortisone cream 1% OTC during past week for 3 days. She denies fever, shortness of breath, diarrhea. \par \par Since office visit on 3/28/19, the patient called on 3/29 complaining of ongoing generalized pruritus without signs of rash/hives despite using moisturizer and hydrocortisone cream. Pt instructed to try daily long acting anti-histamine (i.e. Claritin, Allegra, or Zyrtec). May use PRN Benadryl for itching, educated regarding possible side effects including drowsiness, restlessness, dry mouth, etc. Pt instructed to call our office next week if symptoms persist/worsen. Pt verbalizes understanding and agrees with plan of care. \par She has only tried Benadryl at bedtime and didn't feel it really helped but overall she feels pruritus is better. She has moderate fatigue and normal appetite. She used Desonide cream for facial rash x 2 days with resolution, stopped use then rash recurred and put on once Monday 4/1/19 with resolution.  She denies fever, shortness of breath, abdominal pain, diarrhea.\par \par Since office visit on 4/4/19, she describes good energy level and good appetite. She denies fever, shortness of breath, bone pain. \par \par Since office visit on 4/25/19, serum immunoelectrophoresis(4/25) documents elevated KFLC- 48.92; LFLC- 0.25. Repeat labs on 5/8/19, with KFLC- 50.32, LFLC- 0.37; K/L FLC ratio- 136.00; CMP- within normal range. I discussed lab results with patient by phone conversation and recommendation to initiate immunomodulatory therapy with Pomalyst as she has recovered well post autoPSCT and has minimal symptoms. She has mild fatigue, and good appetite. She has mild muscle achiness near right hip since she started using a stationery bicycle. She denies fever, shortness of breath, abdominal pain. \par \par Since office visit on 5/14/19, I spoke with Anahi on 5/17/19, and she informs me that Pomalyst has been delivered to her house. \par Plan-\par Begin Pomalyst 3 mg po daily on 5/18/19, take 21 days then 7 days off. C1D21- 6/7/19\par Begin aspirin 81 mg po daily on 5/18/19\par Drink plenty of water\par \par Since last office visit on 6/10/19, labs documented serum immunoelectrophoresis- KFLC- 34.51(previously 50.3 on 5/8/19); K/L FLC ratio- 42.60(previously 136.00 on 5/8/19). \par Plan- She is on 7 days break after day 21(6/7/19) of cycle 1 Pomalyst 3 mg dose.\par Begin Pomalyst 3 mg po every other day on 6/15/19(Day 1 of Cycle 2 Pomalyst). \par I spoke to patient on 7/8/19 about labs from 7/5/19 with plan: Continue Pomalyst 3 mg po every other day(Day 1 of Cycle 2 Pomalyst started on 6/15/19). Will take through 7/13/19, then hold pending lab assessment on 7/15. She developed rhinorrhea on 7/12, then dry cough on 7/13, low grade fever 99, never up to 100 degrees, and no chills. Rhinorrhea is much better, intermittent cough, but no shortness of breath, sore throat, sinus pain. She describes new onset right shoulder pain only when lifting arm up x 2 weeks, has been worsening; no other bone pain. \par \par \par

## 2019-07-15 NOTE — CONSULT LETTER
[Dear  ___] : Dear  [unfilled], [Courtesy Letter:] : I had the pleasure of seeing your patient, [unfilled], in my office today. [Please see my note below.] : Please see my note below. [Consult Closing:] : Thank you very much for allowing me to participate in the care of this patient.  If you have any questions, please do not hesitate to contact me. [Sincerely,] : Sincerely, [FreeTextEntry2] : Marshall De Oliveira MD\par Medical Oncology/Hematology\par [facility] [place]\par [facility] \par [address] [facility]\[place], [place]   [address]\par \par  [FreeTextEntry3] : \par Analisa Rahman M.D.\par \par  of Medicine\par [employer] [facility] [employer]\par [facility] \par [facility] [place]\par [address] [place] [address] \[place] [address] \par ph: [phone]\par fax: [phone]\par

## 2019-07-15 NOTE — PHYSICAL EXAM
[Ambulatory and capable of all self care but unable to carry out any work activities] : Status 2- Ambulatory and capable of all self care but unable to carry out any work activities. Up and about more than 50% of waking hours [Normal] : affect appropriate [Ulcers] : no ulcers [Thrush] : no thrush [de-identified] : alopecia [de-identified] : anicteric [de-identified] : RRR, normal S1S2 [de-identified] : warm, no edema [de-identified] : no rash [de-identified] : A & O x 4

## 2019-07-15 NOTE — REVIEW OF SYSTEMS
[Cough] : cough [Fever] : no fever [Chills] : no chills [Fatigue] : no fatigue [Vision Problems] : no vision problems [Mucosal Pain] : no mucosal pain [Lower Ext Edema] : no lower extremity edema [Shortness Of Breath] : no shortness of breath [Abdominal Pain] : no abdominal pain [Vomiting] : no vomiting [Diarrhea] : no diarrhea [Dysuria] : no dysuria [Skin Rash] : no skin rash [Dizziness] : no dizziness [Fainting] : no fainting [Easy Bleeding] : no tendency for easy bleeding [Easy Bruising] : no tendency for easy bruising [FreeTextEntry7] : no nausea [FreeTextEntry9] : + right shoulder pain [de-identified] : + dry skin better [de-identified] : intermittent tingling sensation in right arm(rare) since shoulder pain

## 2019-07-18 LAB
ALBUMIN MFR SERPL ELPH: 64.7 %
ALBUMIN SERPL ELPH-MCNC: 4.7 G/DL
ALBUMIN SERPL-MCNC: 4.3 G/DL
ALBUMIN/GLOB SERPL: 1.8 RATIO
ALP BLD-CCNC: 87 U/L
ALPHA1 GLOB MFR SERPL ELPH: 4.8 %
ALPHA1 GLOB SERPL ELPH-MCNC: 0.3 G/DL
ALPHA2 GLOB MFR SERPL ELPH: 11.9 %
ALPHA2 GLOB SERPL ELPH-MCNC: 0.8 G/DL
ALT SERPL-CCNC: 22 U/L
ANION GAP SERPL CALC-SCNC: 11 MMOL/L
AST SERPL-CCNC: 26 U/L
B-GLOBULIN MFR SERPL ELPH: 9.6 %
B-GLOBULIN SERPL ELPH-MCNC: 0.6 G/DL
BILIRUB SERPL-MCNC: 0.3 MG/DL
BUN SERPL-MCNC: 12 MG/DL
CALCIUM SERPL-MCNC: 9.9 MG/DL
CHLORIDE SERPL-SCNC: 107 MMOL/L
CO2 SERPL-SCNC: 25 MMOL/L
CREAT SERPL-MCNC: 0.68 MG/DL
DEPRECATED KAPPA LC FREE/LAMBDA SER: 61.45 RATIO
GAMMA GLOB FLD ELPH-MCNC: 0.6 G/DL
GAMMA GLOB MFR SERPL ELPH: 9 %
GLUCOSE SERPL-MCNC: 82 MG/DL
IGA SER QL IEP: 11 MG/DL
IGG SER QL IEP: 651 MG/DL
IGM SER QL IEP: 16 MG/DL
INTERPRETATION SERPL IEP-IMP: NORMAL
KAPPA LC CSF-MCNC: 0.69 MG/DL
KAPPA LC SERPL-MCNC: 42.4 MG/DL
M PROTEIN MFR SERPL ELPH: NORMAL
M PROTEIN SPEC IFE-MCNC: NORMAL
MONOCLON BAND OBS SERPL: NORMAL
POTASSIUM SERPL-SCNC: 4.6 MMOL/L
PROT SERPL-MCNC: 6.7 G/DL
SODIUM SERPL-SCNC: 143 MMOL/L

## 2019-07-18 RX ORDER — ATOVAQUONE 750 MG/5ML
750 SUSPENSION ORAL TWICE DAILY
Refills: 0 | Status: DISCONTINUED | COMMUNITY
End: 2019-07-18

## 2019-07-23 ENCOUNTER — MEDICATION RENEWAL (OUTPATIENT)
Age: 69
End: 2019-07-23

## 2019-07-26 ENCOUNTER — OUTPATIENT (OUTPATIENT)
Dept: OUTPATIENT SERVICES | Facility: HOSPITAL | Age: 69
LOS: 1 days | Discharge: HOME | End: 2019-07-26

## 2019-07-26 DIAGNOSIS — Z98.51 TUBAL LIGATION STATUS: Chronic | ICD-10-CM

## 2019-07-26 DIAGNOSIS — C90.00 MULTIPLE MYELOMA NOT HAVING ACHIEVED REMISSION: ICD-10-CM

## 2019-07-29 ENCOUNTER — RESULT REVIEW (OUTPATIENT)
Age: 69
End: 2019-07-29

## 2019-07-29 ENCOUNTER — APPOINTMENT (OUTPATIENT)
Dept: HEMATOLOGY ONCOLOGY | Facility: CLINIC | Age: 69
End: 2019-07-29

## 2019-07-29 LAB
BASOPHILS # BLD AUTO: 0.2 K/UL — SIGNIFICANT CHANGE UP (ref 0–0.2)
BASOPHILS NFR BLD AUTO: 2.7 % — HIGH (ref 0–2)
EOSINOPHIL # BLD AUTO: 0.5 K/UL — SIGNIFICANT CHANGE UP (ref 0–0.5)
EOSINOPHIL NFR BLD AUTO: 8.6 % — HIGH (ref 0–6)
HCT VFR BLD CALC: 35.9 % — SIGNIFICANT CHANGE UP (ref 34.5–45)
HGB BLD-MCNC: 11.9 G/DL — SIGNIFICANT CHANGE UP (ref 11.5–15.5)
LYMPHOCYTES # BLD AUTO: 1.7 K/UL — SIGNIFICANT CHANGE UP (ref 1–3.3)
LYMPHOCYTES # BLD AUTO: 28.7 % — SIGNIFICANT CHANGE UP (ref 13–44)
MCHC RBC-ENTMCNC: 29.9 PG — SIGNIFICANT CHANGE UP (ref 27–34)
MCHC RBC-ENTMCNC: 33.3 G/DL — SIGNIFICANT CHANGE UP (ref 32–36)
MCV RBC AUTO: 89.8 FL — SIGNIFICANT CHANGE UP (ref 80–100)
MONOCYTES # BLD AUTO: 0.7 K/UL — SIGNIFICANT CHANGE UP (ref 0–0.9)
MONOCYTES NFR BLD AUTO: 11.7 % — SIGNIFICANT CHANGE UP (ref 2–14)
NEUTROPHILS # BLD AUTO: 2.8 K/UL — SIGNIFICANT CHANGE UP (ref 1.8–7.4)
NEUTROPHILS NFR BLD AUTO: 48.2 % — SIGNIFICANT CHANGE UP (ref 43–77)
PLATELET # BLD AUTO: 222 K/UL — SIGNIFICANT CHANGE UP (ref 150–400)
RBC # BLD: 3.99 M/UL — SIGNIFICANT CHANGE UP (ref 3.8–5.2)
RBC # FLD: 14.1 % — SIGNIFICANT CHANGE UP (ref 10.3–14.5)
WBC # BLD: 5.8 K/UL — SIGNIFICANT CHANGE UP (ref 3.8–10.5)
WBC # FLD AUTO: 5.8 K/UL — SIGNIFICANT CHANGE UP (ref 3.8–10.5)

## 2019-08-12 ENCOUNTER — MEDICATION RENEWAL (OUTPATIENT)
Age: 69
End: 2019-08-12

## 2019-08-14 ENCOUNTER — OUTPATIENT (OUTPATIENT)
Dept: OUTPATIENT SERVICES | Facility: HOSPITAL | Age: 69
LOS: 1 days | Discharge: HOME | End: 2019-08-14

## 2019-08-14 DIAGNOSIS — Z01.818 ENCOUNTER FOR OTHER PREPROCEDURAL EXAMINATION: ICD-10-CM

## 2019-08-14 DIAGNOSIS — C90.00 MULTIPLE MYELOMA NOT HAVING ACHIEVED REMISSION: ICD-10-CM

## 2019-08-14 DIAGNOSIS — Z98.51 TUBAL LIGATION STATUS: Chronic | ICD-10-CM

## 2019-08-15 ENCOUNTER — APPOINTMENT (OUTPATIENT)
Dept: HEMATOLOGY ONCOLOGY | Facility: CLINIC | Age: 69
End: 2019-08-15
Payer: MEDICARE

## 2019-08-15 ENCOUNTER — RESULT REVIEW (OUTPATIENT)
Age: 69
End: 2019-08-15

## 2019-08-15 VITALS
TEMPERATURE: 98.8 F | RESPIRATION RATE: 16 BRPM | DIASTOLIC BLOOD PRESSURE: 85 MMHG | HEART RATE: 88 BPM | BODY MASS INDEX: 29.76 KG/M2 | OXYGEN SATURATION: 100 % | WEIGHT: 167.99 LBS | SYSTOLIC BLOOD PRESSURE: 133 MMHG

## 2019-08-15 DIAGNOSIS — M25.511 PAIN IN RIGHT SHOULDER: ICD-10-CM

## 2019-08-15 LAB
BASOPHILS # BLD AUTO: 0 K/UL — SIGNIFICANT CHANGE UP (ref 0–0.2)
BASOPHILS NFR BLD AUTO: 0.8 % — SIGNIFICANT CHANGE UP (ref 0–2)
EOSINOPHIL # BLD AUTO: 0 K/UL — SIGNIFICANT CHANGE UP (ref 0–0.5)
EOSINOPHIL NFR BLD AUTO: 0.6 % — SIGNIFICANT CHANGE UP (ref 0–6)
HCT VFR BLD CALC: 33.5 % — LOW (ref 34.5–45)
HGB BLD-MCNC: 11 G/DL — LOW (ref 11.5–15.5)
LYMPHOCYTES # BLD AUTO: 2.3 K/UL — SIGNIFICANT CHANGE UP (ref 1–3.3)
LYMPHOCYTES # BLD AUTO: 38.9 % — SIGNIFICANT CHANGE UP (ref 13–44)
MCHC RBC-ENTMCNC: 30.3 PG — SIGNIFICANT CHANGE UP (ref 27–34)
MCHC RBC-ENTMCNC: 32.9 G/DL — SIGNIFICANT CHANGE UP (ref 32–36)
MCV RBC AUTO: 92.1 FL — SIGNIFICANT CHANGE UP (ref 80–100)
MONOCYTES # BLD AUTO: 0.8 K/UL — SIGNIFICANT CHANGE UP (ref 0–0.9)
MONOCYTES NFR BLD AUTO: 13.1 % — SIGNIFICANT CHANGE UP (ref 2–14)
NEUTROPHILS # BLD AUTO: 2.7 K/UL — SIGNIFICANT CHANGE UP (ref 1.8–7.4)
NEUTROPHILS NFR BLD AUTO: 46.7 % — SIGNIFICANT CHANGE UP (ref 43–77)
PLATELET # BLD AUTO: 227 K/UL — SIGNIFICANT CHANGE UP (ref 150–400)
RBC # BLD: 3.64 M/UL — LOW (ref 3.8–5.2)
RBC # FLD: 14.8 % — HIGH (ref 10.3–14.5)
WBC # BLD: 5.8 K/UL — SIGNIFICANT CHANGE UP (ref 3.8–10.5)
WBC # FLD AUTO: 5.8 K/UL — SIGNIFICANT CHANGE UP (ref 3.8–10.5)

## 2019-08-15 PROCEDURE — 99214 OFFICE O/P EST MOD 30 MIN: CPT

## 2019-08-15 NOTE — ASSESSMENT
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 I discussed rationale of starting Pomalyst versus Revlimid therapy. I discussed potential side effects of Pomalyst, including but not limited to, risk of thrombosis, rash, gastrointestinal/hepatic, musculoskeletal, myelosuppression, risk of infection, secondary malignancy, fatigue. She agrees to begin Pomalyst. Begin Pomalyst 3 mg po daily on 5/18/19, take 21 days then 7 days off. C1D21- 6/7/19\par Begin aspirin 81 mg po daily on 5/18/19.\par 07/15/19:\par Continue Pomalyst 3 mg po every other day(Day 1 of Cycle 2 Pomalyst started on 6/15/19).\par Adjusted schedule of Pomalyst to 3 mg po daily x 21 days, then 7 days off; (started cycle on 7/19/19)\par Continue aspirin 81 mg po daily.\par Continue Acyclovir while on Pom/dex therapy; off Mepron\par Drink plenty of water\par Await CMP, myeloma labs\par Continue current medications as documented in medication history\par Discontinued restrictions on 4/25/19\par Continue MVI with folate\par Continue antiemetics(Zofran, Reglan) as needed.\par Hold Amlodipine if SBP <=110\par Moisturizing cream to the skin several times per day\par Call immediately if fevers, chills, symptoms of infection. \par \par 2) H/O acute right shoulder pain- right shoulder pain completely resolved; she did not do Xray\par Plan- Repeat PET/CT scan at 1 year post transplant unless clinical evidence of POD, new onset bone pain. \par Review prior bisphosphonate therapy- she has never received, prior PET/CT scans- no bone lesions(last scan on 12/17/18)\par Dental evaluation completed on 7/31/19\par \par RTC in 2 months.

## 2019-08-15 NOTE — HISTORY OF PRESENT ILLNESS
[de-identified] : Since initial consult visit on 11/26/18, the patient is currently on cycle 4 KRd(started 1/2/19), with day 21 of Revlimid completed on 1/22/19. She underwent pre-transplant testing on 12/21/18. She has moderate fatigue and good appetite. She denies fever, shortness of breath, abdominal pain. Today, I again did comprehensive review of consent forms and after all questions addressed, the patient signed consent forms.\par \par On 2/11/19, patient presents for a pre stem cell collection visit. Anahi received cytoxan on 2/5/19 followed by 12 mg of neulasta on 2/6/19. Overall patient is well and offers no acute concerns today. Denies fever, chills, nausea, vomiting or diarrhea. Denies SOB, chest pain or B/L LE edema. Patient is currently on ciprofloxacin for prophylaxis. Denies any pain at this time. \par \par On 2/13/19 visit, patient presents for a pre stem cell collection visit. Patient offers no acute concerns. Currently taking ciprofloxacin 250 mg BID for 10 days as prophylaxis. Denies fever, chills, nausea, vomiting,diarrhea, rash or dysuria. Denies SOB, chest pain or B/L LE edema. Currently not on any blood thinners. \par \par On 2/21/19 visit, patient presents for a pre admission visit. Tolerated stem cell mobilization and is scheduled for kepivance today, tomorrow and on 2/23/19. Completed ciprofloxacin. Denies fever, chills, nausea, vomiting, diarrhea, rash, mouth sores or dysuria. Denies SOB, chest pain or B/L LE edema or pain. \par \par Since office visit on 2/21/19, patient was admitted to BMTU on 2/25/19 and received Melphalan 100 mg/m2 IV x 2 consecutive days followed by autoPSCT(10.04 x 10^6/kg) on 2/28/19. Hospital course complicated by GI mucositis with diarrhea managed with Imodium; stool C. diff- negative. Engraftment(white cells) noted on 3/12/19. Repeat UA and culture both negative on 3/15/19. \par \par Since discharge to home on 3/15/19, the patient has moderate fatigue, fair appetite with slow improvement. She denies fever, chills, shortness of breath, diarrhea. \par \par Since office visit on 3/21/19, the patient has moderate fatigue, and fair- good appetite. She has dry skin and on face she notes patchy rash. She has been using hydrocortisone cream 1% OTC during past week for 3 days. She denies fever, shortness of breath, diarrhea. \par \par Since office visit on 3/28/19, the patient called on 3/29 complaining of ongoing generalized pruritus without signs of rash/hives despite using moisturizer and hydrocortisone cream. Pt instructed to try daily long acting anti-histamine (i.e. Claritin, Allegra, or Zyrtec). May use PRN Benadryl for itching, educated regarding possible side effects including drowsiness, restlessness, dry mouth, etc. Pt instructed to call our office next week if symptoms persist/worsen. Pt verbalizes understanding and agrees with plan of care. \par She has only tried Benadryl at bedtime and didn't feel it really helped but overall she feels pruritus is better. She has moderate fatigue and normal appetite. She used Desonide cream for facial rash x 2 days with resolution, stopped use then rash recurred and put on once Monday 4/1/19 with resolution.  She denies fever, shortness of breath, abdominal pain, diarrhea.\par \par Since office visit on 4/4/19, she describes good energy level and good appetite. She denies fever, shortness of breath, bone pain. \par \par Since office visit on 4/25/19, serum immunoelectrophoresis(4/25) documents elevated KFLC- 48.92; LFLC- 0.25. Repeat labs on 5/8/19, with KFLC- 50.32, LFLC- 0.37; K/L FLC ratio- 136.00; CMP- within normal range. I discussed lab results with patient by phone conversation and recommendation to initiate immunomodulatory therapy with Pomalyst as she has recovered well post autoPSCT and has minimal symptoms. She has mild fatigue, and good appetite. She has mild muscle achiness near right hip since she started using a stationery bicycle. She denies fever, shortness of breath, abdominal pain. \par \par Since office visit on 5/14/19, I spoke with Anahi on 5/17/19, and she informs me that Pomalyst has been delivered to her house. \par Plan-\par Begin Pomalyst 3 mg po daily on 5/18/19, take 21 days then 7 days off. C1D21- 6/7/19\par Begin aspirin 81 mg po daily on 5/18/19\par Drink plenty of water\par \par Since office visit on 6/10/19, labs documented serum immunoelectrophoresis- KFLC- 34.51(previously 50.3 on 5/8/19); K/L FLC ratio- 42.60(previously 136.00 on 5/8/19). \par Plan- She is on 7 days break after day 21(6/7/19) of cycle 1 Pomalyst 3 mg dose.\par Begin Pomalyst 3 mg po every other day on 6/15/19(Day 1 of Cycle 2 Pomalyst). \par I spoke to patient on 7/8/19 about labs from 7/5/19 with plan: Continue Pomalyst 3 mg po every other day(Day 1 of Cycle 2 Pomalyst started on 6/15/19). Will take through 7/13/19, then hold pending lab assessment on 7/15. She developed rhinorrhea on 7/12, then dry cough on 7/13, low grade fever 99, never up to 100 degrees, and no chills. Rhinorrhea is much better, intermittent cough, but no shortness of breath, sore throat, sinus pain. She describes new onset right shoulder pain only when lifting arm up x 2 weeks, has been worsening; no other bone pain. \par \par Since last office visit on 7/15/19, repeat labs on 7/15/19- CMP- within normal range; KFLC- 42.40, previously 34.5 on 6/10/19. \par Plan- Resume Pomalyst at 3 mg po daily x 21 days, then 7 days off, beginning on 7/19/19\par Begin Decadron 20 mg po weekly, on 7/19/19\par Continue baby aspirin 81 mg po daily. \par Check [facility] with diff [facility] 7/29/19 at [facility]. \par Labs(7/29/19) WBC- 5.8, ANC- 2.8; Hgb- 11.9; Hct- 35.9; Platelets- 222K. \par Plan- \par Continue Pomalyst at 3 mg po daily x 21 days, then 7 days off; (started cycle on 7/19/19)\par Begin Decadron 20 mg po weekly; (started on 7/19/19)\par Continue baby aspirin 81 mg po daily. \par Day 21 of last cycle on 8/8/19, with 7 days off(day 7- 8/15), plan to resume next cycle on 8/16/19. \par She has good energy level and good appetite. She has persistent dry cough and slight rhinorrhea. She denies fever, chills, sore throat, shortness of breath. Prior shoulder pain has resolved, no other bone pain. \par \par \par  [de-identified] : Ms. Calero is a 69 year old female who was initially referred for monoclonal gammopathy.\par She was noted to have proteinuria and then had a 24 hour urine protein which showed non-nephrotic range proteinuria of 510 mg/24 hours. She was referred to Dr. Lesa Rendon of nephrology. SPEP showed faint M-spike 0.2 g/dL in gammaglobulin region, immunofixation showed this to be a IgG kappa monoclonal protein. UPEP showed 2 monoclonal protein bands, 61%, and 0.7%. \par \par Subsequent work up:\par 6/19/18 M-protein 0.2 g/dL, Kappa FLC 71, lambda FLC 0.36, FLC ratio 198.61\par , Beta 2 microglobulin 1.5 mg/L\par \par She had a bone marrow biopsy on 6/22/18. Pathology: plasma cell neoplasm, plasma cells comprise 20-25% of marrow cells. Trilineage hematopoiesis present with maturation, increased iron stores. Congo red stain negative for amyloid. Karyotype 46, XX. FISH panel - positive for CCND1/IGH fusion - a/w favorable prognosis. PET CT (7/12/18) did not show any bone lesions. Began VRd on 7/19/18. \par \par On C4 of VRd. FLC ratio initially decreasing -->198 --> 126 --> 67, however has increased to 115 with urine studies positive for Bence Brooks protein. For refractory disease, she was switched to KRd for a deeper response.\par \par

## 2019-08-15 NOTE — PHYSICAL EXAM
[Ambulatory and capable of all self care but unable to carry out any work activities] : Status 2- Ambulatory and capable of all self care but unable to carry out any work activities. Up and about more than 50% of waking hours [Normal] : affect appropriate [Ulcers] : no ulcers [de-identified] : hair growing in [Thrush] : no thrush [de-identified] : RRR, normal S1S2 [de-identified] : anicteric [de-identified] : warm, no edema [de-identified] : no rash [de-identified] : A & O x 4

## 2019-08-15 NOTE — CONSULT LETTER
[Dear  ___] : Dear  [unfilled], [Courtesy Letter:] : I had the pleasure of seeing your patient, [unfilled], in my office today. [Please see my note below.] : Please see my note below. [Consult Closing:] : Thank you very much for allowing me to participate in the care of this patient.  If you have any questions, please do not hesitate to contact me. [Sincerely,] : Sincerely, [FreeTextEntry2] : Marshall De Oliveira MD\par Medical Oncology/Hematology\par [facility] [place]\par [facility] \par [address]\[place], [place]   [address]\par \par  [FreeTextEntry3] : \par Analisa Rahman M.D.\par \par  of Medicine\par [employer]\par [facility] \par [facility] [place]\par [address] [place] [address] \[place] [address] \par ph: [phone]\par fax: [phone]\par

## 2019-08-15 NOTE — REVIEW OF SYSTEMS
[Cough] : cough [Fever] : no fever [Chills] : no chills [Fatigue] : no fatigue [Mucosal Pain] : no mucosal pain [Vision Problems] : no vision problems [Lower Ext Edema] : no lower extremity edema [Shortness Of Breath] : no shortness of breath [Abdominal Pain] : no abdominal pain [Vomiting] : no vomiting [Dysuria] : no dysuria [Diarrhea] : no diarrhea [Joint Pain] : no joint pain [Skin Rash] : no skin rash [Dizziness] : no dizziness [Fainting] : no fainting [Easy Bleeding] : no tendency for easy bleeding [Easy Bruising] : no tendency for easy bruising [FreeTextEntry7] : no nausea [FreeTextEntry9] : no bone pain [de-identified] : + dry skin better

## 2019-08-16 LAB
ALBUMIN SERPL ELPH-MCNC: 4.4 G/DL
ALP BLD-CCNC: 70 U/L
ALT SERPL-CCNC: 12 U/L
ANION GAP SERPL CALC-SCNC: 18 MMOL/L
AST SERPL-CCNC: 14 U/L
BILIRUB SERPL-MCNC: 0.3 MG/DL
BUN SERPL-MCNC: 9 MG/DL
CALCIUM SERPL-MCNC: 9.5 MG/DL
CHLORIDE SERPL-SCNC: 108 MMOL/L
CO2 SERPL-SCNC: 20 MMOL/L
CREAT SERPL-MCNC: 0.67 MG/DL
GLUCOSE SERPL-MCNC: 96 MG/DL
POTASSIUM SERPL-SCNC: 4 MMOL/L
PROT SERPL-MCNC: 5.9 G/DL
SODIUM SERPL-SCNC: 146 MMOL/L

## 2019-08-19 LAB
ALBUMIN MFR SERPL ELPH: 67.1 %
ALBUMIN SERPL-MCNC: 4 G/DL
ALBUMIN/GLOB SERPL: 2.1 RATIO
ALPHA1 GLOB MFR SERPL ELPH: 4.3 %
ALPHA1 GLOB SERPL ELPH-MCNC: 0.3 G/DL
ALPHA2 GLOB MFR SERPL ELPH: 11.4 %
ALPHA2 GLOB SERPL ELPH-MCNC: 0.7 G/DL
B-GLOBULIN MFR SERPL ELPH: 9.7 %
B-GLOBULIN SERPL ELPH-MCNC: 0.6 G/DL
DEPRECATED KAPPA LC FREE/LAMBDA SER: 51.18 RATIO
GAMMA GLOB FLD ELPH-MCNC: 0.4 G/DL
GAMMA GLOB MFR SERPL ELPH: 7.5 %
IGA SER QL IEP: 22 MG/DL
IGG SER QL IEP: 483 MG/DL
IGM SER QL IEP: 17 MG/DL
INTERPRETATION SERPL IEP-IMP: NORMAL
KAPPA LC CSF-MCNC: 0.49 MG/DL
KAPPA LC SERPL-MCNC: 25.08 MG/DL
M PROTEIN SPEC IFE-MCNC: NORMAL
PROT SERPL-MCNC: 5.9 G/DL
PROT SERPL-MCNC: 5.9 G/DL

## 2019-08-28 ENCOUNTER — MEDICATION RENEWAL (OUTPATIENT)
Age: 69
End: 2019-08-28

## 2019-09-12 ENCOUNTER — OUTPATIENT (OUTPATIENT)
Dept: OUTPATIENT SERVICES | Facility: HOSPITAL | Age: 69
LOS: 1 days | Discharge: HOME | End: 2019-09-12

## 2019-09-12 DIAGNOSIS — C90.00 MULTIPLE MYELOMA NOT HAVING ACHIEVED REMISSION: ICD-10-CM

## 2019-09-12 DIAGNOSIS — Z98.51 TUBAL LIGATION STATUS: Chronic | ICD-10-CM

## 2019-09-13 ENCOUNTER — APPOINTMENT (OUTPATIENT)
Dept: HEMATOLOGY ONCOLOGY | Facility: CLINIC | Age: 69
End: 2019-09-13

## 2019-09-13 ENCOUNTER — RESULT REVIEW (OUTPATIENT)
Age: 69
End: 2019-09-13

## 2019-09-13 LAB
BASOPHILS # BLD AUTO: 0.1 K/UL — SIGNIFICANT CHANGE UP (ref 0–0.2)
BASOPHILS NFR BLD AUTO: 2.5 % — HIGH (ref 0–2)
EOSINOPHIL # BLD AUTO: 0 K/UL — SIGNIFICANT CHANGE UP (ref 0–0.5)
EOSINOPHIL NFR BLD AUTO: 0.7 % — SIGNIFICANT CHANGE UP (ref 0–6)
HCT VFR BLD CALC: 34.5 % — SIGNIFICANT CHANGE UP (ref 34.5–45)
HGB BLD-MCNC: 11.2 G/DL — LOW (ref 11.5–15.5)
LYMPHOCYTES # BLD AUTO: 1.1 K/UL — SIGNIFICANT CHANGE UP (ref 1–3.3)
LYMPHOCYTES # BLD AUTO: 23.8 % — SIGNIFICANT CHANGE UP (ref 13–44)
MCHC RBC-ENTMCNC: 30.3 PG — SIGNIFICANT CHANGE UP (ref 27–34)
MCHC RBC-ENTMCNC: 32.4 G/DL — SIGNIFICANT CHANGE UP (ref 32–36)
MCV RBC AUTO: 93.5 FL — SIGNIFICANT CHANGE UP (ref 80–100)
MONOCYTES # BLD AUTO: 0.4 K/UL — SIGNIFICANT CHANGE UP (ref 0–0.9)
MONOCYTES NFR BLD AUTO: 8.9 % — SIGNIFICANT CHANGE UP (ref 2–14)
NEUTROPHILS # BLD AUTO: 2.9 K/UL — SIGNIFICANT CHANGE UP (ref 1.8–7.4)
NEUTROPHILS NFR BLD AUTO: 64.1 % — SIGNIFICANT CHANGE UP (ref 43–77)
PLATELET # BLD AUTO: 194 K/UL — SIGNIFICANT CHANGE UP (ref 150–400)
RBC # BLD: 3.69 M/UL — LOW (ref 3.8–5.2)
RBC # FLD: 15.8 % — HIGH (ref 10.3–14.5)
WBC # BLD: 4.5 K/UL — SIGNIFICANT CHANGE UP (ref 3.8–10.5)
WBC # FLD AUTO: 4.5 K/UL — SIGNIFICANT CHANGE UP (ref 3.8–10.5)

## 2019-10-06 ENCOUNTER — TRANSCRIPTION ENCOUNTER (OUTPATIENT)
Age: 69
End: 2019-10-06

## 2019-10-07 ENCOUNTER — RESULT REVIEW (OUTPATIENT)
Age: 69
End: 2019-10-07

## 2019-10-07 ENCOUNTER — OUTPATIENT (OUTPATIENT)
Dept: OUTPATIENT SERVICES | Facility: HOSPITAL | Age: 69
LOS: 1 days | End: 2019-10-07
Payer: MEDICARE

## 2019-10-07 DIAGNOSIS — Z12.11 ENCOUNTER FOR SCREENING FOR MALIGNANT NEOPLASM OF COLON: ICD-10-CM

## 2019-10-07 DIAGNOSIS — Z98.51 TUBAL LIGATION STATUS: Chronic | ICD-10-CM

## 2019-10-07 PROCEDURE — 88305 TISSUE EXAM BY PATHOLOGIST: CPT

## 2019-10-07 PROCEDURE — 45385 COLONOSCOPY W/LESION REMOVAL: CPT | Mod: PT

## 2019-10-07 PROCEDURE — 88305 TISSUE EXAM BY PATHOLOGIST: CPT | Mod: 26

## 2019-10-09 LAB — SURGICAL PATHOLOGY STUDY: SIGNIFICANT CHANGE UP

## 2019-10-10 ENCOUNTER — OUTPATIENT (OUTPATIENT)
Dept: OUTPATIENT SERVICES | Facility: HOSPITAL | Age: 69
LOS: 1 days | Discharge: HOME | End: 2019-10-10

## 2019-10-10 DIAGNOSIS — C90.00 MULTIPLE MYELOMA NOT HAVING ACHIEVED REMISSION: ICD-10-CM

## 2019-10-10 DIAGNOSIS — Z98.51 TUBAL LIGATION STATUS: Chronic | ICD-10-CM

## 2019-10-10 DIAGNOSIS — Z01.818 ENCOUNTER FOR OTHER PREPROCEDURAL EXAMINATION: ICD-10-CM

## 2019-10-14 ENCOUNTER — RESULT REVIEW (OUTPATIENT)
Age: 69
End: 2019-10-14

## 2019-10-14 ENCOUNTER — APPOINTMENT (OUTPATIENT)
Dept: HEMATOLOGY ONCOLOGY | Facility: CLINIC | Age: 69
End: 2019-10-14
Payer: MEDICARE

## 2019-10-14 VITALS
RESPIRATION RATE: 17 BRPM | BODY MASS INDEX: 30.38 KG/M2 | SYSTOLIC BLOOD PRESSURE: 129 MMHG | HEART RATE: 89 BPM | TEMPERATURE: 99.1 F | WEIGHT: 171.52 LBS | OXYGEN SATURATION: 99 % | DIASTOLIC BLOOD PRESSURE: 81 MMHG

## 2019-10-14 LAB
BASOPHILS # BLD AUTO: 0.1 K/UL — SIGNIFICANT CHANGE UP (ref 0–0.2)
BASOPHILS NFR BLD AUTO: 1.4 % — SIGNIFICANT CHANGE UP (ref 0–2)
EOSINOPHIL # BLD AUTO: 0.5 K/UL — SIGNIFICANT CHANGE UP (ref 0–0.5)
EOSINOPHIL NFR BLD AUTO: 8 % — HIGH (ref 0–6)
HCT VFR BLD CALC: 34.9 % — SIGNIFICANT CHANGE UP (ref 34.5–45)
HGB BLD-MCNC: 11.4 G/DL — LOW (ref 11.5–15.5)
LYMPHOCYTES # BLD AUTO: 1.3 K/UL — SIGNIFICANT CHANGE UP (ref 1–3.3)
LYMPHOCYTES # BLD AUTO: 22.5 % — SIGNIFICANT CHANGE UP (ref 13–44)
MCHC RBC-ENTMCNC: 30.8 PG — SIGNIFICANT CHANGE UP (ref 27–34)
MCHC RBC-ENTMCNC: 32.7 G/DL — SIGNIFICANT CHANGE UP (ref 32–36)
MCV RBC AUTO: 94.2 FL — SIGNIFICANT CHANGE UP (ref 80–100)
MONOCYTES # BLD AUTO: 0.7 K/UL — SIGNIFICANT CHANGE UP (ref 0–0.9)
MONOCYTES NFR BLD AUTO: 11.3 % — SIGNIFICANT CHANGE UP (ref 2–14)
NEUTROPHILS # BLD AUTO: 3.4 K/UL — SIGNIFICANT CHANGE UP (ref 1.8–7.4)
NEUTROPHILS NFR BLD AUTO: 56.9 % — SIGNIFICANT CHANGE UP (ref 43–77)
PLATELET # BLD AUTO: 296 K/UL — SIGNIFICANT CHANGE UP (ref 150–400)
RBC # BLD: 3.71 M/UL — LOW (ref 3.8–5.2)
RBC # FLD: 15.1 % — HIGH (ref 10.3–14.5)
WBC # BLD: 5.9 K/UL — SIGNIFICANT CHANGE UP (ref 3.8–10.5)
WBC # FLD AUTO: 5.9 K/UL — SIGNIFICANT CHANGE UP (ref 3.8–10.5)

## 2019-10-14 PROCEDURE — 99214 OFFICE O/P EST MOD 30 MIN: CPT

## 2019-10-14 NOTE — ASSESSMENT
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 I discussed rationale of starting Pomalyst versus Revlimid therapy. I discussed potential side effects of Pomalyst, including but not limited to, risk of thrombosis, rash, gastrointestinal/hepatic, musculoskeletal, myelosuppression, risk of infection, secondary malignancy, fatigue. She agrees to begin Pomalyst. Begin Pomalyst 3 mg po daily on 5/18/19, take 21 days then 7 days off. C1D21- 6/7/19\par Begin aspirin 81 mg po daily on 5/18/19.\par 10/14/19:\par Previously on Pomalyst 3 mg po every other day(Day 1 of Cycle 2 Pomalyst started on 6/15/19).\par Adjusted schedule of Pomalyst to 3 mg po daily x 21 days, then 7 days off; (started cycle on 7/19/19)\par Continue aspirin 81 mg po daily.\par Continue Decadron 20 mg po weekly(on Friday)\par Continue Acyclovir while on Pom/dex therapy; off Mepron\par Drink plenty of water\par Await CMP, myeloma labs\par Continue current medications as documented in medication history\par Discontinued restrictions on 4/25/19\par Continue MVI with folate\par Continue antiemetics(Zofran, Reglan) as needed.\par Hold Amlodipine if SBP <=110\par Moisturizing cream to the skin several times per day\par Call immediately if fevers, chills, symptoms of infection. \par \par 2) H/O acute right shoulder pain- right shoulder pain completely resolved; she did not do Xray\par Plan- Repeat PET/CT scan at 1 year post transplant unless clinical evidence of POD, new onset bone pain. \par Review prior bisphosphonate therapy- she has never received, prior PET/CT scans- no bone lesions(last scan on 12/17/18)\par Dental evaluation completed on 7/31/19\par \par RTC in 2 months.

## 2019-10-14 NOTE — PHYSICAL EXAM
[Ambulatory and capable of all self care but unable to carry out any work activities] : Status 2- Ambulatory and capable of all self care but unable to carry out any work activities. Up and about more than 50% of waking hours [Normal] : affect appropriate [Ulcers] : no ulcers [Thrush] : no thrush [de-identified] : hair growing in [de-identified] : anicteric [de-identified] : RRR, normal S1S2 [de-identified] : warm, no edema [de-identified] : no rash [de-identified] : A & O x 4

## 2019-10-14 NOTE — CONSULT LETTER
[Dear  ___] : Dear  [unfilled], [Courtesy Letter:] : I had the pleasure of seeing your patient, [unfilled], in my office today. [Please see my note below.] : Please see my note below. [Sincerely,] : Sincerely, [Consult Closing:] : Thank you very much for allowing me to participate in the care of this patient.  If you have any questions, please do not hesitate to contact me. [FreeTextEntry2] : Marshall De Oliveira MD\par Medical Oncology/Hematology\par [facility] [place]\par [facility] \par [address]\[place]   [address]\par \par  [FreeTextEntry3] : \par Analisa Rahman M.D.\par \par  of Medicine\par [employer]\par [facility] \par [facility] [place]\par [address] [place] [address] \[place] [address] \par ph: [phone]\par fax: [phone]\par

## 2019-10-14 NOTE — REVIEW OF SYSTEMS
[Fever] : no fever [Vision Problems] : no vision problems [Mucosal Pain] : no mucosal pain [Lower Ext Edema] : no lower extremity edema [Shortness Of Breath] : no shortness of breath [Cough] : no cough [Abdominal Pain] : no abdominal pain [Vomiting] : no vomiting [Diarrhea] : no diarrhea [Dysuria] : no dysuria [Joint Pain] : no joint pain [Skin Rash] : no skin rash [Dizziness] : no dizziness [Fainting] : no fainting [Easy Bleeding] : no tendency for easy bleeding [Easy Bruising] : no tendency for easy bruising [FreeTextEntry7] : no nausea [FreeTextEntry9] : no bone pain [de-identified] : + dry skin better

## 2019-10-14 NOTE — HISTORY OF PRESENT ILLNESS
[de-identified] : Ms. Calero is a 69 year old female who was initially referred for monoclonal gammopathy.\par She was noted to have proteinuria and then had a 24 hour urine protein which showed non-nephrotic range proteinuria of 510 mg/24 hours. She was referred to Dr. Lesa Rendon of nephrology. SPEP showed faint M-spike 0.2 g/dL in gammaglobulin region, immunofixation showed this to be a IgG kappa monoclonal protein. UPEP showed 2 monoclonal protein bands, 61%, and 0.7%. \par \par Subsequent work up:\par 6/19/18 M-protein 0.2 g/dL, Kappa FLC 71, lambda FLC 0.36, FLC ratio 198.61\par , Beta 2 microglobulin 1.5 mg/L\par \par She had a bone marrow biopsy on 6/22/18. Pathology: plasma cell neoplasm, plasma cells comprise 20-25% of marrow cells. Trilineage hematopoiesis present with maturation, increased iron stores. Congo red stain negative for amyloid. Karyotype 46, XX. FISH panel - positive for CCND1/IGH fusion - a/w favorable prognosis. PET CT (7/12/18) did not show any bone lesions. Began VRd on 7/19/18. \par \par On C4 of VRd. FLC ratio initially decreasing -->198 --> 126 --> 67, however has increased to 115 with urine studies positive for Bence Brooks protein. For refractory disease, she was switched to KRd for a deeper response.\par \par  [de-identified] : Since initial consult visit on 11/26/18, the patient is currently on cycle 4 KRd(started 1/2/19), with day 21 of Revlimid completed on 1/22/19. She underwent pre-transplant testing on 12/21/18. She has moderate fatigue and good appetite. She denies fever, shortness of breath, abdominal pain. Today, I again did comprehensive review of consent forms and after all questions addressed, the patient signed consent forms.\par \par On 2/11/19, patient presents for a pre stem cell collection visit. Anahi received cytoxan on 2/5/19 followed by 12 mg of neulasta on 2/6/19. Overall patient is well and offers no acute concerns today. Denies fever, chills, nausea, vomiting or diarrhea. Denies SOB, chest pain or B/L LE edema. Patient is currently on ciprofloxacin for prophylaxis. Denies any pain at this time. \par \par On 2/13/19 visit, patient presents for a pre stem cell collection visit. Patient offers no acute concerns. Currently taking ciprofloxacin 250 mg BID for 10 days as prophylaxis. Denies fever, chills, nausea, vomiting,diarrhea, rash or dysuria. Denies SOB, chest pain or B/L LE edema. Currently not on any blood thinners. \par \par On 2/21/19 visit, patient presents for a pre admission visit. Tolerated stem cell mobilization and is scheduled for kepivance today, tomorrow and on 2/23/19. Completed ciprofloxacin. Denies fever, chills, nausea, vomiting, diarrhea, rash, mouth sores or dysuria. Denies SOB, chest pain or B/L LE edema or pain. \par \par Since office visit on 2/21/19, patient was admitted to [facility] on 2/25/19 and received Melphalan 100 mg/m2 IV x 2 consecutive days followed by autoPSCT(10.04 x 10^6/kg) on 2/28/19. Hospital course complicated by GI mucositis with diarrhea managed with Imodium; stool C. diff- negative. Engraftment(white cells) noted on 3/12/19. Repeat UA and culture both negative on 3/15/19. \par \par Since discharge to home on 3/15/19, the patient has moderate fatigue, fair appetite with slow improvement. She denies fever, chills, shortness of breath, diarrhea. \par \par Since office visit on 3/21/19, the patient has moderate fatigue, and fair- good appetite. She has dry skin and on face she notes patchy rash. She has been using hydrocortisone cream 1% OTC during past week for 3 days. She denies fever, shortness of breath, diarrhea. \par \par Since office visit on 3/28/19, the patient called on 3/29 complaining of ongoing generalized pruritus without signs of rash/hives despite using moisturizer and hydrocortisone cream. Pt instructed to try daily long acting anti-histamine (i.e. Claritin, Allegra, or Zyrtec). May use PRN Benadryl for itching, educated regarding possible side effects including drowsiness, restlessness, dry mouth, etc. Pt instructed to call our office next week if symptoms persist/worsen. Pt verbalizes understanding and agrees with plan of care. \par She has only tried Benadryl at bedtime and didn't feel it really helped but overall she feels pruritus is better. She has moderate fatigue and normal appetite. She used Desonide cream for facial rash x 2 days with resolution, stopped use then rash recurred and put on once Monday 4/1/19 with resolution.  She denies fever, shortness of breath, abdominal pain, diarrhea.\par \par Since office visit on 4/4/19, she describes good energy level and good appetite. She denies fever, shortness of breath, bone pain. \par \par Since office visit on 4/25/19, serum immunoelectrophoresis(4/25) documents elevated KFLC- 48.92; LFLC- 0.25. Repeat labs on 5/8/19, with KFLC- 50.32, LFLC- 0.37; K/L FLC ratio- 136.00; CMP- within normal range. I discussed lab results with patient by phone conversation and recommendation to initiate immunomodulatory therapy with Pomalyst as she has recovered well post autoPSCT and has minimal symptoms. She has mild fatigue, and good appetite. She has mild muscle achiness near right hip since she started using a stationery bicycle. She denies fever, shortness of breath, abdominal pain. \par \par Since office visit on 5/14/19, I spoke with Anahi on 5/17/19, and she informs me that Pomalyst has been delivered to her house. \par Plan-\par Begin Pomalyst 3 mg po daily on 5/18/19, take 21 days then 7 days off. C1D21- 6/7/19\par Begin aspirin 81 mg po daily on 5/18/19\par Drink plenty of water\par \par Since office visit on 6/10/19, labs documented serum immunoelectrophoresis- KFLC- 34.51(previously 50.3 on 5/8/19); K/L FLC ratio- 42.60(previously 136.00 on 5/8/19). \par Plan- She is on 7 days break after day 21(6/7/19) of cycle 1 Pomalyst 3 mg dose.\par Begin Pomalyst 3 mg po every other day on 6/15/19(Day 1 of Cycle 2 Pomalyst). \par I spoke to patient on 7/8/19 about labs from 7/5/19 with plan: Continue Pomalyst 3 mg po every other day(Day 1 of Cycle 2 Pomalyst started on 6/15/19). Will take through 7/13/19, then hold pending lab assessment on 7/15. She developed rhinorrhea on 7/12, then dry cough on 7/13, low grade fever 99, never up to 100 degrees, and no chills. Rhinorrhea is much better, intermittent cough, but no shortness of breath, sore throat, sinus pain. She describes new onset right shoulder pain only when lifting arm up x 2 weeks, has been worsening; no other bone pain. \par \par Since office visit on 7/15/19, repeat labs on 7/15/19- CMP- within normal range; KFLC- 42.40, previously 34.5 on 6/10/19. \par Plan- Resume Pomalyst at 3 mg po daily x 21 days, then 7 days off, beginning on 7/19/19\par Begin Decadron 20 mg po weekly, on 7/19/19\par Continue baby aspirin 81 mg po daily. \par Check CBC with diff on 7/29/19 at [facility]. \par Labs(7/29/19) WBC- 5.8, ANC- 2.8; Hgb- 11.9; Hct- 35.9; Platelets- 222K. \par Plan- \par Continue Pomalyst at 3 mg po daily x 21 days, then 7 days off; (started cycle on 7/19/19)\par Begin Decadron 20 mg po weekly; (started on 7/19/19)\par Continue baby aspirin 81 mg po daily. \par Day 21 of last cycle on 8/8/19, with 7 days off(day 7- 8/15), plan to resume next cycle on 8/16/19. \par She has good energy level and good appetite. She has persistent dry cough and slight rhinorrhea. She denies fever, chills, sore throat, shortness of breath. Prior shoulder pain has resolved, no other bone pain. \par \par Since last office visit on 8/15/19, patient called to inform me that her colonoscopy is on 10/7/19. GI told her to D/C aspirin on 10/3/19.\par Plan-\par D/C Pomalyst and aspirin on 10/3/19. This will be day 21/21 of Pomalyst, of a 28 day cycle. \par She will then proceed with her 7 days off of Pomalyst(starting 10/4) and resume on 10/11/19. \par She has good energy level and good appetite. She denies fever, shortness of breath, abdominal pain. \par \par \par

## 2019-10-26 LAB
ALBUMIN MFR SERPL ELPH: 61.8 %
ALBUMIN SERPL ELPH-MCNC: 4.3 G/DL
ALBUMIN SERPL-MCNC: 3.8 G/DL
ALBUMIN/GLOB SERPL: 1.7 RATIO
ALP BLD-CCNC: 91 U/L
ALPHA1 GLOB MFR SERPL ELPH: 5.8 %
ALPHA1 GLOB SERPL ELPH-MCNC: 0.4 G/DL
ALPHA2 GLOB MFR SERPL ELPH: 14.6 %
ALPHA2 GLOB SERPL ELPH-MCNC: 0.9 G/DL
ALT SERPL-CCNC: 11 U/L
ANION GAP SERPL CALC-SCNC: 13 MMOL/L
AST SERPL-CCNC: 14 U/L
B-GLOBULIN MFR SERPL ELPH: 11.6 %
B-GLOBULIN SERPL ELPH-MCNC: 0.7 G/DL
BILIRUB SERPL-MCNC: 0.3 MG/DL
BUN SERPL-MCNC: 11 MG/DL
CALCIUM SERPL-MCNC: 9.7 MG/DL
CHLORIDE SERPL-SCNC: 107 MMOL/L
CO2 SERPL-SCNC: 24 MMOL/L
CREAT SERPL-MCNC: 0.63 MG/DL
DEPRECATED KAPPA LC FREE/LAMBDA SER: 85.62 RATIO
GAMMA GLOB FLD ELPH-MCNC: 0.4 G/DL
GAMMA GLOB MFR SERPL ELPH: 6.2 %
GLUCOSE SERPL-MCNC: 73 MG/DL
IGA SER QL IEP: 31 MG/DL
IGG SER QL IEP: 430 MG/DL
IGM SER QL IEP: 14 MG/DL
INTERPRETATION SERPL IEP-IMP: NORMAL
KAPPA LC CSF-MCNC: 0.34 MG/DL
KAPPA LC SERPL-MCNC: 29.11 MG/DL
M PROTEIN SPEC IFE-MCNC: NORMAL
POTASSIUM SERPL-SCNC: 4.2 MMOL/L
PROT SERPL-MCNC: 6.1 G/DL
SODIUM SERPL-SCNC: 144 MMOL/L

## 2019-11-19 ENCOUNTER — APPOINTMENT (OUTPATIENT)
Dept: HEMATOLOGY ONCOLOGY | Facility: CLINIC | Age: 69
End: 2019-11-19

## 2019-11-19 ENCOUNTER — OUTPATIENT (OUTPATIENT)
Dept: OUTPATIENT SERVICES | Facility: HOSPITAL | Age: 69
LOS: 1 days | Discharge: HOME | End: 2019-11-19

## 2019-11-19 ENCOUNTER — RESULT REVIEW (OUTPATIENT)
Age: 69
End: 2019-11-19

## 2019-11-19 ENCOUNTER — MEDICATION RENEWAL (OUTPATIENT)
Age: 69
End: 2019-11-19

## 2019-11-19 DIAGNOSIS — Z98.51 TUBAL LIGATION STATUS: Chronic | ICD-10-CM

## 2019-11-19 DIAGNOSIS — C90.00 MULTIPLE MYELOMA NOT HAVING ACHIEVED REMISSION: ICD-10-CM

## 2019-11-19 LAB
ANISOCYTOSIS BLD QL: SLIGHT — SIGNIFICANT CHANGE UP
BASOPHILS # BLD AUTO: 0.1 K/UL — SIGNIFICANT CHANGE UP (ref 0–0.2)
BASOPHILS NFR BLD AUTO: 1 % — SIGNIFICANT CHANGE UP (ref 0–2)
BURR CELLS BLD QL SMEAR: PRESENT — SIGNIFICANT CHANGE UP
DACRYOCYTES BLD QL SMEAR: SLIGHT — SIGNIFICANT CHANGE UP
ELLIPTOCYTES BLD QL SMEAR: SLIGHT — SIGNIFICANT CHANGE UP
EOSINOPHIL # BLD AUTO: 0.4 K/UL — SIGNIFICANT CHANGE UP (ref 0–0.5)
EOSINOPHIL NFR BLD AUTO: 7 % — HIGH (ref 0–6)
HCT VFR BLD CALC: 35.7 % — SIGNIFICANT CHANGE UP (ref 34.5–45)
HGB BLD-MCNC: 11.4 G/DL — LOW (ref 11.5–15.5)
LG PLATELETS BLD QL AUTO: SLIGHT — SIGNIFICANT CHANGE UP
LYMPHOCYTES # BLD AUTO: 1 K/UL — SIGNIFICANT CHANGE UP (ref 1–3.3)
LYMPHOCYTES # BLD AUTO: 7 % — LOW (ref 13–44)
MCHC RBC-ENTMCNC: 30.2 PG — SIGNIFICANT CHANGE UP (ref 27–34)
MCHC RBC-ENTMCNC: 31.9 G/DL — LOW (ref 32–36)
MCV RBC AUTO: 94.8 FL — SIGNIFICANT CHANGE UP (ref 80–100)
MONOCYTES # BLD AUTO: 0.6 K/UL — SIGNIFICANT CHANGE UP (ref 0–0.9)
MONOCYTES NFR BLD AUTO: 3 % — SIGNIFICANT CHANGE UP (ref 2–14)
NEUTROPHILS # BLD AUTO: 5.7 K/UL — SIGNIFICANT CHANGE UP (ref 1.8–7.4)
NEUTROPHILS NFR BLD AUTO: 76 % — SIGNIFICANT CHANGE UP (ref 43–77)
NEUTS BAND # BLD: 4 % — SIGNIFICANT CHANGE UP (ref 0–8)
OVALOCYTES BLD QL SMEAR: SLIGHT — SIGNIFICANT CHANGE UP
PLAT MORPH BLD: NORMAL — SIGNIFICANT CHANGE UP
PLATELET # BLD AUTO: 250 K/UL — SIGNIFICANT CHANGE UP (ref 150–400)
POIKILOCYTOSIS BLD QL AUTO: SLIGHT — SIGNIFICANT CHANGE UP
POLYCHROMASIA BLD QL SMEAR: SLIGHT — SIGNIFICANT CHANGE UP
RBC # BLD: 3.77 M/UL — LOW (ref 3.8–5.2)
RBC # FLD: 15.8 % — HIGH (ref 10.3–14.5)
RBC BLD AUTO: SIGNIFICANT CHANGE UP
SCHISTOCYTES BLD QL AUTO: SLIGHT — SIGNIFICANT CHANGE UP
TOXIC GRANULES BLD QL SMEAR: PRESENT — SIGNIFICANT CHANGE UP
VARIANT LYMPHS # BLD: 2 % — SIGNIFICANT CHANGE UP (ref 0–6)
WBC # BLD: 7.8 K/UL — SIGNIFICANT CHANGE UP (ref 3.8–10.5)
WBC # FLD AUTO: 7.8 K/UL — SIGNIFICANT CHANGE UP (ref 3.8–10.5)

## 2019-11-21 ENCOUNTER — RESULT REVIEW (OUTPATIENT)
Age: 69
End: 2019-11-21

## 2019-11-26 NOTE — PHYSICAL EXAM
[Restricted in physically strenuous activity but ambulatory and able to carry out work of a light or sedentary nature] : Status 1- Restricted in physically strenuous activity but ambulatory and able to carry out work of a light or sedentary nature, e.g., light house work, office work [Normal] : affect appropriate [de-identified] : negative cervical, supra/infraclavicular adenopathy. [de-identified] : negative pedal edema or calve tenderness [de-identified] : BS+, soft, nontender on palpation. [de-identified] : without spinal or cva tenderness.

## 2019-11-26 NOTE — ASSESSMENT
[FreeTextEntry1] :  68 year old female with stage I multiple myeloma, IgG kappa M protien 0.2 g/dl, FLC ratio 198 + proteinuria. S/p bone marrow biopsy on 6/22/18 consistent with plasma cell neoplasm. FISH panel - positive for CCND1/IGH fusion - a/w favorable prognosis. PET CT (7/12/18) did not show any bone lesions. S/p 3.5 cycles of VRd from 7/19/18-10/4/18.  \par \par FLC ratio initially decreasing -->198 --> 126 --> 67, however then increased to 115 with urine studies positive for bence koo protein.  For refractory disease, she was switched to KRd on 10/11/18.  Developed HTN with first cycle which is now well controlled on current meds.  On 12/4/18, she did have FLC ratio rise to 207 which on repeat was 96 and stable. 12/17/18 PET negative for FDG avid lesion, but showed unchanged right ovarian hypodensity.\par \par Kappa light chains and FLC ratio 1/16/19 -->110.  Fatigued, "nervous" about transplant, but states she is committted.\par \par Plan:\par Proceed w/ stem cell transplant w/ Dr. Rahman\par Chemo, and infectious prophylaxis as per BMT team\par Right ovarian hypodensity --> transvaginal US for 2/2019\par

## 2019-11-26 NOTE — HISTORY OF PRESENT ILLNESS
[de-identified] : S/p C4 KRd on 1/02/19. Transplant plans are in place, last seen by Dr. Rahman on 1/15/19. Fatigue persists. Denies fevers, no SOB, no CP, appetite is good, no constipation or diarrhea, no pain/burning w/ urination. No LE edema. Energy level is good. The remainder of ROS is negative.\par  [de-identified] : Ms. Calero is a 68 year old female who was initially referred for monoclonal gammopathy.\par She was noted to have proteinuria and then had a 24 hour urine protein which showed non-nephrotic range proteinuria of 510 mg/24 hours. She was referred to Dr. Lesa Rendon of nephrology. SPEP showed faint M-spike 0.2 g/dL in gammaglobulin region, immunofixation showed this to be a IgG kappa monoclonal protein. UPEP showed 2 monoclonal protein bands, 61%, and 0.7%. \par \par Subsequent work up:\par 6/19/18 M-protein 0.2 g/dL, Kappa FLC 71, lambda FLC 0.36, FLC ratio 198.61\par , Beta 2 microglobulin 1.5 mg/L\par \par She had a bone marrow biopsy on 6/22/18. Pathology: plasma cell neoplasm, plasma cells comprise 20-25% of marrow cells. Trilineage hematopoiesis present with maturation, increased iron stores. Congo red stain negative for amyloid. Karyotype 46, XX. FISH panel - positive for CCND1/IGH fusion - a/w favorable prognosis.\par \par 7/12/18 PET - negative\par \par Treatment Summary \par 7/19/18 - C1 VRd\par 8/9/18 -C2 VRd\par 8/30/18 - C3 VRd\par 9/20/18 -partial C4 VRd\par 10/11/18 - C1D1 - KRd, carfilzomib only given day 1, day 2, day 8, day 9 as [place]\par 11/5/18 -C2 KRd\par 12/4/18 - C3 KRd\par 1/2/19 - C4 KRd

## 2019-12-02 ENCOUNTER — OUTPATIENT (OUTPATIENT)
Dept: OUTPATIENT SERVICES | Facility: HOSPITAL | Age: 69
LOS: 1 days | Discharge: HOME | End: 2019-12-02

## 2019-12-02 ENCOUNTER — OUTPATIENT (OUTPATIENT)
Dept: OUTPATIENT SERVICES | Facility: HOSPITAL | Age: 69
LOS: 1 days | End: 2019-12-02
Payer: MEDICARE

## 2019-12-02 DIAGNOSIS — Z98.51 TUBAL LIGATION STATUS: Chronic | ICD-10-CM

## 2019-12-02 DIAGNOSIS — Z01.818 ENCOUNTER FOR OTHER PREPROCEDURAL EXAMINATION: ICD-10-CM

## 2019-12-02 DIAGNOSIS — C90.00 MULTIPLE MYELOMA NOT HAVING ACHIEVED REMISSION: ICD-10-CM

## 2019-12-05 LAB
ALBUMIN MFR SERPL ELPH: 59.2 %
ALBUMIN SERPL ELPH-MCNC: 4.2 G/DL
ALBUMIN SERPL-MCNC: 3.6 G/DL
ALBUMIN/GLOB SERPL: 1.4 RATIO
ALP BLD-CCNC: 99 U/L
ALPHA1 GLOB MFR SERPL ELPH: 7.5 %
ALPHA1 GLOB SERPL ELPH-MCNC: 0.5 G/DL
ALPHA2 GLOB MFR SERPL ELPH: 16.5 %
ALPHA2 GLOB SERPL ELPH-MCNC: 1 G/DL
ALT SERPL-CCNC: 14 U/L
ANION GAP SERPL CALC-SCNC: 14 MMOL/L
AST SERPL-CCNC: 12 U/L
B-GLOBULIN MFR SERPL ELPH: 12 %
B-GLOBULIN SERPL ELPH-MCNC: 0.7 G/DL
BILIRUB SERPL-MCNC: 0.2 MG/DL
BUN SERPL-MCNC: 12 MG/DL
CALCIUM SERPL-MCNC: 9.6 MG/DL
CHLORIDE SERPL-SCNC: 105 MMOL/L
CO2 SERPL-SCNC: 23 MMOL/L
CREAT SERPL-MCNC: 0.67 MG/DL
DEPRECATED KAPPA LC FREE/LAMBDA SER: 57.68 RATIO
GAMMA GLOB FLD ELPH-MCNC: 0.3 G/DL
GAMMA GLOB MFR SERPL ELPH: 4.8 %
GLUCOSE SERPL-MCNC: 104 MG/DL
IGA SER QL IEP: 24 MG/DL
IGG SER QL IEP: 330 MG/DL
IGM SER QL IEP: 17 MG/DL
INTERPRETATION SERPL IEP-IMP: NORMAL
KAPPA LC CSF-MCNC: 0.6 MG/DL
KAPPA LC SERPL-MCNC: 34.61 MG/DL
M PROTEIN SPEC IFE-MCNC: NORMAL
POTASSIUM SERPL-SCNC: 4.7 MMOL/L
PROT SERPL-MCNC: 6.1 G/DL
RAPID RVP RESULT: DETECTED
RV+EV RNA SPEC QL NAA+PROBE: DETECTED
SODIUM SERPL-SCNC: 142 MMOL/L

## 2019-12-06 ENCOUNTER — RESULT REVIEW (OUTPATIENT)
Age: 69
End: 2019-12-06

## 2019-12-06 ENCOUNTER — APPOINTMENT (OUTPATIENT)
Dept: HEMATOLOGY ONCOLOGY | Facility: CLINIC | Age: 69
End: 2019-12-06
Payer: MEDICARE

## 2019-12-06 VITALS
DIASTOLIC BLOOD PRESSURE: 72 MMHG | TEMPERATURE: 98 F | HEART RATE: 82 BPM | RESPIRATION RATE: 17 BRPM | WEIGHT: 171.08 LBS | SYSTOLIC BLOOD PRESSURE: 115 MMHG | OXYGEN SATURATION: 100 % | BODY MASS INDEX: 30.31 KG/M2

## 2019-12-06 LAB
HCT VFR BLD CALC: 35 % — SIGNIFICANT CHANGE UP (ref 34.5–45)
HGB BLD-MCNC: 11.6 G/DL — SIGNIFICANT CHANGE UP (ref 11.5–15.5)
MCHC RBC-ENTMCNC: 31.9 PG — SIGNIFICANT CHANGE UP (ref 27–34)
MCHC RBC-ENTMCNC: 33.1 G/DL — SIGNIFICANT CHANGE UP (ref 32–36)
MCV RBC AUTO: 96.4 FL — SIGNIFICANT CHANGE UP (ref 80–100)
PLATELET # BLD AUTO: 256 K/UL — SIGNIFICANT CHANGE UP (ref 150–400)
RBC # BLD: 3.63 M/UL — LOW (ref 3.8–5.2)
RBC # FLD: 16.3 % — HIGH (ref 10.3–14.5)
WBC # BLD: 6.2 K/UL — SIGNIFICANT CHANGE UP (ref 3.8–10.5)
WBC # FLD AUTO: 6.2 K/UL — SIGNIFICANT CHANGE UP (ref 3.8–10.5)

## 2019-12-06 PROCEDURE — 88237 TISSUE CULTURE BONE MARROW: CPT

## 2019-12-06 PROCEDURE — 85097 BONE MARROW INTERPRETATION: CPT

## 2019-12-06 PROCEDURE — 88360 TUMOR IMMUNOHISTOCHEM/MANUAL: CPT

## 2019-12-06 PROCEDURE — 88342 IMHCHEM/IMCYTCHM 1ST ANTB: CPT

## 2019-12-06 PROCEDURE — 88264 CHROMOSOME ANALYSIS 20-25: CPT

## 2019-12-06 PROCEDURE — 88271 CYTOGENETICS DNA PROBE: CPT

## 2019-12-06 PROCEDURE — 88313 SPECIAL STAINS GROUP 2: CPT

## 2019-12-06 PROCEDURE — 88342 IMHCHEM/IMCYTCHM 1ST ANTB: CPT | Mod: 26,59

## 2019-12-06 PROCEDURE — 88305 TISSUE EXAM BY PATHOLOGIST: CPT | Mod: 26

## 2019-12-06 PROCEDURE — 88185 FLOWCYTOMETRY/TC ADD-ON: CPT

## 2019-12-06 PROCEDURE — 88360 TUMOR IMMUNOHISTOCHEM/MANUAL: CPT | Mod: 26

## 2019-12-06 PROCEDURE — 38222 DX BONE MARROW BX & ASPIR: CPT | Mod: RT

## 2019-12-06 PROCEDURE — 88305 TISSUE EXAM BY PATHOLOGIST: CPT

## 2019-12-06 PROCEDURE — 88291 CYTO/MOLECULAR REPORT: CPT

## 2019-12-06 PROCEDURE — 88188 FLOWCYTOMETRY/READ 9-15: CPT

## 2019-12-06 PROCEDURE — 88313 SPECIAL STAINS GROUP 2: CPT | Mod: 26

## 2019-12-06 PROCEDURE — 88275 CYTOGENETICS 100-300: CPT

## 2019-12-06 PROCEDURE — 88280 CHROMOSOME KARYOTYPE STUDY: CPT

## 2019-12-06 PROCEDURE — 88184 FLOWCYTOMETRY/ TC 1 MARKER: CPT

## 2019-12-06 PROCEDURE — 87205 SMEAR GRAM STAIN: CPT

## 2019-12-06 NOTE — REASON FOR VISIT
[Bone Marrow Biopsy] : bone marrow biopsy [Bone Marrow Aspiration] : bone marrow aspiration [FreeTextEntry2] : IgG Kappa myeloma s/p APSCT 2/28/19, persistent elevated Kappla FLC, R/O relapse

## 2019-12-09 ENCOUNTER — MEDICATION RENEWAL (OUTPATIENT)
Age: 69
End: 2019-12-09

## 2019-12-09 LAB — TM INTERPRETATION: SIGNIFICANT CHANGE UP

## 2019-12-16 ENCOUNTER — RESULT REVIEW (OUTPATIENT)
Age: 69
End: 2019-12-16

## 2019-12-16 ENCOUNTER — APPOINTMENT (OUTPATIENT)
Dept: HEMATOLOGY ONCOLOGY | Facility: CLINIC | Age: 69
End: 2019-12-16
Payer: MEDICARE

## 2019-12-16 VITALS
OXYGEN SATURATION: 100 % | HEART RATE: 80 BPM | RESPIRATION RATE: 17 BRPM | WEIGHT: 171.96 LBS | SYSTOLIC BLOOD PRESSURE: 131 MMHG | DIASTOLIC BLOOD PRESSURE: 86 MMHG | TEMPERATURE: 98.3 F | BODY MASS INDEX: 30.46 KG/M2

## 2019-12-16 LAB
BASOPHILS # BLD AUTO: 0 K/UL — SIGNIFICANT CHANGE UP (ref 0–0.2)
BASOPHILS NFR BLD AUTO: 0.1 % — SIGNIFICANT CHANGE UP (ref 0–2)
EOSINOPHIL # BLD AUTO: 0.1 K/UL — SIGNIFICANT CHANGE UP (ref 0–0.5)
EOSINOPHIL NFR BLD AUTO: 1.5 % — SIGNIFICANT CHANGE UP (ref 0–6)
HCT VFR BLD CALC: 33.3 % — LOW (ref 34.5–45)
HGB BLD-MCNC: 10.7 G/DL — LOW (ref 11.5–15.5)
LYMPHOCYTES # BLD AUTO: 1.3 K/UL — SIGNIFICANT CHANGE UP (ref 1–3.3)
LYMPHOCYTES # BLD AUTO: 16.2 % — SIGNIFICANT CHANGE UP (ref 13–44)
MCHC RBC-ENTMCNC: 31.3 PG — SIGNIFICANT CHANGE UP (ref 27–34)
MCHC RBC-ENTMCNC: 32.2 G/DL — SIGNIFICANT CHANGE UP (ref 32–36)
MCV RBC AUTO: 97.1 FL — SIGNIFICANT CHANGE UP (ref 80–100)
MONOCYTES # BLD AUTO: 0.9 K/UL — SIGNIFICANT CHANGE UP (ref 0–0.9)
MONOCYTES NFR BLD AUTO: 11.6 % — SIGNIFICANT CHANGE UP (ref 2–14)
NEUTROPHILS # BLD AUTO: 5.8 K/UL — SIGNIFICANT CHANGE UP (ref 1.8–7.4)
NEUTROPHILS NFR BLD AUTO: 70.6 % — SIGNIFICANT CHANGE UP (ref 43–77)
PLATELET # BLD AUTO: 199 K/UL — SIGNIFICANT CHANGE UP (ref 150–400)
RBC # BLD: 3.43 M/UL — LOW (ref 3.8–5.2)
RBC # FLD: 16.2 % — HIGH (ref 10.3–14.5)
WBC # BLD: 8.2 K/UL — SIGNIFICANT CHANGE UP (ref 3.8–10.5)
WBC # FLD AUTO: 8.2 K/UL — SIGNIFICANT CHANGE UP (ref 3.8–10.5)

## 2019-12-16 PROCEDURE — 99215 OFFICE O/P EST HI 40 MIN: CPT

## 2019-12-18 LAB — CHROM ANALY INTERPHASE BLD FISH-IMP: SIGNIFICANT CHANGE UP

## 2019-12-19 ENCOUNTER — RESULT REVIEW (OUTPATIENT)
Age: 69
End: 2019-12-19

## 2019-12-19 LAB
ALBUMIN SERPL ELPH-MCNC: 4.5 G/DL
ALP BLD-CCNC: 77 U/L
ALT SERPL-CCNC: 16 U/L
ANION GAP SERPL CALC-SCNC: 12 MMOL/L
AST SERPL-CCNC: 18 U/L
BILIRUB SERPL-MCNC: 0.3 MG/DL
BUN SERPL-MCNC: 12 MG/DL
CALCIUM SERPL-MCNC: 9.5 MG/DL
CHLORIDE SERPL-SCNC: 107 MMOL/L
CHROM ANALY OVERALL INTERP SPEC-IMP: SIGNIFICANT CHANGE UP
CO2 SERPL-SCNC: 25 MMOL/L
CREAT SERPL-MCNC: 0.68 MG/DL
GLUCOSE SERPL-MCNC: 92 MG/DL
POTASSIUM SERPL-SCNC: 4.1 MMOL/L
PROT SERPL-MCNC: 6.2 G/DL
SODIUM SERPL-SCNC: 144 MMOL/L

## 2019-12-30 ENCOUNTER — MEDICATION RENEWAL (OUTPATIENT)
Age: 69
End: 2019-12-30

## 2020-01-07 ENCOUNTER — OUTPATIENT (OUTPATIENT)
Dept: OUTPATIENT SERVICES | Facility: HOSPITAL | Age: 70
LOS: 1 days | End: 2020-01-07
Payer: MEDICARE

## 2020-01-07 ENCOUNTER — RESULT REVIEW (OUTPATIENT)
Age: 70
End: 2020-01-07

## 2020-01-07 ENCOUNTER — OUTPATIENT (OUTPATIENT)
Dept: OUTPATIENT SERVICES | Facility: HOSPITAL | Age: 70
LOS: 1 days | Discharge: HOME | End: 2020-01-07

## 2020-01-07 ENCOUNTER — APPOINTMENT (OUTPATIENT)
Dept: HEMATOLOGY ONCOLOGY | Facility: CLINIC | Age: 70
End: 2020-01-07

## 2020-01-07 ENCOUNTER — APPOINTMENT (OUTPATIENT)
Dept: HEMATOLOGY ONCOLOGY | Facility: CLINIC | Age: 70
End: 2020-01-07
Payer: MEDICARE

## 2020-01-07 VITALS
BODY MASS INDEX: 31.01 KG/M2 | HEART RATE: 81 BPM | SYSTOLIC BLOOD PRESSURE: 121 MMHG | OXYGEN SATURATION: 96 % | WEIGHT: 175.01 LBS | DIASTOLIC BLOOD PRESSURE: 74 MMHG | HEIGHT: 63 IN | TEMPERATURE: 97.7 F

## 2020-01-07 DIAGNOSIS — Z98.51 TUBAL LIGATION STATUS: Chronic | ICD-10-CM

## 2020-01-07 DIAGNOSIS — C90.00 MULTIPLE MYELOMA NOT HAVING ACHIEVED REMISSION: ICD-10-CM

## 2020-01-07 DIAGNOSIS — Z01.818 ENCOUNTER FOR OTHER PREPROCEDURAL EXAMINATION: ICD-10-CM

## 2020-01-07 LAB
ABO RH CONFIRMATION: SIGNIFICANT CHANGE UP
BASOPHILS # BLD AUTO: 0.1 K/UL — SIGNIFICANT CHANGE UP (ref 0–0.2)
BASOPHILS NFR BLD AUTO: 1.6 % — SIGNIFICANT CHANGE UP (ref 0–2)
BLD GP AB SCN SERPL QL: SIGNIFICANT CHANGE UP
EOSINOPHIL # BLD AUTO: 0.2 K/UL — SIGNIFICANT CHANGE UP (ref 0–0.5)
EOSINOPHIL NFR BLD AUTO: 3.1 % — SIGNIFICANT CHANGE UP (ref 0–6)
HCT VFR BLD CALC: 33.9 % — LOW (ref 34.5–45)
HGB BLD-MCNC: 10.6 G/DL — LOW (ref 11.5–15.5)
LYMPHOCYTES # BLD AUTO: 1.5 K/UL — SIGNIFICANT CHANGE UP (ref 1–3.3)
LYMPHOCYTES # BLD AUTO: 21.7 % — SIGNIFICANT CHANGE UP (ref 13–44)
MCHC RBC-ENTMCNC: 30.7 PG — SIGNIFICANT CHANGE UP (ref 27–34)
MCHC RBC-ENTMCNC: 31.2 G/DL — LOW (ref 32–36)
MCV RBC AUTO: 98.5 FL — SIGNIFICANT CHANGE UP (ref 80–100)
MONOCYTES # BLD AUTO: 0.5 K/UL — SIGNIFICANT CHANGE UP (ref 0–0.9)
MONOCYTES NFR BLD AUTO: 7.5 % — SIGNIFICANT CHANGE UP (ref 2–14)
NEUTROPHILS # BLD AUTO: 4.6 K/UL — SIGNIFICANT CHANGE UP (ref 1.8–7.4)
NEUTROPHILS NFR BLD AUTO: 66.1 % — SIGNIFICANT CHANGE UP (ref 43–77)
PLATELET # BLD AUTO: 254 K/UL — SIGNIFICANT CHANGE UP (ref 150–400)
RBC # BLD: 3.44 M/UL — LOW (ref 3.8–5.2)
RBC # FLD: 17.5 % — HIGH (ref 10.3–14.5)
WBC # BLD: 7 K/UL — SIGNIFICANT CHANGE UP (ref 3.8–10.5)
WBC # FLD AUTO: 7 K/UL — SIGNIFICANT CHANGE UP (ref 3.8–10.5)

## 2020-01-07 PROCEDURE — 99214 OFFICE O/P EST MOD 30 MIN: CPT

## 2020-01-07 PROCEDURE — 86850 RBC ANTIBODY SCREEN: CPT

## 2020-01-07 PROCEDURE — 86900 BLOOD TYPING SEROLOGIC ABO: CPT

## 2020-01-07 PROCEDURE — 36415 COLL VENOUS BLD VENIPUNCTURE: CPT

## 2020-01-07 PROCEDURE — 86901 BLOOD TYPING SEROLOGIC RH(D): CPT

## 2020-01-08 NOTE — ASSESSMENT
[FreeTextEntry1] : 69 year old female IgG kappa multiple myeloma,  FISH panel - positive for CCND1/IGH fusion - a/w favorable prognosis. PET CT (7/12/18) did not show any bone lesions. \par She is s/p VRd, followed by 4 cycles of KRd, followed by autoPSCT on 2/28/19. \par On 5/18/19 she was started on Pomalyst for persistently elevated KFLC at 50, FLC ratio 136.\par \par On Pomalyst + weekly Dex, she has had a partial response, her KFLC has improved to some degree and  bone marrow in 12/2019 shows persistent plasma cells of 15-20%. \par \par Plan:\par -will add daratumumab to her regimen, side effects reviewed, Type & screen to be drawn\par -continue pomalyst + weekly dex\par -continue aspirin\par -Pt will be leaving for [place] on January 23rd. Start Darzalex on the 31st along with Pomalyst. \par -PET CT when she returns\par -F/u in 5 weeks \par -follow up with Dr. Rahman in 3/2020

## 2020-01-08 NOTE — HISTORY OF PRESENT ILLNESS
[de-identified] : Returns for follow up. \par Admitted to [facility]MTU on 2/25/19 and received autoPSCT on 2/28/19. \par She was started on Pomalyst on 5/18/19 for refractory multiple myeloma. \par She had bone marrow biopsy on 12/16/19. Pathology was positive for plasma cell neoplasm, plasma cell comprised of 15-20% marrow cells. \par States she feels a tingling sensation coming up from her toes to her legs. Feels it in her lips too however it's not constant. \par No weight loss. No fevers or night sweats. No N/V/D. No bone pain. She has been feeling extremely fatigued and has trouble sleeping.  [de-identified] : Ms. Calero is a 69 year old female multiple myeloma. \par \par She was noted to have proteinuria and then had a 24 hour urine protein which showed non-nephrotic range proteinuria of 510 mg/24 hours. She was referred to Dr. Lesa Rendon of nephrology. SPEP showed faint M-spike 0.2 g/dL in gammaglobulin region, immunofixation showed this to be a IgG kappa monoclonal protein. UPEP showed 2 monoclonal protein bands, 61%, and 0.7%. \par \par Subsequent work up:\par 6/19/18 M-protein 0.2 g/dL, Kappa FLC 71, lambda FLC 0.36, FLC ratio 198.61\par , Beta 2 microglobulin 1.5 mg/L\par \par She had a bone marrow biopsy on 6/22/18. Pathology: plasma cell neoplasm, plasma cells comprise 20-25% of marrow cells. Trilineage hematopoiesis present with maturation, increased iron stores. Congo red stain negative for amyloid. Karyotype 46, XX. FISH panel - positive for CCND1/IGH fusion - a/w favorable prognosis.\par \par 7/12/18 PET - negative\par \par Treatment Summary \par 7/19/18 - C1 VRd\par 8/9/18 -C2 VRd\par 8/30/18 - C3 VRd\par 9/20/18 -partial C4 VRd\par 10/11/18 - 1/2/19 KRd (carfilzomib, Revlimid, Dexamethasone) x 4 cycles. \par 2/28/19 - autoPSCT \par 5/18/19 - started Pomalyst\par 7/19/19 - decadron 20 mg weekly added to Pomalyst\par

## 2020-01-08 NOTE — PHYSICAL EXAM
[Restricted in physically strenuous activity but ambulatory and able to carry out work of a light or sedentary nature] : Status 1- Restricted in physically strenuous activity but ambulatory and able to carry out work of a light or sedentary nature, e.g., light house work, office work [Normal] : affect appropriate [de-identified] : supple [de-identified] : clear b/l [de-identified] : S1/S2 [de-identified] : no edema [de-identified] : soft, NT

## 2020-01-08 NOTE — ADDENDUM
[FreeTextEntry1] : I, Eduardo Fisher, acted solely as a scribe for Dr. Marshall De Oliveira on 01/7/2020

## 2020-01-21 NOTE — REVIEW OF SYSTEMS
[Fatigue] : fatigue [Fever] : no fever [Vision Problems] : no vision problems [Mucosal Pain] : no mucosal pain [Lower Ext Edema] : no lower extremity edema [Shortness Of Breath] : no shortness of breath [Cough] : no cough [Abdominal Pain] : no abdominal pain [Vomiting] : no vomiting [Diarrhea] : no diarrhea [Joint Pain] : no joint pain [Skin Rash] : no skin rash [Dizziness] : no dizziness [Fainting] : no fainting [Easy Bleeding] : no tendency for easy bleeding [Easy Bruising] : no tendency for easy bruising [FreeTextEntry7] : no nausea [FreeTextEntry9] : no bone pain [de-identified] : + dry skin better

## 2020-01-21 NOTE — PHYSICAL EXAM
[Ambulatory and capable of all self care but unable to carry out any work activities] : Status 2- Ambulatory and capable of all self care but unable to carry out any work activities. Up and about more than 50% of waking hours [Normal] : affect appropriate [Ulcers] : no ulcers [Thrush] : no thrush [de-identified] : hair growing in [de-identified] : anicteric [de-identified] : RRR, normal S1S2 [de-identified] : warm, no edema [de-identified] : no rash [de-identified] : A & O x 4

## 2020-01-21 NOTE — CONSULT LETTER
[Dear  ___] : Dear  [unfilled], [Courtesy Letter:] : I had the pleasure of seeing your patient, [unfilled], in my office today. [Consult Closing:] : Thank you very much for allowing me to participate in the care of this patient.  If you have any questions, please do not hesitate to contact me. [Sincerely,] : Sincerely, [Please see my note below.] : Please see my note below. [FreeTextEntry2] : Marshall De Oliveira MD\par Medical Oncology/Hematology\par [facility] [place]\par [facility] \par [address]\[place]   [address]\par \par  [FreeTextEntry3] : \par Analisa Rahman M.D.\par \par  of Medicine\par [facility]\par [facility] \par [facility] [place]\par [address] [place] [address] \[place] [address] \par ph: [phone]\par fax: [phone]\par

## 2020-01-21 NOTE — RESULTS/DATA
[FreeTextEntry1] : WBC- 8.2; Hgb- 10.7, Hct- 33.3; Platelets- 199K. \par -------------------------------------------------------------------------\par ACCESSION No:  [id number]\par LAITH VAUGHN  (12/6/19)\par \par Hematopathology Report\par \par Final Diagnosis\par 1, 2. Bone marrow biopsy and bone marrow aspirate\par - Plasma cell neoplasm, plasma cells comprise about 15-20%\par of marrow cells\par - Erythroid-predominant trilineage hematopoiesis present\par with maturation, increased iron stores\par \par See note and description.\par \par Diagnostic note:\par Comprehensive report with results of pending ancillary studies to\par follow.\par \par Ancillary studies\par Bone marrow aspirate iron stain: Iron stores are increased; no\par ring sideroblasts are seen.\par Flow cytometry: Monotypic plasma cells (1.1% of cells), positive\par for cytoplasmic kappa, CD38, , dimmer CD45, CD56, ;\par negative CD19, CD20, consistent with plasma cell neoplasm (known\par history of myeloma).\par Immunohistochemical stains:  and cyclin D1 stains were\par performed on block 1A.  positive plasma cells are increased,\par comprising about 15-20% of cells, interstitial and in clusters,\par also positive cyclin D1.\par \par Microscopic description:\par 1. Biopsy: Sections of bone marrow core biopsy and clot show bone\par marrow with normal cellularity of 30%. Trilineage hematopoiesis\par is present with maturation. Megakaryocytes are present in normal\par number and morphology. Iron stores are increased.\par 2. Aspirate:  The aspirate smears show cellular spicules,\par adequate for evaluation. Erythroid and myeloid precursors are\par present with maturation, decreased M:E ratio of 1.1:1, 3% plasma\par cells. Megakaryocytes are seen, with unremarkable morphology.\par \par Bone Marrow Aspirate Differential: (300 Cells).\par Type            %    Normal*\par Blast                0%   0-3\par Neutrophil and\par Precursors        43%  33-63\par Eosinophil           2%   1-5\par Basophil        0%   0-1\par Pronormoblast        2%   0-2\par Normoblast           41%  15-25\par Monocyte        2%   0-2\par Lymphocyte           7%   10-15\par Plasma cell          3 %  0-1\par *Adult Range\par Comment\par Iron stain (examined to evaluate for iron stores; see microscopic\par description) and Giemsa stain (shows appropriate staining\par pattern) are performed and evaluated on block(s): 1A, 1B.\par Slide(s) with built in immunohistochemical study control(s) and\par negative control associated with this case has/have been verified\par by the sign out pathologist.\par For slide(s) without built in controls positive control slides\par has/have been reviewed and approved by immunohistochemistry lab\par These immunohistochemical/ in-situ hybridization tests have been\par developed and their performance characteristics determined by\par [facility], Department of\par Pathology, Division of Immunopathology [facility], [address] [place] [address].  It has not\par been cleared or approved by the U.S. Food and Drug\par Administration.  The FDA has determined that such clearance or\par approval is not necessary.  This test is used for clinical\par purposes.  The laboratory is certified under the\par CLIA-88 as qualified to perform high complexity clinical testing.\par \par Verified by: Eliud Ovalle M.D.\par (Electronic Signature)\par Reported on: 12/10/19 12:40 EST, [address],\[place] [address]\par Phone: [phone]   Fax: [phone]\par _________________________________________________________________\par \par Clinical History\par IgG KAPPA myeloma S/P Auto SCT 2/28/19 - on Pomalyst 3mg P.O.\par daily x 21D/7 off\par Rising KAPPA light chain rule out relapse\par \par Specimen(s) Submitted\par 1     Bone marrow biopsy\par 2     Bone marrow aspirate\par \par Gross Description\par 1. The specimen is received in bouin's fixative and the specimen\par container is labeled: Right PIC bone marrow biopsy.  It consists\par of a 1.5 x 0.1 cm bone marrow core with attached 1.8 x 0.2 x 0.1\par cm cylindrical portion of blood clot.  Entirely submitted.  Two\par cassettes: A = bone marrow core; B = blood clot.\par \par 2. Two Pinon-Giemsa and one iron stained bone marrow aspirate\par smears are submitted   {10-FL-,   }.\par \par In addition to other data that may appear on the specimen\par container, the label has been inspected to confirm the presence\par of the patient's name and date of birth.\par

## 2020-01-21 NOTE — HISTORY OF PRESENT ILLNESS
[de-identified] : Ms. Calero is a 69 year old female who was initially referred for monoclonal gammopathy.\par She was noted to have proteinuria and then had a 24 hour urine protein which showed non-nephrotic range proteinuria of 510 mg/24 hours. She was referred to Dr. Lesa Rendon of nephrology. SPEP showed faint M-spike 0.2 g/dL in gammaglobulin region, immunofixation showed this to be a IgG kappa monoclonal protein. UPEP showed 2 monoclonal protein bands, 61%, and 0.7%. \par \par Subsequent work up:\par 6/19/18 M-protein 0.2 g/dL, Kappa FLC 71, lambda FLC 0.36, FLC ratio 198.61\par , Beta 2 microglobulin 1.5 mg/L\par \par She had a bone marrow biopsy on 6/22/18. Pathology: plasma cell neoplasm, plasma cells comprise 20-25% of marrow cells. Trilineage hematopoiesis present with maturation, increased iron stores. Congo red stain negative for amyloid. Karyotype 46, XX. FISH panel - positive for CCND1/IGH fusion - a/w favorable prognosis. PET CT (7/12/18) did not show any bone lesions. Began VRd on 7/19/18. \par \par On C4 of VRd. FLC ratio initially decreasing -->198 --> 126 --> 67, however has increased to 115 with urine studies positive for Bence Brooks protein. For refractory disease, she was switched to KRd for a deeper response.\par \par  [de-identified] : Since initial consult visit on 11/26/18, the patient is currently on cycle 4 KRd(started 1/2/19), with day 21 of Revlimid completed on 1/22/19. She underwent pre-transplant testing on 12/21/18. She has moderate fatigue and good appetite. She denies fever, shortness of breath, abdominal pain. Today, I again did comprehensive review of consent forms and after all questions addressed, the patient signed consent forms.\par \par On 2/11/19, patient presents for a pre stem cell collection visit. Anahi received cytoxan on 2/5/19 followed by 12 mg of neulasta on 2/6/19. Overall patient is well and offers no acute concerns today. Denies fever, chills, nausea, vomiting or diarrhea. Denies SOB, chest pain or B/L LE edema. Patient is currently on ciprofloxacin for prophylaxis. Denies any pain at this time. \par \par On 2/13/19 visit, patient presents for a pre stem cell collection visit. Patient offers no acute concerns. Currently taking ciprofloxacin 250 mg BID for 10 days as prophylaxis. Denies fever, chills, nausea, vomiting,diarrhea, rash or dysuria. Denies SOB, chest pain or B/L LE edema. Currently not on any blood thinners. \par \par On 2/21/19 visit, patient presents for a pre admission visit. Tolerated stem cell mobilization and is scheduled for kepivance today, tomorrow and on 2/23/19. Completed ciprofloxacin. Denies fever, chills, nausea, vomiting, diarrhea, rash, mouth sores or dysuria. Denies SOB, chest pain or B/L LE edema or pain. \par \par Since office visit on 2/21/19, patient was admitted to [facility] on 2/25/19 and received Melphalan 100 mg/m2 IV x 2 consecutive days followed by autoPSCT(10.04 x 10^6/kg) on 2/28/19. Hospital course complicated by GI mucositis with diarrhea managed with Imodium; stool C. diff- negative. Engraftment(white cells) noted on 3/12/19. Repeat UA and culture both negative on 3/15/19. \par \par Since discharge to home on 3/15/19, the patient has moderate fatigue, fair appetite with slow improvement. She denies fever, chills, shortness of breath, diarrhea. \par \par Since office visit on 3/21/19, the patient has moderate fatigue, and fair- good appetite. She has dry skin and on face she notes patchy rash. She has been using hydrocortisone cream 1% OTC during past week for 3 days. She denies fever, shortness of breath, diarrhea. \par \par Since office visit on 3/28/19, the patient called on 3/29 complaining of ongoing generalized pruritus without signs of rash/hives despite using moisturizer and hydrocortisone cream. Pt instructed to try daily long acting anti-histamine (i.e. Claritin, Allegra, or Zyrtec). May use PRN Benadryl for itching, educated regarding possible side effects including drowsiness, restlessness, dry mouth, etc. Pt instructed to call our office next week if symptoms persist/worsen. Pt verbalizes understanding and agrees with plan of care. \par She has only tried Benadryl at bedtime and didn't feel it really helped but overall she feels pruritus is better. She has moderate fatigue and normal appetite. She used Desonide cream for facial rash x 2 days with resolution, stopped use then rash recurred and put on once Monday 4/1/19 with resolution.  She denies fever, shortness of breath, abdominal pain, diarrhea.\par \par Since office visit on 4/4/19, she describes good energy level and good appetite. She denies fever, shortness of breath, bone pain. \par \par Since office visit on 4/25/19, serum immunoelectrophoresis(4/25) documents elevated KFLC- 48.92; LFLC- 0.25. Repeat labs on 5/8/19, with KFLC- 50.32, LFLC- 0.37; K/L FLC ratio- 136.00; CMP- within normal range. I discussed lab results with patient by phone conversation and recommendation to initiate immunomodulatory therapy with Pomalyst as she has recovered well post autoPSCT and has minimal symptoms. She has mild fatigue, and good appetite. She has mild muscle achiness near right hip since she started using a stationery bicycle. She denies fever, shortness of breath, abdominal pain. \par \par Since office visit on 5/14/19, I spoke with Anahi on 5/17/19, and she informs me that Pomalyst has been delivered to her house. \par Plan-\par Begin Pomalyst 3 mg po daily on 5/18/19, take 21 days then 7 days off. C1D21- 6/7/19\par Begin aspirin 81 mg po daily on 5/18/19\par Drink plenty of water\par \par Since office visit on 6/10/19, labs documented serum immunoelectrophoresis- KFLC- 34.51(previously 50.3 on 5/8/19); K/L FLC ratio- 42.60(previously 136.00 on 5/8/19). \par Plan- She is on 7 days break after day 21(6/7/19) of cycle 1 Pomalyst 3 mg dose.\par Begin Pomalyst 3 mg po every other day on 6/15/19(Day 1 of Cycle 2 Pomalyst). \par I spoke to patient on 7/8/19 about labs from 7/5/19 with plan: Continue Pomalyst 3 mg po every other day(Day 1 of Cycle 2 Pomalyst started on 6/15/19). Will take through 7/13/19, then hold pending lab assessment on 7/15. She developed rhinorrhea on 7/12, then dry cough on 7/13, low grade fever 99, never up to 100 degrees, and no chills. Rhinorrhea is much better, intermittent cough, but no shortness of breath, sore throat, sinus pain. She describes new onset right shoulder pain only when lifting arm up x 2 weeks, has been worsening; no other bone pain. \par \par Since office visit on 7/15/19, repeat labs on 7/15/19- CMP- within normal range; KFLC- 42.40, previously 34.5 on 6/10/19. \par Plan- Resume Pomalyst at 3 mg po daily x 21 days, then 7 days off, beginning on 7/19/19\par Begin Decadron 20 mg po weekly, on 7/19/19\par Continue baby aspirin 81 mg po daily. \par Check CBC with diff on 7/29/19 at [facility]. \par Labs(7/29/19) WBC- 5.8, ANC- 2.8; Hgb- 11.9; Hct- 35.9; Platelets- 222K. \par Plan- \par Continue Pomalyst at 3 mg po daily x 21 days, then 7 days off; (started cycle on 7/19/19)\par Begin Decadron 20 mg po weekly; (started on 7/19/19)\par Continue baby aspirin 81 mg po daily. \par Day 21 of last cycle on 8/8/19, with 7 days off(day 7- 8/15), plan to resume next cycle on 8/16/19. \par She has good energy level and good appetite. She has persistent dry cough and slight rhinorrhea. She denies fever, chills, sore throat, shortness of breath. Prior shoulder pain has resolved, no other bone pain. \par \par Since office visit on 8/15/19, patient called to inform me that her colonoscopy is on 10/7/19. GI told her to D/C aspirin on 10/3/19.\par Plan-\par D/C Pomalyst and aspirin on 10/3/19. This will be day 21/21 of Pomalyst, of a 28 day cycle. \par She will then proceed with her 7 days off of Pomalyst(starting 10/4) and resume on 10/11/19. \par She has good energy level and good appetite. She denies fever, shortness of breath, abdominal pain. \par \par Since last office visit on 10/14/19, patient called on 11/18/19 to inform me that she developed persistent cough, nasal congestion, headache, about 4 days ago. She had 1 episode of fever to 101.3 on 11/16/19, sweats at night x 2 days prior to fever. She has been using Mucinex, and Tylenol. She states cough is improving, no further fever or sweats, no chills. She denies sore throat, shortness of breath, nausea, vomiting, diarrhea. She had the Flu vaccine\par Plan- Hold on antibiotic therapy at present time. \par Check labs tomorrow at [facility].\par I will ask [facility] Staff if RVP can be done tomorrow. RVP + for enterovirus/rhinovirus. \par Bone marrow evaluation done on 12/6/19(see results section) to evaluate status of myeloma prior to adjustment of therapy. The patient has good energy level and good appetite. \par She has insomnia recently and she feels anxious. She denies fever, shortness of breath, abdominal pain. She has a trip planned to [place] between 1/23/20- 1/27/20. \par \par \par \par

## 2020-01-21 NOTE — ASSESSMENT
[FreeTextEntry1] : 1) IgG kappa myeloma, s/p RVD then changed to KRd with rising KFLC, followed by autologous peripheral stem cell transplant on 2/28/19, now with rising KFLC.\par Plan-  I previously discussed initiating immunomodulatory therapy with Pomalyst as she has recovered well post autoPSCT and has minimal symptoms. I discussed rationale of starting Pomalyst versus Revlimid therapy. I discussed potential side effects of Pomalyst, including but not limited to, risk of thrombosis, rash, gastrointestinal/hepatic, musculoskeletal, myelosuppression, risk of infection, secondary malignancy, fatigue. She agrees to begin Pomalyst. Begin Pomalyst 3 mg po daily on 5/18/19, take 21 days then 7 days off. C1D21- 6/7/19\par Begin aspirin 81 mg po daily on 5/18/19.\par 12/16/19:\par Previously on Pomalyst 3 mg po every other day(Day 1 of Cycle 2 Pomalyst started on 6/15/19).\par Adjusted schedule of Pomalyst to 3 mg po daily x 21 days, then 7 days off; (started cycle on 7/19/19)\par Continue aspirin 81 mg po daily.\par Continue Decadron 20 mg po 2X/week(on Tuesday, Friday)\par Continue Acyclovir while on Pom/dex therapy; off Mepron\par Drink plenty of water daily\par Await CMP, myeloma labs\par Continue current medications as documented in medication history\par Discontinued restrictions on 4/25/19\par Continue MVI with folate\par Continue antiemetics(Zofran, Reglan) as needed.\par Hold Amlodipine if SBP <=110\par Moisturizing cream to the skin several times per day\par Recommend PET/CT scan to assess new bone involvement, pre-initiating new therapy. \par Patient stated that she would like to resume followup with primary hematologist; recommend addition of Daratumumab to Pom/Dex given persistent myeloma on bone marrow evaluation done 12/6/19. As recent KFLC is stable, no objection to patient proceeding with her planned vacation on 1/23- 1/27/20. During office visit today, I discussed potential benefit as well as risks and toxicities of Daratumumab(in combination with Pom/Dex) with patient. She will sign consent form at [facility] with Dr. De Oliveira. \par Repeat lab tests on1/29/20, after returning from vacation. \par Hematology followup with Dr. De Oliveira as directed. \par \par RTC in 4 months.

## 2020-01-24 LAB
ALBUMIN MFR SERPL ELPH: 63.8 %
ALBUMIN SERPL ELPH-MCNC: 4.1 G/DL
ALBUMIN SERPL-MCNC: 3.6 G/DL
ALBUMIN/GLOB SERPL: 1.7 RATIO
ALP BLD-CCNC: 70 U/L
ALPHA1 GLOB MFR SERPL ELPH: 5.3 %
ALPHA1 GLOB SERPL ELPH-MCNC: 0.3 G/DL
ALPHA2 GLOB MFR SERPL ELPH: 14 %
ALPHA2 GLOB SERPL ELPH-MCNC: 0.8 G/DL
ALT SERPL-CCNC: 16 U/L
ANION GAP SERPL CALC-SCNC: 14 MMOL/L
AST SERPL-CCNC: 15 U/L
B-GLOBULIN MFR SERPL ELPH: 12 %
B-GLOBULIN SERPL ELPH-MCNC: 0.7 G/DL
BILIRUB SERPL-MCNC: 0.5 MG/DL
BUN SERPL-MCNC: 9 MG/DL
CALCIUM SERPL-MCNC: 9.2 MG/DL
CHLORIDE SERPL-SCNC: 105 MMOL/L
CO2 SERPL-SCNC: 22 MMOL/L
CREAT SERPL-MCNC: 0.56 MG/DL
DEPRECATED KAPPA LC FREE/LAMBDA SER: 52.9 RATIO
GAMMA GLOB FLD ELPH-MCNC: 0.3 G/DL
GAMMA GLOB MFR SERPL ELPH: 4.9 %
GLUCOSE SERPL-MCNC: 96 MG/DL
IGA SER QL IEP: 23 MG/DL
IGG SER QL IEP: 323 MG/DL
IGM SER QL IEP: 22 MG/DL
INTERPRETATION SERPL IEP-IMP: NORMAL
KAPPA LC CSF-MCNC: 0.3 MG/DL
KAPPA LC SERPL-MCNC: 15.87 MG/DL
M PROTEIN SPEC IFE-MCNC: NORMAL
POTASSIUM SERPL-SCNC: 3.7 MMOL/L
PROT SERPL-MCNC: 5.7 G/DL
SODIUM SERPL-SCNC: 141 MMOL/L

## 2020-02-03 ENCOUNTER — APPOINTMENT (OUTPATIENT)
Age: 70
End: 2020-02-03

## 2020-02-03 ENCOUNTER — RESULT REVIEW (OUTPATIENT)
Age: 70
End: 2020-02-03

## 2020-02-03 ENCOUNTER — APPOINTMENT (OUTPATIENT)
Dept: HEMATOLOGY ONCOLOGY | Facility: CLINIC | Age: 70
End: 2020-02-03

## 2020-02-03 ENCOUNTER — LABORATORY RESULT (OUTPATIENT)
Age: 70
End: 2020-02-03

## 2020-02-03 LAB
BASOPHILS # BLD AUTO: 0.2 K/UL — SIGNIFICANT CHANGE UP (ref 0–0.2)
BASOPHILS NFR BLD AUTO: 2.7 % — HIGH (ref 0–2)
EOSINOPHIL # BLD AUTO: 0.2 K/UL — SIGNIFICANT CHANGE UP (ref 0–0.5)
EOSINOPHIL NFR BLD AUTO: 2.8 % — SIGNIFICANT CHANGE UP (ref 0–6)
HCT VFR BLD CALC: 34 % — LOW (ref 34.5–45)
HGB BLD-MCNC: 11.5 G/DL — SIGNIFICANT CHANGE UP (ref 11.5–15.5)
LYMPHOCYTES # BLD AUTO: 1.3 K/UL — SIGNIFICANT CHANGE UP (ref 1–3.3)
LYMPHOCYTES # BLD AUTO: 19.6 % — SIGNIFICANT CHANGE UP (ref 13–44)
MCHC RBC-ENTMCNC: 32.7 PG — SIGNIFICANT CHANGE UP (ref 27–34)
MCHC RBC-ENTMCNC: 33.9 G/DL — SIGNIFICANT CHANGE UP (ref 32–36)
MCV RBC AUTO: 96.4 FL — SIGNIFICANT CHANGE UP (ref 80–100)
MONOCYTES # BLD AUTO: 0.8 K/UL — SIGNIFICANT CHANGE UP (ref 0–0.9)
MONOCYTES NFR BLD AUTO: 11.2 % — SIGNIFICANT CHANGE UP (ref 2–14)
NEUTROPHILS # BLD AUTO: 4.3 K/UL — SIGNIFICANT CHANGE UP (ref 1.8–7.4)
NEUTROPHILS NFR BLD AUTO: 63.7 % — SIGNIFICANT CHANGE UP (ref 43–77)
PLATELET # BLD AUTO: 238 K/UL — SIGNIFICANT CHANGE UP (ref 150–400)
RBC # BLD: 3.53 M/UL — LOW (ref 3.8–5.2)
RBC # FLD: 17.2 % — HIGH (ref 10.3–14.5)
WBC # BLD: 6.8 K/UL — SIGNIFICANT CHANGE UP (ref 3.8–10.5)
WBC # FLD AUTO: 6.8 K/UL — SIGNIFICANT CHANGE UP (ref 3.8–10.5)

## 2020-02-04 DIAGNOSIS — R11.2 NAUSEA WITH VOMITING, UNSPECIFIED: ICD-10-CM

## 2020-02-04 DIAGNOSIS — Z51.11 ENCOUNTER FOR ANTINEOPLASTIC CHEMOTHERAPY: ICD-10-CM

## 2020-02-05 ENCOUNTER — OUTPATIENT (OUTPATIENT)
Dept: OUTPATIENT SERVICES | Facility: HOSPITAL | Age: 70
LOS: 1 days | Discharge: HOME | End: 2020-02-05

## 2020-02-05 DIAGNOSIS — Z98.51 TUBAL LIGATION STATUS: Chronic | ICD-10-CM

## 2020-02-05 DIAGNOSIS — C90.00 MULTIPLE MYELOMA NOT HAVING ACHIEVED REMISSION: ICD-10-CM

## 2020-02-10 ENCOUNTER — APPOINTMENT (OUTPATIENT)
Dept: HEMATOLOGY ONCOLOGY | Facility: CLINIC | Age: 70
End: 2020-02-10
Payer: MEDICARE

## 2020-02-10 ENCOUNTER — RESULT REVIEW (OUTPATIENT)
Age: 70
End: 2020-02-10

## 2020-02-10 ENCOUNTER — LABORATORY RESULT (OUTPATIENT)
Age: 70
End: 2020-02-10

## 2020-02-10 ENCOUNTER — APPOINTMENT (OUTPATIENT)
Age: 70
End: 2020-02-10

## 2020-02-10 LAB
ANISOCYTOSIS BLD QL: SLIGHT — SIGNIFICANT CHANGE UP
BASOPHILS # BLD AUTO: 0.1 K/UL — SIGNIFICANT CHANGE UP (ref 0–0.2)
ELLIPTOCYTES BLD QL SMEAR: SLIGHT — SIGNIFICANT CHANGE UP
EOSINOPHIL # BLD AUTO: 0.2 K/UL — SIGNIFICANT CHANGE UP (ref 0–0.5)
EOSINOPHIL NFR BLD AUTO: 2 % — SIGNIFICANT CHANGE UP (ref 0–6)
HCT VFR BLD CALC: 34.7 % — SIGNIFICANT CHANGE UP (ref 34.5–45)
HGB BLD-MCNC: 11.4 G/DL — LOW (ref 11.5–15.5)
LG PLATELETS BLD QL AUTO: SLIGHT — SIGNIFICANT CHANGE UP
LYMPHOCYTES # BLD AUTO: 0.5 K/UL — LOW (ref 1–3.3)
LYMPHOCYTES # BLD AUTO: 5 % — LOW (ref 13–44)
MACROCYTES BLD QL: SLIGHT — SIGNIFICANT CHANGE UP
MCHC RBC-ENTMCNC: 31.6 PG — SIGNIFICANT CHANGE UP (ref 27–34)
MCHC RBC-ENTMCNC: 32.8 G/DL — SIGNIFICANT CHANGE UP (ref 32–36)
MCV RBC AUTO: 96.4 FL — SIGNIFICANT CHANGE UP (ref 80–100)
MICROCYTES BLD QL: SLIGHT — SIGNIFICANT CHANGE UP
MONOCYTES # BLD AUTO: 0.3 K/UL — SIGNIFICANT CHANGE UP (ref 0–0.9)
MONOCYTES NFR BLD AUTO: 6 % — SIGNIFICANT CHANGE UP (ref 2–14)
NEUTROPHILS # BLD AUTO: 3.4 K/UL — SIGNIFICANT CHANGE UP (ref 1.8–7.4)
NEUTROPHILS NFR BLD AUTO: 87 % — HIGH (ref 43–77)
PLAT MORPH BLD: NORMAL — SIGNIFICANT CHANGE UP
PLATELET # BLD AUTO: 212 K/UL — SIGNIFICANT CHANGE UP (ref 150–400)
POIKILOCYTOSIS BLD QL AUTO: SLIGHT — SIGNIFICANT CHANGE UP
RBC # BLD: 3.6 M/UL — LOW (ref 3.8–5.2)
RBC # FLD: 15.8 % — HIGH (ref 10.3–14.5)
RBC BLD AUTO: SIGNIFICANT CHANGE UP
SCHISTOCYTES BLD QL AUTO: SLIGHT — SIGNIFICANT CHANGE UP
WBC # BLD: 4.5 K/UL — SIGNIFICANT CHANGE UP (ref 3.8–10.5)
WBC # FLD AUTO: 4.5 K/UL — SIGNIFICANT CHANGE UP (ref 3.8–10.5)

## 2020-02-10 PROCEDURE — 99214 OFFICE O/P EST MOD 30 MIN: CPT

## 2020-02-10 NOTE — ASSESSMENT
[FreeTextEntry1] : 70 year old female IgG kappa multiple myeloma,  FISH panel - positive for CCND1/IGH fusion - a/w favorable prognosis. PET CT (7/12/18) did not show any bone lesions. \par She is s/p VRd, followed by 4 cycles of KRd, followed by autoPSCT on 2/28/19. \par On 5/18/19 she was started on Pomalyst for persistently elevated KFLC at 50, FLC ratio 136.\par On Pomalyst + weekly Dex, she has had a partial response, her KFLC has improved to some degree and  bone marrow in 12/2019 shows persistent plasma cells of 15-20%.  Daratumumab was added from 2/3/2020 onwards. \par \par Labs reviewed. Tolerating Daratumumab well.\par \par Plan:\par -Continue daratumumab \par -Continue pomalyst + weekly dex\par -Plan for immunoelectrophoresis after 4th treatment\par -Anemia - Hgb 11.4 g/dL - stable, continue to monitor. \par -F/u in 3 weeks

## 2020-02-10 NOTE — ADDENDUM
[FreeTextEntry1] : I, Eduardo Fisher, acted solely as a scribe for Dr. Marshall De Oliveira on 02/10/2020.

## 2020-02-10 NOTE — HISTORY OF PRESENT ILLNESS
[de-identified] : Ms. Calero is a 69 year old female multiple myeloma. \par \par She was noted to have proteinuria and then had a 24 hour urine protein which showed non-nephrotic range proteinuria of 510 mg/24 hours. She was referred to Dr. Lesa Rendon of nephrology. SPEP showed faint M-spike 0.2 g/dL in gammaglobulin region, immunofixation showed this to be a IgG kappa monoclonal protein. UPEP showed 2 monoclonal protein bands, 61%, and 0.7%. \par \par Subsequent work up:\par 6/19/18 M-protein 0.2 g/dL, Kappa FLC 71, lambda FLC 0.36, FLC ratio 198.61\par , Beta 2 microglobulin 1.5 mg/L\par \par She had a bone marrow biopsy on 6/22/18. Pathology: plasma cell neoplasm, plasma cells comprise 20-25% of marrow cells. Trilineage hematopoiesis present with maturation, increased iron stores. Congo red stain negative for amyloid. Karyotype 46, XX. FISH panel - positive for CCND1/IGH fusion - a/w favorable prognosis.\par \par 7/12/18 PET - negative\par \par Treatment Summary \par 7/19/18 - C1 VRd\par 8/9/18 -C2 VRd\par 8/30/18 - C3 VRd\par 9/20/18 -partial C4 VRd\par 10/11/18 - 1/2/19 KRd (carfilzomib, Revlimid, Dexamethasone) x 4 cycles. \par 2/28/19 - autoPSCT \par 5/18/19 - started Pomalyst\par 7/19/19 - decadron 20 mg weekly added to Pomalyst\par  [de-identified] : Returns for follow up. Today is week 2 daratumumab. \par Tolerating daratumumab well. \par States her legs were swelling up while she was on vacation,but have recovered since she returned. \par Appetite is good. No pain. She had a fever last week of a 100.4, resolved with Tylenol.

## 2020-02-10 NOTE — PHYSICAL EXAM
[Restricted in physically strenuous activity but ambulatory and able to carry out work of a light or sedentary nature] : Status 1- Restricted in physically strenuous activity but ambulatory and able to carry out work of a light or sedentary nature, e.g., light house work, office work [Normal] : grossly intact [de-identified] : supple [de-identified] : S1/S2 [de-identified] : no edema [de-identified] : clear b/l [de-identified] : soft, NT

## 2020-02-11 DIAGNOSIS — R11.2 NAUSEA WITH VOMITING, UNSPECIFIED: ICD-10-CM

## 2020-02-11 DIAGNOSIS — Z51.11 ENCOUNTER FOR ANTINEOPLASTIC CHEMOTHERAPY: ICD-10-CM

## 2020-02-18 ENCOUNTER — APPOINTMENT (OUTPATIENT)
Age: 70
End: 2020-02-18

## 2020-02-18 ENCOUNTER — APPOINTMENT (OUTPATIENT)
Dept: HEMATOLOGY ONCOLOGY | Facility: CLINIC | Age: 70
End: 2020-02-18

## 2020-02-18 ENCOUNTER — LABORATORY RESULT (OUTPATIENT)
Age: 70
End: 2020-02-18

## 2020-02-18 ENCOUNTER — RESULT REVIEW (OUTPATIENT)
Age: 70
End: 2020-02-18

## 2020-02-18 LAB
ACANTHOCYTES BLD QL SMEAR: SLIGHT — SIGNIFICANT CHANGE UP
ANISOCYTOSIS BLD QL: SLIGHT — SIGNIFICANT CHANGE UP
BASOPHILS # BLD AUTO: 0.1 K/UL — SIGNIFICANT CHANGE UP (ref 0–0.2)
BASOPHILS NFR BLD AUTO: 4 % — HIGH (ref 0–2)
BURR CELLS BLD QL SMEAR: PRESENT — SIGNIFICANT CHANGE UP
DOHLE BOD BLD QL SMEAR: PRESENT — SIGNIFICANT CHANGE UP
ELLIPTOCYTES BLD QL SMEAR: SLIGHT — SIGNIFICANT CHANGE UP
EOSINOPHIL # BLD AUTO: 0.6 K/UL — HIGH (ref 0–0.5)
EOSINOPHIL NFR BLD AUTO: 32 % — HIGH (ref 0–6)
HCT VFR BLD CALC: 34.2 % — LOW (ref 34.5–45)
HGB BLD-MCNC: 10.7 G/DL — LOW (ref 11.5–15.5)
LG PLATELETS BLD QL AUTO: SLIGHT — SIGNIFICANT CHANGE UP
LYMPHOCYTES # BLD AUTO: 0.7 K/UL — LOW (ref 1–3.3)
LYMPHOCYTES # BLD AUTO: 33 % — SIGNIFICANT CHANGE UP (ref 13–44)
MACROCYTES BLD QL: SLIGHT — SIGNIFICANT CHANGE UP
MCHC RBC-ENTMCNC: 30.1 PG — SIGNIFICANT CHANGE UP (ref 27–34)
MCHC RBC-ENTMCNC: 31.3 G/DL — LOW (ref 32–36)
MCV RBC AUTO: 96.2 FL — SIGNIFICANT CHANGE UP (ref 80–100)
MICROCYTES BLD QL: SLIGHT — SIGNIFICANT CHANGE UP
MONOCYTES # BLD AUTO: 0.3 K/UL — SIGNIFICANT CHANGE UP (ref 0–0.9)
MONOCYTES NFR BLD AUTO: 19 % — HIGH (ref 2–14)
NEUTROPHILS # BLD AUTO: 0.2 K/UL — LOW (ref 1.8–7.4)
NEUTROPHILS NFR BLD AUTO: 10 % — LOW (ref 43–77)
OVALOCYTES BLD QL SMEAR: SLIGHT — SIGNIFICANT CHANGE UP
PLAT MORPH BLD: NORMAL — SIGNIFICANT CHANGE UP
PLATELET # BLD AUTO: 211 K/UL — SIGNIFICANT CHANGE UP (ref 150–400)
POIKILOCYTOSIS BLD QL AUTO: SLIGHT — SIGNIFICANT CHANGE UP
POLYCHROMASIA BLD QL SMEAR: SLIGHT — SIGNIFICANT CHANGE UP
RBC # BLD: 3.55 M/UL — LOW (ref 3.8–5.2)
RBC # FLD: 14.4 % — SIGNIFICANT CHANGE UP (ref 10.3–14.5)
RBC BLD AUTO: SIGNIFICANT CHANGE UP
ROULEAUX BLD QL SMEAR: PRESENT — SIGNIFICANT CHANGE UP
SCHISTOCYTES BLD QL AUTO: SLIGHT — SIGNIFICANT CHANGE UP
SMUDGE CELLS # BLD: PRESENT — SIGNIFICANT CHANGE UP
VARIANT LYMPHS # BLD: 2 % — SIGNIFICANT CHANGE UP (ref 0–6)
WBC # BLD: 1.8 K/UL — LOW (ref 3.8–10.5)
WBC # FLD AUTO: 1.8 K/UL — LOW (ref 3.8–10.5)

## 2020-02-25 ENCOUNTER — APPOINTMENT (OUTPATIENT)
Age: 70
End: 2020-02-25

## 2020-02-25 ENCOUNTER — RESULT REVIEW (OUTPATIENT)
Age: 70
End: 2020-02-25

## 2020-02-25 ENCOUNTER — APPOINTMENT (OUTPATIENT)
Dept: HEMATOLOGY ONCOLOGY | Facility: CLINIC | Age: 70
End: 2020-02-25

## 2020-02-25 ENCOUNTER — LABORATORY RESULT (OUTPATIENT)
Age: 70
End: 2020-02-25

## 2020-02-25 LAB
BASOPHILS # BLD AUTO: 0.1 K/UL — SIGNIFICANT CHANGE UP (ref 0–0.2)
BASOPHILS NFR BLD AUTO: 3.4 % — HIGH (ref 0–2)
EOSINOPHIL # BLD AUTO: 0.4 K/UL — SIGNIFICANT CHANGE UP (ref 0–0.5)
EOSINOPHIL NFR BLD AUTO: 10.1 % — HIGH (ref 0–6)
HCT VFR BLD CALC: 35 % — SIGNIFICANT CHANGE UP (ref 34.5–45)
HGB BLD-MCNC: 11.1 G/DL — LOW (ref 11.5–15.5)
LYMPHOCYTES # BLD AUTO: 1.3 K/UL — SIGNIFICANT CHANGE UP (ref 1–3.3)
LYMPHOCYTES # BLD AUTO: 35.3 % — SIGNIFICANT CHANGE UP (ref 13–44)
MCHC RBC-ENTMCNC: 30.6 PG — SIGNIFICANT CHANGE UP (ref 27–34)
MCHC RBC-ENTMCNC: 31.8 G/DL — LOW (ref 32–36)
MCV RBC AUTO: 96.1 FL — SIGNIFICANT CHANGE UP (ref 80–100)
MONOCYTES # BLD AUTO: 0.8 K/UL — SIGNIFICANT CHANGE UP (ref 0–0.9)
MONOCYTES NFR BLD AUTO: 22 % — HIGH (ref 2–14)
NEUTROPHILS # BLD AUTO: 1.1 K/UL — LOW (ref 1.8–7.4)
NEUTROPHILS NFR BLD AUTO: 29.2 % — LOW (ref 43–77)
PLATELET # BLD AUTO: 316 K/UL — SIGNIFICANT CHANGE UP (ref 150–400)
RBC # BLD: 3.64 M/UL — LOW (ref 3.8–5.2)
RBC # FLD: 15.1 % — HIGH (ref 10.3–14.5)
WBC # BLD: 3.6 K/UL — LOW (ref 3.8–10.5)
WBC # FLD AUTO: 3.6 K/UL — LOW (ref 3.8–10.5)

## 2020-03-03 ENCOUNTER — RESULT REVIEW (OUTPATIENT)
Age: 70
End: 2020-03-03

## 2020-03-03 ENCOUNTER — APPOINTMENT (OUTPATIENT)
Age: 70
End: 2020-03-03

## 2020-03-03 ENCOUNTER — LABORATORY RESULT (OUTPATIENT)
Age: 70
End: 2020-03-03

## 2020-03-03 ENCOUNTER — APPOINTMENT (OUTPATIENT)
Dept: HEMATOLOGY ONCOLOGY | Facility: CLINIC | Age: 70
End: 2020-03-03

## 2020-03-03 LAB
BASOPHILS # BLD AUTO: 0.1 K/UL — SIGNIFICANT CHANGE UP (ref 0–0.2)
BASOPHILS NFR BLD AUTO: 2.4 % — HIGH (ref 0–2)
EOSINOPHIL # BLD AUTO: 0.4 K/UL — SIGNIFICANT CHANGE UP (ref 0–0.5)
EOSINOPHIL NFR BLD AUTO: 6.3 % — HIGH (ref 0–6)
HCT VFR BLD CALC: 37.9 % — SIGNIFICANT CHANGE UP (ref 34.5–45)
HGB BLD-MCNC: 12.4 G/DL — SIGNIFICANT CHANGE UP (ref 11.5–15.5)
LYMPHOCYTES # BLD AUTO: 1.1 K/UL — SIGNIFICANT CHANGE UP (ref 1–3.3)
LYMPHOCYTES # BLD AUTO: 18.4 % — SIGNIFICANT CHANGE UP (ref 13–44)
MCHC RBC-ENTMCNC: 30.6 PG — SIGNIFICANT CHANGE UP (ref 27–34)
MCHC RBC-ENTMCNC: 32.7 G/DL — SIGNIFICANT CHANGE UP (ref 32–36)
MCV RBC AUTO: 93.8 FL — SIGNIFICANT CHANGE UP (ref 80–100)
MONOCYTES # BLD AUTO: 0.2 K/UL — SIGNIFICANT CHANGE UP (ref 0–0.9)
MONOCYTES NFR BLD AUTO: 4.2 % — SIGNIFICANT CHANGE UP (ref 2–14)
NEUTROPHILS # BLD AUTO: 4 K/UL — SIGNIFICANT CHANGE UP (ref 1.8–7.4)
NEUTROPHILS NFR BLD AUTO: 68.8 % — SIGNIFICANT CHANGE UP (ref 43–77)
PLATELET # BLD AUTO: 259 K/UL — SIGNIFICANT CHANGE UP (ref 150–400)
RBC # BLD: 4.04 M/UL — SIGNIFICANT CHANGE UP (ref 3.8–5.2)
RBC # FLD: 15 % — HIGH (ref 10.3–14.5)
WBC # BLD: 5.8 K/UL — SIGNIFICANT CHANGE UP (ref 3.8–10.5)
WBC # FLD AUTO: 5.8 K/UL — SIGNIFICANT CHANGE UP (ref 3.8–10.5)

## 2020-03-05 ENCOUNTER — OUTPATIENT (OUTPATIENT)
Dept: OUTPATIENT SERVICES | Facility: HOSPITAL | Age: 70
LOS: 1 days | Discharge: HOME | End: 2020-03-05

## 2020-03-05 DIAGNOSIS — Z98.51 TUBAL LIGATION STATUS: Chronic | ICD-10-CM

## 2020-03-05 DIAGNOSIS — C90.00 MULTIPLE MYELOMA NOT HAVING ACHIEVED REMISSION: ICD-10-CM

## 2020-03-09 ENCOUNTER — RESULT REVIEW (OUTPATIENT)
Age: 70
End: 2020-03-09

## 2020-03-09 ENCOUNTER — APPOINTMENT (OUTPATIENT)
Dept: HEMATOLOGY ONCOLOGY | Facility: CLINIC | Age: 70
End: 2020-03-09

## 2020-03-09 LAB
ACANTHOCYTES BLD QL SMEAR: SLIGHT — SIGNIFICANT CHANGE UP
ANISOCYTOSIS BLD QL: SLIGHT — SIGNIFICANT CHANGE UP
BASOPHILS # BLD AUTO: 0.2 K/UL — SIGNIFICANT CHANGE UP (ref 0–0.2)
BASOPHILS NFR BLD AUTO: 2 % — SIGNIFICANT CHANGE UP (ref 0–2)
ELLIPTOCYTES BLD QL SMEAR: SLIGHT — SIGNIFICANT CHANGE UP
EOSINOPHIL # BLD AUTO: 0.6 K/UL — HIGH (ref 0–0.5)
EOSINOPHIL NFR BLD AUTO: 11 % — HIGH (ref 0–6)
HCT VFR BLD CALC: 32.9 % — LOW (ref 34.5–45)
HGB BLD-MCNC: 10.8 G/DL — LOW (ref 11.5–15.5)
LG PLATELETS BLD QL AUTO: SLIGHT — SIGNIFICANT CHANGE UP
LYMPHOCYTES # BLD AUTO: 1 K/UL — SIGNIFICANT CHANGE UP (ref 1–3.3)
LYMPHOCYTES # BLD AUTO: 18 % — SIGNIFICANT CHANGE UP (ref 13–44)
MACROCYTES BLD QL: SLIGHT — SIGNIFICANT CHANGE UP
MACROCYTES OVAL BLD QL SMEAR: SLIGHT — SIGNIFICANT CHANGE UP
MCHC RBC-ENTMCNC: 30.9 PG — SIGNIFICANT CHANGE UP (ref 27–34)
MCHC RBC-ENTMCNC: 32.9 G/DL — SIGNIFICANT CHANGE UP (ref 32–36)
MCV RBC AUTO: 93.9 FL — SIGNIFICANT CHANGE UP (ref 80–100)
MICROCYTES BLD QL: SLIGHT — SIGNIFICANT CHANGE UP
NEUTROPHILS # BLD AUTO: 4.7 K/UL — SIGNIFICANT CHANGE UP (ref 1.8–7.4)
NEUTROPHILS NFR BLD AUTO: 69 % — SIGNIFICANT CHANGE UP (ref 43–77)
PLAT MORPH BLD: NORMAL — SIGNIFICANT CHANGE UP
PLATELET # BLD AUTO: 228 K/UL — SIGNIFICANT CHANGE UP (ref 150–400)
POIKILOCYTOSIS BLD QL AUTO: SLIGHT — SIGNIFICANT CHANGE UP
POLYCHROMASIA BLD QL SMEAR: SLIGHT — SIGNIFICANT CHANGE UP
RBC # BLD: 3.51 M/UL — LOW (ref 3.8–5.2)
RBC # FLD: 14.9 % — HIGH (ref 10.3–14.5)
RBC BLD AUTO: SIGNIFICANT CHANGE UP
WBC # BLD: 7 K/UL — SIGNIFICANT CHANGE UP (ref 3.8–10.5)
WBC # FLD AUTO: 7 K/UL — SIGNIFICANT CHANGE UP (ref 3.8–10.5)

## 2020-03-10 ENCOUNTER — LABORATORY RESULT (OUTPATIENT)
Age: 70
End: 2020-03-10

## 2020-03-10 ENCOUNTER — APPOINTMENT (OUTPATIENT)
Dept: HEMATOLOGY ONCOLOGY | Facility: CLINIC | Age: 70
End: 2020-03-10
Payer: MEDICARE

## 2020-03-10 ENCOUNTER — APPOINTMENT (OUTPATIENT)
Age: 70
End: 2020-03-10

## 2020-03-10 VITALS
TEMPERATURE: 98.5 F | DIASTOLIC BLOOD PRESSURE: 81 MMHG | HEART RATE: 97 BPM | SYSTOLIC BLOOD PRESSURE: 135 MMHG | WEIGHT: 176.03 LBS | OXYGEN SATURATION: 97 % | HEIGHT: 63 IN | BODY MASS INDEX: 31.19 KG/M2

## 2020-03-10 LAB
ALBUMIN MFR SERPL ELPH: 58.4 %
ALBUMIN SERPL ELPH-MCNC: 3.7 G/DL
ALBUMIN SERPL-MCNC: 4 G/DL
ALBUMIN/GLOB SERPL: 1.4 RATIO
ALP BLD-CCNC: 101 U/L
ALPHA1 GLOB MFR SERPL ELPH: 7.7 %
ALPHA1 GLOB SERPL ELPH-MCNC: 0.5 G/DL
ALPHA2 GLOB MFR SERPL ELPH: 17.4 %
ALPHA2 GLOB SERPL ELPH-MCNC: 1.2 G/DL
ALT SERPL-CCNC: 11 U/L
ANION GAP SERPL CALC-SCNC: 12 MMOL/L
AST SERPL-CCNC: 16 U/L
B-GLOBULIN MFR SERPL ELPH: 12.3 %
B-GLOBULIN SERPL ELPH-MCNC: 0.8 G/DL
BILIRUB SERPL-MCNC: 0.2 MG/DL
BUN SERPL-MCNC: 8 MG/DL
CALCIUM SERPL-MCNC: 8.8 MG/DL
CHLORIDE SERPL-SCNC: 108 MMOL/L
CO2 SERPL-SCNC: 24 MMOL/L
CREAT SERPL-MCNC: 0.63 MG/DL
DEPRECATED KAPPA LC FREE/LAMBDA SER: 20.63 RATIO
GAMMA GLOB FLD ELPH-MCNC: 0.3 G/DL
GAMMA GLOB MFR SERPL ELPH: 4.2 %
GLUCOSE SERPL-MCNC: 96 MG/DL
IGA SER QL IEP: 11 MG/DL
IGG SER QL IEP: 276 MG/DL
IGM SER QL IEP: 11 MG/DL
INTERPRETATION SERPL IEP-IMP: NORMAL
KAPPA LC CSF-MCNC: 0.27 MG/DL
KAPPA LC SERPL-MCNC: 5.57 MG/DL
M PROTEIN MFR SERPL ELPH: 1.4 %
M PROTEIN SPEC IFE-MCNC: NORMAL
MONOCLON BAND OBS SERPL: 0.1 G/DL
POTASSIUM SERPL-SCNC: 3.8 MMOL/L
PROT SERPL-MCNC: 5.4 G/DL
PROT SERPL-MCNC: 5.7 G/DL
PROT SERPL-MCNC: 6.8 G/DL
SODIUM SERPL-SCNC: 143 MMOL/L

## 2020-03-10 PROCEDURE — 99214 OFFICE O/P EST MOD 30 MIN: CPT

## 2020-03-10 RX ORDER — HYDROCORTISONE 0.5 %
0.5 OINTMENT (GRAM) TOPICAL TWICE DAILY
Qty: 1 | Refills: 0 | Status: DISCONTINUED | COMMUNITY
Start: 2019-03-24 | End: 2020-03-10

## 2020-03-10 RX ORDER — DESONIDE 0.5 MG/G
0.05 CREAM TOPICAL TWICE DAILY
Qty: 1 | Refills: 1 | Status: DISCONTINUED | COMMUNITY
Start: 2019-03-28 | End: 2020-03-10

## 2020-03-10 RX ORDER — DEXAMETHASONE 4 MG/1
4 TABLET ORAL
Qty: 40 | Refills: 5 | Status: DISCONTINUED | COMMUNITY
Start: 2019-07-18 | End: 2020-03-10

## 2020-03-10 RX ORDER — THIAMINE HCL 100 MG
500 TABLET ORAL
Qty: 30 | Refills: 1 | Status: ACTIVE | COMMUNITY
Start: 2020-03-10

## 2020-03-10 NOTE — ASSESSMENT
[FreeTextEntry1] : 70 year old female IgG kappa multiple myeloma,  FISH panel - positive for CCND1/IGH fusion - a/w favorable prognosis. PET CT (7/12/18) did not show any bone lesions. S/p VRd, followed by 4 cycles of KRd, followed by autoPSCT on 2/28/19. \par On 5/18/19 started on Pomalyst for persistently elevated KFLC at 50, FLC ratio 136.\par On Pomalyst + weekly Dex, she has had a partial response, her KFLC has improved to some degree and bone marrow in 12/2019 shows persistent plasma cells of 15-20%. Daratumumab was added from 2/3/2020 onwards. \par \par 	KFLC	FLC Ratio\par 05/08/19	50.32	136.00\par 08/15/19	25.08	51.18\par 11/19/19	34.61	57.68\par 12/16/19	15.87	52.90\par Daratumab added 2/03/20		\par 3/09/20	5.57	20.63\par \par S/p week 4 of daratumumab on 3/03/20. Reviewed labs, both KFLC 5.57 and FLC ratio 20.63 are both trending downwards. Tolerating daratumumab well w/ the exception of increased fatigue. Hgb 10.8 g/dL, down from 11.5 on 2/05/20. PLT 228K. Is on Magnesium for hand cramping.\par \par Plan:\par - Proceed w/ week 5 daratumumab today and weekly, through week 9. \par - Continue pomalyst + weekly dexamethasone. Day 21 is on 3/19.\par - Anemia - Hgb 11.4 g/dL - stable, continue to monitor. \par - Hand cramping: repeat magnesium today.\par - F/u in 3 weeks w/ Dr. De Oliveira.

## 2020-03-10 NOTE — HISTORY OF PRESENT ILLNESS
[de-identified] : Ms. Calero is a 69 year old female multiple myeloma. \par \par She was noted to have proteinuria and then had a 24 hour urine protein which showed non-nephrotic range proteinuria of 510 mg/24 hours. She was referred to Dr. Lesa Rendon of nephrology. SPEP showed faint M-spike 0.2 g/dL in gammaglobulin region, immunofixation showed this to be a IgG kappa monoclonal protein. UPEP showed 2 monoclonal protein bands, 61%, and 0.7%. \par \par Subsequent work up:\par 6/19/18 M-protein 0.2 g/dL, Kappa FLC 71, lambda FLC 0.36, FLC ratio 198.61\par , Beta 2 microglobulin 1.5 mg/L\par \par She had a bone marrow biopsy on 6/22/18. Pathology: plasma cell neoplasm, plasma cells comprise 20-25% of marrow cells. Trilineage hematopoiesis present with maturation, increased iron stores. Congo red stain negative for amyloid. Karyotype 46, XX. FISH panel - positive for CCND1/IGH fusion - a/w favorable prognosis.\par \par 7/12/18 PET - negative\par \par Treatment Summary \par 7/19/18 - C1 VRd\par 8/9/18 -C2 VRd\par 8/30/18 - C3 VRd\par 9/20/18 -partial C4 VRd\par 10/11/18 - 1/2/19 KRd (carfilzomib, Revlimid, Dexamethasone) x 4 cycles. \par 2/28/19 - autoPSCT \par 5/18/19 - started Pomalyst\par 7/19/19 - decadron 20 mg weekly added to Pomalyst\par  [de-identified] : Presents for evaluation for week 5 Darzalex (daratumumab). Today is day 12 of Pomalyst. Since last seen, denies fevers, no SOB, no CP, appetite is good, manageable constipation, LBM yesterday. No diarrhea, no pain/burning w/ urination. Slight LE edema, when dependent, resolves w/ elevation. No calve pain or tenderness.. Energy level is fair. Since starting Darzalex has increased c/o fatigue. No c/o bony pain. The remainder of ROS is negative.\par

## 2020-03-10 NOTE — PHYSICAL EXAM
[Restricted in physically strenuous activity but ambulatory and able to carry out work of a light or sedentary nature] : Status 1- Restricted in physically strenuous activity but ambulatory and able to carry out work of a light or sedentary nature, e.g., light house work, office work [Normal] : full range of motion and no deformities appreciated [de-identified] : supple [de-identified] : clear b/l [de-identified] : S1/S2 sl tachy rate. [de-identified] : negative pedal edema or calve tenderness [de-identified] : BS+, soft, nontender on palpation. [de-identified] : without spinal or cva tenderness.

## 2020-03-13 DIAGNOSIS — Z51.11 ENCOUNTER FOR ANTINEOPLASTIC CHEMOTHERAPY: ICD-10-CM

## 2020-03-13 DIAGNOSIS — R11.2 NAUSEA WITH VOMITING, UNSPECIFIED: ICD-10-CM

## 2020-03-14 ENCOUNTER — OUTPATIENT (OUTPATIENT)
Dept: OUTPATIENT SERVICES | Facility: HOSPITAL | Age: 70
LOS: 1 days | Discharge: HOME | End: 2020-03-14

## 2020-03-14 DIAGNOSIS — Z01.818 ENCOUNTER FOR OTHER PREPROCEDURAL EXAMINATION: ICD-10-CM

## 2020-03-14 DIAGNOSIS — Z98.51 TUBAL LIGATION STATUS: Chronic | ICD-10-CM

## 2020-03-14 DIAGNOSIS — C90.00 MULTIPLE MYELOMA NOT HAVING ACHIEVED REMISSION: ICD-10-CM

## 2020-03-16 ENCOUNTER — APPOINTMENT (OUTPATIENT)
Dept: INFUSION THERAPY | Facility: HOSPITAL | Age: 70
End: 2020-03-16
Payer: MEDICARE

## 2020-03-16 ENCOUNTER — APPOINTMENT (OUTPATIENT)
Dept: HEMATOLOGY ONCOLOGY | Facility: CLINIC | Age: 70
End: 2020-03-16
Payer: MEDICARE

## 2020-03-16 VITALS
TEMPERATURE: 98.3 F | WEIGHT: 177.03 LBS | RESPIRATION RATE: 17 BRPM | OXYGEN SATURATION: 99 % | DIASTOLIC BLOOD PRESSURE: 83 MMHG | BODY MASS INDEX: 31.36 KG/M2 | SYSTOLIC BLOOD PRESSURE: 126 MMHG | HEART RATE: 83 BPM

## 2020-03-16 DIAGNOSIS — J31.0 CHRONIC RHINITIS: ICD-10-CM

## 2020-03-16 PROCEDURE — 90746 HEPB VACCINE 3 DOSE ADULT IM: CPT

## 2020-03-16 PROCEDURE — G0010: CPT

## 2020-03-16 PROCEDURE — 90647 HIB PRP-OMP VACC 3 DOSE IM: CPT

## 2020-03-16 PROCEDURE — 90472 IMMUNIZATION ADMIN EACH ADD: CPT

## 2020-03-16 PROCEDURE — 99214 OFFICE O/P EST MOD 30 MIN: CPT | Mod: 25

## 2020-03-16 PROCEDURE — 90670 PCV13 VACCINE IM: CPT

## 2020-03-16 PROCEDURE — G0009: CPT

## 2020-03-17 ENCOUNTER — APPOINTMENT (OUTPATIENT)
Dept: HEMATOLOGY ONCOLOGY | Facility: CLINIC | Age: 70
End: 2020-03-17

## 2020-03-17 ENCOUNTER — LABORATORY RESULT (OUTPATIENT)
Age: 70
End: 2020-03-17

## 2020-03-17 ENCOUNTER — APPOINTMENT (OUTPATIENT)
Age: 70
End: 2020-03-17

## 2020-03-17 ENCOUNTER — RESULT REVIEW (OUTPATIENT)
Age: 70
End: 2020-03-17

## 2020-03-17 LAB
ACANTHOCYTES BLD QL SMEAR: SLIGHT — SIGNIFICANT CHANGE UP
ANISOCYTOSIS BLD QL: SLIGHT — SIGNIFICANT CHANGE UP
BASOPHILS # BLD AUTO: 0.1 K/UL — SIGNIFICANT CHANGE UP (ref 0–0.2)
BASOPHILS NFR BLD AUTO: 2 % — SIGNIFICANT CHANGE UP (ref 0–2)
ELLIPTOCYTES BLD QL SMEAR: SLIGHT — SIGNIFICANT CHANGE UP
EOSINOPHIL # BLD AUTO: 1.2 K/UL — HIGH (ref 0–0.5)
EOSINOPHIL NFR BLD AUTO: 33 % — HIGH (ref 0–6)
HCT VFR BLD CALC: 33 % — LOW (ref 34.5–45)
HGB BLD-MCNC: 11 G/DL — LOW (ref 11.5–15.5)
LYMPHOCYTES # BLD AUTO: 1.3 K/UL — SIGNIFICANT CHANGE UP (ref 1–3.3)
LYMPHOCYTES # BLD AUTO: 27 % — SIGNIFICANT CHANGE UP (ref 13–44)
MACROCYTES BLD QL: SLIGHT — SIGNIFICANT CHANGE UP
MCHC RBC-ENTMCNC: 31.4 PG — SIGNIFICANT CHANGE UP (ref 27–34)
MCHC RBC-ENTMCNC: 33.3 G/DL — SIGNIFICANT CHANGE UP (ref 32–36)
MCV RBC AUTO: 94.1 FL — SIGNIFICANT CHANGE UP (ref 80–100)
MICROCYTES BLD QL: SLIGHT — SIGNIFICANT CHANGE UP
MONOCYTES # BLD AUTO: 0.6 K/UL — SIGNIFICANT CHANGE UP (ref 0–0.9)
MONOCYTES NFR BLD AUTO: 8 % — SIGNIFICANT CHANGE UP (ref 2–14)
NEUTROPHILS # BLD AUTO: 1.1 K/UL — LOW (ref 1.8–7.4)
NEUTROPHILS NFR BLD AUTO: 28 % — LOW (ref 43–77)
OVALOCYTES BLD QL SMEAR: SLIGHT — SIGNIFICANT CHANGE UP
PLAT MORPH BLD: NORMAL — SIGNIFICANT CHANGE UP
PLATELET # BLD AUTO: 222 K/UL — SIGNIFICANT CHANGE UP (ref 150–400)
POIKILOCYTOSIS BLD QL AUTO: SLIGHT — SIGNIFICANT CHANGE UP
POLYCHROMASIA BLD QL SMEAR: SLIGHT — SIGNIFICANT CHANGE UP
RBC # BLD: 3.51 M/UL — LOW (ref 3.8–5.2)
RBC # FLD: 15.6 % — HIGH (ref 10.3–14.5)
RBC BLD AUTO: SIGNIFICANT CHANGE UP
ROULEAUX BLD QL SMEAR: PRESENT — SIGNIFICANT CHANGE UP
SCHISTOCYTES BLD QL AUTO: SLIGHT — SIGNIFICANT CHANGE UP
SMUDGE CELLS # BLD: PRESENT — SIGNIFICANT CHANGE UP
VARIANT LYMPHS # BLD: 2 % — SIGNIFICANT CHANGE UP (ref 0–6)
WBC # BLD: 4.4 K/UL — SIGNIFICANT CHANGE UP (ref 3.8–10.5)
WBC # FLD AUTO: 4.4 K/UL — SIGNIFICANT CHANGE UP (ref 3.8–10.5)

## 2020-03-23 ENCOUNTER — APPOINTMENT (OUTPATIENT)
Dept: INFUSION THERAPY | Facility: HOSPITAL | Age: 70
End: 2020-03-23

## 2020-03-23 RX ORDER — CYCLOPHOSPHAMIDE 50 MG/1
50 CAPSULE ORAL
Qty: 40 | Refills: 2 | Status: DISCONTINUED | COMMUNITY
Start: 2020-03-20 | End: 2020-03-23

## 2020-03-24 ENCOUNTER — RESULT REVIEW (OUTPATIENT)
Age: 70
End: 2020-03-24

## 2020-03-24 ENCOUNTER — APPOINTMENT (OUTPATIENT)
Age: 70
End: 2020-03-24

## 2020-03-24 ENCOUNTER — APPOINTMENT (OUTPATIENT)
Dept: HEMATOLOGY ONCOLOGY | Facility: CLINIC | Age: 70
End: 2020-03-24

## 2020-03-24 ENCOUNTER — APPOINTMENT (OUTPATIENT)
Age: 70
End: 2020-03-24
Payer: MEDICARE

## 2020-03-24 LAB
BASOPHILS # BLD AUTO: 0.1 K/UL — SIGNIFICANT CHANGE UP (ref 0–0.2)
BASOPHILS NFR BLD AUTO: 2.1 % — HIGH (ref 0–2)
EOSINOPHIL # BLD AUTO: 0.5 K/UL — SIGNIFICANT CHANGE UP (ref 0–0.5)
EOSINOPHIL NFR BLD AUTO: 9 % — HIGH (ref 0–6)
HCT VFR BLD CALC: 36.6 % — SIGNIFICANT CHANGE UP (ref 34.5–45)
HGB BLD-MCNC: 11.4 G/DL — LOW (ref 11.5–15.5)
LYMPHOCYTES # BLD AUTO: 2.5 K/UL — SIGNIFICANT CHANGE UP (ref 1–3.3)
LYMPHOCYTES # BLD AUTO: 47.1 % — HIGH (ref 13–44)
MCHC RBC-ENTMCNC: 30.4 PG — SIGNIFICANT CHANGE UP (ref 27–34)
MCHC RBC-ENTMCNC: 31.2 G/DL — LOW (ref 32–36)
MCV RBC AUTO: 97.3 FL — SIGNIFICANT CHANGE UP (ref 80–100)
MONOCYTES # BLD AUTO: 0.7 K/UL — SIGNIFICANT CHANGE UP (ref 0–0.9)
MONOCYTES NFR BLD AUTO: 13.9 % — SIGNIFICANT CHANGE UP (ref 2–14)
NEUTROPHILS # BLD AUTO: 1.5 K/UL — LOW (ref 1.8–7.4)
NEUTROPHILS NFR BLD AUTO: 28 % — LOW (ref 43–77)
PLATELET # BLD AUTO: 348 K/UL — SIGNIFICANT CHANGE UP (ref 150–400)
RBC # BLD: 3.76 M/UL — LOW (ref 3.8–5.2)
RBC # FLD: 15.4 % — HIGH (ref 10.3–14.5)
WBC # BLD: 5.3 K/UL — SIGNIFICANT CHANGE UP (ref 3.8–10.5)
WBC # FLD AUTO: 5.3 K/UL — SIGNIFICANT CHANGE UP (ref 3.8–10.5)

## 2020-03-24 PROCEDURE — 90472 IMMUNIZATION ADMIN EACH ADD: CPT

## 2020-03-24 PROCEDURE — 90734 MENACWYD/MENACWYCRM VACC IM: CPT

## 2020-03-24 PROCEDURE — 90713 POLIOVIRUS IPV SC/IM: CPT

## 2020-03-24 PROCEDURE — 90471 IMMUNIZATION ADMIN: CPT

## 2020-03-24 PROCEDURE — 90715 TDAP VACCINE 7 YRS/> IM: CPT

## 2020-03-31 ENCOUNTER — APPOINTMENT (OUTPATIENT)
Dept: HEMATOLOGY ONCOLOGY | Facility: CLINIC | Age: 70
End: 2020-03-31

## 2020-03-31 ENCOUNTER — LABORATORY RESULT (OUTPATIENT)
Age: 70
End: 2020-03-31

## 2020-03-31 ENCOUNTER — RESULT REVIEW (OUTPATIENT)
Age: 70
End: 2020-03-31

## 2020-03-31 ENCOUNTER — APPOINTMENT (OUTPATIENT)
Age: 70
End: 2020-03-31

## 2020-03-31 LAB
BASOPHILS # BLD AUTO: 0.1 K/UL — SIGNIFICANT CHANGE UP (ref 0–0.2)
BASOPHILS NFR BLD AUTO: 2.3 % — HIGH (ref 0–2)
EOSINOPHIL # BLD AUTO: 0.3 K/UL — SIGNIFICANT CHANGE UP (ref 0–0.5)
EOSINOPHIL NFR BLD AUTO: 6.1 % — HIGH (ref 0–6)
HCT VFR BLD CALC: 35.7 % — SIGNIFICANT CHANGE UP (ref 34.5–45)
HGB BLD-MCNC: 11.2 G/DL — LOW (ref 11.5–15.5)
LYMPHOCYTES # BLD AUTO: 1.9 K/UL — SIGNIFICANT CHANGE UP (ref 1–3.3)
LYMPHOCYTES # BLD AUTO: 36.8 % — SIGNIFICANT CHANGE UP (ref 13–44)
MCHC RBC-ENTMCNC: 30.5 PG — SIGNIFICANT CHANGE UP (ref 27–34)
MCHC RBC-ENTMCNC: 31.3 G/DL — LOW (ref 32–36)
MCV RBC AUTO: 97.5 FL — SIGNIFICANT CHANGE UP (ref 80–100)
MONOCYTES # BLD AUTO: 0.6 K/UL — SIGNIFICANT CHANGE UP (ref 0–0.9)
MONOCYTES NFR BLD AUTO: 11.6 % — SIGNIFICANT CHANGE UP (ref 2–14)
NEUTROPHILS # BLD AUTO: 2.2 K/UL — SIGNIFICANT CHANGE UP (ref 1.8–7.4)
NEUTROPHILS NFR BLD AUTO: 43.2 % — SIGNIFICANT CHANGE UP (ref 43–77)
PLATELET # BLD AUTO: 289 K/UL — SIGNIFICANT CHANGE UP (ref 150–400)
RBC # BLD: 3.66 M/UL — LOW (ref 3.8–5.2)
RBC # FLD: 15.4 % — HIGH (ref 10.3–14.5)
WBC # BLD: 5.2 K/UL — SIGNIFICANT CHANGE UP (ref 3.8–10.5)
WBC # FLD AUTO: 5.2 K/UL — SIGNIFICANT CHANGE UP (ref 3.8–10.5)

## 2020-04-03 ENCOUNTER — APPOINTMENT (OUTPATIENT)
Dept: HEMATOLOGY ONCOLOGY | Facility: CLINIC | Age: 70
End: 2020-04-03

## 2020-04-03 LAB
CREAT 24H UR-MCNC: 0.8 G/24 H
CREAT 24H UR-MCNC: 0.8 G/24 H
CREAT ?TM UR-MCNC: 28 MG/DL
CREAT ?TM UR-MCNC: 28 MG/DL
PROT 24H UR-MRATE: 6 MG/DL
PROT ?TM UR-MCNC: 24 HR
PROT ?TM UR-MCNC: 24 HR
PROT UR-MCNC: 180 MG/24 H
SPECIMEN VOL 24H UR: 3000 ML
SPECIMEN VOL 24H UR: 3000 ML

## 2020-04-05 ENCOUNTER — OUTPATIENT (OUTPATIENT)
Dept: OUTPATIENT SERVICES | Facility: HOSPITAL | Age: 70
LOS: 1 days | Discharge: HOME | End: 2020-04-05

## 2020-04-05 DIAGNOSIS — C90.00 MULTIPLE MYELOMA NOT HAVING ACHIEVED REMISSION: ICD-10-CM

## 2020-04-05 DIAGNOSIS — Z98.51 TUBAL LIGATION STATUS: Chronic | ICD-10-CM

## 2020-04-06 ENCOUNTER — APPOINTMENT (OUTPATIENT)
Dept: HEMATOLOGY ONCOLOGY | Facility: CLINIC | Age: 70
End: 2020-04-06
Payer: MEDICARE

## 2020-04-06 PROCEDURE — 99214 OFFICE O/P EST MOD 30 MIN: CPT | Mod: 95

## 2020-04-06 NOTE — HISTORY OF PRESENT ILLNESS
[de-identified] : Ms. Calero is a 69 year old female multiple myeloma. \par \par She was noted to have proteinuria and then had a 24 hour urine protein which showed non-nephrotic range proteinuria of 510 mg/24 hours. She was referred to Dr. Lesa Rendon of nephrology. SPEP showed faint M-spike 0.2 g/dL in gammaglobulin region, immunofixation showed this to be a IgG kappa monoclonal protein. UPEP showed 2 monoclonal protein bands, 61%, and 0.7%. \par \par Subsequent work up:\par 6/19/18 M-protein 0.2 g/dL, Kappa FLC 71, lambda FLC 0.36, FLC ratio 198.61\par , Beta 2 microglobulin 1.5 mg/L\par \par She had a bone marrow biopsy on 6/22/18. Pathology: plasma cell neoplasm, plasma cells comprise 20-25% of marrow cells. Trilineage hematopoiesis present with maturation, increased iron stores. Congo red stain negative for amyloid. Karyotype 46, XX. FISH panel - positive for CCND1/IGH fusion - a/w favorable prognosis.\par \par 7/12/18 PET - negative\par \par Treatment Summary \par 7/19/18 - C1 VRd\par 8/9/18 -C2 VRd\par 8/30/18 - C3 VRd\par 9/20/18 -partial C4 VRd\par 10/11/18 - 1/2/19 KRd (carfilzomib, Revlimid, Dexamethasone) x 4 cycles. \par 2/28/19 - autoPSCT \par 5/18/19 - started Pomalyst\par 7/19/19 - decadron 20 mg weekly added to Pomalyst\par  [de-identified] : Verbal consent given on 4/6/2020 (date) and 2:38 PM (time) by patient, herself. \par She is due for week 9 Darzalex tomorrow.\par Continues on Pomalyst . \par Feels well, denies any significant fatigue.\par No cough, SOB, fever. No diarrhea, N/V.\par

## 2020-04-06 NOTE — PHYSICAL EXAM
[Restricted in physically strenuous activity but ambulatory and able to carry out work of a light or sedentary nature] : Status 1- Restricted in physically strenuous activity but ambulatory and able to carry out work of a light or sedentary nature, e.g., light house work, office work [Normal] : well developed, well nourished, in no acute distress

## 2020-04-06 NOTE — HISTORY OF PRESENT ILLNESS
[de-identified] : Ms. Calero is a 69 year old female multiple myeloma. \par \par She was noted to have proteinuria and then had a 24 hour urine protein which showed non-nephrotic range proteinuria of 510 mg/24 hours. She was referred to Dr. Lesa Rendon of nephrology. SPEP showed faint M-spike 0.2 g/dL in gammaglobulin region, immunofixation showed this to be a IgG kappa monoclonal protein. UPEP showed 2 monoclonal protein bands, 61%, and 0.7%. \par \par Subsequent work up:\par 6/19/18 M-protein 0.2 g/dL, Kappa FLC 71, lambda FLC 0.36, FLC ratio 198.61\par , Beta 2 microglobulin 1.5 mg/L\par \par She had a bone marrow biopsy on 6/22/18. Pathology: plasma cell neoplasm, plasma cells comprise 20-25% of marrow cells. Trilineage hematopoiesis present with maturation, increased iron stores. Congo red stain negative for amyloid. Karyotype 46, XX. FISH panel - positive for CCND1/IGH fusion - a/w favorable prognosis.\par \par 7/12/18 PET - negative\par \par Treatment Summary \par 7/19/18 - C1 VRd\par 8/9/18 -C2 VRd\par 8/30/18 - C3 VRd\par 9/20/18 -partial C4 VRd\par 10/11/18 - 1/2/19 KRd (carfilzomib, Revlimid, Dexamethasone) x 4 cycles. \par 2/28/19 - autoPSCT \par 5/18/19 - started Pomalyst\par 7/19/19 - decadron 20 mg weekly added to Pomalyst\par  [de-identified] : Verbal consent given on 4/6/2020 (date) and 2:38 PM (time) by patient, herself. \par She is due for week 9 Darzalex tomorrow.\par Continues on Pomalyst . \par Feels well, denies any significant fatigue.\par No cough, SOB, fever. No diarrhea, N/V.\par

## 2020-04-06 NOTE — ASSESSMENT
[FreeTextEntry1] : 70 year old female IgG kappa multiple myeloma,  FISH panel - positive for CCND1/IGH fusion - a/w favorable prognosis. PET CT (7/12/18) did not show any bone lesions. S/p VRd, followed by 4 cycles of KRd, followed by autoPSCT on 2/28/19. \par On 5/18/19 started on Pomalyst for persistently elevated KFLC at 50, FLC ratio 136.\par On Pomalyst + weekly Dex, she has had a partial response, her KFLC has improved to some degree and bone marrow in 12/2019 shows persistent plasma cells of 15-20%. Daratumumab was added from 2/3/2020 onwards. \par \par 	KFLC	FLC Ratio\par 05/08/19	50.32	136.00\par 08/15/19	25.08	51.18\par 11/19/19	34.61	57.68\par 12/16/19	15.87	52.90\par Daratumab added 2/03/20		\par 3/09/20	5.57	20.63\par \par S/p week 8 of daratumumab on 3/03/20. \par No major side effects to therapy reported.  FLC ratio is trending down. \par \par Plan:\par - Proceed w/ week 9 daratumumab tomorrow, and every 2 weeks until week 24, and then switch to every 4 weeks\par - Continue pomalyst + weekly dexamethasone. Understand that she needs to continue dex on weeks that she is not now receiving daratumumab.\par - Anemia - Hgb 11 g/dL - stable\par -Follow up in 4 weeks

## 2020-04-07 ENCOUNTER — APPOINTMENT (OUTPATIENT)
Age: 70
End: 2020-04-07

## 2020-04-07 ENCOUNTER — LABORATORY RESULT (OUTPATIENT)
Age: 70
End: 2020-04-07

## 2020-04-07 ENCOUNTER — RESULT REVIEW (OUTPATIENT)
Age: 70
End: 2020-04-07

## 2020-04-07 ENCOUNTER — APPOINTMENT (OUTPATIENT)
Dept: HEMATOLOGY ONCOLOGY | Facility: CLINIC | Age: 70
End: 2020-04-07

## 2020-04-07 LAB
BASOPHILS # BLD AUTO: 0.2 K/UL — SIGNIFICANT CHANGE UP (ref 0–0.2)
BASOPHILS NFR BLD AUTO: 2.1 % — HIGH (ref 0–2)
CREAT 24H UR-MCNC: 0.8 G/24 H
CREATININE UR (MAYO): 28 MG/DL
EOSINOPHIL # BLD AUTO: 0.4 K/UL — SIGNIFICANT CHANGE UP (ref 0–0.5)
EOSINOPHIL NFR BLD AUTO: 5.2 % — SIGNIFICANT CHANGE UP (ref 0–6)
HCT VFR BLD CALC: 35.4 % — SIGNIFICANT CHANGE UP (ref 34.5–45)
HGB BLD-MCNC: 11.1 G/DL — LOW (ref 11.5–15.5)
IGA 24H UR QL IFE: NORMAL
KAPPA LC 24H UR QL: NORMAL
LYMPHOCYTES # BLD AUTO: 1.9 K/UL — SIGNIFICANT CHANGE UP (ref 1–3.3)
LYMPHOCYTES # BLD AUTO: 24.6 % — SIGNIFICANT CHANGE UP (ref 13–44)
MCHC RBC-ENTMCNC: 30.1 PG — SIGNIFICANT CHANGE UP (ref 27–34)
MCHC RBC-ENTMCNC: 31.2 G/DL — LOW (ref 32–36)
MCV RBC AUTO: 96.3 FL — SIGNIFICANT CHANGE UP (ref 80–100)
MONOCYTES # BLD AUTO: 0.7 K/UL — SIGNIFICANT CHANGE UP (ref 0–0.9)
MONOCYTES NFR BLD AUTO: 9.4 % — SIGNIFICANT CHANGE UP (ref 2–14)
NEUTROPHILS # BLD AUTO: 4.5 K/UL — SIGNIFICANT CHANGE UP (ref 1.8–7.4)
NEUTROPHILS NFR BLD AUTO: 58.7 % — SIGNIFICANT CHANGE UP (ref 43–77)
PLATELET # BLD AUTO: 184 K/UL — SIGNIFICANT CHANGE UP (ref 150–400)
PROT PATTERN 24H UR ELPH-IMP: NORMAL
PROT UR-MCNC: NORMAL
PROT UR-MCNC: NORMAL MG/DL
RBC # BLD: 3.68 M/UL — LOW (ref 3.8–5.2)
RBC # FLD: 14.7 % — HIGH (ref 10.3–14.5)
WBC # BLD: 7.6 K/UL — SIGNIFICANT CHANGE UP (ref 3.8–10.5)
WBC # FLD AUTO: 7.6 K/UL — SIGNIFICANT CHANGE UP (ref 3.8–10.5)

## 2020-04-08 DIAGNOSIS — Z51.11 ENCOUNTER FOR ANTINEOPLASTIC CHEMOTHERAPY: ICD-10-CM

## 2020-04-08 DIAGNOSIS — R11.2 NAUSEA WITH VOMITING, UNSPECIFIED: ICD-10-CM

## 2020-04-09 PROBLEM — M25.559 HIP PAIN: Status: RESOLVED | Noted: 2018-02-21 | Resolved: 2020-04-09

## 2020-04-14 LAB
ALBUPE: 16.5 %
ALPHA1UPE: 35.4 %
ALPHA2UPE: 18 %
BETAUPE: 27.1 %
CREAT 24H UR-MCNC: NORMAL G/24 H
CREATININE UR (MAYO): 133 MG/DL
GAMMAUPE: 3 %
IGA 24H UR QL IFE: NORMAL
KAPPA LC 24H UR QL: PRESENT
PROT PATTERN 24H UR ELPH-IMP: NORMAL
PROT UR-MCNC: 12 MG/DL
PROT UR-MCNC: 12 MG/DL
SPECIMEN VOL 24H UR: NORMAL ML

## 2020-04-18 NOTE — HISTORY OF PRESENT ILLNESS
[de-identified] : Ms. Calero is a 70 year old female who was initially referred for monoclonal gammopathy.\par She was noted to have proteinuria and then had a 24 hour urine protein which showed non-nephrotic range proteinuria of 510 mg/24 hours. She was referred to Dr. Lesa Rendon of nephrology. SPEP showed faint M-spike 0.2 g/dL in gammaglobulin region, immunofixation showed this to be a IgG kappa monoclonal protein. UPEP showed 2 monoclonal protein bands, 61%, and 0.7%. \par \par Subsequent work up:\par 6/19/18 M-protein 0.2 g/dL, Kappa FLC 71, lambda FLC 0.36, FLC ratio 198.61\par , Beta 2 microglobulin 1.5 mg/L\par \par She had a bone marrow biopsy on 6/22/18. Pathology: plasma cell neoplasm, plasma cells comprise 20-25% of marrow cells. Trilineage hematopoiesis present with maturation, increased iron stores. Congo red stain negative for amyloid. Karyotype 46, XX. FISH panel - positive for CCND1/IGH fusion - a/w favorable prognosis. PET CT (7/12/18) did not show any bone lesions. Began VRd on 7/19/18. \par \par On C4 of VRd. FLC ratio initially decreasing -->198 --> 126 --> 67, however has increased to 115 with urine studies positive for Bence Brooks protein. For refractory disease, she was switched to KRd for a deeper response.\par \par  [de-identified] : Since initial consult visit on 11/26/18, the patient is currently on cycle 4 KRd(started 1/2/19), with day 21 of Revlimid completed on 1/22/19. She underwent pre-transplant testing on 12/21/18. She has moderate fatigue and good appetite. She denies fever, shortness of breath, abdominal pain. Today, I again did comprehensive review of consent forms and after all questions addressed, the patient signed consent forms.\par \par On 2/11/19, patient presents for a pre stem cell collection visit. Anahi received cytoxan on 2/5/19 followed by 12 mg of neulasta on 2/6/19. Overall patient is well and offers no acute concerns today. Denies fever, chills, nausea, vomiting or diarrhea. Denies SOB, chest pain or B/L LE edema. Patient is currently on ciprofloxacin for prophylaxis. Denies any pain at this time. \par \par On 2/13/19 visit, patient presents for a pre stem cell collection visit. Patient offers no acute concerns. Currently taking ciprofloxacin 250 mg BID for 10 days as prophylaxis. Denies fever, chills, nausea, vomiting,diarrhea, rash or dysuria. Denies SOB, chest pain or B/L LE edema. Currently not on any blood thinners. \par \par On 2/21/19 visit, patient presents for a pre admission visit. Tolerated stem cell mobilization and is scheduled for kepivance today, tomorrow and on 2/23/19. Completed ciprofloxacin. Denies fever, chills, nausea, vomiting, diarrhea, rash, mouth sores or dysuria. Denies SOB, chest pain or B/L LE edema or pain. \par \par Since office visit on 2/21/19, patient was admitted to [facility] on 2/25/19 and received Melphalan 100 mg/m2 IV x 2 consecutive days followed by autoPSCT(10.04 x 10^6/kg) on 2/28/19. Hospital course complicated by GI mucositis with diarrhea managed with Imodium; stool C. diff- negative. Engraftment(white cells) noted on 3/12/19. Repeat UA and culture both negative on 3/15/19. \par \par Since discharge to home on 3/15/19, the patient has moderate fatigue, fair appetite with slow improvement. She denies fever, chills, shortness of breath, diarrhea. \par \par Since office visit on 3/21/19, the patient has moderate fatigue, and fair- good appetite. She has dry skin and on face she notes patchy rash. She has been using hydrocortisone cream 1% OTC during past week for 3 days. She denies fever, shortness of breath, diarrhea. \par \par Since office visit on 3/28/19, the patient called on 3/29 complaining of ongoing generalized pruritus without signs of rash/hives despite using moisturizer and hydrocortisone cream. Pt instructed to try daily long acting anti-histamine (i.e. Claritin, Allegra, or Zyrtec). May use PRN Benadryl for itching, educated regarding possible side effects including drowsiness, restlessness, dry mouth, etc. Pt instructed to call our office next week if symptoms persist/worsen. Pt verbalizes understanding and agrees with plan of care. \par She has only tried Benadryl at bedtime and didn't feel it really helped but overall she feels pruritus is better. She has moderate fatigue and normal appetite. She used Desonide cream for facial rash x 2 days with resolution, stopped use then rash recurred and put on once Monday 4/1/19 with resolution.  She denies fever, shortness of breath, abdominal pain, diarrhea.\par \par Since office visit on 4/4/19, she describes good energy level and good appetite. She denies fever, shortness of breath, bone pain. \par \par Since office visit on 4/25/19, serum immunoelectrophoresis(4/25) documents elevated KFLC- 48.92; LFLC- 0.25. Repeat labs on 5/8/19, with KFLC- 50.32, LFLC- 0.37; K/L FLC ratio- 136.00; CMP- within normal range. I discussed lab results with patient by phone conversation and recommendation to initiate immunomodulatory therapy with Pomalyst as she has recovered well post autoPSCT and has minimal symptoms. She has mild fatigue, and good appetite. She has mild muscle achiness near right hip since she started using a stationery bicycle. She denies fever, shortness of breath, abdominal pain. \par \par Since office visit on 5/14/19, I spoke with Anahi on 5/17/19, and she informs me that Pomalyst has been delivered to her house. \par Plan-\par Begin Pomalyst 3 mg po daily on 5/18/19, take 21 days then 7 days off. C1D21- 6/7/19\par Begin aspirin 81 mg po daily on 5/18/19\par Drink plenty of water\par \par Since office visit on 6/10/19, labs documented serum immunoelectrophoresis- KFLC- 34.51(previously 50.3 on 5/8/19); K/L FLC ratio- 42.60(previously 136.00 on 5/8/19). \par Plan- She is on 7 days break after day 21(6/7/19) of cycle 1 Pomalyst 3 mg dose.\par Begin Pomalyst 3 mg po every other day on 6/15/19(Day 1 of Cycle 2 Pomalyst). \par I spoke to patient on 7/8/19 about labs from 7/5/19 with plan: Continue Pomalyst 3 mg po every other day(Day 1 of Cycle 2 Pomalyst started on 6/15/19). Will take through 7/13/19, then hold pending lab assessment on 7/15. She developed rhinorrhea on 7/12, then dry cough on 7/13, low grade fever 99, never up to 100 degrees, and no chills. Rhinorrhea is much better, intermittent cough, but no shortness of breath, sore throat, sinus pain. She describes new onset right shoulder pain only when lifting arm up x 2 weeks, has been worsening; no other bone pain. \par \par Since office visit on 7/15/19, repeat labs on 7/15/19- CMP- within normal range; KFLC- 42.40, previously 34.5 on 6/10/19. \par Plan- Resume Pomalyst at 3 mg po daily x 21 days, then 7 days off, beginning on 7/19/19\par Begin Decadron 20 mg po weekly, on 7/19/19\par Continue baby aspirin 81 mg po daily. \par Check CBC with diff on 7/29/19 at [facility]. \par Labs(7/29/19) WBC- 5.8, ANC- 2.8; Hgb- 11.9; Hct- 35.9; Platelets- 222K. \par Plan- \par Continue Pomalyst at 3 mg po daily x 21 days, then 7 days off; (started cycle on 7/19/19)\par Begin Decadron 20 mg po weekly; (started on 7/19/19)\par Continue baby aspirin 81 mg po daily. \par Day 21 of last cycle on 8/8/19, with 7 days off(day 7- 8/15), plan to resume next cycle on 8/16/19. \par She has good energy level and good appetite. She has persistent dry cough and slight rhinorrhea. She denies fever, chills, sore throat, shortness of breath. Prior shoulder pain has resolved, no other bone pain. \par \par Since office visit on 8/15/19, patient called to inform me that her colonoscopy is on 10/7/19. GI told her to D/C aspirin on 10/3/19.\par Plan-\par D/C Pomalyst and aspirin on 10/3/19. This will be day 21/21 of Pomalyst, of a 28 day cycle. \par She will then proceed with her 7 days off of Pomalyst(starting 10/4) and resume on 10/11/19. \par She has good energy level and good appetite. She denies fever, shortness of breath, abdominal pain. \par \par Since office visit on 10/14/19, patient called on 11/18/19 to inform me that she developed persistent cough, nasal congestion, headache, about 4 days ago. She had 1 episode of fever to 101.3 on 11/16/19, sweats at night x 2 days prior to fever. She has been using Mucinex, and Tylenol. She states cough is improving, no further fever or sweats, no chills. She denies sore throat, shortness of breath, nausea, vomiting, diarrhea. She had the Flu vaccine\par Plan- Hold on antibiotic therapy at present time. \par Check labs tomorrow at [facility].\par I will ask [facility] Staff if RVP can be done tomorrow. RVP + for enterovirus/rhinovirus. \par Bone marrow evaluation done on 12/6/19(see results section) to evaluate status of myeloma prior to adjustment of therapy. The patient has good energy level and good appetite. \par She has insomnia recently and she feels anxious. She denies fever, shortness of breath, abdominal pain. She has a trip planned to [place] between 1/23/20- 1/27/20. \par \par Since last office visit on 12/16/19, the patient describes mild fatigue and good appetite. She denies fever, chills, sore throat, cough, shortness of breath. Daratumumab was added from 2/3/2020, s/p week 4 of Daratumumab on 3/03/20 and week 5 of Daratumumab on 3/10/20- KFLC= 5.21; K/L FLC ratio= 19.30. \par \par \par \par

## 2020-04-18 NOTE — ASSESSMENT
[FreeTextEntry1] : 1) IgG kappa myeloma, s/p RVD then changed to KRd with rising KFLC, followed by autologous peripheral stem cell transplant on 2/28/19, now with rising KFLC.\par Plan-  I previously discussed initiating immunomodulatory therapy with Pomalyst as she has recovered well post autoPSCT and has minimal symptoms. I discussed rationale of starting Pomalyst versus Revlimid therapy. I discussed potential side effects of Pomalyst, including but not limited to, risk of thrombosis, rash, gastrointestinal/hepatic, musculoskeletal, myelosuppression, risk of infection, secondary malignancy, fatigue. She agrees to begin Pomalyst. Begin Pomalyst 3 mg po daily on 5/18/19, take 21 days then 7 days off. C1D21- 6/7/19\par Begin aspirin 81 mg po daily on 5/18/19.\par 12/16/19:\par Previously on Pomalyst 3 mg po every other day(Day 1 of Cycle 2 Pomalyst started on 6/15/19).\par Adjusted schedule of Pomalyst to 3 mg po daily x 21 days, then 7 days off; (started cycle on 7/19/19)\par Continue aspirin 81 mg po daily.\par Continue Decadron 20 mg po 2X/week(on Tuesday, Friday)\par Continue Acyclovir while on Pom/dex therapy; off Mepron\par Drink plenty of water daily\par Await CMP, myeloma labs\par Continue current medications as documented in medication history\par Discontinued restrictions on 4/25/19\par Continue MVI with folate\par Continue antiemetics(Zofran, Reglan) as needed.\par Hold Amlodipine if SBP <=110\par Moisturizing cream to the skin several times per day\par Recommend PET/CT scan to assess new bone involvement, pre-initiating new therapy. \par Patient stated that she would like to resume followup with primary hematologist; recommend addition of Daratumumab to Pom/Dex given persistent myeloma on bone marrow evaluation done 12/6/19. As recent KFLC is stable, no objection to patient proceeding with her planned vacation on 1/23- 1/27/20. During office visit today, I discussed potential benefit as well as risks and toxicities of Daratumumab(in combination with Pom/Dex) with patient. She will sign consent form at [facility] with Dr. De Oliveira. \par Repeat lab tests on1/29/20, after returning from vacation. \par Hematology followup with Dr. De Oliveira as directed. \par 03/16/20:\par Continue Pom/Dex/Darzalex at [facility]\par Continue Acyclovir prophylaxis\par Patient initiated 12 month post-transplant vaccinations today; she requested to receive week 2 at [facility], vaccination schedule sent to Dr. De Oliveira. \par I have requested that post transplant testing with 24 hr urine for UPEP, B-J protein, TP, creat; and Ferritin be sent from [facility]. TFT's will be checked by Endocrinologist. \par Echo, PFT's to assess vital organ function at 1 yr post transplant will be done locally after COVID pandemic controlled with testing considered safe. Dr. De Oliveira to order\par Whole body PET/CT scan to assess new bone involvement, to be scheduled after COVID pandemic controlled with testing considered safe.\par \par RTC in 4 months.

## 2020-04-18 NOTE — REVIEW OF SYSTEMS
[Fatigue] : fatigue [Fever] : no fever [Chills] : no chills [Night Sweats] : no night sweats [Vision Problems] : no vision problems [Mucosal Pain] : no mucosal pain [Lower Ext Edema] : no lower extremity edema [Shortness Of Breath] : no shortness of breath [Cough] : no cough [Abdominal Pain] : no abdominal pain [Vomiting] : no vomiting [Diarrhea] : no diarrhea [Skin Rash] : no skin rash [Dizziness] : no dizziness [Fainting] : no fainting [Easy Bleeding] : no tendency for easy bleeding [Easy Bruising] : no tendency for easy bruising [FreeTextEntry4] : no sore throat [FreeTextEntry7] : no nausea [FreeTextEntry9] : no bone pain [de-identified] : + dry skin better

## 2020-04-18 NOTE — PHYSICAL EXAM
[Ambulatory and capable of all self care but unable to carry out any work activities] : Status 2- Ambulatory and capable of all self care but unable to carry out any work activities. Up and about more than 50% of waking hours [Normal] : affect appropriate [Ulcers] : no ulcers [Thrush] : no thrush [de-identified] : anicteric [de-identified] : RRR, normal S1S2 [de-identified] : warm, no edema [de-identified] : no rash [de-identified] : A & O x 4

## 2020-04-18 NOTE — RESULTS/DATA
[FreeTextEntry1] : WBC- 7.0; Hgb- 10.8, Hct- 32.9; Platelets- 228K. (3/9/20)\par -------------------------------------------------------------------------\par ACCESSION No:  [id number]\par LAITH VAUGHN  (12/6/19)\par \par Hematopathology Report\par \par Final Diagnosis\par 1, 2. Bone marrow biopsy and bone marrow aspirate\par - Plasma cell neoplasm, plasma cells comprise about 15-20%\par of marrow cells\par - Erythroid-predominant trilineage hematopoiesis present\par with maturation, increased iron stores\par \par See note and description.\par \par Diagnostic note:\par Comprehensive report with results of pending ancillary studies to\par follow.\par \par Ancillary studies\par Bone marrow aspirate iron stain: Iron stores are increased; no\par ring sideroblasts are seen.\par Flow cytometry: Monotypic plasma cells (1.1% of cells), positive\par for cytoplasmic kappa, CD38, , dimmer CD45, CD56, ;\par negative CD19, CD20, consistent with plasma cell neoplasm (known\par history of myeloma).\par Immunohistochemical stains:  and cyclin D1 stains were\par performed on block 1A.  positive plasma cells are increased,\par comprising about 15-20% of cells, interstitial and in clusters,\par also positive cyclin D1.\par \par Microscopic description:\par 1. Biopsy: Sections of bone marrow core biopsy and clot show bone\par marrow with normal cellularity of 30%. Trilineage hematopoiesis\par is present with maturation. Megakaryocytes are present in normal\par number and morphology. Iron stores are increased.\par 2. Aspirate:  The aspirate smears show cellular spicules,\par adequate for evaluation. Erythroid and myeloid precursors are\par present with maturation, decreased M:E ratio of 1.1:1, 3% plasma\par cells. Megakaryocytes are seen, with unremarkable morphology.\par \par Bone Marrow Aspirate Differential: (300 Cells).\par Type            %    Normal*\par Blast                0%   0-3\par Neutrophil and\par Precursors        43%  33-63\par Eosinophil           2%   1-5\par Basophil        0%   0-1\par Pronormoblast        2%   0-2\par Normoblast           41%  15-25\par Monocyte        2%   0-2\par Lymphocyte           7%   10-15\par Plasma cell          3 %  0-1\par *Adult Range\par Comment\par Iron stain (examined to evaluate for iron stores; see microscopic\par description) and Giemsa stain (shows appropriate staining\par pattern) are performed and evaluated on block(s): 1A, 1B.\par Slide(s) with built in immunohistochemical study control(s) and\par negative control associated with this case has/have been verified\par by the sign out pathologist.\par For slide(s) without built in controls positive control slides\par has/have been reviewed and approved by immunohistochemistry lab\par These immunohistochemical/ in-situ hybridization tests have been\par developed and their performance characteristics determined by\par [facility], Department of\par Pathology, Division of Immunopathology [facility]\par [place], [address] [place] [address].  It has not\par been cleared or approved by the U.S. Food and Drug\par Administration.  The FDA has determined that such clearance or\par approval is not necessary.  This test is used for clinical\par purposes.  The laboratory is certified under the\par CLIA-88 as qualified to perform high complexity clinical testing.\par \par Verified by: Eliud Ovalle M.D.\par (Electronic Signature)\par Reported on: 12/10/19 12:40 EST, [address],\[place] [address]\par Phone: [phone]   Fax: [phone]\par _________________________________________________________________\par \par Clinical History\par IgG KAPPA myeloma S/P Auto SCT 2/28/19 - on Pomalyst 3mg P.O.\par daily x 21D/7 off\par Rising KAPPA light chain rule out relapse\par \par Specimen(s) Submitted\par 1     Bone marrow biopsy\par 2     Bone marrow aspirate\par \par Gross Description\par 1. The specimen is received in bouin's fixative and the specimen\par container is labeled: Right PIC bone marrow biopsy.  It consists\par of a 1.5 x 0.1 cm bone marrow core with attached 1.8 x 0.2 x 0.1\par cm cylindrical portion of blood clot.  Entirely submitted.  Two\par cassettes: A = bone marrow core; B = blood clot.\par \par 2. Two Pinon-Giemsa and one iron stained bone marrow aspirate\par smears are submitted     {10-FL-,     }.\par \par In addition to other data that may appear on the specimen\par container, the label has been inspected to confirm the presence\par of the patient's name and date of birth.\par

## 2020-04-18 NOTE — CONSULT LETTER
[Dear  ___] : Dear  [unfilled], [Courtesy Letter:] : I had the pleasure of seeing your patient, [unfilled], in my office today. [Please see my note below.] : Please see my note below. [Consult Closing:] : Thank you very much for allowing me to participate in the care of this patient.  If you have any questions, please do not hesitate to contact me. [Sincerely,] : Sincerely, [DrMihai  ___] : Dr. FUENTES [FreeTextEntry2] : Marshall De Oliveira MD\par Medical Oncology/Hematology\par [facility]\par [facility] \par [address]\par [place], [place]   [address]\par \par  [FreeTextEntry3] : \par Analisa Rahman M.D.\par \par  of Medicine\par [facility] [employer]\par [facility] \par [facility] [place]\par [address] [place] [address] \[place] [address] \par ph: [phone]\par fax: [phone]\par

## 2020-04-21 ENCOUNTER — RESULT REVIEW (OUTPATIENT)
Age: 70
End: 2020-04-21

## 2020-04-21 ENCOUNTER — LABORATORY RESULT (OUTPATIENT)
Age: 70
End: 2020-04-21

## 2020-04-21 ENCOUNTER — APPOINTMENT (OUTPATIENT)
Age: 70
End: 2020-04-21

## 2020-04-21 LAB
ACANTHOCYTES BLD QL SMEAR: SLIGHT — SIGNIFICANT CHANGE UP
ANISOCYTOSIS BLD QL: SLIGHT — SIGNIFICANT CHANGE UP
BASOPHILS # BLD AUTO: 0.1 K/UL — SIGNIFICANT CHANGE UP (ref 0–0.2)
BASOPHILS # BLD AUTO: 0.1 K/UL — SIGNIFICANT CHANGE UP (ref 0–0.2)
BASOPHILS NFR BLD AUTO: 1.7 % — SIGNIFICANT CHANGE UP (ref 0–2)
BASOPHILS NFR BLD AUTO: 2 % — SIGNIFICANT CHANGE UP (ref 0–2)
ELLIPTOCYTES BLD QL SMEAR: SLIGHT — SIGNIFICANT CHANGE UP
EOSINOPHIL # BLD AUTO: 0.4 K/UL — SIGNIFICANT CHANGE UP (ref 0–0.5)
EOSINOPHIL # BLD AUTO: 0.4 K/UL — SIGNIFICANT CHANGE UP (ref 0–0.5)
EOSINOPHIL NFR BLD AUTO: 6.6 % — HIGH (ref 0–6)
EOSINOPHIL NFR BLD AUTO: 7 % — HIGH (ref 0–6)
HCT VFR BLD CALC: 36.6 % — SIGNIFICANT CHANGE UP (ref 34.5–45)
HGB BLD-MCNC: 11.4 G/DL — LOW (ref 11.5–15.5)
LG PLATELETS BLD QL AUTO: SLIGHT — SIGNIFICANT CHANGE UP
LYMPHOCYTES # BLD AUTO: 3.1 K/UL — SIGNIFICANT CHANGE UP (ref 1–3.3)
LYMPHOCYTES # BLD AUTO: 3.1 K/UL — SIGNIFICANT CHANGE UP (ref 1–3.3)
LYMPHOCYTES # BLD AUTO: 49 % — HIGH (ref 13–44)
LYMPHOCYTES # BLD AUTO: 52.3 % — HIGH (ref 13–44)
MACROCYTES BLD QL: SLIGHT — SIGNIFICANT CHANGE UP
MCHC RBC-ENTMCNC: 30.2 PG — SIGNIFICANT CHANGE UP (ref 27–34)
MCHC RBC-ENTMCNC: 31.1 G/DL — LOW (ref 32–36)
MCV RBC AUTO: 96.9 FL — SIGNIFICANT CHANGE UP (ref 80–100)
MICROCYTES BLD QL: SLIGHT — SIGNIFICANT CHANGE UP
MONOCYTES # BLD AUTO: 0.8 K/UL — SIGNIFICANT CHANGE UP (ref 0–0.9)
MONOCYTES # BLD AUTO: 0.8 K/UL — SIGNIFICANT CHANGE UP (ref 0–0.9)
MONOCYTES NFR BLD AUTO: 13.6 % — SIGNIFICANT CHANGE UP (ref 2–14)
MONOCYTES NFR BLD AUTO: 14 % — SIGNIFICANT CHANGE UP (ref 2–14)
NEUTROPHILS # BLD AUTO: 1.5 K/UL — LOW (ref 1.8–7.4)
NEUTROPHILS # BLD AUTO: 1.5 K/UL — SIGNIFICANT CHANGE UP (ref 1.8–7.4)
NEUTROPHILS NFR BLD AUTO: 25 % — LOW (ref 43–77)
NEUTROPHILS NFR BLD AUTO: 25.8 % — LOW (ref 43–77)
PLAT MORPH BLD: NORMAL — SIGNIFICANT CHANGE UP
PLATELET # BLD AUTO: 248 K/UL — SIGNIFICANT CHANGE UP (ref 150–400)
POIKILOCYTOSIS BLD QL AUTO: SLIGHT — SIGNIFICANT CHANGE UP
POLYCHROMASIA BLD QL SMEAR: SLIGHT — SIGNIFICANT CHANGE UP
RBC # BLD: 3.78 M/UL — LOW (ref 3.8–5.2)
RBC # FLD: 15 % — HIGH (ref 10.3–14.5)
RBC BLD AUTO: SIGNIFICANT CHANGE UP
ROULEAUX BLD QL SMEAR: PRESENT — SIGNIFICANT CHANGE UP
SCHISTOCYTES BLD QL AUTO: SLIGHT — SIGNIFICANT CHANGE UP
SMUDGE CELLS # BLD: PRESENT — SIGNIFICANT CHANGE UP
VARIANT LYMPHS # BLD: 3 % — SIGNIFICANT CHANGE UP (ref 0–6)
WBC # BLD: 5.9 K/UL — SIGNIFICANT CHANGE UP (ref 3.8–10.5)
WBC # FLD AUTO: 5.9 K/UL — SIGNIFICANT CHANGE UP (ref 3.8–10.5)

## 2020-04-27 ENCOUNTER — APPOINTMENT (OUTPATIENT)
Dept: HEMATOLOGY ONCOLOGY | Facility: CLINIC | Age: 70
End: 2020-04-27

## 2020-04-27 ENCOUNTER — RESULT REVIEW (OUTPATIENT)
Age: 70
End: 2020-04-27

## 2020-04-27 LAB
BASOPHILS # BLD AUTO: 0.1 K/UL — SIGNIFICANT CHANGE UP (ref 0–0.2)
BASOPHILS NFR BLD AUTO: 2.5 % — HIGH (ref 0–2)
EOSINOPHIL # BLD AUTO: 0.3 K/UL — SIGNIFICANT CHANGE UP (ref 0–0.5)
EOSINOPHIL NFR BLD AUTO: 4.6 % — SIGNIFICANT CHANGE UP (ref 0–6)
HCT VFR BLD CALC: 35.3 % — SIGNIFICANT CHANGE UP (ref 34.5–45)
HGB BLD-MCNC: 10.9 G/DL — LOW (ref 11.5–15.5)
LYMPHOCYTES # BLD AUTO: 2.4 K/UL — SIGNIFICANT CHANGE UP (ref 1–3.3)
LYMPHOCYTES # BLD AUTO: 39.5 % — SIGNIFICANT CHANGE UP (ref 13–44)
MCHC RBC-ENTMCNC: 29.8 PG — SIGNIFICANT CHANGE UP (ref 27–34)
MCHC RBC-ENTMCNC: 30.8 G/DL — LOW (ref 32–36)
MCV RBC AUTO: 96.8 FL — SIGNIFICANT CHANGE UP (ref 80–100)
MONOCYTES # BLD AUTO: 0.7 K/UL — SIGNIFICANT CHANGE UP (ref 0–0.9)
MONOCYTES NFR BLD AUTO: 11.4 % — SIGNIFICANT CHANGE UP (ref 2–14)
NEUTROPHILS # BLD AUTO: 2.5 K/UL — SIGNIFICANT CHANGE UP (ref 1.8–7.4)
NEUTROPHILS NFR BLD AUTO: 42 % — LOW (ref 43–77)
PLATELET # BLD AUTO: 239 K/UL — SIGNIFICANT CHANGE UP (ref 150–400)
RBC # BLD: 3.65 M/UL — LOW (ref 3.8–5.2)
RBC # FLD: 15.2 % — HIGH (ref 10.3–14.5)
WBC # BLD: 6 K/UL — SIGNIFICANT CHANGE UP (ref 3.8–10.5)
WBC # FLD AUTO: 6 K/UL — SIGNIFICANT CHANGE UP (ref 3.8–10.5)

## 2020-05-05 ENCOUNTER — LABORATORY RESULT (OUTPATIENT)
Age: 70
End: 2020-05-05

## 2020-05-05 ENCOUNTER — RESULT REVIEW (OUTPATIENT)
Age: 70
End: 2020-05-05

## 2020-05-05 ENCOUNTER — APPOINTMENT (OUTPATIENT)
Age: 70
End: 2020-05-05

## 2020-05-05 ENCOUNTER — APPOINTMENT (OUTPATIENT)
Dept: HEMATOLOGY ONCOLOGY | Facility: CLINIC | Age: 70
End: 2020-05-05
Payer: MEDICARE

## 2020-05-05 LAB
BASOPHILS # BLD AUTO: 0.2 K/UL — SIGNIFICANT CHANGE UP (ref 0–0.2)
BASOPHILS NFR BLD AUTO: 2 % — SIGNIFICANT CHANGE UP (ref 0–2)
EOSINOPHIL # BLD AUTO: 0.3 K/UL — SIGNIFICANT CHANGE UP (ref 0–0.5)
EOSINOPHIL NFR BLD AUTO: 4 % — SIGNIFICANT CHANGE UP (ref 0–6)
HCT VFR BLD CALC: 36.4 % — SIGNIFICANT CHANGE UP (ref 34.5–45)
HGB BLD-MCNC: 11.4 G/DL — LOW (ref 11.5–15.5)
LYMPHOCYTES # BLD AUTO: 2.4 K/UL — SIGNIFICANT CHANGE UP (ref 1–3.3)
LYMPHOCYTES # BLD AUTO: 30.4 % — SIGNIFICANT CHANGE UP (ref 13–44)
MCHC RBC-ENTMCNC: 29.9 PG — SIGNIFICANT CHANGE UP (ref 27–34)
MCHC RBC-ENTMCNC: 31.3 G/DL — LOW (ref 32–36)
MCV RBC AUTO: 95.6 FL — SIGNIFICANT CHANGE UP (ref 80–100)
MONOCYTES # BLD AUTO: 0.9 K/UL — SIGNIFICANT CHANGE UP (ref 0–0.9)
MONOCYTES NFR BLD AUTO: 11 % — SIGNIFICANT CHANGE UP (ref 2–14)
NEUTROPHILS # BLD AUTO: 4.1 K/UL — SIGNIFICANT CHANGE UP (ref 1.8–7.4)
NEUTROPHILS NFR BLD AUTO: 52.7 % — SIGNIFICANT CHANGE UP (ref 43–77)
PLATELET # BLD AUTO: 198 K/UL — SIGNIFICANT CHANGE UP (ref 150–400)
RBC # BLD: 3.81 M/UL — SIGNIFICANT CHANGE UP (ref 3.8–5.2)
RBC # FLD: 14.6 % — HIGH (ref 10.3–14.5)
WBC # BLD: 7.8 K/UL — SIGNIFICANT CHANGE UP (ref 3.8–10.5)
WBC # FLD AUTO: 7.8 K/UL — SIGNIFICANT CHANGE UP (ref 3.8–10.5)

## 2020-05-05 PROCEDURE — 99214 OFFICE O/P EST MOD 30 MIN: CPT | Mod: 95

## 2020-05-05 NOTE — ASSESSMENT
[FreeTextEntry1] : 70 year old female IgG kappa multiple myeloma,  FISH panel - positive for CCND1/IGH fusion - a/w favorable prognosis. PET CT (7/12/18) did not show any bone lesions. S/p VRd, followed by 4 cycles of KRd, followed by autoPSCT on 2/28/19. \par On 5/18/19 started on Pomalyst for persistently elevated KFLC at 50, FLC ratio 136.\par On Pomalyst + weekly Dex, she has had a partial response, her KFLC has improved to some degree and bone marrow in 12/2019 shows persistent plasma cells of 15-20%. Daratumumab was added from 2/3/2020 onwards. \par \par 	KFLC	KLFLC Ratio\par 05/08/19	50.32	136.00\par 08/15/19	25.08	51.18\par 11/19/19	34.61	57.68\par 12/16/19	15.87	52.90\par Daratumab added 2/03/20		\par 3/09/20	5.57	20.63\par 4/07/20    4.69        16.17\par \par S/p week 11 of daratumumab on 4/21/13. Today is day 12 of Pomalyst.\par Has a minor bout of what appears to be poison ivy, post gardening, to R forearm. It is pruritic, and contained to R lower forearm. FLC ratio continues to trend downwards. \par \par Plan:\par - Proceed w/ week 12 daratumumab today, and every 2 weeks until week 24, and then switch to every 4 weeks\par - Continue pomalyst + weekly dexamethasone. (Continue dex on weeks that she is not now receiving daratumumab.)\par - Anemia - Hgb 11 g/dL - stable\par - Rash: + pruritus R forearm, post gardening. Resembles poison ivy. Being given dexamethasone 20 mg IV pre- daratumumab. Requested she take the utmost care not to touch, especially to face. Call if rash worsens, may need additional steroid. Apply calamine lotion and OTC hydrocortisone for itch.\par - 1 yr post transplant: due for Echo to assess LVEF, PFT, and PET/CT.\par - Endocrine: requests that lab work requested by Dr. Cornejo be drawn today (Total T3, Free T4, and TSH) requested.\par - Mild facial swelling: question r/t prolonged steroids.\par - Follow up w/ Dr. De Oliveira in 4 weeks.

## 2020-05-05 NOTE — HISTORY OF PRESENT ILLNESS
[de-identified] : Ms. Calero is a 70 year old female w/ multiple myeloma. \par \par She was noted to have proteinuria and then had a 24 hour urine protein which showed non-nephrotic range proteinuria of 510 mg/24 hours. She was referred to Dr. Lesa Rendon of nephrology. SPEP showed faint M-spike 0.2 g/dL in gammaglobulin region, immunofixation showed this to be a IgG kappa monoclonal protein. UPEP showed 2 monoclonal protein bands, 61%, and 0.7%. \par \par Subsequent work up:\par 6/19/18 M-protein 0.2 g/dL, Kappa FLC 71, lambda FLC 0.36, FLC ratio 198.61\par , Beta 2 microglobulin 1.5 mg/L\par \par She had a bone marrow biopsy on 6/22/18. Pathology: plasma cell neoplasm, plasma cells comprise 20-25% of marrow cells. Trilineage hematopoiesis present with maturation, increased iron stores. Congo red stain negative for amyloid. Karyotype 46, XX. FISH panel - positive for CCND1/IGH fusion - a/w favorable prognosis.\par \par 7/12/18 PET - negative\par \par Treatment Summary \par 7/19/18 - C1 VRd\par 8/9/18 -C2 VRd\par 8/30/18 - C3 VRd\par 9/20/18 -partial C4 VRd\par 10/11/18 - 1/2/19 KRd (carfilzomib, Revlimid, Dexamethasone) x 4 cycles. \par 2/28/19 - autoPSCT \par 5/18/19 - started Pomalyst\par 7/19/19 - decadron 20 mg weekly added to Pomalyst.\par 2/03/20 - Daratumumab week 1-8 2/03 - 3/31/20. Week 9 (began every other week) 4/07/20. [de-identified] : Verbal consent given on 5/05/20 at 9:45 AM by Anahi Calero for a TeleHealth visit. Patient was in exam room and NP Seth was in her office at [facility].\par Ms. Calero is aware that telehealth visit is necessary during COVID-19 pandemic.\par \par Since last seen, she comes in for evaluation today for week 13 daratumumab. She reports she is taking the dexamethasone 20 mg orally on her week off from daratumumab. Reconciled her medication list and she remains on valacyclovir and ASA 81 mg. She remains on Pomalyst and today is day 12 of Pomalyst cycle. Denies fevers, no SOB, no CP, appetite is very good, no constipation or diarrhea, no pain/burning w/ urination. No LE edema. Energy level is good. Her only complaint is facial swelling. She also went outside to do some gardening and has a small patch of poison ivy located on her lower R forearm. It is pruritic. The remainder of ROS is negative.\par \par

## 2020-05-05 NOTE — PHYSICAL EXAM
[Restricted in physically strenuous activity but ambulatory and able to carry out work of a light or sedentary nature] : Status 1- Restricted in physically strenuous activity but ambulatory and able to carry out work of a light or sedentary nature, e.g., light house work, office work [Normal] : affect appropriate [de-identified] : Pleasant, interactive in NAD. Mild facial puffiness is apparent. [de-identified] : R forearm w/ a patch of dry crusty erythema, ~ 1 cm across, and towards wrist any area of non-erupted rash.

## 2020-05-12 ENCOUNTER — APPOINTMENT (OUTPATIENT)
Dept: ENDOCRINOLOGY | Facility: CLINIC | Age: 70
End: 2020-05-12
Payer: MEDICARE

## 2020-05-12 VITALS
HEART RATE: 84 BPM | HEIGHT: 63 IN | BODY MASS INDEX: 32.6 KG/M2 | WEIGHT: 184 LBS | SYSTOLIC BLOOD PRESSURE: 110 MMHG | DIASTOLIC BLOOD PRESSURE: 70 MMHG | OXYGEN SATURATION: 98 %

## 2020-05-12 PROCEDURE — 99214 OFFICE O/P EST MOD 30 MIN: CPT

## 2020-05-12 NOTE — PHYSICAL EXAM
[Alert] : alert [No Acute Distress] : no acute distress [Well Nourished] : well nourished [Well Developed] : well developed [Normal Sclera/Conjunctiva] : normal sclera/conjunctiva [No Proptosis] : no proptosis [EOMI] : extra ocular movement intact [Normal Oropharynx] : the oropharynx was normal [Thyroid Not Enlarged] : the thyroid was not enlarged [No Thyroid Nodules] : no palpable thyroid nodules [No Respiratory Distress] : no respiratory distress [No Accessory Muscle Use] : no accessory muscle use [Clear to Auscultation] : lungs were clear to auscultation bilaterally [Normal S1, S2] : normal S1 and S2 [Normal Rate] : heart rate was normal [Regular Rhythm] : with a regular rhythm [No Edema] : no peripheral edema [Pedal Pulses Normal] : the pedal pulses are present [Normal Bowel Sounds] : normal bowel sounds [Not Tender] : non-tender [Not Distended] : not distended [Soft] : abdomen soft [Normal Anterior Cervical Nodes] : no anterior cervical lymphadenopathy [Normal Posterior Cervical Nodes] : no posterior cervical lymphadenopathy [No Spinal Tenderness] : no spinal tenderness [Spine Straight] : spine straight [Normal Gait] : normal gait [No Rash] : no rash [No Tremors] : no tremors [Oriented x3] : oriented to person, place, and time [Acanthosis Nigricans] : no acanthosis nigricans

## 2020-05-12 NOTE — ASSESSMENT
[FreeTextEntry1] : Abnormal TFts in context of chemoT for multiple myeloma \par suppressed TSh 0.08 with normal FT4 and Tt3 \par no diarrhea, no palpitations, no weight loss, no tremor \par check TSi \par check thyroid uptake and scan \par TPO ab positive in the past with variable TSH levels \par \par Hyperlipidemia: continue crestor \par advised low fat/low cholesterol diet and weight loss advised\par \par obesity: discussed diet and exercise\par encouraged more exercise walking 30 min 3 x week\par \par MNG : check thyroid US to evaluate nodules. \par Needs to try to have more protein for meals\par she takes steroids for chemoT . She needs to cut down on portion size \par no dysphagia, no dysphonia, no neck pain, no voice change\par \par \par \par

## 2020-05-14 ENCOUNTER — APPOINTMENT (OUTPATIENT)
Dept: CARDIOLOGY | Facility: CLINIC | Age: 70
End: 2020-05-14
Payer: MEDICARE

## 2020-05-14 PROCEDURE — 93306 TTE W/DOPPLER COMPLETE: CPT

## 2020-05-14 PROCEDURE — 76376 3D RENDER W/INTRP POSTPROCES: CPT

## 2020-05-14 PROCEDURE — 93356 MYOCRD STRAIN IMG SPCKL TRCK: CPT

## 2020-05-15 ENCOUNTER — APPOINTMENT (OUTPATIENT)
Dept: NUCLEAR MEDICINE | Facility: CLINIC | Age: 70
End: 2020-05-15
Payer: MEDICARE

## 2020-05-15 ENCOUNTER — OUTPATIENT (OUTPATIENT)
Dept: OUTPATIENT SERVICES | Facility: HOSPITAL | Age: 70
LOS: 1 days | End: 2020-05-15

## 2020-05-15 ENCOUNTER — RESULT REVIEW (OUTPATIENT)
Age: 70
End: 2020-05-15

## 2020-05-15 DIAGNOSIS — C90.00 MULTIPLE MYELOMA NOT HAVING ACHIEVED REMISSION: ICD-10-CM

## 2020-05-15 DIAGNOSIS — Z98.51 TUBAL LIGATION STATUS: Chronic | ICD-10-CM

## 2020-05-15 PROCEDURE — 78816 PET IMAGE W/CT FULL BODY: CPT | Mod: 26,PS

## 2020-05-17 ENCOUNTER — OUTPATIENT (OUTPATIENT)
Dept: OUTPATIENT SERVICES | Facility: HOSPITAL | Age: 70
LOS: 1 days | Discharge: HOME | End: 2020-05-17

## 2020-05-17 DIAGNOSIS — Z98.51 TUBAL LIGATION STATUS: Chronic | ICD-10-CM

## 2020-05-17 DIAGNOSIS — C90.00 MULTIPLE MYELOMA NOT HAVING ACHIEVED REMISSION: ICD-10-CM

## 2020-05-18 LAB
T3 SERPL-MCNC: 107 NG/DL
T4 FREE SERPL-MCNC: 1.1 NG/DL
THYROGLOB AB SERPL-ACNC: <20 IU/ML
THYROGLOB SERPL-MCNC: 9.35 NG/ML
TSH SERPL-ACNC: 0.17 UIU/ML
TSI ACT/NOR SER: <0.1 IU/L

## 2020-05-19 ENCOUNTER — APPOINTMENT (OUTPATIENT)
Age: 70
End: 2020-05-19

## 2020-05-19 ENCOUNTER — RESULT REVIEW (OUTPATIENT)
Age: 70
End: 2020-05-19

## 2020-05-19 ENCOUNTER — LABORATORY RESULT (OUTPATIENT)
Age: 70
End: 2020-05-19

## 2020-05-19 LAB
BASOPHILS # BLD AUTO: 0.1 K/UL — SIGNIFICANT CHANGE UP (ref 0–0.2)
BASOPHILS NFR BLD AUTO: 1.8 % — SIGNIFICANT CHANGE UP (ref 0–2)
EOSINOPHIL # BLD AUTO: 0.5 K/UL — SIGNIFICANT CHANGE UP (ref 0–0.5)
EOSINOPHIL NFR BLD AUTO: 7.6 % — HIGH (ref 0–6)
HCT VFR BLD CALC: 37.1 % — SIGNIFICANT CHANGE UP (ref 34.5–45)
HGB BLD-MCNC: 11.7 G/DL — SIGNIFICANT CHANGE UP (ref 11.5–15.5)
LYMPHOCYTES # BLD AUTO: 3.1 K/UL — SIGNIFICANT CHANGE UP (ref 1–3.3)
LYMPHOCYTES # BLD AUTO: 47.1 % — HIGH (ref 13–44)
MCHC RBC-ENTMCNC: 30.2 PG — SIGNIFICANT CHANGE UP (ref 27–34)
MCHC RBC-ENTMCNC: 31.6 G/DL — LOW (ref 32–36)
MCV RBC AUTO: 95.6 FL — SIGNIFICANT CHANGE UP (ref 80–100)
MONOCYTES # BLD AUTO: 1 K/UL — HIGH (ref 0–0.9)
MONOCYTES NFR BLD AUTO: 14.5 % — HIGH (ref 2–14)
NEUTROPHILS # BLD AUTO: 1.9 K/UL — SIGNIFICANT CHANGE UP (ref 1.8–7.4)
NEUTROPHILS NFR BLD AUTO: 29 % — LOW (ref 43–77)
PLATELET # BLD AUTO: 248 K/UL — SIGNIFICANT CHANGE UP (ref 150–400)
RBC # BLD: 3.88 M/UL — SIGNIFICANT CHANGE UP (ref 3.8–5.2)
RBC # FLD: 14.5 % — SIGNIFICANT CHANGE UP (ref 10.3–14.5)
WBC # BLD: 6.7 K/UL — SIGNIFICANT CHANGE UP (ref 3.8–10.5)
WBC # FLD AUTO: 6.7 K/UL — SIGNIFICANT CHANGE UP (ref 3.8–10.5)

## 2020-05-20 DIAGNOSIS — R11.2 NAUSEA WITH VOMITING, UNSPECIFIED: ICD-10-CM

## 2020-05-20 DIAGNOSIS — Z51.11 ENCOUNTER FOR ANTINEOPLASTIC CHEMOTHERAPY: ICD-10-CM

## 2020-05-28 ENCOUNTER — APPOINTMENT (OUTPATIENT)
Dept: OBGYN | Facility: CLINIC | Age: 70
End: 2020-05-28
Payer: MEDICARE

## 2020-05-28 VITALS
DIASTOLIC BLOOD PRESSURE: 72 MMHG | HEIGHT: 63 IN | BODY MASS INDEX: 32.07 KG/M2 | WEIGHT: 181 LBS | SYSTOLIC BLOOD PRESSURE: 120 MMHG

## 2020-05-28 DIAGNOSIS — Z01.419 ENCOUNTER FOR GYNECOLOGICAL EXAMINATION (GENERAL) (ROUTINE) W/OUT ABNORMAL FINDINGS: ICD-10-CM

## 2020-05-28 DIAGNOSIS — Z12.11 ENCOUNTER FOR SCREENING FOR MALIGNANT NEOPLASM OF COLON: ICD-10-CM

## 2020-05-28 LAB — HEMOCCULT SP1 STL QL: NEGATIVE

## 2020-05-28 PROCEDURE — 99397 PER PM REEVAL EST PAT 65+ YR: CPT

## 2020-05-28 PROCEDURE — 82270 OCCULT BLOOD FECES: CPT

## 2020-05-28 RX ORDER — DOXYCYCLINE HYCLATE 100 MG/1
100 CAPSULE ORAL
Qty: 20 | Refills: 0 | Status: COMPLETED | COMMUNITY
Start: 2019-12-23

## 2020-05-28 RX ORDER — BROMPHENIRAMINE MALEATE, PSEUDOEPHEDRINE HYDROCHLORIDE, 2; 30; 10 MG/5ML; MG/5ML; MG/5ML
30-2-10 SYRUP ORAL
Qty: 150 | Refills: 0 | Status: COMPLETED | COMMUNITY
Start: 2019-12-23

## 2020-05-28 RX ORDER — PREDNISONE 10 MG/1
10 TABLET ORAL
Qty: 7 | Refills: 0 | Status: COMPLETED | COMMUNITY
Start: 2020-05-18

## 2020-05-28 NOTE — PHYSICAL EXAM
[Awake] : awake [Alert] : alert [Thyroid Nodule] : thyroid nodule [Goiter] : goiter [Soft] : soft [Oriented x3] : oriented to person, place, and time [Normal] : uterus [Atrophy] : atrophy [Uterine Adnexae] : were not tender and not enlarged [No Bleeding] : there was no active vaginal bleeding [No Tenderness] : no rectal tenderness [CTAB] : CTAB [RRR, No Murmurs] : RRR, no murmurs [Acute Distress] : no acute distress [LAD] : no lymphadenopathy [Mass] : no breast mass [Nipple Discharge] : no nipple discharge [Tender] : non tender [Axillary LAD] : no axillary lymphadenopathy [Occult Blood] : occult blood test from digital rectal exam was negative

## 2020-05-28 NOTE — HISTORY OF PRESENT ILLNESS
[1 Year Ago] : 1 year ago [Good] : being in good health [Healthy Diet] : a healthy diet [Weight Concerns] : weight concens [___ Servings of Dairy/Day] : [unfilled] servings of dairy foods per day [Less than 3 times/wk] : less than three times a week [Last Mammogram ___] : Last Mammogram was [unfilled] [Current] : risk screening reviewed and current [Last Bone Density ___] : Last bone density studies [unfilled] [Last Colonoscopy ___] : Last colonoscopy [unfilled] [BRCA1 ___] : BRCA1 gene [unfilled] [Last Pap ___] : Last cervical pap smear was [unfilled] [BRCA2 ___] : BRCA2 gene [unfilled] [Performed Within 5 Years] : lipid profile performed within the past five years [ ___] :  [unfilled] [Performed Last Year] : thyroid function test performed last year [Hypertension] : hypertension [High LDL] : high LDL cholesterol [Low HDL] : low HDL cholesterol [Obesity] : obesity [Sedentary Lifestyle] : sedentary lifestyle [FH Cardiovascular Disease] : family history of cardiovascular disease [Previous Colon Polyps] : previous colon polyps [Osteoporosis Risk Factors] : osteoporosis risk factors [Postmenopausal] : is postmenopausal [Pregnancy History] : pregnancy history: [Full Term ___] : [unfilled] [Total Preg ___] : [unfilled] [AB Induced ___] : elective abortions: [unfilled] [Sexually Active] : is sexually active [Diabetes] : no diabetes [Regular Exercise] : not exercising regularly [Stress] : no stress [Tobacco Use] : no tobacco use [Illicit Drug Use] : no illicit drug use [Previous Breast Cancer] : no previous breast cancer [Abnormal Cervical Cytology] : no abnormal cervical cytology [HPV Positive] : no positive screening for human papilloma virus [Hormone Therapy] : no hormone therapy [In Utero BARBY Exposure] : no diethylstilbestrol (BARBY) exposure in utero [Inflammatory Bowel Disease] : no inflammatory bowel disease [de-identified] : Menopause age 51

## 2020-06-02 ENCOUNTER — RESULT REVIEW (OUTPATIENT)
Age: 70
End: 2020-06-02

## 2020-06-02 ENCOUNTER — APPOINTMENT (OUTPATIENT)
Age: 70
End: 2020-06-02

## 2020-06-02 ENCOUNTER — APPOINTMENT (OUTPATIENT)
Dept: HEMATOLOGY ONCOLOGY | Facility: CLINIC | Age: 70
End: 2020-06-02
Payer: MEDICARE

## 2020-06-02 ENCOUNTER — LABORATORY RESULT (OUTPATIENT)
Age: 70
End: 2020-06-02

## 2020-06-02 LAB
BASOPHILS # BLD AUTO: 0.1 K/UL — SIGNIFICANT CHANGE UP (ref 0–0.2)
BASOPHILS NFR BLD AUTO: 1.7 % — SIGNIFICANT CHANGE UP (ref 0–2)
EOSINOPHIL # BLD AUTO: 0.3 K/UL — SIGNIFICANT CHANGE UP (ref 0–0.5)
EOSINOPHIL NFR BLD AUTO: 4.1 % — SIGNIFICANT CHANGE UP (ref 0–6)
HCT VFR BLD CALC: 37.2 % — SIGNIFICANT CHANGE UP (ref 34.5–45)
HGB BLD-MCNC: 11.6 G/DL — SIGNIFICANT CHANGE UP (ref 11.5–15.5)
LYMPHOCYTES # BLD AUTO: 2.6 K/UL — SIGNIFICANT CHANGE UP (ref 1–3.3)
LYMPHOCYTES # BLD AUTO: 33.1 % — SIGNIFICANT CHANGE UP (ref 13–44)
MCHC RBC-ENTMCNC: 30 PG — SIGNIFICANT CHANGE UP (ref 27–34)
MCHC RBC-ENTMCNC: 31.3 G/DL — LOW (ref 32–36)
MCV RBC AUTO: 95.7 FL — SIGNIFICANT CHANGE UP (ref 80–100)
MONOCYTES # BLD AUTO: 0.8 K/UL — SIGNIFICANT CHANGE UP (ref 0–0.9)
MONOCYTES NFR BLD AUTO: 10.6 % — SIGNIFICANT CHANGE UP (ref 2–14)
NEUTROPHILS # BLD AUTO: 3.9 K/UL — SIGNIFICANT CHANGE UP (ref 1.8–7.4)
NEUTROPHILS NFR BLD AUTO: 50.5 % — SIGNIFICANT CHANGE UP (ref 43–77)
PLATELET # BLD AUTO: 173 K/UL — SIGNIFICANT CHANGE UP (ref 150–400)
RBC # BLD: 3.88 M/UL — SIGNIFICANT CHANGE UP (ref 3.8–5.2)
RBC # FLD: 14.6 % — HIGH (ref 10.3–14.5)
WBC # BLD: 7.8 K/UL — SIGNIFICANT CHANGE UP (ref 3.8–10.5)
WBC # FLD AUTO: 7.8 K/UL — SIGNIFICANT CHANGE UP (ref 3.8–10.5)

## 2020-06-02 PROCEDURE — 99214 OFFICE O/P EST MOD 30 MIN: CPT

## 2020-06-02 NOTE — ASSESSMENT
[FreeTextEntry1] : 70 year old female IgG kappa multiple myeloma,  FISH panel - positive for CCND1/IGH fusion - a/w favorable prognosis. PET CT (7/12/18) did not show any bone lesions. S/p VRd, followed by 4 cycles of KRd, followed by autoPSCT on 2/28/19. \par On 5/18/19 started on Pomalyst for persistently elevated KFLC at 50, FLC ratio 136.\par On Pomalyst + weekly Dex, she has had a partial response, her KFLC has improved to some degree and bone marrow in 12/2019 shows persistent plasma cells of 15-20%. Daratumumab was added from 2/3/2020 onwards. \par \par 	KFLC	KLFLC Ratio\par 05/08/19	50.32	136.00\par 08/15/19	25.08	51.18\par 11/19/19	34.61	57.68\par 12/16/19	15.87	52.90\par Daratumab added 2/03/20		\par 3/09/20	5.57	20.63\par 4/07/20    4.69        16.17\par 5/7/20      3.53        16.05\par \par Reviewed PET CT with no FDG avid lesions, and slight enlargement of right adnexal cyst.\par FLC ratio continues to downtrend. \par \par Plan:\par - Proceed w/ week 17 of daratumumab today, and every 2 weeks until week 24, and then switch to every 4 weeks\par - Continue pomalyst + weekly dexamethasone. (\par - Anemia - Hgb 11 g/dL - stable\par -right adnexal cyst - get pelvic US and follow up with GYN\par -RTO 4 weeks

## 2020-06-02 NOTE — HISTORY OF PRESENT ILLNESS
[de-identified] : Ms. Calero is a 70 year old female w/ multiple myeloma. \par \par She was noted to have proteinuria and then had a 24 hour urine protein which showed non-nephrotic range proteinuria of 510 mg/24 hours. She was referred to Dr. Lesa Rendon of nephrology. SPEP showed faint M-spike 0.2 g/dL in gammaglobulin region, immunofixation showed this to be a IgG kappa monoclonal protein. UPEP showed 2 monoclonal protein bands, 61%, and 0.7%. \par \par Subsequent work up:\par 6/19/18 M-protein 0.2 g/dL, Kappa FLC 71, lambda FLC 0.36, FLC ratio 198.61\par , Beta 2 microglobulin 1.5 mg/L\par \par She had a bone marrow biopsy on 6/22/18. Pathology: plasma cell neoplasm, plasma cells comprise 20-25% of marrow cells. Trilineage hematopoiesis present with maturation, increased iron stores. Congo red stain negative for amyloid. Karyotype 46, XX. FISH panel - positive for CCND1/IGH fusion - a/w favorable prognosis.\par \par 7/12/18 PET - negative\par \par Treatment Summary \par 7/19/18 - C1 VRd\par 8/9/18 -C2 VRd\par 8/30/18 - C3 VRd\par 9/20/18 -partial C4 VRd\par 10/11/18 - 1/2/19 KRd (carfilzomib, Revlimid, Dexamethasone) x 4 cycles. \par 2/28/19 - autoPSCT \par 5/18/19 - started Pomalyst\par 7/19/19 - decadron 20 mg weekly added to Pomalyst.\par 2/03/20 - Daratumumab week 1-8 2/03 - 3/31/20. Week 9 (began every other week) 4/07/20. [de-identified] : Returns for treatment today.\par Denies cough, fevers, no SOB, no CP, appetite is very good, no constipation or diarrhea.\par Minor back ache and right pain.  Good energy levels.  The remainder of ROS is negative.\par \par 5/15/20 PET CT : \par IMPRESSION:\par \par Since PET/CT December 17, 2018:\par 1.  No no FDG avid or lytic osseous lesions.\par 2.  Slight interval enlargement right adnexal cystic structure. Consider further evaluation with ultrasound if not already performed.\par 3.  Minimally FDG avid bilateral lower lobe groundglass pulmonary opacity, right greater than left, nonspecific, possibly infectious/inflammatory. Suggest clinical correlation and follow-up with CT chest as indicated.\par

## 2020-06-02 NOTE — PHYSICAL EXAM
[Restricted in physically strenuous activity but ambulatory and able to carry out work of a light or sedentary nature] : Status 1- Restricted in physically strenuous activity but ambulatory and able to carry out work of a light or sedentary nature, e.g., light house work, office work [Normal] : affect appropriate [de-identified] : Pleasant, interactive in NAD.  [de-identified] : R forearm w/ a patch of dry crusty erythema, ~ 1 cm across, and towards wrist any area of non-erupted rash.

## 2020-06-05 ENCOUNTER — OUTPATIENT (OUTPATIENT)
Dept: OUTPATIENT SERVICES | Facility: HOSPITAL | Age: 70
LOS: 1 days | End: 2020-06-05
Payer: MEDICARE

## 2020-06-05 ENCOUNTER — APPOINTMENT (OUTPATIENT)
Dept: RADIOLOGY | Facility: CLINIC | Age: 70
End: 2020-06-05
Payer: MEDICARE

## 2020-06-05 DIAGNOSIS — Z00.00 ENCOUNTER FOR GENERAL ADULT MEDICAL EXAMINATION WITHOUT ABNORMAL FINDINGS: ICD-10-CM

## 2020-06-05 DIAGNOSIS — Z98.51 TUBAL LIGATION STATUS: Chronic | ICD-10-CM

## 2020-06-05 DIAGNOSIS — Z00.8 ENCOUNTER FOR OTHER GENERAL EXAMINATION: ICD-10-CM

## 2020-06-05 PROCEDURE — 77080 DXA BONE DENSITY AXIAL: CPT

## 2020-06-05 PROCEDURE — 77080 DXA BONE DENSITY AXIAL: CPT | Mod: 26

## 2020-06-10 ENCOUNTER — APPOINTMENT (OUTPATIENT)
Dept: NUCLEAR MEDICINE | Facility: CLINIC | Age: 70
End: 2020-06-10
Payer: MEDICARE

## 2020-06-10 ENCOUNTER — OUTPATIENT (OUTPATIENT)
Dept: OUTPATIENT SERVICES | Facility: HOSPITAL | Age: 70
LOS: 1 days | End: 2020-06-10

## 2020-06-10 ENCOUNTER — APPOINTMENT (OUTPATIENT)
Dept: ULTRASOUND IMAGING | Facility: CLINIC | Age: 70
End: 2020-06-10
Payer: MEDICARE

## 2020-06-10 ENCOUNTER — RESULT REVIEW (OUTPATIENT)
Age: 70
End: 2020-06-10

## 2020-06-10 DIAGNOSIS — R94.6 ABNORMAL RESULTS OF THYROID FUNCTION STUDIES: ICD-10-CM

## 2020-06-10 DIAGNOSIS — Z98.51 TUBAL LIGATION STATUS: Chronic | ICD-10-CM

## 2020-06-10 PROCEDURE — 76536 US EXAM OF HEAD AND NECK: CPT | Mod: 26

## 2020-06-11 ENCOUNTER — APPOINTMENT (OUTPATIENT)
Dept: NUCLEAR MEDICINE | Facility: CLINIC | Age: 70
End: 2020-06-11

## 2020-06-11 PROCEDURE — 78014 THYROID IMAGING W/BLOOD FLOW: CPT | Mod: 26

## 2020-06-16 ENCOUNTER — APPOINTMENT (OUTPATIENT)
Age: 70
End: 2020-06-16

## 2020-06-16 ENCOUNTER — RESULT REVIEW (OUTPATIENT)
Age: 70
End: 2020-06-16

## 2020-06-16 ENCOUNTER — LABORATORY RESULT (OUTPATIENT)
Age: 70
End: 2020-06-16

## 2020-06-16 LAB
BASOPHILS # BLD AUTO: 0.1 K/UL — SIGNIFICANT CHANGE UP (ref 0–0.2)
BASOPHILS NFR BLD AUTO: 1.7 % — SIGNIFICANT CHANGE UP (ref 0–2)
EOSINOPHIL # BLD AUTO: 0.3 K/UL — SIGNIFICANT CHANGE UP (ref 0–0.5)
EOSINOPHIL NFR BLD AUTO: 4.8 % — SIGNIFICANT CHANGE UP (ref 0–6)
HCT VFR BLD CALC: 37.8 % — SIGNIFICANT CHANGE UP (ref 34.5–45)
HGB BLD-MCNC: 11.8 G/DL — SIGNIFICANT CHANGE UP (ref 11.5–15.5)
LYMPHOCYTES # BLD AUTO: 3.2 K/UL — SIGNIFICANT CHANGE UP (ref 1–3.3)
LYMPHOCYTES # BLD AUTO: 47.6 % — HIGH (ref 13–44)
MCHC RBC-ENTMCNC: 29.4 PG — SIGNIFICANT CHANGE UP (ref 27–34)
MCHC RBC-ENTMCNC: 31.2 G/DL — LOW (ref 32–36)
MCV RBC AUTO: 94.1 FL — SIGNIFICANT CHANGE UP (ref 80–100)
MONOCYTES # BLD AUTO: 0.9 K/UL — SIGNIFICANT CHANGE UP (ref 0–0.9)
MONOCYTES NFR BLD AUTO: 14 % — SIGNIFICANT CHANGE UP (ref 2–14)
NEUTROPHILS # BLD AUTO: 2.1 K/UL — SIGNIFICANT CHANGE UP (ref 1.8–7.4)
NEUTROPHILS NFR BLD AUTO: 31.9 % — LOW (ref 43–77)
PLATELET # BLD AUTO: 149 K/UL — LOW (ref 150–400)
RBC # BLD: 4.02 M/UL — SIGNIFICANT CHANGE UP (ref 3.8–5.2)
RBC # FLD: 14.5 % — SIGNIFICANT CHANGE UP (ref 10.3–14.5)
WBC # BLD: 6.7 K/UL — SIGNIFICANT CHANGE UP (ref 3.8–10.5)
WBC # FLD AUTO: 6.7 K/UL — SIGNIFICANT CHANGE UP (ref 3.8–10.5)

## 2020-06-24 ENCOUNTER — OUTPATIENT (OUTPATIENT)
Dept: OUTPATIENT SERVICES | Facility: HOSPITAL | Age: 70
LOS: 1 days | Discharge: HOME | End: 2020-06-24

## 2020-06-24 ENCOUNTER — APPOINTMENT (OUTPATIENT)
Dept: HEMATOLOGY ONCOLOGY | Facility: CLINIC | Age: 70
End: 2020-06-24
Payer: MEDICARE

## 2020-06-24 ENCOUNTER — LABORATORY RESULT (OUTPATIENT)
Age: 70
End: 2020-06-24

## 2020-06-24 DIAGNOSIS — Z98.51 TUBAL LIGATION STATUS: Chronic | ICD-10-CM

## 2020-06-24 DIAGNOSIS — C90.00 MULTIPLE MYELOMA NOT HAVING ACHIEVED REMISSION: ICD-10-CM

## 2020-06-24 PROCEDURE — 99499A: CUSTOM | Mod: NC

## 2020-06-30 ENCOUNTER — APPOINTMENT (OUTPATIENT)
Age: 70
End: 2020-06-30

## 2020-06-30 ENCOUNTER — LABORATORY RESULT (OUTPATIENT)
Age: 70
End: 2020-06-30

## 2020-06-30 ENCOUNTER — RESULT REVIEW (OUTPATIENT)
Age: 70
End: 2020-06-30

## 2020-06-30 ENCOUNTER — OUTPATIENT (OUTPATIENT)
Dept: OUTPATIENT SERVICES | Facility: HOSPITAL | Age: 70
LOS: 1 days | End: 2020-06-30

## 2020-06-30 ENCOUNTER — APPOINTMENT (OUTPATIENT)
Dept: HEMATOLOGY ONCOLOGY | Facility: CLINIC | Age: 70
End: 2020-06-30
Payer: MEDICARE

## 2020-06-30 ENCOUNTER — APPOINTMENT (OUTPATIENT)
Dept: ULTRASOUND IMAGING | Facility: CLINIC | Age: 70
End: 2020-06-30

## 2020-06-30 VITALS
DIASTOLIC BLOOD PRESSURE: 75 MMHG | BODY MASS INDEX: 32.25 KG/M2 | HEIGHT: 63 IN | WEIGHT: 182 LBS | OXYGEN SATURATION: 98 % | SYSTOLIC BLOOD PRESSURE: 128 MMHG | HEART RATE: 72 BPM

## 2020-06-30 DIAGNOSIS — R60.0 LOCALIZED EDEMA: ICD-10-CM

## 2020-06-30 DIAGNOSIS — Z98.51 TUBAL LIGATION STATUS: Chronic | ICD-10-CM

## 2020-06-30 LAB
BASOPHILS # BLD AUTO: 0.2 K/UL — SIGNIFICANT CHANGE UP (ref 0–0.2)
BASOPHILS NFR BLD AUTO: 2.3 % — HIGH (ref 0–2)
EOSINOPHIL # BLD AUTO: 0.3 K/UL — SIGNIFICANT CHANGE UP (ref 0–0.5)
EOSINOPHIL NFR BLD AUTO: 3.2 % — SIGNIFICANT CHANGE UP (ref 0–6)
HCT VFR BLD CALC: 36.3 % — SIGNIFICANT CHANGE UP (ref 34.5–45)
HGB BLD-MCNC: 11.9 G/DL — SIGNIFICANT CHANGE UP (ref 11.5–15.5)
LYMPHOCYTES # BLD AUTO: 3.4 K/UL — HIGH (ref 1–3.3)
LYMPHOCYTES # BLD AUTO: 36.7 % — SIGNIFICANT CHANGE UP (ref 13–44)
MCHC RBC-ENTMCNC: 30.5 PG — SIGNIFICANT CHANGE UP (ref 27–34)
MCHC RBC-ENTMCNC: 32.8 G/DL — SIGNIFICANT CHANGE UP (ref 32–36)
MCV RBC AUTO: 92.9 FL — SIGNIFICANT CHANGE UP (ref 80–100)
MONOCYTES # BLD AUTO: 1 K/UL — HIGH (ref 0–0.9)
MONOCYTES NFR BLD AUTO: 11.1 % — SIGNIFICANT CHANGE UP (ref 2–14)
NEUTROPHILS # BLD AUTO: 4.3 K/UL — SIGNIFICANT CHANGE UP (ref 1.8–7.4)
NEUTROPHILS NFR BLD AUTO: 46.7 % — SIGNIFICANT CHANGE UP (ref 43–77)
PLATELET # BLD AUTO: 147 K/UL — LOW (ref 150–400)
RBC # BLD: 3.9 M/UL — SIGNIFICANT CHANGE UP (ref 3.8–5.2)
RBC # FLD: 14.6 % — HIGH (ref 10.3–14.5)
WBC # BLD: 9.2 K/UL — SIGNIFICANT CHANGE UP (ref 3.8–10.5)
WBC # FLD AUTO: 9.2 K/UL — SIGNIFICANT CHANGE UP (ref 3.8–10.5)

## 2020-06-30 PROCEDURE — 99214 OFFICE O/P EST MOD 30 MIN: CPT | Mod: 95

## 2020-06-30 NOTE — HISTORY OF PRESENT ILLNESS
[de-identified] : Ms. Calero is a 70 year old female w/ multiple myeloma. \par \par She was noted to have proteinuria and then had a 24 hour urine protein which showed non-nephrotic range proteinuria of 510 mg/24 hours. She was referred to Dr. Lesa Rendon of nephrology. SPEP showed faint M-spike 0.2 g/dL in gammaglobulin region, immunofixation showed this to be a IgG kappa monoclonal protein. UPEP showed 2 monoclonal protein bands, 61%, and 0.7%. \par \par Subsequent work up:\par 6/19/18 M-protein 0.2 g/dL, Kappa FLC 71, lambda FLC 0.36, FLC ratio 198.61\par , Beta 2 microglobulin 1.5 mg/L\par \par She had a bone marrow biopsy on 6/22/18. Pathology: plasma cell neoplasm, plasma cells comprise 20-25% of marrow cells. Trilineage hematopoiesis present with maturation, increased iron stores. Congo red stain negative for amyloid. Karyotype 46, XX. FISH panel - positive for CCND1/IGH fusion - a/w favorable prognosis.\par \par 7/12/18 PET - negative\par \par Treatment Summary \par 7/19/18 - C1 VRd\par 8/9/18 -C2 VRd\par 8/30/18 - C3 VRd\par 9/20/18 -partial C4 VRd\par 10/11/18 - 1/2/19 KRd (carfilzomib, Revlimid, Dexamethasone) x 4 cycles. \par 2/28/19 - autoPSCT \par 5/18/19 - started Pomalyst\par 7/19/19 - decadron 20 mg weekly added to Pomalyst.\par 2/03/20 - Daratumumab week 1-8 2/03 - 3/31/20. Week 9 (began every other week) 4/07/20. [de-identified] : Since last seen, denies fevers, no SOB, no CP, appetite is good, no constipation or diarrhea, no pain/burning w/ urination. No LE edema. Energy level is fair. She remains on Pomalyst and is on day 12 of the cycle. She remains on dexamethasone 20 mg orally, the weeks she is not getting it IV.  Her L knee feels swollen and pain radiates up to back. This has been present for a week. She has no calve pain. She has not done anything to trigger.The remainder of ROS is negative.\par

## 2020-06-30 NOTE — PHYSICAL EXAM
[Restricted in physically strenuous activity but ambulatory and able to carry out work of a light or sedentary nature] : Status 1- Restricted in physically strenuous activity but ambulatory and able to carry out work of a light or sedentary nature, e.g., light house work, office work [Normal] : grossly intact [de-identified] : Pleasant, interactive in NAD.  [de-identified] : R forearm w/ a patch of dry crusty erythema, ~ 1 cm across, and towards wrist any area of non-erupted rash.

## 2020-06-30 NOTE — ASSESSMENT
[FreeTextEntry1] : 70 year old female IgG kappa multiple myeloma,  FISH panel - positive for CCND1/IGH fusion - a/w favorable prognosis. PET CT (7/12/18) did not show any bone lesions. S/p VRd, followed by 4 cycles of KRd, followed by autoPSCT on 2/28/19. \par On 5/18/19 started on Pomalyst for persistently elevated KFLC at 50, FLC ratio 136.\par On Pomalyst + weekly Dex, she has had a partial response, her KFLC has improved to some degree and bone marrow in 12/2019 shows persistent plasma cells of 15-20%. Daratumumab was added from 2/3/2020 onwards.\par PET CT 5/15/20 with no FDG avid lesions, and slight enlargement of right adnexal cyst.\par  \par \par 	KFLC	KLFLC Ratio\par 05/08/19	50.32	136.00\par 08/15/19	25.08	51.18\par 11/19/19	34.61	57.68\par 12/16/19	15.87	52.90\par Daratumumab added 2/03/20		\par 3/09/20	5.57	20.63\par 4/07/20    4.69        16.17\par 5/7/20      3.53        16.05\par 6/02/20    2.59        13.63\par \par Today is week 21 daratumumab, FLC ratio continues to downtrend, await results of immunoelectrophoresis drawn today. New L LE swelling around knee and w/ pain. Pain radiates from knee upwards x's 1 week. \par \par Plan:\par - Received week 21 of daratumumab today, and every 2 weeks until week 24, first dose of the every-4-week dosing schedule is given at Week 25.\par - Doppler US of LLE today to r/o DVT, or other acute process.\par - Continue pomalyst + weekly dexamethasone. \par - Anemia - improved Hgb 11.9 g/dL today\par - Right adnexal cyst - pelvic US, did not receive call from radiology to schedule. She will schedule while at [facility] today, and follow up with GYN\par - RTO 4 weeks

## 2020-07-01 DIAGNOSIS — R11.2 NAUSEA WITH VOMITING, UNSPECIFIED: ICD-10-CM

## 2020-07-01 DIAGNOSIS — Z51.11 ENCOUNTER FOR ANTINEOPLASTIC CHEMOTHERAPY: ICD-10-CM

## 2020-07-02 ENCOUNTER — RESULT REVIEW (OUTPATIENT)
Age: 70
End: 2020-07-02

## 2020-07-02 ENCOUNTER — APPOINTMENT (OUTPATIENT)
Dept: ULTRASOUND IMAGING | Facility: CLINIC | Age: 70
End: 2020-07-02
Payer: MEDICARE

## 2020-07-02 ENCOUNTER — OUTPATIENT (OUTPATIENT)
Dept: OUTPATIENT SERVICES | Facility: HOSPITAL | Age: 70
LOS: 1 days | End: 2020-07-02

## 2020-07-02 DIAGNOSIS — Z98.51 TUBAL LIGATION STATUS: Chronic | ICD-10-CM

## 2020-07-02 DIAGNOSIS — N94.9 UNSPECIFIED CONDITION ASSOCIATED WITH FEMALE GENITAL ORGANS AND MENSTRUAL CYCLE: ICD-10-CM

## 2020-07-02 PROCEDURE — 93971 EXTREMITY STUDY: CPT | Mod: 26,LT

## 2020-07-02 PROCEDURE — 76856 US EXAM PELVIC COMPLETE: CPT | Mod: 26

## 2020-07-10 ENCOUNTER — APPOINTMENT (OUTPATIENT)
Dept: OBGYN | Facility: CLINIC | Age: 70
End: 2020-07-10
Payer: MEDICARE

## 2020-07-10 PROCEDURE — ZZZZZ: CPT

## 2020-07-14 LAB
CA 125 (LABCORP): 34.6 U/ML
HE 4: 73.8 PMOL/L
POSTMENOPAUSAL ROMA: 2.65
PREMENOPAUSAL ROMA: 1.76
ROMA COMMENT: NORMAL

## 2020-07-15 ENCOUNTER — LABORATORY RESULT (OUTPATIENT)
Age: 70
End: 2020-07-15

## 2020-07-15 ENCOUNTER — RESULT REVIEW (OUTPATIENT)
Age: 70
End: 2020-07-15

## 2020-07-15 ENCOUNTER — APPOINTMENT (OUTPATIENT)
Age: 70
End: 2020-07-15

## 2020-07-15 LAB
BASOPHILS # BLD AUTO: 0.1 K/UL — SIGNIFICANT CHANGE UP (ref 0–0.2)
BASOPHILS NFR BLD AUTO: 2.1 % — HIGH (ref 0–2)
EOSINOPHIL # BLD AUTO: 0.2 K/UL — SIGNIFICANT CHANGE UP (ref 0–0.5)
EOSINOPHIL NFR BLD AUTO: 3.1 % — SIGNIFICANT CHANGE UP (ref 0–6)
HCT VFR BLD CALC: 36.9 % — SIGNIFICANT CHANGE UP (ref 34.5–45)
HGB BLD-MCNC: 11.7 G/DL — SIGNIFICANT CHANGE UP (ref 11.5–15.5)
LYMPHOCYTES # BLD AUTO: 3.2 K/UL — SIGNIFICANT CHANGE UP (ref 1–3.3)
LYMPHOCYTES # BLD AUTO: 45.8 % — HIGH (ref 13–44)
MCHC RBC-ENTMCNC: 29.3 PG — SIGNIFICANT CHANGE UP (ref 27–34)
MCHC RBC-ENTMCNC: 31.7 G/DL — LOW (ref 32–36)
MCV RBC AUTO: 92.6 FL — SIGNIFICANT CHANGE UP (ref 80–100)
MONOCYTES # BLD AUTO: 1.1 K/UL — HIGH (ref 0–0.9)
MONOCYTES NFR BLD AUTO: 15.7 % — HIGH (ref 2–14)
NEUTROPHILS # BLD AUTO: 2.3 K/UL — SIGNIFICANT CHANGE UP (ref 1.8–7.4)
NEUTROPHILS NFR BLD AUTO: 33.3 % — LOW (ref 43–77)
PLATELET # BLD AUTO: 178 K/UL — SIGNIFICANT CHANGE UP (ref 150–400)
RBC # BLD: 3.98 M/UL — SIGNIFICANT CHANGE UP (ref 3.8–5.2)
RBC # FLD: 14.8 % — HIGH (ref 10.3–14.5)
WBC # BLD: 6.9 K/UL — SIGNIFICANT CHANGE UP (ref 3.8–10.5)
WBC # FLD AUTO: 6.9 K/UL — SIGNIFICANT CHANGE UP (ref 3.8–10.5)

## 2020-07-24 ENCOUNTER — APPOINTMENT (OUTPATIENT)
Dept: HEMATOLOGY ONCOLOGY | Facility: CLINIC | Age: 70
End: 2020-07-24

## 2020-07-24 ENCOUNTER — LABORATORY RESULT (OUTPATIENT)
Age: 70
End: 2020-07-24

## 2020-07-25 ENCOUNTER — OUTPATIENT (OUTPATIENT)
Dept: OUTPATIENT SERVICES | Facility: HOSPITAL | Age: 70
LOS: 1 days | Discharge: HOME | End: 2020-07-25

## 2020-07-25 DIAGNOSIS — C90.00 MULTIPLE MYELOMA NOT HAVING ACHIEVED REMISSION: ICD-10-CM

## 2020-07-25 DIAGNOSIS — Z98.51 TUBAL LIGATION STATUS: Chronic | ICD-10-CM

## 2020-07-27 NOTE — PHYSICAL EXAM
[Normal] : grossly intact [Ulcers] : no ulcers [Thrush] : no thrush [de-identified] : RRR, normal S1S2 [de-identified] : warm, no edema [de-identified] : no rash [de-identified] : A & O x 4 DISPLAY PLAN FREE TEXT

## 2020-07-30 ENCOUNTER — APPOINTMENT (OUTPATIENT)
Age: 70
End: 2020-07-30

## 2020-07-30 ENCOUNTER — RESULT REVIEW (OUTPATIENT)
Age: 70
End: 2020-07-30

## 2020-07-30 ENCOUNTER — LABORATORY RESULT (OUTPATIENT)
Age: 70
End: 2020-07-30

## 2020-07-30 LAB
BASOPHILS # BLD AUTO: 0.2 K/UL — SIGNIFICANT CHANGE UP (ref 0–0.2)
BASOPHILS NFR BLD AUTO: 2.5 % — HIGH (ref 0–2)
EOSINOPHIL # BLD AUTO: 0.4 K/UL — SIGNIFICANT CHANGE UP (ref 0–0.5)
EOSINOPHIL NFR BLD AUTO: 5.3 % — SIGNIFICANT CHANGE UP (ref 0–6)
HCT VFR BLD CALC: 36.3 % — SIGNIFICANT CHANGE UP (ref 34.5–45)
HGB BLD-MCNC: 11.5 G/DL — SIGNIFICANT CHANGE UP (ref 11.5–15.5)
LYMPHOCYTES # BLD AUTO: 3 K/UL — SIGNIFICANT CHANGE UP (ref 1–3.3)
LYMPHOCYTES # BLD AUTO: 39.4 % — SIGNIFICANT CHANGE UP (ref 13–44)
MCHC RBC-ENTMCNC: 29.4 PG — SIGNIFICANT CHANGE UP (ref 27–34)
MCHC RBC-ENTMCNC: 31.7 G/DL — LOW (ref 32–36)
MCV RBC AUTO: 92.8 FL — SIGNIFICANT CHANGE UP (ref 80–100)
MONOCYTES # BLD AUTO: 1 K/UL — HIGH (ref 0–0.9)
MONOCYTES NFR BLD AUTO: 12.9 % — SIGNIFICANT CHANGE UP (ref 2–14)
NEUTROPHILS # BLD AUTO: 3 K/UL — SIGNIFICANT CHANGE UP (ref 1.8–7.4)
NEUTROPHILS NFR BLD AUTO: 39.9 % — LOW (ref 43–77)
PLATELET # BLD AUTO: 148 K/UL — LOW (ref 150–400)
RBC # BLD: 3.91 M/UL — SIGNIFICANT CHANGE UP (ref 3.8–5.2)
RBC # FLD: 14.9 % — HIGH (ref 10.3–14.5)
WBC # BLD: 7.5 K/UL — SIGNIFICANT CHANGE UP (ref 3.8–10.5)
WBC # FLD AUTO: 7.5 K/UL — SIGNIFICANT CHANGE UP (ref 3.8–10.5)

## 2020-07-31 DIAGNOSIS — Z51.11 ENCOUNTER FOR ANTINEOPLASTIC CHEMOTHERAPY: ICD-10-CM

## 2020-07-31 DIAGNOSIS — R11.2 NAUSEA WITH VOMITING, UNSPECIFIED: ICD-10-CM

## 2020-08-04 ENCOUNTER — APPOINTMENT (OUTPATIENT)
Dept: HEMATOLOGY ONCOLOGY | Facility: CLINIC | Age: 70
End: 2020-08-04
Payer: MEDICARE

## 2020-08-04 ENCOUNTER — RESULT REVIEW (OUTPATIENT)
Age: 70
End: 2020-08-04

## 2020-08-04 VITALS
HEART RATE: 93 BPM | SYSTOLIC BLOOD PRESSURE: 132 MMHG | WEIGHT: 181.01 LBS | OXYGEN SATURATION: 95 % | DIASTOLIC BLOOD PRESSURE: 81 MMHG | BODY MASS INDEX: 32.07 KG/M2 | HEIGHT: 63 IN

## 2020-08-04 LAB
BASOPHILS # BLD AUTO: 0 K/UL — SIGNIFICANT CHANGE UP (ref 0–0.2)
BASOPHILS NFR BLD AUTO: 0.7 % — SIGNIFICANT CHANGE UP (ref 0–2)
EOSINOPHIL # BLD AUTO: 0 K/UL — SIGNIFICANT CHANGE UP (ref 0–0.5)
EOSINOPHIL NFR BLD AUTO: 0.3 % — SIGNIFICANT CHANGE UP (ref 0–6)
HCT VFR BLD CALC: 36.3 % — SIGNIFICANT CHANGE UP (ref 34.5–45)
HGB BLD-MCNC: 11.8 G/DL — SIGNIFICANT CHANGE UP (ref 11.5–15.5)
LYMPHOCYTES # BLD AUTO: 1 K/UL — SIGNIFICANT CHANGE UP (ref 1–3.3)
LYMPHOCYTES # BLD AUTO: 22.2 % — SIGNIFICANT CHANGE UP (ref 13–44)
MCHC RBC-ENTMCNC: 29.9 PG — SIGNIFICANT CHANGE UP (ref 27–34)
MCHC RBC-ENTMCNC: 32.6 G/DL — SIGNIFICANT CHANGE UP (ref 32–36)
MCV RBC AUTO: 91.5 FL — SIGNIFICANT CHANGE UP (ref 80–100)
MONOCYTES # BLD AUTO: 0.1 K/UL — SIGNIFICANT CHANGE UP (ref 0–0.9)
MONOCYTES NFR BLD AUTO: 1.3 % — LOW (ref 2–14)
NEUTROPHILS # BLD AUTO: 3.4 K/UL — SIGNIFICANT CHANGE UP (ref 1.8–7.4)
NEUTROPHILS NFR BLD AUTO: 75.4 % — SIGNIFICANT CHANGE UP (ref 43–77)
PLATELET # BLD AUTO: 163 K/UL — SIGNIFICANT CHANGE UP (ref 150–400)
RBC # BLD: 3.97 M/UL — SIGNIFICANT CHANGE UP (ref 3.8–5.2)
RBC # FLD: 15 % — HIGH (ref 10.3–14.5)
WBC # BLD: 4.5 K/UL — SIGNIFICANT CHANGE UP (ref 3.8–10.5)
WBC # FLD AUTO: 4.5 K/UL — SIGNIFICANT CHANGE UP (ref 3.8–10.5)

## 2020-08-04 PROCEDURE — 99214 OFFICE O/P EST MOD 30 MIN: CPT

## 2020-08-05 ENCOUNTER — RX RENEWAL (OUTPATIENT)
Age: 70
End: 2020-08-05

## 2020-08-05 ENCOUNTER — APPOINTMENT (OUTPATIENT)
Dept: ENDOCRINOLOGY | Facility: CLINIC | Age: 70
End: 2020-08-05
Payer: MEDICARE

## 2020-08-05 VITALS
HEART RATE: 80 BPM | DIASTOLIC BLOOD PRESSURE: 64 MMHG | WEIGHT: 181 LBS | SYSTOLIC BLOOD PRESSURE: 110 MMHG | HEIGHT: 63 IN | BODY MASS INDEX: 32.07 KG/M2

## 2020-08-05 LAB
ALBUMIN SERPL ELPH-MCNC: 4.6 G/DL
ALP BLD-CCNC: 65 U/L
ALT SERPL-CCNC: 12 U/L
ANION GAP SERPL CALC-SCNC: 17 MMOL/L
AST SERPL-CCNC: 13 U/L
BILIRUB SERPL-MCNC: 0.3 MG/DL
BUN SERPL-MCNC: 14 MG/DL
CALCIUM SERPL-MCNC: 9.8 MG/DL
CHLORIDE SERPL-SCNC: 105 MMOL/L
CO2 SERPL-SCNC: 20 MMOL/L
CREAT SERPL-MCNC: 0.62 MG/DL
POTASSIUM SERPL-SCNC: 4.1 MMOL/L
PROT SERPL-MCNC: 6.1 G/DL
SODIUM SERPL-SCNC: 142 MMOL/L

## 2020-08-05 PROCEDURE — 99214 OFFICE O/P EST MOD 30 MIN: CPT

## 2020-08-05 NOTE — HISTORY OF PRESENT ILLNESS
[de-identified] : Ms. Calero is a 70 year old female w/ multiple myeloma. \par \par She was noted to have proteinuria and then had a 24 hour urine protein which showed non-nephrotic range proteinuria of 510 mg/24 hours. She was referred to Dr. Lesa Rendon of nephrology. SPEP showed faint M-spike 0.2 g/dL in gammaglobulin region, immunofixation showed this to be a IgG kappa monoclonal protein. UPEP showed 2 monoclonal protein bands, 61%, and 0.7%. \par \par Subsequent work up:\par 6/19/18 M-protein 0.2 g/dL, Kappa FLC 71, lambda FLC 0.36, FLC ratio 198.61\par , Beta 2 microglobulin 1.5 mg/L\par \par She had a bone marrow biopsy on 6/22/18. Pathology: plasma cell neoplasm, plasma cells comprise 20-25% of marrow cells. Trilineage hematopoiesis present with maturation, increased iron stores. Congo red stain negative for amyloid. Karyotype 46, XX. FISH panel - positive for CCND1/IGH fusion - a/w favorable prognosis.\par \par 7/12/18 PET - negative\par \par Treatment Summary \par 7/19/18 - C1 VRd\par 8/9/18 -C2 VRd\par 8/30/18 - C3 VRd\par 9/20/18 -partial C4 VRd\par 10/11/18 - 1/2/19 KRd (carfilzomib, Revlimid, Dexamethasone) x 4 cycles. \par 2/28/19 - autoPSCT \par 5/18/19 - started Pomalyst\par 7/19/19 - decadron 20 mg weekly added to Pomalyst.\par 2/03/20 - Daratumumab week 1-8 2/03 - 3/31/20. Week 9 (began every other week) 4/07/20. [de-identified] : Returns for follow up.\par She is seeing ortho for left knee pain, s/p US  which showed Ordoñez's cyst.  Ortho: Dr. Mckinnon.\par He recommended no intervention. \par She notes pain mainly when she sleeps or lays her leg flat. \par Appetite is good.\par Weight gain.\par No fevers or night sweats.\par Notes numbness / tingling of feet, now going up to mid shin.\par She also has same sensation intermittently on the lips.

## 2020-08-05 NOTE — ASSESSMENT
[FreeTextEntry1] : 70 year old female IgG kappa multiple myeloma,  FISH panel - positive for CCND1/IGH fusion - a/w favorable prognosis. PET CT (7/12/18) did not show any bone lesions. S/p VRd, followed by 4 cycles of KRd, followed by autoPSCT on 2/28/19. \par On 5/18/19 started on Pomalyst for persistently elevated KFLC at 50, FLC ratio 136.\par On Pomalyst + weekly Dex, she has had a partial response, her KFLC has improved to some degree and bone marrow in 12/2019 shows persistent plasma cells of 15-20%. Daratumumab was added from 2/3/2020 onwards.\par PET CT 5/15/20 with no FDG avid lesions, and slight enlargement of right adnexal cyst.\par  \par \par 	KFLC	KLFLC Ratio\par 05/08/19	50.32	136.00\par 08/15/19	25.08	51.18\par 11/19/19	34.61	57.68\par 12/16/19	15.87	52.90\par Daratumumab added 2/03/20		\par 3/09/20	5.57	20.63\par 4/07/20    4.69        16.17\par 5/7/20      3.53        16.05\par 6/02/20    2.59        13.63\par \par Tolerating treatment well, except new CIPN grade 1 likely due to pomalyst. \par \par Plan:\par - Now on daratumumab q 4 weeks as of 7/30/20\par -immunoelectrophoresis to be drawn today\par - Continue pomalyst + weekly dexamethasone. \par -grade 1 CIPN - continue to monitor\par - Right adnexal cyst - follow up with GYN\par -left knee baker's cyst - seen by ortho, only bothersome at night, considering 2nd opinion\par -Follow up in 6 weeks

## 2020-08-05 NOTE — HISTORY OF PRESENT ILLNESS
[FreeTextEntry1] : Hyperthyroid\par TSI negative\par Onset: being treated with chemo for multiple myeloma\par \par Treated with no medication\par \par TFTs Improving\par TSH 0.23, Total T3 115, Free T4 1.2 \par \par HLD\par Controlled by PCP\par Treated with Rosuvastatin 20 mg daily\par \par \par MNG\par Multiple bilateral thyroid nodules, see below report\par Denies anterior neck pain, dysphagia, dysphonia

## 2020-08-05 NOTE — ASSESSMENT
[FreeTextEntry1] : Abnormal TFTs/Hyperthyroid \par -Improving TFTs, no symptoms of hyperthyroidism, continue on no medication, will continue to monitor TFTs\par \par \par HLD\par -Continue statin, will be followed by her PCP\par \par \par Thyroid nodules\par -Will continue to monitor, next ultrasound in 6 months\par \par \par RTO 3 months, labs before next visit

## 2020-08-05 NOTE — PHYSICAL EXAM
[Restricted in physically strenuous activity but ambulatory and able to carry out work of a light or sedentary nature] : Status 1- Restricted in physically strenuous activity but ambulatory and able to carry out work of a light or sedentary nature, e.g., light house work, office work [Normal] : PERRL, EOMI, no conjunctival infection, anicteric [de-identified] : Pleasant, interactive in NAD.  [de-identified] : CTA b/l [de-identified] : No edema [de-identified] : S1/S2

## 2020-08-05 NOTE — REVIEW OF SYSTEMS
[Fatigue] : fatigue [Constipation] : constipation [Polyuria] : polyuria [Nocturia] : nocturia [Recent Weight Gain (___ Lbs)] : no recent weight gain [Recent Weight Loss (___ Lbs)] : no recent weight loss [Dysphagia] : no dysphagia [Neck Pain] : no neck pain [Dysphonia] : no dysphonia [Chest Pain] : no chest pain [Palpitations] : no palpitations [Shortness Of Breath] : no shortness of breath [Nausea] : no nausea [Vomiting] : no vomiting [Diarrhea] : no diarrhea [Anxiety] : no anxiety [FreeTextEntry7] : at  baseline  [Polydipsia] : no polydipsia [de-identified] : +tremor since last chemo round

## 2020-08-05 NOTE — PHYSICAL EXAM
[Well Nourished] : well nourished [Alert] : alert [No Acute Distress] : no acute distress [Normal Sclera/Conjunctiva] : normal sclera/conjunctiva [Normal Hearing] : hearing was normal [No Accessory Muscle Use] : no accessory muscle use [Thyroid Not Enlarged] : the thyroid was not enlarged [Clear to Auscultation] : lungs were clear to auscultation bilaterally [Normal S1, S2] : normal S1 and S2 [Normal Rate] : heart rate was normal [No Edema] : no peripheral edema [Not Tender] : non-tender [Soft] : abdomen soft [Normal Gait] : normal gait [No Rash] : no rash [Oriented x3] : oriented to person, place, and time [de-identified] : +tremor

## 2020-08-10 LAB
ALBUMIN MFR SERPL ELPH: 63.4 %
ALBUMIN SERPL-MCNC: 3.9 G/DL
ALBUMIN/GLOB SERPL: 1.8 RATIO
ALPHA1 GLOB MFR SERPL ELPH: 6.3 %
ALPHA1 GLOB SERPL ELPH-MCNC: 0.4 G/DL
ALPHA2 GLOB MFR SERPL ELPH: 13.4 %
ALPHA2 GLOB SERPL ELPH-MCNC: 0.8 G/DL
B-GLOBULIN MFR SERPL ELPH: 11.5 %
B-GLOBULIN SERPL ELPH-MCNC: 0.7 G/DL
DEPRECATED KAPPA LC FREE/LAMBDA SER: 9.7 RATIO
GAMMA GLOB FLD ELPH-MCNC: 0.3 G/DL
GAMMA GLOB MFR SERPL ELPH: 5.4 %
IGA SER QL IEP: 10 MG/DL
IGG SER QL IEP: 334 MG/DL
IGM SER QL IEP: <10 MG/DL
INTERPRETATION SERPL IEP-IMP: NORMAL
KAPPA LC CSF-MCNC: 0.2 MG/DL
KAPPA LC SERPL-MCNC: 1.94 MG/DL
M PROTEIN MFR SERPL ELPH: 1.4 %
M PROTEIN SPEC IFE-MCNC: NORMAL
MONOCLON BAND OBS SERPL: 0.1 G/DL
PROT SERPL-MCNC: 6.1 G/DL
PROT SERPL-MCNC: 6.1 G/DL

## 2020-08-21 ENCOUNTER — APPOINTMENT (OUTPATIENT)
Dept: HEMATOLOGY ONCOLOGY | Facility: CLINIC | Age: 70
End: 2020-08-21

## 2020-08-21 ENCOUNTER — LABORATORY RESULT (OUTPATIENT)
Age: 70
End: 2020-08-21

## 2020-08-22 ENCOUNTER — OUTPATIENT (OUTPATIENT)
Dept: OUTPATIENT SERVICES | Facility: HOSPITAL | Age: 70
LOS: 1 days | Discharge: HOME | End: 2020-08-22

## 2020-08-22 DIAGNOSIS — Z98.51 TUBAL LIGATION STATUS: Chronic | ICD-10-CM

## 2020-08-22 DIAGNOSIS — C90.00 MULTIPLE MYELOMA NOT HAVING ACHIEVED REMISSION: ICD-10-CM

## 2020-08-26 ENCOUNTER — RESULT REVIEW (OUTPATIENT)
Age: 70
End: 2020-08-26

## 2020-08-26 ENCOUNTER — APPOINTMENT (OUTPATIENT)
Age: 70
End: 2020-08-26

## 2020-08-26 ENCOUNTER — LABORATORY RESULT (OUTPATIENT)
Age: 70
End: 2020-08-26

## 2020-08-26 LAB
BASOPHILS # BLD AUTO: 0.2 K/UL — SIGNIFICANT CHANGE UP (ref 0–0.2)
BASOPHILS NFR BLD AUTO: 2.4 % — HIGH (ref 0–2)
EOSINOPHIL # BLD AUTO: 0.4 K/UL — SIGNIFICANT CHANGE UP (ref 0–0.5)
EOSINOPHIL NFR BLD AUTO: 5 % — SIGNIFICANT CHANGE UP (ref 0–6)
HCT VFR BLD CALC: 36.3 % — SIGNIFICANT CHANGE UP (ref 34.5–45)
HGB BLD-MCNC: 11.7 G/DL — SIGNIFICANT CHANGE UP (ref 11.5–15.5)
LYMPHOCYTES # BLD AUTO: 3 K/UL — SIGNIFICANT CHANGE UP (ref 1–3.3)
LYMPHOCYTES # BLD AUTO: 34.7 % — SIGNIFICANT CHANGE UP (ref 13–44)
MCHC RBC-ENTMCNC: 29.9 PG — SIGNIFICANT CHANGE UP (ref 27–34)
MCHC RBC-ENTMCNC: 32.3 G/DL — SIGNIFICANT CHANGE UP (ref 32–36)
MCV RBC AUTO: 92.7 FL — SIGNIFICANT CHANGE UP (ref 80–100)
MONOCYTES # BLD AUTO: 1.2 K/UL — HIGH (ref 0–0.9)
MONOCYTES NFR BLD AUTO: 13.8 % — SIGNIFICANT CHANGE UP (ref 2–14)
NEUTROPHILS # BLD AUTO: 3.8 K/UL — SIGNIFICANT CHANGE UP (ref 1.8–7.4)
NEUTROPHILS NFR BLD AUTO: 44 % — SIGNIFICANT CHANGE UP (ref 43–77)
PLATELET # BLD AUTO: 157 K/UL — SIGNIFICANT CHANGE UP (ref 150–400)
RBC # BLD: 3.91 M/UL — SIGNIFICANT CHANGE UP (ref 3.8–5.2)
RBC # FLD: 15.4 % — HIGH (ref 10.3–14.5)
WBC # BLD: 8.7 K/UL — SIGNIFICANT CHANGE UP (ref 3.8–10.5)
WBC # FLD AUTO: 8.7 K/UL — SIGNIFICANT CHANGE UP (ref 3.8–10.5)

## 2020-08-27 DIAGNOSIS — Z51.11 ENCOUNTER FOR ANTINEOPLASTIC CHEMOTHERAPY: ICD-10-CM

## 2020-08-27 DIAGNOSIS — R11.2 NAUSEA WITH VOMITING, UNSPECIFIED: ICD-10-CM

## 2020-09-14 ENCOUNTER — OUTPATIENT (OUTPATIENT)
Dept: OUTPATIENT SERVICES | Facility: HOSPITAL | Age: 70
LOS: 1 days | Discharge: HOME | End: 2020-09-14

## 2020-09-14 DIAGNOSIS — Z98.51 TUBAL LIGATION STATUS: Chronic | ICD-10-CM

## 2020-09-14 DIAGNOSIS — C90.00 MULTIPLE MYELOMA NOT HAVING ACHIEVED REMISSION: ICD-10-CM

## 2020-09-15 ENCOUNTER — APPOINTMENT (OUTPATIENT)
Dept: HEMATOLOGY ONCOLOGY | Facility: CLINIC | Age: 70
End: 2020-09-15
Payer: MEDICARE

## 2020-09-15 ENCOUNTER — RESULT REVIEW (OUTPATIENT)
Age: 70
End: 2020-09-15

## 2020-09-15 ENCOUNTER — LABORATORY RESULT (OUTPATIENT)
Age: 70
End: 2020-09-15

## 2020-09-15 VITALS
WEIGHT: 183 LBS | HEART RATE: 71 BPM | DIASTOLIC BLOOD PRESSURE: 74 MMHG | HEIGHT: 63 IN | OXYGEN SATURATION: 98 % | BODY MASS INDEX: 32.43 KG/M2 | SYSTOLIC BLOOD PRESSURE: 128 MMHG

## 2020-09-15 LAB
BASOPHILS # BLD AUTO: 0.1 K/UL — SIGNIFICANT CHANGE UP (ref 0–0.2)
BASOPHILS NFR BLD AUTO: 1.9 % — SIGNIFICANT CHANGE UP (ref 0–2)
EOSINOPHIL # BLD AUTO: 0 K/UL — SIGNIFICANT CHANGE UP (ref 0–0.5)
EOSINOPHIL NFR BLD AUTO: 0.6 % — SIGNIFICANT CHANGE UP (ref 0–6)
HCT VFR BLD CALC: 36.6 % — SIGNIFICANT CHANGE UP (ref 34.5–45)
HGB BLD-MCNC: 11.6 G/DL — SIGNIFICANT CHANGE UP (ref 11.5–15.5)
LYMPHOCYTES # BLD AUTO: 1.6 K/UL — SIGNIFICANT CHANGE UP (ref 1–3.3)
LYMPHOCYTES # BLD AUTO: 21.7 % — SIGNIFICANT CHANGE UP (ref 13–44)
MCHC RBC-ENTMCNC: 30.3 PG — SIGNIFICANT CHANGE UP (ref 27–34)
MCHC RBC-ENTMCNC: 31.6 G/DL — LOW (ref 32–36)
MCV RBC AUTO: 95.8 FL — SIGNIFICANT CHANGE UP (ref 80–100)
MONOCYTES # BLD AUTO: 0.2 K/UL — SIGNIFICANT CHANGE UP (ref 0–0.9)
MONOCYTES NFR BLD AUTO: 2.6 % — SIGNIFICANT CHANGE UP (ref 2–14)
NEUTROPHILS # BLD AUTO: 5.3 K/UL — SIGNIFICANT CHANGE UP (ref 1.8–7.4)
NEUTROPHILS NFR BLD AUTO: 73.1 % — SIGNIFICANT CHANGE UP (ref 43–77)
PLATELET # BLD AUTO: 256 K/UL — SIGNIFICANT CHANGE UP (ref 150–400)
RBC # BLD: 3.82 M/UL — SIGNIFICANT CHANGE UP (ref 3.8–5.2)
RBC # FLD: 16 % — HIGH (ref 10.3–14.5)
WBC # BLD: 7.2 K/UL — SIGNIFICANT CHANGE UP (ref 3.8–10.5)
WBC # FLD AUTO: 7.2 K/UL — SIGNIFICANT CHANGE UP (ref 3.8–10.5)

## 2020-09-15 PROCEDURE — 99214 OFFICE O/P EST MOD 30 MIN: CPT

## 2020-09-15 NOTE — HISTORY OF PRESENT ILLNESS
[de-identified] : Returns for follow up.\par Seeing Dr. Greenwood for left tabares's cyst this week. \par No new pain.\par No fevers or night sweats.\par Notes numbness / tingling of feet, now going up to mid shin.\par She has tingling sensation on arms intermittently, comes and goes.\par R eye cataract surgery is on 10/5/20. \par Today is day 5 of polmalyst\par  [de-identified] : Ms. Calero is a 70 year old female w/ multiple myeloma. \par \par She was noted to have proteinuria and then had a 24 hour urine protein which showed non-nephrotic range proteinuria of 510 mg/24 hours. She was referred to Dr. Lesa Rendon of nephrology. SPEP showed faint M-spike 0.2 g/dL in gammaglobulin region, immunofixation showed this to be a IgG kappa monoclonal protein. UPEP showed 2 monoclonal protein bands, 61%, and 0.7%. \par \par Subsequent work up:\par 6/19/18 M-protein 0.2 g/dL, Kappa FLC 71, lambda FLC 0.36, FLC ratio 198.61\par , Beta 2 microglobulin 1.5 mg/L\par \par She had a bone marrow biopsy on 6/22/18. Pathology: plasma cell neoplasm, plasma cells comprise 20-25% of marrow cells. Trilineage hematopoiesis present with maturation, increased iron stores. Congo red stain negative for amyloid. Karyotype 46, XX. FISH panel - positive for CCND1/IGH fusion - a/w favorable prognosis.\par \par 7/12/18 PET - negative\par \par Treatment Summary \par 7/19/18 - C1 VRd\par 8/9/18 -C2 VRd\par 8/30/18 - C3 VRd\par 9/20/18 -partial C4 VRd\par 10/11/18 - 1/2/19 KRd (carfilzomib, Revlimid, Dexamethasone) x 4 cycles. \par 2/28/19 - autoPSCT \par 5/18/19 - started Pomalyst\par 7/19/19 - decadron 20 mg weekly added to Pomalyst.\par 2/03/20 - Daratumumab week 1-8 2/03 - 3/31/20. Week 9 (began every other week) 4/07/20.

## 2020-09-15 NOTE — ASSESSMENT
[FreeTextEntry1] : 70 year old female IgG kappa multiple myeloma,  FISH panel - positive for CCND1/IGH fusion - a/w favorable prognosis. PET CT (7/12/18) did not show any bone lesions. S/p VRd, followed by 4 cycles of KRd, followed by autoPSCT on 2/28/19. \par On 5/18/19 started on Pomalyst for persistently elevated KFLC at 50, FLC ratio 136.\par On Pomalyst + weekly Dex, she has had a partial response, her KFLC has improved to some degree and bone marrow in 12/2019 shows persistent plasma cells of 15-20%. Daratumumab was added from 2/3/2020 onwards.\par PET CT 5/15/20 with no FDG avid lesions, and slight enlargement of right adnexal cyst.\par  Q4 week daratumumab started 7/30/20\par \par 	KFLC	KLFLC Ratio\par 05/08/19	50.32	136.00\par 08/15/19	25.08	51.18\par 11/19/19	34.61	57.68\par 12/16/19	15.87	52.90\par Daratumumab added 2/03/20		\par 3/09/20	5.57	20.63\par 4/07/20    4.69        16.17\par 5/7/20      3.53        16.05\par 6/02/20    2.59        13.63\par 8/4/20      1.94         9.70\par \par Tolerating treatment well aside from CIPN grade 1 likely due to pomalyst. \par \par Plan:\par - continue daratumumab q 4 weeks\par - Continue pomalyst + weekly dexamethasone.  Delay polmalyst restart by 1 week and to start on 10/16/20 given cataract surgery on 10/5/20. \par -grade 1 CIPN - continue to monitor\par - Right adnexal cyst - follows with GYN\par -left knee baker's cyst - will see Dr. Greenwood\par -Follow up in 6 weeks with NP

## 2020-09-15 NOTE — PHYSICAL EXAM
[Restricted in physically strenuous activity but ambulatory and able to carry out work of a light or sedentary nature] : Status 1- Restricted in physically strenuous activity but ambulatory and able to carry out work of a light or sedentary nature, e.g., light house work, office work [Normal] : grossly intact [de-identified] : Pleasant, interactive in NAD.  [de-identified] : S1/S2 [de-identified] : CTA b/l [de-identified] : No edema

## 2020-09-16 ENCOUNTER — APPOINTMENT (OUTPATIENT)
Dept: HEMATOLOGY ONCOLOGY | Facility: CLINIC | Age: 70
End: 2020-09-16
Payer: MEDICARE

## 2020-09-16 LAB
ALBUMIN SERPL ELPH-MCNC: 4.6 G/DL
ALP BLD-CCNC: 75 U/L
ALT SERPL-CCNC: 13 U/L
ANION GAP SERPL CALC-SCNC: 12 MMOL/L
AST SERPL-CCNC: 16 U/L
BILIRUB SERPL-MCNC: <0.2 MG/DL
BUN SERPL-MCNC: 13 MG/DL
CALCIUM SERPL-MCNC: 9.6 MG/DL
CHLORIDE SERPL-SCNC: 108 MMOL/L
CO2 SERPL-SCNC: 24 MMOL/L
CREAT SERPL-MCNC: 0.66 MG/DL
GLUCOSE SERPL-MCNC: 113 MG/DL
POTASSIUM SERPL-SCNC: 3.8 MMOL/L
PROT SERPL-MCNC: 6.2 G/DL
SODIUM SERPL-SCNC: 143 MMOL/L

## 2020-09-16 PROCEDURE — 99213 OFFICE O/P EST LOW 20 MIN: CPT | Mod: 95

## 2020-09-16 NOTE — REVIEW OF SYSTEMS
[Chills] : no chills [Fever] : no fever [Night Sweats] : no night sweats [Fatigue] : no fatigue [Mucosal Pain] : no mucosal pain [Lower Ext Edema] : no lower extremity edema [Shortness Of Breath] : no shortness of breath [Cough] : no cough [Abdominal Pain] : no abdominal pain [Vomiting] : no vomiting [Diarrhea] : no diarrhea [Skin Rash] : no skin rash [Dizziness] : no dizziness [Easy Bleeding] : no tendency for easy bleeding [Fainting] : no fainting [FreeTextEntry3] : having cataract surgery next month [Easy Bruising] : no tendency for easy bruising [FreeTextEntry7] : no nausea [FreeTextEntry9] : no bone pain [FreeTextEntry4] : no sore throat [de-identified] : + dry skin better

## 2020-09-16 NOTE — HISTORY OF PRESENT ILLNESS
[Home] : at home, [unfilled] , at the time of the visit. [Medical Office: ([facility])___] : at the medical office located in  [Verbal consent obtained from patient] : the patient, [unfilled] [de-identified] : Ms. Calero is a 70 year old female who was initially referred for monoclonal gammopathy.\par She was noted to have proteinuria and then had a 24 hour urine protein which showed non-nephrotic range proteinuria of 510 mg/24 hours. She was referred to Dr. Lesa Rendon of nephrology. SPEP showed faint M-spike 0.2 g/dL in gammaglobulin region, immunofixation showed this to be a IgG kappa monoclonal protein. UPEP showed 2 monoclonal protein bands, 61%, and 0.7%. \par \par Subsequent work up:\par 6/19/18 M-protein 0.2 g/dL, Kappa FLC 71, lambda FLC 0.36, FLC ratio 198.61\par , Beta 2 microglobulin 1.5 mg/L\par \par She had a bone marrow biopsy on 6/22/18. Pathology: plasma cell neoplasm, plasma cells comprise 20-25% of marrow cells. Trilineage hematopoiesis present with maturation, increased iron stores. Congo red stain negative for amyloid. Karyotype 46, XX. FISH panel - positive for CCND1/IGH fusion - a/w favorable prognosis. PET CT (7/12/18) did not show any bone lesions. Began VRd on 7/19/18. \par \par On C4 of VRd. FLC ratio initially decreasing -->198 --> 126 --> 67, however has increased to 115 with urine studies positive for Bence Brooks protein. For refractory disease, she was switched to KRd for a deeper response.\par \par  [de-identified] : Since initial consult visit on 11/26/18, the patient is currently on cycle 4 KRd(started 1/2/19), with day 21 of Revlimid completed on 1/22/19. She underwent pre-transplant testing on 12/21/18. She has moderate fatigue and good appetite. She denies fever, shortness of breath, abdominal pain. Today, I again did comprehensive review of consent forms and after all questions addressed, the patient signed consent forms.\par \par On 2/11/19, patient presents for a pre stem cell collection visit. Anahi received cytoxan on 2/5/19 followed by 12 mg of neulasta on 2/6/19. Overall patient is well and offers no acute concerns today. Denies fever, chills, nausea, vomiting or diarrhea. Denies SOB, chest pain or B/L LE edema. Patient is currently on ciprofloxacin for prophylaxis. Denies any pain at this time. \par \par On 2/13/19 visit, patient presents for a pre stem cell collection visit. Patient offers no acute concerns. Currently taking ciprofloxacin 250 mg BID for 10 days as prophylaxis. Denies fever, chills, nausea, vomiting,diarrhea, rash or dysuria. Denies SOB, chest pain or B/L LE edema. Currently not on any blood thinners. \par \par On 2/21/19 visit, patient presents for a pre admission visit. Tolerated stem cell mobilization and is scheduled for kepivance today, tomorrow and on 2/23/19. Completed ciprofloxacin. Denies fever, chills, nausea, vomiting, diarrhea, rash, mouth sores or dysuria. Denies SOB, chest pain or B/L LE edema or pain. \par \par Since office visit on 2/21/19, patient was admitted to [facility] on 2/25/19 and received Melphalan 100 mg/m2 IV x 2 consecutive days followed by autoPSCT(10.04 x 10^6/kg) on 2/28/19. Hospital course complicated by GI mucositis with diarrhea managed with Imodium; stool C. diff- negative. Engraftment(white cells) noted on 3/12/19. Repeat UA and culture both negative on 3/15/19. \par \par Since discharge to home on 3/15/19, the patient has moderate fatigue, fair appetite with slow improvement. She denies fever, chills, shortness of breath, diarrhea. \par \par Since office visit on 3/21/19, the patient has moderate fatigue, and fair- good appetite. She has dry skin and on face she notes patchy rash. She has been using hydrocortisone cream 1% OTC during past week for 3 days. She denies fever, shortness of breath, diarrhea. \par \par Since office visit on 3/28/19, the patient called on 3/29 complaining of ongoing generalized pruritus without signs of rash/hives despite using moisturizer and hydrocortisone cream. Pt instructed to try daily long acting anti-histamine (i.e. Claritin, Allegra, or Zyrtec). May use PRN Benadryl for itching, educated regarding possible side effects including drowsiness, restlessness, dry mouth, etc. Pt instructed to call our office next week if symptoms persist/worsen. Pt verbalizes understanding and agrees with plan of care. \par She has only tried Benadryl at bedtime and didn't feel it really helped but overall she feels pruritus is better. She has moderate fatigue and normal appetite. She used Desonide cream for facial rash x 2 days with resolution, stopped use then rash recurred and put on once Monday 4/1/19 with resolution.  She denies fever, shortness of breath, abdominal pain, diarrhea.\par \par Since office visit on 4/4/19, she describes good energy level and good appetite. She denies fever, shortness of breath, bone pain. \par \par Since office visit on 4/25/19, serum immunoelectrophoresis(4/25) documents elevated KFLC- 48.92; LFLC- 0.25. Repeat labs on 5/8/19, with KFLC- 50.32, LFLC- 0.37; K/L FLC ratio- 136.00; CMP- within normal range. I discussed lab results with patient by phone conversation and recommendation to initiate immunomodulatory therapy with Pomalyst as she has recovered well post autoPSCT and has minimal symptoms. She has mild fatigue, and good appetite. She has mild muscle achiness near right hip since she started using a stationery bicycle. She denies fever, shortness of breath, abdominal pain. \par \par Since office visit on 5/14/19, I spoke with Anahi on 5/17/19, and she informs me that Pomalyst has been delivered to her house. \par Plan-\par Begin Pomalyst 3 mg po daily on 5/18/19, take 21 days then 7 days off. C1D21- 6/7/19\par Begin aspirin 81 mg po daily on 5/18/19\par Drink plenty of water\par \par Since office visit on 6/10/19, labs documented serum immunoelectrophoresis- KFLC- 34.51(previously 50.3 on 5/8/19); K/L FLC ratio- 42.60(previously 136.00 on 5/8/19). \par Plan- She is on 7 days break after day 21(6/7/19) of cycle 1 Pomalyst 3 mg dose.\par Begin Pomalyst 3 mg po every other day on 6/15/19(Day 1 of Cycle 2 Pomalyst). \par I spoke to patient on 7/8/19 about labs from 7/5/19 with plan: Continue Pomalyst 3 mg po every other day(Day 1 of Cycle 2 Pomalyst started on 6/15/19). Will take through 7/13/19, then hold pending lab assessment on 7/15. She developed rhinorrhea on 7/12, then dry cough on 7/13, low grade fever 99, never up to 100 degrees, and no chills. Rhinorrhea is much better, intermittent cough, but no shortness of breath, sore throat, sinus pain. She describes new onset right shoulder pain only when lifting arm up x 2 weeks, has been worsening; no other bone pain. \par \par Since office visit on 7/15/19, repeat labs on 7/15/19- CMP- within normal range; KFLC- 42.40, previously 34.5 on 6/10/19. \par Plan- Resume Pomalyst at 3 mg po daily x 21 days, then 7 days off, beginning on 7/19/19\par Begin Decadron 20 mg po weekly, on 7/19/19\par Continue baby aspirin 81 mg po daily. \par Check CBC with diff on 7/29/19 at [facility]. \par Labs(7/29/19) WBC- 5.8, ANC- 2.8; Hgb- 11.9; Hct- 35.9; Platelets- 222K. \par Plan- \par Continue Pomalyst at 3 mg po daily x 21 days, then 7 days off; (started cycle on 7/19/19)\par Begin Decadron 20 mg po weekly; (started on 7/19/19)\par Continue baby aspirin 81 mg po daily. \par Day 21 of last cycle on 8/8/19, with 7 days off(day 7- 8/15), plan to resume next cycle on 8/16/19. \par She has good energy level and good appetite. She has persistent dry cough and slight rhinorrhea. She denies fever, chills, sore throat, shortness of breath. Prior shoulder pain has resolved, no other bone pain. \par \par Since office visit on 8/15/19, patient called to inform me that her colonoscopy is on 10/7/19. GI told her to D/C aspirin on 10/3/19.\par Plan-\par D/C Pomalyst and aspirin on 10/3/19. This will be day 21/21 of Pomalyst, of a 28 day cycle. \par She will then proceed with her 7 days off of Pomalyst(starting 10/4) and resume on 10/11/19. \par She has good energy level and good appetite. She denies fever, shortness of breath, abdominal pain. \par \par Since office visit on 10/14/19, patient called on 11/18/19 to inform me that she developed persistent cough, nasal congestion, headache, about 4 days ago. She had 1 episode of fever to 101.3 on 11/16/19, sweats at night x 2 days prior to fever. She has been using Mucinex, and Tylenol. She states cough is improving, no further fever or sweats, no chills. She denies sore throat, shortness of breath, nausea, vomiting, diarrhea. She had the Flu vaccine\par Plan- Hold on antibiotic therapy at present time. \par Check labs tomorrow at [facility].\par I will ask [facility] Staff if RVP can be done tomorrow. RVP + for enterovirus/rhinovirus. \par Bone marrow evaluation done on 12/6/19(see results section) to evaluate status of myeloma prior to adjustment of therapy. The patient has good energy level and good appetite. \par She has insomnia recently and she feels anxious. She denies fever, shortness of breath, abdominal pain. She has a trip planned to [place] between 1/23/20- 1/27/20. \par \par Since office visit on 12/16/19, the patient describes mild fatigue and good appetite. She denies fever, chills, sore throat, cough, shortness of breath. Daratumumab was added from 2/3/2020, s/p week 4 of Daratumumab on 3/03/20 and week 5 of Daratumumab on 3/10/20- KFLC= 5.21; K/L FLC ratio= 19.30. \par \par Since last transplant office followup visit on 3/16/20, she underwent PET/CT(5/15/20)- with no FDG avid lesions, and slight enlargement of right adnexal cyst. She remains on Pomalyst, weekly Dex and monthly Darzalex. She notes numbness / tingling of feet, now going up to mid shin as well as tingling sensation on arms intermittently, comes and goes.\par \par \par \par

## 2020-09-16 NOTE — RESULTS/DATA
[FreeTextEntry1] : Labs reviewed from 9/15/20\par -------------------------------------------------------------------------\par ACCESSION No:  [id number]\par LAITH VAUGHN DAVIANMihai  (12/6/19)\par \par Hematopathology Report\par \par Final Diagnosis\par 1, 2. Bone marrow biopsy and bone marrow aspirate\par - Plasma cell neoplasm, plasma cells comprise about 15-20%\par of marrow cells\par - Erythroid-predominant trilineage hematopoiesis present\par with maturation, increased iron stores\par \par See note and description.\par \par Diagnostic note:\par Comprehensive report with results of pending ancillary studies to\par follow.\par \par Ancillary studies\par Bone marrow aspirate iron stain: Iron stores are increased; no\par ring sideroblasts are seen.\par Flow cytometry: Monotypic plasma cells (1.1% of cells), positive\par for cytoplasmic kappa, CD38, , dimmer CD45, CD56, ;\par negative CD19, CD20, consistent with plasma cell neoplasm (known\par history of myeloma).\par Immunohistochemical stains:  and cyclin D1 stains were\par performed on block 1A.  positive plasma cells are increased,\par comprising about 15-20% of cells, interstitial and in clusters,\par also positive cyclin D1.\par \par Microscopic description:\par 1. Biopsy: Sections of bone marrow core biopsy and clot show bone\par marrow with normal cellularity of 30%. Trilineage hematopoiesis\par is present with maturation. Megakaryocytes are present in normal\par number and morphology. Iron stores are increased.\par 2. Aspirate:  The aspirate smears show cellular spicules,\par adequate for evaluation. Erythroid and myeloid precursors are\par present with maturation, decreased M:E ratio of 1.1:1, 3% plasma\par cells. Megakaryocytes are seen, with unremarkable morphology.\par \par Bone Marrow Aspirate Differential: (300 Cells).\par Type            %    Normal*\par Blast                0%   0-3\par Neutrophil and\par Precursors        43%  33-63\par Eosinophil           2%   1-5\par Basophil        0%   0-1\par Pronormoblast        2%   0-2\par Normoblast           41%  15-25\par Monocyte        2%   0-2\par Lymphocyte           7%   10-15\par Plasma cell          3 %  0-1\par *Adult Range\par Comment\par Iron stain (examined to evaluate for iron stores; see microscopic\par description) and Giemsa stain (shows appropriate staining\par pattern) are performed and evaluated on block(s): 1A, 1B.\par Slide(s) with built in immunohistochemical study control(s) and\par negative control associated with this case has/have been verified\par by the sign out pathologist.\par For slide(s) without built in controls positive control slides\par has/have been reviewed and approved by immunohistochemistry lab\par These immunohistochemical/ in-situ hybridization tests have been\par developed and their performance characteristics determined by\par [facility], Department of\par Pathology, Division of Immunopathology [facility], [address] [place] [address].  It has not\par been cleared or approved by the U.S. Food and Drug\par Administration.  The FDA has determined that such clearance or\par approval is not necessary.  This test is used for clinical\par purposes.  The laboratory is certified under the\par CLIA-88 as qualified to perform high complexity clinical testing.\par \par Verified by: Eliud Ovalle M.D.\par (Electronic Signature)\par Reported on: 12/10/19 12:40 EST, [address],\[place] [address]\par Phone: [phone]   Fax: [phone]\par _________________________________________________________________\par \par Clinical History\par IgG KAPPA myeloma S/P Auto SCT 2/28/19 - on Pomalyst 3mg P.O.\par daily x 21D/7 off\par Rising KAPPA light chain rule out relapse\par \par Specimen(s) Submitted\par 1     Bone marrow biopsy\par 2     Bone marrow aspirate\par \par Gross Description\par 1. The specimen is received in bouin's fixative and the specimen\par container is labeled: Right PIC bone marrow biopsy.  It consists\par of a 1.5 x 0.1 cm bone marrow core with attached 1.8 x 0.2 x 0.1\par cm cylindrical portion of blood clot.  Entirely submitted.  Two\par cassettes: A = bone marrow core; B = blood clot.\par \par 2. Two Pinon-Giemsa and one iron stained bone marrow aspirate\par smears are submitted      {10-TO-,      }.\par \par In addition to other data that may appear on the specimen\par container, the label has been inspected to confirm the presence\par of the patient's name and date of birth.\par

## 2020-09-16 NOTE — CONSULT LETTER
[Dear  ___] : Dear  [unfilled], [Courtesy Letter:] : I had the pleasure of seeing your patient, [unfilled], in my office today. [Please see my note below.] : Please see my note below. [Consult Closing:] : Thank you very much for allowing me to participate in the care of this patient.  If you have any questions, please do not hesitate to contact me. [Sincerely,] : Sincerely, [DrMihai  ___] : Dr. FUENTES [FreeTextEntry2] : Marshall De Oliveira MD\par Medical Oncology/Hematology\par [facility] [place]\par [facility] \par [address]\[place], [place]   [address]\par \par  [FreeTextEntry3] : \par Analisa Rahman M.D.\par \par  of Medicine\par [facility]\par [facility] \par [facility] [place]\par [address] [place] [address] \[place] [address] \par ph: [phone]\par fax: [phone]\par

## 2020-09-17 ENCOUNTER — APPOINTMENT (OUTPATIENT)
Dept: ORTHOPEDIC SURGERY | Facility: CLINIC | Age: 70
End: 2020-09-17
Payer: MEDICARE

## 2020-09-17 ENCOUNTER — RESULT REVIEW (OUTPATIENT)
Age: 70
End: 2020-09-17

## 2020-09-17 ENCOUNTER — OUTPATIENT (OUTPATIENT)
Dept: OUTPATIENT SERVICES | Facility: HOSPITAL | Age: 70
LOS: 1 days | End: 2020-09-17
Payer: MEDICARE

## 2020-09-17 VITALS
HEART RATE: 85 BPM | TEMPERATURE: 96.7 F | DIASTOLIC BLOOD PRESSURE: 79 MMHG | HEIGHT: 63 IN | SYSTOLIC BLOOD PRESSURE: 126 MMHG

## 2020-09-17 DIAGNOSIS — M71.22 SYNOVIAL CYST OF POPLITEAL SPACE [BAKER], LEFT KNEE: ICD-10-CM

## 2020-09-17 DIAGNOSIS — Z98.51 TUBAL LIGATION STATUS: Chronic | ICD-10-CM

## 2020-09-17 LAB
ALBUMIN MFR SERPL ELPH: 66.3 %
ALBUMIN SERPL-MCNC: 4.1 G/DL
ALBUMIN/GLOB SERPL: 2 RATIO
ALPHA1 GLOB MFR SERPL ELPH: 5.3 %
ALPHA1 GLOB SERPL ELPH-MCNC: 0.3 G/DL
ALPHA2 GLOB MFR SERPL ELPH: 12.6 %
ALPHA2 GLOB SERPL ELPH-MCNC: 0.8 G/DL
B-GLOBULIN MFR SERPL ELPH: 11.3 %
B-GLOBULIN SERPL ELPH-MCNC: 0.7 G/DL
DEPRECATED KAPPA LC FREE/LAMBDA SER: 10.45 RATIO
GAMMA GLOB FLD ELPH-MCNC: 0.3 G/DL
GAMMA GLOB MFR SERPL ELPH: 4.5 %
IGA SER QL IEP: 10 MG/DL
IGG SER QL IEP: 292 MG/DL
IGM SER QL IEP: <10 MG/DL
INTERPRETATION SERPL IEP-IMP: NORMAL
KAPPA LC CSF-MCNC: 0.2 MG/DL
KAPPA LC SERPL-MCNC: 2.09 MG/DL
M PROTEIN MFR SERPL ELPH: 0.9 %
M PROTEIN SPEC IFE-MCNC: NORMAL
MONOCLON BAND OBS SERPL: 0.1 G/DL
PROT SERPL-MCNC: 6.2 G/DL
PROT SERPL-MCNC: 6.2 G/DL

## 2020-09-17 PROCEDURE — 73560 X-RAY EXAM OF KNEE 1 OR 2: CPT | Mod: 26,LT

## 2020-09-17 PROCEDURE — 99203 OFFICE O/P NEW LOW 30 MIN: CPT

## 2020-09-17 NOTE — HISTORY OF PRESENT ILLNESS
[FreeTextEntry1] : This is the first visit of a 70 years old female recently was complaining about localized pain tenderness over the left knee recent MRI scan suggested a Baker's cyst left knee at the same time and previous medical history is significant for multiple myeloma.

## 2020-09-17 NOTE — PHYSICAL EXAM
[FreeTextEntry1] : Physical exam revealed a healthy looking patient in no apparent distress patient appears to be fully alert oriented basically having no significant complain examination of the left knee demonstrate full range of motion stable knee no swelling no palpable mass and no gross neurovascular deficit.  Previous MRI scan suggested a possible Baker's cyst.  At this stage patient was recommended to be followed conservatively and to be seen as needed

## 2020-09-18 ENCOUNTER — OUTPATIENT (OUTPATIENT)
Dept: OUTPATIENT SERVICES | Facility: HOSPITAL | Age: 70
LOS: 1 days | Discharge: HOME | End: 2020-09-18

## 2020-09-18 DIAGNOSIS — C90.00 MULTIPLE MYELOMA NOT HAVING ACHIEVED REMISSION: ICD-10-CM

## 2020-09-18 DIAGNOSIS — Z98.51 TUBAL LIGATION STATUS: Chronic | ICD-10-CM

## 2020-09-22 ENCOUNTER — APPOINTMENT (OUTPATIENT)
Age: 70
End: 2020-09-22

## 2020-09-22 ENCOUNTER — LABORATORY RESULT (OUTPATIENT)
Age: 70
End: 2020-09-22

## 2020-09-22 ENCOUNTER — RESULT REVIEW (OUTPATIENT)
Age: 70
End: 2020-09-22

## 2020-09-22 LAB
BASOPHILS # BLD AUTO: 0.2 K/UL — SIGNIFICANT CHANGE UP (ref 0–0.2)
BASOPHILS NFR BLD AUTO: 1.9 % — SIGNIFICANT CHANGE UP (ref 0–2)
EOSINOPHIL # BLD AUTO: 0.2 K/UL — SIGNIFICANT CHANGE UP (ref 0–0.5)
EOSINOPHIL NFR BLD AUTO: 2.5 % — SIGNIFICANT CHANGE UP (ref 0–6)
HCT VFR BLD CALC: 38 % — SIGNIFICANT CHANGE UP (ref 34.5–45)
HGB BLD-MCNC: 11.9 G/DL — SIGNIFICANT CHANGE UP (ref 11.5–15.5)
LYMPHOCYTES # BLD AUTO: 2.7 K/UL — SIGNIFICANT CHANGE UP (ref 1–3.3)
LYMPHOCYTES # BLD AUTO: 28.8 % — SIGNIFICANT CHANGE UP (ref 13–44)
MCHC RBC-ENTMCNC: 29.8 PG — SIGNIFICANT CHANGE UP (ref 27–34)
MCHC RBC-ENTMCNC: 31.2 G/DL — LOW (ref 32–36)
MCV RBC AUTO: 95.4 FL — SIGNIFICANT CHANGE UP (ref 80–100)
MONOCYTES # BLD AUTO: 1.1 K/UL — HIGH (ref 0–0.9)
MONOCYTES NFR BLD AUTO: 11.2 % — SIGNIFICANT CHANGE UP (ref 2–14)
NEUTROPHILS # BLD AUTO: 5.3 K/UL — SIGNIFICANT CHANGE UP (ref 1.8–7.4)
NEUTROPHILS NFR BLD AUTO: 55.5 % — SIGNIFICANT CHANGE UP (ref 43–77)
PLATELET # BLD AUTO: 150 K/UL — SIGNIFICANT CHANGE UP (ref 150–400)
RBC # BLD: 3.99 M/UL — SIGNIFICANT CHANGE UP (ref 3.8–5.2)
RBC # FLD: 15.1 % — HIGH (ref 10.3–14.5)
WBC # BLD: 9.5 K/UL — SIGNIFICANT CHANGE UP (ref 3.8–10.5)
WBC # FLD AUTO: 9.5 K/UL — SIGNIFICANT CHANGE UP (ref 3.8–10.5)

## 2020-09-23 DIAGNOSIS — R11.2 NAUSEA WITH VOMITING, UNSPECIFIED: ICD-10-CM

## 2020-09-23 DIAGNOSIS — Z51.11 ENCOUNTER FOR ANTINEOPLASTIC CHEMOTHERAPY: ICD-10-CM

## 2020-10-02 ENCOUNTER — RESULT REVIEW (OUTPATIENT)
Age: 70
End: 2020-10-02

## 2020-10-02 ENCOUNTER — APPOINTMENT (OUTPATIENT)
Dept: HEMATOLOGY ONCOLOGY | Facility: CLINIC | Age: 70
End: 2020-10-02

## 2020-10-02 LAB
ANISOCYTOSIS BLD QL: SLIGHT — SIGNIFICANT CHANGE UP
BASOPHILS # BLD AUTO: 0.2 K/UL — SIGNIFICANT CHANGE UP (ref 0–0.2)
BASOPHILS NFR BLD AUTO: 1 % — SIGNIFICANT CHANGE UP (ref 0–2)
BURR CELLS BLD QL SMEAR: PRESENT — SIGNIFICANT CHANGE UP
ELLIPTOCYTES BLD QL SMEAR: SLIGHT — SIGNIFICANT CHANGE UP
EOSINOPHIL # BLD AUTO: 1 K/UL — HIGH (ref 0–0.5)
EOSINOPHIL NFR BLD AUTO: 16 % — HIGH (ref 0–6)
HCT VFR BLD CALC: 36.6 % — SIGNIFICANT CHANGE UP (ref 34.5–45)
HGB BLD-MCNC: 11.8 G/DL — SIGNIFICANT CHANGE UP (ref 11.5–15.5)
LYMPHOCYTES # BLD AUTO: 2.9 K/UL — SIGNIFICANT CHANGE UP (ref 1–3.3)
LYMPHOCYTES # BLD AUTO: 33 % — SIGNIFICANT CHANGE UP (ref 13–44)
MACROCYTES BLD QL: SLIGHT — SIGNIFICANT CHANGE UP
MCHC RBC-ENTMCNC: 30.2 PG — SIGNIFICANT CHANGE UP (ref 27–34)
MCHC RBC-ENTMCNC: 32.3 G/DL — SIGNIFICANT CHANGE UP (ref 32–36)
MCV RBC AUTO: 93.6 FL — SIGNIFICANT CHANGE UP (ref 80–100)
MICROCYTES BLD QL: SLIGHT — SIGNIFICANT CHANGE UP
MONOCYTES # BLD AUTO: 1.3 K/UL — HIGH (ref 0–0.9)
MONOCYTES NFR BLD AUTO: 21 % — HIGH (ref 2–14)
NEUTROPHILS # BLD AUTO: 1.6 K/UL — LOW (ref 1.8–7.4)
NEUTROPHILS NFR BLD AUTO: 29 % — LOW (ref 43–77)
OVALOCYTES BLD QL SMEAR: SLIGHT — SIGNIFICANT CHANGE UP
PLAT MORPH BLD: NORMAL — SIGNIFICANT CHANGE UP
PLATELET # BLD AUTO: 106 K/UL — LOW (ref 150–400)
POIKILOCYTOSIS BLD QL AUTO: SLIGHT — SIGNIFICANT CHANGE UP
POLYCHROMASIA BLD QL SMEAR: SLIGHT — SIGNIFICANT CHANGE UP
RBC # BLD: 3.92 M/UL — SIGNIFICANT CHANGE UP (ref 3.8–5.2)
RBC # FLD: 15.6 % — HIGH (ref 10.3–14.5)
RBC BLD AUTO: SIGNIFICANT CHANGE UP
ROULEAUX BLD QL SMEAR: PRESENT — SIGNIFICANT CHANGE UP
SCHISTOCYTES BLD QL AUTO: SLIGHT — SIGNIFICANT CHANGE UP
WBC # BLD: 6.9 K/UL — SIGNIFICANT CHANGE UP (ref 3.8–10.5)
WBC # FLD AUTO: 6.9 K/UL — SIGNIFICANT CHANGE UP (ref 3.8–10.5)

## 2020-10-15 ENCOUNTER — OUTPATIENT (OUTPATIENT)
Dept: OUTPATIENT SERVICES | Facility: HOSPITAL | Age: 70
LOS: 1 days | Discharge: HOME | End: 2020-10-15

## 2020-10-15 DIAGNOSIS — Z98.51 TUBAL LIGATION STATUS: Chronic | ICD-10-CM

## 2020-10-15 DIAGNOSIS — C90.00 MULTIPLE MYELOMA NOT HAVING ACHIEVED REMISSION: ICD-10-CM

## 2020-10-20 ENCOUNTER — APPOINTMENT (OUTPATIENT)
Dept: HEMATOLOGY ONCOLOGY | Facility: CLINIC | Age: 70
End: 2020-10-20
Payer: MEDICARE

## 2020-10-20 ENCOUNTER — APPOINTMENT (OUTPATIENT)
Age: 70
End: 2020-10-20

## 2020-10-20 ENCOUNTER — LABORATORY RESULT (OUTPATIENT)
Age: 70
End: 2020-10-20

## 2020-10-20 ENCOUNTER — RESULT REVIEW (OUTPATIENT)
Age: 70
End: 2020-10-20

## 2020-10-20 VITALS
OXYGEN SATURATION: 96 % | DIASTOLIC BLOOD PRESSURE: 77 MMHG | TEMPERATURE: 96.3 F | HEIGHT: 63 IN | SYSTOLIC BLOOD PRESSURE: 136 MMHG | HEART RATE: 77 BPM | BODY MASS INDEX: 32.61 KG/M2 | WEIGHT: 184.02 LBS

## 2020-10-20 LAB
BASOPHILS # BLD AUTO: 0.1 K/UL — SIGNIFICANT CHANGE UP (ref 0–0.2)
BASOPHILS NFR BLD AUTO: 1.5 % — SIGNIFICANT CHANGE UP (ref 0–2)
EOSINOPHIL # BLD AUTO: 0.4 K/UL — SIGNIFICANT CHANGE UP (ref 0–0.5)
EOSINOPHIL NFR BLD AUTO: 4.9 % — SIGNIFICANT CHANGE UP (ref 0–6)
HCT VFR BLD CALC: 37.3 % — SIGNIFICANT CHANGE UP (ref 34.5–45)
HGB BLD-MCNC: 11.6 G/DL — SIGNIFICANT CHANGE UP (ref 11.5–15.5)
LYMPHOCYTES # BLD AUTO: 2.4 K/UL — SIGNIFICANT CHANGE UP (ref 1–3.3)
LYMPHOCYTES # BLD AUTO: 31.8 % — SIGNIFICANT CHANGE UP (ref 13–44)
MCHC RBC-ENTMCNC: 30.1 PG — SIGNIFICANT CHANGE UP (ref 27–34)
MCHC RBC-ENTMCNC: 31.2 G/DL — LOW (ref 32–36)
MCV RBC AUTO: 96.4 FL — SIGNIFICANT CHANGE UP (ref 80–100)
MONOCYTES # BLD AUTO: 0.4 K/UL — SIGNIFICANT CHANGE UP (ref 0–0.9)
MONOCYTES NFR BLD AUTO: 6 % — SIGNIFICANT CHANGE UP (ref 2–14)
NEUTROPHILS # BLD AUTO: 4.2 K/UL — SIGNIFICANT CHANGE UP (ref 1.8–7.4)
NEUTROPHILS NFR BLD AUTO: 55.8 % — SIGNIFICANT CHANGE UP (ref 43–77)
PLATELET # BLD AUTO: 140 K/UL — LOW (ref 150–400)
RBC # BLD: 3.87 M/UL — SIGNIFICANT CHANGE UP (ref 3.8–5.2)
RBC # FLD: 14.9 % — HIGH (ref 10.3–14.5)
WBC # BLD: 7.4 K/UL — SIGNIFICANT CHANGE UP (ref 3.8–10.5)
WBC # FLD AUTO: 7.4 K/UL — SIGNIFICANT CHANGE UP (ref 3.8–10.5)

## 2020-10-20 PROCEDURE — 99214 OFFICE O/P EST MOD 30 MIN: CPT

## 2020-10-20 PROCEDURE — 99072 ADDL SUPL MATRL&STAF TM PHE: CPT

## 2020-10-20 RX ORDER — LEVOCETIRIZINE DIHYDROCHLORIDE 5 MG/1
5 TABLET ORAL
Qty: 30 | Refills: 0 | Status: DISCONTINUED | COMMUNITY
Start: 2019-12-01 | End: 2020-10-20

## 2020-10-20 RX ORDER — METOCLOPRAMIDE 10 MG/1
10 TABLET ORAL
Qty: 15 | Refills: 0 | Status: DISCONTINUED | COMMUNITY
Start: 2019-03-15 | End: 2020-10-20

## 2020-10-20 RX ORDER — ONDANSETRON 8 MG/1
8 TABLET ORAL
Qty: 45 | Refills: 0 | Status: DISCONTINUED | COMMUNITY
Start: 2019-03-15 | End: 2020-10-20

## 2020-10-21 DIAGNOSIS — Z51.11 ENCOUNTER FOR ANTINEOPLASTIC CHEMOTHERAPY: ICD-10-CM

## 2020-10-21 DIAGNOSIS — R11.2 NAUSEA WITH VOMITING, UNSPECIFIED: ICD-10-CM

## 2020-10-27 ENCOUNTER — LABORATORY RESULT (OUTPATIENT)
Age: 70
End: 2020-10-27

## 2020-10-27 NOTE — HISTORY OF PRESENT ILLNESS
[de-identified] : Ms. Calero is a 70 year old female w/ multiple myeloma. \par \par She was noted to have proteinuria and then had a 24 hour urine protein which showed non-nephrotic range proteinuria of 510 mg/24 hours. She was referred to Dr. Lesa Rendon of nephrology. SPEP showed faint M-spike 0.2 g/dL in gammaglobulin region, immunofixation showed this to be a IgG kappa monoclonal protein. UPEP showed 2 monoclonal protein bands, 61%, and 0.7%. \par \par Subsequent work up:\par 6/19/18 M-protein 0.2 g/dL, Kappa FLC 71, lambda FLC 0.36, FLC ratio 198.61\par , Beta 2 microglobulin 1.5 mg/L\par \par She had a bone marrow biopsy on 6/22/18. Pathology: plasma cell neoplasm, plasma cells comprise 20-25% of marrow cells. Trilineage hematopoiesis present with maturation, increased iron stores. Congo red stain negative for amyloid. Karyotype 46, XX. FISH panel - positive for CCND1/IGH fusion - a/w favorable prognosis.\par \par 7/12/18 PET - negative\par \par Treatment Summary \par 7/19/18 - C1 VRd\par 8/9/18 -C2 VRd\par 8/30/18 - C3 VRd\par 9/20/18 -partial C4 VRd\par 10/11/18 - 1/2/19 KRd (carfilzomib, Revlimid, Dexamethasone) x 4 cycles. \par 2/28/19 - autoPSCT \par 5/18/19 - started Pomalyst\par 7/19/19 - decadron 20 mg weekly added to Pomalyst.\par 2/03/20 - Daratumumab week 1-8 2/03 - 3/31/20. Week 9 (began every other week) 4/07/20. [de-identified] : Since last seen, she had f/u w/ Dr. Rahman on 9/16/20. She has received Flu shot on 9/16/20, and Shingles (recombinant Zoster) vaccine on 9/23/20, has dose 2 due in 6 months. Denies fevers, has noted that she must now be careful w/ her swallow, because she has noted, infrequently post swallow a cough. No SOB, no CP, appetite is good, no constipation or diarrhea, no pain/burning w/ urination. No LE edema. Energy level is good. She also had cataract surgery to R eye on 10/05 and has plans for L eye on 11/17/20. 11/17 will be day 6 ON of that cycle. Tells me she is considering relocation to [place], next year. The remainder of ROS is negative.\par

## 2020-10-27 NOTE — ASSESSMENT
[FreeTextEntry1] : 70 year old female IgG kappa multiple myeloma,  FISH panel - positive for CCND1/IGH fusion - a/w favorable prognosis. PET CT (7/12/18) did not show any bone lesions. S/p VRd, followed by 4 cycles of KRd, followed by autoPSCT on 2/28/19. \par On 5/18/19 started on Pomalyst for persistently elevated KFLC at 50, FLC ratio 136.\par On Pomalyst + weekly Dex, she has had a partial response, her KFLC has improved to some degree and bone marrow in 12/2019 shows persistent plasma cells of 15-20%. Daratumumab was added from 2/3/2020 onwards.\par PET CT 5/15/20 with no FDG avid lesions, and slight enlargement of right adnexal cyst.\par Q4 week daratumumab started 7/30/20.\par \par Date	KFLC	KLFLC Ratio\par \par 05/08/19	50.32	136.00\par 08/15/19	25.08	  51.18\par 11/19/19	34.61	  57.68\par 12/16/19	15.87	  52.90\par 02/03/20	Daratumumab added	\par 3/09/20	5.57	  20.63\par 4/07/20	4.69	  16.17\par 5/07/20	3.53	  16.05\par 6/02/20	2.59	  13.63\par 8/04/20	1.94	    9.70\par 9/15/20	2.09	  10.45\par 10/20/20	2.30	  12.11\par 		\par Today is day 6 of Pomalyst, started Friday, 10/16, restart Pomalyst on 11/24/20. S/p cataract surgery to R eye on 10/05 and has plans for L eye on 11/17/20. 11/17 will be day 6 ON of that cycle. C/o infrequent post swallow cough. Observed her drink water at visit and no cough noted post swallow. Reviewed labs: wbc 7.4, Hgb 11.6 g/dL, Plt 140K. CMP wnls. Immunoelectrophoresis from 9/15 shows sl increase in [place] FLC and Kappa Lambda FLC ratio. Received Flu vaccine on 9/16/20, and Shingles (recombinant Zoster) vaccine on 9/23/20, has dose 2 due in 6 months.\par \par Plan:\par - Proceed w/ daratumumab today and q 4 weeks\par - Continue pomalyst + weekly dexamethasone.  Delay polmalyst restart by 1 week and to start on 10/16/20 given - Cataract surgery L eye planned for 11/17.Pomalyst started on 10/16, day 21 is 11/06. then has 7 days off (11/13)   delay Pomalyst restart by 1 week to 11/20. \par - Grade 1 CIPN - continue to monitor\par - Right adnexal cyst - follows with GYN\par - Left knee Baker's cyst - seen by Dr. Greenwood on 9/17, advised conservative management.\par - RTO w/ Dr. De Oliveira on 11/24/20.

## 2020-10-27 NOTE — PHYSICAL EXAM
[Restricted in physically strenuous activity but ambulatory and able to carry out work of a light or sedentary nature] : Status 1- Restricted in physically strenuous activity but ambulatory and able to carry out work of a light or sedentary nature, e.g., light house work, office work [Normal] : full range of motion and no deformities appreciated [de-identified] : Pleasant, interactive in NAD.  [de-identified] : negative cervical, supra/infraclavicular adenopathy. [de-identified] : CTA b/l [de-identified] : S1/S2 [de-identified] : negative pedal edema or calve tenderness [de-identified] : BS+, soft, nontender on palpation. [de-identified] : without spinal or cva tenderness.

## 2020-11-09 ENCOUNTER — APPOINTMENT (OUTPATIENT)
Dept: ENDOCRINOLOGY | Facility: CLINIC | Age: 70
End: 2020-11-09
Payer: MEDICARE

## 2020-11-09 VITALS
HEIGHT: 63 IN | WEIGHT: 182 LBS | DIASTOLIC BLOOD PRESSURE: 64 MMHG | SYSTOLIC BLOOD PRESSURE: 112 MMHG | TEMPERATURE: 97.6 F | BODY MASS INDEX: 32.25 KG/M2

## 2020-11-09 PROCEDURE — 99072 ADDL SUPL MATRL&STAF TM PHE: CPT

## 2020-11-09 PROCEDURE — 99214 OFFICE O/P EST MOD 30 MIN: CPT

## 2020-11-09 NOTE — REVIEW OF SYSTEMS
[Fatigue] : fatigue [Visual Field Defect] : visual field defect [Blurred Vision] : blurred vision [Dizziness] : dizziness [Recent Weight Gain (___ Lbs)] : no recent weight gain [Recent Weight Loss (___ Lbs)] : no recent weight loss [Dysphagia] : no dysphagia [Neck Pain] : no neck pain [Dysphonia] : no dysphonia [Chest Pain] : no chest pain [Palpitations] : no palpitations [Shortness Of Breath] : no shortness of breath [Nausea] : no nausea [Constipation] : no constipation [Vomiting] : no vomiting [Diarrhea] : no diarrhea [Polyuria] : no polyuria [Polydipsia] : no polydipsia [FreeTextEntry3] : +cataracts and cataract surgery

## 2020-11-09 NOTE — HISTORY OF PRESENT ILLNESS
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

## 2020-11-09 NOTE — PHYSICAL EXAM
[Alert] : alert [Well Nourished] : well nourished [No Acute Distress] : no acute distress [Normal Sclera/Conjunctiva] : normal sclera/conjunctiva [Normal Hearing] : hearing was normal [Thyroid Not Enlarged] : the thyroid was not enlarged [No Accessory Muscle Use] : no accessory muscle use [Clear to Auscultation] : lungs were clear to auscultation bilaterally [Normal S1, S2] : normal S1 and S2 [Normal Rate] : heart rate was normal [No Edema] : no peripheral edema [Not Tender] : non-tender [Soft] : abdomen soft [Normal Gait] : normal gait [No Rash] : no rash [Oriented x3] : oriented to person, place, and time [de-identified] : +tremor

## 2020-11-09 NOTE — ASSESSMENT
[FreeTextEntry1] : Abnormal TFTs/Hyperthyroid \par -Stable TFTs, no symptoms of hyperthyroidism, continue on no medication, will continue to monitor TFTs\par \par \par HLD\par -Continue statin, will be followed by her PCP\par \par \par Thyroid nodules\par -Will continue to monitor, next ultrasound in 6 months\par \par \par RTO 3 months, labs before next visit

## 2020-11-18 ENCOUNTER — OUTPATIENT (OUTPATIENT)
Dept: OUTPATIENT SERVICES | Facility: HOSPITAL | Age: 70
LOS: 1 days | Discharge: HOME | End: 2020-11-18

## 2020-11-18 DIAGNOSIS — C90.00 MULTIPLE MYELOMA NOT HAVING ACHIEVED REMISSION: ICD-10-CM

## 2020-11-18 DIAGNOSIS — Z98.51 TUBAL LIGATION STATUS: Chronic | ICD-10-CM

## 2020-11-19 ENCOUNTER — APPOINTMENT (OUTPATIENT)
Dept: HEMATOLOGY ONCOLOGY | Facility: CLINIC | Age: 70
End: 2020-11-19

## 2020-11-19 ENCOUNTER — LABORATORY RESULT (OUTPATIENT)
Age: 70
End: 2020-11-19

## 2020-11-24 ENCOUNTER — APPOINTMENT (OUTPATIENT)
Dept: HEMATOLOGY ONCOLOGY | Facility: CLINIC | Age: 70
End: 2020-11-24
Payer: MEDICARE

## 2020-11-24 ENCOUNTER — RESULT REVIEW (OUTPATIENT)
Age: 70
End: 2020-11-24

## 2020-11-24 ENCOUNTER — LABORATORY RESULT (OUTPATIENT)
Age: 70
End: 2020-11-24

## 2020-11-24 ENCOUNTER — APPOINTMENT (OUTPATIENT)
Age: 70
End: 2020-11-24

## 2020-11-24 VITALS
WEIGHT: 181 LBS | BODY MASS INDEX: 32.07 KG/M2 | OXYGEN SATURATION: 99 % | HEART RATE: 83 BPM | TEMPERATURE: 97 F | DIASTOLIC BLOOD PRESSURE: 80 MMHG | HEIGHT: 63 IN | SYSTOLIC BLOOD PRESSURE: 121 MMHG

## 2020-11-24 LAB
BASOPHILS # BLD AUTO: 0.2 K/UL — SIGNIFICANT CHANGE UP (ref 0–0.2)
BASOPHILS NFR BLD AUTO: 2.6 % — HIGH (ref 0–2)
EOSINOPHIL # BLD AUTO: 0.2 K/UL — SIGNIFICANT CHANGE UP (ref 0–0.5)
EOSINOPHIL NFR BLD AUTO: 2.5 % — SIGNIFICANT CHANGE UP (ref 0–6)
HCT VFR BLD CALC: 39.8 % — SIGNIFICANT CHANGE UP (ref 34.5–45)
HGB BLD-MCNC: 12.4 G/DL — SIGNIFICANT CHANGE UP (ref 11.5–15.5)
LYMPHOCYTES # BLD AUTO: 3.5 K/UL — HIGH (ref 1–3.3)
LYMPHOCYTES # BLD AUTO: 40.2 % — SIGNIFICANT CHANGE UP (ref 13–44)
MCHC RBC-ENTMCNC: 30 PG — SIGNIFICANT CHANGE UP (ref 27–34)
MCHC RBC-ENTMCNC: 31.1 G/DL — LOW (ref 32–36)
MCV RBC AUTO: 96.4 FL — SIGNIFICANT CHANGE UP (ref 80–100)
MONOCYTES # BLD AUTO: 0.5 K/UL — SIGNIFICANT CHANGE UP (ref 0–0.9)
MONOCYTES NFR BLD AUTO: 5.6 % — SIGNIFICANT CHANGE UP (ref 2–14)
NEUTROPHILS # BLD AUTO: 4.3 K/UL — SIGNIFICANT CHANGE UP (ref 1.8–7.4)
NEUTROPHILS NFR BLD AUTO: 49.2 % — SIGNIFICANT CHANGE UP (ref 43–77)
PLATELET # BLD AUTO: 128 K/UL — LOW (ref 150–400)
RBC # BLD: 4.14 M/UL — SIGNIFICANT CHANGE UP (ref 3.8–5.2)
RBC # FLD: 13.9 % — SIGNIFICANT CHANGE UP (ref 10.3–14.5)
WBC # BLD: 8.8 K/UL — SIGNIFICANT CHANGE UP (ref 3.8–10.5)
WBC # FLD AUTO: 8.8 K/UL — SIGNIFICANT CHANGE UP (ref 3.8–10.5)

## 2020-11-24 PROCEDURE — 99214 OFFICE O/P EST MOD 30 MIN: CPT

## 2020-11-24 RX ORDER — MECLIZINE HYDROCHLORIDE 12.5 MG/1
12.5 TABLET ORAL
Qty: 10 | Refills: 0 | Status: DISCONTINUED | COMMUNITY
Start: 2020-10-27

## 2020-11-24 NOTE — HISTORY OF PRESENT ILLNESS
[de-identified] : Ms. Calero is a 70 year old female w/ multiple myeloma. \par \par She was noted to have proteinuria and then had a 24 hour urine protein which showed non-nephrotic range proteinuria of 510 mg/24 hours. She was referred to Dr. Lesa Rendon of nephrology. SPEP showed faint M-spike 0.2 g/dL in gammaglobulin region, immunofixation showed this to be a IgG kappa monoclonal protein. UPEP showed 2 monoclonal protein bands, 61%, and 0.7%. \par \par Subsequent work up:\par 6/19/18 M-protein 0.2 g/dL, Kappa FLC 71, lambda FLC 0.36, FLC ratio 198.61\par , Beta 2 microglobulin 1.5 mg/L\par \par She had a bone marrow biopsy on 6/22/18. Pathology: plasma cell neoplasm, plasma cells comprise 20-25% of marrow cells. Trilineage hematopoiesis present with maturation, increased iron stores. Congo red stain negative for amyloid. Karyotype 46, XX. FISH panel - positive for CCND1/IGH fusion - a/w favorable prognosis.\par \par 7/12/18 PET - negative\par \par Treatment Summary \par 7/19/18 - C1 VRd\par 8/9/18 -C2 VRd\par 8/30/18 - C3 VRd\par 9/20/18 -partial C4 VRd\par 10/11/18 - 1/2/19 KRd (carfilzomib, Revlimid, Dexamethasone) x 4 cycles. \par 2/28/19 - autoPSCT \par 5/18/19 - started Pomalyst\par 7/19/19 - decadron 20 mg weekly added to Pomalyst.\par 2/03/20 - Daratumumab week 1-8 2/03 - 3/31/20. Week 9 (began every other week) 4/07/20. [de-identified] : Returns for follow up.\par S/p episode of vertigo for which she was at [facility] ED. \par She had a CT scan of the head which was negative per patient. \par Good energy levels.\par No fevers, night sweats, weight loss.

## 2020-11-24 NOTE — ASSESSMENT
[FreeTextEntry1] : 70 year old female IgG kappa multiple myeloma,  FISH panel - positive for CCND1/IGH fusion - a/w favorable prognosis. PET CT (7/12/18) did not show any bone lesions. S/p VRd, followed by 4 cycles of KRd, followed by autoPSCT on 2/28/19. \par On 5/18/19 started on Pomalyst for persistently elevated KFLC at 50, FLC ratio 136.\par On Pomalyst + weekly Dex, she has had a partial response, her KFLC has improved to some degree and bone marrow in 12/2019 shows persistent plasma cells of 15-20%. Daratumumab was added from 2/3/2020 onwards.\par PET CT 5/15/20 with no FDG avid lesions, and slight enlargement of right adnexal cyst.\par  Q4 week daratumumab started 7/30/20\par \par 	KFLC	KLFLC Ratio\par 05/08/19	50.32	136.00\par 08/15/19	25.08	51.18\par 11/19/19	34.61	57.68\par 12/16/19	15.87	52.90\par Daratumumab added 2/03/20		\par 3/09/20	5.57	20.63\par 4/07/20    4.69        16.17\par 5/7/20      3.53        16.05\par 6/02/20    2.59        13.63\par 8/4/20      1.94         9.70\par 9/15/20    2.09         10.45\par 10/20/20  2.30         12.11\par \par Tolerating treatment, no significant side effects. \par Minimal upward trend in FLC ratio\par \par Plan:\par - continue daratumumab q 4 weeks\par - Continue pomalyst + weekly dexamethasone. \par -grade 1 CIPN - continue to monitor\par -mild thrombocytopenia - related to treatment, monitor\par -NP follow up in 4 weeks\par -MD follow up with Dr. Flowers in 3 months \par -patient aware that I will be on maternity leave from Jan-March 2021

## 2020-11-24 NOTE — PHYSICAL EXAM
[Restricted in physically strenuous activity but ambulatory and able to carry out work of a light or sedentary nature] : Status 1- Restricted in physically strenuous activity but ambulatory and able to carry out work of a light or sedentary nature, e.g., light house work, office work [Normal] : affect appropriate [de-identified] : Pleasant, interactive in NAD.  [de-identified] : CTA b/l [de-identified] : S1/S2 [de-identified] : No edema

## 2020-11-25 DIAGNOSIS — R11.2 NAUSEA WITH VOMITING, UNSPECIFIED: ICD-10-CM

## 2020-11-25 DIAGNOSIS — Z51.11 ENCOUNTER FOR ANTINEOPLASTIC CHEMOTHERAPY: ICD-10-CM

## 2020-12-08 ENCOUNTER — OUTPATIENT (OUTPATIENT)
Dept: OUTPATIENT SERVICES | Facility: HOSPITAL | Age: 70
LOS: 1 days | End: 2020-12-08

## 2020-12-08 ENCOUNTER — APPOINTMENT (OUTPATIENT)
Dept: ULTRASOUND IMAGING | Facility: CLINIC | Age: 70
End: 2020-12-08
Payer: MEDICARE

## 2020-12-08 ENCOUNTER — RESULT REVIEW (OUTPATIENT)
Age: 70
End: 2020-12-08

## 2020-12-08 DIAGNOSIS — Z98.51 TUBAL LIGATION STATUS: Chronic | ICD-10-CM

## 2020-12-08 DIAGNOSIS — N83.291 OTHER OVARIAN CYST, RIGHT SIDE: ICD-10-CM

## 2020-12-08 PROCEDURE — 76830 TRANSVAGINAL US NON-OB: CPT | Mod: 26

## 2020-12-18 ENCOUNTER — LABORATORY RESULT (OUTPATIENT)
Age: 70
End: 2020-12-18

## 2020-12-18 ENCOUNTER — APPOINTMENT (OUTPATIENT)
Dept: HEMATOLOGY ONCOLOGY | Facility: CLINIC | Age: 70
End: 2020-12-18

## 2020-12-19 ENCOUNTER — OUTPATIENT (OUTPATIENT)
Dept: OUTPATIENT SERVICES | Facility: HOSPITAL | Age: 70
LOS: 1 days | Discharge: HOME | End: 2020-12-19

## 2020-12-19 DIAGNOSIS — Z98.51 TUBAL LIGATION STATUS: Chronic | ICD-10-CM

## 2020-12-19 DIAGNOSIS — C90.00 MULTIPLE MYELOMA NOT HAVING ACHIEVED REMISSION: ICD-10-CM

## 2020-12-23 ENCOUNTER — LABORATORY RESULT (OUTPATIENT)
Age: 70
End: 2020-12-23

## 2020-12-23 ENCOUNTER — RESULT REVIEW (OUTPATIENT)
Age: 70
End: 2020-12-23

## 2020-12-23 ENCOUNTER — APPOINTMENT (OUTPATIENT)
Age: 70
End: 2020-12-23

## 2020-12-23 ENCOUNTER — APPOINTMENT (OUTPATIENT)
Dept: HEMATOLOGY ONCOLOGY | Facility: CLINIC | Age: 70
End: 2020-12-23
Payer: MEDICARE

## 2020-12-23 PROBLEM — Z01.419 ENCOUNTER FOR GYNECOLOGICAL EXAMINATION WITHOUT ABNORMAL FINDING: Status: RESOLVED | Noted: 2017-02-02 | Resolved: 2020-12-23

## 2020-12-23 LAB
ACANTHOCYTES BLD QL SMEAR: SLIGHT — SIGNIFICANT CHANGE UP
ANISOCYTOSIS BLD QL: SLIGHT — SIGNIFICANT CHANGE UP
BASOPHILS # BLD AUTO: 0.2 K/UL — SIGNIFICANT CHANGE UP (ref 0–0.2)
BASOPHILS NFR BLD AUTO: 5 % — HIGH (ref 0–2)
BURR CELLS BLD QL SMEAR: PRESENT — SIGNIFICANT CHANGE UP
DACRYOCYTES BLD QL SMEAR: SLIGHT — SIGNIFICANT CHANGE UP
ELLIPTOCYTES BLD QL SMEAR: SLIGHT — SIGNIFICANT CHANGE UP
EOSINOPHIL # BLD AUTO: 0.2 K/UL — SIGNIFICANT CHANGE UP (ref 0–0.5)
EOSINOPHIL NFR BLD AUTO: 3 % — SIGNIFICANT CHANGE UP (ref 0–6)
HCT VFR BLD CALC: 37.6 % — SIGNIFICANT CHANGE UP (ref 34.5–45)
HGB BLD-MCNC: 11.9 G/DL — SIGNIFICANT CHANGE UP (ref 11.5–15.5)
LYMPHOCYTES # BLD AUTO: 3.2 K/UL — SIGNIFICANT CHANGE UP (ref 1–3.3)
LYMPHOCYTES # BLD AUTO: 49 % — HIGH (ref 13–44)
MACROCYTES BLD QL: SLIGHT — SIGNIFICANT CHANGE UP
MCHC RBC-ENTMCNC: 30.3 PG — SIGNIFICANT CHANGE UP (ref 27–34)
MCHC RBC-ENTMCNC: 31.6 G/DL — LOW (ref 32–36)
MCV RBC AUTO: 96 FL — SIGNIFICANT CHANGE UP (ref 80–100)
MICROCYTES BLD QL: SLIGHT — SIGNIFICANT CHANGE UP
MONOCYTES # BLD AUTO: 1.2 K/UL — HIGH (ref 0–0.9)
MONOCYTES NFR BLD AUTO: 16 % — HIGH (ref 2–14)
NEUTROPHILS # BLD AUTO: 2 K/UL — SIGNIFICANT CHANGE UP (ref 1.8–7.4)
NEUTROPHILS NFR BLD AUTO: 27 % — LOW (ref 43–77)
OVALOCYTES BLD QL SMEAR: SLIGHT — SIGNIFICANT CHANGE UP
PLAT MORPH BLD: NORMAL — SIGNIFICANT CHANGE UP
PLATELET # BLD AUTO: 178 K/UL — SIGNIFICANT CHANGE UP (ref 150–400)
POIKILOCYTOSIS BLD QL AUTO: SLIGHT — SIGNIFICANT CHANGE UP
POLYCHROMASIA BLD QL SMEAR: SLIGHT — SIGNIFICANT CHANGE UP
RBC # BLD: 3.91 M/UL — SIGNIFICANT CHANGE UP (ref 3.8–5.2)
RBC # FLD: 13.5 % — SIGNIFICANT CHANGE UP (ref 10.3–14.5)
RBC BLD AUTO: SIGNIFICANT CHANGE UP
ROULEAUX BLD QL SMEAR: PRESENT — SIGNIFICANT CHANGE UP
SCHISTOCYTES BLD QL AUTO: SLIGHT — SIGNIFICANT CHANGE UP
SMUDGE CELLS # BLD: PRESENT — SIGNIFICANT CHANGE UP
WBC # BLD: 6.9 K/UL — SIGNIFICANT CHANGE UP (ref 3.8–10.5)
WBC # FLD AUTO: 6.9 K/UL — SIGNIFICANT CHANGE UP (ref 3.8–10.5)

## 2020-12-23 PROCEDURE — 99213 OFFICE O/P EST LOW 20 MIN: CPT

## 2020-12-23 PROCEDURE — 99072 ADDL SUPL MATRL&STAF TM PHE: CPT

## 2020-12-23 NOTE — HISTORY OF PRESENT ILLNESS
[de-identified] : Ms. Calero is a 70 year old female w/ multiple myeloma. \par \par She was noted to have proteinuria and then had a 24 hour urine protein which showed non-nephrotic range proteinuria of 510 mg/24 hours. She was referred to Dr. Lesa Rendon of nephrology. SPEP showed faint M-spike 0.2 g/dL in gammaglobulin region, immunofixation showed this to be a IgG kappa monoclonal protein. UPEP showed 2 monoclonal protein bands, 61%, and 0.7%. \par \par Subsequent work up:\par 6/19/18 M-protein 0.2 g/dL, Kappa FLC 71, lambda FLC 0.36, FLC ratio 198.61\par , Beta 2 microglobulin 1.5 mg/L\par \par She had a bone marrow biopsy on 6/22/18. Pathology: plasma cell neoplasm, plasma cells comprise 20-25% of marrow cells. Trilineage hematopoiesis present with maturation, increased iron stores. Congo red stain negative for amyloid. Karyotype 46, XX. FISH panel - positive for CCND1/IGH fusion - a/w favorable prognosis.\par \par 7/12/18 PET - negative\par \par Treatment Summary \par 7/19/18 - C1 VRd\par 8/9/18 -C2 VRd\par 8/30/18 - C3 VRd\par 9/20/18 -partial C4 VRd\par 10/11/18 - 1/2/19 KRd (carfilzomib, Revlimid, Dexamethasone) x 4 cycles. \par 2/28/19 - autoPSCT \par 5/18/19 - started Pomalyst\par 7/19/19 - decadron 20 mg weekly added to Pomalyst.\par 2/03/20 - Daratumumab week 1-8 2/03 - 3/31/20. Week 9 (began every other week) 4/07/20. [de-identified] : Since last seen, denies fevers, no SOB, no CP, appetite is good, notes decrease in her sense of taste, no wgt changes. No constipation or diarrhea, no pain/burning w/ urination. No LE edema. Energy level is fair. No bony aches or pains. Hopes to relocate to [place] in 2021. The remainder of ROS is negative.\par

## 2020-12-23 NOTE — ASSESSMENT
[FreeTextEntry1] : 70 year old female IgG kappa multiple myeloma,  FISH panel - positive for CCND1/IGH fusion - a/w favorable prognosis. PET CT (7/12/18) did not show any bone lesions. S/p VRd, followed by 4 cycles of KRd, followed by autoPSCT on 2/28/19. \par On 5/18/19 started on Pomalyst for persistently elevated KFLC at 50, FLC ratio 136.\par On Pomalyst + weekly Dex, she has had a partial response, her KFLC has improved to some degree and bone marrow in 12/2019 shows persistent plasma cells of 15-20%. Daratumumab was added from 2/3/2020 onwards.\par PET CT 5/15/20 with no FDG avid lesions, and slight enlargement of right adnexal cyst.\par Q4 week daratumumab started 7/30/20\par \par 	KFLC	KLFLC Ratio\par 05/08/19	50.32	136.00\par 08/15/19	25.08	51.18\par 11/19/19	34.61	57.68\par 12/16/19	15.87	52.90\par Daratumumab added 2/03/20		\par 3/09/20	5.57	20.63\par 4/07/20    4.69        16.17\par 5/7/20      3.53        16.05\par 6/02/20    2.59        13.63\par 8/4/20      1.94         9.70\par 9/15/20    2.09         10.45\par 10/20/20  2.30         12.11\par 11/24/20  1.99          9.05\par \par Started this cycle of Pomalyst 12/22/20. Cataract surgery completed 11/17/2 to L eye, all is healed (R was done previously). Received second shingles vaccine on 12/07/20. Continues to tolerate Pomalyst, Darzalex monthly and weekly Dexamethasone. Feels peripheral neuropathy is slightly better to fingers, feet are the same, and not interfering w/ ADLs.  Future plans to relocate to [place], [place], she is going to research finding hematologist there. \par FLC ratio is trending downwards. Reviewed labs from today: WBC 6.9, ANC 2.0, Hgb 11.9 g/dL, PLT 178K.\par \par \par Plan:\par - Continue daratumumab q 4 weeks, w/ IV dexamethasone w/ Darzalex, otherwise on oral dexamethasone weekly.\par - Continue pomalyst + weekly dexamethasone. \par - grade 1 CIPN - subjectively feels fingers are improved, continue to monitor\par - Mild thrombocytopenia - related to treatment, today PLT 178K.\par - Patient aware that I will be on maternity leave from Jan-March 2021\par - MD follow up with Dr. Flowers in 3 months.- F/u w/ Dr. De Oliveira in April.

## 2020-12-23 NOTE — PHYSICAL EXAM
[Restricted in physically strenuous activity but ambulatory and able to carry out work of a light or sedentary nature] : Status 1- Restricted in physically strenuous activity but ambulatory and able to carry out work of a light or sedentary nature, e.g., light house work, office work [Normal] : full range of motion and no deformities appreciated [de-identified] : Pleasant, interactive in NAD.  [de-identified] : negative cervical, supra/infraclavicular adenopathy. [de-identified] : CTA b/l [de-identified] : S1/S2 [de-identified] : negative pedal edema or calve tenderness [de-identified] : BS+, soft, nontender on palpation. [de-identified] : without spinal or cva tenderness.

## 2020-12-24 DIAGNOSIS — R11.2 NAUSEA WITH VOMITING, UNSPECIFIED: ICD-10-CM

## 2020-12-24 DIAGNOSIS — Z51.11 ENCOUNTER FOR ANTINEOPLASTIC CHEMOTHERAPY: ICD-10-CM

## 2021-01-14 NOTE — PATIENT PROFILE ADULT - ARE ANY OF THE ITEMS ON THE CHART
An attempt was made to contact the patient; there was no answer.  A message was left for the patient to contact the office and let us know the date and time of his nephrology appointment at [facility].     yes

## 2021-01-15 ENCOUNTER — OUTPATIENT (OUTPATIENT)
Dept: OUTPATIENT SERVICES | Facility: HOSPITAL | Age: 71
LOS: 1 days | Discharge: HOME | End: 2021-01-15

## 2021-01-15 ENCOUNTER — APPOINTMENT (OUTPATIENT)
Dept: HEMATOLOGY ONCOLOGY | Facility: CLINIC | Age: 71
End: 2021-01-15

## 2021-01-15 ENCOUNTER — LABORATORY RESULT (OUTPATIENT)
Age: 71
End: 2021-01-15

## 2021-01-15 DIAGNOSIS — Z98.51 TUBAL LIGATION STATUS: Chronic | ICD-10-CM

## 2021-01-15 DIAGNOSIS — C90.00 MULTIPLE MYELOMA NOT HAVING ACHIEVED REMISSION: ICD-10-CM

## 2021-01-21 ENCOUNTER — APPOINTMENT (OUTPATIENT)
Age: 71
End: 2021-01-21

## 2021-01-21 ENCOUNTER — RESULT REVIEW (OUTPATIENT)
Age: 71
End: 2021-01-21

## 2021-01-21 ENCOUNTER — LABORATORY RESULT (OUTPATIENT)
Age: 71
End: 2021-01-21

## 2021-01-21 LAB
BASOPHILS # BLD AUTO: 0.2 K/UL — SIGNIFICANT CHANGE UP (ref 0–0.2)
BASOPHILS NFR BLD AUTO: 2.7 % — HIGH (ref 0–2)
EOSINOPHIL # BLD AUTO: 0.2 K/UL — SIGNIFICANT CHANGE UP (ref 0–0.5)
EOSINOPHIL NFR BLD AUTO: 2.8 % — SIGNIFICANT CHANGE UP (ref 0–6)
HCT VFR BLD CALC: 37.3 % — SIGNIFICANT CHANGE UP (ref 34.5–45)
HGB BLD-MCNC: 11.5 G/DL — SIGNIFICANT CHANGE UP (ref 11.5–15.5)
LYMPHOCYTES # BLD AUTO: 3.2 K/UL — SIGNIFICANT CHANGE UP (ref 1–3.3)
LYMPHOCYTES # BLD AUTO: 45.5 % — HIGH (ref 13–44)
MCHC RBC-ENTMCNC: 29.6 PG — SIGNIFICANT CHANGE UP (ref 27–34)
MCHC RBC-ENTMCNC: 30.8 G/DL — LOW (ref 32–36)
MCV RBC AUTO: 96.1 FL — SIGNIFICANT CHANGE UP (ref 80–100)
MONOCYTES # BLD AUTO: 1.2 K/UL — HIGH (ref 0–0.9)
MONOCYTES NFR BLD AUTO: 16.8 % — HIGH (ref 2–14)
NEUTROPHILS # BLD AUTO: 2.3 K/UL — SIGNIFICANT CHANGE UP (ref 1.8–7.4)
NEUTROPHILS NFR BLD AUTO: 32.2 % — LOW (ref 43–77)
PLATELET # BLD AUTO: 179 K/UL — SIGNIFICANT CHANGE UP (ref 150–400)
RBC # BLD: 3.88 M/UL — SIGNIFICANT CHANGE UP (ref 3.8–5.2)
RBC # FLD: 14.7 % — HIGH (ref 10.3–14.5)
WBC # BLD: 7.1 K/UL — SIGNIFICANT CHANGE UP (ref 3.8–10.5)
WBC # FLD AUTO: 7.1 K/UL — SIGNIFICANT CHANGE UP (ref 3.8–10.5)

## 2021-01-22 DIAGNOSIS — R11.2 NAUSEA WITH VOMITING, UNSPECIFIED: ICD-10-CM

## 2021-01-22 DIAGNOSIS — Z51.11 ENCOUNTER FOR ANTINEOPLASTIC CHEMOTHERAPY: ICD-10-CM

## 2021-01-26 ENCOUNTER — APPOINTMENT (OUTPATIENT)
Dept: ULTRASOUND IMAGING | Facility: CLINIC | Age: 71
End: 2021-01-26

## 2021-02-02 ENCOUNTER — LABORATORY RESULT (OUTPATIENT)
Age: 71
End: 2021-02-02

## 2021-02-09 ENCOUNTER — APPOINTMENT (OUTPATIENT)
Dept: ENDOCRINOLOGY | Facility: CLINIC | Age: 71
End: 2021-02-09
Payer: MEDICARE

## 2021-02-09 VITALS
BODY MASS INDEX: 32.6 KG/M2 | TEMPERATURE: 96.1 F | SYSTOLIC BLOOD PRESSURE: 120 MMHG | DIASTOLIC BLOOD PRESSURE: 80 MMHG | WEIGHT: 184 LBS | HEIGHT: 63 IN

## 2021-02-09 PROCEDURE — 99072 ADDL SUPL MATRL&STAF TM PHE: CPT

## 2021-02-09 PROCEDURE — 99213 OFFICE O/P EST LOW 20 MIN: CPT

## 2021-02-09 NOTE — ASSESSMENT
[FreeTextEntry1] : Abnormal TFTs/Hyperthyroid \par -Stable TFTs, no symptoms of hyperthyroidism, continue on no medication, will continue to monitor TFTs\par \par \par HLD\par -Continue statin, will be followed by her PCP\par \par \par Thyroid nodules\par -Will continue to monitor, due for thyroid ultrasound, rx given\par \par Pt aware to call office if she has symptoms of hyperthyroid for TFTs to be run sooner and potenital addition of medication \par RTO 6 months, labs before next visit

## 2021-02-09 NOTE — REVIEW OF SYSTEMS
[Fatigue] : fatigue [Tremors] : tremors [Cold Intolerance] : cold intolerance [Recent Weight Gain (___ Lbs)] : no recent weight gain [Recent Weight Loss (___ Lbs)] : no recent weight loss [Dysphagia] : no dysphagia [Dysphonia] : no dysphonia [Neck Pain] : no neck pain [Chest Pain] : no chest pain [Palpitations] : no palpitations [Shortness Of Breath] : no shortness of breath [Constipation] : no constipation [Diarrhea] : no diarrhea [Heat Intolerance] : no heat intolerance [de-identified] : slight hand tremor

## 2021-02-09 NOTE — HISTORY OF PRESENT ILLNESS
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

## 2021-02-09 NOTE — PHYSICAL EXAM
[Alert] : alert [Well Nourished] : well nourished [No Acute Distress] : no acute distress [Normal Sclera/Conjunctiva] : normal sclera/conjunctiva [Thyroid Not Enlarged] : the thyroid was not enlarged [Normal Hearing] : hearing was normal [No Accessory Muscle Use] : no accessory muscle use [Clear to Auscultation] : lungs were clear to auscultation bilaterally [Normal S1, S2] : normal S1 and S2 [No Edema] : no peripheral edema [Normal Rate] : heart rate was normal [Not Tender] : non-tender [Soft] : abdomen soft [Normal Gait] : normal gait [No Rash] : no rash [Oriented x3] : oriented to person, place, and time [de-identified] : +tremor

## 2021-02-10 ENCOUNTER — NON-APPOINTMENT (OUTPATIENT)
Age: 71
End: 2021-02-10

## 2021-02-10 ENCOUNTER — OUTPATIENT (OUTPATIENT)
Dept: OUTPATIENT SERVICES | Facility: HOSPITAL | Age: 71
LOS: 1 days | End: 2021-02-10

## 2021-02-10 ENCOUNTER — APPOINTMENT (OUTPATIENT)
Dept: ULTRASOUND IMAGING | Facility: CLINIC | Age: 71
End: 2021-02-10
Payer: MEDICARE

## 2021-02-10 DIAGNOSIS — Z98.51 TUBAL LIGATION STATUS: Chronic | ICD-10-CM

## 2021-02-10 DIAGNOSIS — E04.1 NONTOXIC SINGLE THYROID NODULE: ICD-10-CM

## 2021-02-10 PROCEDURE — 76536 US EXAM OF HEAD AND NECK: CPT | Mod: 26

## 2021-02-12 ENCOUNTER — APPOINTMENT (OUTPATIENT)
Dept: HEMATOLOGY ONCOLOGY | Facility: CLINIC | Age: 71
End: 2021-02-12

## 2021-02-12 ENCOUNTER — LABORATORY RESULT (OUTPATIENT)
Age: 71
End: 2021-02-12

## 2021-02-16 ENCOUNTER — OUTPATIENT (OUTPATIENT)
Dept: OUTPATIENT SERVICES | Facility: HOSPITAL | Age: 71
LOS: 1 days | Discharge: HOME | End: 2021-02-16

## 2021-02-16 DIAGNOSIS — C90.00 MULTIPLE MYELOMA NOT HAVING ACHIEVED REMISSION: ICD-10-CM

## 2021-02-16 DIAGNOSIS — Z98.51 TUBAL LIGATION STATUS: Chronic | ICD-10-CM

## 2021-02-18 ENCOUNTER — LABORATORY RESULT (OUTPATIENT)
Age: 71
End: 2021-02-18

## 2021-02-18 ENCOUNTER — RESULT REVIEW (OUTPATIENT)
Age: 71
End: 2021-02-18

## 2021-02-18 ENCOUNTER — APPOINTMENT (OUTPATIENT)
Age: 71
End: 2021-02-18

## 2021-02-18 LAB
ANISOCYTOSIS BLD QL: SLIGHT — SIGNIFICANT CHANGE UP
BASOPHILS # BLD AUTO: 0.2 K/UL — SIGNIFICANT CHANGE UP (ref 0–0.2)
BASOPHILS NFR BLD AUTO: 1 % — SIGNIFICANT CHANGE UP (ref 0–2)
BURR CELLS BLD QL SMEAR: PRESENT — SIGNIFICANT CHANGE UP
ELLIPTOCYTES BLD QL SMEAR: SIGNIFICANT CHANGE UP
EOSINOPHIL # BLD AUTO: 0.3 K/UL — SIGNIFICANT CHANGE UP (ref 0–0.5)
EOSINOPHIL NFR BLD AUTO: 6 % — SIGNIFICANT CHANGE UP (ref 0–6)
HCT VFR BLD CALC: 35.9 % — SIGNIFICANT CHANGE UP (ref 34.5–45)
HGB BLD-MCNC: 11 G/DL — LOW (ref 11.5–15.5)
LG PLATELETS BLD QL AUTO: SLIGHT — SIGNIFICANT CHANGE UP
LYMPHOCYTES # BLD AUTO: 2.6 K/UL — SIGNIFICANT CHANGE UP (ref 1–3.3)
LYMPHOCYTES # BLD AUTO: 30 % — SIGNIFICANT CHANGE UP (ref 13–44)
MACROCYTES BLD QL: SIGNIFICANT CHANGE UP
MCHC RBC-ENTMCNC: 29.6 PG — SIGNIFICANT CHANGE UP (ref 27–34)
MCHC RBC-ENTMCNC: 30.6 G/DL — LOW (ref 32–36)
MCV RBC AUTO: 96.7 FL — SIGNIFICANT CHANGE UP (ref 80–100)
MICROCYTES BLD QL: SLIGHT — SIGNIFICANT CHANGE UP
MONOCYTES # BLD AUTO: 1.3 K/UL — HIGH (ref 0–0.9)
MONOCYTES NFR BLD AUTO: 23 % — HIGH (ref 2–14)
NEUTROPHILS # BLD AUTO: 1.9 K/UL — SIGNIFICANT CHANGE UP (ref 1.8–7.4)
NEUTROPHILS NFR BLD AUTO: 35 % — LOW (ref 43–77)
OVALOCYTES BLD QL SMEAR: SLIGHT — SIGNIFICANT CHANGE UP
PLAT MORPH BLD: NORMAL — SIGNIFICANT CHANGE UP
PLATELET # BLD AUTO: 210 K/UL — SIGNIFICANT CHANGE UP (ref 150–400)
POIKILOCYTOSIS BLD QL AUTO: SLIGHT — SIGNIFICANT CHANGE UP
POLYCHROMASIA BLD QL SMEAR: SLIGHT — SIGNIFICANT CHANGE UP
RBC # BLD: 3.71 M/UL — LOW (ref 3.8–5.2)
RBC # FLD: 14.6 % — HIGH (ref 10.3–14.5)
RBC BLD AUTO: ABNORMAL
SCHISTOCYTES BLD QL AUTO: SLIGHT — SIGNIFICANT CHANGE UP
VARIANT LYMPHS # BLD: 5 % — SIGNIFICANT CHANGE UP (ref 0–6)
WBC # BLD: 6.2 K/UL — SIGNIFICANT CHANGE UP (ref 3.8–10.5)
WBC # FLD AUTO: 6.2 K/UL — SIGNIFICANT CHANGE UP (ref 3.8–10.5)

## 2021-02-19 DIAGNOSIS — Z51.11 ENCOUNTER FOR ANTINEOPLASTIC CHEMOTHERAPY: ICD-10-CM

## 2021-02-19 DIAGNOSIS — R11.2 NAUSEA WITH VOMITING, UNSPECIFIED: ICD-10-CM

## 2021-02-24 ENCOUNTER — APPOINTMENT (OUTPATIENT)
Dept: HEMATOLOGY ONCOLOGY | Facility: CLINIC | Age: 71
End: 2021-02-24
Payer: MEDICARE

## 2021-02-24 VITALS
BODY MASS INDEX: 31.99 KG/M2 | HEART RATE: 94 BPM | HEIGHT: 63 IN | WEIGHT: 180.56 LBS | OXYGEN SATURATION: 97 % | SYSTOLIC BLOOD PRESSURE: 132 MMHG | DIASTOLIC BLOOD PRESSURE: 75 MMHG

## 2021-02-24 PROCEDURE — 99212 OFFICE O/P EST SF 10 MIN: CPT

## 2021-02-24 PROCEDURE — 99072 ADDL SUPL MATRL&STAF TM PHE: CPT

## 2021-02-24 NOTE — HISTORY OF PRESENT ILLNESS
[de-identified] : \par Ms. Calero is a 71 year old female w/ multiple myeloma. \par \par She was noted to have proteinuria and then had a 24 hour urine protein which showed non-nephrotic range proteinuria of 510 mg/24 hours. She was referred to Dr. Lesa Rendon of nephrology. SPEP showed faint M-spike 0.2 g/dL in gammaglobulin region, immunofixation showed this to be a IgG kappa monoclonal protein. UPEP showed 2 monoclonal protein bands, 61%, and 0.7%. \par \par Subsequent work up:\par 6/19/18 M-protein 0.2 g/dL, Kappa FLC 71, lambda FLC 0.36, FLC ratio 198.61\par , Beta 2 microglobulin 1.5 mg/L\par \par She had a bone marrow biopsy on 6/22/18. Pathology: plasma cell neoplasm, plasma cells comprise 20-25% of marrow cells. Trilineage hematopoiesis present with maturation, increased iron stores. Congo red stain negative for amyloid. Karyotype 46, XX. FISH panel - positive for CCND1/IGH fusion - a/w favorable prognosis.\par \par 7/12/18 PET - negative\par \par Treatment Summary \par 7/19/18 - C1 VRd\par 8/9/18 -C2 VRd\par 8/30/18 - C3 VRd\par 9/20/18 -partial C4 VRd\par 10/11/18 - 1/2/19 KRd (carfilzomib, Revlimid, Dexamethasone) x 4 cycles. \par 2/28/19 - autoPSCT \par 5/18/19 - started Pomalyst\par 7/19/19 - decadron 20 mg weekly added to Pomalyst.\par 2/03/20 - Daratumumab week 1-8 2/03 - 3/31/20. Week 9 (began every other week) 4/07/20. \par \par \par  [de-identified] : Doing well on daratumumab/dex

## 2021-02-24 NOTE — ASSESSMENT
[FreeTextEntry1] : \par 70 year old female IgG kappa multiple myeloma, FISH panel - positive for CCND1/IGH fusion - a/w favorable prognosis. PET CT (7/12/18) did not show any bone lesions. S/p VRd, followed by 4 cycles of KRd, followed by autoPSCT on 2/28/19. \par On 5/18/19 started on Pomalyst for persistently elevated KFLC at 50, FLC ratio 136.\par On Pomalyst + weekly Dex, she  had a partial response, her KFLC has improved to some degree and bone marrow in 12/2019 shows persistent plasma cells of 15-20%. Daratumumab was added from 2/3/2020 onwards.\par PET CT 5/15/20 with no FDG avid lesions, and slight enlargement of right adnexal cyst.\par Q4 week daratumumab started 7/30/20\par

## 2021-03-15 ENCOUNTER — LABORATORY RESULT (OUTPATIENT)
Age: 71
End: 2021-03-15

## 2021-03-15 ENCOUNTER — APPOINTMENT (OUTPATIENT)
Dept: HEMATOLOGY ONCOLOGY | Facility: CLINIC | Age: 71
End: 2021-03-15

## 2021-03-18 ENCOUNTER — RESULT REVIEW (OUTPATIENT)
Age: 71
End: 2021-03-18

## 2021-03-18 ENCOUNTER — APPOINTMENT (OUTPATIENT)
Age: 71
End: 2021-03-18

## 2021-03-18 ENCOUNTER — LABORATORY RESULT (OUTPATIENT)
Age: 71
End: 2021-03-18

## 2021-03-18 LAB
BASOPHILS # BLD AUTO: 0.1 K/UL — SIGNIFICANT CHANGE UP (ref 0–0.2)
BASOPHILS NFR BLD AUTO: 2.3 % — HIGH (ref 0–2)
EOSINOPHIL # BLD AUTO: 0.3 K/UL — SIGNIFICANT CHANGE UP (ref 0–0.5)
EOSINOPHIL NFR BLD AUTO: 6.1 % — HIGH (ref 0–6)
HCT VFR BLD CALC: 37.1 % — SIGNIFICANT CHANGE UP (ref 34.5–45)
HGB BLD-MCNC: 11.3 G/DL — LOW (ref 11.5–15.5)
LYMPHOCYTES # BLD AUTO: 2.5 K/UL — SIGNIFICANT CHANGE UP (ref 1–3.3)
LYMPHOCYTES # BLD AUTO: 43.9 % — SIGNIFICANT CHANGE UP (ref 13–44)
MCHC RBC-ENTMCNC: 29.8 PG — SIGNIFICANT CHANGE UP (ref 27–34)
MCHC RBC-ENTMCNC: 30.4 G/DL — LOW (ref 32–36)
MCV RBC AUTO: 98.2 FL — SIGNIFICANT CHANGE UP (ref 80–100)
MONOCYTES # BLD AUTO: 1 K/UL — HIGH (ref 0–0.9)
MONOCYTES NFR BLD AUTO: 18.1 % — HIGH (ref 2–14)
NEUTROPHILS # BLD AUTO: 1.7 K/UL — LOW (ref 1.8–7.4)
NEUTROPHILS NFR BLD AUTO: 29.6 % — LOW (ref 43–77)
PLATELET # BLD AUTO: 203 K/UL — SIGNIFICANT CHANGE UP (ref 150–400)
RBC # BLD: 3.78 M/UL — LOW (ref 3.8–5.2)
RBC # FLD: 16.4 % — HIGH (ref 10.3–14.5)
WBC # BLD: 5.6 K/UL — SIGNIFICANT CHANGE UP (ref 3.8–10.5)
WBC # FLD AUTO: 5.6 K/UL — SIGNIFICANT CHANGE UP (ref 3.8–10.5)

## 2021-04-09 ENCOUNTER — OUTPATIENT (OUTPATIENT)
Dept: OUTPATIENT SERVICES | Facility: HOSPITAL | Age: 71
LOS: 1 days | Discharge: HOME | End: 2021-04-09

## 2021-04-09 DIAGNOSIS — C90.00 MULTIPLE MYELOMA NOT HAVING ACHIEVED REMISSION: ICD-10-CM

## 2021-04-09 DIAGNOSIS — Z98.51 TUBAL LIGATION STATUS: Chronic | ICD-10-CM

## 2021-04-15 ENCOUNTER — RESULT REVIEW (OUTPATIENT)
Age: 71
End: 2021-04-15

## 2021-04-15 ENCOUNTER — LABORATORY RESULT (OUTPATIENT)
Age: 71
End: 2021-04-15

## 2021-04-15 ENCOUNTER — APPOINTMENT (OUTPATIENT)
Age: 71
End: 2021-04-15

## 2021-04-15 ENCOUNTER — APPOINTMENT (OUTPATIENT)
Dept: HEMATOLOGY ONCOLOGY | Facility: CLINIC | Age: 71
End: 2021-04-15
Payer: MEDICARE

## 2021-04-15 LAB
ACANTHOCYTES BLD QL SMEAR: SLIGHT — SIGNIFICANT CHANGE UP
ANISOCYTOSIS BLD QL: SLIGHT — SIGNIFICANT CHANGE UP
BASOPHILS # BLD AUTO: 0.2 K/UL — SIGNIFICANT CHANGE UP (ref 0–0.2)
BASOPHILS NFR BLD AUTO: 2 % — SIGNIFICANT CHANGE UP (ref 0–2)
BITE CELLS BLD QL SMEAR: PRESENT — SIGNIFICANT CHANGE UP
DACRYOCYTES BLD QL SMEAR: SLIGHT — SIGNIFICANT CHANGE UP
ELLIPTOCYTES BLD QL SMEAR: SLIGHT — SIGNIFICANT CHANGE UP
EOSINOPHIL # BLD AUTO: 0.3 K/UL — SIGNIFICANT CHANGE UP (ref 0–0.5)
EOSINOPHIL NFR BLD AUTO: 3 % — SIGNIFICANT CHANGE UP (ref 0–6)
HCT VFR BLD CALC: 38.4 % — SIGNIFICANT CHANGE UP (ref 34.5–45)
HGB BLD-MCNC: 11.8 G/DL — SIGNIFICANT CHANGE UP (ref 11.5–15.5)
HYPOCHROMIA BLD QL: SLIGHT — SIGNIFICANT CHANGE UP
LG PLATELETS BLD QL AUTO: SLIGHT — SIGNIFICANT CHANGE UP
LYMPHOCYTES # BLD AUTO: 2.9 K/UL — SIGNIFICANT CHANGE UP (ref 1–3.3)
LYMPHOCYTES # BLD AUTO: 37 % — SIGNIFICANT CHANGE UP (ref 13–44)
MACROCYTES BLD QL: SLIGHT — SIGNIFICANT CHANGE UP
MCHC RBC-ENTMCNC: 30 PG — SIGNIFICANT CHANGE UP (ref 27–34)
MCHC RBC-ENTMCNC: 30.8 G/DL — LOW (ref 32–36)
MCV RBC AUTO: 97.2 FL — SIGNIFICANT CHANGE UP (ref 80–100)
MICROCYTES BLD QL: SLIGHT — SIGNIFICANT CHANGE UP
MONOCYTES # BLD AUTO: 1.3 K/UL — HIGH (ref 0–0.9)
MONOCYTES NFR BLD AUTO: 18 % — HIGH (ref 2–14)
NEUTROPHILS # BLD AUTO: 3.4 K/UL — SIGNIFICANT CHANGE UP (ref 1.8–7.4)
NEUTROPHILS NFR BLD AUTO: 40 % — LOW (ref 43–77)
OVALOCYTES BLD QL SMEAR: SLIGHT — SIGNIFICANT CHANGE UP
PLAT MORPH BLD: NORMAL — SIGNIFICANT CHANGE UP
PLATELET # BLD AUTO: 165 K/UL — SIGNIFICANT CHANGE UP (ref 150–400)
POIKILOCYTOSIS BLD QL AUTO: SLIGHT — SIGNIFICANT CHANGE UP
POLYCHROMASIA BLD QL SMEAR: SLIGHT — SIGNIFICANT CHANGE UP
RBC # BLD: 3.95 M/UL — SIGNIFICANT CHANGE UP (ref 3.8–5.2)
RBC # FLD: 16 % — HIGH (ref 10.3–14.5)
RBC BLD AUTO: ABNORMAL
SCHISTOCYTES BLD QL AUTO: SLIGHT — SIGNIFICANT CHANGE UP
WBC # BLD: 8.1 K/UL — SIGNIFICANT CHANGE UP (ref 3.8–10.5)
WBC # FLD AUTO: 8.1 K/UL — SIGNIFICANT CHANGE UP (ref 3.8–10.5)

## 2021-04-15 PROCEDURE — 99072 ADDL SUPL MATRL&STAF TM PHE: CPT

## 2021-04-15 PROCEDURE — 99214 OFFICE O/P EST MOD 30 MIN: CPT

## 2021-04-15 NOTE — HISTORY OF PRESENT ILLNESS
[de-identified] : Ms. Calero is a 70 year old female w/ multiple myeloma. \par \par She was noted to have proteinuria and then had a 24 hour urine protein which showed non-nephrotic range proteinuria of 510 mg/24 hours. She was referred to Dr. Lesa Rendon of nephrology. SPEP showed faint M-spike 0.2 g/dL in gammaglobulin region, immunofixation showed this to be a IgG kappa monoclonal protein. UPEP showed 2 monoclonal protein bands, 61%, and 0.7%. \par \par Subsequent work up:\par 6/19/18 M-protein 0.2 g/dL, Kappa FLC 71, lambda FLC 0.36, FLC ratio 198.61\par , Beta 2 microglobulin 1.5 mg/L\par \par She had a bone marrow biopsy on 6/22/18. Pathology: plasma cell neoplasm, plasma cells comprise 20-25% of marrow cells. Trilineage hematopoiesis present with maturation, increased iron stores. Congo red stain negative for amyloid. Karyotype 46, XX. FISH panel - positive for CCND1/IGH fusion - a/w favorable prognosis.\par \par 7/12/18 PET - negative\par \par Treatment Summary \par 7/19/18 - C1 VRd\par 8/9/18 -C2 VRd\par 8/30/18 - C3 VRd\par 9/20/18 -partial C4 VRd\par 10/11/18 - 1/2/19 KRd (carfilzomib, Revlimid, Dexamethasone) x 4 cycles. \par 2/28/19 - autoPSCT \par 5/18/19 - started Pomalyst\par 7/19/19 - decadron 20 mg weekly added to Pomalyst.\par 2/03/20 - Daratumumab week 1-8 2/03 - 3/31/20. Week 9 (began every other week) 4/07/20. [de-identified] : Returns for treatment. \par Denies weight loss, fevers, sweats. She has b/l knee effusions, s/p drainage by ortho\par No bone pain. No N/V/D/C.\par Will be moving to [place] at the end of the year.

## 2021-04-15 NOTE — PHYSICAL EXAM
[Restricted in physically strenuous activity but ambulatory and able to carry out work of a light or sedentary nature] : Status 1- Restricted in physically strenuous activity but ambulatory and able to carry out work of a light or sedentary nature, e.g., light house work, office work [Normal] : affect appropriate [de-identified] : Pleasant, interactive in NAD.  [de-identified] : CTA b/l [de-identified] : S1/S2 [de-identified] : no edema [de-identified] : soft

## 2021-04-15 NOTE — ASSESSMENT
[FreeTextEntry1] : 70 year old female IgG kappa multiple myeloma,  FISH panel - positive for CCND1/IGH fusion - a/w favorable prognosis. PET CT (7/12/18) did not show any bone lesions. S/p VRd, followed by 4 cycles of KRd, followed by autoPSCT on 2/28/19. \par On 5/18/19 started on Pomalyst for persistently elevated KFLC at 50, FLC ratio 136.\par On Pomalyst + weekly Dex, she has had a partial response, her KFLC has improved to some degree and bone marrow in 12/2019 shows persistent plasma cells of 15-20%. Daratumumab was added from 2/3/2020 onwards.\par PET CT 5/15/20 with no FDG avid lesions, and slight enlargement of right adnexal cyst.\par Q4 week daratumumab started 7/30/20\par \par 	KFLC	KLFLC Ratio\par 05/08/19	50.32	136.00\par 08/15/19	25.08	51.18\par 11/19/19	34.61	57.68\par 12/16/19	15.87	52.90\par Daratumumab added 2/03/20		\par 3/09/20	5.57	20.63\par 4/07/20    4.69        16.17\par 5/7/20      3.53        16.05\par 6/02/20    2.59        13.63\par 8/4/20      1.94         9.70\par 9/15/20    2.09         10.45\par 10/20/20  2.30         12.11\par 11/24/20  1.99          9.05\par \par Continues to tolerate Pomalyst, Darzalex monthly and weekly Dexamethasone.\par FLC ratio is trending downwards. \par CBC from today reviewed\par \par \par Plan:\par - Continue daratumumab q 4 weeks, w/ IV dexamethasone w/ Darzalex, otherwise on oral dexamethasone weekly.\par - Continue pomalyst + weekly dexamethasone. \par - grade 1 CIPN - subjectively feels fingers are improved, continue to monitor\par -RTO 2 months

## 2021-04-16 DIAGNOSIS — R11.2 NAUSEA WITH VOMITING, UNSPECIFIED: ICD-10-CM

## 2021-04-16 DIAGNOSIS — Z51.11 ENCOUNTER FOR ANTINEOPLASTIC CHEMOTHERAPY: ICD-10-CM

## 2021-04-19 ENCOUNTER — APPOINTMENT (OUTPATIENT)
Dept: FAMILY MEDICINE | Facility: CLINIC | Age: 71
End: 2021-04-19

## 2021-04-20 ENCOUNTER — APPOINTMENT (OUTPATIENT)
Dept: FAMILY MEDICINE | Facility: CLINIC | Age: 71
End: 2021-04-20

## 2021-04-27 ENCOUNTER — APPOINTMENT (OUTPATIENT)
Dept: FAMILY MEDICINE | Facility: CLINIC | Age: 71
End: 2021-04-27
Payer: MEDICARE

## 2021-04-27 ENCOUNTER — NON-APPOINTMENT (OUTPATIENT)
Age: 71
End: 2021-04-27

## 2021-04-27 VITALS
DIASTOLIC BLOOD PRESSURE: 66 MMHG | BODY MASS INDEX: 31.54 KG/M2 | OXYGEN SATURATION: 98 % | HEIGHT: 63 IN | SYSTOLIC BLOOD PRESSURE: 120 MMHG | HEART RATE: 86 BPM | WEIGHT: 178 LBS | TEMPERATURE: 97.3 F

## 2021-04-27 DIAGNOSIS — Z80.42 FAMILY HISTORY OF MALIGNANT NEOPLASM OF PROSTATE: ICD-10-CM

## 2021-04-27 PROCEDURE — 99387 INIT PM E/M NEW PAT 65+ YRS: CPT

## 2021-04-27 PROCEDURE — 99072 ADDL SUPL MATRL&STAF TM PHE: CPT

## 2021-04-27 NOTE — HISTORY OF PRESENT ILLNESS
[FreeTextEntry1] : Pt is in office for a physical.  [de-identified] : Ms. LAITH VAUGHN is a 71 year female who presents to office for annual CPE as a new patient. Patient is currently being treated for MM, she feels well. She is due for lipid screen today. Patient is expressing a desire to reduce pill burden and would like to start with statin.

## 2021-04-27 NOTE — PHYSICAL EXAM
Detail Level: Simple [No Acute Distress] : no acute distress [Well Nourished] : well nourished [Well Developed] : well developed [Well-Appearing] : well-appearing [Normal Sclera/Conjunctiva] : normal sclera/conjunctiva [EOMI] : extraocular movements intact [Normal Outer Ear/Nose] : the outer ears and nose were normal in appearance [Normal Oropharynx] : the oropharynx was normal [No JVD] : no jugular venous distention [Supple] : supple [Thyroid Normal, No Nodules] : the thyroid was normal and there were no nodules present [No Respiratory Distress] : no respiratory distress  [No Accessory Muscle Use] : no accessory muscle use [Clear to Auscultation] : lungs were clear to auscultation bilaterally [Normal Rate] : normal rate  [Regular Rhythm] : with a regular rhythm [Normal S1, S2] : normal S1 and S2 [No Murmur] : no murmur heard [No Carotid Bruits] : no carotid bruits [Pedal Pulses Present] : the pedal pulses are present [No Edema] : there was no peripheral edema [No Palpable Aorta] : no palpable aorta [No Extremity Clubbing/Cyanosis] : no extremity clubbing/cyanosis [Soft] : abdomen soft [Non Tender] : non-tender [Non-distended] : non-distended [No Masses] : no abdominal mass palpated [No HSM] : no HSM [Normal Bowel Sounds] : normal bowel sounds [No CVA Tenderness] : no CVA  tenderness [No Joint Swelling] : no joint swelling [Grossly Normal Strength/Tone] : grossly normal strength/tone [No Rash] : no rash [Coordination Grossly Intact] : coordination grossly intact [No Focal Deficits] : no focal deficits [Normal Gait] : normal gait [Normal Affect] : the affect was normal [Normal Insight/Judgement] : insight and judgment were intact

## 2021-04-27 NOTE — HEALTH RISK ASSESSMENT
[No] : In the past 12 months have you used drugs other than those required for medical reasons? No [Two or more falls in past year] : Patient reported two or more falls in the past year [0] : 2) Feeling down, depressed, or hopeless: Not at all (0) [Patient declined Low Dose CT Scan] : Patient declined Low Dose CT Scan [Patient declined Retinal Exam] : Patient declined Retinal Exam. [Patient reported mammogram was normal] : Patient reported mammogram was normal [Patient reported PAP Smear was normal] : Patient reported PAP Smear was normal [Patient reported bone density results were normal] : Patient reported bone density results were normal [Patient reported colonoscopy was normal] : Patient reported colonoscopy was normal [HIV test declined] : HIV test declined [Hepatitis C test declined] : Hepatitis C test declined [With Family] : lives with family [Retired] : retired [Fully functional (bathing, dressing, toileting, transferring, walking, feeding)] : Fully functional (bathing, dressing, toileting, transferring, walking, feeding) [With Patient/Caregiver] : With Patient/Caregiver [Good] : ~his/her~  mood as  good [KHF1Fadcp] : 0 [MammogramDate] : 06/2020 [PapSmearDate] : 06/2020 [BoneDensityDate] : 06/2020 [ColonoscopyDate] : 10/2019 [de-identified] : Cataracts in both eyes, operated on 10/2020 and 11/2020 [AdvancecareDate] : 2011

## 2021-04-30 LAB
CHOLEST SERPL-MCNC: 205 MG/DL
HDLC SERPL-MCNC: 100 MG/DL
LDLC SERPL CALC-MCNC: 96 MG/DL
NONHDLC SERPL-MCNC: 105 MG/DL
TRIGL SERPL-MCNC: 49 MG/DL

## 2021-05-12 ENCOUNTER — OUTPATIENT (OUTPATIENT)
Dept: OUTPATIENT SERVICES | Facility: HOSPITAL | Age: 71
LOS: 1 days | Discharge: HOME | End: 2021-05-12

## 2021-05-12 DIAGNOSIS — C90.00 MULTIPLE MYELOMA NOT HAVING ACHIEVED REMISSION: ICD-10-CM

## 2021-05-12 DIAGNOSIS — Z98.51 TUBAL LIGATION STATUS: Chronic | ICD-10-CM

## 2021-05-13 ENCOUNTER — LABORATORY RESULT (OUTPATIENT)
Age: 71
End: 2021-05-13

## 2021-05-13 ENCOUNTER — RESULT REVIEW (OUTPATIENT)
Age: 71
End: 2021-05-13

## 2021-05-13 ENCOUNTER — APPOINTMENT (OUTPATIENT)
Age: 71
End: 2021-05-13

## 2021-05-13 LAB
BASOPHILS # BLD AUTO: 0.2 K/UL — SIGNIFICANT CHANGE UP (ref 0–0.2)
BASOPHILS NFR BLD AUTO: 2.9 % — HIGH (ref 0–2)
EOSINOPHIL # BLD AUTO: 0.3 K/UL — SIGNIFICANT CHANGE UP (ref 0–0.5)
EOSINOPHIL NFR BLD AUTO: 4.7 % — SIGNIFICANT CHANGE UP (ref 0–6)
HCT VFR BLD CALC: 39.2 % — SIGNIFICANT CHANGE UP (ref 34.5–45)
HGB BLD-MCNC: 11.9 G/DL — SIGNIFICANT CHANGE UP (ref 11.5–15.5)
LYMPHOCYTES # BLD AUTO: 3.1 K/UL — SIGNIFICANT CHANGE UP (ref 1–3.3)
LYMPHOCYTES # BLD AUTO: 41.4 % — SIGNIFICANT CHANGE UP (ref 13–44)
MCHC RBC-ENTMCNC: 30.1 PG — SIGNIFICANT CHANGE UP (ref 27–34)
MCHC RBC-ENTMCNC: 30.3 G/DL — LOW (ref 32–36)
MCV RBC AUTO: 99.4 FL — SIGNIFICANT CHANGE UP (ref 80–100)
MONOCYTES # BLD AUTO: 1.5 K/UL — HIGH (ref 0–0.9)
MONOCYTES NFR BLD AUTO: 19.5 % — HIGH (ref 2–14)
NEUTROPHILS # BLD AUTO: 2.3 K/UL — SIGNIFICANT CHANGE UP (ref 1.8–7.4)
NEUTROPHILS NFR BLD AUTO: 31.5 % — LOW (ref 43–77)
PLATELET # BLD AUTO: 193 K/UL — SIGNIFICANT CHANGE UP (ref 150–400)
RBC # BLD: 3.95 M/UL — SIGNIFICANT CHANGE UP (ref 3.8–5.2)
RBC # FLD: 15.6 % — HIGH (ref 10.3–14.5)
WBC # BLD: 7.5 K/UL — SIGNIFICANT CHANGE UP (ref 3.8–10.5)
WBC # FLD AUTO: 7.5 K/UL — SIGNIFICANT CHANGE UP (ref 3.8–10.5)

## 2021-05-14 DIAGNOSIS — Z51.11 ENCOUNTER FOR ANTINEOPLASTIC CHEMOTHERAPY: ICD-10-CM

## 2021-05-14 DIAGNOSIS — R11.2 NAUSEA WITH VOMITING, UNSPECIFIED: ICD-10-CM

## 2021-06-01 ENCOUNTER — APPOINTMENT (OUTPATIENT)
Dept: OBGYN | Facility: CLINIC | Age: 71
End: 2021-06-01
Payer: MEDICARE

## 2021-06-01 ENCOUNTER — NON-APPOINTMENT (OUTPATIENT)
Age: 71
End: 2021-06-01

## 2021-06-01 VITALS
HEIGHT: 63 IN | DIASTOLIC BLOOD PRESSURE: 78 MMHG | TEMPERATURE: 97 F | WEIGHT: 180 LBS | BODY MASS INDEX: 31.89 KG/M2 | SYSTOLIC BLOOD PRESSURE: 110 MMHG

## 2021-06-01 DIAGNOSIS — Z87.891 PERSONAL HISTORY OF NICOTINE DEPENDENCE: ICD-10-CM

## 2021-06-01 LAB
DATE COLLECTED: NORMAL
HEMOCCULT SP1 STL QL: NEGATIVE
QUALITY CONTROL: YES

## 2021-06-01 PROCEDURE — G0101: CPT

## 2021-06-01 PROCEDURE — 82270 OCCULT BLOOD FECES: CPT

## 2021-06-01 NOTE — PHYSICAL EXAM
[Appropriately responsive] : appropriately responsive [Alert] : alert [No Acute Distress] : no acute distress [No Lymphadenopathy] : no lymphadenopathy [Soft] : soft [Non-tender] : non-tender [Non-distended] : non-distended [No HSM] : No HSM [No Lesions] : no lesions [No Mass] : no mass [Oriented x3] : oriented x3 [FreeTextEntry6] : supine and upright  no palp masses  no pain no nipple d c or skin changes bilat [Labia Majora] : normal [Labia Minora] : normal [Atrophy] : atrophy [Normal] : normal [Normal Position] : in a normal position [Tenderness] : nontender [Enlarged ___ wks] : not enlarged [Mass ___ cm] : no uterine mass was palpated [Uterine Adnexae] : normal [FreeTextEntry9] : MARGIE neg

## 2021-06-01 NOTE — HISTORY OF PRESENT ILLNESS
[FreeTextEntry1] : 72 yo WF  had 2 NSVDs PM \par \par single  not sexually active\par \par being treated for multiple myeloma   had stem cell transplant did not work\par \par pt to discuss genetic testing with her oncologist for hereditary cancer risk\par \par nl paps\par \par PMH:\par \par multiple myeloma \par \par HTN\par \par high cholesterol\par \par PSH: stem cell transplant\par \par  colonoscopy done\par \par FH: mat aunt  breast ca\par \par     heart disease in family \par \par \par \par

## 2021-06-01 NOTE — DISCUSSION/SUMMARY
[FreeTextEntry1] : Annual exam\par \par 72 yo HF  PM  getting Rx for multiple myeloma \par \par exam significant for atrophic vag changes\par \par pap SG obtained\par \par advised for pt to ask about genetic testing w her oncologist\par \par return in one yr

## 2021-06-02 ENCOUNTER — APPOINTMENT (OUTPATIENT)
Dept: ULTRASOUND IMAGING | Facility: CLINIC | Age: 71
End: 2021-06-02
Payer: MEDICARE

## 2021-06-02 ENCOUNTER — OUTPATIENT (OUTPATIENT)
Dept: OUTPATIENT SERVICES | Facility: HOSPITAL | Age: 71
LOS: 1 days | End: 2021-06-02

## 2021-06-02 ENCOUNTER — RESULT REVIEW (OUTPATIENT)
Age: 71
End: 2021-06-02

## 2021-06-02 DIAGNOSIS — Z00.8 ENCOUNTER FOR OTHER GENERAL EXAMINATION: ICD-10-CM

## 2021-06-02 DIAGNOSIS — Z98.51 TUBAL LIGATION STATUS: Chronic | ICD-10-CM

## 2021-06-02 PROCEDURE — 76830 TRANSVAGINAL US NON-OB: CPT | Mod: 26

## 2021-06-02 PROCEDURE — 76856 US EXAM PELVIC COMPLETE: CPT | Mod: 26

## 2021-06-04 ENCOUNTER — NON-APPOINTMENT (OUTPATIENT)
Age: 71
End: 2021-06-04

## 2021-06-04 LAB
CYTOLOGY CVX/VAG DOC THIN PREP: ABNORMAL
HPV HIGH+LOW RISK DNA PNL CVX: NOT DETECTED

## 2021-06-08 ENCOUNTER — LABORATORY RESULT (OUTPATIENT)
Age: 71
End: 2021-06-08

## 2021-06-08 ENCOUNTER — RESULT REVIEW (OUTPATIENT)
Age: 71
End: 2021-06-08

## 2021-06-08 ENCOUNTER — APPOINTMENT (OUTPATIENT)
Dept: HEMATOLOGY ONCOLOGY | Facility: CLINIC | Age: 71
End: 2021-06-08
Payer: MEDICARE

## 2021-06-08 VITALS
OXYGEN SATURATION: 99 % | HEART RATE: 93 BPM | BODY MASS INDEX: 32.07 KG/M2 | HEIGHT: 63 IN | SYSTOLIC BLOOD PRESSURE: 125 MMHG | DIASTOLIC BLOOD PRESSURE: 79 MMHG | WEIGHT: 181 LBS

## 2021-06-08 LAB
BASOPHILS # BLD AUTO: 0.1 K/UL — SIGNIFICANT CHANGE UP (ref 0–0.2)
BASOPHILS NFR BLD AUTO: 1.9 % — SIGNIFICANT CHANGE UP (ref 0–2)
EOSINOPHIL # BLD AUTO: 0.1 K/UL — SIGNIFICANT CHANGE UP (ref 0–0.5)
EOSINOPHIL NFR BLD AUTO: 2.4 % — SIGNIFICANT CHANGE UP (ref 0–6)
HCT VFR BLD CALC: 38 % — SIGNIFICANT CHANGE UP (ref 34.5–45)
HGB BLD-MCNC: 11.5 G/DL — SIGNIFICANT CHANGE UP (ref 11.5–15.5)
LYMPHOCYTES # BLD AUTO: 2.2 K/UL — SIGNIFICANT CHANGE UP (ref 1–3.3)
LYMPHOCYTES # BLD AUTO: 35.3 % — SIGNIFICANT CHANGE UP (ref 13–44)
MCHC RBC-ENTMCNC: 30.2 G/DL — LOW (ref 32–36)
MCHC RBC-ENTMCNC: 30.4 PG — SIGNIFICANT CHANGE UP (ref 27–34)
MCV RBC AUTO: 100.5 FL — HIGH (ref 80–100)
MONOCYTES # BLD AUTO: 1 K/UL — HIGH (ref 0–0.9)
MONOCYTES NFR BLD AUTO: 16.6 % — HIGH (ref 2–14)
NEUTROPHILS # BLD AUTO: 2.7 K/UL — SIGNIFICANT CHANGE UP (ref 1.8–7.4)
NEUTROPHILS NFR BLD AUTO: 44 % — SIGNIFICANT CHANGE UP (ref 43–77)
PLATELET # BLD AUTO: 276 K/UL — SIGNIFICANT CHANGE UP (ref 150–400)
RBC # BLD: 3.78 M/UL — LOW (ref 3.8–5.2)
RBC # FLD: 15.2 % — HIGH (ref 10.3–14.5)
WBC # BLD: 6.2 K/UL — SIGNIFICANT CHANGE UP (ref 3.8–10.5)
WBC # FLD AUTO: 6.2 K/UL — SIGNIFICANT CHANGE UP (ref 3.8–10.5)

## 2021-06-08 PROCEDURE — 99214 OFFICE O/P EST MOD 30 MIN: CPT

## 2021-06-08 NOTE — ASSESSMENT
[FreeTextEntry1] : 70 year old female IgG kappa multiple myeloma,  FISH panel - positive for CCND1/IGH fusion - a/w favorable prognosis. PET CT (7/12/18) did not show any bone lesions. S/p VRd, followed by 4 cycles of KRd, followed by autoPSCT on 2/28/19. \par On 5/18/19 started on Pomalyst for persistently elevated KFLC at 50, FLC ratio 136.\par On Pomalyst + weekly Dex, she has had a partial response, her KFLC has improved to some degree and bone marrow in 12/2019 shows persistent plasma cells of 15-20%. Daratumumab was added from 2/3/2020 onwards.\par PET CT 5/15/20 with no FDG avid lesions, and slight enlargement of right adnexal cyst.\par Q4 week daratumumab started 7/30/20\par \par 	KFLC	KLFLC Ratio\par 05/08/19	50.32	136.00\par 08/15/19	25.08	51.18\par 11/19/19	34.61	57.68\par 12/16/19	15.87	52.90\par Daratumumab added 2/03/20		\par 3/09/20	5.57	20.63\par 4/07/20    4.69        16.17\par 5/7/20      3.53        16.05\par 6/02/20    2.59        13.63\par 8/4/20      1.94         9.70\par 9/15/20    2.09         10.45\par 10/20/20  2.30         12.11\par 11/24/20  1.99          9.05\par 05/13/21 1.26           4.85\par \par Continues to tolerate Pomalyst, Darzalex monthly and weekly Dexamethasone.\par FLC ratio is trending downwards. \par CBC from today reviewed WBC 6.2 K HGB 11.5 g HCT 38.0 %  K \par \par Plan:\par - Continue daratumumab q 4 weeks, w/ IV dexamethasone w/ Darzalex, otherwise on oral dexamethasone weekly.\par - Continue pomalyst + weekly dexamethasone. \par - grade 1 CIPN - stable\par -RTO 2 months

## 2021-06-08 NOTE — PHYSICAL EXAM
[Restricted in physically strenuous activity but ambulatory and able to carry out work of a light or sedentary nature] : Status 1- Restricted in physically strenuous activity but ambulatory and able to carry out work of a light or sedentary nature, e.g., light house work, office work [Normal] : affect appropriate [de-identified] : Pleasant, interactive in NAD.  [de-identified] : CTA b/l [de-identified] : S1/S2 [de-identified] : no edema [de-identified] : soft

## 2021-06-08 NOTE — HISTORY OF PRESENT ILLNESS
[de-identified] : Ms. Calero is a 70 year old female w/ multiple myeloma. \par \par She was noted to have proteinuria and then had a 24 hour urine protein which showed non-nephrotic range proteinuria of 510 mg/24 hours. She was referred to Dr. Lesa Rendon of nephrology. SPEP showed faint M-spike 0.2 g/dL in gammaglobulin region, immunofixation showed this to be a IgG kappa monoclonal protein. UPEP showed 2 monoclonal protein bands, 61%, and 0.7%. \par \par Subsequent work up:\par 6/19/18 M-protein 0.2 g/dL, Kappa FLC 71, lambda FLC 0.36, FLC ratio 198.61\par , Beta 2 microglobulin 1.5 mg/L\par \par She had a bone marrow biopsy on 6/22/18. Pathology: plasma cell neoplasm, plasma cells comprise 20-25% of marrow cells. Trilineage hematopoiesis present with maturation, increased iron stores. Congo red stain negative for amyloid. Karyotype 46, XX. FISH panel - positive for CCND1/IGH fusion - a/w favorable prognosis.\par \par 7/12/18 PET - negative\par \par Treatment Summary \par 7/19/18 - C1 VRd\par 8/9/18 -C2 VRd\par 8/30/18 - C3 VRd\par 9/20/18 -partial C4 VRd\par 10/11/18 - 1/2/19 KRd (carfilzomib, Revlimid, Dexamethasone) x 4 cycles. \par 2/28/19 - autoPSCT \par 5/18/19 - started Pomalyst\par 7/19/19 - decadron 20 mg weekly added to Pomalyst.\par 2/03/20 - Daratumumab week 1-8 2/03 - 3/31/20. Week 9 (began every other week) 4/07/20. [de-identified] : Returns for follow up\par Continued fatigue, does not impact daily activities\par Bilateral knee pain, history of arthritis \par Intermittent b/l leg swelling, self resolves\par Today is D1 of [place]ph[place] \par Will be moving to [place] in November 2021

## 2021-06-10 ENCOUNTER — LABORATORY RESULT (OUTPATIENT)
Age: 71
End: 2021-06-10

## 2021-06-10 ENCOUNTER — APPOINTMENT (OUTPATIENT)
Age: 71
End: 2021-06-10

## 2021-06-22 ENCOUNTER — APPOINTMENT (OUTPATIENT)
Dept: DERMATOLOGY | Facility: CLINIC | Age: 71
End: 2021-06-22

## 2021-06-22 ENCOUNTER — APPOINTMENT (OUTPATIENT)
Dept: FAMILY MEDICINE | Facility: CLINIC | Age: 71
End: 2021-06-22
Payer: MEDICARE

## 2021-06-22 VITALS
HEART RATE: 95 BPM | HEIGHT: 63 IN | SYSTOLIC BLOOD PRESSURE: 122 MMHG | DIASTOLIC BLOOD PRESSURE: 82 MMHG | OXYGEN SATURATION: 97 % | WEIGHT: 180 LBS | BODY MASS INDEX: 31.89 KG/M2

## 2021-06-22 DIAGNOSIS — R60.0 LOCALIZED EDEMA: ICD-10-CM

## 2021-06-22 PROCEDURE — 99213 OFFICE O/P EST LOW 20 MIN: CPT

## 2021-06-24 PROBLEM — R60.0 EDEMA OF LEFT LOWER EXTREMITY: Status: ACTIVE | Noted: 2020-06-30

## 2021-06-24 NOTE — PHYSICAL EXAM
[Normal] : no acute distress, well nourished, well developed and well-appearing [Normal Sclera/Conjunctiva] : normal sclera/conjunctiva [Normal Outer Ear/Nose] : the outer ears and nose were normal in appearance [Supple] : supple [No Respiratory Distress] : no respiratory distress  [No Accessory Muscle Use] : no accessory muscle use [Normal Gait] : normal gait [Normal Affect] : the affect was normal [de-identified] : R arm ROM intact, slighly tender

## 2021-06-24 NOTE — HISTORY OF PRESENT ILLNESS
[FreeTextEntry1] : Pt complains of intermittent bilateral ankle and feet swelling x 3 weeks.\par Pt states swelling is worse at night.\par Pt also complains of right arm pain x 2 months.\par After receiving 2nd COVID vaccine shot on 4/13/21 pt states experiencing shooting pain that radiates from site of injection to right hand.\par Pain has been persistent and has not improved.  [de-identified] : Regarding ankle swelling, patient states she is mostly sedentary.  Recent labs show that kidney function is within normal limits and denies cardiac symptoms. In office today, no swelling noted. Patient has been on Amlodipine for at least 2 years.\par Patient is also complaining, since covid vaccine, slightly improving.

## 2021-06-24 NOTE — PLAN
[FreeTextEntry1] : Monitor Pedal edema, encourage intermittent ambulation during work hours, compression stockings\par Apply Diclofenac gel to right arm, prn

## 2021-07-05 ENCOUNTER — OUTPATIENT (OUTPATIENT)
Dept: OUTPATIENT SERVICES | Facility: HOSPITAL | Age: 71
LOS: 1 days | Discharge: HOME | End: 2021-07-05

## 2021-07-05 DIAGNOSIS — C90.00 MULTIPLE MYELOMA NOT HAVING ACHIEVED REMISSION: ICD-10-CM

## 2021-07-05 DIAGNOSIS — Z98.51 TUBAL LIGATION STATUS: Chronic | ICD-10-CM

## 2021-07-08 ENCOUNTER — RESULT REVIEW (OUTPATIENT)
Age: 71
End: 2021-07-08

## 2021-07-08 ENCOUNTER — LABORATORY RESULT (OUTPATIENT)
Age: 71
End: 2021-07-08

## 2021-07-08 ENCOUNTER — APPOINTMENT (OUTPATIENT)
Age: 71
End: 2021-07-08

## 2021-07-08 LAB
ACANTHOCYTES BLD QL SMEAR: SLIGHT — SIGNIFICANT CHANGE UP
ANISOCYTOSIS BLD QL: SLIGHT — SIGNIFICANT CHANGE UP
BASOPHILS # BLD AUTO: 0.2 K/UL — SIGNIFICANT CHANGE UP (ref 0–0.2)
BASOPHILS NFR BLD AUTO: 1 % — SIGNIFICANT CHANGE UP (ref 0–2)
BITE CELLS BLD QL SMEAR: PRESENT — SIGNIFICANT CHANGE UP
BURR CELLS BLD QL SMEAR: PRESENT — SIGNIFICANT CHANGE UP
DACRYOCYTES BLD QL SMEAR: SLIGHT — SIGNIFICANT CHANGE UP
ELLIPTOCYTES BLD QL SMEAR: SLIGHT — SIGNIFICANT CHANGE UP
EOSINOPHIL # BLD AUTO: 0.2 K/UL — SIGNIFICANT CHANGE UP (ref 0–0.5)
EOSINOPHIL NFR BLD AUTO: 2 % — SIGNIFICANT CHANGE UP (ref 0–6)
HCT VFR BLD CALC: 38.1 % — SIGNIFICANT CHANGE UP (ref 34.5–45)
HGB BLD-MCNC: 11.9 G/DL — SIGNIFICANT CHANGE UP (ref 11.5–15.5)
HYPOCHROMIA BLD QL: SLIGHT — SIGNIFICANT CHANGE UP
LYMPHOCYTES # BLD AUTO: 4 K/UL — HIGH (ref 1–3.3)
LYMPHOCYTES # BLD AUTO: 47 % — HIGH (ref 13–44)
MACROCYTES BLD QL: SLIGHT — SIGNIFICANT CHANGE UP
MCHC RBC-ENTMCNC: 30.4 PG — SIGNIFICANT CHANGE UP (ref 27–34)
MCHC RBC-ENTMCNC: 31.1 G/DL — LOW (ref 32–36)
MCV RBC AUTO: 97.8 FL — SIGNIFICANT CHANGE UP (ref 80–100)
METAMYELOCYTES # FLD: 1 % — HIGH (ref 0–0)
MICROCYTES BLD QL: SLIGHT — SIGNIFICANT CHANGE UP
MONOCYTES # BLD AUTO: 1.5 K/UL — HIGH (ref 0–0.9)
MONOCYTES NFR BLD AUTO: 19 % — HIGH (ref 2–14)
NEUTROPHILS # BLD AUTO: 2.3 K/UL — SIGNIFICANT CHANGE UP (ref 1.8–7.4)
NEUTROPHILS NFR BLD AUTO: 27 % — LOW (ref 43–77)
OVALOCYTES BLD QL SMEAR: SLIGHT — SIGNIFICANT CHANGE UP
PLAT MORPH BLD: NORMAL — SIGNIFICANT CHANGE UP
PLATELET # BLD AUTO: 168 K/UL — SIGNIFICANT CHANGE UP (ref 150–400)
POIKILOCYTOSIS BLD QL AUTO: SLIGHT — SIGNIFICANT CHANGE UP
POLYCHROMASIA BLD QL SMEAR: SLIGHT — SIGNIFICANT CHANGE UP
RBC # BLD: 3.9 M/UL — SIGNIFICANT CHANGE UP (ref 3.8–5.2)
RBC # FLD: 14.5 % — SIGNIFICANT CHANGE UP (ref 10.3–14.5)
RBC BLD AUTO: ABNORMAL
SCHISTOCYTES BLD QL AUTO: SLIGHT — SIGNIFICANT CHANGE UP
VARIANT LYMPHS # BLD: 3 % — SIGNIFICANT CHANGE UP (ref 0–6)
WBC # BLD: 8.1 K/UL — SIGNIFICANT CHANGE UP (ref 3.8–10.5)
WBC # FLD AUTO: 8.1 K/UL — SIGNIFICANT CHANGE UP (ref 3.8–10.5)

## 2021-07-09 DIAGNOSIS — R11.2 NAUSEA WITH VOMITING, UNSPECIFIED: ICD-10-CM

## 2021-07-09 DIAGNOSIS — Z51.11 ENCOUNTER FOR ANTINEOPLASTIC CHEMOTHERAPY: ICD-10-CM

## 2021-07-13 ENCOUNTER — NON-APPOINTMENT (OUTPATIENT)
Age: 71
End: 2021-07-13

## 2021-07-30 ENCOUNTER — RX RENEWAL (OUTPATIENT)
Age: 71
End: 2021-07-30

## 2021-08-05 ENCOUNTER — LABORATORY RESULT (OUTPATIENT)
Age: 71
End: 2021-08-05

## 2021-08-05 ENCOUNTER — RESULT REVIEW (OUTPATIENT)
Age: 71
End: 2021-08-05

## 2021-08-05 ENCOUNTER — APPOINTMENT (OUTPATIENT)
Age: 71
End: 2021-08-05

## 2021-08-05 ENCOUNTER — OUTPATIENT (OUTPATIENT)
Dept: OUTPATIENT SERVICES | Facility: HOSPITAL | Age: 71
LOS: 1 days | Discharge: HOME | End: 2021-08-05

## 2021-08-05 ENCOUNTER — APPOINTMENT (OUTPATIENT)
Dept: HEMATOLOGY ONCOLOGY | Facility: CLINIC | Age: 71
End: 2021-08-05
Payer: MEDICARE

## 2021-08-05 VITALS
OXYGEN SATURATION: 100 % | HEART RATE: 81 BPM | DIASTOLIC BLOOD PRESSURE: 73 MMHG | WEIGHT: 178.13 LBS | RESPIRATION RATE: 16 BRPM | SYSTOLIC BLOOD PRESSURE: 123 MMHG | BODY MASS INDEX: 31.56 KG/M2 | HEIGHT: 63 IN | TEMPERATURE: 98 F

## 2021-08-05 DIAGNOSIS — C90.00 MULTIPLE MYELOMA NOT HAVING ACHIEVED REMISSION: ICD-10-CM

## 2021-08-05 DIAGNOSIS — D64.81 ANEMIA DUE TO ANTINEOPLASTIC CHEMOTHERAPY: ICD-10-CM

## 2021-08-05 DIAGNOSIS — Z98.51 TUBAL LIGATION STATUS: Chronic | ICD-10-CM

## 2021-08-05 DIAGNOSIS — T45.1X5A ANEMIA DUE TO ANTINEOPLASTIC CHEMOTHERAPY: ICD-10-CM

## 2021-08-05 LAB
ACANTHOCYTES BLD QL SMEAR: SLIGHT — SIGNIFICANT CHANGE UP
ANISOCYTOSIS BLD QL: SLIGHT — SIGNIFICANT CHANGE UP
BASOPHILS # BLD AUTO: 0.2 K/UL — SIGNIFICANT CHANGE UP (ref 0–0.2)
BASOPHILS NFR BLD AUTO: 3 % — HIGH (ref 0–2)
BITE CELLS BLD QL SMEAR: PRESENT — SIGNIFICANT CHANGE UP
BURR CELLS BLD QL SMEAR: PRESENT — SIGNIFICANT CHANGE UP
DACRYOCYTES BLD QL SMEAR: SLIGHT — SIGNIFICANT CHANGE UP
ELLIPTOCYTES BLD QL SMEAR: SLIGHT — SIGNIFICANT CHANGE UP
EOSINOPHIL # BLD AUTO: 0.2 K/UL — SIGNIFICANT CHANGE UP (ref 0–0.5)
EOSINOPHIL NFR BLD AUTO: 4 % — SIGNIFICANT CHANGE UP (ref 0–6)
HCT VFR BLD CALC: 35.1 % — SIGNIFICANT CHANGE UP (ref 34.5–45)
HGB BLD-MCNC: 11.3 G/DL — LOW (ref 11.5–15.5)
HYPOCHROMIA BLD QL: SLIGHT — SIGNIFICANT CHANGE UP
LYMPHOCYTES # BLD AUTO: 3 K/UL — SIGNIFICANT CHANGE UP (ref 1–3.3)
LYMPHOCYTES # BLD AUTO: 36 % — SIGNIFICANT CHANGE UP (ref 13–44)
MACROCYTES BLD QL: SLIGHT — SIGNIFICANT CHANGE UP
MCHC RBC-ENTMCNC: 30.9 PG — SIGNIFICANT CHANGE UP (ref 27–34)
MCHC RBC-ENTMCNC: 32.2 G/DL — SIGNIFICANT CHANGE UP (ref 32–36)
MCV RBC AUTO: 95.9 FL — SIGNIFICANT CHANGE UP (ref 80–100)
METAMYELOCYTES # FLD: 2 % — HIGH (ref 0–0)
MICROCYTES BLD QL: SLIGHT — SIGNIFICANT CHANGE UP
MONOCYTES # BLD AUTO: 1.2 K/UL — HIGH (ref 0–0.9)
MONOCYTES NFR BLD AUTO: 15 % — HIGH (ref 2–14)
NEUTROPHILS # BLD AUTO: 2.3 K/UL — SIGNIFICANT CHANGE UP (ref 1.8–7.4)
NEUTROPHILS NFR BLD AUTO: 39 % — LOW (ref 43–77)
OVALOCYTES BLD QL SMEAR: SLIGHT — SIGNIFICANT CHANGE UP
PLAT MORPH BLD: NORMAL — SIGNIFICANT CHANGE UP
PLATELET # BLD AUTO: 30 K/UL — LOW (ref 150–400)
POIKILOCYTOSIS BLD QL AUTO: SLIGHT — SIGNIFICANT CHANGE UP
POLYCHROMASIA BLD QL SMEAR: SLIGHT — SIGNIFICANT CHANGE UP
RBC # BLD: 3.66 M/UL — LOW (ref 3.8–5.2)
RBC # FLD: 14.8 % — HIGH (ref 10.3–14.5)
RBC BLD AUTO: ABNORMAL
SCHISTOCYTES BLD QL AUTO: SLIGHT — SIGNIFICANT CHANGE UP
VARIANT LYMPHS # BLD: 1 % — SIGNIFICANT CHANGE UP (ref 0–6)
WBC # BLD: 9.2 K/UL — SIGNIFICANT CHANGE UP (ref 3.8–10.5)
WBC # FLD AUTO: 9.2 K/UL — SIGNIFICANT CHANGE UP (ref 3.8–10.5)

## 2021-08-05 PROCEDURE — 99214 OFFICE O/P EST MOD 30 MIN: CPT

## 2021-08-06 DIAGNOSIS — R11.2 NAUSEA WITH VOMITING, UNSPECIFIED: ICD-10-CM

## 2021-08-06 DIAGNOSIS — Z51.11 ENCOUNTER FOR ANTINEOPLASTIC CHEMOTHERAPY: ICD-10-CM

## 2021-08-06 PROBLEM — D64.81 ANEMIA DUE TO ANTINEOPLASTIC CHEMOTHERAPY: Status: ACTIVE | Noted: 2020-02-10

## 2021-08-06 NOTE — ASSESSMENT
[FreeTextEntry1] : 70 year old female IgG kappa multiple myeloma,  FISH panel - positive for CCND1/IGH fusion - a/w favorable prognosis. PET CT (7/12/18) did not show any bone lesions. S/p VRd, followed by 4 cycles of KRd, followed by autoPSCT on 2/28/19. \par On 5/18/19 started on Pomalyst for persistently elevated KFLC at 50, FLC ratio 136.\par On Pomalyst + weekly Dex, she has had a partial response, her KFLC has improved to some degree and bone marrow in 12/2019 shows persistent plasma cells of 15-20%. Daratumumab was added from 2/3/2020 onwards.\par PET CT 5/15/20 with no FDG avid lesions, and slight enlargement of right adnexal cyst.\par Q4 week daratumumab started 7/30/20\par \par 	KFLC	KLFLC Ratio\par 05/08/19	50.32	136.00\par 08/15/19	25.08	51.18\par 11/19/19	34.61	57.68\par 12/16/19	15.87	52.90\par Daratumumab added 2/03/20		\par 3/09/20	5.57	20.63\par 4/07/20    4.69        16.17\par 5/7/20      3.53        16.05\par 6/02/20    2.59        13.63\par 8/4/20      1.94         9.70\par 9/15/20    2.09         10.45\par 10/20/20  2.30         12.11\par 11/24/20  1.99          9.05\par 05/13/21 1.26           4.85\par 06/8/21   1.17          4.88\par 6/10/21    1.39         5.35\par 7/8/21    1.24           5.64\par \par Continues to tolerate Pomalyst, Darzalex monthly and weekly Dexamethasone.\par FLC ratio is trending downwards. \par CBC from today reviewed WBC 9.2 K HGB 11.3 g HCT 35.1 %  K Neutrophil # 2.3 K \par \par Plan:\par - Continue daratumumab q 4 weeks, w/ IV dexamethasone w/ Darzalex, otherwise on oral dexamethasone weekly, C16 9/2/2021\par - Continue pomalyst + weekly dexamethasone. \par - grade 1 CIPN - stable\par -Follow up in 1 month with Dr. De Oilveira

## 2021-08-06 NOTE — REVIEW OF SYSTEMS
[Negative] : Allergic/Immunologic [FreeTextEntry2] : see interval hx, otherwise remainder of ROS is negative. [FreeTextEntry9] : as noted in HPI

## 2021-08-06 NOTE — HISTORY OF PRESENT ILLNESS
[de-identified] : Ms. Calero is a 70 year old female w/ multiple myeloma. \par \par She was noted to have proteinuria and then had a 24 hour urine protein which showed non-nephrotic range proteinuria of 510 mg/24 hours. She was referred to Dr. Lesa Rendon of nephrology. SPEP showed faint M-spike 0.2 g/dL in gammaglobulin region, immunofixation showed this to be a IgG kappa monoclonal protein. UPEP showed 2 monoclonal protein bands, 61%, and 0.7%. \par \par Subsequent work up:\par 6/19/18 M-protein 0.2 g/dL, Kappa FLC 71, lambda FLC 0.36, FLC ratio 198.61\par , Beta 2 microglobulin 1.5 mg/L\par \par She had a bone marrow biopsy on 6/22/18. Pathology: plasma cell neoplasm, plasma cells comprise 20-25% of marrow cells. Trilineage hematopoiesis present with maturation, increased iron stores. Congo red stain negative for amyloid. Karyotype 46, XX. FISH panel - positive for CCND1/IGH fusion - a/w favorable prognosis.\par \par 7/12/18 PET - negative\par \par Treatment Summary \par 7/19/18 - C1 VRd\par 8/9/18 -C2 VRd\par 8/30/18 - C3 VRd\par 9/20/18 -partial C4 VRd\par 10/11/18 - 1/2/19 KRd (carfilzomib, Revlimid, Dexamethasone) x 4 cycles. \par 2/28/19 - autoPSCT \par 5/18/19 - started Pomalyst\par 7/19/19 - decadron 20 mg weekly added to Pomalyst.\par 2/03/20 - Daratumumab week 1-8 2/03 - 3/31/20. Week 9 (began every other week) 4/07/20. [de-identified] : Returns for follow up. Recieved C15 daratumumab q 4 weeks, w/ IV dexamethasone w/ Darzalex today.\par \par Recieved gel injection to bilateral knees 2 weeks ago with significant improvement of joint pain\par On D2 of Polamylst cycle\par Denies fever, recent infection, night sweats\par Denies headache, dizziness \par Denies shortness of breath, cough\par Denies weight loss. Good appetite\par Denies abdominal pain, nausea, vomiting, diarrhea, constipation\par Reports neuropathy of bilateral feet is minor and has not progressed \par Will be moving to [place] in December

## 2021-08-06 NOTE — PHYSICAL EXAM
[Restricted in physically strenuous activity but ambulatory and able to carry out work of a light or sedentary nature] : Status 1- Restricted in physically strenuous activity but ambulatory and able to carry out work of a light or sedentary nature, e.g., light house work, office work [Normal] : normoactive bowel sounds, soft and nontender, no hepatosplenomegaly or masses appreciated [de-identified] : Pleasant, interactive in NAD.  [de-identified] : CTA b/l [de-identified] : S1/S2 [de-identified] : no edema [de-identified] : soft

## 2021-08-12 ENCOUNTER — APPOINTMENT (OUTPATIENT)
Dept: ENDOCRINOLOGY | Facility: CLINIC | Age: 71
End: 2021-08-12

## 2021-08-19 ENCOUNTER — RX RENEWAL (OUTPATIENT)
Age: 71
End: 2021-08-19

## 2021-08-23 ENCOUNTER — NON-APPOINTMENT (OUTPATIENT)
Age: 71
End: 2021-08-23

## 2021-08-25 ENCOUNTER — APPOINTMENT (OUTPATIENT)
Dept: HEMATOLOGY ONCOLOGY | Facility: CLINIC | Age: 71
End: 2021-08-25

## 2021-08-25 ENCOUNTER — RESULT REVIEW (OUTPATIENT)
Age: 71
End: 2021-08-25

## 2021-08-25 LAB
BASOPHILS # BLD AUTO: 0 K/UL — SIGNIFICANT CHANGE UP (ref 0–0.2)
BASOPHILS NFR BLD AUTO: 0.5 % — SIGNIFICANT CHANGE UP (ref 0–2)
EOSINOPHIL # BLD AUTO: 0 K/UL — SIGNIFICANT CHANGE UP (ref 0–0.5)
EOSINOPHIL NFR BLD AUTO: 0.2 % — SIGNIFICANT CHANGE UP (ref 0–6)
HCT VFR BLD CALC: 35.6 % — SIGNIFICANT CHANGE UP (ref 34.5–45)
HGB BLD-MCNC: 11.5 G/DL — SIGNIFICANT CHANGE UP (ref 11.5–15.5)
LYMPHOCYTES # BLD AUTO: 1.5 K/UL — SIGNIFICANT CHANGE UP (ref 1–3.3)
LYMPHOCYTES # BLD AUTO: 27.8 % — SIGNIFICANT CHANGE UP (ref 13–44)
MCHC RBC-ENTMCNC: 30.9 PG — SIGNIFICANT CHANGE UP (ref 27–34)
MCHC RBC-ENTMCNC: 32.2 G/DL — SIGNIFICANT CHANGE UP (ref 32–36)
MCV RBC AUTO: 96.1 FL — SIGNIFICANT CHANGE UP (ref 80–100)
MONOCYTES # BLD AUTO: 0.9 K/UL — SIGNIFICANT CHANGE UP (ref 0–0.9)
MONOCYTES NFR BLD AUTO: 17 % — HIGH (ref 2–14)
NEUTROPHILS # BLD AUTO: 2.9 K/UL — SIGNIFICANT CHANGE UP (ref 1.8–7.4)
NEUTROPHILS NFR BLD AUTO: 54.5 % — SIGNIFICANT CHANGE UP (ref 43–77)
PLATELET # BLD AUTO: 187 K/UL — SIGNIFICANT CHANGE UP (ref 150–400)
RBC # BLD: 3.71 M/UL — LOW (ref 3.8–5.2)
RBC # FLD: 14.7 % — HIGH (ref 10.3–14.5)
WBC # BLD: 5.3 K/UL — SIGNIFICANT CHANGE UP (ref 3.8–10.5)
WBC # FLD AUTO: 5.3 K/UL — SIGNIFICANT CHANGE UP (ref 3.8–10.5)

## 2021-08-26 ENCOUNTER — NON-APPOINTMENT (OUTPATIENT)
Age: 71
End: 2021-08-26

## 2021-08-30 LAB
APTT BLD: 28.7 SEC
INR PPP: 0.93 RATIO
PT BLD: 11 SEC

## 2021-09-02 ENCOUNTER — LABORATORY RESULT (OUTPATIENT)
Age: 71
End: 2021-09-02

## 2021-09-02 ENCOUNTER — APPOINTMENT (OUTPATIENT)
Age: 71
End: 2021-09-02

## 2021-09-02 ENCOUNTER — APPOINTMENT (OUTPATIENT)
Dept: HEMATOLOGY ONCOLOGY | Facility: CLINIC | Age: 71
End: 2021-09-02
Payer: MEDICARE

## 2021-09-02 ENCOUNTER — RESULT REVIEW (OUTPATIENT)
Age: 71
End: 2021-09-02

## 2021-09-02 VITALS
SYSTOLIC BLOOD PRESSURE: 131 MMHG | HEIGHT: 63 IN | HEART RATE: 89 BPM | DIASTOLIC BLOOD PRESSURE: 78 MMHG | WEIGHT: 171.05 LBS | OXYGEN SATURATION: 98 % | BODY MASS INDEX: 30.31 KG/M2

## 2021-09-02 VITALS — TEMPERATURE: 98.2 F

## 2021-09-02 LAB
BASOPHILS # BLD AUTO: 0.1 K/UL — SIGNIFICANT CHANGE UP (ref 0–0.2)
BASOPHILS NFR BLD AUTO: 2 % — SIGNIFICANT CHANGE UP (ref 0–2)
EOSINOPHIL # BLD AUTO: 0.2 K/UL — SIGNIFICANT CHANGE UP (ref 0–0.5)
EOSINOPHIL NFR BLD AUTO: 2.9 % — SIGNIFICANT CHANGE UP (ref 0–6)
HCT VFR BLD CALC: 37.5 % — SIGNIFICANT CHANGE UP (ref 34.5–45)
HGB BLD-MCNC: 11.8 G/DL — SIGNIFICANT CHANGE UP (ref 11.5–15.5)
LYMPHOCYTES # BLD AUTO: 2.5 K/UL — SIGNIFICANT CHANGE UP (ref 1–3.3)
LYMPHOCYTES # BLD AUTO: 34.9 % — SIGNIFICANT CHANGE UP (ref 13–44)
MCHC RBC-ENTMCNC: 30.6 PG — SIGNIFICANT CHANGE UP (ref 27–34)
MCHC RBC-ENTMCNC: 31.5 G/DL — LOW (ref 32–36)
MCV RBC AUTO: 97.1 FL — SIGNIFICANT CHANGE UP (ref 80–100)
MONOCYTES # BLD AUTO: 1.2 K/UL — HIGH (ref 0–0.9)
MONOCYTES NFR BLD AUTO: 16 % — HIGH (ref 2–14)
NEUTROPHILS # BLD AUTO: 3.2 K/UL — SIGNIFICANT CHANGE UP (ref 1.8–7.4)
NEUTROPHILS NFR BLD AUTO: 44.2 % — SIGNIFICANT CHANGE UP (ref 43–77)
PLATELET # BLD AUTO: 189 K/UL — SIGNIFICANT CHANGE UP (ref 150–400)
RBC # BLD: 3.86 M/UL — SIGNIFICANT CHANGE UP (ref 3.8–5.2)
RBC # FLD: 15 % — HIGH (ref 10.3–14.5)
WBC # BLD: 7.3 K/UL — SIGNIFICANT CHANGE UP (ref 3.8–10.5)
WBC # FLD AUTO: 7.3 K/UL — SIGNIFICANT CHANGE UP (ref 3.8–10.5)

## 2021-09-02 PROCEDURE — 99215 OFFICE O/P EST HI 40 MIN: CPT

## 2021-09-02 NOTE — RESULTS/DATA
[FreeTextEntry1] : 	KFLC	KLFLC Ratio\par 05/08/19	50.32	136.00\par 08/15/19	25.08	51.18\par 11/19/19	34.61	57.68\par 12/16/19	15.87	52.90\par Daratumumab added 2/03/20		\par 3/09/20	5.57	20.63\par 4/07/20    4.69        16.17\par 5/7/20      3.53        16.05\par 6/02/20    2.59        13.63\par 8/4/20      1.94         9.70\par 9/15/20    2.09         10.45\par 10/20/20  2.30         12.11\par 11/24/20  1.99          9.05

## 2021-09-02 NOTE — HISTORY OF PRESENT ILLNESS
[de-identified] : Ms. Calero is a 70 year old female w/ multiple myeloma. \par \par She was noted to have proteinuria and then had a 24 hour urine protein which showed non-nephrotic range proteinuria of 510 mg/24 hours. She was referred to Dr. Lesa Rendon of nephrology. SPEP showed faint M-spike 0.2 g/dL in gammaglobulin region, immunofixation showed this to be a IgG kappa monoclonal protein. UPEP showed 2 monoclonal protein bands, 61%, and 0.7%. \par \par Subsequent work up:\par 6/19/18 M-protein 0.2 g/dL, Kappa FLC 71, lambda FLC 0.36, FLC ratio 198.61\par , Beta 2 microglobulin 1.5 mg/L\par \par She had a bone marrow biopsy on 6/22/18. Pathology: plasma cell neoplasm, plasma cells comprise 20-25% of marrow cells. Trilineage hematopoiesis present with maturation, increased iron stores. Congo red stain negative for amyloid. Karyotype 46, XX. FISH panel - positive for CCND1/IGH fusion - a/w favorable prognosis.\par \par 7/12/18 PET - negative\par \par Treatment Summary \par 7/19/18 - C1 VRd\par 8/9/18 -C2 VRd\par 8/30/18 - C3 VRd\par 9/20/18 -partial C4 VRd\par 10/11/18 - 1/2/19 KRd (carfilzomib, Revlimid, Dexamethasone) x 4 cycles. \par 2/28/19 - autoPSCT \par 5/18/19 - started Pomalyst\par 7/19/19 - decadron 20 mg weekly added to Pomalyst.\par 2/03/20 - Daratumumab week 1-8 2/03 - 3/31/20. Week 9 (began every other week) 4/07/20. [de-identified] : Returns for treatment. \par Patient feeling severe right arm pain x 1 month. The pain in upper arm is more prominent than shoulder. Has pain at rest and at movement of arm. \par No pain in movement of the hand \par Received cortisone shot to R shoulder about 2 months ago by Ortho. She states that pain is worse after receiving the shot.\par Patient reports took Advil to sleep. Antiinflammatories for 3 weeks but lately been taking Advil  which is helpful. \par \par Today's CBC for oncology 09/02/2021 : WBC 7.3 K/uL, HGB 11.8 g/dL, HCT 37.5%,  K/uL \par \par

## 2021-09-02 NOTE — ASSESSMENT
[FreeTextEntry1] : 70 year old female IgG kappa multiple myeloma,  FISH panel - positive for CCND1/IGH fusion - a/w favorable prognosis. PET CT (7/12/18) did not show any bone lesions. S/p VRd, followed by 4 cycles of KRd, followed by autoPSCT on 2/28/19. \par On 5/18/19 started on Pomalyst for persistently elevated KFLC at 50, FLC ratio 136.\par On Pomalyst + weekly Dex, she has had a partial response, her KFLC has improved to some degree and bone marrow in 12/2019 shows persistent plasma cells of 15-20%. Daratumumab was added from 2/3/2020 onwards.\par PET CT 5/15/20 with no FDG avid lesions, and slight enlargement of right adnexal cyst.\par Q4 week daratumumab started 7/30/20\par \par Continues to tolerate Pomalyst, Darzalex monthly and weekly Dexamethasone.\par \par Today's CBC for oncology 09/02/2021 : WBC 7.3 K/uL, HGB 11.8 g/dL, HCT 37.5%,  K/uL \par Reviwed : FLC ratio 3.84\par \par Patient c/o severe right arm pain for the past month and she receives cortisone shot to R shoulder with no improvement. Finds it difficult to perform everyday activities due to pain.  \par \par Plan:\par - Continue daratumumab q 4 weeks, w/ IV dexamethasone w/ Darzalex, otherwise on oral dexamethasone weekly.\par - Continue pomalyst + weekly dexamethasone. \par - grade 1 CIPN - stable. \par -Upper R arm pain - MRI Right Upper Extremity w/wo contrast\par -follow up orth\par -F/U 1 month

## 2021-09-02 NOTE — PHYSICAL EXAM
[Restricted in physically strenuous activity but ambulatory and able to carry out work of a light or sedentary nature] : Status 1- Restricted in physically strenuous activity but ambulatory and able to carry out work of a light or sedentary nature, e.g., light house work, office work [Normal] : affect appropriate [de-identified] : Pleasant, interactive in NAD.  [de-identified] : no edema [de-identified] : mild tenderness to palpation of right arm / mid humerus.

## 2021-09-02 NOTE — ADDENDUM
[FreeTextEntry1] : Documented by Yusra Price acting as scribe for Dr. De Oliveira on 09/02/2021.\par \par All Medical record entries made by the Scribe were at my, Dr. De Oliveira's, direction and personally dictated by me on 09/02/2021 . I have reviewed the chart and agree that the record accurately reflects my personal performance of the history, physical exam, assessment and plan. I have also personally directed, reviewed, and agreed with the discharge instructions.

## 2021-09-15 ENCOUNTER — OUTPATIENT (OUTPATIENT)
Dept: OUTPATIENT SERVICES | Facility: HOSPITAL | Age: 71
LOS: 1 days | End: 2021-09-15

## 2021-09-15 ENCOUNTER — APPOINTMENT (OUTPATIENT)
Dept: MRI IMAGING | Facility: CLINIC | Age: 71
End: 2021-09-15
Payer: MEDICARE

## 2021-09-15 DIAGNOSIS — Z98.51 TUBAL LIGATION STATUS: Chronic | ICD-10-CM

## 2021-09-15 DIAGNOSIS — M79.601 PAIN IN RIGHT ARM: ICD-10-CM

## 2021-09-15 PROCEDURE — 73220 MRI UPPR EXTREMITY W/O&W/DYE: CPT | Mod: 26,RT

## 2021-09-16 ENCOUNTER — NON-APPOINTMENT (OUTPATIENT)
Age: 71
End: 2021-09-16

## 2021-09-17 ENCOUNTER — APPOINTMENT (OUTPATIENT)
Dept: ORTHOPEDIC SURGERY | Facility: CLINIC | Age: 71
End: 2021-09-17
Payer: MEDICARE

## 2021-09-17 VITALS
BODY MASS INDEX: 30.3 KG/M2 | HEART RATE: 88 BPM | SYSTOLIC BLOOD PRESSURE: 124 MMHG | WEIGHT: 171 LBS | HEIGHT: 63 IN | DIASTOLIC BLOOD PRESSURE: 75 MMHG

## 2021-09-17 PROCEDURE — 99072 ADDL SUPL MATRL&STAF TM PHE: CPT | Mod: RT

## 2021-09-17 PROCEDURE — 99203 OFFICE O/P NEW LOW 30 MIN: CPT

## 2021-09-17 PROCEDURE — 73030 X-RAY EXAM OF SHOULDER: CPT | Mod: RT

## 2021-09-27 ENCOUNTER — OUTPATIENT (OUTPATIENT)
Dept: OUTPATIENT SERVICES | Facility: HOSPITAL | Age: 71
LOS: 1 days | Discharge: HOME | End: 2021-09-27

## 2021-09-27 DIAGNOSIS — C90.00 MULTIPLE MYELOMA NOT HAVING ACHIEVED REMISSION: ICD-10-CM

## 2021-09-27 DIAGNOSIS — Z98.51 TUBAL LIGATION STATUS: Chronic | ICD-10-CM

## 2021-09-30 ENCOUNTER — RESULT REVIEW (OUTPATIENT)
Age: 71
End: 2021-09-30

## 2021-09-30 ENCOUNTER — APPOINTMENT (OUTPATIENT)
Age: 71
End: 2021-09-30

## 2021-09-30 ENCOUNTER — APPOINTMENT (OUTPATIENT)
Dept: HEMATOLOGY ONCOLOGY | Facility: CLINIC | Age: 71
End: 2021-09-30
Payer: MEDICARE

## 2021-09-30 VITALS
DIASTOLIC BLOOD PRESSURE: 72 MMHG | BODY MASS INDEX: 29.68 KG/M2 | HEIGHT: 63 IN | OXYGEN SATURATION: 100 % | SYSTOLIC BLOOD PRESSURE: 114 MMHG | WEIGHT: 167.5 LBS | HEART RATE: 81 BPM

## 2021-09-30 LAB
ACANTHOCYTES BLD QL SMEAR: SLIGHT — SIGNIFICANT CHANGE UP
ANISOCYTOSIS BLD QL: SLIGHT — SIGNIFICANT CHANGE UP
BASOPHILS # BLD AUTO: 0.2 K/UL — SIGNIFICANT CHANGE UP (ref 0–0.2)
BASOPHILS NFR BLD AUTO: 3 % — HIGH (ref 0–2)
ELLIPTOCYTES BLD QL SMEAR: SLIGHT — SIGNIFICANT CHANGE UP
EOSINOPHIL # BLD AUTO: 0.2 K/UL — SIGNIFICANT CHANGE UP (ref 0–0.5)
EOSINOPHIL NFR BLD AUTO: 3 % — SIGNIFICANT CHANGE UP (ref 0–6)
HCT VFR BLD CALC: 31.7 % — LOW (ref 34.5–45)
HGB BLD-MCNC: 10.2 G/DL — LOW (ref 11.5–15.5)
HYPOCHROMIA BLD QL: SLIGHT — SIGNIFICANT CHANGE UP
LG PLATELETS BLD QL AUTO: SLIGHT — SIGNIFICANT CHANGE UP
LYMPHOCYTES # BLD AUTO: 3.5 K/UL — HIGH (ref 1–3.3)
LYMPHOCYTES # BLD AUTO: 44 % — SIGNIFICANT CHANGE UP (ref 13–44)
MACROCYTES BLD QL: SLIGHT — SIGNIFICANT CHANGE UP
MCHC RBC-ENTMCNC: 30.9 PG — SIGNIFICANT CHANGE UP (ref 27–34)
MCHC RBC-ENTMCNC: 32.2 G/DL — SIGNIFICANT CHANGE UP (ref 32–36)
MCV RBC AUTO: 96 FL — SIGNIFICANT CHANGE UP (ref 80–100)
MICROCYTES BLD QL: SLIGHT — SIGNIFICANT CHANGE UP
MONOCYTES # BLD AUTO: 1.3 K/UL — HIGH (ref 0–0.9)
MONOCYTES NFR BLD AUTO: 14 % — SIGNIFICANT CHANGE UP (ref 2–14)
MYELOCYTES NFR BLD: 2 % — HIGH (ref 0–0)
NEUTROPHILS # BLD AUTO: 2.3 K/UL — SIGNIFICANT CHANGE UP (ref 1.8–7.4)
NEUTROPHILS NFR BLD AUTO: 33 % — LOW (ref 43–77)
OVALOCYTES BLD QL SMEAR: SLIGHT — SIGNIFICANT CHANGE UP
PLAT MORPH BLD: NORMAL — SIGNIFICANT CHANGE UP
PLATELET # BLD AUTO: 276 K/UL — SIGNIFICANT CHANGE UP (ref 150–400)
POIKILOCYTOSIS BLD QL AUTO: SLIGHT — SIGNIFICANT CHANGE UP
POLYCHROMASIA BLD QL SMEAR: SLIGHT — SIGNIFICANT CHANGE UP
RBC # BLD: 3.3 M/UL — LOW (ref 3.8–5.2)
RBC # FLD: 16.6 % — HIGH (ref 10.3–14.5)
RBC BLD AUTO: ABNORMAL
SCHISTOCYTES BLD QL AUTO: SLIGHT — SIGNIFICANT CHANGE UP
VARIANT LYMPHS # BLD: 1 % — SIGNIFICANT CHANGE UP (ref 0–6)
WBC # BLD: 7.5 K/UL — SIGNIFICANT CHANGE UP (ref 3.8–10.5)
WBC # FLD AUTO: 7.5 K/UL — SIGNIFICANT CHANGE UP (ref 3.8–10.5)

## 2021-09-30 PROCEDURE — 99215 OFFICE O/P EST HI 40 MIN: CPT

## 2021-09-30 NOTE — ASSESSMENT
[FreeTextEntry1] : 70 year old female IgG kappa multiple myeloma,  FISH panel - positive for CCND1/IGH fusion - a/w favorable prognosis. PET CT (7/12/18) did not show any bone lesions. S/p VRd, followed by 4 cycles of KRd, followed by autoPSCT on 2/28/19. \par On 5/18/19 started on Pomalyst for persistently elevated KFLC at 50, FLC ratio 136.\par On Pomalyst + weekly Dex, she has had a partial response, her KFLC has improved to some degree and bone marrow in 12/2019 shows persistent plasma cells of 15-20%. Daratumumab was added from 2/3/2020 onwards.\par PET CT 5/15/20 with no FDG avid lesions, and slight enlargement of right adnexal cyst.\par Q4 week daratumumab started 7/30/20\par \par 9/15/21 MRI R shoulder - partial collapse of humeral head with associated edema and enhancement with glenohumeral joint effusion and subacromial / subdeltoid and subcoracoid bursitis. High grade likely full thickness tears of supraspinatus and infraspinatus. \par \par Continues to tolerate Pomalyst, Darzalex monthly. \par CBC WBC 7.5, Hgb 10.2 HCT 31.7%, platelets 276K\par Reviewed : FLC ratio 3.84\par \par \par \par Plan:\par - Continue daratumumab q 4 weeks, w/ IV dexamethasone w/ Darzalex\par - Continue pomalyst \par -Decrease Dexamethasone to  8 mg with Darzalex today.\par -Decrease weekly Dex to 4 mg x 3 weeks, and then stop\par - grade 1 CIPN - stable. 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

## 2021-09-30 NOTE — HISTORY OF PRESENT ILLNESS
[de-identified] : Ms. Calero is a 70 year old female w/ multiple myeloma. \par \par She was noted to have proteinuria and then had a 24 hour urine protein which showed non-nephrotic range proteinuria of 510 mg/24 hours. She was referred to Dr. Lesa Rendon of nephrology. SPEP showed faint M-spike 0.2 g/dL in gammaglobulin region, immunofixation showed this to be a IgG kappa monoclonal protein. UPEP showed 2 monoclonal protein bands, 61%, and 0.7%. \par \par Subsequent work up:\par 6/19/18 M-protein 0.2 g/dL, Kappa FLC 71, lambda FLC 0.36, FLC ratio 198.61\par , Beta 2 microglobulin 1.5 mg/L\par \par She had a bone marrow biopsy on 6/22/18. Pathology: plasma cell neoplasm, plasma cells comprise 20-25% of marrow cells. Trilineage hematopoiesis present with maturation, increased iron stores. Congo red stain negative for amyloid. Karyotype 46, XX. FISH panel - positive for CCND1/IGH fusion - a/w favorable prognosis.\par \par 7/12/18 PET - negative\par \par Treatment Summary \par 7/19/18 - C1 VRd\par 8/9/18 -C2 VRd\par 8/30/18 - C3 VRd\par 9/20/18 -partial C4 VRd\par 10/11/18 - 1/2/19 KRd (carfilzomib, Revlimid, Dexamethasone) x 4 cycles. \par 2/28/19 - autoPSCT \par 5/18/19 - started Pomalyst\par 7/19/19 - decadron 20 mg weekly added to Pomalyst.\par 2/03/20 - Daratumumab week 1-8 2/03 - 3/31/20. Week 9 (began every other week) 4/07/20. [de-identified] : Returns for treatment. 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 \par \par \par \par \par

## 2021-09-30 NOTE — PHYSICAL EXAM
[Restricted in physically strenuous activity but ambulatory and able to carry out work of a light or sedentary nature] : Status 1- Restricted in physically strenuous activity but ambulatory and able to carry out work of a light or sedentary nature, e.g., light house work, office work [Normal] : affect appropriate [de-identified] : Pleasant, interactive in NAD.  [de-identified] : no edema [de-identified] : mild tenderness to palpation of right arm / mid humerus.

## 2021-10-01 DIAGNOSIS — R11.2 NAUSEA WITH VOMITING, UNSPECIFIED: ICD-10-CM

## 2021-10-01 DIAGNOSIS — Z51.11 ENCOUNTER FOR ANTINEOPLASTIC CHEMOTHERAPY: ICD-10-CM

## 2021-10-01 LAB
ALBUMIN SERPL ELPH-MCNC: 4.3 G/DL
ALP BLD-CCNC: 83 U/L
ALT SERPL-CCNC: 16 U/L
ANION GAP SERPL CALC-SCNC: 11 MMOL/L
AST SERPL-CCNC: 14 U/L
BILIRUB SERPL-MCNC: 0.3 MG/DL
BUN SERPL-MCNC: 16 MG/DL
CALCIUM SERPL-MCNC: 9.4 MG/DL
CHLORIDE SERPL-SCNC: 110 MMOL/L
CO2 SERPL-SCNC: 24 MMOL/L
CREAT SERPL-MCNC: 0.66 MG/DL
GLUCOSE SERPL-MCNC: 102 MG/DL
POTASSIUM SERPL-SCNC: 3.8 MMOL/L
PROT SERPL-MCNC: 5.8 G/DL
SODIUM SERPL-SCNC: 145 MMOL/L

## 2021-10-11 ENCOUNTER — RX RENEWAL (OUTPATIENT)
Age: 71
End: 2021-10-11

## 2021-10-18 ENCOUNTER — RX RENEWAL (OUTPATIENT)
Age: 71
End: 2021-10-18

## 2021-10-18 LAB
ALBUMIN MFR SERPL ELPH: 65.2 %
ALBUMIN SERPL-MCNC: 3.8 G/DL
ALBUMIN/GLOB SERPL: 1.9 RATIO
ALPHA1 GLOB MFR SERPL ELPH: 5.8 %
ALPHA1 GLOB SERPL ELPH-MCNC: 0.3 G/DL
ALPHA2 GLOB MFR SERPL ELPH: 13.6 %
ALPHA2 GLOB SERPL ELPH-MCNC: 0.8 G/DL
B-GLOBULIN MFR SERPL ELPH: 11.2 %
B-GLOBULIN SERPL ELPH-MCNC: 0.6 G/DL
DEPRECATED KAPPA LC FREE/LAMBDA SER: 3.64 RATIO
GAMMA GLOB FLD ELPH-MCNC: 0.2 G/DL
GAMMA GLOB MFR SERPL ELPH: 4.2 %
IGA SER QL IEP: 24 MG/DL
IGG SER QL IEP: 282 MG/DL
IGM SER QL IEP: 14 MG/DL
INTERPRETATION SERPL IEP-IMP: NORMAL
KAPPA LC CSF-MCNC: 0.33 MG/DL
KAPPA LC SERPL-MCNC: 1.2 MG/DL
M PROTEIN MFR SERPL ELPH: NORMAL
M PROTEIN SPEC IFE-MCNC: NORMAL
MONOCLON BAND OBS SERPL: NORMAL
PROT SERPL-MCNC: 5.8 G/DL
PROT SERPL-MCNC: 5.8 G/DL

## 2021-10-20 ENCOUNTER — RX RENEWAL (OUTPATIENT)
Age: 71
End: 2021-10-20

## 2021-10-20 ENCOUNTER — NON-APPOINTMENT (OUTPATIENT)
Age: 71
End: 2021-10-20

## 2021-10-25 ENCOUNTER — APPOINTMENT (OUTPATIENT)
Dept: ORTHOPEDIC SURGERY | Facility: CLINIC | Age: 71
End: 2021-10-25
Payer: MEDICARE

## 2021-10-25 VITALS
SYSTOLIC BLOOD PRESSURE: 117 MMHG | WEIGHT: 167 LBS | HEART RATE: 87 BPM | BODY MASS INDEX: 29.59 KG/M2 | HEIGHT: 63 IN | DIASTOLIC BLOOD PRESSURE: 75 MMHG

## 2021-10-25 PROCEDURE — 99213 OFFICE O/P EST LOW 20 MIN: CPT

## 2021-10-25 NOTE — HISTORY OF PRESENT ILLNESS
[de-identified] : 10/25/21: Anahi is a pleasant 71-year-old right-hand-dominant female returns to the office today for follow-up regarding her right shoulder pain secondary to avascular necrosis.  She has a past medical history of multiple myeloma and is on chemotherapeutic drugs as well as high-dose steroids.  At her previous office visit we discussed the risks and benefits of nonsurgical versus surgical treatment for her avascular necrosis.  She returns today to discuss those options further.  She also has a new complaint of bilateral knee pain.  She is on pain on and off in her knees over the years but has not had any injuries that she can recall.  She was seeing another orthopedist, Dr Moore, for her knees in the past but has had no recent treatment.  She has had imaging and brings a disc with her which does not have any files on it today.  She denies any fevers, chills, sweats, numbness, tingling, or pain elsewhere.\par \par 9/17/21: Anahi is a pleasant 71-year-old right-hand-dominant female who presents to the office today complaining of right shoulder pain.  The patient has a past medical history of multiple myeloma and is on chemotherapeutic drugs as well as high-dose steroids.  She has been taking consistently dexamethasone for the past several years.  Patient states that ever after she had her second Covid vaccine in the spring she has noticed worsening pain in her right shoulder.  With the pain is also an associated stiffness and loss of range of motion.  Her pain is activity related but also bothers her at rest.  Hurts to reach overhead or behind her.  It hurts to lay on her right side at night.  She recently had an MRI of the shoulder and she was referred to me for evaluation of these findings.  She had a cortisone injection to the right shoulder in July which did not provide her with any substantial relief.  She denies any fevers, chills, sweats, redness, warmth, drainage, numbness, tingling, or pain elsewhere.\par

## 2021-10-25 NOTE — PHYSICAL EXAM
[de-identified] : General:\par Awake, alert, no acute distress, Patient was cooperative and appropriate during the examination.\par \par The patient is of normal weight for height and age.\par \par Walks without an antalgic gait.\par \par Full, painless range of motion of the neck and back.\par \par Exam of the bilateral lower extremities is intact and symmetric with regards to dermatologic, vascular, and neurologic exam. Bilateral lower extremity sensation is grossly intact to light touch in the DP/SP/T/S/S nerve distributions. Intact DF/PF/EHL. BIlateral lower extremities warm and well-perfused with brisk capillary refill.\par \par \par Pulmonary:\par Regular, nonlabored breathing\par \par Abdomen:\par Soft, nontender, nondistended.\par \par Lymphatic:\par No evidence of axillary lymphadenopathy\par \par Right shoulder Exam:\par Physical exam of the shoulder demonstrates normal skin without signs of skin changes or abnormalities. No erythema, warmth, or joint effusion appreciated. \par \par Sensation intact light touch C5-T1\par Palpable radial pulse\par Radial/ulnar/median/axillary/musculocutaneous/AIN/PIN nerves grossly intact\par \par Range of motion:\par Forward Flexion: 150\par Abduction: 90\par External Rotation: 40\par Internal Rotation: Back pocket\par \par Palpation:\par Moderately tender to palpation over the glenohumeral joint\par Mildly tender palpation over the rotator cuff insertion on the greater tuberosity\par Not tender to palpation over the AC joint\par Not tender to palpation of the biceps tendon/bicipital groove\par \par Strength testing:\par Supraspinatus: 4/5\par Infraspinatus: 4/5\par Subscapularis: 5/5\par \par Special test:\par Empty can test positive\par Hardin impingement test positive\par Speeds test positive\par Louin's test negative\par Lift-off test negative\par Bell-press test negative\par Cross-arm adduction test negative\par \par \par \par Bilateral Knee Examination:\par Physical examination of the knees demonstrates normal skin without signs of skin changes or abnormalities. No erythema, warmth, or joint effusion is appreciated. \par \par Sensation is intact to light touch L2-S1\par Palpable DP/PT pulse\par EHL/FHL/TA/GSC motor function intact\par \par Range of Motion\par 0 to 130 degrees bilaterally\par \par Strength Testing\par Quadriceps/Hamstrings 5/5 bilaterally\par Patient is able to perform a straight leg raise bilaterally without difficulty.\par \par Palpation\par Not tender to palpation about the distal femurs, proximal tibias, or patellas\par No palpable defect appreciated in the quadriceps or patellar tendons\par Mildly tender to palpation of medial joint lines\par Mildly tender to palpation of lateral joint lines\par \par Special Tests\par Anterior Drawer negative bilaterally\par Posterior Drawer negative bilaterally\par Lachman Exam negative bilaterally\par No Varus or Valgus Laxity at 0 or 30 degrees of knee flexion bilaterally\par Caty's Test negative bilaterally\par Active compression of the patella positive for crepitus and mild pain bilaterally\par Translation of the patella less than 2 quadrants with firm endpoints bilaterally\par \par \par \par \par \par \par  [de-identified] : MRI of the right upper extremity from a [facility] imaging facility on 9/15/2021 was reviewed the patient today in the office.  There is partial collapse of the humeral head with associated edema and enhancement with a glenohumeral joint effusion and subacromial/subdeltoid and subcoracoid bursitis.  High-grade likely full-thickness tears of the supraspinatus and infraspinatus.  Constellation of findings may represent sequela of rapidly progressive arthrosis, possibly in the setting of a prior subchondral fracture in the setting of steroid use and/or rotator cuff arthropathy.  Neuropathic and infectious etiologies are less likely but differential considerations.  No evidence of an underlying osseous lesion.\par \par X-rays including 4 views of the right shoulder from my office on 9/17/2021 were reviewed again today.  There is no acute fracture or dislocation.  There is collapse of the humeral head and loss of glenohumeral joint space which likely represents avascular necrosis of the humeral head.

## 2021-10-25 NOTE — DISCUSSION/SUMMARY
[de-identified] : Assessment: 72-year-old female with right shoulder pain secondary to avascular necrosis of the humeral head\par \par Plan: I had a long discussion with the patient today regarding the nature of their diagnosis and treatment plan. I reviewed the patient's MRI today with her in the office which demonstrates partial collapse of the humeral head and associated edema and a glenohumeral joint effusion.  There are high-grade, likely full-thickness tears of the supraspinatus and infraspinatus.  Given the patient's history of multiple myeloma on chemotherapeutic agents for treatment as well as long-term corticosteroid use, these constellation of findings on her MRI likely represent avascular necrosis of the humeral head.  The patient has no clinical or systemic signs of infection so this is unlikely. We discussed the risks and benefits of no treatment as well as nonoperative and operative treatments.  Nonsurgical treatment options include resting, icing, heating, stretching, anti-inflammatory medicines as needed, activity/lifestyle modifications, physical therapy, and possible cortisone injections.  The benefits of nonsurgical treatment are that it may manage her symptoms while avoiding the risks and rehabilitation associated with the surgical procedure.  The disadvantages of nonsurgical treatment but that they will not reverse the collapse of the humeral head and she may continue to have pain and functional limitations.  Surgical treatment would include a right reverse total shoulder arthroplasty.  The benefits of surgery include improved pain and function.  The risks of surgery include but are not limited to infection, blood loss, blood clots, neurovascular injury, stiffness/loss of range of motion, fracture, instability, persistent pain, hardware failure, painful hardware, anesthesia related complications including paralysis and death, and the need for further surgery in the future.  The patient understands that she is at much higher risk for infection because she is on steroids and chemotherapeutic agents.  She will require medical clearance prior to surgery and must speak with her oncologist as well regarding whether or not it is safe to proceed with surgery at this time given her medical history in the current medications that she is on.  She has an appointment with Dr. De Oliveira this week.  She will follow-up with me afterwards we can discuss proceeding with nonsurgical versus surgical treatment further at that time.\par \par The patient verbalizes their understanding and agrees with the plan.  All questions were answered to their satisfaction.

## 2021-10-27 ENCOUNTER — OUTPATIENT (OUTPATIENT)
Dept: OUTPATIENT SERVICES | Facility: HOSPITAL | Age: 71
LOS: 1 days | Discharge: HOME | End: 2021-10-27

## 2021-10-27 DIAGNOSIS — Z98.51 TUBAL LIGATION STATUS: Chronic | ICD-10-CM

## 2021-10-27 DIAGNOSIS — C90.00 MULTIPLE MYELOMA NOT HAVING ACHIEVED REMISSION: ICD-10-CM

## 2021-10-28 ENCOUNTER — RESULT REVIEW (OUTPATIENT)
Age: 71
End: 2021-10-28

## 2021-10-28 ENCOUNTER — LABORATORY RESULT (OUTPATIENT)
Age: 71
End: 2021-10-28

## 2021-10-28 ENCOUNTER — APPOINTMENT (OUTPATIENT)
Age: 71
End: 2021-10-28

## 2021-10-28 ENCOUNTER — APPOINTMENT (OUTPATIENT)
Dept: HEMATOLOGY ONCOLOGY | Facility: CLINIC | Age: 71
End: 2021-10-28
Payer: MEDICARE

## 2021-10-28 VITALS
DIASTOLIC BLOOD PRESSURE: 73 MMHG | SYSTOLIC BLOOD PRESSURE: 118 MMHG | WEIGHT: 171 LBS | BODY MASS INDEX: 30.3 KG/M2 | HEIGHT: 63 IN | OXYGEN SATURATION: 99 % | HEART RATE: 93 BPM

## 2021-10-28 DIAGNOSIS — R11.2 NAUSEA WITH VOMITING, UNSPECIFIED: ICD-10-CM

## 2021-10-28 DIAGNOSIS — Z51.11 ENCOUNTER FOR ANTINEOPLASTIC CHEMOTHERAPY: ICD-10-CM

## 2021-10-28 LAB
BASOPHILS # BLD AUTO: 0.2 K/UL — SIGNIFICANT CHANGE UP (ref 0–0.2)
BASOPHILS NFR BLD AUTO: 2.6 % — HIGH (ref 0–2)
EOSINOPHIL # BLD AUTO: 0.3 K/UL — SIGNIFICANT CHANGE UP (ref 0–0.5)
EOSINOPHIL NFR BLD AUTO: 3.7 % — SIGNIFICANT CHANGE UP (ref 0–6)
HCT VFR BLD CALC: 34.8 % — SIGNIFICANT CHANGE UP (ref 34.5–45)
HGB BLD-MCNC: 10.8 G/DL — LOW (ref 11.5–15.5)
LYMPHOCYTES # BLD AUTO: 3.1 K/UL — SIGNIFICANT CHANGE UP (ref 1–3.3)
LYMPHOCYTES # BLD AUTO: 42.3 % — SIGNIFICANT CHANGE UP (ref 13–44)
MCHC RBC-ENTMCNC: 30.5 PG — SIGNIFICANT CHANGE UP (ref 27–34)
MCHC RBC-ENTMCNC: 31 G/DL — LOW (ref 32–36)
MCV RBC AUTO: 98.2 FL — SIGNIFICANT CHANGE UP (ref 80–100)
MONOCYTES # BLD AUTO: 1 K/UL — HIGH (ref 0–0.9)
MONOCYTES NFR BLD AUTO: 13.9 % — SIGNIFICANT CHANGE UP (ref 2–14)
NEUTROPHILS # BLD AUTO: 2.7 K/UL — SIGNIFICANT CHANGE UP (ref 1.8–7.4)
NEUTROPHILS NFR BLD AUTO: 37.4 % — LOW (ref 43–77)
PLATELET # BLD AUTO: 247 K/UL — SIGNIFICANT CHANGE UP (ref 150–400)
RBC # BLD: 3.55 M/UL — LOW (ref 3.8–5.2)
RBC # FLD: 15.5 % — HIGH (ref 10.3–14.5)
WBC # BLD: 7.3 K/UL — SIGNIFICANT CHANGE UP (ref 3.8–10.5)
WBC # FLD AUTO: 7.3 K/UL — SIGNIFICANT CHANGE UP (ref 3.8–10.5)

## 2021-10-28 PROCEDURE — 99214 OFFICE O/P EST MOD 30 MIN: CPT

## 2021-10-28 RX ORDER — AMOXICILLIN 500 MG/1
500 TABLET, FILM COATED ORAL
Qty: 15 | Refills: 0 | Status: DISCONTINUED | COMMUNITY
Start: 2021-09-10

## 2021-10-28 RX ORDER — IBUPROFEN 600 MG/1
600 TABLET, FILM COATED ORAL
Qty: 15 | Refills: 0 | Status: DISCONTINUED | COMMUNITY
Start: 2021-09-10

## 2021-10-28 RX ORDER — CHLORHEXIDINE GLUCONATE, 0.12% ORAL RINSE 1.2 MG/ML
0.12 SOLUTION DENTAL
Qty: 473 | Refills: 0 | Status: DISCONTINUED | COMMUNITY
Start: 2021-09-10

## 2021-10-28 NOTE — PHYSICAL EXAM
[Restricted in physically strenuous activity but ambulatory and able to carry out work of a light or sedentary nature] : Status 1- Restricted in physically strenuous activity but ambulatory and able to carry out work of a light or sedentary nature, e.g., light house work, office work [Normal] : affect appropriate [de-identified] : Pleasant, interactive in NAD.  [de-identified] : no edema [de-identified] : mild tenderness to palpation of right arm / mid humerus.

## 2021-10-28 NOTE — HISTORY OF PRESENT ILLNESS
[de-identified] : Ms. Calero is a 71 year old female w/ multiple myeloma. \par \par She was noted to have proteinuria and then had a 24 hour urine protein which showed non-nephrotic range proteinuria of 510 mg/24 hours. She was referred to Dr. Lesa Rendon of nephrology. SPEP showed faint M-spike 0.2 g/dL in gammaglobulin region, immunofixation showed this to be a IgG kappa monoclonal protein. UPEP showed 2 monoclonal protein bands, 61%, and 0.7%. \par \par Subsequent work up:\par 6/19/18 M-protein 0.2 g/dL, Kappa FLC 71, lambda FLC 0.36, FLC ratio 198.61\par , Beta 2 microglobulin 1.5 mg/L\par \par She had a bone marrow biopsy on 6/22/18. Pathology: plasma cell neoplasm, plasma cells comprise 20-25% of marrow cells. Trilineage hematopoiesis present with maturation, increased iron stores. Congo red stain negative for amyloid. Karyotype 46, XX. FISH panel - positive for CCND1/IGH fusion - a/w favorable prognosis.\par \par 7/12/18 PET - negative\par \par Treatment Summary \par 7/19/18 - C1 VRd\par 8/9/18 -C2 VRd\par 8/30/18 - C3 VRd\par 9/20/18 -partial C4 VRd\par 10/11/18 - 1/2/19 KRd (carfilzomib, Revlimid, Dexamethasone) x 4 cycles. \par 2/28/19 - autoPSCT \par 5/18/19 - started Pomalyst\par 7/19/19 - decadron 20 mg weekly added to Pomalyst.\par 2/03/20 - Daratumumab week 1-8 2/03 - 3/31/20. Week 9 (began every other week) 4/07/20. [de-identified] : Returns for treatment. Tolerated well. \par Notes R shoulder pain is improved with antiinflammatories but still restricted in what she can do with the arm.\par Seen by Sports Med for AVN of the right shoulder, concerned about proceeding w/ surgery due to chemotherapy. Discussed about receiving Cortizone injections. \par No diarrhea, constipation.\par Move to [place] is on hold pending outcome of her R arm AVN.\par \par \par \par

## 2021-10-28 NOTE — ASSESSMENT
[FreeTextEntry1] : 70 year old female IgG kappa multiple myeloma,  FISH panel - positive for CCND1/IGH fusion - a/w favorable prognosis. PET CT (7/12/18) did not show any bone lesions. S/p VRd, followed by 4 cycles of KRd, followed by autoPSCT on 2/28/19. \par On 5/18/19 started on Pomalyst for persistently elevated KFLC at 50, FLC ratio 136.\par On Pomalyst + weekly Dex, she has had a partial response, her KFLC has improved to some degree and bone marrow in 12/2019 shows persistent plasma cells of 15-20%. Daratumumab was added from 2/3/2020 onwards.\par PET CT 5/15/20 with no FDG avid lesions, and slight enlargement of right adnexal cyst.\par Q4 week daratumumab started 7/30/20\par \par 9/15/21 MRI R shoulder - partial collapse of humeral head with associated edema and enhancement with glenohumeral joint effusion and subacromial / subdeltoid and subcoracoid bursitis. High grade likely full thickness tears of supraspinatus and infraspinatus. \par \par Continues to tolerate Pomalyst, Darzalex monthly. \par CBC WBC 7.5, Hgb 10.2 HCT 31.7%, platelets 276K\par Reviewed : FLC ratio 3.64\par \par \par \par Plan:\par - Continue daratumumab q 4 weeks\par - Continue pomalyst \par -now off weekly dexamthasone\par - grade 1 CIPN - stable. \par -AVN- right humeral head - followed up with Dr. Fitzgerald on 10/25/21. Will reach out to him re: further plan \par -F/U in 2 months

## 2021-10-28 NOTE — ADDENDUM
[FreeTextEntry1] : Documented by Apollo Mcdaniel acting as scribe for Dr. De Oliveira on 10/28/2021. \par \par All Medical record entries made by the Scribe were at my, Dr. De Oliveira's, direction and personally dictated by me on 10/28/2021. I have reviewed the chart and agree that the record accurately reflects my personal performance of the history, physical exam, assessment and plan. I have also personally directed, reviewed, and agreed with the discharge instructions.

## 2021-11-26 ENCOUNTER — RESULT REVIEW (OUTPATIENT)
Age: 71
End: 2021-11-26

## 2021-11-26 ENCOUNTER — LABORATORY RESULT (OUTPATIENT)
Age: 71
End: 2021-11-26

## 2021-11-26 ENCOUNTER — APPOINTMENT (OUTPATIENT)
Age: 71
End: 2021-11-26

## 2021-11-26 LAB
BASOPHILS # BLD AUTO: 0.2 K/UL — SIGNIFICANT CHANGE UP (ref 0–0.2)
BASOPHILS NFR BLD AUTO: 2.6 % — HIGH (ref 0–2)
EOSINOPHIL # BLD AUTO: 0.2 K/UL — SIGNIFICANT CHANGE UP (ref 0–0.5)
EOSINOPHIL NFR BLD AUTO: 3.5 % — SIGNIFICANT CHANGE UP (ref 0–6)
HCT VFR BLD CALC: 36 % — SIGNIFICANT CHANGE UP (ref 34.5–45)
HGB BLD-MCNC: 11.1 G/DL — LOW (ref 11.5–15.5)
LYMPHOCYTES # BLD AUTO: 3.5 K/UL — HIGH (ref 1–3.3)
LYMPHOCYTES # BLD AUTO: 51.9 % — HIGH (ref 13–44)
MCHC RBC-ENTMCNC: 30.6 PG — SIGNIFICANT CHANGE UP (ref 27–34)
MCHC RBC-ENTMCNC: 30.9 G/DL — LOW (ref 32–36)
MCV RBC AUTO: 99 FL — SIGNIFICANT CHANGE UP (ref 80–100)
MONOCYTES # BLD AUTO: 1 K/UL — HIGH (ref 0–0.9)
MONOCYTES NFR BLD AUTO: 14.4 % — HIGH (ref 2–14)
NEUTROPHILS # BLD AUTO: 1.8 K/UL — SIGNIFICANT CHANGE UP (ref 1.8–7.4)
NEUTROPHILS NFR BLD AUTO: 27.6 % — LOW (ref 43–77)
PLATELET # BLD AUTO: 242 K/UL — SIGNIFICANT CHANGE UP (ref 150–400)
RBC # BLD: 3.63 M/UL — LOW (ref 3.8–5.2)
RBC # FLD: 14.4 % — SIGNIFICANT CHANGE UP (ref 10.3–14.5)
WBC # BLD: 6.7 K/UL — SIGNIFICANT CHANGE UP (ref 3.8–10.5)
WBC # FLD AUTO: 6.7 K/UL — SIGNIFICANT CHANGE UP (ref 3.8–10.5)

## 2021-11-29 ENCOUNTER — APPOINTMENT (OUTPATIENT)
Dept: ORTHOPEDIC SURGERY | Facility: CLINIC | Age: 71
End: 2021-11-29
Payer: MEDICARE

## 2021-11-29 VITALS
HEIGHT: 63 IN | WEIGHT: 171 LBS | DIASTOLIC BLOOD PRESSURE: 70 MMHG | SYSTOLIC BLOOD PRESSURE: 121 MMHG | BODY MASS INDEX: 30.3 KG/M2 | HEART RATE: 70 BPM

## 2021-11-29 PROCEDURE — 99214 OFFICE O/P EST MOD 30 MIN: CPT | Mod: 25

## 2021-11-29 PROCEDURE — 20610 DRAIN/INJ JOINT/BURSA W/O US: CPT | Mod: LT

## 2021-11-29 NOTE — PROCEDURE
[de-identified] : I injected the patient's left knee today with cortisone.\par \par I discussed at length with the patient the planned steroid and lidocaine injection. The risks, benefits, convalescence and alternatives were reviewed. The possible side effects discussed included but were not limited to: pain, swelling, heat, bleeding, and redness. Symptoms are generally mild but if they are extensive then contact the office. Giving pain relievers by mouth such as NSAIDs or Tylenol can generally treat the reactions to steroid and lidocaine. Rare cases of infection have been noted. Rash, hives and itching may occur post injection. If you have muscle pain or cramps, flushing and or swelling of the face, rapid heart beat, nausea, dizziness, fever, chills, headache, difficulty breathing, swelling in the arms or legs, or have a prickly feeling of your skin, contact a health care provider immediately. Following this discussion, the knee was prepped with Chlorhexidine and Alcohol and under sterile conditions the 80 mg Depo-Medrol and 4 cc Lidocaine injection was performed with a 22 gauge needle through a anterolateral injection site. The needle was introduced into the joint, aspiration was performed to ensure intra-articular placement and the medication was injected. Upon withdrawal of the needle the site was cleaned with alcohol and a band aid applied. The patient tolerated the injection well and there were no adverse effects. Post injection instructions included no strenuous activity for 24 hours, cryotherapy and if there are any adverse effects to contact the office.

## 2021-11-29 NOTE — DISCUSSION/SUMMARY
[de-identified] : Assessment: 71-year-old female with right shoulder pain secondary to avascular necrosis of the humeral head\par \par Plan: I had a long discussion with the patient today regarding the nature of their diagnosis and treatment plan. I reviewed the patient's MRI today with her in the office which demonstrates partial collapse of the humeral head and associated edema and a glenohumeral joint effusion.  There are high-grade, likely full-thickness tears of the supraspinatus and infraspinatus.  Given the patient's history of multiple myeloma on chemotherapeutic agents for treatment as well as long-term corticosteroid use, these constellation of findings on her MRI likely represent avascular necrosis of the humeral head.  The patient has no clinical or systemic signs of infection so this is unlikely. We discussed the risks and benefits of no treatment as well as nonoperative and operative treatments.  Nonsurgical treatment options include resting, icing, heating, stretching, anti-inflammatory medicines as needed, activity/lifestyle modifications, physical therapy, and possible cortisone injections.  The benefits of nonsurgical treatment are that it may manage her symptoms while avoiding the risks and rehabilitation associated with the surgical procedure.  The disadvantages of nonsurgical treatment but that they will not reverse the collapse of the humeral head and she may continue to have pain and functional limitations.  Surgical treatment would include a right reverse total shoulder arthroplasty.  The benefits of surgery include improved pain and function.  The risks of surgery include but are not limited to infection, blood loss, blood clots, neurovascular injury, stiffness/loss of range of motion, fracture, instability, persistent pain, hardware failure, painful hardware, anesthesia related complications including paralysis and death, and the need for further surgery in the future.  The patient understands that she is at much higher risk for infection because she is on steroids and chemotherapeutic agents. \par \par I spoke with Dr. De Oliveira we will plan for the surgery 3 weeks after chemotherapy we will then delay the resumption of the chemotherapy an additional 3 weeks to mitigate the risk of a surgical site infection.  Anahi will speak with my surgical this week about presurgical testing, clearances, and scheduling the procedure.\par \par Regarding the patient's knee pain, we will continue to treat this conservatively.  A cortisone injection was administered to the left knee today in the office under sterile conditions.  The patient tolerated this well and there are no adverse events.\par \par The patient verbalizes their understanding and agrees with the plan.  All questions were answered to their satisfaction.

## 2021-11-29 NOTE — REASON FOR VISIT
[Follow-Up Visit] : a follow-up visit for [FreeTextEntry2] : right shoulder pain and Left knee pain.

## 2021-11-29 NOTE — PHYSICAL EXAM

## 2021-11-29 NOTE — HISTORY OF PRESENT ILLNESS
[de-identified] : 11/29/21: Anahi is a pleasant 71-year-old right-hand-dominant female who returns the office today for follow-up regarding her right shoulder pain secondary to avascular necrosis.  The patient states that her symptoms are relatively unchanged since her previous visit she comes in to discuss possible surgery.  She also states that her left knee continues to bother her.  The patient denies any fevers, chills, sweats, recent illnesses, numbness, tingling, weakness, or pain elsewhere at this time.\par \par 10/25/21: Anahi is a pleasant 71-year-old right-hand-dominant female returns to the office today for follow-up regarding her right shoulder pain secondary to avascular necrosis.  She has a past medical history of multiple myeloma and is on chemotherapeutic drugs as well as high-dose steroids.  At her previous office visit we discussed the risks and benefits of nonsurgical versus surgical treatment for her avascular necrosis.  She returns today to discuss those options further.  She also has a new complaint of bilateral knee pain.  She is on pain on and off in her knees over the years but has not had any injuries that she can recall.  She was seeing another orthopedist, Dr Moore, for her knees in the past but has had no recent treatment.  She has had imaging and brings a disc with her which does not have any files on it today.  She denies any fevers, chills, sweats, numbness, tingling, or pain elsewhere.\par \par 9/17/21: Anahi is a pleasant 71-year-old right-hand-dominant female who presents to the office today complaining of right shoulder pain.  The patient has a past medical history of multiple myeloma and is on chemotherapeutic drugs as well as high-dose steroids.  She has been taking consistently dexamethasone for the past several years.  Patient states that ever after she had her second Covid vaccine in the spring she has noticed worsening pain in her right shoulder.  With the pain is also an associated stiffness and loss of range of motion.  Her pain is activity related but also bothers her at rest.  Hurts to reach overhead or behind her.  It hurts to lay on her right side at night.  She recently had an MRI of the shoulder and she was referred to me for evaluation of these findings.  She had a cortisone injection to the right shoulder in July which did not provide her with any substantial relief.  She denies any fevers, chills, sweats, redness, warmth, drainage, numbness, tingling, or pain elsewhere.\par

## 2021-12-09 NOTE — H&P ADULT - NEGATIVE GASTROINTESTINAL SYMPTOMS
Detail Level: Simple Render Risk Assessment In Note?: no Comment: - onset over one week with tenderness, spreading\\n- no drainage\\n- favoring infection, no signs of vesicles to suggest Zoster\\n- will treat empirically with close follow up no diarrhea/no nausea/no vomiting

## 2021-12-11 ENCOUNTER — APPOINTMENT (OUTPATIENT)
Dept: CT IMAGING | Facility: CLINIC | Age: 71
End: 2021-12-11
Payer: MEDICARE

## 2021-12-11 ENCOUNTER — OUTPATIENT (OUTPATIENT)
Dept: OUTPATIENT SERVICES | Facility: HOSPITAL | Age: 71
LOS: 1 days | End: 2021-12-11

## 2021-12-11 DIAGNOSIS — Z98.51 TUBAL LIGATION STATUS: Chronic | ICD-10-CM

## 2021-12-11 DIAGNOSIS — Z00.8 ENCOUNTER FOR OTHER GENERAL EXAMINATION: ICD-10-CM

## 2021-12-11 PROCEDURE — 73200 CT UPPER EXTREMITY W/O DYE: CPT | Mod: 26,RT

## 2021-12-21 ENCOUNTER — OUTPATIENT (OUTPATIENT)
Dept: OUTPATIENT SERVICES | Facility: HOSPITAL | Age: 71
LOS: 1 days | Discharge: HOME | End: 2021-12-21

## 2021-12-21 DIAGNOSIS — C90.00 MULTIPLE MYELOMA NOT HAVING ACHIEVED REMISSION: ICD-10-CM

## 2021-12-21 DIAGNOSIS — Z98.51 TUBAL LIGATION STATUS: Chronic | ICD-10-CM

## 2021-12-23 ENCOUNTER — RESULT REVIEW (OUTPATIENT)
Age: 71
End: 2021-12-23

## 2021-12-23 ENCOUNTER — APPOINTMENT (OUTPATIENT)
Dept: HEMATOLOGY ONCOLOGY | Facility: CLINIC | Age: 71
End: 2021-12-23
Payer: MEDICARE

## 2021-12-23 ENCOUNTER — APPOINTMENT (OUTPATIENT)
Age: 71
End: 2021-12-23

## 2021-12-23 VITALS
HEART RATE: 93 BPM | BODY MASS INDEX: 29.95 KG/M2 | HEIGHT: 63 IN | OXYGEN SATURATION: 100 % | DIASTOLIC BLOOD PRESSURE: 76 MMHG | SYSTOLIC BLOOD PRESSURE: 117 MMHG | WEIGHT: 169 LBS

## 2021-12-23 LAB
ALBUMIN SERPL ELPH-MCNC: 4.2 G/DL — SIGNIFICANT CHANGE UP (ref 3.3–5)
ALP SERPL-CCNC: 67 U/L — SIGNIFICANT CHANGE UP (ref 40–120)
ALT FLD-CCNC: 18 U/L — SIGNIFICANT CHANGE UP (ref 10–45)
ANION GAP SERPL CALC-SCNC: 12 MMOL/L — SIGNIFICANT CHANGE UP (ref 5–17)
ANISOCYTOSIS BLD QL: SLIGHT — SIGNIFICANT CHANGE UP
AST SERPL-CCNC: 29 U/L — SIGNIFICANT CHANGE UP (ref 10–40)
BASOPHILS # BLD AUTO: 0.2 K/UL — SIGNIFICANT CHANGE UP (ref 0–0.2)
BASOPHILS NFR BLD AUTO: 3 % — HIGH (ref 0–2)
BILIRUB SERPL-MCNC: 0.2 MG/DL — SIGNIFICANT CHANGE UP (ref 0.2–1.2)
BUN SERPL-MCNC: 19 MG/DL — SIGNIFICANT CHANGE UP (ref 7–23)
CALCIUM SERPL-MCNC: 9.7 MG/DL — SIGNIFICANT CHANGE UP (ref 8.4–10.5)
CHLORIDE SERPL-SCNC: 109 MMOL/L — HIGH (ref 96–108)
CO2 SERPL-SCNC: 22 MMOL/L — SIGNIFICANT CHANGE UP (ref 22–31)
CREAT SERPL-MCNC: 0.54 MG/DL — SIGNIFICANT CHANGE UP (ref 0.5–1.3)
DACRYOCYTES BLD QL SMEAR: SLIGHT — SIGNIFICANT CHANGE UP
ELLIPTOCYTES BLD QL SMEAR: SLIGHT — SIGNIFICANT CHANGE UP
EOSINOPHIL # BLD AUTO: 0.2 K/UL — SIGNIFICANT CHANGE UP (ref 0–0.5)
EOSINOPHIL NFR BLD AUTO: 1 % — SIGNIFICANT CHANGE UP (ref 0–6)
GLUCOSE SERPL-MCNC: 97 MG/DL — SIGNIFICANT CHANGE UP (ref 70–99)
HCT VFR BLD CALC: 38.6 % — SIGNIFICANT CHANGE UP (ref 34.5–45)
HGB BLD-MCNC: 11.8 G/DL — SIGNIFICANT CHANGE UP (ref 11.5–15.5)
HYPOCHROMIA BLD QL: SLIGHT — SIGNIFICANT CHANGE UP
IGA FLD-MCNC: 17 MG/DL — LOW (ref 84–499)
IGG FLD-MCNC: 332 MG/DL — LOW (ref 610–1660)
IGM SERPL-MCNC: 14 MG/DL — LOW (ref 35–242)
KAPPA LC SER QL IFE: 1.55 MG/DL — SIGNIFICANT CHANGE UP (ref 0.33–1.94)
KAPPA/LAMBDA FREE LIGHT CHAIN RATIO, SERUM: 5.54 RATIO — HIGH (ref 0.26–1.65)
LAMBDA LC SER QL IFE: 0.28 MG/DL — LOW (ref 0.57–2.63)
LG PLATELETS BLD QL AUTO: SLIGHT — SIGNIFICANT CHANGE UP
LYMPHOCYTES # BLD AUTO: 3.7 K/UL — HIGH (ref 1–3.3)
LYMPHOCYTES # BLD AUTO: 51 % — HIGH (ref 13–44)
MACROCYTES BLD QL: SLIGHT — SIGNIFICANT CHANGE UP
MCHC RBC-ENTMCNC: 29.6 PG — SIGNIFICANT CHANGE UP (ref 27–34)
MCHC RBC-ENTMCNC: 30.5 G/DL — LOW (ref 32–36)
MCV RBC AUTO: 97.1 FL — SIGNIFICANT CHANGE UP (ref 80–100)
MICROCYTES BLD QL: SLIGHT — SIGNIFICANT CHANGE UP
MONOCYTES # BLD AUTO: 1.4 K/UL — HIGH (ref 0–0.9)
MONOCYTES NFR BLD AUTO: 14 % — SIGNIFICANT CHANGE UP (ref 2–14)
NEUTROPHILS # BLD AUTO: 2 K/UL — SIGNIFICANT CHANGE UP (ref 1.8–7.4)
NEUTROPHILS NFR BLD AUTO: 31 % — LOW (ref 43–77)
OVALOCYTES BLD QL SMEAR: SLIGHT — SIGNIFICANT CHANGE UP
PLAT MORPH BLD: NORMAL — SIGNIFICANT CHANGE UP
PLATELET # BLD AUTO: 204 K/UL — SIGNIFICANT CHANGE UP (ref 150–400)
POIKILOCYTOSIS BLD QL AUTO: SLIGHT — SIGNIFICANT CHANGE UP
POLYCHROMASIA BLD QL SMEAR: SLIGHT — SIGNIFICANT CHANGE UP
POTASSIUM SERPL-MCNC: 4 MMOL/L — SIGNIFICANT CHANGE UP (ref 3.5–5.3)
POTASSIUM SERPL-SCNC: 4 MMOL/L — SIGNIFICANT CHANGE UP (ref 3.5–5.3)
PROT SERPL-MCNC: 6.1 G/DL — SIGNIFICANT CHANGE UP (ref 6–8.3)
RBC # BLD: 3.98 M/UL — SIGNIFICANT CHANGE UP (ref 3.8–5.2)
RBC # FLD: 14.5 % — SIGNIFICANT CHANGE UP (ref 10.3–14.5)
RBC BLD AUTO: SIGNIFICANT CHANGE UP
SCHISTOCYTES BLD QL AUTO: SLIGHT — SIGNIFICANT CHANGE UP
SODIUM SERPL-SCNC: 143 MMOL/L — SIGNIFICANT CHANGE UP (ref 135–145)
WBC # BLD: 7.4 K/UL — SIGNIFICANT CHANGE UP (ref 3.8–10.5)
WBC # FLD AUTO: 7.4 K/UL — SIGNIFICANT CHANGE UP (ref 3.8–10.5)

## 2021-12-23 PROCEDURE — 99214 OFFICE O/P EST MOD 30 MIN: CPT

## 2021-12-23 NOTE — HISTORY OF PRESENT ILLNESS
[de-identified] : Ms. Calero is a 71 year old female w/ multiple myeloma. \par \par She was noted to have proteinuria and then had a 24 hour urine protein which showed non-nephrotic range proteinuria of 510 mg/24 hours. She was referred to Dr. Lesa Rendon of nephrology. SPEP showed faint M-spike 0.2 g/dL in gammaglobulin region, immunofixation showed this to be a IgG kappa monoclonal protein. UPEP showed 2 monoclonal protein bands, 61%, and 0.7%. \par \par Subsequent work up:\par 6/19/18 M-protein 0.2 g/dL, Kappa FLC 71, lambda FLC 0.36, FLC ratio 198.61\par , Beta 2 microglobulin 1.5 mg/L\par \par She had a bone marrow biopsy on 6/22/18. Pathology: plasma cell neoplasm, plasma cells comprise 20-25% of marrow cells. Trilineage hematopoiesis present with maturation, increased iron stores. Congo red stain negative for amyloid. Karyotype 46, XX. FISH panel - positive for CCND1/IGH fusion - a/w favorable prognosis.\par \par 7/12/18 PET - negative\par \par Treatment Summary \par 7/19/18 - C1 VRd\par 8/9/18 -C2 VRd\par 8/30/18 - C3 VRd\par 9/20/18 -partial C4 VRd\par 10/11/18 - 1/2/19 KRd (carfilzomib, Revlimid, Dexamethasone) x 4 cycles. \par 2/28/19 - autoPSCT \par 5/18/19 - started Pomalyst\par 7/19/19 - decadron 20 mg weekly added to Pomalyst.\par 2/03/20 - Daratumumab week 1-8 2/03 - 3/31/20. Week 9 (began every other week) 4/07/20. [de-identified] : Returns for treatment. Tolerated well. \par Continued R shoulder pain. Notes taking antiinflammatories with no relief.  \par Seen by Sports Med on 11/29/21 for AVN of the right shoulder. Agrees and elects to have surgery for March 2022. \par No infections \par No diarrhea, constipation.\par Move to [place] is on hold pending outcome of her R arm  AVN.\par \par \par \par

## 2021-12-23 NOTE — ASSESSMENT
[FreeTextEntry1] : 71 year old female IgG kappa multiple myeloma,  FISH panel - positive for CCND1/IGH fusion - a/w favorable prognosis. PET CT (7/12/18) did not show any bone lesions. S/p VRd, followed by 4 cycles of KRd, followed by autoPSCT on 2/28/19. \par On 5/18/19 started on Pomalyst for persistently elevated KFLC at 50, FLC ratio 136.\par On Pomalyst + weekly Dex, she has had a partial response, her KFLC has improved to some degree and bone marrow in 12/2019 shows persistent plasma cells of 15-20%. Daratumumab was added from 2/3/2020 onwards.\par PET CT 5/15/20 with no FDG avid lesions, and slight enlargement of right adnexal cyst.\par Q4 week daratumumab started 7/30/20\par \par 9/15/21 MRI R shoulder - partial collapse of humeral head with associated edema and enhancement with glenohumeral joint effusion and subacromial / subdeltoid and subcoracoid bursitis. High grade likely full thickness tears of supraspinatus and infraspinatus. \par \par Continues to tolerate Pomalyst, Darzalex monthly. \par CBC WBC 7.4, Hgb 11.8 HCT 38.6%, platelets 204K\par Reviewed : FLC ratio 4.16\par \par \par Plan:\par - Continue Daratumumab q 4 weeks\par - Continue Pomalyst \par - Start Tramadol for R Shoulder pain PRN, BID  \par - Grade 1 CIPN - stable. \par - AVN- right humeral head - followed up with Dr. Fitzgerald on 11/29/21. Patient wants surgery in 3/2022. Will coordinate with Dr. Giron. Plan to hold Pomalyst and Darzalex pre and post surgery \par - F/U in 2 months

## 2021-12-23 NOTE — PHYSICAL EXAM
[Restricted in physically strenuous activity but ambulatory and able to carry out work of a light or sedentary nature] : Status 1- Restricted in physically strenuous activity but ambulatory and able to carry out work of a light or sedentary nature, e.g., light house work, office work [Normal] : affect appropriate [de-identified] : Pleasant, interactive in NAD.  [de-identified] : no edema [de-identified] : mild tenderness to palpation of right arm / mid humerus.

## 2021-12-27 DIAGNOSIS — Z51.11 ENCOUNTER FOR ANTINEOPLASTIC CHEMOTHERAPY: ICD-10-CM

## 2021-12-27 DIAGNOSIS — R11.2 NAUSEA WITH VOMITING, UNSPECIFIED: ICD-10-CM

## 2021-12-31 LAB
% ALBUMIN: 66.6 % — SIGNIFICANT CHANGE UP
% ALPHA 1: 5.5 % — SIGNIFICANT CHANGE UP
% ALPHA 2: 13 % — SIGNIFICANT CHANGE UP
% BETA: 10.5 % — SIGNIFICANT CHANGE UP
% GAMMA: 4.4 % — SIGNIFICANT CHANGE UP
% M SPIKE: SIGNIFICANT CHANGE UP
ALBUMIN SERPL ELPH-MCNC: 4.1 G/DL — SIGNIFICANT CHANGE UP (ref 3.6–5.5)
ALBUMIN/GLOB SERPL ELPH: 2 RATIO — SIGNIFICANT CHANGE UP
ALPHA1 GLOB SERPL ELPH-MCNC: 0.3 G/DL — SIGNIFICANT CHANGE UP (ref 0.1–0.4)
ALPHA2 GLOB SERPL ELPH-MCNC: 0.8 G/DL — SIGNIFICANT CHANGE UP (ref 0.5–1)
B-GLOBULIN SERPL ELPH-MCNC: 0.6 G/DL — SIGNIFICANT CHANGE UP (ref 0.5–1)
GAMMA GLOBULIN: 0.3 G/DL — LOW (ref 0.6–1.6)
INTERPRETATION SERPL IFE-IMP: SIGNIFICANT CHANGE UP
M-SPIKE: SIGNIFICANT CHANGE UP (ref 0–0)
PROT PATTERN SERPL ELPH-IMP: SIGNIFICANT CHANGE UP

## 2022-01-18 ENCOUNTER — APPOINTMENT (OUTPATIENT)
Dept: FAMILY MEDICINE | Facility: CLINIC | Age: 72
End: 2022-01-18
Payer: MEDICARE

## 2022-01-18 VITALS
WEIGHT: 162 LBS | SYSTOLIC BLOOD PRESSURE: 122 MMHG | BODY MASS INDEX: 28.7 KG/M2 | OXYGEN SATURATION: 99 % | HEART RATE: 77 BPM | RESPIRATION RATE: 16 BRPM | DIASTOLIC BLOOD PRESSURE: 70 MMHG | HEIGHT: 63 IN

## 2022-01-18 PROCEDURE — 99214 OFFICE O/P EST MOD 30 MIN: CPT

## 2022-01-18 RX ORDER — KETOROLAC TROMETHAMINE 5 MG/ML
0.5 SOLUTION OPHTHALMIC
Qty: 5 | Refills: 0 | Status: DISCONTINUED | COMMUNITY
Start: 2020-11-11 | End: 2022-01-18

## 2022-01-18 RX ORDER — HYALURONATE SODIUM 10 MG/ML
25 SYRINGE (ML) INTRAARTICULAR
Qty: 25 | Refills: 0 | Status: DISCONTINUED | COMMUNITY
Start: 2021-06-07 | End: 2022-01-18

## 2022-01-19 NOTE — PHYSICAL EXAM
[Normal Sclera/Conjunctiva] : normal sclera/conjunctiva [Normal Outer Ear/Nose] : the outer ears and nose were normal in appearance [Supple] : supple [No Respiratory Distress] : no respiratory distress  [No Accessory Muscle Use] : no accessory muscle use [Normal] : soft, non-tender, non-distended, no masses palpated, no HSM and normal bowel sounds [Normal Gait] : normal gait [Normal Affect] : the affect was normal [de-identified] : R arm ROM intact, slightly tender

## 2022-01-19 NOTE — HISTORY OF PRESENT ILLNESS
[FreeTextEntry1] : Pt 6 month follow up on HTN, HLD and give a health update to PMD. [de-identified] : Patient reports she is doing well, has no acute complaints. She is due for Lipid and Thyroid labs, other labs checked frequently with Heme/Onc. Patient is planned to right shoulder replacement in March, will return for medical clearance.  She does have bilateral knee osteoarthritis, she is trying to keep joints mobile to avoid possible knee replacement in the future. She is looking to have Rosuvastatin refilled.

## 2022-01-20 ENCOUNTER — RESULT REVIEW (OUTPATIENT)
Age: 72
End: 2022-01-20

## 2022-01-20 ENCOUNTER — APPOINTMENT (OUTPATIENT)
Age: 72
End: 2022-01-20

## 2022-01-20 LAB
ALBUMIN SERPL ELPH-MCNC: 4.1 G/DL — SIGNIFICANT CHANGE UP (ref 3.3–5)
ALP SERPL-CCNC: 63 U/L — SIGNIFICANT CHANGE UP (ref 40–120)
ALT FLD-CCNC: 18 U/L — SIGNIFICANT CHANGE UP (ref 10–45)
ANION GAP SERPL CALC-SCNC: 15 MMOL/L — SIGNIFICANT CHANGE UP (ref 5–17)
ANISOCYTOSIS BLD QL: SLIGHT — SIGNIFICANT CHANGE UP
AST SERPL-CCNC: 24 U/L — SIGNIFICANT CHANGE UP (ref 10–40)
BASOPHILS # BLD AUTO: 0.2 K/UL — SIGNIFICANT CHANGE UP (ref 0–0.2)
BASOPHILS NFR BLD AUTO: 1 % — SIGNIFICANT CHANGE UP (ref 0–2)
BILIRUB SERPL-MCNC: 0.2 MG/DL — SIGNIFICANT CHANGE UP (ref 0.2–1.2)
BUN SERPL-MCNC: 15 MG/DL — SIGNIFICANT CHANGE UP (ref 7–23)
CALCIUM SERPL-MCNC: 9.3 MG/DL — SIGNIFICANT CHANGE UP (ref 8.4–10.5)
CHLORIDE SERPL-SCNC: 108 MMOL/L — SIGNIFICANT CHANGE UP (ref 96–108)
CO2 SERPL-SCNC: 20 MMOL/L — LOW (ref 22–31)
CREAT SERPL-MCNC: 0.6 MG/DL — SIGNIFICANT CHANGE UP (ref 0.5–1.3)
EOSINOPHIL # BLD AUTO: 0.2 K/UL — SIGNIFICANT CHANGE UP (ref 0–0.5)
EOSINOPHIL NFR BLD AUTO: 5 % — SIGNIFICANT CHANGE UP (ref 0–6)
GLUCOSE SERPL-MCNC: 85 MG/DL — SIGNIFICANT CHANGE UP (ref 70–99)
HCT VFR BLD CALC: 37.6 % — SIGNIFICANT CHANGE UP (ref 34.5–45)
HGB BLD-MCNC: 11.8 G/DL — SIGNIFICANT CHANGE UP (ref 11.5–15.5)
IGA FLD-MCNC: 17 MG/DL — LOW (ref 84–499)
IGG FLD-MCNC: 552 MG/DL — LOW (ref 610–1660)
IGM SERPL-MCNC: 12 MG/DL — LOW (ref 35–242)
KAPPA LC SER QL IFE: 1.39 MG/DL — SIGNIFICANT CHANGE UP (ref 0.33–1.94)
KAPPA/LAMBDA FREE LIGHT CHAIN RATIO, SERUM: 5.35 RATIO — HIGH (ref 0.26–1.65)
LAMBDA LC SER QL IFE: 0.26 MG/DL — LOW (ref 0.57–2.63)
LYMPHOCYTES # BLD AUTO: 3.8 K/UL — HIGH (ref 1–3.3)
LYMPHOCYTES # BLD AUTO: 61 % — HIGH (ref 13–44)
MACROCYTES BLD QL: SLIGHT — SIGNIFICANT CHANGE UP
MCHC RBC-ENTMCNC: 29.3 PG — SIGNIFICANT CHANGE UP (ref 27–34)
MCHC RBC-ENTMCNC: 31.3 G/DL — LOW (ref 32–36)
MCV RBC AUTO: 93.7 FL — SIGNIFICANT CHANGE UP (ref 80–100)
MICROCYTES BLD QL: SLIGHT — SIGNIFICANT CHANGE UP
MONOCYTES # BLD AUTO: 0.9 K/UL — SIGNIFICANT CHANGE UP (ref 0–0.9)
MONOCYTES NFR BLD AUTO: 14 % — SIGNIFICANT CHANGE UP (ref 2–14)
NEUTROPHILS # BLD AUTO: 1.3 K/UL — LOW (ref 1.8–7.4)
NEUTROPHILS NFR BLD AUTO: 17 % — LOW (ref 43–77)
OVALOCYTES BLD QL SMEAR: SLIGHT — SIGNIFICANT CHANGE UP
PLAT MORPH BLD: NORMAL — SIGNIFICANT CHANGE UP
PLATELET # BLD AUTO: 234 K/UL — SIGNIFICANT CHANGE UP (ref 150–400)
POIKILOCYTOSIS BLD QL AUTO: SLIGHT — SIGNIFICANT CHANGE UP
POLYCHROMASIA BLD QL SMEAR: SLIGHT — SIGNIFICANT CHANGE UP
POTASSIUM SERPL-MCNC: 4.2 MMOL/L — SIGNIFICANT CHANGE UP (ref 3.5–5.3)
POTASSIUM SERPL-SCNC: 4.2 MMOL/L — SIGNIFICANT CHANGE UP (ref 3.5–5.3)
PROT SERPL-MCNC: 6.1 G/DL — SIGNIFICANT CHANGE UP (ref 6–8.3)
RBC # BLD: 4.01 M/UL — SIGNIFICANT CHANGE UP (ref 3.8–5.2)
RBC # FLD: 14.2 % — SIGNIFICANT CHANGE UP (ref 10.3–14.5)
RBC BLD AUTO: SIGNIFICANT CHANGE UP
ROULEAUX BLD QL SMEAR: PRESENT — SIGNIFICANT CHANGE UP
SMUDGE CELLS # BLD: PRESENT — SIGNIFICANT CHANGE UP
SODIUM SERPL-SCNC: 143 MMOL/L — SIGNIFICANT CHANGE UP (ref 135–145)
VARIANT LYMPHS # BLD: 2 % — SIGNIFICANT CHANGE UP (ref 0–6)
WBC # BLD: 6.4 K/UL — SIGNIFICANT CHANGE UP (ref 3.8–10.5)
WBC # FLD AUTO: 6.4 K/UL — SIGNIFICANT CHANGE UP (ref 3.8–10.5)

## 2022-01-21 ENCOUNTER — LABORATORY RESULT (OUTPATIENT)
Age: 72
End: 2022-01-21

## 2022-01-25 LAB
% ALBUMIN: 67.6 % — SIGNIFICANT CHANGE UP
% ALPHA 1: 5.1 % — SIGNIFICANT CHANGE UP
% ALPHA 2: 12.5 % — SIGNIFICANT CHANGE UP
% BETA: 10.3 % — SIGNIFICANT CHANGE UP
% GAMMA: 4.5 % — SIGNIFICANT CHANGE UP
% M SPIKE: SIGNIFICANT CHANGE UP
ALBUMIN SERPL ELPH-MCNC: 4.1 G/DL — SIGNIFICANT CHANGE UP (ref 3.6–5.5)
ALBUMIN/GLOB SERPL ELPH: 2 RATIO — SIGNIFICANT CHANGE UP
ALPHA1 GLOB SERPL ELPH-MCNC: 0.3 G/DL — SIGNIFICANT CHANGE UP (ref 0.1–0.4)
ALPHA2 GLOB SERPL ELPH-MCNC: 0.8 G/DL — SIGNIFICANT CHANGE UP (ref 0.5–1)
B-GLOBULIN SERPL ELPH-MCNC: 0.6 G/DL — SIGNIFICANT CHANGE UP (ref 0.5–1)
GAMMA GLOBULIN: 0.3 G/DL — LOW (ref 0.6–1.6)
INTERPRETATION SERPL IFE-IMP: SIGNIFICANT CHANGE UP
M-SPIKE: SIGNIFICANT CHANGE UP (ref 0–0)
PROT PATTERN SERPL ELPH-IMP: SIGNIFICANT CHANGE UP

## 2022-01-28 LAB
CHOLEST SERPL-MCNC: 187 MG/DL
HDLC SERPL-MCNC: 98 MG/DL
LDLC SERPL CALC-MCNC: 78 MG/DL
NONHDLC SERPL-MCNC: 89 MG/DL
TRIGL SERPL-MCNC: 55 MG/DL
TSH SERPL-ACNC: 0.09 UIU/ML

## 2022-02-09 ENCOUNTER — OUTPATIENT (OUTPATIENT)
Dept: OUTPATIENT SERVICES | Facility: HOSPITAL | Age: 72
LOS: 1 days | Discharge: HOME | End: 2022-02-09

## 2022-02-09 DIAGNOSIS — Z98.51 TUBAL LIGATION STATUS: Chronic | ICD-10-CM

## 2022-02-09 DIAGNOSIS — C90.00 MULTIPLE MYELOMA NOT HAVING ACHIEVED REMISSION: ICD-10-CM

## 2022-02-17 ENCOUNTER — APPOINTMENT (OUTPATIENT)
Dept: HEMATOLOGY ONCOLOGY | Facility: CLINIC | Age: 72
End: 2022-02-17
Payer: MEDICARE

## 2022-02-17 ENCOUNTER — RESULT REVIEW (OUTPATIENT)
Age: 72
End: 2022-02-17

## 2022-02-17 ENCOUNTER — APPOINTMENT (OUTPATIENT)
Age: 72
End: 2022-02-17

## 2022-02-17 VITALS
SYSTOLIC BLOOD PRESSURE: 116 MMHG | OXYGEN SATURATION: 100 % | BODY MASS INDEX: 30.34 KG/M2 | WEIGHT: 171.25 LBS | HEIGHT: 63 IN | DIASTOLIC BLOOD PRESSURE: 73 MMHG | HEART RATE: 88 BPM

## 2022-02-17 LAB
ALBUMIN SERPL ELPH-MCNC: 4.5 G/DL — SIGNIFICANT CHANGE UP (ref 3.3–5)
ALP SERPL-CCNC: 70 U/L — SIGNIFICANT CHANGE UP (ref 40–120)
ALT FLD-CCNC: 17 U/L — SIGNIFICANT CHANGE UP (ref 10–45)
ANION GAP SERPL CALC-SCNC: 13 MMOL/L — SIGNIFICANT CHANGE UP (ref 5–17)
AST SERPL-CCNC: 20 U/L — SIGNIFICANT CHANGE UP (ref 10–40)
BASOPHILS # BLD AUTO: 0.2 K/UL — SIGNIFICANT CHANGE UP (ref 0–0.2)
BASOPHILS NFR BLD AUTO: 2.8 % — HIGH (ref 0–2)
BILIRUB SERPL-MCNC: 0.2 MG/DL — SIGNIFICANT CHANGE UP (ref 0.2–1.2)
BUN SERPL-MCNC: 20 MG/DL — SIGNIFICANT CHANGE UP (ref 7–23)
CALCIUM SERPL-MCNC: 9.8 MG/DL — SIGNIFICANT CHANGE UP (ref 8.4–10.5)
CHLORIDE SERPL-SCNC: 108 MMOL/L — SIGNIFICANT CHANGE UP (ref 96–108)
CO2 SERPL-SCNC: 24 MMOL/L — SIGNIFICANT CHANGE UP (ref 22–31)
CREAT SERPL-MCNC: 0.65 MG/DL — SIGNIFICANT CHANGE UP (ref 0.5–1.3)
EOSINOPHIL # BLD AUTO: 0.2 K/UL — SIGNIFICANT CHANGE UP (ref 0–0.5)
EOSINOPHIL NFR BLD AUTO: 3 % — SIGNIFICANT CHANGE UP (ref 0–6)
GLUCOSE SERPL-MCNC: 79 MG/DL — SIGNIFICANT CHANGE UP (ref 70–99)
HCT VFR BLD CALC: 36.4 % — SIGNIFICANT CHANGE UP (ref 34.5–45)
HGB BLD-MCNC: 11 G/DL — LOW (ref 11.5–15.5)
IGA FLD-MCNC: 18 MG/DL — LOW (ref 84–499)
IGG FLD-MCNC: 285 MG/DL — LOW (ref 610–1660)
IGM SERPL-MCNC: <10 MG/DL — LOW (ref 35–242)
KAPPA LC SER QL IFE: 1.64 MG/DL — SIGNIFICANT CHANGE UP (ref 0.33–1.94)
KAPPA/LAMBDA FREE LIGHT CHAIN RATIO, SERUM: 4.1 RATIO — HIGH (ref 0.26–1.65)
LAMBDA LC SER QL IFE: 0.4 MG/DL — LOW (ref 0.57–2.63)
LYMPHOCYTES # BLD AUTO: 3.8 K/UL — HIGH (ref 1–3.3)
LYMPHOCYTES # BLD AUTO: 55.8 % — HIGH (ref 13–44)
MCHC RBC-ENTMCNC: 29.3 PG — SIGNIFICANT CHANGE UP (ref 27–34)
MCHC RBC-ENTMCNC: 30.2 G/DL — LOW (ref 32–36)
MCV RBC AUTO: 97.3 FL — SIGNIFICANT CHANGE UP (ref 80–100)
MONOCYTES # BLD AUTO: 1 K/UL — HIGH (ref 0–0.9)
MONOCYTES NFR BLD AUTO: 14.7 % — HIGH (ref 2–14)
NEUTROPHILS # BLD AUTO: 1.6 K/UL — LOW (ref 1.8–7.4)
NEUTROPHILS NFR BLD AUTO: 23.7 % — LOW (ref 43–77)
PLATELET # BLD AUTO: 271 K/UL — SIGNIFICANT CHANGE UP (ref 150–400)
POTASSIUM SERPL-MCNC: 4.2 MMOL/L — SIGNIFICANT CHANGE UP (ref 3.5–5.3)
POTASSIUM SERPL-SCNC: 4.2 MMOL/L — SIGNIFICANT CHANGE UP (ref 3.5–5.3)
PROT SERPL-MCNC: 5.8 G/DL — LOW (ref 6–8.3)
RBC # BLD: 3.74 M/UL — LOW (ref 3.8–5.2)
RBC # FLD: 15.4 % — HIGH (ref 10.3–14.5)
SODIUM SERPL-SCNC: 145 MMOL/L — SIGNIFICANT CHANGE UP (ref 135–145)
WBC # BLD: 6.9 K/UL — SIGNIFICANT CHANGE UP (ref 3.8–10.5)
WBC # FLD AUTO: 6.9 K/UL — SIGNIFICANT CHANGE UP (ref 3.8–10.5)

## 2022-02-17 PROCEDURE — 99214 OFFICE O/P EST MOD 30 MIN: CPT

## 2022-02-17 NOTE — PHYSICAL EXAM
[Restricted in physically strenuous activity but ambulatory and able to carry out work of a light or sedentary nature] : Status 1- Restricted in physically strenuous activity but ambulatory and able to carry out work of a light or sedentary nature, e.g., light house work, office work [Normal] : affect appropriate [de-identified] : Pleasant, interactive in NAD.  [de-identified] : no edema

## 2022-02-17 NOTE — ASSESSMENT
[FreeTextEntry1] : 72 year old female IgG kappa multiple myeloma,  FISH panel - positive for CCND1/IGH fusion - a/w favorable prognosis. PET CT (7/12/18) did not show any bone lesions. S/p VRd, followed by 4 cycles of KRd, followed by autoPSCT on 2/28/19. \par On 5/18/19 started on Pomalyst for persistently elevated KFLC at 50, FLC ratio 136.\par On Pomalyst + weekly Dex, she has had a partial response, her KFLC has improved to some degree and bone marrow in 12/2019 shows persistent plasma cells of 15-20%. Daratumumab was added from 2/3/2020 onwards.\par PET CT 5/15/20 with no FDG avid lesions, and slight enlargement of right adnexal cyst.\par Q4 week daratumumab started 7/30/20\par \par 9/15/21 MRI R shoulder - partial collapse of humeral head with associated edema and enhancement with glenohumeral joint effusion and subacromial / subdeltoid and subcoracoid bursitis. High grade likely full thickness tears of supraspinatus and infraspinatus. \par \par Continues to tolerate Pomalyst, Darzalex monthly. \par CBC WBC 6.9, Hgb 11.0, HCT 36.4%, platelets 271 K\par Reviewed : IGG and FLC ratio slowly trending up, possibly secondary to stop of weekly dexamethasone in 9/2021 due to AVN\par \par \par Plan:\par - Continue Daratumumab q 4 weeks last dose today prior to surgery.\par - Continue Pomalyst, D3 today. Hold on 02/24/2022 for 1 month prior to surgery. \par -plan to reintroduce weekly dexamethasone after shoulder surgery given slight increase in trend of IGG and FLC ratio.\par - Continue Tramadol for R Shoulder pain PRN, BID  \par - Grade 1 CIPN - stable. \par - AVN- right humeral head - scheduled to have right shoulder replacement on 3/24/2022 with Dr. Giron.\par -repeat CBC on 3/14/22 in anticipation of surgery\par - F/U in 2 weeks post-op

## 2022-02-17 NOTE — HISTORY OF PRESENT ILLNESS
[de-identified] : Ms. Calero is a 72 year old female w/ multiple myeloma. \par \par She was noted to have proteinuria and then had a 24 hour urine protein which showed non-nephrotic range proteinuria of 510 mg/24 hours. She was referred to Dr. Lesa Rendon of nephrology. SPEP showed faint M-spike 0.2 g/dL in gammaglobulin region, immunofixation showed this to be a IgG kappa monoclonal protein. UPEP showed 2 monoclonal protein bands, 61%, and 0.7%. \par \par Subsequent work up:\par 6/19/18 M-protein 0.2 g/dL, Kappa FLC 71, lambda FLC 0.36, FLC ratio 198.61\par , Beta 2 microglobulin 1.5 mg/L\par \par She had a bone marrow biopsy on 6/22/18. Pathology: plasma cell neoplasm, plasma cells comprise 20-25% of marrow cells. Trilineage hematopoiesis present with maturation, increased iron stores. Congo red stain negative for amyloid. Karyotype 46, XX. FISH panel - positive for CCND1/IGH fusion - a/w favorable prognosis.\par \par 7/12/18 PET - negative\par \par Treatment Summary \par 7/19/18 - C1 VRd\par 8/9/18 -C2 VRd\par 8/30/18 - C3 VRd\par 9/20/18 -partial C4 VRd\par 10/11/18 - 1/2/19 KRd (carfilzomib, Revlimid, Dexamethasone) x 4 cycles. \par 2/28/19 - autoPSCT \par 5/18/19 - started Pomalyst\par 7/19/19 - decadron 20 mg weekly added to Pomalyst.\par 2/03/20 - Daratumumab week 1-8 2/03 - 3/31/20. Week 9 (began every other week) 4/07/20. [de-identified] : Returns for treatment. Tolerated well. \par Continued R shoulder pain. \par Seen by Dr. Giron on 11/29/21 for AVN of the right shoulder. Right shoulder replacement on March 24, 2022. \par Reports b/l knee pain. H/o Baker's cyst in left knee. \par Move to [place] is planned for July 2022, pending outcome of her R arm  AVN.  \par \par \par \par

## 2022-02-18 DIAGNOSIS — R11.2 NAUSEA WITH VOMITING, UNSPECIFIED: ICD-10-CM

## 2022-02-18 DIAGNOSIS — Z51.11 ENCOUNTER FOR ANTINEOPLASTIC CHEMOTHERAPY: ICD-10-CM

## 2022-02-18 LAB
ALBUMIN SERPL ELPH-MCNC: 4.5 G/DL
ALP BLD-CCNC: 72 U/L
ALT SERPL-CCNC: 17 U/L
ANION GAP SERPL CALC-SCNC: 12 MMOL/L
AST SERPL-CCNC: 20 U/L
BILIRUB SERPL-MCNC: 0.3 MG/DL
BUN SERPL-MCNC: 19 MG/DL
CALCIUM SERPL-MCNC: 9.7 MG/DL
CHLORIDE SERPL-SCNC: 108 MMOL/L
CO2 SERPL-SCNC: 25 MMOL/L
CREAT SERPL-MCNC: 0.66 MG/DL
GLUCOSE SERPL-MCNC: 87 MG/DL
POTASSIUM SERPL-SCNC: 4.2 MMOL/L
PROT SERPL-MCNC: 5.9 G/DL
SODIUM SERPL-SCNC: 145 MMOL/L

## 2022-02-22 LAB
% ALBUMIN: 66.9 % — SIGNIFICANT CHANGE UP
% ALPHA 1: 5 % — SIGNIFICANT CHANGE UP
% ALPHA 2: 12.7 % — SIGNIFICANT CHANGE UP
% BETA: 10.5 % — SIGNIFICANT CHANGE UP
% GAMMA: 4.9 % — SIGNIFICANT CHANGE UP
% M SPIKE: SIGNIFICANT CHANGE UP
ALBUMIN MFR SERPL ELPH: 65.7 %
ALBUMIN SERPL ELPH-MCNC: 3.9 G/DL — SIGNIFICANT CHANGE UP (ref 3.6–5.5)
ALBUMIN SERPL-MCNC: 3.9 G/DL
ALBUMIN/GLOB SERPL ELPH: 2.1 RATIO — SIGNIFICANT CHANGE UP
ALBUMIN/GLOB SERPL: 2 RATIO
ALPHA1 GLOB MFR SERPL ELPH: 5.2 %
ALPHA1 GLOB SERPL ELPH-MCNC: 0.3 G/DL
ALPHA1 GLOB SERPL ELPH-MCNC: 0.3 G/DL — SIGNIFICANT CHANGE UP (ref 0.1–0.4)
ALPHA2 GLOB MFR SERPL ELPH: 13.2 %
ALPHA2 GLOB SERPL ELPH-MCNC: 0.7 G/DL — SIGNIFICANT CHANGE UP (ref 0.5–1)
ALPHA2 GLOB SERPL ELPH-MCNC: 0.8 G/DL
B-GLOBULIN MFR SERPL ELPH: 10.8 %
B-GLOBULIN SERPL ELPH-MCNC: 0.6 G/DL
B-GLOBULIN SERPL ELPH-MCNC: 0.6 G/DL — SIGNIFICANT CHANGE UP (ref 0.5–1)
DEPRECATED KAPPA LC FREE/LAMBDA SER: 4.82 RATIO
GAMMA GLOB FLD ELPH-MCNC: 0.3 G/DL
GAMMA GLOB MFR SERPL ELPH: 5.1 %
GAMMA GLOBULIN: 0.3 G/DL — LOW (ref 0.6–1.6)
IGA SER QL IEP: 18 MG/DL
IGG SER QL IEP: 309 MG/DL
IGM SER QL IEP: <10 MG/DL
INTERPRETATION SERPL IEP-IMP: NORMAL
INTERPRETATION SERPL IFE-IMP: SIGNIFICANT CHANGE UP
KAPPA LC CSF-MCNC: 0.33 MG/DL
KAPPA LC SERPL-MCNC: 1.59 MG/DL
M PROTEIN MFR SERPL ELPH: NORMAL
M PROTEIN SPEC IFE-MCNC: NORMAL
M-SPIKE: SIGNIFICANT CHANGE UP (ref 0–0)
MONOCLON BAND OBS SERPL: NORMAL
PROT PATTERN SERPL ELPH-IMP: SIGNIFICANT CHANGE UP
PROT SERPL-MCNC: 5.9 G/DL
PROT SERPL-MCNC: 5.9 G/DL

## 2022-03-04 ENCOUNTER — OUTPATIENT (OUTPATIENT)
Dept: OUTPATIENT SERVICES | Facility: HOSPITAL | Age: 72
LOS: 1 days | End: 2022-03-04
Payer: MEDICARE

## 2022-03-04 VITALS
RESPIRATION RATE: 20 BRPM | WEIGHT: 169.76 LBS | SYSTOLIC BLOOD PRESSURE: 120 MMHG | TEMPERATURE: 98 F | DIASTOLIC BLOOD PRESSURE: 60 MMHG | HEART RATE: 57 BPM | HEIGHT: 63 IN | OXYGEN SATURATION: 98 %

## 2022-03-04 DIAGNOSIS — Z91.89 OTHER SPECIFIED PERSONAL RISK FACTORS, NOT ELSEWHERE CLASSIFIED: ICD-10-CM

## 2022-03-04 DIAGNOSIS — M25.511 PAIN IN RIGHT SHOULDER: ICD-10-CM

## 2022-03-04 DIAGNOSIS — Z98.890 OTHER SPECIFIED POSTPROCEDURAL STATES: Chronic | ICD-10-CM

## 2022-03-04 DIAGNOSIS — Z94.84 STEM CELLS TRANSPLANT STATUS: Chronic | ICD-10-CM

## 2022-03-04 DIAGNOSIS — Z01.818 ENCOUNTER FOR OTHER PREPROCEDURAL EXAMINATION: ICD-10-CM

## 2022-03-04 DIAGNOSIS — Z29.9 ENCOUNTER FOR PROPHYLACTIC MEASURES, UNSPECIFIED: ICD-10-CM

## 2022-03-04 DIAGNOSIS — Z98.51 TUBAL LIGATION STATUS: Chronic | ICD-10-CM

## 2022-03-04 LAB
A1C WITH ESTIMATED AVERAGE GLUCOSE RESULT: 5.1 % — SIGNIFICANT CHANGE UP (ref 4–5.6)
ANION GAP SERPL CALC-SCNC: 15 MMOL/L — SIGNIFICANT CHANGE UP (ref 5–17)
APTT BLD: 31.6 SEC — SIGNIFICANT CHANGE UP (ref 27.5–35.5)
BASOPHILS # BLD AUTO: 0.17 K/UL — SIGNIFICANT CHANGE UP (ref 0–0.2)
BASOPHILS NFR BLD AUTO: 2.5 % — HIGH (ref 0–2)
BLD GP AB SCN SERPL QL: SIGNIFICANT CHANGE UP
BUN SERPL-MCNC: 14.5 MG/DL — SIGNIFICANT CHANGE UP (ref 8–20)
CALCIUM SERPL-MCNC: 9.5 MG/DL — SIGNIFICANT CHANGE UP (ref 8.6–10.2)
CHLORIDE SERPL-SCNC: 102 MMOL/L — SIGNIFICANT CHANGE UP (ref 98–107)
CO2 SERPL-SCNC: 26 MMOL/L — SIGNIFICANT CHANGE UP (ref 22–29)
CREAT SERPL-MCNC: 0.53 MG/DL — SIGNIFICANT CHANGE UP (ref 0.5–1.3)
DIR ANTIGLOB POLYSPECIFIC INTERPRETATION: SIGNIFICANT CHANGE UP
EGFR: 99 ML/MIN/1.73M2 — SIGNIFICANT CHANGE UP
EOSINOPHIL # BLD AUTO: 0.13 K/UL — SIGNIFICANT CHANGE UP (ref 0–0.5)
EOSINOPHIL NFR BLD AUTO: 1.9 % — SIGNIFICANT CHANGE UP (ref 0–6)
ESTIMATED AVERAGE GLUCOSE: 100 MG/DL — SIGNIFICANT CHANGE UP (ref 68–114)
GLUCOSE SERPL-MCNC: 76 MG/DL — SIGNIFICANT CHANGE UP (ref 70–99)
HCT VFR BLD CALC: 34.8 % — SIGNIFICANT CHANGE UP (ref 34.5–45)
HGB BLD-MCNC: 11.2 G/DL — LOW (ref 11.5–15.5)
IMM GRANULOCYTES NFR BLD AUTO: 0.1 % — SIGNIFICANT CHANGE UP (ref 0–1.5)
INR BLD: 0.98 RATIO — SIGNIFICANT CHANGE UP (ref 0.88–1.16)
LYMPHOCYTES # BLD AUTO: 2.84 K/UL — SIGNIFICANT CHANGE UP (ref 1–3.3)
LYMPHOCYTES # BLD AUTO: 42.1 % — SIGNIFICANT CHANGE UP (ref 13–44)
MCHC RBC-ENTMCNC: 29.4 PG — SIGNIFICANT CHANGE UP (ref 27–34)
MCHC RBC-ENTMCNC: 32.2 GM/DL — SIGNIFICANT CHANGE UP (ref 32–36)
MCV RBC AUTO: 91.3 FL — SIGNIFICANT CHANGE UP (ref 80–100)
MONOCYTES # BLD AUTO: 0.83 K/UL — SIGNIFICANT CHANGE UP (ref 0–0.9)
MONOCYTES NFR BLD AUTO: 12.3 % — SIGNIFICANT CHANGE UP (ref 2–14)
MRSA PCR RESULT.: SIGNIFICANT CHANGE UP
NEUTROPHILS # BLD AUTO: 2.77 K/UL — SIGNIFICANT CHANGE UP (ref 1.8–7.4)
NEUTROPHILS NFR BLD AUTO: 41.1 % — LOW (ref 43–77)
PLATELET # BLD AUTO: 209 K/UL — SIGNIFICANT CHANGE UP (ref 150–400)
POTASSIUM SERPL-MCNC: 4 MMOL/L — SIGNIFICANT CHANGE UP (ref 3.5–5.3)
POTASSIUM SERPL-SCNC: 4 MMOL/L — SIGNIFICANT CHANGE UP (ref 3.5–5.3)
PROTHROM AB SERPL-ACNC: 11.4 SEC — SIGNIFICANT CHANGE UP (ref 10.5–13.4)
RBC # BLD: 3.81 M/UL — SIGNIFICANT CHANGE UP (ref 3.8–5.2)
RBC # FLD: 15.3 % — HIGH (ref 10.3–14.5)
S AUREUS DNA NOSE QL NAA+PROBE: SIGNIFICANT CHANGE UP
SODIUM SERPL-SCNC: 143 MMOL/L — SIGNIFICANT CHANGE UP (ref 135–145)
WBC # BLD: 6.75 K/UL — SIGNIFICANT CHANGE UP (ref 3.8–10.5)
WBC # FLD AUTO: 6.75 K/UL — SIGNIFICANT CHANGE UP (ref 3.8–10.5)

## 2022-03-04 PROCEDURE — 93010 ELECTROCARDIOGRAM REPORT: CPT

## 2022-03-04 PROCEDURE — 93005 ELECTROCARDIOGRAM TRACING: CPT

## 2022-03-04 PROCEDURE — 86077 PHYS BLOOD BANK SERV XMATCH: CPT

## 2022-03-04 PROCEDURE — G0463: CPT

## 2022-03-04 NOTE — H&P PST ADULT - PROBLEM SELECTOR PLAN 2
Stop Bang = 4, medical clearance pending. PCP for medical management and follow up. Surgical team to follow.

## 2022-03-04 NOTE — H&P PST ADULT - NSICDXFAMILYHX_GEN_ALL_CORE_FT
FAMILY HISTORY:  Mother  Still living? Unknown  Family history of cancer of the kidney, Age at diagnosis: Age Unknown    Grandparent  Still living? Unknown  Family history of cerebrovascular accident (CVA), Age at diagnosis: Age Unknown    Aunt  Still living? Unknown  Family history of ovarian cancer, Age at diagnosis: Age Unknown

## 2022-03-04 NOTE — H&P PST ADULT - HISTORY OF PRESENT ILLNESS
71 y/o female presents today to PST pending right reverse total shoulder arthroplasty with Dr. Cesar Girno 3/24/22 secondary to pain in right shoulder. Pt. with history of HTN, HLD, multiple myeloma s/p stem cell transplant, presently on chemotherapy, pt. with thyroid nodules,  Pt. states 12/2021 she got her flu shot and she began to have shoulder pain limiting her ability to move her arm, states she could not brush her hair, she was told by her orthopedist that she had arthritis, received an injection, pain persisted, and she was sent for an MRI and was referred for aforementioned procedure. Pt. states right shoulder pain worse with pain rated 7-8/10, described as intermittent, states she cannot brush her teeth, states "she closes her eyes and does what she needs to do." Pain is worse at night, states she cannot get comfortable, she denies pain at rest, pain radiates down to mid upper arm, pt. is right handed, relieved with rest. Pt. states her oncologist Dr. De Oliveira has taken her off the chemotherapy daratumumab, dexamethasone, as well as the pomalyst until after her surgery.

## 2022-03-04 NOTE — H&P PST ADULT - PROBLEM SELECTOR PLAN 3
Medical clearance pending for right reverse total shoulder arthroplasty with Dr. eCsar Giron 3/24/22 secondary to pain in right shoulder.

## 2022-03-04 NOTE — H&P PST ADULT - NSICDXPASTSURGICALHX_GEN_ALL_CORE_FT
PAST SURGICAL HISTORY:  H/O stem cell transplant 2/28/19    H/O tubal ligation 4/12/89    S/P LASIK surgery 4/23/07

## 2022-03-04 NOTE — H&P PST ADULT - NEGATIVE OPHTHALMOLOGIC SYMPTOMS
no diplopia/no photophobia/no lacrimation L/no lacrimation R/no blurred vision L/no blurred vision R/no discharge L/no discharge R/no pain L/no pain R/no irritation L/no irritation R/no scleral injection L/no scleral injection R

## 2022-03-04 NOTE — H&P PST ADULT - NSANTHOSAYNRD_GEN_A_CORE
No. SIRENA screening performed.  STOP BANG Legend: 0-2 = LOW Risk; 3-4 = INTERMEDIATE Risk; 5-8 = HIGH Risk

## 2022-03-04 NOTE — PATIENT PROFILE ADULT - FALL HARM RISK - UNIVERSAL INTERVENTIONS
Bed in lowest position, wheels locked, appropriate side rails in place/Call bell, personal items and telephone in reach/Instruct patient to call for assistance before getting out of bed or chair/Non-slip footwear when patient is out of bed/[place] to call system/Physically safe environment - no spills, clutter or unnecessary equipment/Purposeful Proactive Rounding/Room/bathroom lighting operational, light cord in reach

## 2022-03-04 NOTE — H&P PST ADULT - LAB RESULTS AND INTERPRETATION
Labs pending Labs pending  3/4/22 17:54 All available labs noted as documented. All abnormal labs noted as documented and have been faxed to PCP with whom medical clearance is pending. MRSA/MSSA is pending. Tod VALLES, FNP-BC

## 2022-03-04 NOTE — H&P PST ADULT - ASSESSMENT
71 y/o female presents today to PST pending right reverse total shoulder arthroplasty with Dr. Cesar Giron 3/24/22 secondary to pain in right shoulder. Pt. with history of HTN, HLD, multiple myeloma s/p stem cell transplant, presently on chemotherapy, pt. with thyroid nodules,  Pt. states 2021 she got her flu shot and she began to have shoulder pain limiting her ability to move her arm, states she could not brush her hair, she was told by her orthopedist that she had arthritis, received an injection, pain persisted, and she was sent for an MRI and was referred for aforementioned procedure. Pt. states right shoulder pain worse with pain rated 7-8/10, described as intermittent, states she cannot brush her teeth, states "she closes her eyes and does what she needs to do." Pain is worse at night, states she cannot get comfortable, she denies pain at rest, pain radiates down to mid upper arm, pt. is right handed, relieved with rest. Pt. states her oncologist Dr. De Oliveira has taken her off the chemotherapy daratumumab, dexamethasone, as well as the pomalyst until after her surgery.    Pt. to have medical clearance with Dr. Sav Marquez 3/8/22, pt. verbalized agreement and understanding.   Patient educated on surgical scrub, COVID testing, preadmission instructions, medical clearance and day of procedure medications, pt. verbalized agreement and understanding.   Pt instructed to stop vitamins/supplements/herbal medications for one week prior to surgery and discuss with PMD and pt. verbalized agreement and understanding.   Pt. educated via both verbal and written means of communication regarding all preoperative education and instruction as per policy and pt. verbalized agreement and understanding.  Pt. advised to discuss ASA hold preoperative instructions with PCP and pt. verbalized agreement and understanding.     CAPRINI SCORE    AGE RELATED RISK FACTORS                                                             [ ] Age 41-60 years                                            (1 Point)  [ x] Age: 61-74 years                                           (2 Points)                 [ ] Age= 75 years                                                (3 Points)             DISEASE RELATED RISK FACTORS                                                       [ ] Edema in the lower extremities                 (1 Point)                     [ ] Varicose veins                                               (1 Point)                                 [x ] BMI > 25 Kg/m2                                            (1 Point)                                  [ ] Serious infection (ie PNA)                            (1 Point)                     [ ] Lung disease ( COPD, Emphysema)            (1 Point)                                                                          [ ] Acute myocardial infarction                         (1 Point)                  [ ] Congestive heart failure (in the previous month)  (1 Point)         [ ] Inflammatory bowel disease                            (1 Point)                  [ ] Central venous access, PICC or Port               (2 points)       (within the last month)                                                                [ ] Stroke (in the previous month)                        (5 Points)    [x ] Previous or present malignancy                       (2 points)                                                                                                                                                         HEMATOLOGY RELATED FACTORS                                                         [ ] Prior episodes of VTE                                     (3 Points)                     [ ] Positive family history for VTE                      (3 Points)                  [ ] Prothrombin 80298 A                                     (3 Points)                     [ ] Factor V Leiden                                                (3 Points)                        [ ] Lupus anticoagulants                                      (3 Points)                                                           [ ] Anticardiolipin antibodies                              (3 Points)                                                       [ ] High homocysteine in the blood                   (3 Points)                                             [ ] Other congenital or acquired thrombophilia      (3 Points)                                                [ ] Heparin induced thrombocytopenia                  (3 Points)                                        MOBILITY RELATED FACTORS  [ ] Bed rest                                                         (1 Point)  [ ] Plaster cast                                                    (2 points)  [ ] Bed bound for more than 72 hours           (2 Points)    GENDER SPECIFIC FACTORS  [ ] Pregnancy or had a baby within the last month   (1 Point)  [ ] Post-partum < 6 weeks                                   (1 Point)  [ ] Hormonal therapy  or oral contraception   (1 Point)  [ ] History of pregnancy complications              (1 point)  [ ] Unexplained or recurrent              (1 Point)    OTHER RISK FACTORS                                           (1 Point)  [x ] BMI >40, smoking, diabetes requiring insulin, chemotherapy  blood transfusions and length of surgery over 2 hours    SURGERY RELATED RISK FACTORS  [ ]  Section within the last month     (1 Point)  [ ] Minor surgery                                                  (1 Point)  [ ] Arthroscopic surgery                                       (2 Points)  [x ] Planned major surgery lasting more            (2 Points)      than 45 minutes     [ ] Elective hip or knee joint replacement       (5 points)       surgery                                                TRAUMA RELATED RISK FACTORS  [ ] Fracture of the hip, pelvis, or leg                       (5 Points)  [ ] Spinal cord injury resulting in paralysis             (5 points)       (in the previous month)    [ ] Paralysis  (less than 1 month)                             (5 Points)  [ ] Multiple Trauma within 1 month                        (5 Points)    Total Score [       8 ]    Caprini Score 0-2: Low Risk, NO VTE prophylaxis required for most patients, encourage ambulation  Caprini Score 3-6: Moderate Risk , pharmacologic VTE prophylaxis is indicated for most patients (in the absence of contraindications)  Caprini Score Greater than or =7: High risk, pharmocologic VTE prophylaxis indicated for most patients (in the absence of contraindications)    OPIOID RISK TOOL    CLEMENTE EACH BOX THAT APPLIES AND ADD TOTALS AT THE END    FAMILY HISTORY OF SUBSTANCE ABUSE                 FEMALE         MALE                                                Alcohol                             [  ]1 pt          [  ]3pts                                               Illegal Durgs                     [  ]2 pts        [  ]3pts                                               Rx Drugs                           [  ]4 pts        [  ]4 pts    PERSONAL HISTORY OF SUBSTANCE ABUSE                                                                                          Alcohol                             [  ]3 pts       [  ]3 pts                                               Illegal Drugs                     [  ]4 pts        [  ]4 pts                                               Rx Drugs                           [  ]5 pts        [  ]5 pts    AGE BETWEEN 16-45 YEARS                                      [  ]1 pt         [  ]1 pt    HISTORY OF PREADOLESCENT   SEXUAL ABUSE                                                             [  ]3 pts        [  ]0pts    PSYCHOLOGICAL DISEASE                     ADD, OCD, Bipolar, Schizophrenia        [  ]2 pts         [  ]2 pts                      Depression                                               [  ]1 pt           [  ]1 pt           SCORING TOTAL   (add numbers and type here)              (0)                                     A score of 3 or lower indicated LOW risk for future opioid abuse  A score of 4 to 7 indicated moderate risk for future opioid abuse  A score of 8 or higher indicates a high risk for opioid abuse

## 2022-03-04 NOTE — H&P PST ADULT - NSICDXPASTMEDICALHX_GEN_ALL_CORE_FT
PAST MEDICAL HISTORY:  Bilateral knee pain     Complex ovarian cyst     COVID-19 vaccine series completed     Endometrial disorder Fluid in endometrial cavity    [place] hearing[place]     Hyperlipidemia     Hyperlipidemia, unspecified hyperlipidemia type     Hypertension     Multiple myeloma Chemotherapy    Multiple thyroid nodules     Pain in right shoulder     Subacute vulvitis     Termination of pregnancy (fetus)

## 2022-03-04 NOTE — H&P PST ADULT - FUNCTIONAL ASSESSMENT - BASIC MOBILITY ASSESSMENT TYPE
Spoke to patient about echo results, with EF 20%  Stated risk of life threatening dysrhythmias and indication for ICD  He wishes to defer until after lung surgery at [facility] in 8/19, and he will contact our office afterwards to schedule an appt in 9/19 to discuss ICD 
Admission

## 2022-03-04 NOTE — H&P PST ADULT - NEGATIVE MUSCULOSKELETAL SYMPTOMS
no arthralgia/no joint swelling/no myalgia/no muscle cramps/no muscle weakness/no stiffness/no neck pain/no arm pain L/no back pain

## 2022-03-07 ENCOUNTER — OUTPATIENT (OUTPATIENT)
Dept: OUTPATIENT SERVICES | Facility: HOSPITAL | Age: 72
LOS: 1 days | End: 2022-03-07
Payer: MEDICARE

## 2022-03-07 DIAGNOSIS — Z98.890 OTHER SPECIFIED POSTPROCEDURAL STATES: Chronic | ICD-10-CM

## 2022-03-07 DIAGNOSIS — Z01.812 ENCOUNTER FOR PREPROCEDURAL LABORATORY EXAMINATION: ICD-10-CM

## 2022-03-07 DIAGNOSIS — Z94.84 STEM CELLS TRANSPLANT STATUS: Chronic | ICD-10-CM

## 2022-03-07 DIAGNOSIS — Z98.51 TUBAL LIGATION STATUS: Chronic | ICD-10-CM

## 2022-03-07 PROBLEM — Z92.29 PERSONAL HISTORY OF OTHER DRUG THERAPY: Chronic | Status: ACTIVE | Noted: 2022-03-04

## 2022-03-07 PROBLEM — M25.511 PAIN IN RIGHT SHOULDER: Chronic | Status: ACTIVE | Noted: 2022-03-04

## 2022-03-07 PROBLEM — E04.2 NONTOXIC MULTINODULAR GOITER: Chronic | Status: ACTIVE | Noted: 2022-03-04

## 2022-03-07 PROBLEM — H91.90 UNSPECIFIED HEARING LOSS, UNSPECIFIED EAR: Chronic | Status: ACTIVE | Noted: 2022-03-04

## 2022-03-07 PROBLEM — I10 ESSENTIAL (PRIMARY) HYPERTENSION: Chronic | Status: ACTIVE | Noted: 2022-03-04

## 2022-03-07 PROBLEM — E78.5 HYPERLIPIDEMIA, UNSPECIFIED: Chronic | Status: ACTIVE | Noted: 2022-03-04

## 2022-03-07 PROBLEM — M25.561 PAIN IN RIGHT KNEE: Chronic | Status: ACTIVE | Noted: 2022-03-04

## 2022-03-07 PROBLEM — C90.00 MULTIPLE MYELOMA NOT HAVING ACHIEVED REMISSION: Chronic | Status: ACTIVE | Noted: 2022-03-04

## 2022-03-07 LAB
ALLERGY+IMMUNOLOGY DIAG STUDY NOTE: SIGNIFICANT CHANGE UP
BLD GP AB SCN SERPL QL: SIGNIFICANT CHANGE UP
DIR ANTIGLOB POLYSPECIFIC INTERPRETATION: SIGNIFICANT CHANGE UP

## 2022-03-07 PROCEDURE — 36415 COLL VENOUS BLD VENIPUNCTURE: CPT

## 2022-03-07 PROCEDURE — 86880 COOMBS TEST DIRECT: CPT

## 2022-03-07 PROCEDURE — 86900 BLOOD TYPING SEROLOGIC ABO: CPT

## 2022-03-07 PROCEDURE — 86850 RBC ANTIBODY SCREEN: CPT

## 2022-03-07 PROCEDURE — 86901 BLOOD TYPING SEROLOGIC RH(D): CPT

## 2022-03-07 PROCEDURE — 0001U RBC DNA HEA 35 AG 11 BLD GRP: CPT

## 2022-03-08 ENCOUNTER — APPOINTMENT (OUTPATIENT)
Dept: FAMILY MEDICINE | Facility: CLINIC | Age: 72
End: 2022-03-08
Payer: MEDICARE

## 2022-03-08 VITALS
DIASTOLIC BLOOD PRESSURE: 72 MMHG | BODY MASS INDEX: 29.41 KG/M2 | RESPIRATION RATE: 16 BRPM | HEIGHT: 63 IN | WEIGHT: 166 LBS | SYSTOLIC BLOOD PRESSURE: 130 MMHG | HEART RATE: 89 BPM | OXYGEN SATURATION: 99 %

## 2022-03-08 DIAGNOSIS — Z98.890 OTHER SPECIFIED POSTPROCEDURAL STATES: ICD-10-CM

## 2022-03-08 DIAGNOSIS — N83.291 OTHER OVARIAN CYST, RIGHT SIDE: ICD-10-CM

## 2022-03-08 DIAGNOSIS — Z12.39 ENCOUNTER FOR OTHER SCREENING FOR MALIGNANT NEOPLASM OF BREAST: ICD-10-CM

## 2022-03-08 DIAGNOSIS — M24.9 JOINT DERANGEMENT, UNSPECIFIED: ICD-10-CM

## 2022-03-08 DIAGNOSIS — M79.601 PAIN IN RIGHT ARM: ICD-10-CM

## 2022-03-08 DIAGNOSIS — G47.33 OBSTRUCTIVE SLEEP APNEA (ADULT) (PEDIATRIC): ICD-10-CM

## 2022-03-08 DIAGNOSIS — Z12.11 ENCOUNTER FOR SCREENING FOR MALIGNANT NEOPLASM OF COLON: ICD-10-CM

## 2022-03-08 DIAGNOSIS — Z92.29 PERSONAL HISTORY OF OTHER DRUG THERAPY: ICD-10-CM

## 2022-03-08 DIAGNOSIS — R80.9 PROTEINURIA, UNSPECIFIED: ICD-10-CM

## 2022-03-08 PROCEDURE — 99215 OFFICE O/P EST HI 40 MIN: CPT

## 2022-03-09 PROBLEM — M24.9 DERANGEMENT OF RIGHT SHOULDER JOINT: Status: ACTIVE | Noted: 2022-03-09

## 2022-03-09 PROBLEM — R80.9 PROTEINURIA: Status: RESOLVED | Noted: 2018-06-19 | Resolved: 2022-03-09

## 2022-03-09 PROBLEM — Z12.39 SCREENING BREAST EXAMINATION: Status: RESOLVED | Noted: 2018-02-02 | Resolved: 2022-03-09

## 2022-03-09 PROBLEM — N83.291 OTHER OVARIAN CYST, RIGHT SIDE: Status: RESOLVED | Noted: 2020-07-09 | Resolved: 2022-03-09

## 2022-03-09 PROBLEM — Z12.11 SCREENING FOR COLON CANCER: Status: RESOLVED | Noted: 2021-04-27 | Resolved: 2022-03-09

## 2022-03-09 PROBLEM — M79.601 PAIN OF RIGHT UPPER EXTREMITY: Status: RESOLVED | Noted: 2021-06-24 | Resolved: 2022-03-09

## 2022-03-09 PROBLEM — Z98.890 HISTORY OF COLONOSCOPY: Status: RESOLVED | Noted: 2021-04-27 | Resolved: 2022-03-09

## 2022-03-09 RX ORDER — PREDNISOLONE ACETATE 10 MG/ML
1 SUSPENSION/ DROPS OPHTHALMIC
Qty: 5 | Refills: 0 | Status: DISCONTINUED | COMMUNITY
Start: 2020-11-11 | End: 2022-03-09

## 2022-03-09 NOTE — RESULTS/DATA
[] : results reviewed [de-identified] : mild anemia, chronic [de-identified] : unremarkable [de-identified] : unremarkable [de-identified] : see above

## 2022-03-09 NOTE — HISTORY OF PRESENT ILLNESS
[No Pertinent Cardiac History] : no history of aortic stenosis, atrial fibrillation, coronary artery disease, recent myocardial infarction, or implantable device/pacemaker [Sleep Apnea] : sleep apnea [No Adverse Anesthesia Reaction] : no adverse anesthesia reaction in self or family member [(Patient denies any chest pain, claudication, dyspnea on exertion, orthopnea, palpitations or syncope)] : Patient denies any chest pain, claudication, dyspnea on exertion, orthopnea, palpitations or syncope [Good (7-10 METs)] : Good (7-10 METs) [Asthma] : no asthma [COPD] : no COPD [Smoker] : not a smoker [Chronic Anticoagulation] : no chronic anticoagulation [Chronic Kidney Disease] : no chronic kidney disease [Diabetes] : no diabetes [FreeTextEntry1] : Reverse R Shoulder Replacement [FreeTextEntry2] : 03/24/2022 [FreeTextEntry3] : Dr Giron [FreeTextEntry4] : Ms. LAITH VAUGHN is a 72 year female in office for preoperative medical clearance for above procedure.  Patient without acute complaints.  [FreeTextEntry7] : EKG (3/4/2022): NSR at 63 bpm with premature supraventricular complexes, with sinus arrhythmia.

## 2022-03-10 ENCOUNTER — RESULT REVIEW (OUTPATIENT)
Age: 72
End: 2022-03-10

## 2022-03-10 ENCOUNTER — APPOINTMENT (OUTPATIENT)
Dept: HEMATOLOGY ONCOLOGY | Facility: CLINIC | Age: 72
End: 2022-03-10

## 2022-03-10 LAB
ANISOCYTOSIS BLD QL: SLIGHT — SIGNIFICANT CHANGE UP
BASOPHILS # BLD AUTO: 0.1 K/UL — SIGNIFICANT CHANGE UP (ref 0–0.2)
BASOPHILS NFR BLD AUTO: 2.2 % — HIGH (ref 0–2)
EOSINOPHIL # BLD AUTO: 0.1 K/UL — SIGNIFICANT CHANGE UP (ref 0–0.5)
EOSINOPHIL NFR BLD AUTO: 2.8 % — SIGNIFICANT CHANGE UP (ref 0–6)
HCT VFR BLD CALC: 38.4 % — SIGNIFICANT CHANGE UP (ref 34.5–45)
HGB BLD-MCNC: 11.5 G/DL — SIGNIFICANT CHANGE UP (ref 11.5–15.5)
LYMPHOCYTES # BLD AUTO: 3 K/UL — SIGNIFICANT CHANGE UP (ref 1–3.3)
LYMPHOCYTES # BLD AUTO: 62.4 % — HIGH (ref 13–44)
MACROCYTES BLD QL: SLIGHT — SIGNIFICANT CHANGE UP
MCHC RBC-ENTMCNC: 29 PG — SIGNIFICANT CHANGE UP (ref 27–34)
MCHC RBC-ENTMCNC: 29.9 G/DL — LOW (ref 32–36)
MCV RBC AUTO: 96.9 FL — SIGNIFICANT CHANGE UP (ref 80–100)
MICROCYTES BLD QL: SLIGHT — SIGNIFICANT CHANGE UP
MONOCYTES # BLD AUTO: 0.5 K/UL — SIGNIFICANT CHANGE UP (ref 0–0.9)
MONOCYTES NFR BLD AUTO: 10.6 % — SIGNIFICANT CHANGE UP (ref 2–14)
NEUTROPHILS # BLD AUTO: 1 K/UL — LOW (ref 1.8–7.4)
NEUTROPHILS NFR BLD AUTO: 22 % — LOW (ref 43–77)
OVALOCYTES BLD QL SMEAR: SLIGHT — SIGNIFICANT CHANGE UP
PLAT MORPH BLD: NORMAL — SIGNIFICANT CHANGE UP
PLATELET # BLD AUTO: 182 K/UL — SIGNIFICANT CHANGE UP (ref 150–400)
POIKILOCYTOSIS BLD QL AUTO: SLIGHT — SIGNIFICANT CHANGE UP
POLYCHROMASIA BLD QL SMEAR: SLIGHT — SIGNIFICANT CHANGE UP
RBC # BLD: 3.96 M/UL — SIGNIFICANT CHANGE UP (ref 3.8–5.2)
RBC # FLD: 15 % — HIGH (ref 10.3–14.5)
RBC BLD AUTO: SIGNIFICANT CHANGE UP
SMUDGE CELLS # BLD: PRESENT — SIGNIFICANT CHANGE UP
WBC # BLD: 4.8 K/UL — SIGNIFICANT CHANGE UP (ref 3.8–10.5)
WBC # FLD AUTO: 4.8 K/UL — SIGNIFICANT CHANGE UP (ref 3.8–10.5)

## 2022-03-14 ENCOUNTER — NON-APPOINTMENT (OUTPATIENT)
Age: 72
End: 2022-03-14

## 2022-03-16 ENCOUNTER — OUTPATIENT (OUTPATIENT)
Dept: OUTPATIENT SERVICES | Facility: HOSPITAL | Age: 72
LOS: 1 days | Discharge: HOME | End: 2022-03-16

## 2022-03-16 DIAGNOSIS — Z94.84 STEM CELLS TRANSPLANT STATUS: Chronic | ICD-10-CM

## 2022-03-16 DIAGNOSIS — C90.00 MULTIPLE MYELOMA NOT HAVING ACHIEVED REMISSION: ICD-10-CM

## 2022-03-16 DIAGNOSIS — Z98.890 OTHER SPECIFIED POSTPROCEDURAL STATES: Chronic | ICD-10-CM

## 2022-03-16 DIAGNOSIS — Z98.51 TUBAL LIGATION STATUS: Chronic | ICD-10-CM

## 2022-03-17 ENCOUNTER — APPOINTMENT (OUTPATIENT)
Dept: HEMATOLOGY ONCOLOGY | Facility: CLINIC | Age: 72
End: 2022-03-17

## 2022-03-17 ENCOUNTER — APPOINTMENT (OUTPATIENT)
Age: 72
End: 2022-03-17

## 2022-03-17 ENCOUNTER — RESULT REVIEW (OUTPATIENT)
Age: 72
End: 2022-03-17

## 2022-03-17 LAB
BASOPHILS # BLD AUTO: 0.1 K/UL — SIGNIFICANT CHANGE UP (ref 0–0.2)
BASOPHILS NFR BLD AUTO: 1.6 % — SIGNIFICANT CHANGE UP (ref 0–2)
EOSINOPHIL # BLD AUTO: 0.2 K/UL — SIGNIFICANT CHANGE UP (ref 0–0.5)
EOSINOPHIL NFR BLD AUTO: 3 % — SIGNIFICANT CHANGE UP (ref 0–6)
HCT VFR BLD CALC: 36.7 % — SIGNIFICANT CHANGE UP (ref 34.5–45)
HGB BLD-MCNC: 11.6 G/DL — SIGNIFICANT CHANGE UP (ref 11.5–15.5)
LYMPHOCYTES # BLD AUTO: 3.5 K/UL — HIGH (ref 1–3.3)
LYMPHOCYTES # BLD AUTO: 48.7 % — HIGH (ref 13–44)
MCHC RBC-ENTMCNC: 30.1 PG — SIGNIFICANT CHANGE UP (ref 27–34)
MCHC RBC-ENTMCNC: 31.8 G/DL — LOW (ref 32–36)
MCV RBC AUTO: 94.8 FL — SIGNIFICANT CHANGE UP (ref 80–100)
MONOCYTES # BLD AUTO: 0.4 K/UL — SIGNIFICANT CHANGE UP (ref 0–0.9)
MONOCYTES NFR BLD AUTO: 5.7 % — SIGNIFICANT CHANGE UP (ref 2–14)
NEUTROPHILS # BLD AUTO: 2.9 K/UL — SIGNIFICANT CHANGE UP (ref 1.8–7.4)
NEUTROPHILS NFR BLD AUTO: 40.9 % — LOW (ref 43–77)
PLATELET # BLD AUTO: 264 K/UL — SIGNIFICANT CHANGE UP (ref 150–400)
RBC # BLD: 3.87 M/UL — SIGNIFICANT CHANGE UP (ref 3.8–5.2)
RBC # FLD: 14.8 % — HIGH (ref 10.3–14.5)
WBC # BLD: 7.1 K/UL — SIGNIFICANT CHANGE UP (ref 3.8–10.5)
WBC # FLD AUTO: 7.1 K/UL — SIGNIFICANT CHANGE UP (ref 3.8–10.5)

## 2022-03-18 ENCOUNTER — NON-APPOINTMENT (OUTPATIENT)
Age: 72
End: 2022-03-18

## 2022-03-21 NOTE — CHART NOTE - NSCHARTNOTEFT_GEN_A_CORE
Antibody Interpretation: Anti-CD38 (Daratumumab)    Patient is a 71 y/o female presents today to PST pending right reverse total shoulder arthroplasty with Dr. Cesar Giron 3/24/22 secondary to pain in right shoulder. Pt. with history of HTN, HLD, multiple myeloma s/p stem cell transplant, presently on chemotherapy.  Blood sample on 03/04/22 has a positive antibody screen, and the patient's plasma reacts with all red blood cells tested, consistent with anti-CD38. The patient's blood sample was sent to the [facility] for additional studies. The [facility] confirms the patient's plasma contains reactivity consistent with the administration of Daratumumab, anti-CD38. No other unexpected antibodies were detected. Daratumumab is directed to the CD38 portion of malignant cells; however, this drug reacts with the reagent red blood cell (RBC) used in pre-transfusion tests which also express CD38 on their cell surface complicating the identification of clinically significant RBC antibodies. These alterations identified in pre-transfusion tests may persist for months making transfusional management of these patients even more difficult. If transfusion is clinically indicated, crossmatched incompatible red blood cells through the [facility] phase of testing will be issued for transfusion, and a signed waiver from the ordering physician will be required before units are dispensed by the blood bank. In the absence of other clinically significant red blood cells antibodies in patient's plasma, these serologically incompatible units are generally safe to transfuse. In the future, please allow sufficient time for pre-transfusion testing.

## 2022-03-23 ENCOUNTER — TRANSCRIPTION ENCOUNTER (OUTPATIENT)
Age: 72
End: 2022-03-23

## 2022-03-23 LAB — HUMAN ERYTHROCYTE ANTIGEN PANEL RESULT: SIGNIFICANT CHANGE UP

## 2022-03-24 ENCOUNTER — APPOINTMENT (OUTPATIENT)
Dept: ORTHOPEDIC SURGERY | Facility: HOSPITAL | Age: 72
End: 2022-03-24

## 2022-03-24 ENCOUNTER — TRANSCRIPTION ENCOUNTER (OUTPATIENT)
Age: 72
End: 2022-03-24

## 2022-03-24 ENCOUNTER — INPATIENT (INPATIENT)
Facility: HOSPITAL | Age: 72
LOS: 0 days | Discharge: HOME | DRG: 483 | End: 2022-03-25
Attending: STUDENT IN AN ORGANIZED HEALTH CARE EDUCATION/TRAINING PROGRAM | Admitting: STUDENT IN AN ORGANIZED HEALTH CARE EDUCATION/TRAINING PROGRAM
Payer: MEDICARE

## 2022-03-24 ENCOUNTER — RESULT REVIEW (OUTPATIENT)
Age: 72
End: 2022-03-24

## 2022-03-24 VITALS
RESPIRATION RATE: 16 BRPM | TEMPERATURE: 98 F | DIASTOLIC BLOOD PRESSURE: 57 MMHG | OXYGEN SATURATION: 100 % | HEIGHT: 63 IN | WEIGHT: 169.76 LBS | HEART RATE: 88 BPM | SYSTOLIC BLOOD PRESSURE: 125 MMHG

## 2022-03-24 DIAGNOSIS — Z98.51 TUBAL LIGATION STATUS: Chronic | ICD-10-CM

## 2022-03-24 DIAGNOSIS — Z98.890 OTHER SPECIFIED POSTPROCEDURAL STATES: Chronic | ICD-10-CM

## 2022-03-24 DIAGNOSIS — M25.511 PAIN IN RIGHT SHOULDER: ICD-10-CM

## 2022-03-24 DIAGNOSIS — Z94.84 STEM CELLS TRANSPLANT STATUS: Chronic | ICD-10-CM

## 2022-03-24 LAB
BLD GP AB SCN SERPL QL: SIGNIFICANT CHANGE UP
DIR ANTIGLOB POLYSPECIFIC INTERPRETATION: SIGNIFICANT CHANGE UP
GLUCOSE BLDC GLUCOMTR-MCNC: 124 MG/DL — HIGH (ref 70–99)
GLUCOSE BLDC GLUCOMTR-MCNC: 88 MG/DL — SIGNIFICANT CHANGE UP (ref 70–99)
GLUCOSE BLDC GLUCOMTR-MCNC: 90 MG/DL — SIGNIFICANT CHANGE UP (ref 70–99)

## 2022-03-24 PROCEDURE — 86079 PHYS BLOOD BANK SERV AUTHRJ: CPT

## 2022-03-24 PROCEDURE — 73030 X-RAY EXAM OF SHOULDER: CPT | Mod: 26,RT

## 2022-03-24 PROCEDURE — 23472 RECONSTRUCT SHOULDER JOINT: CPT | Mod: RT

## 2022-03-24 PROCEDURE — 88311 DECALCIFY TISSUE: CPT | Mod: 26

## 2022-03-24 PROCEDURE — 23472 RECONSTRUCT SHOULDER JOINT: CPT | Mod: AS,RT

## 2022-03-24 PROCEDURE — 88305 TISSUE EXAM BY PATHOLOGIST: CPT | Mod: 26

## 2022-03-24 DEVICE — SCREW LOKG FIXED 3.5 HEX 4.75X25MM: Type: IMPLANTABLE DEVICE | Status: FUNCTIONAL

## 2022-03-24 DEVICE — IMPLANTABLE DEVICE: Type: IMPLANTABLE DEVICE | Status: FUNCTIONAL

## 2022-03-24 DEVICE — GLENOSPHERE 36MM DIA OF CURVE: Type: IMPLANTABLE DEVICE | Status: FUNCTIONAL

## 2022-03-24 DEVICE — SCREW RVS CNTRL 6.5X30MM ST: Type: IMPLANTABLE DEVICE | Status: FUNCTIONAL

## 2022-03-24 DEVICE — SCREW LOKG FIXED 3.5 HEX 4.75X30MM: Type: IMPLANTABLE DEVICE | Status: FUNCTIONAL

## 2022-03-24 DEVICE — BEARING HUM 36MM PLUS 3MM VITE: Type: IMPLANTABLE DEVICE | Status: FUNCTIONAL

## 2022-03-24 DEVICE — COMP PRIMARY STEM 8MM MINI: Type: IMPLANTABLE DEVICE | Status: FUNCTIONAL

## 2022-03-24 DEVICE — BASEPLATE GLENOID MINI REV 25MM: Type: IMPLANTABLE DEVICE | Status: FUNCTIONAL

## 2022-03-24 DEVICE — CEMENT  PALACOS  LV PLUS G LOW VISCOSITY CEMENT W GENTAMICIN: Type: IMPLANTABLE DEVICE | Status: FUNCTIONAL

## 2022-03-24 DEVICE — TRAY HUM COMP VERSA-DIA STD: Type: IMPLANTABLE DEVICE | Status: FUNCTIONAL

## 2022-03-24 DEVICE — SCREW COMP LOCKING 3.5 HEX 4.75X15MM: Type: IMPLANTABLE DEVICE | Status: FUNCTIONAL

## 2022-03-24 DEVICE — PIN REVERSE SHOULDER: Type: IMPLANTABLE DEVICE | Status: FUNCTIONAL

## 2022-03-24 RX ORDER — HYDROMORPHONE HYDROCHLORIDE 2 MG/ML
0.5 INJECTION INTRAMUSCULAR; INTRAVENOUS; SUBCUTANEOUS
Refills: 0 | Status: DISCONTINUED | OUTPATIENT
Start: 2022-03-24 | End: 2022-03-24

## 2022-03-24 RX ORDER — SODIUM CHLORIDE 9 MG/ML
1000 INJECTION, SOLUTION INTRAVENOUS
Refills: 0 | Status: DISCONTINUED | OUTPATIENT
Start: 2022-03-24 | End: 2022-03-24

## 2022-03-24 RX ORDER — CEFAZOLIN SODIUM 1 G
2000 VIAL (EA) INJECTION EVERY 8 HOURS
Refills: 0 | Status: COMPLETED | OUTPATIENT
Start: 2022-03-24 | End: 2022-03-25

## 2022-03-24 RX ORDER — ATORVASTATIN CALCIUM 80 MG/1
20 TABLET, FILM COATED ORAL AT BEDTIME
Refills: 0 | Status: DISCONTINUED | OUTPATIENT
Start: 2022-03-24 | End: 2022-03-25

## 2022-03-24 RX ORDER — ASPIRIN/CALCIUM CARB/MAGNESIUM 324 MG
325 TABLET ORAL DAILY
Refills: 0 | Status: DISCONTINUED | OUTPATIENT
Start: 2022-03-25 | End: 2022-03-25

## 2022-03-24 RX ORDER — OXYCODONE HYDROCHLORIDE 5 MG/1
5 TABLET ORAL
Refills: 0 | Status: DISCONTINUED | OUTPATIENT
Start: 2022-03-24 | End: 2022-03-25

## 2022-03-24 RX ORDER — FERROUS SULFATE 325(65) MG
325 TABLET ORAL DAILY
Refills: 0 | Status: DISCONTINUED | OUTPATIENT
Start: 2022-03-24 | End: 2022-03-25

## 2022-03-24 RX ORDER — KETOROLAC TROMETHAMINE 30 MG/ML
30 SYRINGE (ML) INJECTION ONCE
Refills: 0 | Status: DISCONTINUED | OUTPATIENT
Start: 2022-03-24 | End: 2022-03-24

## 2022-03-24 RX ORDER — CELECOXIB 200 MG/1
400 CAPSULE ORAL EVERY 12 HOURS
Refills: 0 | Status: DISCONTINUED | OUTPATIENT
Start: 2022-03-24 | End: 2022-03-24

## 2022-03-24 RX ORDER — ACYCLOVIR SODIUM 500 MG
400 VIAL (EA) INTRAVENOUS THREE TIMES A DAY
Refills: 0 | Status: DISCONTINUED | OUTPATIENT
Start: 2022-03-24 | End: 2022-03-25

## 2022-03-24 RX ORDER — OXYCODONE HYDROCHLORIDE 5 MG/1
10 TABLET ORAL
Refills: 0 | Status: DISCONTINUED | OUTPATIENT
Start: 2022-03-24 | End: 2022-03-25

## 2022-03-24 RX ORDER — SENNA PLUS 8.6 MG/1
2 TABLET ORAL AT BEDTIME
Refills: 0 | Status: DISCONTINUED | OUTPATIENT
Start: 2022-03-24 | End: 2022-03-25

## 2022-03-24 RX ORDER — CEFAZOLIN SODIUM 1 G
2000 VIAL (EA) INJECTION EVERY 8 HOURS
Refills: 0 | Status: DISCONTINUED | OUTPATIENT
Start: 2022-03-24 | End: 2022-03-24

## 2022-03-24 RX ORDER — SODIUM CHLORIDE 9 MG/ML
1000 INJECTION, SOLUTION INTRAVENOUS
Refills: 0 | Status: DISCONTINUED | OUTPATIENT
Start: 2022-03-24 | End: 2022-03-25

## 2022-03-24 RX ORDER — TRANEXAMIC ACID 100 MG/ML
1000 INJECTION, SOLUTION INTRAVENOUS ONCE
Refills: 0 | Status: DISCONTINUED | OUTPATIENT
Start: 2022-03-24 | End: 2022-03-24

## 2022-03-24 RX ORDER — SODIUM CHLORIDE 9 MG/ML
3 INJECTION INTRAMUSCULAR; INTRAVENOUS; SUBCUTANEOUS EVERY 8 HOURS
Refills: 0 | Status: DISCONTINUED | OUTPATIENT
Start: 2022-03-24 | End: 2022-03-24

## 2022-03-24 RX ORDER — TRANEXAMIC ACID 100 MG/ML
1000 INJECTION, SOLUTION INTRAVENOUS ONCE
Refills: 0 | Status: DISCONTINUED | OUTPATIENT
Start: 2022-03-24 | End: 2022-03-25

## 2022-03-24 RX ORDER — ACETAMINOPHEN 500 MG
1000 TABLET ORAL ONCE
Refills: 0 | Status: DISCONTINUED | OUTPATIENT
Start: 2022-03-24 | End: 2022-03-24

## 2022-03-24 RX ORDER — AMLODIPINE BESYLATE 2.5 MG/1
5 TABLET ORAL DAILY
Refills: 0 | Status: DISCONTINUED | OUTPATIENT
Start: 2022-03-24 | End: 2022-03-25

## 2022-03-24 RX ORDER — ACETAMINOPHEN 500 MG
975 TABLET ORAL ONCE
Refills: 0 | Status: COMPLETED | OUTPATIENT
Start: 2022-03-24 | End: 2022-03-24

## 2022-03-24 RX ORDER — ONDANSETRON 8 MG/1
4 TABLET, FILM COATED ORAL
Refills: 0 | Status: DISCONTINUED | OUTPATIENT
Start: 2022-03-24 | End: 2022-03-24

## 2022-03-24 RX ADMIN — SODIUM CHLORIDE 75 MILLILITER(S): 9 INJECTION, SOLUTION INTRAVENOUS at 21:59

## 2022-03-24 RX ADMIN — ATORVASTATIN CALCIUM 20 MILLIGRAM(S): 80 TABLET, FILM COATED ORAL at 21:59

## 2022-03-24 RX ADMIN — Medication 975 MILLIGRAM(S): at 12:13

## 2022-03-24 RX ADMIN — Medication 100 MILLIGRAM(S): at 20:51

## 2022-03-24 RX ADMIN — Medication 400 MILLIGRAM(S): at 21:59

## 2022-03-24 RX ADMIN — SENNA PLUS 2 TABLET(S): 8.6 TABLET ORAL at 21:59

## 2022-03-24 RX ADMIN — CELECOXIB 400 MILLIGRAM(S): 200 CAPSULE ORAL at 12:20

## 2022-03-24 NOTE — DISCHARGE NOTE PROVIDER - NSDCFUADDINST_GEN_ALL_CORE_FT
The patient will be seen in the office between 2 weeks for wound check. Sutures/Staples/Tape will be removed at that time. Patient may shower after post-op day #5. The dressing is to be removed on POD#7 (3/31/22). IF THE DRESSING BECOMES SOILED BEFORE THE REMOVAL DATE, CHANGE WITH A SIMILAR DRESSING. IF THE DRESSING BECOMES STAINED WITH DISCHARGE, CONTACT THE OFFICE FOR FURTHER DIRECTIONS.  The patient will contact the office if the wound becomes red, has increasing pain, develops bleeding or discharge, an injury occurs, or has other concerns. The patient will continue ASPIRIN  325mg daily for blood clot prevention. The patient will take OXYCODONE AND TYLENOL for pain control and titrate according to prescription and patient needs. The patient will take Colace while taking oxycodone to prevent narcotic associated constipation.  Additionally, increase water intake (drink at least 8 glasses of water daily) and try adding fiber to the diet by eating fruits, vegetables and foods that are rich in grains. If constipation is experienced, contact the medical/primary care provider to discuss further treatment options. The patient is NON weight bearing to the right upper extremity, continue sling to the right upper extremity. . The patient will be seen in the office between 1-2 weeks for wound check. Patient may shower after post-op day #5. The dressing is to be removed on POD#7 (3/31/22). IF THE DRESSING BECOMES SOILED BEFORE THE REMOVAL DATE, CHANGE WITH A SIMILAR DRESSING. IF THE DRESSING BECOMES STAINED WITH DISCHARGE, CONTACT THE OFFICE FOR FURTHER DIRECTIONS.  The patient will contact the office if the wound becomes red, has increasing pain, develops bleeding or discharge, an injury occurs, or has other concerns. The patient will continue ASPIRIN  325mg daily for blood clot prevention. The patient will take OXYCODONE AND TYLENOL for pain control and titrate according to prescription and patient needs. The patient will take senna and Colace while taking oxycodone to prevent narcotic associated constipation.  Additionally, increase water intake (drink at least 8 glasses of water daily) and try adding fiber to the diet by eating fruits, vegetables and foods that are rich in grains. If constipation is experienced, contact the medical/primary care provider to discuss further treatment options. The patient is NON weight bearing to the right upper extremity, continue sling to the right upper extremity. .

## 2022-03-24 NOTE — PROGRESS NOTE ADULT - SUBJECTIVE AND OBJECTIVE BOX
Ortho Post Op Check    Name: LAITH VAUGHN    MR #: [id number]    Procedure: right reverse total shoulder arthroplasty  Surgeon: Mack    Pt comfortable without complaints, pain controlled  Denies CP, SOB, N/V, numbness/tingling     General Exam:  Vital Signs Last 24 Hrs  T(C): 36.4 (03-24-22 @ 18:15), Max: 36.4 (03-24-22 @ 18:15)  T(F): 97.6 (03-24-22 @ 18:15), Max: 97.6 (03-24-22 @ 18:15)  HR: 77 (03-24-22 @ 19:00) (72 - 90)  BP: 100/56 (03-24-22 @ 19:00) (95/51 - 110/55)  BP(mean): 65 (03-24-22 @ 19:00) (65 - 65)  RR: 12 (03-24-22 @ 19:00) (12 - 15)  SpO2: 94% (03-24-22 @ 19:00) (93% - 100%)    General: Pt Alert and oriented, NAD, controlled pain.  Dressings C/D/I. No bleeding. gunslinger sling in place  Vasc: brisk cap refill, no cyanosis  Sensation: Grossly intact to light touch without deficit.  Motor: +ROM digits intact    A/P: 72yFemale POD#0 s/p right reverse total shoulder arthroplasty  - Stable  - Pain Control  - DVT ppx: asa  - NWB RUE, continue sling

## 2022-03-24 NOTE — BRIEF OPERATIVE NOTE - OPERATION/FINDINGS
right proximal humerus avascular necrosis Silvana Dutton  (RN)  2017 22:34:27 Era Llanos  (NP)  2017 08:59:41

## 2022-03-24 NOTE — DISCHARGE NOTE PROVIDER - CARE PROVIDER_API CALL
Cesar Giron (DO)  Orthopedics  [address] [place] [id number]  Phone: [phone]  Fax: [phone]  Follow Up Time:

## 2022-03-24 NOTE — DISCHARGE NOTE PROVIDER - NSDCFUSCHEDAPPT_GEN_ALL_CORE_FT
LAITH VAUGHN ; 03/31/2022 ; PETRA KOWALSKI [facility]  LAITH VAUGHN ; 04/07/2022 ; PETRA [facility]  LAITH VAUGHN ; 04/14/2022 ; PETRA KOWALSKI [facility]

## 2022-03-24 NOTE — DISCHARGE NOTE PROVIDER - HOSPITAL COURSE
The patient underwent a RIGHT REVERSE TOTAL SHOULDER REPLACEMENT on 3/24/22. The patient received antibiotics consistent with SCIP guidelines. The patient underwent the procedure and had no intra-operative complications. Post-operatively, the patient was seen by medicine and PT. The patient received ASA for DVTP. The patient received pain medications per orthopedic pain managment pathway and the pain was appropriately controlled. The patient did not have any post-operative medical complications. The patient was discharged in stable condition.

## 2022-03-24 NOTE — DISCHARGE NOTE PROVIDER - NSDCCPCAREPLAN_GEN_ALL_CORE_FT
PRINCIPAL DISCHARGE DIAGNOSIS  Diagnosis: Pain in right shoulder  Assessment and Plan of Treatment:

## 2022-03-24 NOTE — DISCHARGE NOTE PROVIDER - NSDCMRMEDTOKEN_GEN_ALL_CORE_FT
acyclovir 400 mg oral tablet: 1 tab(s) orally 3 times a day  amLODIPine 5 mg oral tablet: 1 tab(s) orally once a day  aspirin 81 mg oral tablet: 1 tab(s) orally once a day  Calcium 600+D 600 mg-200 intl units (5 mcg) oral tablet: 2 tab(s) orally  CoQ10: 100  orally once a day  dexamethasone 4 mg oral tablet: 5 tab(s) orally every Tuesday prior to Darzalex (may be given IV as pre-treatment, if so, hold oral)  ferrous sulfate 324 mg (65 mg elemental iron) oral tablet:   Mag-Ox 400:   meloxicam 15 mg oral tablet: 1 tab(s) orally once a day  Multiple Vitamins oral tablet: 1 tab(s) orally once a day  Pomalyst 3 mg oral capsule: 1 cap(s) orally once a day - 21days on 7 days off  Probiotic Formula oral capsule:   rosuvastatin 5 mg oral tablet: 1 tab(s) orally once a day  vitamin c: 1000 milligram(s) orally once a day   acyclovir 400 mg oral tablet: 1 tab(s) orally 3 times a day  amLODIPine 5 mg oral tablet: 1 tab(s) orally once a day  aspirin 325 mg oral tablet: 1 tab(s) orally once a day  ferrous sulfate 324 mg (65 mg elemental iron) oral tablet:   Mag-Ox 400:   meloxicam 15 mg oral tablet: 1 tab(s) orally once a day  Multiple Vitamins oral tablet: 1 tab(s) orally once a day  oxyCODONE 5 mg oral tablet: 1 tab(s) orally every 4 to 6 hours, As Needed for Pain MDD:6  Pomalyst 3 mg oral capsule: 1 cap(s) orally once a day - 21days on 7 days off  Probiotic Formula oral capsule:   rosuvastatin 5 mg oral tablet: 1 tab(s) orally once a day  Senna S 50 mg-8.6 mg oral tablet: 2 tab(s) orally once a day (at bedtime)

## 2022-03-25 ENCOUNTER — TRANSCRIPTION ENCOUNTER (OUTPATIENT)
Age: 72
End: 2022-03-25

## 2022-03-25 VITALS
SYSTOLIC BLOOD PRESSURE: 122 MMHG | DIASTOLIC BLOOD PRESSURE: 77 MMHG | OXYGEN SATURATION: 97 % | RESPIRATION RATE: 18 BRPM | HEART RATE: 105 BPM | TEMPERATURE: 98 F

## 2022-03-25 LAB
ANION GAP SERPL CALC-SCNC: 12 MMOL/L — SIGNIFICANT CHANGE UP (ref 5–17)
BUN SERPL-MCNC: 7.7 MG/DL — LOW (ref 8–20)
CALCIUM SERPL-MCNC: 8.6 MG/DL — SIGNIFICANT CHANGE UP (ref 8.6–10.2)
CHLORIDE SERPL-SCNC: 109 MMOL/L — HIGH (ref 98–107)
CO2 SERPL-SCNC: 22 MMOL/L — SIGNIFICANT CHANGE UP (ref 22–29)
CREAT SERPL-MCNC: 0.57 MG/DL — SIGNIFICANT CHANGE UP (ref 0.5–1.3)
EGFR: 97 ML/MIN/1.73M2 — SIGNIFICANT CHANGE UP
GLUCOSE SERPL-MCNC: 119 MG/DL — HIGH (ref 70–99)
HCT VFR BLD CALC: 27.7 % — LOW (ref 34.5–45)
HGB BLD-MCNC: 9 G/DL — LOW (ref 11.5–15.5)
MCHC RBC-ENTMCNC: 29 PG — SIGNIFICANT CHANGE UP (ref 27–34)
MCHC RBC-ENTMCNC: 32.5 GM/DL — SIGNIFICANT CHANGE UP (ref 32–36)
MCV RBC AUTO: 89.4 FL — SIGNIFICANT CHANGE UP (ref 80–100)
PLATELET # BLD AUTO: 157 K/UL — SIGNIFICANT CHANGE UP (ref 150–400)
POTASSIUM SERPL-MCNC: 3.9 MMOL/L — SIGNIFICANT CHANGE UP (ref 3.5–5.3)
POTASSIUM SERPL-SCNC: 3.9 MMOL/L — SIGNIFICANT CHANGE UP (ref 3.5–5.3)
RBC # BLD: 3.1 M/UL — LOW (ref 3.8–5.2)
RBC # FLD: 14.6 % — HIGH (ref 10.3–14.5)
SODIUM SERPL-SCNC: 143 MMOL/L — SIGNIFICANT CHANGE UP (ref 135–145)
WBC # BLD: 10.38 K/UL — SIGNIFICANT CHANGE UP (ref 3.8–10.5)
WBC # FLD AUTO: 10.38 K/UL — SIGNIFICANT CHANGE UP (ref 3.8–10.5)

## 2022-03-25 PROCEDURE — 86901 BLOOD TYPING SEROLOGIC RH(D): CPT

## 2022-03-25 PROCEDURE — 88305 TISSUE EXAM BY PATHOLOGIST: CPT

## 2022-03-25 PROCEDURE — 86922 COMPATIBILITY TEST ANTIGLOB: CPT

## 2022-03-25 PROCEDURE — 73030 X-RAY EXAM OF SHOULDER: CPT

## 2022-03-25 PROCEDURE — 97163 PT EVAL HIGH COMPLEX 45 MIN: CPT

## 2022-03-25 PROCEDURE — C1713: CPT

## 2022-03-25 PROCEDURE — C1776: CPT

## 2022-03-25 PROCEDURE — 86900 BLOOD TYPING SEROLOGIC ABO: CPT

## 2022-03-25 PROCEDURE — 80048 BASIC METABOLIC PNL TOTAL CA: CPT

## 2022-03-25 PROCEDURE — 88311 DECALCIFY TISSUE: CPT

## 2022-03-25 PROCEDURE — 86880 COOMBS TEST DIRECT: CPT

## 2022-03-25 PROCEDURE — 36415 COLL VENOUS BLD VENIPUNCTURE: CPT

## 2022-03-25 PROCEDURE — 82962 GLUCOSE BLOOD TEST: CPT

## 2022-03-25 PROCEDURE — 86850 RBC ANTIBODY SCREEN: CPT

## 2022-03-25 PROCEDURE — 85027 COMPLETE CBC AUTOMATED: CPT

## 2022-03-25 RX ORDER — ASCORBIC ACID 60 MG
1000 TABLET,CHEWABLE ORAL
Qty: 0 | Refills: 0 | DISCHARGE

## 2022-03-25 RX ORDER — ASPIRIN/CALCIUM CARB/MAGNESIUM 324 MG
1 TABLET ORAL
Qty: 14 | Refills: 0
Start: 2022-03-25 | End: 2022-04-07

## 2022-03-25 RX ORDER — SENNOSIDES/DOCUSATE SODIUM 8.6MG-50MG
2 TABLET ORAL
Qty: 10 | Refills: 0
Start: 2022-03-25 | End: 2022-03-29

## 2022-03-25 RX ORDER — UBIDECARENONE 100 MG
100 CAPSULE ORAL
Qty: 0 | Refills: 0 | DISCHARGE

## 2022-03-25 RX ORDER — OXYCODONE HYDROCHLORIDE 5 MG/1
1 TABLET ORAL
Qty: 30 | Refills: 0
Start: 2022-03-25 | End: 2022-03-29

## 2022-03-25 RX ADMIN — Medication 325 MILLIGRAM(S): at 11:56

## 2022-03-25 RX ADMIN — AMLODIPINE BESYLATE 5 MILLIGRAM(S): 2.5 TABLET ORAL at 05:39

## 2022-03-25 RX ADMIN — OXYCODONE HYDROCHLORIDE 5 MILLIGRAM(S): 5 TABLET ORAL at 13:09

## 2022-03-25 RX ADMIN — OXYCODONE HYDROCHLORIDE 5 MILLIGRAM(S): 5 TABLET ORAL at 12:09

## 2022-03-25 RX ADMIN — Medication 100 MILLIGRAM(S): at 05:37

## 2022-03-25 RX ADMIN — Medication 400 MILLIGRAM(S): at 05:40

## 2022-03-25 NOTE — PHYSICAL THERAPY INITIAL EVALUATION ADULT - TRANSFER SAFETY CONCERNS NOTED: SIT/STAND, REHAB EVAL
Pt demonstrated safety awareness and ability to perform successful transfers on initial attempt while respecting right UE NWB status

## 2022-03-25 NOTE — PHYSICAL THERAPY INITIAL EVALUATION ADULT - ADDITIONAL COMMENTS
Pt reports that she lives alone in a home with 0 MAYITO. Pt reports that prior to admission she was independent with all functional mobility with no assistive device. Pt reports that she owns a cane. Pt reports that her daughter (who normally resides in [place]) will be staying with her for a couple of weeks after d/c and is willing and able to physically assist her as needed.

## 2022-03-25 NOTE — DISCHARGE NOTE NURSING/CASE MANAGEMENT/SOCIAL WORK - NSDCVIVACCINE_GEN_ALL_CORE_FT
Hep B, adult; 16-Mar-2020 12:16; Nemo Sánchez (KALYANI); [employer]; VIS (VIS Presented: 16-Mar-2020);   Hib (PRP-T); 16-Mar-2020 12:16; Nemo Sánchez (RN); [employer]; VIS (VIS Presented: 16-Mar-2020);   Pneumococcal conjugate PCV 13; 16-Mar-2020 12:16; Nemo Sánchez); [employer]; VIS (VIS Presented: 16-Mar-2020);

## 2022-03-25 NOTE — PHYSICAL THERAPY INITIAL EVALUATION ADULT - PERTINENT HX OF CURRENT PROBLEM, REHAB EVAL
PT history includes HTN, HLD, multiple myeloma s/p stem cell transplant (presently on chemotherapy), thyroid nodules; Pt reports she got her flu shot and she began to have shoulder pain limiting her ability to move her arm, states she could not brush her hair, she was told by her orthopedist that she had arthritis, received an injection, pain persisted, and she was sent for an MRI and was referred for surgical procedure

## 2022-03-25 NOTE — PROGRESS NOTE ADULT - SUBJECTIVE AND OBJECTIVE BOX
Patient seen and examined at bedside. Pain well-controlled, resting in bed. No events overnight. Denies fevers, chills, sweats, redness, warmth, drainage, numbness, tingling, pain elsewhere.      ICU Vital Signs Last 24 Hrs  T(C): 36.9 (25 Mar 2022 09:08), Max: 36.9 (24 Mar 2022 20:29)  T(F): 98.5 (25 Mar 2022 09:08), Max: 98.5 (24 Mar 2022 20:29)  HR: 105 (25 Mar 2022 09:08) (72 - 105)  BP: 122/77 (25 Mar 2022 09:08) (95/51 - 122/77)  BP(mean): 64 (24 Mar 2022 20:00) (64 - 71)  ABP: --  ABP(mean): --  RR: 18 (25 Mar 2022 09:08) (12 - 18)  SpO2: 97% (25 Mar 2022 09:08) (93% - 100%)      Gen: AAOx3, NAD  RUE: dressing c/d/i, AIN/PIN/R/U/M/MSC/Axillary motor function intact, SILT C5-T1, radial pulse 2+, compartments soft, no calf TTP B/L          A/P: 72F s/p right reverse TSA POD#1    1. Pain control  2. NWB RUE in sling; elbow/wrist/hand ROM ok. pendulums ok. No other active/passive shoulder ROM at this time  3. PT eval  4. ASA 325mg PO QD for DVT PPX  5. FU labs  6. Plan to resume patient's chemo in 3-4 weeks as per Dr. De Oliveira's recommendations  7. Follow up with Dr. Giron and Dr. De Oliveira in 2 weeks

## 2022-03-25 NOTE — PHYSICAL THERAPY INITIAL EVALUATION ADULT - ACTIVE RANGE OF MOTION EXAMINATION, REHAB EVAL
right UE AROM not observed secondary to Pt received and left in sling/Left UE Active ROM was WFL (within functional limits)/bilateral  lower extremity Active ROM was WFL (within functional limits)

## 2022-03-25 NOTE — DISCHARGE NOTE NURSING/CASE MANAGEMENT/SOCIAL WORK - PATIENT PORTAL LINK FT
You can access the FollowMyHealth Patient Portal offered by [facility] by registering at the following website: [email or web address]. By joining [employer]’s FollowMyHealth portal, you will also be able to view your health information using other applications (apps) compatible with our system.

## 2022-03-25 NOTE — PHYSICAL THERAPY INITIAL EVALUATION ADULT - MANUAL MUSCLE TESTING RESULTS, REHAB EVAL
left UE WFL; right UE not assessed secondary to sling and weightbearing status; bilateral LEs at least 3+/5 throughout

## 2022-03-25 NOTE — PHYSICAL THERAPY INITIAL EVALUATION ADULT - GENERAL OBSERVATIONS, REHAB EVAL
Pt received on [place] out of bed in chair with (+) IV and (+) right UE sling; CNA disconnected Pt from IV for out of chair mobility. Pt agreeable to PT reporting 0/10 right shoulder pain throughout session.

## 2022-03-25 NOTE — PHYSICAL THERAPY INITIAL EVALUATION ADULT - GAIT PATTERN USED, PT EVAL
Pt demonstrated 0 LOB in the presence of balance challenging activities (change of direction, navigating obstacles, head turns with ambulation, abrupt stops); Pt demonstrates adequate gait speed

## 2022-03-25 NOTE — DISCHARGE NOTE NURSING/CASE MANAGEMENT/SOCIAL WORK - NSDCPEFALRISK_GEN_ALL_CORE
For information on Fall & Injury Prevention, visit: [email or web address] OR  [email or web address] OR  https://www.cdc.gov/steadi/patient.html

## 2022-03-28 ENCOUNTER — NON-APPOINTMENT (OUTPATIENT)
Age: 72
End: 2022-03-28

## 2022-03-31 ENCOUNTER — RESULT REVIEW (OUTPATIENT)
Age: 72
End: 2022-03-31

## 2022-03-31 ENCOUNTER — APPOINTMENT (OUTPATIENT)
Dept: HEMATOLOGY ONCOLOGY | Facility: CLINIC | Age: 72
End: 2022-03-31
Payer: MEDICARE

## 2022-03-31 VITALS
DIASTOLIC BLOOD PRESSURE: 73 MMHG | WEIGHT: 163 LBS | HEART RATE: 85 BPM | OXYGEN SATURATION: 96 % | BODY MASS INDEX: 28.88 KG/M2 | HEIGHT: 63 IN | SYSTOLIC BLOOD PRESSURE: 110 MMHG

## 2022-03-31 LAB
BASOPHILS # BLD AUTO: 0.1 K/UL — SIGNIFICANT CHANGE UP (ref 0–0.2)
BASOPHILS NFR BLD AUTO: 0.8 % — SIGNIFICANT CHANGE UP (ref 0–2)
EOSINOPHIL # BLD AUTO: 0.1 K/UL — SIGNIFICANT CHANGE UP (ref 0–0.5)
EOSINOPHIL NFR BLD AUTO: 1.7 % — SIGNIFICANT CHANGE UP (ref 0–6)
HCT VFR BLD CALC: 28.6 % — LOW (ref 34.5–45)
HGB BLD-MCNC: 8.7 G/DL — LOW (ref 11.5–15.5)
LYMPHOCYTES # BLD AUTO: 2.6 K/UL — SIGNIFICANT CHANGE UP (ref 1–3.3)
LYMPHOCYTES # BLD AUTO: 34.4 % — SIGNIFICANT CHANGE UP (ref 13–44)
MCHC RBC-ENTMCNC: 29.7 PG — SIGNIFICANT CHANGE UP (ref 27–34)
MCHC RBC-ENTMCNC: 30.5 G/DL — LOW (ref 32–36)
MCV RBC AUTO: 97.5 FL — SIGNIFICANT CHANGE UP (ref 80–100)
MONOCYTES # BLD AUTO: 0.5 K/UL — SIGNIFICANT CHANGE UP (ref 0–0.9)
MONOCYTES NFR BLD AUTO: 6.6 % — SIGNIFICANT CHANGE UP (ref 2–14)
NEUTROPHILS # BLD AUTO: 4.3 K/UL — SIGNIFICANT CHANGE UP (ref 1.8–7.4)
NEUTROPHILS NFR BLD AUTO: 56.5 % — SIGNIFICANT CHANGE UP (ref 43–77)
PLATELET # BLD AUTO: 238 K/UL — SIGNIFICANT CHANGE UP (ref 150–400)
RBC # BLD: 2.93 M/UL — LOW (ref 3.8–5.2)
RBC # FLD: 13.8 % — SIGNIFICANT CHANGE UP (ref 10.3–14.5)
WBC # BLD: 7.7 K/UL — SIGNIFICANT CHANGE UP (ref 3.8–10.5)
WBC # FLD AUTO: 7.7 K/UL — SIGNIFICANT CHANGE UP (ref 3.8–10.5)

## 2022-03-31 PROCEDURE — 99214 OFFICE O/P EST MOD 30 MIN: CPT

## 2022-03-31 NOTE — PHYSICAL EXAM
[Restricted in physically strenuous activity but ambulatory and able to carry out work of a light or sedentary nature] : Status 1- Restricted in physically strenuous activity but ambulatory and able to carry out work of a light or sedentary nature, e.g., light house work, office work [Normal] : affect appropriate [de-identified] : Pleasant, interactive in NAD.  [de-identified] : no edema

## 2022-03-31 NOTE — HISTORY OF PRESENT ILLNESS
[de-identified] : Ms. Calero is a 72 year old female w/ multiple myeloma. \par \par She was noted to have proteinuria and then had a 24 hour urine protein which showed non-nephrotic range proteinuria of 510 mg/24 hours. She was referred to Dr. Lesa Rendon of nephrology. SPEP showed faint M-spike 0.2 g/dL in gammaglobulin region, immunofixation showed this to be a IgG kappa monoclonal protein. UPEP showed 2 monoclonal protein bands, 61%, and 0.7%. \par \par Subsequent work up:\par 6/19/18 M-protein 0.2 g/dL, Kappa FLC 71, lambda FLC 0.36, FLC ratio 198.61\par , Beta 2 microglobulin 1.5 mg/L\par \par She had a bone marrow biopsy on 6/22/18. Pathology: plasma cell neoplasm, plasma cells comprise 20-25% of marrow cells. Trilineage hematopoiesis present with maturation, increased iron stores. Congo red stain negative for amyloid. Karyotype 46, XX. FISH panel - positive for CCND1/IGH fusion - a/w favorable prognosis.\par \par 7/12/18 PET - negative\par \par Treatment Summary \par 7/19/18 - C1 VRd\par 8/9/18 -C2 VRd\par 8/30/18 - C3 VRd\par 9/20/18 -partial C4 VRd\par 10/11/18 - 1/2/19 KRd (carfilzomib, Revlimid, Dexamethasone) x 4 cycles. \par 2/28/19 - autoPSCT \par 5/18/19 - started Pomalyst\par 7/19/19 - decadron 20 mg weekly added to Pomalyst.\par 2/03/20 - Daratumumab week 1-8 2/03 - 3/31/20. Week 9 (began every other week) 4/07/20. [de-identified] : Patient returns for follow-up.\par S/p R Shoulder Replacement on 3/24/22. No complications, pain is tolerable, Tramadol taken at night. \par Denies fevers, cough \par \par \par \par \par \par

## 2022-03-31 NOTE — ASSESSMENT
[FreeTextEntry1] : 72 year old female IgG kappa multiple myeloma,  FISH panel - positive for CCND1/IGH fusion - a/w favorable prognosis. PET CT (7/12/18) did not show any bone lesions. S/p VRd, followed by 4 cycles of KRd, followed by autoPSCT on 2/28/19. \par On 5/18/19 started on Pomalyst for persistently elevated KFLC at 50, FLC ratio 136.\par On Pomalyst + weekly Dex, she has had a partial response, her KFLC has improved to some degree and bone marrow in 12/2019 shows persistent plasma cells of 15-20%. Daratumumab was added from 2/3/2020 onwards.\par PET CT 5/15/20 with no FDG avid lesions, and slight enlargement of right adnexal cyst.\par Q4 week daratumumab started 7/30/20\par \par 9/15/21 MRI R shoulder - partial collapse of humeral head with associated edema and enhancement with glenohumeral joint effusion and subacromial / subdeltoid and subcoracoid bursitis. High grade likely full thickness tears of supraspinatus and infraspinatus. \par \par S/p R Shoulder Replacement on 3/24/2022 No complications. \par CBC WBC 7.7, Hgb 8.7, HCT 28.6%, platelets 238 K\par 2/17/22 FLC ratio 4.10\par \par \par \par Plan:\par - Restart Daratumumab q 4 weeks, on 4/14/2022\par - Restart Pomalyst on 4/14/22\par - Anemia - likely secondary to surgery on 3/24/22\par - Continue Tramadol for R Shoulder pain PRN, BID  \par - Grade 1 CIPN - stable. \par - AVN- right humeral head - s/p right shoulder replacement on 3/24/2022 - Following up with Dr. Giron on 4/07/22 \par - F/U in 6 weeks with NP

## 2022-04-01 LAB
ALBUMIN SERPL ELPH-MCNC: 3.9 G/DL
ALP BLD-CCNC: 81 U/L
ALT SERPL-CCNC: 18 U/L
ANION GAP SERPL CALC-SCNC: 11 MMOL/L
AST SERPL-CCNC: 18 U/L
BILIRUB SERPL-MCNC: 0.2 MG/DL
BUN SERPL-MCNC: 13 MG/DL
CALCIUM SERPL-MCNC: 9.3 MG/DL
CHLORIDE SERPL-SCNC: 108 MMOL/L
CO2 SERPL-SCNC: 27 MMOL/L
CREAT SERPL-MCNC: 0.64 MG/DL
EGFR: 94 ML/MIN/1.73M2
GLUCOSE SERPL-MCNC: 85 MG/DL
POTASSIUM SERPL-SCNC: 3.9 MMOL/L
PROT SERPL-MCNC: 5.5 G/DL
SODIUM SERPL-SCNC: 146 MMOL/L
SURGICAL PATHOLOGY STUDY: SIGNIFICANT CHANGE UP

## 2022-04-07 ENCOUNTER — APPOINTMENT (OUTPATIENT)
Dept: ORTHOPEDIC SURGERY | Facility: CLINIC | Age: 72
End: 2022-04-07
Payer: MEDICARE

## 2022-04-07 PROCEDURE — 99024 POSTOP FOLLOW-UP VISIT: CPT

## 2022-04-07 PROCEDURE — 73030 X-RAY EXAM OF SHOULDER: CPT | Mod: RT

## 2022-04-07 NOTE — HISTORY OF PRESENT ILLNESS
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  The patient states that she is doing well and has minimal discomfort at this point.  She takes oxycodone to help her sleep at night only.  Otherwise she does not need to think for pain.  She has been compliant with her postoperative restrictions.  She has been doing pendulum exercises on her own.  She has remained nonweightbearing in her sling.  She has not started with physical therapy yet.  She returns today for routine evaluation.  The patient denies any fevers, chills, sweats, recent illnesses, numbness, tingling, weakness, redness, warmth, drainage, or pain elsewhere at this time. [de-identified] : On exam, the patient is pleasant.  They  are awake, alert, and oriented x3.  The patient presents today with a sling and no other assistive devices.\par Weight is appropriate for height\par Full range of motion of cervical spine without instability\par Full range of motion of back without instability\par Intact neurologic, vascular, and dermatologic exam to right and left upper extremities\par Intact neurologic, vascular, and dermatologic exam to right and left lower extremities\par \par Right upper Extremity\par The patient's incision is well approximated and healing well.  Her bandages were removed and the incision was cleaned with alcohol before Steri-Strips were applied.  The patient tolerated this well.  No erythema, warmth, or drainage. There is mild postoperative swelling. No bony tenderness to palpation about the shoulder. Range of motion: Tolerates gentle range of motion in all planes.  Strength testing: Not tested today. Motor and sensory function is intact, sensations intact light touch in C5-T1 distributions, DP/PT pulses are palpable, compartments are soft and compressible, there is no calf tenderness to palpation bilaterally. [de-identified] : X-ray 2 views of the right shoulder taken in the office today shows a well-seated total joint arthroplasty in good position without any evidence of fracture or loosening. [de-identified] : 72-year-old female status post right reverse total shoulder arthroplasty on 3/24/2022 [de-identified] : Anahi is recovering well from her recent surgery.  She has had improvements in her pain and swelling since the day of surgery.  At this point she may begin to wean out of her sling over the next 2 weeks.  She will continue to wear the sling while sleeping and while out of the house.  She may do waist level activities such as eating, drinking, writing, and typing without restriction.  She will avoid any hyperextension of the shoulder or heavy overhead lifting.  A referral for physical therapy was provided to begin working on gentle range of motion exercises for the shoulder.  She was cautioned on the range of motion restrictions, particularly in internal rotation.  She was reminded about infection prophylaxis for any future dental procedures.  She was also reminded of the 30 to 40 pound weight restriction for life with this shoulder.  Recommend follow-up in 4 weeks for repeat clinical radiographic evaluation.  She verbalizes her understanding and agrees with this plan.  All questions were answered to her satisfaction.

## 2022-04-11 ENCOUNTER — RX RENEWAL (OUTPATIENT)
Age: 72
End: 2022-04-11

## 2022-04-14 ENCOUNTER — RESULT REVIEW (OUTPATIENT)
Age: 72
End: 2022-04-14

## 2022-04-14 ENCOUNTER — APPOINTMENT (OUTPATIENT)
Age: 72
End: 2022-04-14

## 2022-04-14 DIAGNOSIS — Z51.11 ENCOUNTER FOR ANTINEOPLASTIC CHEMOTHERAPY: ICD-10-CM

## 2022-04-14 DIAGNOSIS — R11.2 NAUSEA WITH VOMITING, UNSPECIFIED: ICD-10-CM

## 2022-04-14 LAB
ALBUMIN SERPL ELPH-MCNC: 4.3 G/DL — SIGNIFICANT CHANGE UP (ref 3.3–5)
ALP SERPL-CCNC: 76 U/L — SIGNIFICANT CHANGE UP (ref 40–120)
ALT FLD-CCNC: 12 U/L — SIGNIFICANT CHANGE UP (ref 10–45)
ANION GAP SERPL CALC-SCNC: 12 MMOL/L — SIGNIFICANT CHANGE UP (ref 5–17)
AST SERPL-CCNC: 25 U/L — SIGNIFICANT CHANGE UP (ref 10–40)
BASOPHILS # BLD AUTO: 0.1 K/UL — SIGNIFICANT CHANGE UP (ref 0–0.2)
BASOPHILS NFR BLD AUTO: 1.1 % — SIGNIFICANT CHANGE UP (ref 0–2)
BILIRUB SERPL-MCNC: 0.2 MG/DL — SIGNIFICANT CHANGE UP (ref 0.2–1.2)
BUN SERPL-MCNC: 15 MG/DL — SIGNIFICANT CHANGE UP (ref 7–23)
CALCIUM SERPL-MCNC: 9.8 MG/DL — SIGNIFICANT CHANGE UP (ref 8.4–10.5)
CHLORIDE SERPL-SCNC: 108 MMOL/L — SIGNIFICANT CHANGE UP (ref 96–108)
CO2 SERPL-SCNC: 23 MMOL/L — SIGNIFICANT CHANGE UP (ref 22–31)
CREAT SERPL-MCNC: 0.73 MG/DL — SIGNIFICANT CHANGE UP (ref 0.5–1.3)
EGFR: 88 ML/MIN/1.73M2 — SIGNIFICANT CHANGE UP
EOSINOPHIL # BLD AUTO: 0 K/UL — SIGNIFICANT CHANGE UP (ref 0–0.5)
EOSINOPHIL NFR BLD AUTO: 0.8 % — SIGNIFICANT CHANGE UP (ref 0–6)
GLUCOSE SERPL-MCNC: 75 MG/DL — SIGNIFICANT CHANGE UP (ref 70–99)
HCT VFR BLD CALC: 35.6 % — SIGNIFICANT CHANGE UP (ref 34.5–45)
HGB BLD-MCNC: 10.9 G/DL — LOW (ref 11.5–15.5)
LYMPHOCYTES # BLD AUTO: 3.1 K/UL — SIGNIFICANT CHANGE UP (ref 1–3.3)
LYMPHOCYTES # BLD AUTO: 47.5 % — HIGH (ref 13–44)
MCHC RBC-ENTMCNC: 29.7 PG — SIGNIFICANT CHANGE UP (ref 27–34)
MCHC RBC-ENTMCNC: 30.7 G/DL — LOW (ref 32–36)
MCV RBC AUTO: 96.9 FL — SIGNIFICANT CHANGE UP (ref 80–100)
MONOCYTES # BLD AUTO: 0.4 K/UL — SIGNIFICANT CHANGE UP (ref 0–0.9)
MONOCYTES NFR BLD AUTO: 6.1 % — SIGNIFICANT CHANGE UP (ref 2–14)
NEUTROPHILS # BLD AUTO: 2.9 K/UL — SIGNIFICANT CHANGE UP (ref 1.8–7.4)
NEUTROPHILS NFR BLD AUTO: 44.5 % — SIGNIFICANT CHANGE UP (ref 43–77)
PLATELET # BLD AUTO: 258 K/UL — SIGNIFICANT CHANGE UP (ref 150–400)
POTASSIUM SERPL-MCNC: 4.2 MMOL/L — SIGNIFICANT CHANGE UP (ref 3.5–5.3)
POTASSIUM SERPL-SCNC: 4.2 MMOL/L — SIGNIFICANT CHANGE UP (ref 3.5–5.3)
PROT SERPL-MCNC: 6.5 G/DL — SIGNIFICANT CHANGE UP (ref 6–8.3)
RBC # BLD: 3.68 M/UL — LOW (ref 3.8–5.2)
RBC # FLD: 14.8 % — HIGH (ref 10.3–14.5)
SODIUM SERPL-SCNC: 143 MMOL/L — SIGNIFICANT CHANGE UP (ref 135–145)
WBC # BLD: 6.5 K/UL — SIGNIFICANT CHANGE UP (ref 3.8–10.5)
WBC # FLD AUTO: 6.5 K/UL — SIGNIFICANT CHANGE UP (ref 3.8–10.5)

## 2022-04-15 LAB
ALBUMIN MFR SERPL ELPH: 59 %
ALBUMIN SERPL-MCNC: 3.2 G/DL
ALBUMIN/GLOB SERPL: 1.4 RATIO
ALPHA1 GLOB MFR SERPL ELPH: 8.8 %
ALPHA1 GLOB SERPL ELPH-MCNC: 0.5 G/DL
ALPHA2 GLOB MFR SERPL ELPH: 16.9 %
ALPHA2 GLOB SERPL ELPH-MCNC: 0.9 G/DL
B-GLOBULIN MFR SERPL ELPH: 11.5 %
B-GLOBULIN SERPL ELPH-MCNC: 0.6 G/DL
DEPRECATED KAPPA LC FREE/LAMBDA SER: 8.29 RATIO
GAMMA GLOB FLD ELPH-MCNC: 0.2 G/DL
GAMMA GLOB MFR SERPL ELPH: 3.8 %
IGA FLD-MCNC: 15 MG/DL — LOW (ref 84–499)
IGA SER QL IEP: 14 MG/DL
IGG FLD-MCNC: 282 MG/DL — LOW (ref 610–1660)
IGG SER QL IEP: 220 MG/DL
IGM SER QL IEP: <10 MG/DL
IGM SERPL-MCNC: <10 MG/DL — LOW (ref 35–242)
INTERPRETATION SERPL IEP-IMP: NORMAL
KAPPA LC CSF-MCNC: 0.24 MG/DL
KAPPA LC SER QL IFE: 2.05 MG/DL — HIGH (ref 0.33–1.94)
KAPPA LC SERPL-MCNC: 1.99 MG/DL
KAPPA/LAMBDA FREE LIGHT CHAIN RATIO, SERUM: 8.91 RATIO — HIGH (ref 0.26–1.65)
LAMBDA LC SER QL IFE: 0.23 MG/DL — LOW (ref 0.57–2.63)
M PROTEIN MFR SERPL ELPH: NORMAL
M PROTEIN SPEC IFE-MCNC: NORMAL
MONOCLON BAND OBS SERPL: NORMAL
PROT SERPL-MCNC: 5.5 G/DL
PROT SERPL-MCNC: 5.5 G/DL

## 2022-04-17 LAB
% ALBUMIN: 65.7 % — SIGNIFICANT CHANGE UP
% ALPHA 1: 5.5 % — SIGNIFICANT CHANGE UP
% ALPHA 2: 14 % — SIGNIFICANT CHANGE UP
% BETA: 10.6 % — SIGNIFICANT CHANGE UP
% GAMMA: 4.2 % — SIGNIFICANT CHANGE UP
% M SPIKE: SIGNIFICANT CHANGE UP
ALBUMIN SERPL ELPH-MCNC: 4.3 G/DL — SIGNIFICANT CHANGE UP (ref 3.6–5.5)
ALBUMIN/GLOB SERPL ELPH: 2 RATIO — SIGNIFICANT CHANGE UP
ALPHA1 GLOB SERPL ELPH-MCNC: 0.4 G/DL — SIGNIFICANT CHANGE UP (ref 0.1–0.4)
ALPHA2 GLOB SERPL ELPH-MCNC: 0.9 G/DL — SIGNIFICANT CHANGE UP (ref 0.5–1)
B-GLOBULIN SERPL ELPH-MCNC: 0.7 G/DL — SIGNIFICANT CHANGE UP (ref 0.5–1)
GAMMA GLOBULIN: 0.3 G/DL — LOW (ref 0.6–1.6)
INTERPRETATION SERPL IFE-IMP: SIGNIFICANT CHANGE UP
M-SPIKE: SIGNIFICANT CHANGE UP (ref 0–0)
PROT PATTERN SERPL ELPH-IMP: SIGNIFICANT CHANGE UP

## 2022-05-05 ENCOUNTER — OUTPATIENT (OUTPATIENT)
Dept: OUTPATIENT SERVICES | Facility: HOSPITAL | Age: 72
LOS: 1 days | Discharge: HOME | End: 2022-05-05

## 2022-05-05 DIAGNOSIS — C90.00 MULTIPLE MYELOMA NOT HAVING ACHIEVED REMISSION: ICD-10-CM

## 2022-05-05 DIAGNOSIS — Z98.51 TUBAL LIGATION STATUS: Chronic | ICD-10-CM

## 2022-05-05 DIAGNOSIS — Z94.84 STEM CELLS TRANSPLANT STATUS: Chronic | ICD-10-CM

## 2022-05-05 DIAGNOSIS — Z98.890 OTHER SPECIFIED POSTPROCEDURAL STATES: Chronic | ICD-10-CM

## 2022-05-06 ENCOUNTER — APPOINTMENT (OUTPATIENT)
Dept: ORTHOPEDIC SURGERY | Facility: CLINIC | Age: 72
End: 2022-05-06

## 2022-05-06 PROCEDURE — 20610 DRAIN/INJ JOINT/BURSA W/O US: CPT | Mod: 58,LT

## 2022-05-06 PROCEDURE — 99024 POSTOP FOLLOW-UP VISIT: CPT

## 2022-05-06 NOTE — PROCEDURE
[de-identified] : I injected the patient's left knee today with cortisone.\par \par I discussed at length with the patient the planned steroid and lidocaine injection. The risks, benefits, convalescence and alternatives were reviewed. The possible side effects discussed included but were not limited to: pain, swelling, heat, bleeding, and redness. Symptoms are generally mild but if they are extensive then contact the office. Giving pain relievers by mouth such as NSAIDs or Tylenol can generally treat the reactions to steroid and lidocaine. Rare cases of infection have been noted. Rash, hives and itching may occur post injection. If you have muscle pain or cramps, flushing and or swelling of the face, rapid heart beat, nausea, dizziness, fever, chills, headache, difficulty breathing, swelling in the arms or legs, or have a prickly feeling of your skin, contact a health care provider immediately. Following this discussion, the knee was prepped with Chlorhexidine and Alcohol and under sterile conditions the 80 mg Depo-Medrol and 4 cc Lidocaine injection was performed with a 22 gauge needle through a anterolateral injection site. The needle was introduced into the joint, aspiration was performed to ensure intra-articular placement and the medication was injected. Upon withdrawal of the needle the site was cleaned with alcohol and a band aid applied. The patient tolerated the injection well and there were no adverse effects. Post injection instructions included no strenuous activity for 24 hours, cryotherapy and if there are any adverse effects to contact the office.

## 2022-05-06 NOTE — HISTORY OF PRESENT ILLNESS
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  The patient states that she is doing well and has minimal pain.  She is not taking anything for pain at this point.  She is actively using the shoulder for light activities and uses the sling when she is out of the house.  She has been compliant with her postoperative restrictions otherwise.  She has been going to physical therapy working on her range of motion and is happy with her progress so far.  She is complaining of left knee pain which is bothering her more than her shoulder today.  She has had no recent treatment for this.  The patient denies any fevers, chills, sweats, recent illnesses, numbness, tingling, weakness, or pain elsewhere at this time. [de-identified] : On exam, the patient is pleasant.  They  are awake, alert, and oriented x3.  The patient presents today with a sling and no other assistive devices.\par Weight is appropriate for height\par Full range of motion of cervical spine without instability\par Full range of motion of back without instability\par Intact neurologic, vascular, and dermatologic exam to right and left upper extremities\par Intact neurologic, vascular, and dermatologic exam to right and left lower extremities\par \par Right upper Extremity\par The patient's incision is well approximated and well-healed with the exception of 3 small areas of spitting Vicryl sutures.  The incision was cleaned with alcohol and the sutures were removed.  At the middle third of the incision there is a small suture abscess from which a scant amount of pus was expressed.  This area was vigorously cleaned with alcohol and Steri-Strips were applied.  She tolerated this well.  No erythema, warmth, or drainage. There is mild postoperative swelling. No bony tenderness to palpation about the shoulder. Range of motion: Forward flexion to 110, external rotation to 10, internal rotation to back pocket.  Strength testing: Not tested today. Motor and sensory function is intact, sensations intact light touch in C5-T1 distributions, DP/PT pulses are palpable, compartments are soft and compressible, there is no calf tenderness to palpation bilaterally.\par \par \par Left knee Examination:\par Physical examination of the knee demonstrates normal skin without signs of skin changes or abnormalities. No erythema or warmth is appreciated. There is a mild joint effusion.\par \par Sensation is intact to light touch L2-S1\par Palpable DP/PT pulse\par EHL/FHL/TA/GSC motor function intact\par \par Range of Motion\par 0-1 30 with pain at terminal flexion\par \par Strength Testing\par Quadriceps/Hamstring 5/5\par Patient is able to perform a straight leg raise without difficulty.\par \par Palpation\par Not tender to palpation about the distal femur, proximal tibia, or patella\par No palpable defect appreciated in the quadriceps or patellar tendons\par Moderately tender to palpation of medial joint line\par Mildly tender to palpation of lateral joint line\par \par Special Tests\par Anterior Drawer negative\par Posterior Drawer negative\par Lachman Exam negative\par No Varus or Valgus Laxity at 0 or 30 degrees of knee flexion\par Caty's Test negative\par Active compression of the patella is negative for pain\par Translation of the patella 2 quadrants with a firm endpoint [de-identified] : X-ray 2 views of the right shoulder taken in the office today shows a well-seated total joint arthroplasty in good position without any evidence of fracture or loosening. [de-identified] : 72-year-old female status post right reverse total shoulder arthroplasty on 3/24/2022 [de-identified] : Anahi is recovering well from her recent surgery.  She has minimal pain and has made significant strides with respect to her range of motion.  She did have several sutures spitting out of her incision today with a small suture abscess at the middle third of the incision which was cleaned in the office.  We will prescribe her Keflex as a precaution for infection prophylaxis for this reason.  She will continue with physical therapy.  She will avoid any heavy lifting but may continue with active range of motion in all planes.  We discussed the weight limit and range of motion restrictions for her prosthesis.  We also discussed the need for antibiotic prophylaxis prior to any future dental procedures.  Regarding her left knee, a cortisone injection was administered today in the office under sterile conditions.  The patient tolerated this well and there were no adverse events.  Recommend follow-up in 6 weeks for repeat evaluation.  She verbalizes her understanding and agrees with the plan.  All questions were answered to her satisfaction.

## 2022-05-11 LAB
ALBUMIN SERPL ELPH-MCNC: 4.2 G/DL
ALP BLD-CCNC: 77 U/L
ALT SERPL-CCNC: 9 U/L
ANION GAP SERPL CALC-SCNC: 13 MMOL/L
AST SERPL-CCNC: 10 U/L
BASOPHILS # BLD AUTO: 0.11 K/UL
BASOPHILS NFR BLD AUTO: 1.4 %
BILIRUB SERPL-MCNC: 0.2 MG/DL
BUN SERPL-MCNC: 11 MG/DL
CALCIUM SERPL-MCNC: 9.8 MG/DL
CHLORIDE SERPL-SCNC: 106 MMOL/L
CO2 SERPL-SCNC: 25 MMOL/L
CREAT SERPL-MCNC: 0.71 MG/DL
EOSINOPHIL # BLD AUTO: 0.29 K/UL
EOSINOPHIL NFR BLD AUTO: 3.7 %
GLUCOSE SERPL-MCNC: 90 MG/DL
HCT VFR BLD CALC: 36.7 %
HGB BLD-MCNC: 11.5 G/DL
IMM GRANULOCYTES NFR BLD AUTO: 0.9 %
LYMPHOCYTES # BLD AUTO: 3.63 K/UL
LYMPHOCYTES NFR BLD AUTO: 46.1 %
MAN DIFF?: NORMAL
MCHC RBC-ENTMCNC: 30 PG
MCHC RBC-ENTMCNC: 31.3 GM/DL
MCV RBC AUTO: 95.8 FL
MONOCYTES # BLD AUTO: 1.26 K/UL
MONOCYTES NFR BLD AUTO: 16 %
NEUTROPHILS # BLD AUTO: 2.52 K/UL
NEUTROPHILS NFR BLD AUTO: 31.9 %
PLATELET # BLD AUTO: 286 K/UL
POTASSIUM SERPL-SCNC: 4.1 MMOL/L
PROT SERPL-MCNC: 6 G/DL
RBC # BLD: 3.83 M/UL
RBC # FLD: 16.6 %
SODIUM SERPL-SCNC: 144 MMOL/L
T3 SERPL-MCNC: 115 NG/DL
T4 FREE SERPL-MCNC: 1.2 NG/DL
TSH SERPL-ACNC: 0.23 UIU/ML
WBC # FLD AUTO: 7.88 K/UL

## 2022-05-12 ENCOUNTER — APPOINTMENT (OUTPATIENT)
Dept: HEMATOLOGY ONCOLOGY | Facility: CLINIC | Age: 72
End: 2022-05-12

## 2022-05-12 ENCOUNTER — APPOINTMENT (OUTPATIENT)
Age: 72
End: 2022-05-12

## 2022-05-12 ENCOUNTER — RESULT REVIEW (OUTPATIENT)
Age: 72
End: 2022-05-12

## 2022-05-12 ENCOUNTER — APPOINTMENT (OUTPATIENT)
Dept: HEMATOLOGY ONCOLOGY | Facility: CLINIC | Age: 72
End: 2022-05-12
Payer: MEDICARE

## 2022-05-12 VITALS
WEIGHT: 169.05 LBS | OXYGEN SATURATION: 100 % | BODY MASS INDEX: 29.95 KG/M2 | SYSTOLIC BLOOD PRESSURE: 123 MMHG | DIASTOLIC BLOOD PRESSURE: 77 MMHG | HEART RATE: 61 BPM | HEIGHT: 63 IN

## 2022-05-12 DIAGNOSIS — Z51.11 ENCOUNTER FOR ANTINEOPLASTIC CHEMOTHERAPY: ICD-10-CM

## 2022-05-12 DIAGNOSIS — R11.2 NAUSEA WITH VOMITING, UNSPECIFIED: ICD-10-CM

## 2022-05-12 LAB
ALBUMIN SERPL ELPH-MCNC: 4.3 G/DL — SIGNIFICANT CHANGE UP (ref 3.3–5)
ALP SERPL-CCNC: 71 U/L — SIGNIFICANT CHANGE UP (ref 40–120)
ALT FLD-CCNC: 12 U/L — SIGNIFICANT CHANGE UP (ref 10–45)
ANION GAP SERPL CALC-SCNC: 10 MMOL/L — SIGNIFICANT CHANGE UP (ref 5–17)
AST SERPL-CCNC: 25 U/L — SIGNIFICANT CHANGE UP (ref 10–40)
BASOPHILS # BLD AUTO: 0.1 K/UL — SIGNIFICANT CHANGE UP (ref 0–0.2)
BASOPHILS NFR BLD AUTO: 1.8 % — SIGNIFICANT CHANGE UP (ref 0–2)
BILIRUB SERPL-MCNC: 0.2 MG/DL — SIGNIFICANT CHANGE UP (ref 0.2–1.2)
BUN SERPL-MCNC: 17 MG/DL — SIGNIFICANT CHANGE UP (ref 7–23)
CALCIUM SERPL-MCNC: 9.8 MG/DL — SIGNIFICANT CHANGE UP (ref 8.4–10.5)
CHLORIDE SERPL-SCNC: 108 MMOL/L — SIGNIFICANT CHANGE UP (ref 96–108)
CO2 SERPL-SCNC: 25 MMOL/L — SIGNIFICANT CHANGE UP (ref 22–31)
CREAT SERPL-MCNC: 0.63 MG/DL — SIGNIFICANT CHANGE UP (ref 0.5–1.3)
EGFR: 95 ML/MIN/1.73M2 — SIGNIFICANT CHANGE UP
EOSINOPHIL # BLD AUTO: 0.1 K/UL — SIGNIFICANT CHANGE UP (ref 0–0.5)
EOSINOPHIL NFR BLD AUTO: 1.3 % — SIGNIFICANT CHANGE UP (ref 0–6)
GLUCOSE SERPL-MCNC: 67 MG/DL — LOW (ref 70–99)
HCT VFR BLD CALC: 36.2 % — SIGNIFICANT CHANGE UP (ref 34.5–45)
HGB BLD-MCNC: 11.1 G/DL — LOW (ref 11.5–15.5)
LYMPHOCYTES # BLD AUTO: 4.7 K/UL — HIGH (ref 1–3.3)
LYMPHOCYTES # BLD AUTO: 64 % — HIGH (ref 13–44)
MCHC RBC-ENTMCNC: 29.5 PG — SIGNIFICANT CHANGE UP (ref 27–34)
MCHC RBC-ENTMCNC: 30.8 G/DL — LOW (ref 32–36)
MCV RBC AUTO: 95.6 FL — SIGNIFICANT CHANGE UP (ref 80–100)
MONOCYTES # BLD AUTO: 1.1 K/UL — HIGH (ref 0–0.9)
MONOCYTES NFR BLD AUTO: 14.4 % — HIGH (ref 2–14)
NEUTROPHILS # BLD AUTO: 1.4 K/UL — LOW (ref 1.8–7.4)
NEUTROPHILS NFR BLD AUTO: 18.6 % — LOW (ref 43–77)
PLATELET # BLD AUTO: 290 K/UL — SIGNIFICANT CHANGE UP (ref 150–400)
POTASSIUM SERPL-MCNC: 4.1 MMOL/L — SIGNIFICANT CHANGE UP (ref 3.5–5.3)
POTASSIUM SERPL-SCNC: 4.1 MMOL/L — SIGNIFICANT CHANGE UP (ref 3.5–5.3)
PROT SERPL-MCNC: 6.3 G/DL — SIGNIFICANT CHANGE UP (ref 6–8.3)
RBC # BLD: 3.78 M/UL — LOW (ref 3.8–5.2)
RBC # FLD: 15.2 % — HIGH (ref 10.3–14.5)
SODIUM SERPL-SCNC: 143 MMOL/L — SIGNIFICANT CHANGE UP (ref 135–145)
WBC # BLD: 7.3 K/UL — SIGNIFICANT CHANGE UP (ref 3.8–10.5)
WBC # FLD AUTO: 7.3 K/UL — SIGNIFICANT CHANGE UP (ref 3.8–10.5)

## 2022-05-12 PROCEDURE — 99214 OFFICE O/P EST MOD 30 MIN: CPT

## 2022-05-12 NOTE — HISTORY OF PRESENT ILLNESS
[de-identified] : Patient returns for follow-up. S/p Darzalex on 4/14/22\par \par Restarted Pomalyst today\par Denies pain. Not using tramadol or pain medication. Receiving PT since right shoulder surgery \par Denies headache, dizziness\par Denies fever, night sweats, weight loss\par Denies abdominal pain \par Denies dyspnea \par Good appetite \par Denies leg swelling \par Feels well  [de-identified] : Ms. Calero is a 72 year old female w/ multiple myeloma. \par \par She was noted to have proteinuria and then had a 24 hour urine protein which showed non-nephrotic range proteinuria of 510 mg/24 hours. She was referred to Dr. Lesa Rendon of nephrology. SPEP showed faint M-spike 0.2 g/dL in gammaglobulin region, immunofixation showed this to be a IgG kappa monoclonal protein. UPEP showed 2 monoclonal protein bands, 61%, and 0.7%. \par \par Subsequent work up:\par 6/19/18 M-protein 0.2 g/dL, Kappa FLC 71, lambda FLC 0.36, FLC ratio 198.61\par , Beta 2 microglobulin 1.5 mg/L\par \par She had a bone marrow biopsy on 6/22/18. Pathology: plasma cell neoplasm, plasma cells comprise 20-25% of marrow cells. Trilineage hematopoiesis present with maturation, increased iron stores. Congo red stain negative for amyloid. Karyotype 46, XX. FISH panel - positive for CCND1/IGH fusion - a/w favorable prognosis.\par \par 7/12/18 PET - negative\par \par Treatment Summary \par 7/19/18 - C1 VRd\par 8/9/18 -C2 VRd\par 8/30/18 - C3 VRd\par 9/20/18 -partial C4 VRd\par 10/11/18 - 1/2/19 KRd (carfilzomib, Revlimid, Dexamethasone) x 4 cycles. \par 2/28/19 - autoPSCT \par 5/18/19 - started Pomalyst\par 7/19/19 - decadron 20 mg weekly added to Pomalyst.\par 2/03/20 - Daratumumab week 1-8 2/03 - 3/31/20. Week 9 (began every other week) 4/07/20.

## 2022-05-12 NOTE — PHYSICAL EXAM
[Restricted in physically strenuous activity but ambulatory and able to carry out work of a light or sedentary nature] : Status 1- Restricted in physically strenuous activity but ambulatory and able to carry out work of a light or sedentary nature, e.g., light house work, office work [Normal] : normal appearance, no rash, nodules, vesicles, ulcers, erythema [de-identified] : Pleasant, interactive in NAD.  [de-identified] : supple [de-identified] : no edema

## 2022-05-12 NOTE — ASSESSMENT
[FreeTextEntry1] : 72 year old female IgG kappa multiple myeloma,  FISH panel - positive for CCND1/IGH fusion - a/w favorable prognosis. PET CT (7/12/18) did not show any bone lesions. S/p VRd, followed by 4 cycles of KRd, followed by autoPSCT on 2/28/19. \par On 5/18/19 started on Pomalyst for persistently elevated KFLC at 50, FLC ratio 136.\par On Pomalyst + weekly Dex, she has had a partial response, her KFLC has improved to some degree and bone marrow in 12/2019 shows persistent plasma cells of 15-20%. Daratumumab was added from 2/3/2020 onwards.\par PET CT 5/15/20 with no FDG avid lesions, and slight enlargement of right adnexal cyst.\par Q4 week daratumumab started 7/30/20\par 9/15/21 MRI R shoulder - partial collapse of humeral head with associated edema and enhancement with glenohumeral joint effusion and subacromial / subdeltoid and subcoracoid bursitis. High grade likely full thickness tears of supraspinatus and infraspinatus. \par 3/24/2022 S/p R Shoulder Replacement \par \par 2/17/22 FLC ratio 4.10\par 3/31/22 FLC ratio 8.29\par 4/14/22 FLC ratio 8.91\par \par 4/14/2022 Restarted Daratumumab q 4 weeks \par Reviewed CBC from today WBC 7.3 K HGB 11.1 g HCT 36.2 %  K Neutrophil # 1.4 K \par \par Plan:\par - Continue Daratumumab q 4 weeks, C24 today \par - Continue Pomalyst 3mg 3 weeks on followed by 1 week off \par - Anemia - likely secondary to surgery on 3/24/22, HGB improved 11.1 g today, will monitor \par - Grade 1 CIPN - stable \par - AVN- right humeral head - s/p right shoulder replacement on 3/24/2022 - Following up with Dr. Giron on 4/07/22. Continue PT \par - Advised to call with concerns/symptoms \par - F/U in 6 weeks with Dr De Oliveira or sooner if needed

## 2022-05-13 LAB
IGA FLD-MCNC: 22 MG/DL — LOW (ref 84–499)
IGG FLD-MCNC: 284 MG/DL — LOW (ref 610–1660)
IGM SERPL-MCNC: 10 MG/DL — LOW (ref 35–242)
KAPPA LC SER QL IFE: 1.37 MG/DL — SIGNIFICANT CHANGE UP (ref 0.33–1.94)
KAPPA/LAMBDA FREE LIGHT CHAIN RATIO, SERUM: 4.42 RATIO — HIGH (ref 0.26–1.65)
LAMBDA LC SER QL IFE: 0.31 MG/DL — LOW (ref 0.57–2.63)

## 2022-05-18 LAB
% ALBUMIN: 66.5 % — SIGNIFICANT CHANGE UP
% ALPHA 1: 5 % — SIGNIFICANT CHANGE UP
% ALPHA 2: 12.7 % — SIGNIFICANT CHANGE UP
% BETA: 10.8 % — SIGNIFICANT CHANGE UP
% GAMMA: 5 % — SIGNIFICANT CHANGE UP
% M SPIKE: SIGNIFICANT CHANGE UP
ALBUMIN SERPL ELPH-MCNC: 4.2 G/DL — SIGNIFICANT CHANGE UP (ref 3.6–5.5)
ALBUMIN/GLOB SERPL ELPH: 2 RATIO — SIGNIFICANT CHANGE UP
ALPHA1 GLOB SERPL ELPH-MCNC: 0.3 G/DL — SIGNIFICANT CHANGE UP (ref 0.1–0.4)
ALPHA2 GLOB SERPL ELPH-MCNC: 0.8 G/DL — SIGNIFICANT CHANGE UP (ref 0.5–1)
B-GLOBULIN SERPL ELPH-MCNC: 0.7 G/DL — SIGNIFICANT CHANGE UP (ref 0.5–1)
GAMMA GLOBULIN: 0.3 G/DL — LOW (ref 0.6–1.6)
INTERPRETATION SERPL IFE-IMP: SIGNIFICANT CHANGE UP
M-SPIKE: SIGNIFICANT CHANGE UP (ref 0–0)
PROT PATTERN SERPL ELPH-IMP: SIGNIFICANT CHANGE UP

## 2022-06-02 ENCOUNTER — APPOINTMENT (OUTPATIENT)
Dept: OBGYN | Facility: CLINIC | Age: 72
End: 2022-06-02

## 2022-06-08 ENCOUNTER — OUTPATIENT (OUTPATIENT)
Dept: OUTPATIENT SERVICES | Facility: HOSPITAL | Age: 72
LOS: 1 days | Discharge: HOME | End: 2022-06-08

## 2022-06-08 DIAGNOSIS — C90.00 MULTIPLE MYELOMA NOT HAVING ACHIEVED REMISSION: ICD-10-CM

## 2022-06-08 DIAGNOSIS — Z98.890 OTHER SPECIFIED POSTPROCEDURAL STATES: Chronic | ICD-10-CM

## 2022-06-08 DIAGNOSIS — Z98.51 TUBAL LIGATION STATUS: Chronic | ICD-10-CM

## 2022-06-08 DIAGNOSIS — Z94.84 STEM CELLS TRANSPLANT STATUS: Chronic | ICD-10-CM

## 2022-06-09 ENCOUNTER — APPOINTMENT (OUTPATIENT)
Age: 72
End: 2022-06-09

## 2022-06-09 ENCOUNTER — RESULT REVIEW (OUTPATIENT)
Age: 72
End: 2022-06-09

## 2022-06-09 LAB
ALBUMIN SERPL ELPH-MCNC: 4.1 G/DL — SIGNIFICANT CHANGE UP (ref 3.3–5)
ALP SERPL-CCNC: 65 U/L — SIGNIFICANT CHANGE UP (ref 40–120)
ALT FLD-CCNC: 13 U/L — SIGNIFICANT CHANGE UP (ref 10–45)
ANION GAP SERPL CALC-SCNC: 12 MMOL/L — SIGNIFICANT CHANGE UP (ref 5–17)
AST SERPL-CCNC: 20 U/L — SIGNIFICANT CHANGE UP (ref 10–40)
BASOPHILS # BLD AUTO: 0.1 K/UL — SIGNIFICANT CHANGE UP (ref 0–0.2)
BASOPHILS NFR BLD AUTO: 2.1 % — HIGH (ref 0–2)
BILIRUB SERPL-MCNC: 0.4 MG/DL — SIGNIFICANT CHANGE UP (ref 0.2–1.2)
BUN SERPL-MCNC: 14 MG/DL — SIGNIFICANT CHANGE UP (ref 7–23)
CALCIUM SERPL-MCNC: 9.2 MG/DL — SIGNIFICANT CHANGE UP (ref 8.4–10.5)
CHLORIDE SERPL-SCNC: 109 MMOL/L — HIGH (ref 96–108)
CO2 SERPL-SCNC: 24 MMOL/L — SIGNIFICANT CHANGE UP (ref 22–31)
CREAT SERPL-MCNC: 0.66 MG/DL — SIGNIFICANT CHANGE UP (ref 0.5–1.3)
EGFR: 94 ML/MIN/1.73M2 — SIGNIFICANT CHANGE UP
EOSINOPHIL # BLD AUTO: 0.2 K/UL — SIGNIFICANT CHANGE UP (ref 0–0.5)
EOSINOPHIL NFR BLD AUTO: 3.2 % — SIGNIFICANT CHANGE UP (ref 0–6)
GLUCOSE SERPL-MCNC: 114 MG/DL — HIGH (ref 70–99)
HCT VFR BLD CALC: 35.1 % — SIGNIFICANT CHANGE UP (ref 34.5–45)
HGB BLD-MCNC: 11 G/DL — LOW (ref 11.5–15.5)
LYMPHOCYTES # BLD AUTO: 3.7 K/UL — HIGH (ref 1–3.3)
LYMPHOCYTES # BLD AUTO: 61.5 % — HIGH (ref 13–44)
MCHC RBC-ENTMCNC: 29.3 PG — SIGNIFICANT CHANGE UP (ref 27–34)
MCHC RBC-ENTMCNC: 31.4 G/DL — LOW (ref 32–36)
MCV RBC AUTO: 93.5 FL — SIGNIFICANT CHANGE UP (ref 80–100)
MONOCYTES # BLD AUTO: 0.7 K/UL — SIGNIFICANT CHANGE UP (ref 0–0.9)
MONOCYTES NFR BLD AUTO: 12.3 % — SIGNIFICANT CHANGE UP (ref 2–14)
NEUTROPHILS # BLD AUTO: 1.3 K/UL — LOW (ref 1.8–7.4)
NEUTROPHILS NFR BLD AUTO: 20.8 % — LOW (ref 43–77)
PLATELET # BLD AUTO: 240 K/UL — SIGNIFICANT CHANGE UP (ref 150–400)
POTASSIUM SERPL-MCNC: 3.5 MMOL/L — SIGNIFICANT CHANGE UP (ref 3.5–5.3)
POTASSIUM SERPL-SCNC: 3.5 MMOL/L — SIGNIFICANT CHANGE UP (ref 3.5–5.3)
PROT SERPL-MCNC: 5.7 G/DL — LOW (ref 6–8.3)
RBC # BLD: 3.76 M/UL — LOW (ref 3.8–5.2)
RBC # FLD: 15.2 % — HIGH (ref 10.3–14.5)
SODIUM SERPL-SCNC: 144 MMOL/L — SIGNIFICANT CHANGE UP (ref 135–145)
WBC # BLD: 6 K/UL — SIGNIFICANT CHANGE UP (ref 3.8–10.5)
WBC # FLD AUTO: 6 K/UL — SIGNIFICANT CHANGE UP (ref 3.8–10.5)

## 2022-06-10 DIAGNOSIS — Z51.11 ENCOUNTER FOR ANTINEOPLASTIC CHEMOTHERAPY: ICD-10-CM

## 2022-06-10 DIAGNOSIS — R11.2 NAUSEA WITH VOMITING, UNSPECIFIED: ICD-10-CM

## 2022-06-10 LAB
IGA FLD-MCNC: 23 MG/DL — LOW (ref 84–499)
IGG FLD-MCNC: 264 MG/DL — LOW (ref 610–1660)
IGM SERPL-MCNC: <10 MG/DL — LOW (ref 35–242)
KAPPA LC SER QL IFE: 1.52 MG/DL — SIGNIFICANT CHANGE UP (ref 0.33–1.94)
KAPPA/LAMBDA FREE LIGHT CHAIN RATIO, SERUM: 4.11 RATIO — HIGH (ref 0.26–1.65)
LAMBDA LC SER QL IFE: 0.37 MG/DL — LOW (ref 0.57–2.63)

## 2022-06-13 LAB
% ALBUMIN: 68.4 % — SIGNIFICANT CHANGE UP
% ALPHA 1: 4.8 % — SIGNIFICANT CHANGE UP
% ALPHA 2: 11.9 % — SIGNIFICANT CHANGE UP
% BETA: 10.3 % — SIGNIFICANT CHANGE UP
% GAMMA: 4.6 % — SIGNIFICANT CHANGE UP
% M SPIKE: SIGNIFICANT CHANGE UP
ALBUMIN SERPL ELPH-MCNC: 3.9 G/DL — SIGNIFICANT CHANGE UP (ref 3.6–5.5)
ALBUMIN/GLOB SERPL ELPH: 2.2 RATIO — SIGNIFICANT CHANGE UP
ALPHA1 GLOB SERPL ELPH-MCNC: 0.3 G/DL — SIGNIFICANT CHANGE UP (ref 0.1–0.4)
ALPHA2 GLOB SERPL ELPH-MCNC: 0.7 G/DL — SIGNIFICANT CHANGE UP (ref 0.5–1)
B-GLOBULIN SERPL ELPH-MCNC: 0.6 G/DL — SIGNIFICANT CHANGE UP (ref 0.5–1)
GAMMA GLOBULIN: 0.3 G/DL — LOW (ref 0.6–1.6)
INTERPRETATION SERPL IFE-IMP: SIGNIFICANT CHANGE UP
M-SPIKE: SIGNIFICANT CHANGE UP (ref 0–0)
PROT PATTERN SERPL ELPH-IMP: SIGNIFICANT CHANGE UP

## 2022-06-17 ENCOUNTER — APPOINTMENT (OUTPATIENT)
Dept: ORTHOPEDIC SURGERY | Facility: CLINIC | Age: 72
End: 2022-06-17
Payer: MEDICARE

## 2022-06-17 PROCEDURE — 99024 POSTOP FOLLOW-UP VISIT: CPT

## 2022-06-17 NOTE — HISTORY OF PRESENT ILLNESS
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  Patient states she is doing very well and has minimal to no pain at this point.  She has been going to physical therapy working on her strength and range of motion and is very happy with her progress so far.  She returns today for routine evaluation.  She denies any fevers, chills, sweats, numbness, tingling, or pain elsewhere. [de-identified] : On exam, the patient is pleasant.  They  are awake, alert, and oriented x3.  The patient presents today with a sling and no other assistive devices.\par Weight is appropriate for height\par Full range of motion of cervical spine without instability\par Full range of motion of back without instability\par Intact neurologic, vascular, and dermatologic exam to right and left upper extremities\par Intact neurologic, vascular, and dermatologic exam to right and left lower extremities\par \par Right upper Extremity\par The patient's incision is well-healed.  No erythema, warmth, or drainage.  No swelling no bony tenderness to palpation about the shoulder. Range of motion: Forward flexion to 150, external rotation to 30, internal rotation to back pocket.  Strength testing: Not tested today. Motor and sensory function is intact, sensations intact light touch in C5-T1 distributions, DP/PT pulses are palpable, compartments are soft and compressible, there is no calf tenderness to palpation bilaterally.\par \par \par Left knee Examination:\par Physical examination of the knee demonstrates normal skin without signs of skin changes or abnormalities. No erythema or warmth is appreciated. There is a mild joint effusion.\par \par Sensation is intact to light touch L2-S1\par Palpable DP/PT pulse\par EHL/FHL/TA/GSC motor function intact\par \par Range of Motion\par 0-1 30 with pain at terminal flexion\par \par Strength Testing\par Quadriceps/Hamstring 5/5\par Patient is able to perform a straight leg raise without difficulty.\par \par Palpation\par Not tender to palpation about the distal femur, proximal tibia, or patella\par No palpable defect appreciated in the quadriceps or patellar tendons\par Moderately tender to palpation of medial joint line\par Mildly tender to palpation of lateral joint line\par \par Special Tests\par Anterior Drawer negative\par Posterior Drawer negative\par Lachman Exam negative\par No Varus or Valgus Laxity at 0 or 30 degrees of knee flexion\par Caty's Test negative\par Active compression of the patella is negative for pain\par Translation of the patella 2 quadrants with a firm endpoint [de-identified] : X-ray 2 views of the right shoulder for previous office visit reviewed with the patient today.  There is no acute fracture or dislocation of the hardware remains in good position. [de-identified] : 72-year-old female status post right reverse total shoulder arthroplasty on 3/24/2022 [de-identified] : Anahi is recovering well from her recent surgery.  She has minimal pain and has made significa she is now 3 months out from surgery and nearly pain-free.  She has excellent range of motion and strength.  At this point she will continue to avoid any heavy lifting.  She may continue to do strengthening exercises on her own at this point now that she has completed her course of physical therapy.  I reminded her of the 30 to 40 pound weight restriction for life with her operative shoulder.  We discussed the limitations in internal rotation as well as the need for antibiotic prophylaxis prior to any future dental procedures.  Recommend follow-up in 3 months for repeat clinical and radiographic evaluation.  She verbalizes her understanding and agrees with the plan.  All questions were answered to her satisfaction.

## 2022-06-22 ENCOUNTER — RESULT REVIEW (OUTPATIENT)
Age: 72
End: 2022-06-22

## 2022-06-22 ENCOUNTER — APPOINTMENT (OUTPATIENT)
Dept: HEMATOLOGY ONCOLOGY | Facility: CLINIC | Age: 72
End: 2022-06-22
Payer: MEDICARE

## 2022-06-22 VITALS
SYSTOLIC BLOOD PRESSURE: 120 MMHG | BODY MASS INDEX: 30.56 KG/M2 | HEIGHT: 63 IN | WEIGHT: 172.5 LBS | HEART RATE: 69 BPM | DIASTOLIC BLOOD PRESSURE: 72 MMHG | OXYGEN SATURATION: 100 %

## 2022-06-22 LAB
BASOPHILS # BLD AUTO: 0.2 K/UL — SIGNIFICANT CHANGE UP (ref 0–0.2)
BASOPHILS NFR BLD AUTO: 2.2 % — HIGH (ref 0–2)
EOSINOPHIL # BLD AUTO: 0.2 K/UL — SIGNIFICANT CHANGE UP (ref 0–0.5)
EOSINOPHIL NFR BLD AUTO: 2.5 % — SIGNIFICANT CHANGE UP (ref 0–6)
HCT VFR BLD CALC: 35.2 % — SIGNIFICANT CHANGE UP (ref 34.5–45)
HGB BLD-MCNC: 10.8 G/DL — LOW (ref 11.5–15.5)
LYMPHOCYTES # BLD AUTO: 3.5 K/UL — HIGH (ref 1–3.3)
LYMPHOCYTES # BLD AUTO: 51.3 % — HIGH (ref 13–44)
MCHC RBC-ENTMCNC: 28.9 PG — SIGNIFICANT CHANGE UP (ref 27–34)
MCHC RBC-ENTMCNC: 30.6 G/DL — LOW (ref 32–36)
MCV RBC AUTO: 94.6 FL — SIGNIFICANT CHANGE UP (ref 80–100)
MONOCYTES # BLD AUTO: 0.7 K/UL — SIGNIFICANT CHANGE UP (ref 0–0.9)
MONOCYTES NFR BLD AUTO: 10.8 % — SIGNIFICANT CHANGE UP (ref 2–14)
NEUTROPHILS # BLD AUTO: 2.3 K/UL — SIGNIFICANT CHANGE UP (ref 1.8–7.4)
NEUTROPHILS NFR BLD AUTO: 33.2 % — LOW (ref 43–77)
PLATELET # BLD AUTO: 180 K/UL — SIGNIFICANT CHANGE UP (ref 150–400)
RBC # BLD: 3.72 M/UL — LOW (ref 3.8–5.2)
RBC # FLD: 15.2 % — HIGH (ref 10.3–14.5)
WBC # BLD: 6.8 K/UL — SIGNIFICANT CHANGE UP (ref 3.8–10.5)
WBC # FLD AUTO: 6.8 K/UL — SIGNIFICANT CHANGE UP (ref 3.8–10.5)

## 2022-06-22 PROCEDURE — 99214 OFFICE O/P EST MOD 30 MIN: CPT

## 2022-06-22 NOTE — HISTORY OF PRESENT ILLNESS
[de-identified] : Ms. Calero is a 72 year old female w/ multiple myeloma. \par \par She was noted to have proteinuria and then had a 24 hour urine protein which showed non-nephrotic range proteinuria of 510 mg/24 hours. She was referred to Dr. Lesa Rendon of nephrology. SPEP showed faint M-spike 0.2 g/dL in gammaglobulin region, immunofixation showed this to be a IgG kappa monoclonal protein. UPEP showed 2 monoclonal protein bands, 61%, and 0.7%. \par \par Subsequent work up:\par 6/19/18 M-protein 0.2 g/dL, Kappa FLC 71, lambda FLC 0.36, FLC ratio 198.61\par , Beta 2 microglobulin 1.5 mg/L\par \par She had a bone marrow biopsy on 6/22/18. Pathology: plasma cell neoplasm, plasma cells comprise 20-25% of marrow cells. Trilineage hematopoiesis present with maturation, increased iron stores. Congo red stain negative for amyloid. Karyotype 46, XX. FISH panel - positive for CCND1/IGH fusion - a/w favorable prognosis.\par \par 7/12/18 PET - negative\par \par Treatment Summary \par 7/19/18 - C1 VRd\par 8/9/18 -C2 VRd\par 8/30/18 - C3 VRd\par 9/20/18 -partial C4 VRd\par 10/11/18 - 1/2/19 KRd (carfilzomib, Revlimid, Dexamethasone) x 4 cycles. \par 2/28/19 - autoPSCT \par 5/18/19 - started Pomalyst\par 7/19/19 - decadron 20 mg weekly added to Pomalyst.\par 2/03/20 - Daratumumab week 1-8 2/03 - 3/31/20. Week 9 (began every other week) 4/07/20. [de-identified] : Patient returns for follow-up. S/p Darzalex on 6/9/22\par Notes she is overall doing well.\par She is getting PT for R shoulder till end of this month.\par Still has pain on the surgical scar.\par No fevers. No infections. \par Currently on Pomalyst.

## 2022-06-22 NOTE — ASSESSMENT
[FreeTextEntry1] : 72 year old female IgG kappa multiple myeloma,  FISH panel - positive for CCND1/IGH fusion - a/w favorable prognosis. PET CT (7/12/18) did not show any bone lesions. S/p VRd, followed by 4 cycles of KRd, followed by autoPSCT on 2/28/19. \par On 5/18/19 started on Pomalyst for persistently elevated KFLC at 50, FLC ratio 136.\par On Pomalyst + weekly Dex, she has had a partial response, her KFLC has improved to some degree and bone marrow in 12/2019 shows persistent plasma cells of 15-20%. Daratumumab was added from 2/3/2020 onwards.\par PET CT 5/15/20 with no FDG avid lesions, and slight enlargement of right adnexal cyst.\par Q4 week daratumumab started 7/30/20\par 9/15/21 MRI R shoulder - partial collapse of humeral head with associated edema and enhancement with glenohumeral joint effusion and subacromial / subdeltoid and subcoracoid bursitis. High grade likely full thickness tears of supraspinatus and infraspinatus. \par 3/24/2022 S/p R Shoulder Replacement \par 2/17/22 FLC ratio 4.10\par 3/31/22 FLC ratio 8.29\par 4/14/22 FLC ratio 8.91\par 6/9/22 FLC ratio 4.11\par 4/14/2022 Restarted Daratumumab q 4 weeks \par \par Doing well on treatment. Getting PT for right shoulder. \par 6/6/22 WBC 6.8, Hgb 10.8, plts 180K\par \par Plan:\par - Continue Daratumumab q 4 weeks, C26 on 7/7/22 \par - Continue Pomalyst 3mg 3 weeks on followed by 1 week off \par - Anemia - stable likely secondary to pomalyst/darzalex, Hgb is 10.8 g/dL. 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 Giron. Continue PT \par - Advised to call with concerns/symptoms \par -RTO 3 months

## 2022-06-22 NOTE — PHYSICAL EXAM
[Restricted in physically strenuous activity but ambulatory and able to carry out work of a light or sedentary nature] : Status 1- Restricted in physically strenuous activity but ambulatory and able to carry out work of a light or sedentary nature, e.g., light house work, office work [Normal] : affect appropriate [de-identified] : Pleasant, interactive in NAD.  [de-identified] : supple [de-identified] : no edema

## 2022-06-24 LAB
ALBUMIN SERPL ELPH-MCNC: 4.5 G/DL
ALP BLD-CCNC: 74 U/L
ALT SERPL-CCNC: 12 U/L
ANION GAP SERPL CALC-SCNC: 12 MMOL/L
AST SERPL-CCNC: 17 U/L
BILIRUB SERPL-MCNC: 0.4 MG/DL
BUN SERPL-MCNC: 12 MG/DL
CALCIUM SERPL-MCNC: 9.4 MG/DL
CHLORIDE SERPL-SCNC: 109 MMOL/L
CO2 SERPL-SCNC: 25 MMOL/L
CREAT SERPL-MCNC: 0.63 MG/DL
EGFR: 95 ML/MIN/1.73M2
GLUCOSE SERPL-MCNC: 77 MG/DL
LDH SERPL-CCNC: 155 U/L
POTASSIUM SERPL-SCNC: 3.8 MMOL/L
PROT SERPL-MCNC: 5.8 G/DL
SODIUM SERPL-SCNC: 146 MMOL/L

## 2022-07-07 ENCOUNTER — APPOINTMENT (OUTPATIENT)
Age: 72
End: 2022-07-07

## 2022-07-07 ENCOUNTER — RESULT REVIEW (OUTPATIENT)
Age: 72
End: 2022-07-07

## 2022-07-07 LAB
ACANTHOCYTES BLD QL SMEAR: SLIGHT — SIGNIFICANT CHANGE UP
ALBUMIN SERPL ELPH-MCNC: 4 G/DL — SIGNIFICANT CHANGE UP (ref 3.3–5)
ALP SERPL-CCNC: 68 U/L — SIGNIFICANT CHANGE UP (ref 40–120)
ALT FLD-CCNC: 11 U/L — SIGNIFICANT CHANGE UP (ref 10–45)
ANION GAP SERPL CALC-SCNC: 10 MMOL/L — SIGNIFICANT CHANGE UP (ref 5–17)
ANISOCYTOSIS BLD QL: SLIGHT — SIGNIFICANT CHANGE UP
AST SERPL-CCNC: 20 U/L — SIGNIFICANT CHANGE UP (ref 10–40)
BASOPHILS # BLD AUTO: 0.2 K/UL — SIGNIFICANT CHANGE UP (ref 0–0.2)
BASOPHILS NFR BLD AUTO: 2 % — SIGNIFICANT CHANGE UP (ref 0–2)
BILIRUB SERPL-MCNC: 0.2 MG/DL — SIGNIFICANT CHANGE UP (ref 0.2–1.2)
BUN SERPL-MCNC: 17 MG/DL — SIGNIFICANT CHANGE UP (ref 7–23)
BURR CELLS BLD QL SMEAR: PRESENT — SIGNIFICANT CHANGE UP
CALCIUM SERPL-MCNC: 9.6 MG/DL — SIGNIFICANT CHANGE UP (ref 8.4–10.5)
CHLORIDE SERPL-SCNC: 112 MMOL/L — HIGH (ref 96–108)
CO2 SERPL-SCNC: 25 MMOL/L — SIGNIFICANT CHANGE UP (ref 22–31)
CREAT SERPL-MCNC: 0.63 MG/DL — SIGNIFICANT CHANGE UP (ref 0.5–1.3)
DACRYOCYTES BLD QL SMEAR: SLIGHT — SIGNIFICANT CHANGE UP
EGFR: 95 ML/MIN/1.73M2 — SIGNIFICANT CHANGE UP
ELLIPTOCYTES BLD QL SMEAR: SLIGHT — SIGNIFICANT CHANGE UP
EOSINOPHIL # BLD AUTO: 0.2 K/UL — SIGNIFICANT CHANGE UP (ref 0–0.5)
EOSINOPHIL NFR BLD AUTO: 4 % — SIGNIFICANT CHANGE UP (ref 0–6)
GLUCOSE SERPL-MCNC: 77 MG/DL — SIGNIFICANT CHANGE UP (ref 70–99)
HCT VFR BLD CALC: 36 % — SIGNIFICANT CHANGE UP (ref 34.5–45)
HGB BLD-MCNC: 11.5 G/DL — SIGNIFICANT CHANGE UP (ref 11.5–15.5)
LYMPHOCYTES # BLD AUTO: 3.4 K/UL — HIGH (ref 1–3.3)
LYMPHOCYTES # BLD AUTO: 52 % — HIGH (ref 13–44)
MACROCYTES BLD QL: SLIGHT — SIGNIFICANT CHANGE UP
MCHC RBC-ENTMCNC: 29.2 PG — SIGNIFICANT CHANGE UP (ref 27–34)
MCHC RBC-ENTMCNC: 32 G/DL — SIGNIFICANT CHANGE UP (ref 32–36)
MCV RBC AUTO: 91.3 FL — SIGNIFICANT CHANGE UP (ref 80–100)
MICROCYTES BLD QL: SLIGHT — SIGNIFICANT CHANGE UP
MONOCYTES # BLD AUTO: 1.3 K/UL — HIGH (ref 0–0.9)
MONOCYTES NFR BLD AUTO: 14 % — SIGNIFICANT CHANGE UP (ref 2–14)
MYELOCYTES NFR BLD: 1 % — HIGH (ref 0–0)
NEUTROPHILS # BLD AUTO: 1.6 K/UL — LOW (ref 1.8–7.4)
NEUTROPHILS NFR BLD AUTO: 27 % — LOW (ref 43–77)
OVALOCYTES BLD QL SMEAR: SLIGHT — SIGNIFICANT CHANGE UP
PLAT MORPH BLD: NORMAL — SIGNIFICANT CHANGE UP
PLATELET # BLD AUTO: 296 K/UL — SIGNIFICANT CHANGE UP (ref 150–400)
POIKILOCYTOSIS BLD QL AUTO: SIGNIFICANT CHANGE UP
POLYCHROMASIA BLD QL SMEAR: SLIGHT — SIGNIFICANT CHANGE UP
POTASSIUM SERPL-MCNC: 4.1 MMOL/L — SIGNIFICANT CHANGE UP (ref 3.5–5.3)
POTASSIUM SERPL-SCNC: 4.1 MMOL/L — SIGNIFICANT CHANGE UP (ref 3.5–5.3)
PROT SERPL-MCNC: 5.8 G/DL — LOW (ref 6–8.3)
RBC # BLD: 3.94 M/UL — SIGNIFICANT CHANGE UP (ref 3.8–5.2)
RBC # FLD: 15.8 % — HIGH (ref 10.3–14.5)
RBC BLD AUTO: ABNORMAL
SCHISTOCYTES BLD QL AUTO: SLIGHT — SIGNIFICANT CHANGE UP
SMUDGE CELLS # BLD: PRESENT — SIGNIFICANT CHANGE UP
SODIUM SERPL-SCNC: 146 MMOL/L — HIGH (ref 135–145)
WBC # BLD: 6.8 K/UL — SIGNIFICANT CHANGE UP (ref 3.8–10.5)
WBC # FLD AUTO: 6.8 K/UL — SIGNIFICANT CHANGE UP (ref 3.8–10.5)

## 2022-07-08 LAB
IGA FLD-MCNC: 25 MG/DL — LOW (ref 84–499)
IGG FLD-MCNC: 273 MG/DL — LOW (ref 610–1660)
IGM SERPL-MCNC: <10 MG/DL — LOW (ref 35–242)
KAPPA LC SER QL IFE: 1.3 MG/DL — SIGNIFICANT CHANGE UP (ref 0.33–1.94)
KAPPA/LAMBDA FREE LIGHT CHAIN RATIO, SERUM: 4.48 RATIO — HIGH (ref 0.26–1.65)
LAMBDA LC SER QL IFE: 0.29 MG/DL — LOW (ref 0.57–2.63)

## 2022-07-11 LAB
ALBUMIN MFR SERPL ELPH: 66.3 %
ALBUMIN SERPL-MCNC: 3.8 G/DL
ALBUMIN/GLOB SERPL: 1.9 RATIO
ALPHA1 GLOB MFR SERPL ELPH: 5.2 %
ALPHA1 GLOB SERPL ELPH-MCNC: 0.3 G/DL
ALPHA2 GLOB MFR SERPL ELPH: 12.3 %
ALPHA2 GLOB SERPL ELPH-MCNC: 0.7 G/DL
B-GLOBULIN MFR SERPL ELPH: 10.9 %
B-GLOBULIN SERPL ELPH-MCNC: 0.6 G/DL
DEPRECATED KAPPA LC FREE/LAMBDA SER: 3.22 RATIO
GAMMA GLOB FLD ELPH-MCNC: 0.3 G/DL
GAMMA GLOB MFR SERPL ELPH: 5.3 %
IGA SER QL IEP: 20 MG/DL
IGG SER QL IEP: 290 MG/DL
IGM SER QL IEP: <10 MG/DL
INTERPRETATION SERPL IEP-IMP: NORMAL
KAPPA LC CSF-MCNC: 0.4 MG/DL
KAPPA LC SERPL-MCNC: 1.29 MG/DL
M PROTEIN MFR SERPL ELPH: NORMAL
M PROTEIN SPEC IFE-MCNC: NORMAL
MONOCLON BAND OBS SERPL: NORMAL
PROT SERPL-MCNC: 5.8 G/DL
PROT SERPL-MCNC: 5.8 G/DL

## 2022-07-12 ENCOUNTER — APPOINTMENT (OUTPATIENT)
Dept: OBGYN | Facility: CLINIC | Age: 72
End: 2022-07-12

## 2022-07-12 ENCOUNTER — NON-APPOINTMENT (OUTPATIENT)
Age: 72
End: 2022-07-12

## 2022-07-12 VITALS
WEIGHT: 170 LBS | DIASTOLIC BLOOD PRESSURE: 70 MMHG | SYSTOLIC BLOOD PRESSURE: 122 MMHG | BODY MASS INDEX: 30.12 KG/M2 | HEIGHT: 63 IN

## 2022-07-12 DIAGNOSIS — Z12.31 ENCOUNTER FOR SCREENING MAMMOGRAM FOR MALIGNANT NEOPLASM OF BREAST: ICD-10-CM

## 2022-07-12 LAB
% ALBUMIN: 66.5 % — SIGNIFICANT CHANGE UP
% ALPHA 1: 5 % — SIGNIFICANT CHANGE UP
% ALPHA 2: 12.9 % — SIGNIFICANT CHANGE UP
% BETA: 10.8 % — SIGNIFICANT CHANGE UP
% GAMMA: 4.8 % — SIGNIFICANT CHANGE UP
% M SPIKE: SIGNIFICANT CHANGE UP
ALBUMIN SERPL ELPH-MCNC: 3.9 G/DL — SIGNIFICANT CHANGE UP (ref 3.6–5.5)
ALBUMIN/GLOB SERPL ELPH: 2.1 RATIO — SIGNIFICANT CHANGE UP
ALPHA1 GLOB SERPL ELPH-MCNC: 0.3 G/DL — SIGNIFICANT CHANGE UP (ref 0.1–0.4)
ALPHA2 GLOB SERPL ELPH-MCNC: 0.7 G/DL — SIGNIFICANT CHANGE UP (ref 0.5–1)
B-GLOBULIN SERPL ELPH-MCNC: 0.6 G/DL — SIGNIFICANT CHANGE UP (ref 0.5–1)
GAMMA GLOBULIN: 0.3 G/DL — LOW (ref 0.6–1.6)
INTERPRETATION SERPL IFE-IMP: SIGNIFICANT CHANGE UP
M-SPIKE: SIGNIFICANT CHANGE UP (ref 0–0)
PROT PATTERN SERPL ELPH-IMP: SIGNIFICANT CHANGE UP

## 2022-07-12 PROCEDURE — 99397 PER PM REEVAL EST PAT 65+ YR: CPT

## 2022-07-13 ENCOUNTER — APPOINTMENT (OUTPATIENT)
Dept: ORTHOPEDIC SURGERY | Facility: CLINIC | Age: 72
End: 2022-07-13

## 2022-07-13 VITALS
SYSTOLIC BLOOD PRESSURE: 107 MMHG | HEART RATE: 62 BPM | WEIGHT: 170 LBS | HEIGHT: 63 IN | DIASTOLIC BLOOD PRESSURE: 69 MMHG | BODY MASS INDEX: 30.12 KG/M2

## 2022-07-13 PROCEDURE — 99213 OFFICE O/P EST LOW 20 MIN: CPT

## 2022-07-13 PROCEDURE — 73030 X-RAY EXAM OF SHOULDER: CPT | Mod: RT

## 2022-07-15 ENCOUNTER — RX RENEWAL (OUTPATIENT)
Age: 72
End: 2022-07-15

## 2022-07-15 LAB — HPV HIGH+LOW RISK DNA PNL CVX: NOT DETECTED

## 2022-07-15 NOTE — DISCUSSION/SUMMARY
[FreeTextEntry1] : Benign breast and pelvic exam. Pap done today. Stool for occult blood was negative.\par \par Prescription for mammogram screening given.\par \par Self-breast exam reviewed.\par \par We discussed my recommendation for coconut oil for vaginal lubrication due to patient c/o vaginal dryness.\par \par She will follow up annually and as needed.

## 2022-07-15 NOTE — HISTORY OF PRESENT ILLNESS
[LMP unknown] : LMP unknown [postmenopausal] : postmenopausal [N] : Patient does not use contraception [Y] : Positive pregnancy history [unknown] : Patient is unsure of the date of her LMP [Menarche Age: ____] : age at menarche was [unfilled] [No] : Patient does not have concerns regarding sex [FreeTextEntry1] : LAITH 71 yo  LMP unknown is here today for an annual exam. She is doing well.\par \par Patient reports vaginal dryness. States she does not have an vaginal discharge. \par \par Last mammogram on 2021 revealed BR1. \par \par Last pap smear on 2021 was normal, HPV negative. \par \par Last colonoscopy was in 2019 as per patient. \par  [Mammogramdate] : 06/02/21 [TextBox_19] : BR1 [PapSmeardate] : 06/01/21 [TextBox_31] : NEG [HPVDate] : 06/01/21 [TextBox_78] : NEG [PGHxTotal] : 3 [[facility] [[facility] : 2 [PGHxABInduced] : 1

## 2022-07-15 NOTE — PHYSICAL EXAM
[Appropriately responsive] : appropriately responsive [Alert] : alert [No Acute Distress] : no acute distress [Soft] : soft [Non-tender] : non-tender [Oriented x3] : oriented x3 [Examination Of The Breasts] : a normal appearance [No Discharge] : no discharge [No Masses] : no breast masses were palpable [Labia Majora] : normal [Labia Minora] : normal [No Bleeding] : There was no active vaginal bleeding [Normal] : normal [Uterine Adnexae] : normal [Chaperone Present] : A chaperone was present in the examining room during all aspects of the physical examination [FreeTextEntry1] : HERMINIO: Lana. [Non-distended] : non-distended [Atrophy] : atrophy [Dry Mucosa] : dry mucosa

## 2022-07-15 NOTE — END OF VISIT
[FreeTextEntry3] : I, Angelic White solely acted as scribe for Dr. Ebony More on 07/12/2022 \par All medical entries made by the Scribe were at my, Dr. More’s, direction and personally\par dictated by me on 07/12/2022 . I have reviewed the chart and agree that the record\par accurately reflects my personal performance of the history, physical exam, assessment and plan. I\par have also personally directed, reviewed, and agreed with the chart.

## 2022-07-21 LAB — CYTOLOGY CVX/VAG DOC THIN PREP: ABNORMAL

## 2022-07-26 ENCOUNTER — APPOINTMENT (OUTPATIENT)
Dept: FAMILY MEDICINE | Facility: CLINIC | Age: 72
End: 2022-07-26

## 2022-07-26 VITALS
HEIGHT: 63 IN | OXYGEN SATURATION: 98 % | BODY MASS INDEX: 29.41 KG/M2 | RESPIRATION RATE: 16 BRPM | SYSTOLIC BLOOD PRESSURE: 110 MMHG | TEMPERATURE: 97.6 F | HEART RATE: 77 BPM | DIASTOLIC BLOOD PRESSURE: 70 MMHG | WEIGHT: 166 LBS

## 2022-07-26 DIAGNOSIS — Z01.818 ENCOUNTER FOR OTHER PREPROCEDURAL EXAMINATION: ICD-10-CM

## 2022-07-26 DIAGNOSIS — Z13.820 ENCOUNTER FOR SCREENING FOR OSTEOPOROSIS: ICD-10-CM

## 2022-07-26 DIAGNOSIS — Z00.00 ENCOUNTER FOR GENERAL ADULT MEDICAL EXAMINATION W/OUT ABNORMAL FINDINGS: ICD-10-CM

## 2022-07-26 PROCEDURE — G0009: CPT

## 2022-07-26 PROCEDURE — G0439: CPT

## 2022-07-26 PROCEDURE — 90677 PCV20 VACCINE IM: CPT

## 2022-07-26 RX ORDER — CEPHALEXIN 500 MG/1
500 CAPSULE ORAL
Qty: 21 | Refills: 0 | Status: COMPLETED | COMMUNITY
Start: 2022-05-06

## 2022-07-26 RX ORDER — OXYCODONE 5 MG/1
5 TABLET ORAL
Qty: 30 | Refills: 0 | Status: COMPLETED | COMMUNITY
Start: 2022-03-25

## 2022-07-28 NOTE — HEALTH RISK ASSESSMENT
[Patient reported mammogram was normal] : Patient reported mammogram was normal [Fair] : ~his/her~ current health as fair  [Good] : ~his/her~  mood as  good [Former] : Former [No] : In the past 12 months have you used drugs other than those required for medical reasons? No [Any fall with injury in past year] : Patient reported fall with injury in the past year [0] : 1) Little interest or pleasure doing things: Not at all (0) [1] : 2) Feeling down, depressed, or hopeless for several days (1) [PHQ-2 Negative - No further assessment needed] : PHQ-2 Negative - No further assessment needed [With Family] : lives with family [Retired] : retired [Smoke Detector] : smoke detector [Seat Belt] :  uses seat belt [HIV test declined] : HIV test declined [Hepatitis C test offered] : Hepatitis C test offered [Audit-CScore] : 0 [FDU2Aydpv] : 1 [MammogramDate] : 7/12/22 [PapSmearDate] : 6/1/21 [BoneDensityDate] : 6/5/2020 [ColonoscopyDate] : 2019

## 2022-07-28 NOTE — HEALTH RISK ASSESSMENT
[Patient reported mammogram was normal] : Patient reported mammogram was normal [Fair] : ~his/her~ current health as fair  [Good] : ~his/her~  mood as  good [Former] : Former [No] : In the past 12 months have you used drugs other than those required for medical reasons? No [Any fall with injury in past year] : Patient reported fall with injury in the past year [0] : 1) Little interest or pleasure doing things: Not at all (0) [1] : 2) Feeling down, depressed, or hopeless for several days (1) [PHQ-2 Negative - No further assessment needed] : PHQ-2 Negative - No further assessment needed [With Family] : lives with family [Retired] : retired [Smoke Detector] : smoke detector [Seat Belt] :  uses seat belt [HIV test declined] : HIV test declined [Hepatitis C test offered] : Hepatitis C test offered [Audit-CScore] : 0 [DPR4Wbije] : 1 [MammogramDate] : 7/12/22 [PapSmearDate] : 6/1/21 [BoneDensityDate] : 6/5/2020 [ColonoscopyDate] : 2019

## 2022-07-28 NOTE — HISTORY OF PRESENT ILLNESS
[de-identified] : We reviewed all age appropriate preventive care items and made a plan to remedy any deficiencies. Patient is due for PCV20, Hep B and C screening,  Dexa scan.  Patient reports normal mammo earlier this month.  [FreeTextEntry1] : Patient is here for physical\par

## 2022-07-28 NOTE — HISTORY OF PRESENT ILLNESS
[de-identified] : We reviewed all age appropriate preventive care items and made a plan to remedy any deficiencies. Patient is due for PCV20, Hep B and C screening,  Dexa scan.  Patient reports normal mammo earlier this month.  [FreeTextEntry1] : Patient is here for physical\par

## 2022-08-04 ENCOUNTER — RESULT REVIEW (OUTPATIENT)
Age: 72
End: 2022-08-04

## 2022-08-04 ENCOUNTER — APPOINTMENT (OUTPATIENT)
Age: 72
End: 2022-08-04

## 2022-08-04 LAB
ACANTHOCYTES BLD QL SMEAR: SLIGHT — SIGNIFICANT CHANGE UP
ALBUMIN SERPL ELPH-MCNC: 4.3 G/DL — SIGNIFICANT CHANGE UP (ref 3.3–5)
ALP SERPL-CCNC: 72 U/L — SIGNIFICANT CHANGE UP (ref 40–120)
ALT FLD-CCNC: 14 U/L — SIGNIFICANT CHANGE UP (ref 10–45)
ANION GAP SERPL CALC-SCNC: 13 MMOL/L — SIGNIFICANT CHANGE UP (ref 5–17)
ANISOCYTOSIS BLD QL: SLIGHT — SIGNIFICANT CHANGE UP
AST SERPL-CCNC: 29 U/L — SIGNIFICANT CHANGE UP (ref 10–40)
BASOPHILS # BLD AUTO: 0.1 K/UL — SIGNIFICANT CHANGE UP (ref 0–0.2)
BASOPHILS NFR BLD AUTO: 3 % — HIGH (ref 0–2)
BILIRUB SERPL-MCNC: 0.3 MG/DL — SIGNIFICANT CHANGE UP (ref 0.2–1.2)
BITE CELLS BLD QL SMEAR: PRESENT — SIGNIFICANT CHANGE UP
BUN SERPL-MCNC: 11 MG/DL — SIGNIFICANT CHANGE UP (ref 7–23)
BURR CELLS BLD QL SMEAR: PRESENT — SIGNIFICANT CHANGE UP
CALCIUM SERPL-MCNC: 9.7 MG/DL — SIGNIFICANT CHANGE UP (ref 8.4–10.5)
CHLORIDE SERPL-SCNC: 108 MMOL/L — SIGNIFICANT CHANGE UP (ref 96–108)
CO2 SERPL-SCNC: 21 MMOL/L — LOW (ref 22–31)
CREAT SERPL-MCNC: 0.63 MG/DL — SIGNIFICANT CHANGE UP (ref 0.5–1.3)
DACRYOCYTES BLD QL SMEAR: SLIGHT — SIGNIFICANT CHANGE UP
EGFR: 95 ML/MIN/1.73M2 — SIGNIFICANT CHANGE UP
ELLIPTOCYTES BLD QL SMEAR: SLIGHT — SIGNIFICANT CHANGE UP
EOSINOPHIL # BLD AUTO: 0.3 K/UL — SIGNIFICANT CHANGE UP (ref 0–0.5)
EOSINOPHIL NFR BLD AUTO: 5 % — SIGNIFICANT CHANGE UP (ref 0–6)
GLUCOSE SERPL-MCNC: 69 MG/DL — LOW (ref 70–99)
HCT VFR BLD CALC: 37.5 % — SIGNIFICANT CHANGE UP (ref 34.5–45)
HGB BLD-MCNC: 11.9 G/DL — SIGNIFICANT CHANGE UP (ref 11.5–15.5)
LYMPHOCYTES # BLD AUTO: 2.9 K/UL — SIGNIFICANT CHANGE UP (ref 1–3.3)
LYMPHOCYTES # BLD AUTO: 47 % — HIGH (ref 13–44)
MACROCYTES BLD QL: SLIGHT — SIGNIFICANT CHANGE UP
MCHC RBC-ENTMCNC: 29.5 PG — SIGNIFICANT CHANGE UP (ref 27–34)
MCHC RBC-ENTMCNC: 31.7 G/DL — LOW (ref 32–36)
MCV RBC AUTO: 93.1 FL — SIGNIFICANT CHANGE UP (ref 80–100)
MICROCYTES BLD QL: SLIGHT — SIGNIFICANT CHANGE UP
MONOCYTES # BLD AUTO: 1.1 K/UL — HIGH (ref 0–0.9)
MONOCYTES NFR BLD AUTO: 12 % — SIGNIFICANT CHANGE UP (ref 2–14)
NEUTROPHILS # BLD AUTO: 1.5 K/UL — LOW (ref 1.8–7.4)
NEUTROPHILS NFR BLD AUTO: 33 % — LOW (ref 43–77)
OVALOCYTES BLD QL SMEAR: SLIGHT — SIGNIFICANT CHANGE UP
PLAT MORPH BLD: NORMAL — SIGNIFICANT CHANGE UP
PLATELET # BLD AUTO: 312 K/UL — SIGNIFICANT CHANGE UP (ref 150–400)
POIKILOCYTOSIS BLD QL AUTO: SIGNIFICANT CHANGE UP
POLYCHROMASIA BLD QL SMEAR: SLIGHT — SIGNIFICANT CHANGE UP
POTASSIUM SERPL-MCNC: 4 MMOL/L — SIGNIFICANT CHANGE UP (ref 3.5–5.3)
POTASSIUM SERPL-SCNC: 4 MMOL/L — SIGNIFICANT CHANGE UP (ref 3.5–5.3)
PROT SERPL-MCNC: 5.6 G/DL — LOW (ref 6–8.3)
RBC # BLD: 4.02 M/UL — SIGNIFICANT CHANGE UP (ref 3.8–5.2)
RBC # FLD: 16 % — HIGH (ref 10.3–14.5)
RBC BLD AUTO: SIGNIFICANT CHANGE UP
SCHISTOCYTES BLD QL AUTO: SLIGHT — SIGNIFICANT CHANGE UP
SMUDGE CELLS # BLD: PRESENT — SIGNIFICANT CHANGE UP
SODIUM SERPL-SCNC: 142 MMOL/L — SIGNIFICANT CHANGE UP (ref 135–145)
WBC # BLD: 5.9 K/UL — SIGNIFICANT CHANGE UP (ref 3.8–10.5)
WBC # FLD AUTO: 5.9 K/UL — SIGNIFICANT CHANGE UP (ref 3.8–10.5)

## 2022-08-05 LAB
IGA FLD-MCNC: 24 MG/DL — LOW (ref 84–499)
IGG FLD-MCNC: 294 MG/DL — LOW (ref 610–1660)
IGM SERPL-MCNC: 11 MG/DL — LOW (ref 35–242)
KAPPA LC SER QL IFE: 1.23 MG/DL — SIGNIFICANT CHANGE UP (ref 0.33–1.94)
KAPPA/LAMBDA FREE LIGHT CHAIN RATIO, SERUM: 4.1 RATIO — HIGH (ref 0.26–1.65)
LAMBDA LC SER QL IFE: 0.3 MG/DL — LOW (ref 0.57–2.63)

## 2022-08-06 LAB
% ALBUMIN: 67 % — SIGNIFICANT CHANGE UP
% ALPHA 1: 5.6 % — SIGNIFICANT CHANGE UP
% ALPHA 2: 13.1 % — SIGNIFICANT CHANGE UP
% BETA: 10.2 % — SIGNIFICANT CHANGE UP
% GAMMA: 4.1 % — SIGNIFICANT CHANGE UP
% M SPIKE: 0.9 % — SIGNIFICANT CHANGE UP
ALBUMIN SERPL ELPH-MCNC: 3.8 G/DL — SIGNIFICANT CHANGE UP (ref 3.6–5.5)
ALBUMIN/GLOB SERPL ELPH: 2.1 RATIO — SIGNIFICANT CHANGE UP
ALPHA1 GLOB SERPL ELPH-MCNC: 0.3 G/DL — SIGNIFICANT CHANGE UP (ref 0.1–0.4)
ALPHA2 GLOB SERPL ELPH-MCNC: 0.7 G/DL — SIGNIFICANT CHANGE UP (ref 0.5–1)
B-GLOBULIN SERPL ELPH-MCNC: 0.6 G/DL — SIGNIFICANT CHANGE UP (ref 0.5–1)
CHOLEST SERPL-MCNC: 174 MG/DL
GAMMA GLOBULIN: 0.2 G/DL — LOW (ref 0.6–1.6)
HBV SURFACE AB SER QL: NONREACTIVE
HCV AB SER QL: NONREACTIVE
HCV S/CO RATIO: 0.03 S/CO
HDLC SERPL-MCNC: 83 MG/DL
INTERPRETATION SERPL IFE-IMP: SIGNIFICANT CHANGE UP
LDLC SERPL CALC-MCNC: 81 MG/DL
M-SPIKE: 0.1 G/DL — HIGH (ref 0–0)
NONHDLC SERPL-MCNC: 91 MG/DL
PROT PATTERN SERPL ELPH-IMP: SIGNIFICANT CHANGE UP
TRIGL SERPL-MCNC: 49 MG/DL

## 2022-08-25 ENCOUNTER — OUTPATIENT (OUTPATIENT)
Dept: OUTPATIENT SERVICES | Facility: HOSPITAL | Age: 72
LOS: 1 days | Discharge: HOME | End: 2022-08-25

## 2022-08-25 DIAGNOSIS — Z98.890 OTHER SPECIFIED POSTPROCEDURAL STATES: Chronic | ICD-10-CM

## 2022-08-25 DIAGNOSIS — C90.00 MULTIPLE MYELOMA NOT HAVING ACHIEVED REMISSION: ICD-10-CM

## 2022-08-25 DIAGNOSIS — Z98.51 TUBAL LIGATION STATUS: Chronic | ICD-10-CM

## 2022-08-25 DIAGNOSIS — Z94.84 STEM CELLS TRANSPLANT STATUS: Chronic | ICD-10-CM

## 2022-09-01 ENCOUNTER — APPOINTMENT (OUTPATIENT)
Dept: HEMATOLOGY ONCOLOGY | Facility: CLINIC | Age: 72
End: 2022-09-01

## 2022-09-01 ENCOUNTER — APPOINTMENT (OUTPATIENT)
Age: 72
End: 2022-09-01

## 2022-09-01 ENCOUNTER — RESULT REVIEW (OUTPATIENT)
Age: 72
End: 2022-09-01

## 2022-09-01 LAB
ALBUMIN SERPL ELPH-MCNC: 4.1 G/DL — SIGNIFICANT CHANGE UP (ref 3.3–5)
ALP SERPL-CCNC: 67 U/L — SIGNIFICANT CHANGE UP (ref 40–120)
ALT FLD-CCNC: 15 U/L — SIGNIFICANT CHANGE UP (ref 10–45)
ANION GAP SERPL CALC-SCNC: 13 MMOL/L — SIGNIFICANT CHANGE UP (ref 5–17)
AST SERPL-CCNC: 21 U/L — SIGNIFICANT CHANGE UP (ref 10–40)
BASOPHILS # BLD AUTO: 0.2 K/UL — SIGNIFICANT CHANGE UP (ref 0–0.2)
BASOPHILS NFR BLD AUTO: 2.9 % — HIGH (ref 0–2)
BILIRUB SERPL-MCNC: 0.3 MG/DL — SIGNIFICANT CHANGE UP (ref 0.2–1.2)
BUN SERPL-MCNC: 16 MG/DL — SIGNIFICANT CHANGE UP (ref 7–23)
CALCIUM SERPL-MCNC: 9.7 MG/DL — SIGNIFICANT CHANGE UP (ref 8.4–10.5)
CHLORIDE SERPL-SCNC: 111 MMOL/L — HIGH (ref 96–108)
CO2 SERPL-SCNC: 22 MMOL/L — SIGNIFICANT CHANGE UP (ref 22–31)
CREAT SERPL-MCNC: 0.67 MG/DL — SIGNIFICANT CHANGE UP (ref 0.5–1.3)
EGFR: 93 ML/MIN/1.73M2 — SIGNIFICANT CHANGE UP
EOSINOPHIL # BLD AUTO: 0.2 K/UL — SIGNIFICANT CHANGE UP (ref 0–0.5)
EOSINOPHIL NFR BLD AUTO: 3.5 % — SIGNIFICANT CHANGE UP (ref 0–6)
GLUCOSE SERPL-MCNC: 76 MG/DL — SIGNIFICANT CHANGE UP (ref 70–99)
HCT VFR BLD CALC: 35 % — SIGNIFICANT CHANGE UP (ref 34.5–45)
HGB BLD-MCNC: 10.9 G/DL — LOW (ref 11.5–15.5)
LYMPHOCYTES # BLD AUTO: 2.9 K/UL — SIGNIFICANT CHANGE UP (ref 1–3.3)
LYMPHOCYTES # BLD AUTO: 44.9 % — HIGH (ref 13–44)
MCHC RBC-ENTMCNC: 29 PG — SIGNIFICANT CHANGE UP (ref 27–34)
MCHC RBC-ENTMCNC: 31.2 G/DL — LOW (ref 32–36)
MCV RBC AUTO: 93.1 FL — SIGNIFICANT CHANGE UP (ref 80–100)
MONOCYTES # BLD AUTO: 1 K/UL — HIGH (ref 0–0.9)
MONOCYTES NFR BLD AUTO: 15 % — HIGH (ref 2–14)
NEUTROPHILS # BLD AUTO: 2.2 K/UL — SIGNIFICANT CHANGE UP (ref 1.8–7.4)
NEUTROPHILS NFR BLD AUTO: 33.8 % — LOW (ref 43–77)
PLATELET # BLD AUTO: 300 K/UL — SIGNIFICANT CHANGE UP (ref 150–400)
POTASSIUM SERPL-MCNC: 4.2 MMOL/L — SIGNIFICANT CHANGE UP (ref 3.5–5.3)
POTASSIUM SERPL-SCNC: 4.2 MMOL/L — SIGNIFICANT CHANGE UP (ref 3.5–5.3)
PROT SERPL-MCNC: 5.4 G/DL — LOW (ref 6–8.3)
RBC # BLD: 3.76 M/UL — LOW (ref 3.8–5.2)
RBC # FLD: 17.3 % — HIGH (ref 10.3–14.5)
SODIUM SERPL-SCNC: 146 MMOL/L — HIGH (ref 135–145)
WBC # BLD: 6.5 K/UL — SIGNIFICANT CHANGE UP (ref 3.8–10.5)
WBC # FLD AUTO: 6.5 K/UL — SIGNIFICANT CHANGE UP (ref 3.8–10.5)

## 2022-09-01 PROCEDURE — 99214 OFFICE O/P EST MOD 30 MIN: CPT

## 2022-09-02 DIAGNOSIS — R11.2 NAUSEA WITH VOMITING, UNSPECIFIED: ICD-10-CM

## 2022-09-02 DIAGNOSIS — Z51.11 ENCOUNTER FOR ANTINEOPLASTIC CHEMOTHERAPY: ICD-10-CM

## 2022-09-02 LAB
IGA FLD-MCNC: 23 MG/DL — LOW (ref 84–499)
IGG FLD-MCNC: 256 MG/DL — LOW (ref 610–1660)
IGM SERPL-MCNC: <10 MG/DL — LOW (ref 35–242)
KAPPA LC SER QL IFE: 1.3 MG/DL — SIGNIFICANT CHANGE UP (ref 0.33–1.94)
KAPPA/LAMBDA FREE LIGHT CHAIN RATIO, SERUM: 4.19 RATIO — HIGH (ref 0.26–1.65)
LAMBDA LC SER QL IFE: 0.31 MG/DL — LOW (ref 0.57–2.63)

## 2022-09-02 NOTE — PHYSICAL EXAM
[Restricted in physically strenuous activity but ambulatory and able to carry out work of a light or sedentary nature] : Status 1- Restricted in physically strenuous activity but ambulatory and able to carry out work of a light or sedentary nature, e.g., light house work, office work [Normal] : no JVD, no calf tenderness, venous stasis changes, varices [de-identified] : Pleasant, interactive in NAD.  [de-identified] : supple [de-identified] : no edema

## 2022-09-02 NOTE — ASSESSMENT
[FreeTextEntry1] : 72 year old female IgG kappa multiple myeloma,  FISH panel - positive for CCND1/IGH fusion - a/w favorable prognosis. PET CT (7/12/18) did not show any bone lesions. S/p VRd, followed by 4 cycles of KRd, followed by autoPSCT on 2/28/19. \par On 5/18/19 started on Pomalyst for persistently elevated KFLC at 50, FLC ratio 136.\par On Pomalyst + weekly Dex, she has had a partial response, her KFLC has improved to some degree and bone marrow in 12/2019 shows persistent plasma cells of 15-20%. Daratumumab was added from 2/3/2020 onwards.\par PET CT 5/15/20 with no FDG avid lesions, and slight enlargement of right adnexal cyst.\par Q4 week daratumumab started 7/30/20\par 9/15/21 MRI R shoulder - partial collapse of humeral head with associated edema and enhancement with glenohumeral joint effusion and subacromial / subdeltoid and subcoracoid bursitis. High grade likely full thickness tears of supraspinatus and infraspinatus. \par 3/24/2022 S/p R Shoulder Replacement \par 2/17/22 FLC ratio 4.10\par 3/31/22 FLC ratio 8.29\par 4/14/22 FLC ratio 8.91\par 4/14/2022 Restarted Daratumumab q 4 weeks \par 6/9/22 FLC ratio 4.11\par 6/22/22 FLC ratio 3.22\par 7/7/22 FLC ratio 4.48\par \par S/p C27 Darzalex on 8/4/22, tolerated well\par Reviewed CBC w dif from today WBC 6.5 K HGB 10.9 g HCT 35.0 %  K Neutrophil # 2.2 K \par \par Plan:\par - Continue Daratumumab q 4 weeks, C28 today\par -Immunoelectrophoresis panel ordered \par - Continue Pomalyst 3mg 3 weeks on followed by 1 week off \par - Anemia - stable likely secondary to pomalyst/darzalex, Hgb is 10.9 g/dL. 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 Giron. Continue PT \par - Advised to call with concerns/symptoms \par -RTO 2 months with Dr De Oliveira or sooner if needed

## 2022-09-02 NOTE — HISTORY OF PRESENT ILLNESS
[de-identified] : Patient returns for follow-up. S/p C27 Darzalex on 8/4/22\par \par Reports right shoulder pain has improved, f/u with ortho Dr Giron \par Actively trying to lose weight with diet \par Denies ha, dizziness\par Denies fever, night sweats, infection, dyspnea, leg swelling \par Denies abdominal pain, nausea, vomiting, diarrhea, constipation \par Good appetite\par Feels overall well  [de-identified] : Ms. Calero is a 72 year old female w/ multiple myeloma. \par \par She was noted to have proteinuria and then had a 24 hour urine protein which showed non-nephrotic range proteinuria of 510 mg/24 hours. She was referred to Dr. Lesa Rendon of nephrology. SPEP showed faint M-spike 0.2 g/dL in gammaglobulin region, immunofixation showed this to be a IgG kappa monoclonal protein. UPEP showed 2 monoclonal protein bands, 61%, and 0.7%. \par \par Subsequent work up:\par 6/19/18 M-protein 0.2 g/dL, Kappa FLC 71, lambda FLC 0.36, FLC ratio 198.61\par , Beta 2 microglobulin 1.5 mg/L\par \par She had a bone marrow biopsy on 6/22/18. Pathology: plasma cell neoplasm, plasma cells comprise 20-25% of marrow cells. Trilineage hematopoiesis present with maturation, increased iron stores. Congo red stain negative for amyloid. Karyotype 46, XX. FISH panel - positive for CCND1/IGH fusion - a/w favorable prognosis.\par \par 7/12/18 PET - negative\par \par Treatment Summary \par 7/19/18 - C1 VRd\par 8/9/18 -C2 VRd\par 8/30/18 - C3 VRd\par 9/20/18 -partial C4 VRd\par 10/11/18 - 1/2/19 KRd (carfilzomib, Revlimid, Dexamethasone) x 4 cycles. \par 2/28/19 - autoPSCT \par 5/18/19 - started Pomalyst\par 7/19/19 - decadron 20 mg weekly added to Pomalyst.\par 2/03/20 - Daratumumab week 1-8 2/03 - 3/31/20. Week 9 (began every other week) 4/07/20.

## 2022-09-05 LAB
% ALBUMIN: 66.8 % — SIGNIFICANT CHANGE UP
% ALPHA 1: 5.7 % — SIGNIFICANT CHANGE UP
% ALPHA 2: 12.6 % — SIGNIFICANT CHANGE UP
% BETA: 10.5 % — SIGNIFICANT CHANGE UP
% GAMMA: 4.4 % — SIGNIFICANT CHANGE UP
% M SPIKE: SIGNIFICANT CHANGE UP
ALBUMIN SERPL ELPH-MCNC: 3.6 G/DL — SIGNIFICANT CHANGE UP (ref 3.6–5.5)
ALBUMIN/GLOB SERPL ELPH: 2 RATIO — SIGNIFICANT CHANGE UP
ALPHA1 GLOB SERPL ELPH-MCNC: 0.3 G/DL — SIGNIFICANT CHANGE UP (ref 0.1–0.4)
ALPHA2 GLOB SERPL ELPH-MCNC: 0.7 G/DL — SIGNIFICANT CHANGE UP (ref 0.5–1)
B-GLOBULIN SERPL ELPH-MCNC: 0.6 G/DL — SIGNIFICANT CHANGE UP (ref 0.5–1)
GAMMA GLOBULIN: 0.2 G/DL — LOW (ref 0.6–1.6)
INTERPRETATION SERPL IFE-IMP: SIGNIFICANT CHANGE UP
M-SPIKE: SIGNIFICANT CHANGE UP (ref 0–0)
PROT PATTERN SERPL ELPH-IMP: SIGNIFICANT CHANGE UP

## 2022-09-12 ENCOUNTER — APPOINTMENT (OUTPATIENT)
Dept: ENDOCRINOLOGY | Facility: CLINIC | Age: 72
End: 2022-09-12

## 2022-09-12 PROCEDURE — 99214 OFFICE O/P EST MOD 30 MIN: CPT | Mod: 95

## 2022-09-12 NOTE — REASON FOR VISIT
[Follow - Up] : a follow-up visit [Other___] : [unfilled] Full range of motion of upper and lower extremities, no joint tenderness/swelling.

## 2022-09-13 NOTE — HISTORY OF PRESENT ILLNESS
[Home] : at home, [unfilled] , at the time of the visit. [Medical Office: ([facility])___] : at the medical office located in  [Verbal consent obtained from patient] : the patient, [unfilled] [FreeTextEntry1] : abnormal TFts \par she is treated wit chemoT for multiple myeloma

## 2022-09-13 NOTE — ASSESSMENT
[Exercise/Effect on Glucose] : exercise/effect on glucose [FreeTextEntry1] : Abnormal TFts in context of chemoT for multiple myeloma \par check tfts today \par no diarrhea, no palpitations, no weight loss, no tremor \par thyroid uptake and scan done show low uptake &% \par TPO ab positive\par check TSi and Trab  \par \par Hyperlipidemia: continue crestor \par advised low fat/low cholesterol diet and weight loss advised\par \par obesity: discussed diet and exercise\par encouraged more exercise walking 30 min 3 x week\par \par MNG : check thyroid US to evaluate nodules for growth \par no dysphagia, no dysphonia, no neck pain, no voice change\par \par Overweight: \par discussed diet and exercise\par encouraged more exercise walking 30 min 3 x week\par \par osteopenia: takes steroids only with chemoT \par cont calcium and vit D supplements. \par FRAX score 9/1% . No need for BIP \par \par HyperG; check A1c \par \par Verbal consent obtained from patient for telemedicine .\par Discussed with patient given unable to provide physical exam, evaluation done on symptoms only. \par All lab results reviewed and discussed with patient. All questions answered\par This was a telehealth service rendered via interactive audio and video telecommunication\par  system.\par \par \par \par \par \par

## 2022-09-14 ENCOUNTER — OUTPATIENT (OUTPATIENT)
Dept: OUTPATIENT SERVICES | Facility: HOSPITAL | Age: 72
LOS: 1 days | End: 2022-09-14

## 2022-09-14 ENCOUNTER — APPOINTMENT (OUTPATIENT)
Dept: ULTRASOUND IMAGING | Facility: CLINIC | Age: 72
End: 2022-09-14

## 2022-09-14 DIAGNOSIS — Z98.890 OTHER SPECIFIED POSTPROCEDURAL STATES: Chronic | ICD-10-CM

## 2022-09-14 DIAGNOSIS — Z94.84 STEM CELLS TRANSPLANT STATUS: Chronic | ICD-10-CM

## 2022-09-14 DIAGNOSIS — E04.2 NONTOXIC MULTINODULAR GOITER: ICD-10-CM

## 2022-09-14 DIAGNOSIS — Z98.51 TUBAL LIGATION STATUS: Chronic | ICD-10-CM

## 2022-09-14 PROCEDURE — 76536 US EXAM OF HEAD AND NECK: CPT | Mod: 26

## 2022-09-15 ENCOUNTER — LABORATORY RESULT (OUTPATIENT)
Age: 72
End: 2022-09-15

## 2022-09-16 LAB
25(OH)D3 SERPL-MCNC: 69 NG/ML
ALBUMIN SERPL ELPH-MCNC: 4.4 G/DL
ALP BLD-CCNC: 80 U/L
ALT SERPL-CCNC: 14 U/L
ANION GAP SERPL CALC-SCNC: 10 MMOL/L
AST SERPL-CCNC: 18 U/L
BILIRUB SERPL-MCNC: 0.5 MG/DL
BUN SERPL-MCNC: 12 MG/DL
CALCIUM SERPL-MCNC: 9.2 MG/DL
CHLORIDE SERPL-SCNC: 111 MMOL/L
CO2 SERPL-SCNC: 24 MMOL/L
CREAT SERPL-MCNC: 0.64 MG/DL
EGFR: 94 ML/MIN/1.73M2
ESTIMATED AVERAGE GLUCOSE: 100 MG/DL
GLUCOSE SERPL-MCNC: 80 MG/DL
HBA1C MFR BLD HPLC: 5.1 %
POTASSIUM SERPL-SCNC: 4.4 MMOL/L
PROT SERPL-MCNC: 5.8 G/DL
SODIUM SERPL-SCNC: 145 MMOL/L
T3 SERPL-MCNC: 113 NG/DL
T4 FREE SERPL-MCNC: 1.1 NG/DL
TSH SERPL-ACNC: 0.32 UIU/ML

## 2022-09-20 ENCOUNTER — NON-APPOINTMENT (OUTPATIENT)
Age: 72
End: 2022-09-20

## 2022-09-20 LAB
BASOPHILS # BLD AUTO: 0.11 K/UL
BASOPHILS NFR BLD AUTO: 1.6 %
EOSINOPHIL # BLD AUTO: 0.42 K/UL
EOSINOPHIL NFR BLD AUTO: 5.9 %
HCT VFR BLD CALC: 34 %
HGB BLD-MCNC: 10.5 G/DL
IMM GRANULOCYTES NFR BLD AUTO: 0.3 %
LYMPHOCYTES # BLD AUTO: 3.2 K/UL
LYMPHOCYTES NFR BLD AUTO: 45.2 %
MAN DIFF?: NORMAL
MCHC RBC-ENTMCNC: 29.4 PG
MCHC RBC-ENTMCNC: 30.9 GM/DL
MCV RBC AUTO: 95.2 FL
MONOCYTES # BLD AUTO: 0.66 K/UL
MONOCYTES NFR BLD AUTO: 9.3 %
NEUTROPHILS # BLD AUTO: 2.67 K/UL
NEUTROPHILS NFR BLD AUTO: 37.7 %
PLATELET # BLD AUTO: 223 K/UL
RBC # BLD: 3.57 M/UL
RBC # FLD: 18 %
TSH RECEPTOR AB: <1.1 IU/L
TSI ACT/NOR SER: <0.1 IU/L
WBC # FLD AUTO: 7.08 K/UL

## 2022-09-23 ENCOUNTER — APPOINTMENT (OUTPATIENT)
Dept: ORTHOPEDIC SURGERY | Facility: CLINIC | Age: 72
End: 2022-09-23

## 2022-09-23 VITALS
HEIGHT: 63 IN | WEIGHT: 166 LBS | DIASTOLIC BLOOD PRESSURE: 67 MMHG | BODY MASS INDEX: 29.41 KG/M2 | SYSTOLIC BLOOD PRESSURE: 116 MMHG | HEART RATE: 70 BPM

## 2022-09-23 PROCEDURE — 99213 OFFICE O/P EST LOW 20 MIN: CPT

## 2022-09-23 PROCEDURE — 73030 X-RAY EXAM OF SHOULDER: CPT | Mod: RT

## 2022-09-23 NOTE — REASON FOR VISIT
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

## 2022-09-23 NOTE — HISTORY OF PRESENT ILLNESS
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  The patient had been doing quite well at her last postoperative appointment near the 6-month judi.  She is happy with her progress.  She states she gets some achiness in the shoulder at times when she is sleeping and tenderness over the incision and an occasional cramping sensation into her hands but is otherwise doing well.  She has no other complaints today.  She denies any numbness or tingling distally.  She is here for routine follow-up today. [de-identified] : On exam, the patient is pleasant.  They  are awake, alert, and oriented x3.  The patient presents today with no assistive devices.\par Weight is appropriate for height\par Full range of motion of cervical spine without instability\par Full range of motion of back without instability\par Intact neurologic, vascular, and dermatologic exam to right and left upper extremities\par Intact neurologic, vascular, and dermatologic exam to right and left lower extremities\par \par Right upper Extremity\par The patient's incision is well-healed.  No erythema, warmth, or drainage.  No swelling no bony tenderness to palpation about the shoulder. Range of motion: Forward flexion to 175, external rotation to 70, internal rotation to beltline.  Strength testin/5 in all planes. Motor and sensory function is intact, sensations intact light touch in C5-T1 distributions, DP/PT pulses are palpable, compartments are soft and compressible, there is no calf tenderness to palpation bilaterally. [de-identified] : X-ray 2 views of the right shoulder for previous office visit reviewed with the patient today.  There is no acute fracture or dislocation of the hardware remains in good position without any evidence of loosening [de-identified] : 72-year-old female status post right reverse total shoulder arthroplasty on 3/24/2022 [de-identified] : Anahi is recovering well from her recent surgery.  At this time I am recommending that she continue with scar massage for desensitization of the scar and continue with physical therapy exercises on her own for continued strengthening.  She will continue to progress her activities as tolerated we will follow-up in 6 months for her annual follow-up.  She was counseled on her range of motion and weightlifting limitations with a reverse prosthesis.  She was also reminded to call my office prior to any future dental procedures for antibiotic prophylaxis patient understands and agrees and all questions were answered.

## 2022-09-29 ENCOUNTER — RESULT REVIEW (OUTPATIENT)
Age: 72
End: 2022-09-29

## 2022-09-29 ENCOUNTER — APPOINTMENT (OUTPATIENT)
Age: 72
End: 2022-09-29

## 2022-09-29 LAB
ALBUMIN SERPL ELPH-MCNC: 4.1 G/DL — SIGNIFICANT CHANGE UP (ref 3.3–5)
ALP SERPL-CCNC: 69 U/L — SIGNIFICANT CHANGE UP (ref 40–120)
ALT FLD-CCNC: 15 U/L — SIGNIFICANT CHANGE UP (ref 10–45)
ANION GAP SERPL CALC-SCNC: 10 MMOL/L — SIGNIFICANT CHANGE UP (ref 5–17)
AST SERPL-CCNC: 31 U/L — SIGNIFICANT CHANGE UP (ref 10–40)
BASOPHILS # BLD AUTO: 0.2 K/UL — SIGNIFICANT CHANGE UP (ref 0–0.2)
BASOPHILS NFR BLD AUTO: 2.9 % — HIGH (ref 0–2)
BILIRUB SERPL-MCNC: 0.2 MG/DL — SIGNIFICANT CHANGE UP (ref 0.2–1.2)
BUN SERPL-MCNC: 21 MG/DL — SIGNIFICANT CHANGE UP (ref 7–23)
CALCIUM SERPL-MCNC: 9.9 MG/DL — SIGNIFICANT CHANGE UP (ref 8.4–10.5)
CHLORIDE SERPL-SCNC: 109 MMOL/L — HIGH (ref 96–108)
CO2 SERPL-SCNC: 24 MMOL/L — SIGNIFICANT CHANGE UP (ref 22–31)
CREAT SERPL-MCNC: 0.63 MG/DL — SIGNIFICANT CHANGE UP (ref 0.5–1.3)
EGFR: 95 ML/MIN/1.73M2 — SIGNIFICANT CHANGE UP
EOSINOPHIL # BLD AUTO: 0.2 K/UL — SIGNIFICANT CHANGE UP (ref 0–0.5)
EOSINOPHIL NFR BLD AUTO: 2.6 % — SIGNIFICANT CHANGE UP (ref 0–6)
GLUCOSE SERPL-MCNC: 85 MG/DL — SIGNIFICANT CHANGE UP (ref 70–99)
HCT VFR BLD CALC: 36.2 % — SIGNIFICANT CHANGE UP (ref 34.5–45)
HGB BLD-MCNC: 10.9 G/DL — LOW (ref 11.5–15.5)
LYMPHOCYTES # BLD AUTO: 2.9 K/UL — SIGNIFICANT CHANGE UP (ref 1–3.3)
LYMPHOCYTES # BLD AUTO: 45.4 % — HIGH (ref 13–44)
MCHC RBC-ENTMCNC: 30 PG — SIGNIFICANT CHANGE UP (ref 27–34)
MCHC RBC-ENTMCNC: 30.2 G/DL — LOW (ref 32–36)
MCV RBC AUTO: 99.1 FL — SIGNIFICANT CHANGE UP (ref 80–100)
MONOCYTES # BLD AUTO: 1.1 K/UL — HIGH (ref 0–0.9)
MONOCYTES NFR BLD AUTO: 17 % — HIGH (ref 2–14)
NEUTROPHILS # BLD AUTO: 2 K/UL — SIGNIFICANT CHANGE UP (ref 1.8–7.4)
NEUTROPHILS NFR BLD AUTO: 32.1 % — LOW (ref 43–77)
PLATELET # BLD AUTO: 221 K/UL — SIGNIFICANT CHANGE UP (ref 150–400)
POTASSIUM SERPL-MCNC: 4.3 MMOL/L — SIGNIFICANT CHANGE UP (ref 3.5–5.3)
POTASSIUM SERPL-SCNC: 4.3 MMOL/L — SIGNIFICANT CHANGE UP (ref 3.5–5.3)
PROT SERPL-MCNC: 5.9 G/DL — LOW (ref 6–8.3)
RBC # BLD: 3.65 M/UL — LOW (ref 3.8–5.2)
RBC # FLD: 17.1 % — HIGH (ref 10.3–14.5)
SODIUM SERPL-SCNC: 143 MMOL/L — SIGNIFICANT CHANGE UP (ref 135–145)
WBC # BLD: 6.3 K/UL — SIGNIFICANT CHANGE UP (ref 3.8–10.5)
WBC # FLD AUTO: 6.3 K/UL — SIGNIFICANT CHANGE UP (ref 3.8–10.5)

## 2022-09-30 LAB
IGA FLD-MCNC: 21 MG/DL — LOW (ref 84–499)
IGG FLD-MCNC: 254 MG/DL — LOW (ref 610–1660)
IGM SERPL-MCNC: <10 MG/DL — LOW (ref 35–242)
KAPPA LC SER QL IFE: 1.38 MG/DL — SIGNIFICANT CHANGE UP (ref 0.33–1.94)
KAPPA/LAMBDA FREE LIGHT CHAIN RATIO, SERUM: 4.93 RATIO — HIGH (ref 0.26–1.65)
LAMBDA LC SER QL IFE: 0.28 MG/DL — LOW (ref 0.57–2.63)

## 2022-10-02 NOTE — PROCEDURE
[Bone Marrow Biopsy] : bone marrow biopsy [Bone Marrow Aspiration] : bone marrow aspiration  [Patient] : the patient [Procedure verified and consent obtained] : procedure verified and consent obtained [Patient identification verified] : patient identification verified [Laterality verified and correct site marked] : laterality verified and correct site marked [Correct positioning] : correct positioning [Prone] : prone [Aspirate] : aspirate [The right posterior iliac crest was prepped with betadine and draped, using sterile technique.] : The right posterior iliac crest was prepped with betadine and draped, using sterile technique. [Cytogenetics] : cytogenetics [FISH] : FISH [Flow Cytometry] : flow cytometry [Biopsy] : biopsy [] : The patient was instructed to remove the bandage the following AM. The patient may bathe. Acetaminophen may be taken for discomfort, as per package directions.If there are any other problems, the patient was instructed to call the office. The patient verbalized understanding, and is aware of the office contact numbers. [FreeTextEntry1] : IgG Kappa myeloma s/p APSCT 2/28/19, persistent elevated Kappa FLC, R/O relapse [FreeTextEntry2] : CBC prior to procedure\par WBC 6.2 \par Hgb 11.6  Hct 35\par Plt 256\par BM Bx and aspiration was performed by MARIAN Graham under the supervision of NUNU Hernandez. A snaring needle was utilized. \par  Vaccine status unknown

## 2022-10-04 ENCOUNTER — RX RENEWAL (OUTPATIENT)
Age: 72
End: 2022-10-04

## 2022-10-06 LAB
% ALBUMIN: 67.2 % — SIGNIFICANT CHANGE UP
% ALPHA 1: 5.4 % — SIGNIFICANT CHANGE UP
% ALPHA 2: 12.7 % — SIGNIFICANT CHANGE UP
% BETA: 10.6 % — SIGNIFICANT CHANGE UP
% GAMMA: 4.1 % — SIGNIFICANT CHANGE UP
% M SPIKE: SIGNIFICANT CHANGE UP
ALBUMIN SERPL ELPH-MCNC: 4 G/DL — SIGNIFICANT CHANGE UP (ref 3.6–5.5)
ALBUMIN/GLOB SERPL ELPH: 2.1 RATIO — SIGNIFICANT CHANGE UP
ALPHA1 GLOB SERPL ELPH-MCNC: 0.3 G/DL — SIGNIFICANT CHANGE UP (ref 0.1–0.4)
ALPHA2 GLOB SERPL ELPH-MCNC: 0.7 G/DL — SIGNIFICANT CHANGE UP (ref 0.5–1)
B-GLOBULIN SERPL ELPH-MCNC: 0.6 G/DL — SIGNIFICANT CHANGE UP (ref 0.5–1)
GAMMA GLOBULIN: 0.2 G/DL — LOW (ref 0.6–1.6)
INTERPRETATION SERPL IFE-IMP: SIGNIFICANT CHANGE UP
M-SPIKE: SIGNIFICANT CHANGE UP (ref 0–0)
PROT PATTERN SERPL ELPH-IMP: SIGNIFICANT CHANGE UP

## 2022-10-25 ENCOUNTER — OUTPATIENT (OUTPATIENT)
Dept: OUTPATIENT SERVICES | Facility: HOSPITAL | Age: 72
LOS: 1 days | Discharge: HOME | End: 2022-10-25

## 2022-10-25 DIAGNOSIS — Z98.890 OTHER SPECIFIED POSTPROCEDURAL STATES: Chronic | ICD-10-CM

## 2022-10-25 DIAGNOSIS — Z94.84 STEM CELLS TRANSPLANT STATUS: Chronic | ICD-10-CM

## 2022-10-25 DIAGNOSIS — Z98.51 TUBAL LIGATION STATUS: Chronic | ICD-10-CM

## 2022-10-25 DIAGNOSIS — C90.00 MULTIPLE MYELOMA NOT HAVING ACHIEVED REMISSION: ICD-10-CM

## 2022-10-27 ENCOUNTER — RESULT REVIEW (OUTPATIENT)
Age: 72
End: 2022-10-27

## 2022-10-27 ENCOUNTER — APPOINTMENT (OUTPATIENT)
Age: 72
End: 2022-10-27

## 2022-10-27 LAB
ALBUMIN SERPL ELPH-MCNC: 4 G/DL — SIGNIFICANT CHANGE UP (ref 3.3–5)
ALP SERPL-CCNC: 69 U/L — SIGNIFICANT CHANGE UP (ref 40–120)
ALT FLD-CCNC: 14 U/L — SIGNIFICANT CHANGE UP (ref 10–45)
ANION GAP SERPL CALC-SCNC: 11 MMOL/L — SIGNIFICANT CHANGE UP (ref 5–17)
AST SERPL-CCNC: 27 U/L — SIGNIFICANT CHANGE UP (ref 10–40)
BASOPHILS # BLD AUTO: 0.2 K/UL — SIGNIFICANT CHANGE UP (ref 0–0.2)
BASOPHILS NFR BLD AUTO: 2.8 % — HIGH (ref 0–2)
BILIRUB SERPL-MCNC: 0.2 MG/DL — SIGNIFICANT CHANGE UP (ref 0.2–1.2)
BUN SERPL-MCNC: 13 MG/DL — SIGNIFICANT CHANGE UP (ref 7–23)
CALCIUM SERPL-MCNC: 9.8 MG/DL — SIGNIFICANT CHANGE UP (ref 8.4–10.5)
CHLORIDE SERPL-SCNC: 111 MMOL/L — HIGH (ref 96–108)
CO2 SERPL-SCNC: 24 MMOL/L — SIGNIFICANT CHANGE UP (ref 22–31)
CREAT SERPL-MCNC: 0.63 MG/DL — SIGNIFICANT CHANGE UP (ref 0.5–1.3)
EGFR: 95 ML/MIN/1.73M2 — SIGNIFICANT CHANGE UP
EOSINOPHIL # BLD AUTO: 0.2 K/UL — SIGNIFICANT CHANGE UP (ref 0–0.5)
EOSINOPHIL NFR BLD AUTO: 2.6 % — SIGNIFICANT CHANGE UP (ref 0–6)
GLUCOSE SERPL-MCNC: 79 MG/DL — SIGNIFICANT CHANGE UP (ref 70–99)
HCT VFR BLD CALC: 37.4 % — SIGNIFICANT CHANGE UP (ref 34.5–45)
HGB BLD-MCNC: 11.7 G/DL — SIGNIFICANT CHANGE UP (ref 11.5–15.5)
LYMPHOCYTES # BLD AUTO: 3.7 K/UL — HIGH (ref 1–3.3)
LYMPHOCYTES # BLD AUTO: 54.3 % — HIGH (ref 13–44)
MCHC RBC-ENTMCNC: 30.5 PG — SIGNIFICANT CHANGE UP (ref 27–34)
MCHC RBC-ENTMCNC: 31.2 G/DL — LOW (ref 32–36)
MCV RBC AUTO: 97.6 FL — SIGNIFICANT CHANGE UP (ref 80–100)
MONOCYTES # BLD AUTO: 1 K/UL — HIGH (ref 0–0.9)
MONOCYTES NFR BLD AUTO: 14.7 % — HIGH (ref 2–14)
NEUTROPHILS # BLD AUTO: 1.7 K/UL — LOW (ref 1.8–7.4)
NEUTROPHILS NFR BLD AUTO: 25.5 % — LOW (ref 43–77)
PLATELET # BLD AUTO: 289 K/UL — SIGNIFICANT CHANGE UP (ref 150–400)
POTASSIUM SERPL-MCNC: 3.8 MMOL/L — SIGNIFICANT CHANGE UP (ref 3.5–5.3)
POTASSIUM SERPL-SCNC: 3.8 MMOL/L — SIGNIFICANT CHANGE UP (ref 3.5–5.3)
PROT SERPL-MCNC: 5.5 G/DL — LOW (ref 6–8.3)
RBC # BLD: 3.83 M/UL — SIGNIFICANT CHANGE UP (ref 3.8–5.2)
RBC # FLD: 15.6 % — HIGH (ref 10.3–14.5)
SODIUM SERPL-SCNC: 145 MMOL/L — SIGNIFICANT CHANGE UP (ref 135–145)
WBC # BLD: 6.8 K/UL — SIGNIFICANT CHANGE UP (ref 3.8–10.5)
WBC # FLD AUTO: 6.8 K/UL — SIGNIFICANT CHANGE UP (ref 3.8–10.5)

## 2022-10-28 DIAGNOSIS — Z51.11 ENCOUNTER FOR ANTINEOPLASTIC CHEMOTHERAPY: ICD-10-CM

## 2022-10-28 DIAGNOSIS — R11.2 NAUSEA WITH VOMITING, UNSPECIFIED: ICD-10-CM

## 2022-10-28 LAB
IGA FLD-MCNC: 21 MG/DL — LOW (ref 84–499)
IGG FLD-MCNC: 257 MG/DL — LOW (ref 610–1660)
IGM SERPL-MCNC: <10 MG/DL — LOW (ref 35–242)
KAPPA LC SER QL IFE: 1.31 MG/DL — SIGNIFICANT CHANGE UP (ref 0.33–1.94)
KAPPA/LAMBDA FREE LIGHT CHAIN RATIO, SERUM: 4.37 RATIO — HIGH (ref 0.26–1.65)
LAMBDA LC SER QL IFE: 0.3 MG/DL — LOW (ref 0.57–2.63)

## 2022-11-05 LAB
% ALBUMIN: 67.3 % — SIGNIFICANT CHANGE UP
% ALPHA 1: 5.3 % — SIGNIFICANT CHANGE UP
% ALPHA 2: 12.3 % — SIGNIFICANT CHANGE UP
% BETA: 11 % — SIGNIFICANT CHANGE UP
% GAMMA: 4.1 % — SIGNIFICANT CHANGE UP
% M SPIKE: SIGNIFICANT CHANGE UP
ALBUMIN SERPL ELPH-MCNC: 3.7 G/DL — SIGNIFICANT CHANGE UP (ref 3.6–5.5)
ALBUMIN/GLOB SERPL ELPH: 2.1 RATIO — SIGNIFICANT CHANGE UP
ALPHA1 GLOB SERPL ELPH-MCNC: 0.3 G/DL — SIGNIFICANT CHANGE UP (ref 0.1–0.4)
ALPHA2 GLOB SERPL ELPH-MCNC: 0.7 G/DL — SIGNIFICANT CHANGE UP (ref 0.5–1)
B-GLOBULIN SERPL ELPH-MCNC: 0.6 G/DL — SIGNIFICANT CHANGE UP (ref 0.5–1)
GAMMA GLOBULIN: 0.2 G/DL — LOW (ref 0.6–1.6)
INTERPRETATION SERPL IFE-IMP: SIGNIFICANT CHANGE UP
M-SPIKE: SIGNIFICANT CHANGE UP (ref 0–0)
PROT PATTERN SERPL ELPH-IMP: SIGNIFICANT CHANGE UP

## 2022-11-07 ENCOUNTER — APPOINTMENT (OUTPATIENT)
Dept: HEMATOLOGY ONCOLOGY | Facility: CLINIC | Age: 72
End: 2022-11-07

## 2022-11-07 ENCOUNTER — RESULT REVIEW (OUTPATIENT)
Age: 72
End: 2022-11-07

## 2022-11-07 LAB
ANISOCYTOSIS BLD QL: SLIGHT — SIGNIFICANT CHANGE UP
BASOPHILS # BLD AUTO: 0.1 K/UL — SIGNIFICANT CHANGE UP (ref 0–0.2)
BASOPHILS NFR BLD AUTO: 1 % — SIGNIFICANT CHANGE UP (ref 0–2)
DACRYOCYTES BLD QL SMEAR: SLIGHT — SIGNIFICANT CHANGE UP
DOHLE BOD BLD QL SMEAR: PRESENT — SIGNIFICANT CHANGE UP
EOSINOPHIL # BLD AUTO: 0.1 K/UL — SIGNIFICANT CHANGE UP (ref 0–0.5)
EOSINOPHIL NFR BLD AUTO: 1 % — SIGNIFICANT CHANGE UP (ref 0–6)
GIANT PLATELETS BLD QL SMEAR: PRESENT — SIGNIFICANT CHANGE UP
HCT VFR BLD CALC: 36.3 % — SIGNIFICANT CHANGE UP (ref 34.5–45)
HGB BLD-MCNC: 11.2 G/DL — LOW (ref 11.5–15.5)
LG PLATELETS BLD QL AUTO: SLIGHT — SIGNIFICANT CHANGE UP
LYMPHOCYTES # BLD AUTO: 1.7 K/UL — SIGNIFICANT CHANGE UP (ref 1–3.3)
LYMPHOCYTES # BLD AUTO: 25 % — SIGNIFICANT CHANGE UP (ref 13–44)
MACROCYTES BLD QL: SLIGHT — SIGNIFICANT CHANGE UP
MCHC RBC-ENTMCNC: 30.2 PG — SIGNIFICANT CHANGE UP (ref 27–34)
MCHC RBC-ENTMCNC: 30.9 G/DL — LOW (ref 32–36)
MCV RBC AUTO: 97.8 FL — SIGNIFICANT CHANGE UP (ref 80–100)
MICROCYTES BLD QL: SLIGHT — SIGNIFICANT CHANGE UP
MONOCYTES # BLD AUTO: 1.1 K/UL — HIGH (ref 0–0.9)
MONOCYTES NFR BLD AUTO: 11 % — SIGNIFICANT CHANGE UP (ref 2–14)
NEUTROPHILS # BLD AUTO: 4.1 K/UL — SIGNIFICANT CHANGE UP (ref 1.8–7.4)
NEUTROPHILS NFR BLD AUTO: 57 % — SIGNIFICANT CHANGE UP (ref 43–77)
NEUTS BAND # BLD: 2 % — SIGNIFICANT CHANGE UP (ref 0–8)
OVALOCYTES BLD QL SMEAR: SLIGHT — SIGNIFICANT CHANGE UP
PLAT MORPH BLD: NORMAL — SIGNIFICANT CHANGE UP
PLATELET # BLD AUTO: 168 K/UL — SIGNIFICANT CHANGE UP (ref 150–400)
POIKILOCYTOSIS BLD QL AUTO: SLIGHT — SIGNIFICANT CHANGE UP
RBC # BLD: 3.72 M/UL — LOW (ref 3.8–5.2)
RBC # FLD: 14.8 % — HIGH (ref 10.3–14.5)
RBC BLD AUTO: SIGNIFICANT CHANGE UP
SCHISTOCYTES BLD QL AUTO: SLIGHT — SIGNIFICANT CHANGE UP
TARGETS BLD QL SMEAR: SLIGHT — SIGNIFICANT CHANGE UP
TOXIC GRANULES BLD QL SMEAR: PRESENT — SIGNIFICANT CHANGE UP
VARIANT LYMPHS # BLD: 3 % — SIGNIFICANT CHANGE UP (ref 0–6)
WBC # BLD: 7.1 K/UL — SIGNIFICANT CHANGE UP (ref 3.8–10.5)
WBC # FLD AUTO: 7.1 K/UL — SIGNIFICANT CHANGE UP (ref 3.8–10.5)

## 2022-11-07 PROCEDURE — 99203 OFFICE O/P NEW LOW 30 MIN: CPT | Mod: 95

## 2022-11-07 PROCEDURE — 99213 OFFICE O/P EST LOW 20 MIN: CPT | Mod: 95

## 2022-11-07 NOTE — HISTORY OF PRESENT ILLNESS
[Home] : at home, [unfilled] , at the time of the visit. [Medical Office: ([facility])___] : at the medical office located in  [de-identified] : Patient returns for follow-up. \par Reports cough/sob x 1 week\par No fever.\par Runny nose, sore throat.\par [facility], and rapid covid19 testing was negative.\par She was Rx'ed doxycycline.\par  [de-identified] : Ms. Calero is a 72 year old female w/ multiple myeloma. \par \par She was noted to have proteinuria and then had a 24 hour urine protein which showed non-nephrotic range proteinuria of 510 mg/24 hours. She was referred to Dr. Lesa Rendon of nephrology. SPEP showed faint M-spike 0.2 g/dL in gammaglobulin region, immunofixation showed this to be a IgG kappa monoclonal protein. UPEP showed 2 monoclonal protein bands, 61%, and 0.7%. \par \par Subsequent work up:\par 6/19/18 M-protein 0.2 g/dL, Kappa FLC 71, lambda FLC 0.36, FLC ratio 198.61\par , Beta 2 microglobulin 1.5 mg/L\par \par She had a bone marrow biopsy on 6/22/18. Pathology: plasma cell neoplasm, plasma cells comprise 20-25% of marrow cells. Trilineage hematopoiesis present with maturation, increased iron stores. Congo red stain negative for amyloid. Karyotype 46, XX. FISH panel - positive for CCND1/IGH fusion - a/w favorable prognosis.\par \par 7/12/18 PET - negative\par \par Treatment Summary \par 7/19/18 - C1 VRd\par 8/9/18 -C2 VRd\par 8/30/18 - C3 VRd\par 9/20/18 -partial C4 VRd\par 10/11/18 - 1/2/19 KRd (carfilzomib, Revlimid, Dexamethasone) x 4 cycles. \par 2/28/19 - autoPSCT \par 5/18/19 - started Pomalyst\par 7/19/19 - decadron 20 mg weekly added to Pomalyst.\par 2/03/20 - Daratumumab week 1-8 2/03 - 3/31/20. Week 9 (began every other week) 4/07/20.

## 2022-11-07 NOTE — PHYSICAL EXAM
[Restricted in physically strenuous activity but ambulatory and able to carry out work of a light or sedentary nature] : Status 1- Restricted in physically strenuous activity but ambulatory and able to carry out work of a light or sedentary nature, e.g., light house work, office work [de-identified] : Pleasant, interactive in NAD.

## 2022-11-08 ENCOUNTER — NON-APPOINTMENT (OUTPATIENT)
Age: 72
End: 2022-11-08

## 2022-11-08 ENCOUNTER — APPOINTMENT (OUTPATIENT)
Dept: FAMILY MEDICINE | Facility: CLINIC | Age: 72
End: 2022-11-08

## 2022-11-08 ENCOUNTER — OUTPATIENT (OUTPATIENT)
Dept: OUTPATIENT SERVICES | Facility: HOSPITAL | Age: 72
LOS: 1 days | End: 2022-11-08

## 2022-11-08 DIAGNOSIS — R05.9 COUGH, UNSPECIFIED: ICD-10-CM

## 2022-11-08 DIAGNOSIS — Z98.51 TUBAL LIGATION STATUS: Chronic | ICD-10-CM

## 2022-11-08 DIAGNOSIS — Z98.890 OTHER SPECIFIED POSTPROCEDURAL STATES: Chronic | ICD-10-CM

## 2022-11-08 DIAGNOSIS — Z94.84 STEM CELLS TRANSPLANT STATUS: Chronic | ICD-10-CM

## 2022-11-08 LAB
ALBUMIN SERPL ELPH-MCNC: 4.3 G/DL
ALP BLD-CCNC: 85 U/L
ALT SERPL-CCNC: 17 U/L
ANION GAP SERPL CALC-SCNC: 12 MMOL/L
AST SERPL-CCNC: 17 U/L
BILIRUB SERPL-MCNC: 0.5 MG/DL
BUN SERPL-MCNC: 12 MG/DL
CALCIUM SERPL-MCNC: 9.9 MG/DL
CHLORIDE SERPL-SCNC: 109 MMOL/L
CO2 SERPL-SCNC: 24 MMOL/L
CREAT SERPL-MCNC: 0.8 MG/DL
EGFR: 79 ML/MIN/1.73M2
GLUCOSE SERPL-MCNC: 111 MG/DL
LDH SERPL-CCNC: 152 U/L
POTASSIUM SERPL-SCNC: 4.3 MMOL/L
PROT SERPL-MCNC: 6.2 G/DL
SODIUM SERPL-SCNC: 145 MMOL/L

## 2022-11-08 PROCEDURE — 71046 X-RAY EXAM CHEST 2 VIEWS: CPT | Mod: 26

## 2022-11-16 ENCOUNTER — OFFICE (OUTPATIENT)
Dept: URBAN - METROPOLITAN AREA CLINIC 63 | Facility: CLINIC | Age: 72
Setting detail: OPHTHALMOLOGY
End: 2022-11-16
Payer: COMMERCIAL

## 2022-11-16 DIAGNOSIS — D31.32: ICD-10-CM

## 2022-11-16 DIAGNOSIS — H35.373: ICD-10-CM

## 2022-11-16 DIAGNOSIS — H04.123: ICD-10-CM

## 2022-11-16 DIAGNOSIS — Z96.1: ICD-10-CM

## 2022-11-16 DIAGNOSIS — H52.4: ICD-10-CM

## 2022-11-16 DIAGNOSIS — H43.813: ICD-10-CM

## 2022-11-16 PROCEDURE — 92012 INTRM OPH EXAM EST PATIENT: CPT | Performed by: STUDENT IN AN ORGANIZED HEALTH CARE EDUCATION/TRAINING PROGRAM

## 2022-11-16 PROCEDURE — 92015 DETERMINE REFRACTIVE STATE: CPT | Performed by: STUDENT IN AN ORGANIZED HEALTH CARE EDUCATION/TRAINING PROGRAM

## 2022-11-16 ASSESSMENT — KERATOMETRY
OD_AXISANGLE_DEGREES: 080
OS_AXISANGLE_DEGREES: 086
OS_K1POWER_DIOPTERS: 44.25
OD_K2POWER_DIOPTERS: 47.75
OS_K2POWER_DIOPTERS: 45.00
OD_K1POWER_DIOPTERS: 47.00

## 2022-11-16 ASSESSMENT — REFRACTION_MANIFEST
OD_CYLINDER: SPHERE
OS_CYLINDER: -0.25
OD_AXIS: 000
OD_ADD: +2.50
OD_SPHERE: -0.50
OS_SPHERE: +0.25
OD_VA1: 20/25
OS_ADD: +2.50
OS_AXIS: 040
OS_VA1: 20/20

## 2022-11-16 ASSESSMENT — LID EXAM ASSESSMENTS
OS_BLEPHARITIS: LUL T
OD_BLEPHARITIS: RUL T

## 2022-11-16 ASSESSMENT — SPHEQUIV_DERIVED
OS_SPHEQUIV: 0.125
OS_SPHEQUIV: 0.125

## 2022-11-16 ASSESSMENT — TONOMETRY
OD_IOP_MMHG: 11
OS_IOP_MMHG: 12

## 2022-11-16 ASSESSMENT — VISUAL ACUITY
OS_BCVA: 20/25-1
OD_BCVA: 20/25

## 2022-11-16 ASSESSMENT — TEAR BREAK UP TIME (TBUT)
OS_TBUT: T
OD_TBUT: T

## 2022-11-16 ASSESSMENT — REFRACTION_CURRENTRX
OD_OVR_VA: 20/
OS_OVR_VA: 20/
OD_ADD: +2.00
OD_CYLINDER: 0.00
OS_CYLINDER: -0.25
OS_ADD: +2.00
OD_AXIS: 180
OS_SPHERE: +0.25
OD_SPHERE: +0.25
OD_VPRISM_DIRECTION: PROGS
OS_AXIS: 079
OS_VPRISM_DIRECTION: PROGS

## 2022-11-16 ASSESSMENT — CONFRONTATIONAL VISUAL FIELD TEST (CVF)
OS_FINDINGS: FULL
OD_FINDINGS: FULL

## 2022-11-16 ASSESSMENT — REFRACTION_AUTOREFRACTION
OS_AXIS: 041
OD_SPHERE: -0.50
OS_CYLINDER: -0.25
OS_SPHERE: +0.25

## 2022-11-16 ASSESSMENT — AXIALLENGTH_DERIVED
OS_AL: 23.1389
OS_AL: 23.1389

## 2022-11-18 LAB
ALBUMIN MFR SERPL ELPH: 59.1 %
ALBUMIN SERPL-MCNC: 3.7 G/DL
ALBUMIN/GLOB SERPL: 1.5 RATIO
ALPHA1 GLOB MFR SERPL ELPH: 8.3 %
ALPHA1 GLOB SERPL ELPH-MCNC: 0.5 G/DL
ALPHA2 GLOB MFR SERPL ELPH: 16.6 %
ALPHA2 GLOB SERPL ELPH-MCNC: 1 G/DL
B-GLOBULIN MFR SERPL ELPH: 11.8 %
B-GLOBULIN SERPL ELPH-MCNC: 0.7 G/DL
DEPRECATED KAPPA LC FREE/LAMBDA SER: 2.66 RATIO
GAMMA GLOB FLD ELPH-MCNC: 0.3 G/DL
GAMMA GLOB MFR SERPL ELPH: 4.2 %
IGA SER QL IEP: 23 MG/DL
IGG SER QL IEP: 260 MG/DL
IGM SER QL IEP: 11 MG/DL
INTERPRETATION SERPL IEP-IMP: NORMAL
KAPPA LC CSF-MCNC: 0.64 MG/DL
KAPPA LC SERPL-MCNC: 1.7 MG/DL
M PROTEIN MFR SERPL ELPH: NORMAL
M PROTEIN SPEC IFE-MCNC: NORMAL
MONOCLON BAND OBS SERPL: NORMAL
PROT SERPL-MCNC: 6.2 G/DL
PROT SERPL-MCNC: 6.2 G/DL

## 2022-11-25 ENCOUNTER — RESULT REVIEW (OUTPATIENT)
Age: 72
End: 2022-11-25

## 2022-11-25 ENCOUNTER — APPOINTMENT (OUTPATIENT)
Age: 72
End: 2022-11-25

## 2022-11-25 LAB
ALBUMIN SERPL ELPH-MCNC: 4.2 G/DL — SIGNIFICANT CHANGE UP (ref 3.3–5)
ALP SERPL-CCNC: 69 U/L — SIGNIFICANT CHANGE UP (ref 40–120)
ALT FLD-CCNC: 11 U/L — SIGNIFICANT CHANGE UP (ref 10–45)
ANION GAP SERPL CALC-SCNC: 14 MMOL/L — SIGNIFICANT CHANGE UP (ref 5–17)
AST SERPL-CCNC: 22 U/L — SIGNIFICANT CHANGE UP (ref 10–40)
BASOPHILS # BLD AUTO: 0.1 K/UL — SIGNIFICANT CHANGE UP (ref 0–0.2)
BASOPHILS NFR BLD AUTO: 2 % — SIGNIFICANT CHANGE UP (ref 0–2)
BILIRUB SERPL-MCNC: 0.2 MG/DL — SIGNIFICANT CHANGE UP (ref 0.2–1.2)
BUN SERPL-MCNC: 14 MG/DL — SIGNIFICANT CHANGE UP (ref 7–23)
CALCIUM SERPL-MCNC: 9.6 MG/DL — SIGNIFICANT CHANGE UP (ref 8.4–10.5)
CHLORIDE SERPL-SCNC: 108 MMOL/L — SIGNIFICANT CHANGE UP (ref 96–108)
CO2 SERPL-SCNC: 22 MMOL/L — SIGNIFICANT CHANGE UP (ref 22–31)
CREAT SERPL-MCNC: 0.63 MG/DL — SIGNIFICANT CHANGE UP (ref 0.5–1.3)
EGFR: 95 ML/MIN/1.73M2 — SIGNIFICANT CHANGE UP
EOSINOPHIL # BLD AUTO: 0.1 K/UL — SIGNIFICANT CHANGE UP (ref 0–0.5)
EOSINOPHIL NFR BLD AUTO: 2.4 % — SIGNIFICANT CHANGE UP (ref 0–6)
GLUCOSE SERPL-MCNC: 75 MG/DL — SIGNIFICANT CHANGE UP (ref 70–99)
HCT VFR BLD CALC: 35.6 % — SIGNIFICANT CHANGE UP (ref 34.5–45)
HGB BLD-MCNC: 11.1 G/DL — LOW (ref 11.5–15.5)
IGA FLD-MCNC: 127 MG/DL — SIGNIFICANT CHANGE UP (ref 84–499)
IGG FLD-MCNC: 237 MG/DL — LOW (ref 610–1660)
IGM SERPL-MCNC: 12 MG/DL — LOW (ref 35–242)
KAPPA LC SER QL IFE: 1.66 MG/DL — SIGNIFICANT CHANGE UP (ref 0.33–1.94)
KAPPA/LAMBDA FREE LIGHT CHAIN RATIO, SERUM: 3.02 RATIO — HIGH (ref 0.26–1.65)
LAMBDA LC SER QL IFE: 0.55 MG/DL — LOW (ref 0.57–2.63)
LYMPHOCYTES # BLD AUTO: 2.9 K/UL — SIGNIFICANT CHANGE UP (ref 1–3.3)
LYMPHOCYTES # BLD AUTO: 50.2 % — HIGH (ref 13–44)
MCHC RBC-ENTMCNC: 30.1 PG — SIGNIFICANT CHANGE UP (ref 27–34)
MCHC RBC-ENTMCNC: 31 G/DL — LOW (ref 32–36)
MCV RBC AUTO: 96.9 FL — SIGNIFICANT CHANGE UP (ref 80–100)
MONOCYTES # BLD AUTO: 0.8 K/UL — SIGNIFICANT CHANGE UP (ref 0–0.9)
MONOCYTES NFR BLD AUTO: 14.5 % — HIGH (ref 2–14)
NEUTROPHILS # BLD AUTO: 1.8 K/UL — SIGNIFICANT CHANGE UP (ref 1.8–7.4)
NEUTROPHILS NFR BLD AUTO: 30.9 % — LOW (ref 43–77)
PLATELET # BLD AUTO: 265 K/UL — SIGNIFICANT CHANGE UP (ref 150–400)
POTASSIUM SERPL-MCNC: 3.9 MMOL/L — SIGNIFICANT CHANGE UP (ref 3.5–5.3)
POTASSIUM SERPL-SCNC: 3.9 MMOL/L — SIGNIFICANT CHANGE UP (ref 3.5–5.3)
PROT SERPL-MCNC: 5.7 G/DL — LOW (ref 6–8.3)
RBC # BLD: 3.68 M/UL — LOW (ref 3.8–5.2)
RBC # FLD: 15.7 % — HIGH (ref 10.3–14.5)
SODIUM SERPL-SCNC: 144 MMOL/L — SIGNIFICANT CHANGE UP (ref 135–145)
WBC # BLD: 5.8 K/UL — SIGNIFICANT CHANGE UP (ref 3.8–10.5)
WBC # FLD AUTO: 5.8 K/UL — SIGNIFICANT CHANGE UP (ref 3.8–10.5)

## 2022-11-29 LAB
% ALBUMIN: 63.8 % — SIGNIFICANT CHANGE UP
% ALPHA 1: 5.8 % — SIGNIFICANT CHANGE UP
% ALPHA 2: 13.2 % — SIGNIFICANT CHANGE UP
% BETA: 11.9 % — SIGNIFICANT CHANGE UP
% GAMMA: 5.3 % — SIGNIFICANT CHANGE UP
% M SPIKE: SIGNIFICANT CHANGE UP
ALBUMIN SERPL ELPH-MCNC: 3.6 G/DL — SIGNIFICANT CHANGE UP (ref 3.6–5.5)
ALBUMIN/GLOB SERPL ELPH: 1.7 RATIO — SIGNIFICANT CHANGE UP
ALPHA1 GLOB SERPL ELPH-MCNC: 0.3 G/DL — SIGNIFICANT CHANGE UP (ref 0.1–0.4)
ALPHA2 GLOB SERPL ELPH-MCNC: 0.8 G/DL — SIGNIFICANT CHANGE UP (ref 0.5–1)
B-GLOBULIN SERPL ELPH-MCNC: 0.7 G/DL — SIGNIFICANT CHANGE UP (ref 0.5–1)
GAMMA GLOBULIN: 0.3 G/DL — LOW (ref 0.6–1.6)
INTERPRETATION SERPL IFE-IMP: SIGNIFICANT CHANGE UP
M-SPIKE: SIGNIFICANT CHANGE UP (ref 0–0)
PROT PATTERN SERPL ELPH-IMP: SIGNIFICANT CHANGE UP

## 2022-12-01 LAB
ALBUMIN SERPL ELPH-MCNC: 4.2 G/DL
ALP BLD-CCNC: 73 U/L
ALT SERPL-CCNC: 13 U/L
ANION GAP SERPL CALC-SCNC: 10 MMOL/L
AST SERPL-CCNC: 17 U/L
BILIRUB SERPL-MCNC: 0.3 MG/DL
BUN SERPL-MCNC: 14 MG/DL
CALCIUM SERPL-MCNC: 9.4 MG/DL
CHLORIDE SERPL-SCNC: 106 MMOL/L
CO2 SERPL-SCNC: 26 MMOL/L
CREAT SERPL-MCNC: 0.62 MG/DL
EGFR: 95 ML/MIN/1.73M2
GLUCOSE SERPL-MCNC: 78 MG/DL
LDH SERPL-CCNC: 157 U/L
POTASSIUM SERPL-SCNC: 3.5 MMOL/L
PROT SERPL-MCNC: 6 G/DL
SODIUM SERPL-SCNC: 142 MMOL/L

## 2022-12-02 ENCOUNTER — APPOINTMENT (OUTPATIENT)
Dept: FAMILY MEDICINE | Facility: CLINIC | Age: 72
End: 2022-12-02

## 2022-12-02 VITALS
WEIGHT: 160 LBS | BODY MASS INDEX: 28.35 KG/M2 | DIASTOLIC BLOOD PRESSURE: 62 MMHG | SYSTOLIC BLOOD PRESSURE: 112 MMHG | HEART RATE: 79 BPM | OXYGEN SATURATION: 98 % | HEIGHT: 63 IN | TEMPERATURE: 97.3 F

## 2022-12-02 DIAGNOSIS — M17.0 BILATERAL PRIMARY OSTEOARTHRITIS OF KNEE: ICD-10-CM

## 2022-12-02 PROCEDURE — 99213 OFFICE O/P EST LOW 20 MIN: CPT

## 2022-12-02 RX ORDER — DOXYCYCLINE 100 MG/1
100 CAPSULE ORAL
Qty: 14 | Refills: 0 | Status: COMPLETED | COMMUNITY
Start: 2022-11-07

## 2022-12-02 RX ORDER — BENZONATATE 200 MG/1
200 CAPSULE ORAL
Qty: 20 | Refills: 0 | Status: COMPLETED | COMMUNITY
Start: 2022-11-07

## 2022-12-02 NOTE — HISTORY OF PRESENT ILLNESS
[FreeTextEntry1] : Patient is following up to have handicap parking pass completed. [de-identified] : Ms. LAITH VAUGHN is a 72 year F presents requesting handicap parking pass. Patient has bilateral arthritis, currently getting "gel" injections. She complains of severe pain when ambulating, and she avoids stairs due to the pain she experiences when going up and down stairs.

## 2022-12-02 NOTE — PHYSICAL EXAM
[Normal] : normal sclera/conjunctiva, pupils are equal, round and reactive to light and extraocular movements are intact [Normal Outer Ear/Nose] : the outer ears and nose were normal in appearance [Supple] : supple [Normal Affect] : the affect was normal [de-identified] : knee crepitus [de-identified] : antalgic gait

## 2022-12-02 NOTE — REVIEW OF SYSTEMS
"   LOS: 5 days    Patient Care Team:  Nina Tam MD as PCP - General (Internal Medicine)  Rodolfo Gordon MD as PCP - Claims Attributed  Sharif Mckenzie MD as Consulting Physician (Hematology and Oncology)  Marcellus Osborne MD as Referring Physician (Internal Medicine)  Brenton Aiken MD as Consulting Physician (Cardiology)  Ken Moseley MD as Consulting Physician (Pulmonary Disease)  Cooper Virgen MD as Consulting Physician (Urology)    Chief Complaint:    Chief Complaint   Patient presents with   • Shortness of Breath       Subjective follow-up end-stage renal disease    Interval History:   Did not sleep.  Urinary frequency, small volumes last night.  Very concerned. Did not eat much breakfast.  Hungry for lunch.  Ileostomy output loose, normal for him.     Objective     Vital Signs  Temp:  [97.1 °F (36.2 °C)-98.5 °F (36.9 °C)] 97.4 °F (36.3 °C)  Heart Rate:  [74-92] 81  Resp:  [16-20] 20  BP: (124-134)/(53-67) 134/64    Flowsheet Rows         First Filed Value    Admission Height  177.8 cm (70\") Documented at 01/19/2018 0037    Admission Weight  90.7 kg (200 lb) Documented at 01/19/2018 0037          I/O this shift:  In: -   Out: 590 [Urine:190; Stool:400]  I/O last 3 completed shifts:  In: 690 [P.O.:690]  Out: 1500 [Urine:650; Stool:850]    Intake/Output Summary (Last 24 hours) at 01/24/18 1458  Last data filed at 01/24/18 1250   Gross per 24 hour   Intake              360 ml   Output             1090 ml   Net             -730 ml       Physical Exam:  Gen. Appearance: Awake and alert, no acute distress. On dialysis.   Skin: warm and dry, pale  HEENT: Nonicteric sclerae, oral mucosa normal,   Neck: supple, no JVD, trachea midline  Lungs: Bilateral rhonchi, unlabored breathing effort  Heart: Irregularly irregular, no rub  Abdomen: soft, non-tender,  ileostomy, + bowel sounds   Extremities: 1+ lower ext  edema, cyanosis or clubbing, functional AV fistula in the left upper arm,  Ecchymoses.  Thrill " and bruit.   Neuro: normal speech and mental status       Results Review:      Results from last 7 days  Lab Units 01/24/18  0339 01/23/18  0356 01/22/18  0350  01/20/18  0524 01/19/18  0054   SODIUM mmol/L 128* 131* 129*  < > 125* 123*   POTASSIUM mmol/L 4.3 4.1 4.4  < > 4.0 4.6   CHLORIDE mmol/L 92* 95* 95*  < > 91* 89*   CO2 mmol/L 18.1* 20.5* 16.6*  < > 14.3* 10.3*   BUN mg/dL 80* 64* 94*  < > 109* 141*   CREATININE mg/dL 4.22* 3.44* 4.67*  < > 5.27* 7.44*   CALCIUM mg/dL 8.2* 7.8* 7.8*  < > 7.9* 8.1*   BILIRUBIN mg/dL  --   --   --   --  2.0* 1.8*   ALK PHOS U/L  --   --   --   --  122* 136*   ALT (SGPT) U/L  --   --   --   --  20 17   AST (SGOT) U/L  --   --   --   --  36 33   GLUCOSE mg/dL 118* 79 91  < > 99 165*   < > = values in this interval not displayed.    Estimated Creatinine Clearance: 19.2 mL/min (by C-G formula based on Cr of 4.22).      Results from last 7 days  Lab Units 01/23/18  0356 01/20/18  0524   PHOSPHORUS mg/dL 5.2* 7.2*               Results from last 7 days  Lab Units 01/24/18  0339 01/23/18  0356 01/22/18  0350 01/21/18  1042 01/21/18  0547 01/20/18  0524   WBC 10*3/mm3 9.08 7.32 7.71  --  5.70 6.06   HEMOGLOBIN g/dL 9.4* 9.1* 7.5* 7.8* 7.6* 6.4*   PLATELETS 10*3/mm3 65* 61* 67*  --  67* 80*         Results from last 7 days  Lab Units 01/19/18  0054   INR  1.91*         Imaging Results (last 24 hours)     Procedure Component Value Units Date/Time    XR Chest 1 View [[id number]] Collected:  01/23/18 1611     Updated:  01/23/18 1634    Narrative:       ONE-VIEW PORTABLE CHEST     HISTORY: Follow-up of extensive pneumonia. Shortness of breath.     FINDINGS:  There is considerable generalized pneumonia, particularly in  the upper lobes and this shows improvement from 4 days ago. The heart  remains mildly enlarged.     This report was finalized on 1/23/2018 4:31 PM by Dr. Jason Flores MD.             aspirin 81 mg Oral Daily   calcitriol 0.25 mcg Oral Daily   epoetin tip 10,000 Units  Intravenous Once per day on Mon Wed Fri   [START ON 1/25/2018] febuxostat 40 mg Oral Daily   ferrous sulfate 325 mg Oral Daily With Breakfast   folic acid 1 mg Oral Daily   guaiFENesin 600 mg Oral Q12H   hydrALAZINE 100 mg Oral TID   ipratropium-albuterol 3 mL Nebulization Q4H - RT   latanoprost 1 drop Both Eyes Nightly   [START ON 1/25/2018] levoFLOXacin 500 mg Oral Q48H   nystatin  Topical Q12H   pantoprazole 40 mg Oral BID AC   sevelamer 800 mg Oral TID With Meals   sodium bicarbonate 650 mg Oral TID   terazosin 5 mg Oral Nightly   vitamin B-12 1,000 mcg Oral Daily          Medication Review:   Current Facility-Administered Medications   Medication Dose Route Frequency Provider Last Rate Last Dose   • albumin human 25 % IV SOLN 12.5 g  12.5 g Intravenous PRN Keisha Freed MD       • aspirin chewable tablet 81 mg  81 mg Oral Daily Luiz Horan MD   81 mg at 01/24/18 0847   • calcitriol (ROCALTROL) capsule 0.25 mcg  0.25 mcg Oral Daily Lisa Osorio MD   0.25 mcg at 01/24/18 0847   • epoetin tip (EPOGEN,PROCRIT) injection 10,000 Units  10,000 Units Intravenous Once per day on Mon Wed Fri Kvng Monsivais MD   10,000 Units at 01/24/18 1210   • [START ON 1/25/2018] febuxostat (ULORIC) tablet 40 mg  40 mg Oral Daily Keisha Freed MD       • ferrous sulfate tablet 325 mg  325 mg Oral Daily With Breakfast Marcellus Osborne MD   325 mg at 01/24/18 0847   • folic acid (FOLVITE) tablet 1 mg  1 mg Oral Daily Marcellus Osborne MD   1 mg at 01/24/18 0847   • guaiFENesin (MUCINEX) 12 hr tablet 600 mg  600 mg Oral Q12H Marcellus Osborne MD   600 mg at 01/24/18 0847   • hydrALAZINE (APRESOLINE) tablet 100 mg  100 mg Oral TID Marcellus Osborne MD   100 mg at 01/23/18 2110   • ipratropium-albuterol (DUO-NEB) nebulizer solution 3 mL  3 mL Nebulization Q4H - RT Marcellus Ahmed, MD   3 mL at 01/24/18 0747   • latanoprost (XALATAN) 0.005 % ophthalmic solution 1 drop  1 drop Both Eyes Nightly Marcellus Osborne MD   1 drop at 01/23/18  2111   • [START ON 1/25/2018] levoFLOXacin (LEVAQUIN) tablet 500 mg  500 mg Oral Q48H Marcellus Osborne MD       • loratadine (CLARITIN) tablet 10 mg  10 mg Oral Daily PRN Marcellus Osborne MD       • mannitol 25% (RENAL) injection  12.5 g Intravenous PRN Kvng Monsivais MD       • nebivolol (BYSTOLIC) tablet 2.5 mg  2.5 mg Oral PRN Marcellus Osborne MD       • nystatin (MYCOSTATIN) powder   Topical Q12H Marcellus Osborne MD       • pantoprazole (PROTONIX) EC tablet 40 mg  40 mg Oral BID AC Theo Freedman MD   40 mg at 01/24/18 0901   • sevelamer (RENVELA) tablet 800 mg  800 mg Oral TID With Meals Lisa Osorio MD   800 mg at 01/24/18 0847   • sodium bicarbonate tablet 650 mg  650 mg Oral TID Keisha Frede MD       • sodium chloride (OCEAN) nasal spray 1 spray  1 spray Each Nare 5x Daily PRN Marcellus Osborne MD       • sodium chloride 0.9 % bolus 1,000 mL  1,000 mL Intravenous PRN Kvng Monsivais MD       • sodium chloride 0.9 % flush 10 mL  10 mL Intravenous PRN Rick Pisano MD       • terazosin (HYTRIN) capsule 5 mg  5 mg Oral Nightly Marcellus Osborne MD   5 mg at 01/23/18 2110   • vitamin B-12 (CYANOCOBALAMIN) tablet 1,000 mcg  1,000 mcg Oral Daily Marcellus Osborne MD   1,000 mcg at 01/24/18 0847       Assessment/Plan   1.  New ESRD.  Dialysis today.    He is scheduled for outpatient dialysis every Monday, Wednesday and Friday  2.  Hypertension  controlled.  3.  Anemia and thrombocytopenia, picture suggestive of myelodysplastic syndrome and gastritis.  Also a component of GI bleed related to the ulcer near the stoma.  Hg better after PRBC  4.  Crohn's disease with prior colectomy ,ileostomy  5.  History of nephrolithiasis. On uloric. Reduce dose to 40 mg for renal function.   6.  Pneumonia on po levaquin  7.  Metabolic acidosis:   associated with chronic kidney disease and ileostomy on sodium bicarbonate supplement  8. Urinary frequency, small volumes.  On cardura bid at home, hytrin here.  Check bladder scan  and send UA, culture.           Plan:  1.   Hemodialysis MWF as outpatient.   2.  Scan bladder after dialysis.  In and out cath for UA, culture.    3. DW wife.       Keisha Freed MD  01/24/18  2:58 PM     [Negative] : Heme/Lymph

## 2022-12-23 ENCOUNTER — RESULT REVIEW (OUTPATIENT)
Age: 72
End: 2022-12-23

## 2022-12-23 ENCOUNTER — APPOINTMENT (OUTPATIENT)
Age: 72
End: 2022-12-23

## 2022-12-23 LAB
ALBUMIN SERPL ELPH-MCNC: 4.1 G/DL — SIGNIFICANT CHANGE UP (ref 3.3–5)
ALP SERPL-CCNC: 74 U/L — SIGNIFICANT CHANGE UP (ref 40–120)
ALT FLD-CCNC: 11 U/L — SIGNIFICANT CHANGE UP (ref 10–45)
ANION GAP SERPL CALC-SCNC: 9 MMOL/L — SIGNIFICANT CHANGE UP (ref 5–17)
AST SERPL-CCNC: 19 U/L — SIGNIFICANT CHANGE UP (ref 10–40)
BASOPHILS # BLD AUTO: 0.1 K/UL — SIGNIFICANT CHANGE UP (ref 0–0.2)
BASOPHILS NFR BLD AUTO: 2.2 % — HIGH (ref 0–2)
BILIRUB SERPL-MCNC: 0.2 MG/DL — SIGNIFICANT CHANGE UP (ref 0.2–1.2)
BUN SERPL-MCNC: 12 MG/DL — SIGNIFICANT CHANGE UP (ref 7–23)
CALCIUM SERPL-MCNC: 9.9 MG/DL — SIGNIFICANT CHANGE UP (ref 8.4–10.5)
CHLORIDE SERPL-SCNC: 108 MMOL/L — SIGNIFICANT CHANGE UP (ref 96–108)
CO2 SERPL-SCNC: 25 MMOL/L — SIGNIFICANT CHANGE UP (ref 22–31)
CREAT SERPL-MCNC: 0.65 MG/DL — SIGNIFICANT CHANGE UP (ref 0.5–1.3)
EGFR: 94 ML/MIN/1.73M2 — SIGNIFICANT CHANGE UP
EOSINOPHIL # BLD AUTO: 0.1 K/UL — SIGNIFICANT CHANGE UP (ref 0–0.5)
EOSINOPHIL NFR BLD AUTO: 1.9 % — SIGNIFICANT CHANGE UP (ref 0–6)
GLUCOSE SERPL-MCNC: 82 MG/DL — SIGNIFICANT CHANGE UP (ref 70–99)
HCT VFR BLD CALC: 36.5 % — SIGNIFICANT CHANGE UP (ref 34.5–45)
HGB BLD-MCNC: 11.6 G/DL — SIGNIFICANT CHANGE UP (ref 11.5–15.5)
LYMPHOCYTES # BLD AUTO: 3 K/UL — SIGNIFICANT CHANGE UP (ref 1–3.3)
LYMPHOCYTES # BLD AUTO: 52.7 % — HIGH (ref 13–44)
MCHC RBC-ENTMCNC: 30.2 PG — SIGNIFICANT CHANGE UP (ref 27–34)
MCHC RBC-ENTMCNC: 31.8 G/DL — LOW (ref 32–36)
MCV RBC AUTO: 95.2 FL — SIGNIFICANT CHANGE UP (ref 80–100)
MONOCYTES # BLD AUTO: 0.6 K/UL — SIGNIFICANT CHANGE UP (ref 0–0.9)
MONOCYTES NFR BLD AUTO: 11.2 % — SIGNIFICANT CHANGE UP (ref 2–14)
NEUTROPHILS # BLD AUTO: 1.8 K/UL — SIGNIFICANT CHANGE UP (ref 1.8–7.4)
NEUTROPHILS NFR BLD AUTO: 32 % — LOW (ref 43–77)
PLATELET # BLD AUTO: 289 K/UL — SIGNIFICANT CHANGE UP (ref 150–400)
POTASSIUM SERPL-MCNC: 4.4 MMOL/L — SIGNIFICANT CHANGE UP (ref 3.5–5.3)
POTASSIUM SERPL-SCNC: 4.4 MMOL/L — SIGNIFICANT CHANGE UP (ref 3.5–5.3)
PROT SERPL-MCNC: 5.7 G/DL — LOW (ref 6–8.3)
RBC # BLD: 3.83 M/UL — SIGNIFICANT CHANGE UP (ref 3.8–5.2)
RBC # FLD: 15.1 % — HIGH (ref 10.3–14.5)
SODIUM SERPL-SCNC: 142 MMOL/L — SIGNIFICANT CHANGE UP (ref 135–145)
WBC # BLD: 5.7 K/UL — SIGNIFICANT CHANGE UP (ref 3.8–10.5)
WBC # FLD AUTO: 5.7 K/UL — SIGNIFICANT CHANGE UP (ref 3.8–10.5)

## 2022-12-26 LAB
IGA FLD-MCNC: 90 MG/DL — SIGNIFICANT CHANGE UP (ref 84–499)
IGG FLD-MCNC: 260 MG/DL — LOW (ref 610–1660)
IGM SERPL-MCNC: <10 MG/DL — LOW (ref 35–242)
KAPPA LC SER QL IFE: 1.68 MG/DL — SIGNIFICANT CHANGE UP (ref 0.33–1.94)
KAPPA/LAMBDA FREE LIGHT CHAIN RATIO, SERUM: 2.75 RATIO — HIGH (ref 0.26–1.65)
LAMBDA LC SER QL IFE: 0.61 MG/DL — SIGNIFICANT CHANGE UP (ref 0.57–2.63)

## 2022-12-30 LAB
% ALBUMIN: 65.4 % — SIGNIFICANT CHANGE UP
% ALPHA 1: 5.7 % — SIGNIFICANT CHANGE UP
% ALPHA 2: 12.6 % — SIGNIFICANT CHANGE UP
% BETA: 11.2 % — SIGNIFICANT CHANGE UP
% GAMMA: 5.1 % — SIGNIFICANT CHANGE UP
% M SPIKE: SIGNIFICANT CHANGE UP
ALBUMIN SERPL ELPH-MCNC: 3.7 G/DL — SIGNIFICANT CHANGE UP (ref 3.6–5.5)
ALBUMIN/GLOB SERPL ELPH: 1.8 RATIO — SIGNIFICANT CHANGE UP
ALPHA1 GLOB SERPL ELPH-MCNC: 0.3 G/DL — SIGNIFICANT CHANGE UP (ref 0.1–0.4)
ALPHA2 GLOB SERPL ELPH-MCNC: 0.7 G/DL — SIGNIFICANT CHANGE UP (ref 0.5–1)
B-GLOBULIN SERPL ELPH-MCNC: 0.6 G/DL — SIGNIFICANT CHANGE UP (ref 0.5–1)
GAMMA GLOBULIN: 0.3 G/DL — LOW (ref 0.6–1.6)
INTERPRETATION SERPL IFE-IMP: SIGNIFICANT CHANGE UP
M-SPIKE: SIGNIFICANT CHANGE UP (ref 0–0)
PROT PATTERN SERPL ELPH-IMP: SIGNIFICANT CHANGE UP

## 2023-01-03 ENCOUNTER — RX RENEWAL (OUTPATIENT)
Age: 73
End: 2023-01-03

## 2023-01-09 ENCOUNTER — OUTPATIENT (OUTPATIENT)
Dept: OUTPATIENT SERVICES | Facility: HOSPITAL | Age: 73
LOS: 1 days | Discharge: HOME | End: 2023-01-09

## 2023-01-09 DIAGNOSIS — Z94.84 STEM CELLS TRANSPLANT STATUS: Chronic | ICD-10-CM

## 2023-01-09 DIAGNOSIS — Z98.51 TUBAL LIGATION STATUS: Chronic | ICD-10-CM

## 2023-01-09 DIAGNOSIS — C90.00 MULTIPLE MYELOMA NOT HAVING ACHIEVED REMISSION: ICD-10-CM

## 2023-01-09 DIAGNOSIS — Z98.890 OTHER SPECIFIED POSTPROCEDURAL STATES: Chronic | ICD-10-CM

## 2023-01-17 ENCOUNTER — OUTPATIENT (OUTPATIENT)
Dept: OUTPATIENT SERVICES | Facility: HOSPITAL | Age: 73
LOS: 1 days | End: 2023-01-17
Payer: MEDICARE

## 2023-01-17 DIAGNOSIS — Z94.84 STEM CELLS TRANSPLANT STATUS: Chronic | ICD-10-CM

## 2023-01-17 DIAGNOSIS — Z98.890 OTHER SPECIFIED POSTPROCEDURAL STATES: Chronic | ICD-10-CM

## 2023-01-17 DIAGNOSIS — M19.90 UNSPECIFIED OSTEOARTHRITIS, UNSPECIFIED SITE: ICD-10-CM

## 2023-01-17 DIAGNOSIS — Z98.51 TUBAL LIGATION STATUS: Chronic | ICD-10-CM

## 2023-01-17 PROCEDURE — 73562 X-RAY EXAM OF KNEE 3: CPT | Mod: 26,50

## 2023-01-20 ENCOUNTER — RESULT REVIEW (OUTPATIENT)
Age: 73
End: 2023-01-20

## 2023-01-20 ENCOUNTER — APPOINTMENT (OUTPATIENT)
Dept: ENDOCRINOLOGY | Facility: CLINIC | Age: 73
End: 2023-01-20
Payer: MEDICARE

## 2023-01-20 ENCOUNTER — APPOINTMENT (OUTPATIENT)
Age: 73
End: 2023-01-20

## 2023-01-20 VITALS — OXYGEN SATURATION: 99 % | DIASTOLIC BLOOD PRESSURE: 70 MMHG | HEART RATE: 69 BPM | SYSTOLIC BLOOD PRESSURE: 114 MMHG

## 2023-01-20 VITALS — HEIGHT: 63 IN | BODY MASS INDEX: 28.88 KG/M2 | WEIGHT: 163 LBS

## 2023-01-20 LAB
BASOPHILS # BLD AUTO: 0.2 K/UL — SIGNIFICANT CHANGE UP (ref 0–0.2)
BASOPHILS NFR BLD AUTO: 3.1 % — HIGH (ref 0–2)
EOSINOPHIL # BLD AUTO: 0.1 K/UL — SIGNIFICANT CHANGE UP (ref 0–0.5)
EOSINOPHIL NFR BLD AUTO: 1.8 % — SIGNIFICANT CHANGE UP (ref 0–6)
HCT VFR BLD CALC: 34.9 % — SIGNIFICANT CHANGE UP (ref 34.5–45)
HGB BLD-MCNC: 11.3 G/DL — LOW (ref 11.5–15.5)
LYMPHOCYTES # BLD AUTO: 3 K/UL — SIGNIFICANT CHANGE UP (ref 1–3.3)
LYMPHOCYTES # BLD AUTO: 47.9 % — HIGH (ref 13–44)
MCHC RBC-ENTMCNC: 30 PG — SIGNIFICANT CHANGE UP (ref 27–34)
MCHC RBC-ENTMCNC: 32.5 G/DL — SIGNIFICANT CHANGE UP (ref 32–36)
MCV RBC AUTO: 92.2 FL — SIGNIFICANT CHANGE UP (ref 80–100)
MONOCYTES # BLD AUTO: 0.8 K/UL — SIGNIFICANT CHANGE UP (ref 0–0.9)
MONOCYTES NFR BLD AUTO: 12.6 % — SIGNIFICANT CHANGE UP (ref 2–14)
NEUTROPHILS # BLD AUTO: 2.2 K/UL — SIGNIFICANT CHANGE UP (ref 1.8–7.4)
NEUTROPHILS NFR BLD AUTO: 34.6 % — LOW (ref 43–77)
PLATELET # BLD AUTO: 250 K/UL — SIGNIFICANT CHANGE UP (ref 150–400)
RBC # BLD: 3.78 M/UL — LOW (ref 3.8–5.2)
RBC # FLD: 15.2 % — HIGH (ref 10.3–14.5)
WBC # BLD: 6.3 K/UL — SIGNIFICANT CHANGE UP (ref 3.8–10.5)
WBC # FLD AUTO: 6.3 K/UL — SIGNIFICANT CHANGE UP (ref 3.8–10.5)

## 2023-01-20 PROCEDURE — 99214 OFFICE O/P EST MOD 30 MIN: CPT

## 2023-01-20 NOTE — ASSESSMENT
[FreeTextEntry1] : Abnormal TFts in context of chemoT for multiple myeloma \par check tfts today \par thyroid uptake and scan done show low uptake &% \par TPO ab positive\par TSi and Trab  negative. \par \par Hyperlipidemia: continue crestor \par \par obesity: discussed diet and exercise\par encouraged more exercise walking 30 min 3 x week\par \par MNG : US shows stable nodules. Repeat US in 1 yr \par \par Overweight: discussed diet and exercise\par encouraged more exercise walking 30 min 3 x week\par \par osteopenia: takes steroids only with chemoT \par cont calcium and vit D supplements. \par FRAX score 9/1% . No need for BIP \par \par HyperG; a1c normal \par \par \par \par \par \par

## 2023-01-20 NOTE — PHYSICAL EXAM
[Alert] : alert [Well Nourished] : well nourished [No Acute Distress] : no acute distress [Well Developed] : well developed [Normal Sclera/Conjunctiva] : normal sclera/conjunctiva [EOMI] : extra ocular movement intact [No Proptosis] : no proptosis [Normal Oropharynx] : the oropharynx was normal [Thyroid Not Enlarged] : the thyroid was not enlarged [No Thyroid Nodules] : no palpable thyroid nodules [No Respiratory Distress] : no respiratory distress [No Accessory Muscle Use] : no accessory muscle use [Clear to Auscultation] : lungs were clear to auscultation bilaterally [Normal S1, S2] : normal S1 and S2 [Normal Rate] : heart rate was normal [Regular Rhythm] : with a regular rhythm [No Edema] : no peripheral edema [Pedal Pulses Normal] : the pedal pulses are present [Normal Bowel Sounds] : normal bowel sounds [Not Tender] : non-tender [Not Distended] : not distended [Soft] : abdomen soft [Normal Anterior Cervical Nodes] : no anterior cervical lymphadenopathy [Normal Gait] : normal gait [No Rash] : no rash [Oriented x3] : oriented to person, place, and time [Acanthosis Nigricans] : no acanthosis nigricans

## 2023-01-21 LAB
ALBUMIN SERPL ELPH-MCNC: 3.9 G/DL — SIGNIFICANT CHANGE UP (ref 3.3–5)
ALP SERPL-CCNC: 75 U/L — SIGNIFICANT CHANGE UP (ref 40–120)
ALT FLD-CCNC: 14 U/L — SIGNIFICANT CHANGE UP (ref 10–45)
ANION GAP SERPL CALC-SCNC: 12 MMOL/L — SIGNIFICANT CHANGE UP (ref 5–17)
AST SERPL-CCNC: 28 U/L — SIGNIFICANT CHANGE UP (ref 10–40)
BILIRUB SERPL-MCNC: 0.2 MG/DL — SIGNIFICANT CHANGE UP (ref 0.2–1.2)
BUN SERPL-MCNC: 12 MG/DL — SIGNIFICANT CHANGE UP (ref 7–23)
CALCIUM SERPL-MCNC: 9.3 MG/DL — SIGNIFICANT CHANGE UP (ref 8.4–10.5)
CHLORIDE SERPL-SCNC: 108 MMOL/L — SIGNIFICANT CHANGE UP (ref 96–108)
CO2 SERPL-SCNC: 22 MMOL/L — SIGNIFICANT CHANGE UP (ref 22–31)
CREAT SERPL-MCNC: 0.57 MG/DL — SIGNIFICANT CHANGE UP (ref 0.5–1.3)
EGFR: 97 ML/MIN/1.73M2 — SIGNIFICANT CHANGE UP
GLUCOSE SERPL-MCNC: 77 MG/DL — SIGNIFICANT CHANGE UP (ref 70–99)
IGA FLD-MCNC: 61 MG/DL — LOW (ref 84–499)
IGG FLD-MCNC: 258 MG/DL — LOW (ref 610–1660)
IGM SERPL-MCNC: <10 MG/DL — LOW (ref 35–242)
KAPPA LC SER QL IFE: 1.38 MG/DL — SIGNIFICANT CHANGE UP (ref 0.33–1.94)
KAPPA/LAMBDA FREE LIGHT CHAIN RATIO, SERUM: 4.06 RATIO — HIGH (ref 0.26–1.65)
LAMBDA LC SER QL IFE: 0.34 MG/DL — LOW (ref 0.57–2.63)
POTASSIUM SERPL-MCNC: 4 MMOL/L — SIGNIFICANT CHANGE UP (ref 3.5–5.3)
POTASSIUM SERPL-SCNC: 4 MMOL/L — SIGNIFICANT CHANGE UP (ref 3.5–5.3)
PROT SERPL-MCNC: 6 G/DL — SIGNIFICANT CHANGE UP (ref 6–8.3)
SODIUM SERPL-SCNC: 142 MMOL/L — SIGNIFICANT CHANGE UP (ref 135–145)

## 2023-01-23 DIAGNOSIS — Z51.11 ENCOUNTER FOR ANTINEOPLASTIC CHEMOTHERAPY: ICD-10-CM

## 2023-01-23 DIAGNOSIS — R11.2 NAUSEA WITH VOMITING, UNSPECIFIED: ICD-10-CM

## 2023-01-25 ENCOUNTER — APPOINTMENT (OUTPATIENT)
Dept: HEMATOLOGY ONCOLOGY | Facility: CLINIC | Age: 73
End: 2023-01-25
Payer: MEDICARE

## 2023-01-25 LAB
% ALBUMIN: 65.5 % — SIGNIFICANT CHANGE UP
% ALPHA 1: 5.6 % — SIGNIFICANT CHANGE UP
% ALPHA 2: 13 % — SIGNIFICANT CHANGE UP
% BETA: 11.3 % — SIGNIFICANT CHANGE UP
% GAMMA: 4.6 % — SIGNIFICANT CHANGE UP
% M SPIKE: SIGNIFICANT CHANGE UP
ALBUMIN SERPL ELPH-MCNC: 3.9 G/DL — SIGNIFICANT CHANGE UP (ref 3.6–5.5)
ALBUMIN/GLOB SERPL ELPH: 1.9 RATIO — SIGNIFICANT CHANGE UP
ALPHA1 GLOB SERPL ELPH-MCNC: 0.3 G/DL — SIGNIFICANT CHANGE UP (ref 0.1–0.4)
ALPHA2 GLOB SERPL ELPH-MCNC: 0.8 G/DL — SIGNIFICANT CHANGE UP (ref 0.5–1)
B-GLOBULIN SERPL ELPH-MCNC: 0.7 G/DL — SIGNIFICANT CHANGE UP (ref 0.5–1)
GAMMA GLOBULIN: 0.3 G/DL — LOW (ref 0.6–1.6)
INTERPRETATION SERPL IFE-IMP: SIGNIFICANT CHANGE UP
M-SPIKE: SIGNIFICANT CHANGE UP (ref 0–0)
PROT PATTERN SERPL ELPH-IMP: SIGNIFICANT CHANGE UP

## 2023-01-25 PROCEDURE — 99446 NTRPROF PH1/NTRNET/EHR 5-10: CPT

## 2023-01-25 NOTE — ASSESSMENT
[FreeTextEntry1] : 72 year old female IgG kappa multiple myeloma,  FISH panel - positive for CCND1/IGH fusion - a/w favorable prognosis. PET CT (7/12/18) did not show any bone lesions. S/p VRd, followed by 4 cycles of KRd, followed by autoPSCT on 2/28/19. \par On 5/18/19 started on Pomalyst for persistently elevated KFLC at 50, FLC ratio 136.\par On Pomalyst + weekly Dex, she has had a partial response, her KFLC has improved to some degree and bone marrow in 12/2019 shows persistent plasma cells of 15-20%. Daratumumab was added from 2/3/2020 onwards.\par PET CT 5/15/20 with no FDG avid lesions, and slight enlargement of right adnexal cyst.\par Q4 week daratumumab started 7/30/20\par 3/24/2022 S/p R Shoulder Replacement for AVN\par 2/17/22 FLC ratio 4.10\par 4/14/22 FLC ratio 8.91\par 4/14/2022 Restarted Daratumumab q 4 weeks after shoulder surgery\par 6/9/22 FLC ratio 4.11\par 10/27/22 FCL ratio 4.37\par \par Continues on monthly Darzalex. \par Currently congested, but afebrile, likely viral URI\par \par Plan:\par - Continue Daratumumab q 4 weeks, FLC ratio ranges 2-4 and is overall stable. \par - Continue Pomalyst 3mg 3 weeks on followed by 1 week off.\par - Anemia - stable likely secondary to pomalyst/darzalex, Hgb is 11.3 g/dl \par -uri - afbrile, no sOB, advised urgent care if symptoms worsen. \par -RTO 2 months\par

## 2023-01-25 NOTE — HISTORY OF PRESENT ILLNESS
[Home] : at home, [unfilled] , at the time of the visit. [Medical Office: ([facility])___] : at the medical office located in  [Verbal consent obtained from patient] : the patient, [unfilled] [de-identified] : Patient returns for follow-up. \par Reported congestion x 4 days, reports cough and feels nasally, has nasal discharge.  Denies fever. \par She took home covid19 test was negative. Feels better today compared to last 3 days.  [de-identified] : Ms. Calero is a 72 year old female w/ multiple myeloma. \par \par She was noted to have proteinuria and then had a 24 hour urine protein which showed non-nephrotic range proteinuria of 510 mg/24 hours. She was referred to Dr. Lesa Rendon of nephrology. SPEP showed faint M-spike 0.2 g/dL in gammaglobulin region, immunofixation showed this to be a IgG kappa monoclonal protein. UPEP showed 2 monoclonal protein bands, 61%, and 0.7%. \par \par Subsequent work up:\par 6/19/18 M-protein 0.2 g/dL, Kappa FLC 71, lambda FLC 0.36, FLC ratio 198.61\par , Beta 2 microglobulin 1.5 mg/L\par \par She had a bone marrow biopsy on 6/22/18. Pathology: plasma cell neoplasm, plasma cells comprise 20-25% of marrow cells. Trilineage hematopoiesis present with maturation, increased iron stores. Congo red stain negative for amyloid. Karyotype 46, XX. FISH panel - positive for CCND1/IGH fusion - a/w favorable prognosis.\par \par 7/12/18 PET - negative\par \par Treatment Summary \par 7/19/18 - C1 VRd\par 8/9/18 -C2 VRd\par 8/30/18 - C3 VRd\par 9/20/18 -partial C4 VRd\par 10/11/18 - 1/2/19 KRd (carfilzomib, Revlimid, Dexamethasone) x 4 cycles. \par 2/28/19 - autoPSCT \par 5/18/19 - started Pomalyst\par 7/19/19 - decadron 20 mg weekly added to Pomalyst.\par 2/03/20 - Daratumumab week 1-8 2/03 - 3/31/20. Week 9 (began every other week) 4/07/20.

## 2023-01-25 NOTE — PHYSICAL EXAM
[Restricted in physically strenuous activity but ambulatory and able to carry out work of a light or sedentary nature] : Status 1- Restricted in physically strenuous activity but ambulatory and able to carry out work of a light or sedentary nature, e.g., light house work, office work [de-identified] : Pleasant, interactive in NAD.

## 2023-01-26 ENCOUNTER — LABORATORY RESULT (OUTPATIENT)
Age: 73
End: 2023-01-26

## 2023-02-02 ENCOUNTER — APPOINTMENT (OUTPATIENT)
Dept: HEMATOLOGY ONCOLOGY | Facility: CLINIC | Age: 73
End: 2023-02-02

## 2023-02-03 ENCOUNTER — NON-APPOINTMENT (OUTPATIENT)
Age: 73
End: 2023-02-03

## 2023-02-17 ENCOUNTER — RESULT REVIEW (OUTPATIENT)
Age: 73
End: 2023-02-17

## 2023-02-17 ENCOUNTER — APPOINTMENT (OUTPATIENT)
Age: 73
End: 2023-02-17

## 2023-02-17 LAB
ALBUMIN SERPL ELPH-MCNC: 4.1 G/DL — SIGNIFICANT CHANGE UP (ref 3.3–5)
ALP SERPL-CCNC: 79 U/L — SIGNIFICANT CHANGE UP (ref 40–120)
ALT FLD-CCNC: 18 U/L — SIGNIFICANT CHANGE UP (ref 10–45)
ANION GAP SERPL CALC-SCNC: 11 MMOL/L — SIGNIFICANT CHANGE UP (ref 5–17)
AST SERPL-CCNC: 25 U/L — SIGNIFICANT CHANGE UP (ref 10–40)
BASOPHILS # BLD AUTO: 0.2 K/UL — SIGNIFICANT CHANGE UP (ref 0–0.2)
BASOPHILS NFR BLD AUTO: 2.7 % — HIGH (ref 0–2)
BILIRUB SERPL-MCNC: 0.3 MG/DL — SIGNIFICANT CHANGE UP (ref 0.2–1.2)
BUN SERPL-MCNC: 19 MG/DL — SIGNIFICANT CHANGE UP (ref 7–23)
CALCIUM SERPL-MCNC: 10 MG/DL — SIGNIFICANT CHANGE UP (ref 8.4–10.5)
CHLORIDE SERPL-SCNC: 107 MMOL/L — SIGNIFICANT CHANGE UP (ref 96–108)
CO2 SERPL-SCNC: 25 MMOL/L — SIGNIFICANT CHANGE UP (ref 22–31)
CREAT SERPL-MCNC: 0.62 MG/DL — SIGNIFICANT CHANGE UP (ref 0.5–1.3)
EGFR: 95 ML/MIN/1.73M2 — SIGNIFICANT CHANGE UP
EOSINOPHIL # BLD AUTO: 0.1 K/UL — SIGNIFICANT CHANGE UP (ref 0–0.5)
EOSINOPHIL NFR BLD AUTO: 2 % — SIGNIFICANT CHANGE UP (ref 0–6)
GLUCOSE SERPL-MCNC: 65 MG/DL — LOW (ref 70–99)
HCT VFR BLD CALC: 33.3 % — LOW (ref 34.5–45)
HGB BLD-MCNC: 11.1 G/DL — LOW (ref 11.5–15.5)
IGA FLD-MCNC: 50 MG/DL — LOW (ref 84–499)
IGG FLD-MCNC: 277 MG/DL — LOW (ref 610–1660)
IGM SERPL-MCNC: 10 MG/DL — LOW (ref 35–242)
KAPPA LC SER QL IFE: 1.45 MG/DL — SIGNIFICANT CHANGE UP (ref 0.33–1.94)
KAPPA/LAMBDA FREE LIGHT CHAIN RATIO, SERUM: 4.53 RATIO — HIGH (ref 0.26–1.65)
LAMBDA LC SER QL IFE: 0.32 MG/DL — LOW (ref 0.57–2.63)
LYMPHOCYTES # BLD AUTO: 3.4 K/UL — HIGH (ref 1–3.3)
LYMPHOCYTES # BLD AUTO: 52.9 % — HIGH (ref 13–44)
MCHC RBC-ENTMCNC: 30.3 PG — SIGNIFICANT CHANGE UP (ref 27–34)
MCHC RBC-ENTMCNC: 33.3 G/DL — SIGNIFICANT CHANGE UP (ref 32–36)
MCV RBC AUTO: 91.1 FL — SIGNIFICANT CHANGE UP (ref 80–100)
MONOCYTES # BLD AUTO: 0.9 K/UL — SIGNIFICANT CHANGE UP (ref 0–0.9)
MONOCYTES NFR BLD AUTO: 14.9 % — HIGH (ref 2–14)
NEUTROPHILS # BLD AUTO: 1.8 K/UL — SIGNIFICANT CHANGE UP (ref 1.8–7.4)
NEUTROPHILS NFR BLD AUTO: 27.6 % — LOW (ref 43–77)
PLATELET # BLD AUTO: 261 K/UL — SIGNIFICANT CHANGE UP (ref 150–400)
POTASSIUM SERPL-MCNC: 4.3 MMOL/L — SIGNIFICANT CHANGE UP (ref 3.5–5.3)
POTASSIUM SERPL-SCNC: 4.3 MMOL/L — SIGNIFICANT CHANGE UP (ref 3.5–5.3)
PROT SERPL-MCNC: 6.4 G/DL — SIGNIFICANT CHANGE UP (ref 6–8.3)
RBC # BLD: 3.66 M/UL — LOW (ref 3.8–5.2)
RBC # FLD: 15.1 % — HIGH (ref 10.3–14.5)
SODIUM SERPL-SCNC: 143 MMOL/L — SIGNIFICANT CHANGE UP (ref 135–145)
WBC # BLD: 6.3 K/UL — SIGNIFICANT CHANGE UP (ref 3.8–10.5)
WBC # FLD AUTO: 6.3 K/UL — SIGNIFICANT CHANGE UP (ref 3.8–10.5)

## 2023-02-21 LAB
% ALBUMIN: 66.5 % — SIGNIFICANT CHANGE UP
% ALPHA 1: 5.6 % — SIGNIFICANT CHANGE UP
% ALPHA 2: 12.5 % — SIGNIFICANT CHANGE UP
% BETA: 10.7 % — SIGNIFICANT CHANGE UP
% GAMMA: 4.7 % — SIGNIFICANT CHANGE UP
% M SPIKE: SIGNIFICANT CHANGE UP
ALBUMIN SERPL ELPH-MCNC: 4.3 G/DL — SIGNIFICANT CHANGE UP (ref 3.6–5.5)
ALBUMIN/GLOB SERPL ELPH: 2 RATIO — SIGNIFICANT CHANGE UP
ALPHA1 GLOB SERPL ELPH-MCNC: 0.4 G/DL — SIGNIFICANT CHANGE UP (ref 0.1–0.4)
ALPHA2 GLOB SERPL ELPH-MCNC: 0.8 G/DL — SIGNIFICANT CHANGE UP (ref 0.5–1)
B-GLOBULIN SERPL ELPH-MCNC: 0.7 G/DL — SIGNIFICANT CHANGE UP (ref 0.5–1)
GAMMA GLOBULIN: 0.3 G/DL — LOW (ref 0.6–1.6)
INTERPRETATION SERPL IFE-IMP: SIGNIFICANT CHANGE UP
M-SPIKE: SIGNIFICANT CHANGE UP (ref 0–0)
PROT PATTERN SERPL ELPH-IMP: SIGNIFICANT CHANGE UP

## 2023-03-02 ENCOUNTER — NON-APPOINTMENT (OUTPATIENT)
Age: 73
End: 2023-03-02

## 2023-03-03 ENCOUNTER — NON-APPOINTMENT (OUTPATIENT)
Age: 73
End: 2023-03-03

## 2023-03-03 ENCOUNTER — EMERGENCY (EMERGENCY)
Facility: HOSPITAL | Age: 73
LOS: 1 days | Discharge: HOME | End: 2023-03-03
Attending: STUDENT IN AN ORGANIZED HEALTH CARE EDUCATION/TRAINING PROGRAM
Payer: COMMERCIAL

## 2023-03-03 VITALS
HEIGHT: 63 IN | RESPIRATION RATE: 18 BRPM | TEMPERATURE: 98 F | WEIGHT: 166.89 LBS | DIASTOLIC BLOOD PRESSURE: 66 MMHG | OXYGEN SATURATION: 97 % | HEART RATE: 68 BPM | SYSTOLIC BLOOD PRESSURE: 114 MMHG

## 2023-03-03 DIAGNOSIS — Z98.890 OTHER SPECIFIED POSTPROCEDURAL STATES: Chronic | ICD-10-CM

## 2023-03-03 DIAGNOSIS — Z98.51 TUBAL LIGATION STATUS: Chronic | ICD-10-CM

## 2023-03-03 DIAGNOSIS — Z94.84 STEM CELLS TRANSPLANT STATUS: Chronic | ICD-10-CM

## 2023-03-03 LAB
ALBUMIN SERPL ELPH-MCNC: 3.9 G/DL — SIGNIFICANT CHANGE UP (ref 3.3–5.2)
ALP SERPL-CCNC: 83 U/L — SIGNIFICANT CHANGE UP (ref 40–120)
ALT FLD-CCNC: 13 U/L — SIGNIFICANT CHANGE UP
ANION GAP SERPL CALC-SCNC: 12 MMOL/L — SIGNIFICANT CHANGE UP (ref 5–17)
APTT BLD: 31.6 SEC — SIGNIFICANT CHANGE UP (ref 27.5–35.5)
AST SERPL-CCNC: 23 U/L — SIGNIFICANT CHANGE UP
BASOPHILS # BLD AUTO: 0.1 K/UL — SIGNIFICANT CHANGE UP (ref 0–0.2)
BASOPHILS NFR BLD AUTO: 1.8 % — SIGNIFICANT CHANGE UP (ref 0–2)
BILIRUB SERPL-MCNC: 0.3 MG/DL — LOW (ref 0.4–2)
BUN SERPL-MCNC: 11 MG/DL — SIGNIFICANT CHANGE UP (ref 8–20)
CALCIUM SERPL-MCNC: 9 MG/DL — SIGNIFICANT CHANGE UP (ref 8.4–10.5)
CHLORIDE SERPL-SCNC: 106 MMOL/L — SIGNIFICANT CHANGE UP (ref 96–108)
CO2 SERPL-SCNC: 24 MMOL/L — SIGNIFICANT CHANGE UP (ref 22–29)
CREAT SERPL-MCNC: 0.64 MG/DL — SIGNIFICANT CHANGE UP (ref 0.5–1.3)
EGFR: 94 ML/MIN/1.73M2 — SIGNIFICANT CHANGE UP
EOSINOPHIL # BLD AUTO: 0.13 K/UL — SIGNIFICANT CHANGE UP (ref 0–0.5)
EOSINOPHIL NFR BLD AUTO: 2.3 % — SIGNIFICANT CHANGE UP (ref 0–6)
FLUAV AG NPH QL: SIGNIFICANT CHANGE UP
FLUBV AG NPH QL: SIGNIFICANT CHANGE UP
GLUCOSE SERPL-MCNC: 111 MG/DL — HIGH (ref 70–99)
HCT VFR BLD CALC: 33.5 % — LOW (ref 34.5–45)
HGB BLD-MCNC: 11 G/DL — LOW (ref 11.5–15.5)
IMM GRANULOCYTES NFR BLD AUTO: 0.4 % — SIGNIFICANT CHANGE UP (ref 0–0.9)
INR BLD: 0.97 RATIO — SIGNIFICANT CHANGE UP (ref 0.88–1.16)
LYMPHOCYTES # BLD AUTO: 1.56 K/UL — SIGNIFICANT CHANGE UP (ref 1–3.3)
LYMPHOCYTES # BLD AUTO: 27.7 % — SIGNIFICANT CHANGE UP (ref 13–44)
MCHC RBC-ENTMCNC: 29.7 PG — SIGNIFICANT CHANGE UP (ref 27–34)
MCHC RBC-ENTMCNC: 32.8 GM/DL — SIGNIFICANT CHANGE UP (ref 32–36)
MCV RBC AUTO: 90.5 FL — SIGNIFICANT CHANGE UP (ref 80–100)
MONOCYTES # BLD AUTO: 0.91 K/UL — HIGH (ref 0–0.9)
MONOCYTES NFR BLD AUTO: 16.2 % — HIGH (ref 2–14)
NEUTROPHILS # BLD AUTO: 2.91 K/UL — SIGNIFICANT CHANGE UP (ref 1.8–7.4)
NEUTROPHILS NFR BLD AUTO: 51.6 % — SIGNIFICANT CHANGE UP (ref 43–77)
NT-PROBNP SERPL-SCNC: 78 PG/ML — SIGNIFICANT CHANGE UP (ref 0–300)
PLATELET # BLD AUTO: 180 K/UL — SIGNIFICANT CHANGE UP (ref 150–400)
POTASSIUM SERPL-MCNC: 4.2 MMOL/L — SIGNIFICANT CHANGE UP (ref 3.5–5.3)
POTASSIUM SERPL-SCNC: 4.2 MMOL/L — SIGNIFICANT CHANGE UP (ref 3.5–5.3)
PROT SERPL-MCNC: 5.9 G/DL — LOW (ref 6.6–8.7)
PROTHROM AB SERPL-ACNC: 11.3 SEC — SIGNIFICANT CHANGE UP (ref 10.5–13.4)
RBC # BLD: 3.7 M/UL — LOW (ref 3.8–5.2)
RBC # FLD: 15.9 % — HIGH (ref 10.3–14.5)
RSV RNA NPH QL NAA+NON-PROBE: SIGNIFICANT CHANGE UP
SARS-COV-2 RNA SPEC QL NAA+PROBE: SIGNIFICANT CHANGE UP
SODIUM SERPL-SCNC: 141 MMOL/L — SIGNIFICANT CHANGE UP (ref 135–145)
TROPONIN T SERPL-MCNC: <0.01 NG/ML — SIGNIFICANT CHANGE UP (ref 0–0.06)
WBC # BLD: 5.63 K/UL — SIGNIFICANT CHANGE UP (ref 3.8–10.5)
WBC # FLD AUTO: 5.63 K/UL — SIGNIFICANT CHANGE UP (ref 3.8–10.5)

## 2023-03-03 PROCEDURE — 71045 X-RAY EXAM CHEST 1 VIEW: CPT | Mod: 26

## 2023-03-03 PROCEDURE — 93010 ELECTROCARDIOGRAM REPORT: CPT

## 2023-03-03 PROCEDURE — 99223 1ST HOSP IP/OBS HIGH 75: CPT

## 2023-03-03 RX ORDER — ATORVASTATIN CALCIUM 80 MG/1
20 TABLET, FILM COATED ORAL AT BEDTIME
Refills: 0 | Status: DISCONTINUED | OUTPATIENT
Start: 2023-03-03 | End: 2023-03-11

## 2023-03-03 RX ORDER — ACYCLOVIR SODIUM 500 MG
400 VIAL (EA) INTRAVENOUS THREE TIMES A DAY
Refills: 0 | Status: DISCONTINUED | OUTPATIENT
Start: 2023-03-03 | End: 2023-03-11

## 2023-03-03 RX ORDER — ASPIRIN/CALCIUM CARB/MAGNESIUM 324 MG
81 TABLET ORAL DAILY
Refills: 0 | Status: DISCONTINUED | OUTPATIENT
Start: 2023-03-03 | End: 2023-03-11

## 2023-03-03 RX ORDER — AMLODIPINE BESYLATE 2.5 MG/1
5 TABLET ORAL DAILY
Refills: 0 | Status: DISCONTINUED | OUTPATIENT
Start: 2023-03-03 | End: 2023-03-11

## 2023-03-03 NOTE — ED ADULT NURSE NOTE - OBJECTIVE STATEMENT
assumed care of pt from triage, pt AAOX3, resp. even and unlabored on RA, pt on CM/, pt c/o dizziness/chest pressure earlier this evening, symptoms last approx. 30min/resolved when she got to the ED, denies chest pain/SOB, denies sick contacts, hx of hypertension and thyroid problems, pos. blurred vision at the time of the incident, denies pain/discomfort at this time

## 2023-03-03 NOTE — ED PROVIDER NOTE - NSICDXPASTMEDICALHX_GEN_ALL_CORE_FT
PAST MEDICAL HISTORY:  Bilateral knee pain     Complex ovarian cyst     COVID-19 vaccine series completed     Endometrial disorder Fluid in endometrial cavity    [place] (hard of hearing)     Hyperlipidemia     Hyperlipidemia, unspecified hyperlipidemia type     Hypertension     Multiple myeloma Chemotherapy    Multiple thyroid nodules     Pain in right shoulder     Subacute vulvitis     Termination of pregnancy (fetus)

## 2023-03-03 NOTE — ED PROVIDER NOTE - PHYSICAL EXAMINATION
Constitutional: Well appearing, awake, alert, oriented to person, place, and time/situation and in no apparent distress  ENMT: Airway patent nasal mucosa clear. Mouth with normal mucosa. Throat has no vesicles no oropharyngeal exudates and uvula is midline. No blood in the oropharynx  EYES: clear bilaterally, pupils equal, round and reactive to light  Cardiac: Regular rate, regular rhythm. Heart sounds S1, S2. No murmurs, rubs or gallops. Good capillary refill, 2+ pulses, no peripheral edema  Respiratory: Lungs CTAB, no use of accessory muscles, no crackles, satting 99% on RA in no distress  Gastrointestinal: Abdomen nondistended, non-tender, no rebound guarding or peritoneal signs  Genitourinary: No CVA tenderness, pelvis nontender, bladder nondistended  Musculoskeletal: Spine appears normal, range of motion is not limited, no muscle or joint tenderness  Neurological: Alert and oriented, no focal deficits, no motor or sensory deficits. CN 2-12 intact, PERRLA, EOMI, No FND

## 2023-03-03 NOTE — ED ADULT NURSE NOTE - ED STAT RN HANDOFF DETAILS
pt AAOX3, resp. even and unlabored on RA, pt admitted to OBS for cardiac consult and ECHO in the AM, pt c/o chest pressure and episode of dizziness at home, resolved once she was at the ED, pt on CM,

## 2023-03-03 NOTE — ED ADULT NURSE NOTE - NSICDXPASTMEDICALHX_GEN_ALL_CORE_FT
PAST MEDICAL HISTORY:  Bilateral knee pain     Complex ovarian cyst     COVID-19 vaccine series completed     Endometrial disorder Fluid in endometrial cavity    [place] (hard of hearing)     Hyperlipidemia     Hyperlipidemia, unspecified hyperlipidemia type     Hypertension     Multiple myeloma Chemotherapy    Multiple thyroid nodules     Pain in right shoulder     Subacute vulvitis     Termination of pregnancy (fetus)

## 2023-03-03 NOTE — ED CDU PROVIDER INITIAL DAY NOTE - ATTENDING APP SHARED VISIT CONTRIBUTION OF CARE
I, Caio Carroll, have personally performed a face to face diagnostic evaluation on this patient. I have reviewed the AGNIESZKA note and agree with the history, exam and plan of care, except as noted.    73-year-old female history of multiple myeloma  getting monthly chemo injection presents with chest pain lasting about an hour.  EKG without ischemic changes.  Troponin negative.  Chest x-ray clear.  Patient seen by cardiology PA, recommend serial Trop  and echo.

## 2023-03-03 NOTE — ED PROVIDER NOTE - CLINICAL SUMMARY MEDICAL DECISION MAKING FREE TEXT BOX
Patient is a 72 yo female with PMHx multiple myeloma on chemo therapy at [facility], hypothyroidism presenting from home with lightheadedness and substernal CP that started at 5 PM and lasted for 20 minutes. Patient states she developed lightheadedness and nearly syncopized after eating a meal at approximately 5 PM; patient also developed dull 5/10 substernal CP that lasted 10-15 minutes. VSS, resting comfortably in no distress, lungs ctab, belly soft nontender, no peripheral edema.    Will consult cardiology, evaluate for ACS with troponin EKG, check labs, evaluate for URI vs. PNA with viral swab CXR, reassess.

## 2023-03-03 NOTE — ED ADULT TRIAGE NOTE - CHIEF COMPLAINT QUOTE
pt BIBEMS from urgent care due to sudden onset bilateral hand tingling, lightheadedness and chest discomfort since 5pm. pt reports symptoms are improving at this time. STAT EKG to be obtained.

## 2023-03-03 NOTE — ED PROVIDER NOTE - OBJECTIVE STATEMENT
Patient is a 74 yo female with PMHx multiple myeloma on chemo therapy at [facility], hypothyroidism presenting from home with lightheadedness and substernal CP that started at 5 PM and lasted for 20 minutes. Patient is a 72 yo female with PMHx multiple myeloma on chemo therapy at [facility], hypothyroidism presenting from home with lightheadedness and substernal CP that started at 5 PM and lasted for 20 minutes. Patient states she developed lightheadedness and nearly syncopized after eating a meal at approximately 5 PM; patient also developed dull 5/10 substernal CP that lasted 10-15 minutes. Denies syncope, LOC, peripheral edema, dizziness, lightheadedness. Patient has not seen a cardiologist in several years and last had a normal TTE in 2020. Denies fevers, chills, dizziness, dysphagia, dysarthria, diplopia, photophobia, syncope, cough, congestion, SOB, abdominal pain, neck pain, back pain, diarrhea, dysuria, hematuria, hematochezia, hematemesis, n/v, recent travel, sick contacts, leg swelling.

## 2023-03-03 NOTE — ED CDU PROVIDER INITIAL DAY NOTE - CLINICAL SUMMARY MEDICAL DECISION MAKING FREE TEXT BOX
74 yo female with PMHx multiple myeloma on chemo therapy at [facility], hypothyroidism presenting from home with lightheadedness and substernal CP that started at 5 PM and lasted for 1hr. Patient states she also developed lightheadedness and nearly syncopized after eating a meal at approximately 5 PM.   In ED, VSS, asx. Trop neg x 1, EKG without ischemia. Cardiology consulted. Ordered for repeat troponins, EKG, echo.

## 2023-03-03 NOTE — ED CDU PROVIDER INITIAL DAY NOTE - NSICDXPASTMEDICALHX_GEN_ALL_CORE_FT
PAST MEDICAL HISTORY:  Bilateral knee pain     Complex ovarian cyst     COVID-19 vaccine series completed     Endometrial disorder Fluid in endometrial cavity    [place] (hard of hearing)     Hyperlipidemia     Hyperlipidemia, unspecified hyperlipidemia type     Hypertension     Multiple myeloma Chemotherapy    Multiple thyroid nodules     Pain in right shoulder     Subacute vulvitis     Termination of pregnancy (fetus)

## 2023-03-03 NOTE — ED PROVIDER NOTE - PROGRESS NOTE DETAILS
JK - patient labs, EKG WNL. Official cardiology recs pending. Will place into CDU pending TTE. VSS, resting comfortably in no distress.

## 2023-03-03 NOTE — ED CDU PROVIDER INITIAL DAY NOTE - NS ED ATTENDING STATEMENT MOD
This was a shared visit with the AGNIESZKA. I reviewed and verified the documentation and independently performed the documented:

## 2023-03-03 NOTE — ED CDU PROVIDER INITIAL DAY NOTE - OBJECTIVE STATEMENT
73F with pmh hypothyroidism and multiple myeloma on chemo at [facility] q4 weeks (next chemo end of March per pt) now presenting with substernal chest pain lasting 1hr. During episode, also with dizziness, chest pressure, palpitations, tingling in both hands. Now without any sx and pt states she feels well.

## 2023-03-04 VITALS
HEART RATE: 63 BPM | TEMPERATURE: 98 F | RESPIRATION RATE: 18 BRPM | SYSTOLIC BLOOD PRESSURE: 97 MMHG | OXYGEN SATURATION: 98 % | DIASTOLIC BLOOD PRESSURE: 57 MMHG

## 2023-03-04 LAB
TROPONIN T SERPL-MCNC: <0.01 NG/ML — SIGNIFICANT CHANGE UP (ref 0–0.06)
TROPONIN T SERPL-MCNC: <0.01 NG/ML — SIGNIFICANT CHANGE UP (ref 0–0.06)

## 2023-03-04 PROCEDURE — 99285 EMERGENCY DEPT VISIT HI MDM: CPT | Mod: 25

## 2023-03-04 PROCEDURE — 83880 ASSAY OF NATRIURETIC PEPTIDE: CPT

## 2023-03-04 PROCEDURE — 85610 PROTHROMBIN TIME: CPT

## 2023-03-04 PROCEDURE — 93010 ELECTROCARDIOGRAM REPORT: CPT

## 2023-03-04 PROCEDURE — 93005 ELECTROCARDIOGRAM TRACING: CPT

## 2023-03-04 PROCEDURE — 99238 HOSP IP/OBS DSCHRG MGMT 30/<: CPT

## 2023-03-04 PROCEDURE — 85730 THROMBOPLASTIN TIME PARTIAL: CPT

## 2023-03-04 PROCEDURE — 85025 COMPLETE CBC W/AUTO DIFF WBC: CPT

## 2023-03-04 PROCEDURE — G0378: CPT

## 2023-03-04 PROCEDURE — 36415 COLL VENOUS BLD VENIPUNCTURE: CPT

## 2023-03-04 PROCEDURE — 71045 X-RAY EXAM CHEST 1 VIEW: CPT

## 2023-03-04 PROCEDURE — 87637 SARSCOV2&INF A&B&RSV AMP PRB: CPT

## 2023-03-04 PROCEDURE — 93306 TTE W/DOPPLER COMPLETE: CPT

## 2023-03-04 PROCEDURE — 99222 1ST HOSP IP/OBS MODERATE 55: CPT

## 2023-03-04 PROCEDURE — 84484 ASSAY OF TROPONIN QUANT: CPT

## 2023-03-04 PROCEDURE — 80053 COMPREHEN METABOLIC PANEL: CPT

## 2023-03-04 RX ADMIN — Medication 400 MILLIGRAM(S): at 05:35

## 2023-03-04 NOTE — ED CDU PROVIDER DISPOSITION NOTE - CARE PROVIDER_API CALL
Luan Meredith)  Jaime Pate at [address] [address]  Phone: [phone]  Fax: [phone]  Follow Up Time:

## 2023-03-04 NOTE — ED CDU PROVIDER SUBSEQUENT DAY NOTE - NSICDXPASTMEDICALHX_GEN_ALL_CORE_FT
PAST MEDICAL HISTORY:  Bilateral knee pain     Complex ovarian cyst     COVID-19 vaccine series completed     Endometrial disorder Fluid in endometrial cavity    [place] (hard of hearing)     Hyperlipidemia     Hyperlipidemia, unspecified hyperlipidemia type     Hypertension     Multiple myeloma Chemotherapy    Multiple thyroid nodules     Pain in right shoulder     Subacute vulvitis     Termination of pregnancy (fetus)

## 2023-03-04 NOTE — CONSULT NOTE ADULT - SUBJECTIVE AND OBJECTIVE BOX
LAITH VAUGHN  [id number]      HPI:  72 y/o male PMHx MM on CTX, HTN, hypothyroid, HLD p/w episode of sudden dizziness a/w CP and n/t in hands.  Patient had just finished dinner eating Chinese food she had ordered and was clearing the table when she turned around and suddenly felt very lightheaded and dizziness and her vision was blurrying.  She then started having chest pressure in her chest and then had n/t in her hands.  She went and laid down and the CP slowly resolved over a half hour but other symptoms persisted.  She then came to the ER.  In the ER all symptoms resolved and she felt back to normal.  Symptoms lasted 3 hours in total.  Patient now feels well without c/o.  No symptoms at all prior to this episode recently.  Denies SOB, palps, orthopnea.      ALLERGIES:  No Known Allergies      PAST MEDICAL & SURGICAL HISTORY:  Complex ovarian cyst  Pain in right shoulder  [facility] [place]  Multiple thyroid nodules  Bilateral knee pain  H/O tubal ligation  S/P LASIK surgery  Otherwise, as noted above      MEDICATIONS (HOME):  · 	Senna S 50 mg-8.6 mg oral tablet: Last Dose Taken:  , 2 tab(s) orally once a day (at bedtime)   · 	Multiple Vitamins oral tablet: Last Dose Taken:  , 1 tab(s) orally once a day  · 	amLODIPine 5 mg oral tablet: Last Dose Taken:  , 1 tab(s) orally once a day  · 	rosuvastatin 5 mg oral tablet: Last Dose Taken:  , 1 tab(s) orally once a day  · 	meloxicam 15 mg oral tablet: Last Dose Taken:  , 1 tab(s) orally once a day  · 	aspirin 81 mg oral tablet, chewable: Last Dose Taken:  , 1 tab(s) orally once a day  · 	Pomalyst 3 mg oral capsule: Last Dose Taken:  , 1 cap(s) orally once a day - 21days on 7 days off  · 	acyclovir 400 mg oral tablet: Last Dose Taken:  , 1 tab(s) orally 3 times a day  · 	ferrous sulfate 324 mg (65 mg elemental iron) oral tablet: Last Dose Taken:    · 	Mag-Ox 400: Last Dose Taken:    · 	Probiotic Formula oral capsule: Last Dose Taken:     SOCIAL HISTORY:  Patient denies alcohol, tobacco, drug use    FAMILY HISTORY:  Family history of cerebrovascular accident (CVA) (Grandparent)  Family history of MI (Uncle)  Family history of cancer of the kidney (Mother)  Family history of ovarian cancer (Aunt)    ROS:  Patient denies cough, and other than noted above full ROS is unremarkable      PHYSICAL EXAM:  Vital Signs Last 24 Hrs  T(C): 36.3 (04 Mar 2023 07:40), Max: 36.7 (03 Mar 2023 20:09)  T(F): 97.3 (04 Mar 2023 07:40), Max: 98 (03 Mar 2023 20:09)  HR: 59 (04 Mar 2023 07:40) (58 - 68)  BP: 103/61 (04 Mar 2023 07:40) (103/61 - 114/66)  BP(mean): --  RR: 18 (04 Mar 2023 07:40) (18 - 18)  SpO2: 99% (04 Mar 2023 07:40) (97% - 99%)  General: Patient comfortable in NAD  HEENT: NCAT, mmm, EOMI  Neck: no JVD, no carotid bruits  CVS: nl s1, split s2, no s3, no s4, 2/6 sys murmur LLSB, RRR  Chest: CTA b/l  Abdomen: soft, nt/nd  Extremities: No c/c/e  Neuro: A&O x3  Psych: Normal affect      ECG:  SR with no ST-T wave changes.  PAC.      LABS:                        11.0   5.63  )-----------( 180      ( 03 Mar 2023 21:02 )             33.5     03-03    141  |  106  |  11.0  ----------------------------<  111<H>  4.2   |  24.0  |  0.64    Ca    9.0      03 Mar 2023 21:02    TPro  5.9<L>  /  Alb  3.9  /  TBili  0.3<L>  /  DBili  x   /  AST  23  /  ALT  13  /  AlkPhos  83  03-03    CARDIAC MARKERS ( 04 Mar 2023 04:00 )  x     / <0.01 ng/mL / x     / x     / x      CARDIAC MARKERS ( 04 Mar 2023 00:00 )  x     / <0.01 ng/mL / x     / x     / x      CARDIAC MARKERS ( 03 Mar 2023 21:02 )  x     / <0.01 ng/mL / x     / x     / x          PT/INR - ( 03 Mar 2023 21:02 )   PT: 11.3 sec;   INR: 0.97 ratio         PTT - ( 03 Mar 2023 21:02 )  PTT:31.6 sec      RADIOLOGY:  CXR:  Clear      Assessment:  72 y/o male PMHx MM on CTX, HTN, hypothyroid, HLD p/w episode of sudden dizziness a/w CP and n/t in hands.  Episode of unclear etiology.  ?GI caused with associated vagal event.  EKG unremarkable.  No symptoms now.  Trops negative and other BW unremarkable.  Recommend echo and if unremarkable reasonable to d/c home with OP f/u.    Plan:  1. Check echo.  2. Continue current CV meds at current doses as PTA.  3. Encourage increased po water intake.  4. If echo unremarkable CV stable for d/c with OP f/u.    Thanks!

## 2023-03-04 NOTE — ED CDU PROVIDER DISPOSITION NOTE - CLINICAL COURSE
74 yo female with PMHx multiple myeloma on chemo therapy at [facility], hypothyroidism presenting from home with lightheadedness and substernal CP that started at 5 PM and lasted for 1hr. Patient states she also developed lightheadedness and nearly syncopized after eating a meal at approximately 5 PM.  In ED, VSS, asx. Trop neg x 3, EKG without ischemia. Cardiology consulted. Ordered for echo.  Echo unremarkable, stable for outpatient f/u.  Given copies of all results, understands and agrees to proceed.

## 2023-03-04 NOTE — ED ADULT NURSE REASSESSMENT NOTE - CAPILLARY REFILL
Lumbar Transforaminal Epidural Steroid Injection (two levels)  [facility]      PREOPERATIVE DIAGNOSIS:  left Lumbar Radiculopathy    POSTOPERATIVE DIAGNOSIS:  Same as preop diagnosis    PROCEDURE:    1. CPT 85534 --  Diagnostic Transforaminal Epidural Steroid Injection at the L2 level, on the left   2. CPT 31939 --  Diagnostic Transforaminal Epidural Steroid Injection at the L5 level, on the left     PRE-PROCEDURE DISCUSSION WITH PATIENT:    Risks and complications were discussed with the patient prior to starting the procedure and informed consent was obtained.  We discussed various topics including but not limited to bleeding, infection, injury, nerve injury, paralysis, coma, death, postprocedural painful flare-up, postprocedural site soreness, and a lack of pain relief.  We discussed the diagnostic aspect of transforaminal epidural / selective nerve root blockade.    SURGEON:  Steven Corcoran MD    REASON FOR PROCEDURE:    Previous diagnostic positivity from injection at same location, Previous clinically significant therapeutic effect is noted., Degenerative changes are noted in the area., Stenotic area is noted, and is likely contributing to this chronic &/or recurrent pain. , Radiating pattern of pain is likely consistent with degenerative changes in the area., Acute pain flareup is noted & problematic, and the injection is used to attempt to break the flareup.   and Interlaminar approach is relatively contraindicated.      SEDATION:  Versed 4mg & Fentanyl 50 mcg IV  ANESTHETIC:  Marcaine 0.25%  STEROID:  Methylprednisolone (DEPO MEDROL) 80mg/ml    DESCRIPTON OF PROCEDURE:  After obtaining informed consent, an I.V. was started in the preoperative area. The patient taken to the operating room and was placed in the prone position with a pillow under the abdomen.  All pressure points were well padded.  EKG, blood pressure, and pulse oximeter were monitored.  The lumbar area was prepped with  Chloraprep and draped in a sterile fashion. Under fluoroscopic guidance in an oblique dimension on the above mentioned side, the transverse process of the first aforementioned vertebra at the junction of the body at 6 o'clock position was identified. Skin and subcutaneous tissue was anesthetized with 1% lidocaine. A 22-gauge spinal needle was introduced under fluoroscopic guidance at the above junction into the foramen without parasthesias and into the epidural space. After confirming the position of the needle with PA, lateral, and oblique fluoroscopic views, aspiration was checked and was clear of blood or CSF.  Next, 1 mL of Omnipaque was injected. After seeing adequate spread on the corresponding nerve root, a total volume 2mL of injectate containing local anesthetic as above and half of the above mentioned corticosteroid was injected into the epidural space.  The needle was removed intact.      Next, in similar fashion, the second level mentioned above was addressed and a similar amount of injectate was delivered after similar imaging was achieved.      Vital signs were stable throughout.          ESTIMATED BLOOD LOSS:  <5 mL  SPECIMENS:  none    COMPLICATIONS:   No complications were noted., There was no indication of vascular uptake on live injection of contrast dye., There was no indication of intrathecal uptake on live injection of contrast dye., There was not any evidence of dural puncture.   and The patient did not have any signs of postprocedure numbness nor weakness.    TOLERANCE & DISCHARGE CONDITION:    The patient tolerated the procedure well.  The patient was transported to the recovery area without difficulties.  The patient was discharged to home under the care of family in stable and satisfactory condition.    PLAN OF CARE:  1. The patient was given our standard instruction sheet.  2. The patient will Repeat injection in 3 months PRN.  3. The patient will resume all medications as per the  medication reconciliation sheet.           2 seconds or less

## 2023-03-04 NOTE — ED ADULT NURSE REASSESSMENT NOTE - NS ED NURSE REASSESS COMMENT FT1
Assumed care of the patient @0730. Pt A&Ox4, VSS afebrile. Pt denies c/o chest pain at this time. Awaiting ECHO. Patient in understanding of plan of care. Patient with no further questions for the RN. Resting in comfort. Call bell within reach and encouraged to use when assistance needed. Will continue to monitor.
Assuming care of patient at this time. Pt aaox4. VSS. NO s/s of distress noted. Pt denies chest pain/sob at this time. Safety precautions in place. Pt remains NSR on tele. Plan of care discussed with patient. Patient verbalized understanding. Will continue to implement plan of care. Serial troponin ordered. Plan for TTE in the morning.

## 2023-03-04 NOTE — ED CDU PROVIDER SUBSEQUENT DAY NOTE - CLINICAL SUMMARY MEDICAL DECISION MAKING FREE TEXT BOX
72 yo female with PMHx multiple myeloma on chemo therapy at [facility], hypothyroidism presenting from home with lightheadedness and substernal CP that started at 5 PM and lasted for 1hr. Patient states she also developed lightheadedness and nearly syncopized after eating a meal at approximately 5 PM.   In ED, VSS, asx. Trop neg x 3, EKG without ischemia. Cardiology consulted. Ordered for echo.

## 2023-03-04 NOTE — ED CDU PROVIDER SUBSEQUENT DAY NOTE - ATTENDING APP SHARED VISIT CONTRIBUTION OF CARE
I agree with the PA's note and was available for any issues/concerns. I was directly involved in patient care. My brief overall assessment is as follows:    no chest pain overnight; awaiting cardiology assessment

## 2023-03-04 NOTE — ED CDU PROVIDER DISPOSITION NOTE - NSFOLLOWUPINSTRUCTIONS_ED_ALL_ED_FT
Please follow up with cardiology as outpatient    Return if symptoms worsen or persist     Chest Pain    Chest pain can be caused by many different conditions which may or may not be dangerous. Causes include heartburn, lung infections, heart attack, blood clot in lungs, skin infections, strain or damage to muscle, cartilage, or bones, etc. In addition to a history and physical examination, an electrocardiogram (ECG) or other lab tests may have been performed to determine the cause of your chest pain. Follow up with your primary care provider or with a cardiologist as instructed.     SEEK IMMEDIATE MEDICAL CARE IF YOU HAVE ANY OF THE FOLLOWING SYMPTOMS: worsening chest pain, coughing up blood, unexplained back/neck/jaw pain, severe abdominal pain, dizziness or lightheadedness, fainting, shortness of breath, sweaty or clammy skin, vomiting, or racing heart beat. These symptoms may represent a serious problem that is an emergency. Do not wait to see if the symptoms will go away. Get medical help right away. Call 911 and do not drive yourself to the hospital.

## 2023-03-04 NOTE — ED CDU PROVIDER SUBSEQUENT DAY NOTE - HISTORY
Pt resting comfortably at time of re-assessment. No events overnight. Pending echo. Will continue to monitor.

## 2023-03-04 NOTE — ED CDU PROVIDER DISPOSITION NOTE - PATIENT PORTAL LINK FT
You can access the FollowMyHealth Patient Portal offered by [facility] by registering at the following website: [email or web address]. By joining [employer]’s FollowMyHealth portal, you will also be able to view your health information using other applications (apps) compatible with our system.

## 2023-03-08 ENCOUNTER — OFFICE (OUTPATIENT)
Dept: URBAN - METROPOLITAN AREA CLINIC 63 | Facility: CLINIC | Age: 73
Setting detail: OPHTHALMOLOGY
End: 2023-03-08
Payer: MEDICARE

## 2023-03-08 DIAGNOSIS — H04.123: ICD-10-CM

## 2023-03-08 DIAGNOSIS — H00.14: ICD-10-CM

## 2023-03-08 PROCEDURE — 92012 INTRM OPH EXAM EST PATIENT: CPT | Performed by: STUDENT IN AN ORGANIZED HEALTH CARE EDUCATION/TRAINING PROGRAM

## 2023-03-08 ASSESSMENT — REFRACTION_MANIFEST
OS_ADD: +2.50
OD_AXIS: 000
OS_SPHERE: +0.25
OD_SPHERE: -0.50
OS_VA1: 20/20
OD_VA1: 20/25
OD_ADD: +2.50
OD_CYLINDER: SPHERE
OS_CYLINDER: -0.25
OS_AXIS: 040

## 2023-03-08 ASSESSMENT — REFRACTION_CURRENTRX
OD_CYLINDER: 0.00
OS_AXIS: 079
OS_CYLINDER: -0.25
OD_SPHERE: +0.25
OD_ADD: +2.00
OD_OVR_VA: 20/
OS_ADD: +2.00
OS_SPHERE: +0.25
OS_VPRISM_DIRECTION: PROGS
OD_AXIS: 180
OD_VPRISM_DIRECTION: PROGS
OS_OVR_VA: 20/

## 2023-03-08 ASSESSMENT — TONOMETRY
OS_IOP_MMHG: 14
OD_IOP_MMHG: 13

## 2023-03-08 ASSESSMENT — AXIALLENGTH_DERIVED
OS_AL: 23.2721
OS_AL: 23.1777
OD_AL: 22.3753

## 2023-03-08 ASSESSMENT — REFRACTION_AUTOREFRACTION
OD_SPHERE: +0.25
OS_CYLINDER: -0.75
OS_SPHERE: +0.75
OD_CYLINDER: -0.75
OD_AXIS: 089
OS_AXIS: 076

## 2023-03-08 ASSESSMENT — KERATOMETRY
OD_K2POWER_DIOPTERS: 47.25
OD_AXISANGLE_DEGREES: 069
OS_K2POWER_DIOPTERS: 44.25
OD_K1POWER_DIOPTERS: 47.00
OS_AXISANGLE_DEGREES: 090
OS_K1POWER_DIOPTERS: 44.25

## 2023-03-08 ASSESSMENT — SPHEQUIV_DERIVED
OS_SPHEQUIV: 0.125
OD_SPHEQUIV: -0.125
OS_SPHEQUIV: 0.375

## 2023-03-08 ASSESSMENT — LID EXAM ASSESSMENTS
OD_BLEPHARITIS: RUL T
OS_BLEPHARITIS: LUL T

## 2023-03-08 ASSESSMENT — TEAR BREAK UP TIME (TBUT)
OD_TBUT: T
OS_TBUT: T

## 2023-03-08 ASSESSMENT — CONFRONTATIONAL VISUAL FIELD TEST (CVF)
OD_FINDINGS: FULL
OS_FINDINGS: FULL

## 2023-03-08 ASSESSMENT — VISUAL ACUITY
OD_BCVA: 20/25
OS_BCVA: 20/30-1

## 2023-03-14 ENCOUNTER — OUTPATIENT (OUTPATIENT)
Dept: OUTPATIENT SERVICES | Facility: HOSPITAL | Age: 73
LOS: 1 days | Discharge: HOME | End: 2023-03-14

## 2023-03-14 DIAGNOSIS — C90.00 MULTIPLE MYELOMA NOT HAVING ACHIEVED REMISSION: ICD-10-CM

## 2023-03-14 DIAGNOSIS — Z98.51 TUBAL LIGATION STATUS: Chronic | ICD-10-CM

## 2023-03-14 DIAGNOSIS — Z94.84 STEM CELLS TRANSPLANT STATUS: Chronic | ICD-10-CM

## 2023-03-14 DIAGNOSIS — Z98.890 OTHER SPECIFIED POSTPROCEDURAL STATES: Chronic | ICD-10-CM

## 2023-03-17 ENCOUNTER — RESULT REVIEW (OUTPATIENT)
Age: 73
End: 2023-03-17

## 2023-03-17 ENCOUNTER — APPOINTMENT (OUTPATIENT)
Age: 73
End: 2023-03-17

## 2023-03-17 LAB
24R-OH-CALCIDIOL SERPL-MCNC: 72.7 NG/ML — SIGNIFICANT CHANGE UP (ref 30–80)
ALBUMIN SERPL ELPH-MCNC: 4.2 G/DL — SIGNIFICANT CHANGE UP (ref 3.3–5)
ALP SERPL-CCNC: 82 U/L — SIGNIFICANT CHANGE UP (ref 40–120)
ALT FLD-CCNC: 15 U/L — SIGNIFICANT CHANGE UP (ref 10–45)
ANION GAP SERPL CALC-SCNC: 12 MMOL/L — SIGNIFICANT CHANGE UP (ref 5–17)
AST SERPL-CCNC: 24 U/L — SIGNIFICANT CHANGE UP (ref 10–40)
BASOPHILS # BLD AUTO: 0.2 K/UL — SIGNIFICANT CHANGE UP (ref 0–0.2)
BASOPHILS NFR BLD AUTO: 2.6 % — HIGH (ref 0–2)
BILIRUB SERPL-MCNC: 0.3 MG/DL — SIGNIFICANT CHANGE UP (ref 0.2–1.2)
BUN SERPL-MCNC: 8 MG/DL — SIGNIFICANT CHANGE UP (ref 7–23)
CALCIUM SERPL-MCNC: 9.8 MG/DL — SIGNIFICANT CHANGE UP (ref 8.4–10.5)
CHLORIDE SERPL-SCNC: 108 MMOL/L — SIGNIFICANT CHANGE UP (ref 96–108)
CO2 SERPL-SCNC: 24 MMOL/L — SIGNIFICANT CHANGE UP (ref 22–31)
CREAT SERPL-MCNC: 0.71 MG/DL — SIGNIFICANT CHANGE UP (ref 0.5–1.3)
EGFR: 90 ML/MIN/1.73M2 — SIGNIFICANT CHANGE UP
EOSINOPHIL # BLD AUTO: 0.2 K/UL — SIGNIFICANT CHANGE UP (ref 0–0.5)
EOSINOPHIL NFR BLD AUTO: 2.9 % — SIGNIFICANT CHANGE UP (ref 0–6)
GLUCOSE SERPL-MCNC: 80 MG/DL — SIGNIFICANT CHANGE UP (ref 70–99)
HCT VFR BLD CALC: 35.7 % — SIGNIFICANT CHANGE UP (ref 34.5–45)
HGB BLD-MCNC: 12 G/DL — SIGNIFICANT CHANGE UP (ref 11.5–15.5)
LYMPHOCYTES # BLD AUTO: 3.3 K/UL — SIGNIFICANT CHANGE UP (ref 1–3.3)
LYMPHOCYTES # BLD AUTO: 51.3 % — HIGH (ref 13–44)
MCHC RBC-ENTMCNC: 30.6 PG — SIGNIFICANT CHANGE UP (ref 27–34)
MCHC RBC-ENTMCNC: 33.6 G/DL — SIGNIFICANT CHANGE UP (ref 32–36)
MCV RBC AUTO: 91.2 FL — SIGNIFICANT CHANGE UP (ref 80–100)
MONOCYTES # BLD AUTO: 0.7 K/UL — SIGNIFICANT CHANGE UP (ref 0–0.9)
MONOCYTES NFR BLD AUTO: 10.7 % — SIGNIFICANT CHANGE UP (ref 2–14)
NEUTROPHILS # BLD AUTO: 2.1 K/UL — SIGNIFICANT CHANGE UP (ref 1.8–7.4)
NEUTROPHILS NFR BLD AUTO: 32.6 % — LOW (ref 43–77)
PLATELET # BLD AUTO: 254 K/UL — SIGNIFICANT CHANGE UP (ref 150–400)
POTASSIUM SERPL-MCNC: 4.4 MMOL/L — SIGNIFICANT CHANGE UP (ref 3.5–5.3)
POTASSIUM SERPL-SCNC: 4.4 MMOL/L — SIGNIFICANT CHANGE UP (ref 3.5–5.3)
PROT SERPL-MCNC: 5.8 G/DL — LOW (ref 6–8.3)
RBC # BLD: 3.92 M/UL — SIGNIFICANT CHANGE UP (ref 3.8–5.2)
RBC # FLD: 15.2 % — HIGH (ref 10.3–14.5)
SODIUM SERPL-SCNC: 144 MMOL/L — SIGNIFICANT CHANGE UP (ref 135–145)
WBC # BLD: 6.4 K/UL — SIGNIFICANT CHANGE UP (ref 3.8–10.5)
WBC # FLD AUTO: 6.4 K/UL — SIGNIFICANT CHANGE UP (ref 3.8–10.5)

## 2023-03-18 LAB
IGA FLD-MCNC: 44 MG/DL — LOW (ref 84–499)
IGG FLD-MCNC: 286 MG/DL — LOW (ref 610–1660)
IGM SERPL-MCNC: 11 MG/DL — LOW (ref 35–242)
KAPPA LC SER QL IFE: 1.3 MG/DL — SIGNIFICANT CHANGE UP (ref 0.33–1.94)
KAPPA/LAMBDA FREE LIGHT CHAIN RATIO, SERUM: 3.94 RATIO — HIGH (ref 0.26–1.65)
LAMBDA LC SER QL IFE: 0.33 MG/DL — LOW (ref 0.57–2.63)

## 2023-03-20 DIAGNOSIS — Z51.11 ENCOUNTER FOR ANTINEOPLASTIC CHEMOTHERAPY: ICD-10-CM

## 2023-03-20 DIAGNOSIS — R11.2 NAUSEA WITH VOMITING, UNSPECIFIED: ICD-10-CM

## 2023-03-21 LAB
% ALBUMIN: 65.4 % — SIGNIFICANT CHANGE UP
% ALPHA 1: 5.5 % — SIGNIFICANT CHANGE UP
% ALPHA 2: 13 % — SIGNIFICANT CHANGE UP
% BETA: 11.3 % — SIGNIFICANT CHANGE UP
% GAMMA: 4.8 % — SIGNIFICANT CHANGE UP
% M SPIKE: SIGNIFICANT CHANGE UP
ALBUMIN SERPL ELPH-MCNC: 3.8 G/DL — SIGNIFICANT CHANGE UP (ref 3.6–5.5)
ALBUMIN/GLOB SERPL ELPH: 1.9 RATIO — SIGNIFICANT CHANGE UP
ALPHA1 GLOB SERPL ELPH-MCNC: 0.3 G/DL — SIGNIFICANT CHANGE UP (ref 0.1–0.4)
ALPHA2 GLOB SERPL ELPH-MCNC: 0.8 G/DL — SIGNIFICANT CHANGE UP (ref 0.5–1)
B-GLOBULIN SERPL ELPH-MCNC: 0.7 G/DL — SIGNIFICANT CHANGE UP (ref 0.5–1)
GAMMA GLOBULIN: 0.3 G/DL — LOW (ref 0.6–1.6)
INTERPRETATION SERPL IFE-IMP: SIGNIFICANT CHANGE UP
M-SPIKE: SIGNIFICANT CHANGE UP (ref 0–0)
PROT PATTERN SERPL ELPH-IMP: SIGNIFICANT CHANGE UP

## 2023-03-23 ENCOUNTER — APPOINTMENT (OUTPATIENT)
Dept: ORTHOPEDIC SURGERY | Facility: CLINIC | Age: 73
End: 2023-03-23
Payer: MEDICARE

## 2023-03-23 PROCEDURE — 73030 X-RAY EXAM OF SHOULDER: CPT | Mod: RT

## 2023-03-23 PROCEDURE — 99213 OFFICE O/P EST LOW 20 MIN: CPT

## 2023-03-23 NOTE — HISTORY OF PRESENT ILLNESS
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  Patient states she is doing well and overall is satisfied with her result.  She states she does get occasional pain in the shoulder and still has some difficulty sleeping at times but is overall doing fairly well.  She states she gets occasional cramping sensation into her hands but this is intermittent and infrequent.  She is here for routine follow-up today.  She denies any numbness or tingling distally.  She has no other complaints today. [de-identified] : On exam, the patient is pleasant.  They  are awake, alert, and oriented x3.  The patient presents today with no assistive devices.\par Weight is appropriate for height\par Full range of motion of cervical spine without instability\par Full range of motion of back without instability\par Intact neurologic, vascular, and dermatologic exam to right and left upper extremities\par Intact neurologic, vascular, and dermatologic exam to right and left lower extremities\par \par Right upper Extremity\par The patient's incision is well-healed.  No erythema, warmth, or drainage.  No swelling no bony tenderness to palpation about the shoulder. Range of motion: Forward flexion to 175, external rotation to 70, internal rotation to beltline.  Strength testin/5 in all planes. Motor and sensory function is intact, sensations intact light touch in C5-T1 distributions, DP/PT pulses are palpable, compartments are soft and compressible, there is no calf tenderness to palpation bilaterally. [de-identified] : X-ray 2 views of the right shoulder for previous office visit reviewed with the patient today.  There is no acute fracture or dislocation of the hardware remains in good position without any evidence of loosening [de-identified] : 72-year-old female status post right reverse total shoulder arthroplasty on 3/24/2022 [de-identified] : Anahi is recovering well from her recent surgery.  At this time I am recommending that she continue with scar massage for desensitization and for management of scar tissue.  She will continue with physical therapy on her own for continued range of motion and strengthening.  I advised her that she has a 30 to 40 pound weight restriction for the rest of her life with the prosthetic and she should not exceed internal rotation beyond the back beltline.  I advised her that these are lifetime  restrictions.  She will continue to progress her activities as tolerated.  She was again counseled on her range of motion and weightlifting limitations with a reverse prosthesis.  She was also reminded to call my office prior to any future dental procedures for antibiotic prophylaxis patient understands and agrees and all questions were answered.  She will follow-up in 1 year for her annual follow-up.

## 2023-03-29 ENCOUNTER — APPOINTMENT (OUTPATIENT)
Dept: FAMILY MEDICINE | Facility: CLINIC | Age: 73
End: 2023-03-29
Payer: MEDICARE

## 2023-03-29 VITALS
TEMPERATURE: 97.4 F | BODY MASS INDEX: 28 KG/M2 | SYSTOLIC BLOOD PRESSURE: 110 MMHG | WEIGHT: 158 LBS | HEART RATE: 84 BPM | OXYGEN SATURATION: 98 % | HEIGHT: 63 IN | RESPIRATION RATE: 16 BRPM | DIASTOLIC BLOOD PRESSURE: 68 MMHG

## 2023-03-29 DIAGNOSIS — D69.6 THROMBOCYTOPENIA, UNSPECIFIED: ICD-10-CM

## 2023-03-29 DIAGNOSIS — Z94.84 STEM CELLS TRANSPLANT STATUS: ICD-10-CM

## 2023-03-29 PROCEDURE — 99214 OFFICE O/P EST MOD 30 MIN: CPT

## 2023-03-29 NOTE — PHYSICAL EXAM
[Normal Outer Ear/Nose] : the outer ears and nose were normal in appearance [Supple] : supple [Pedal Pulses Present] : the pedal pulses are present [No Edema] : there was no peripheral edema [Normal] : soft, non-tender, non-distended, no masses palpated, no HSM and normal bowel sounds [No Joint Swelling] : no joint swelling [Grossly Normal Strength/Tone] : grossly normal strength/tone [Coordination Grossly Intact] : coordination grossly intact [No Focal Deficits] : no focal deficits [Normal Gait] : normal gait [Normal Affect] : the affect was normal [Normal Insight/Judgement] : insight and judgment were intact

## 2023-03-29 NOTE — PLAN
[FreeTextEntry1] : Hold Amlodipine and Rosuvastatin and RTO 3 months for repeat BP check and cholesterol check.

## 2023-03-29 NOTE — HISTORY OF PRESENT ILLNESS
[FreeTextEntry1] : Patient is here for ER follow up.\par Patient c/o cramps on right hand\par \par  [de-identified] : Ms. LAITH VAUGHN is a 73 year F who presents in office to follow up after ED visit. Patient states she was experiencing chest pain and lightheadedness. Work up was negative in the ED. \par Patient is requesting if possible to get of BP med and statin. She would like to have a trial off the medication.

## 2023-03-30 ENCOUNTER — APPOINTMENT (OUTPATIENT)
Dept: HEMATOLOGY ONCOLOGY | Facility: CLINIC | Age: 73
End: 2023-03-30
Payer: MEDICARE

## 2023-03-30 ENCOUNTER — RESULT REVIEW (OUTPATIENT)
Age: 73
End: 2023-03-30

## 2023-03-30 VITALS
OXYGEN SATURATION: 99 % | SYSTOLIC BLOOD PRESSURE: 117 MMHG | HEIGHT: 63 IN | HEART RATE: 60 BPM | TEMPERATURE: 98.1 F | BODY MASS INDEX: 28.35 KG/M2 | WEIGHT: 160 LBS | DIASTOLIC BLOOD PRESSURE: 76 MMHG

## 2023-03-30 LAB
BASOPHILS # BLD AUTO: 0.1 K/UL — SIGNIFICANT CHANGE UP (ref 0–0.2)
BASOPHILS NFR BLD AUTO: 2.5 % — HIGH (ref 0–2)
EOSINOPHIL # BLD AUTO: 0.2 K/UL — SIGNIFICANT CHANGE UP (ref 0–0.5)
EOSINOPHIL NFR BLD AUTO: 3.6 % — SIGNIFICANT CHANGE UP (ref 0–6)
HCT VFR BLD CALC: 37 % — SIGNIFICANT CHANGE UP (ref 34.5–45)
HGB BLD-MCNC: 11.4 G/DL — LOW (ref 11.5–15.5)
LYMPHOCYTES # BLD AUTO: 2.6 K/UL — SIGNIFICANT CHANGE UP (ref 1–3.3)
LYMPHOCYTES # BLD AUTO: 45.1 % — HIGH (ref 13–44)
MCHC RBC-ENTMCNC: 29.5 PG — SIGNIFICANT CHANGE UP (ref 27–34)
MCHC RBC-ENTMCNC: 30.7 G/DL — LOW (ref 32–36)
MCV RBC AUTO: 96.2 FL — SIGNIFICANT CHANGE UP (ref 80–100)
MONOCYTES # BLD AUTO: 0.6 K/UL — SIGNIFICANT CHANGE UP (ref 0–0.9)
MONOCYTES NFR BLD AUTO: 11.4 % — SIGNIFICANT CHANGE UP (ref 2–14)
NEUTROPHILS # BLD AUTO: 2.1 K/UL — SIGNIFICANT CHANGE UP (ref 1.8–7.4)
NEUTROPHILS NFR BLD AUTO: 37.4 % — LOW (ref 43–77)
PLATELET # BLD AUTO: 141 K/UL — LOW (ref 150–400)
RBC # BLD: 3.85 M/UL — SIGNIFICANT CHANGE UP (ref 3.8–5.2)
RBC # FLD: 15 % — HIGH (ref 10.3–14.5)
WBC # BLD: 5.7 K/UL — SIGNIFICANT CHANGE UP (ref 3.8–10.5)
WBC # FLD AUTO: 5.7 K/UL — SIGNIFICANT CHANGE UP (ref 3.8–10.5)

## 2023-03-30 PROCEDURE — 99214 OFFICE O/P EST MOD 30 MIN: CPT

## 2023-03-31 LAB
ALBUMIN SERPL ELPH-MCNC: 4.2 G/DL
ALP BLD-CCNC: 83 U/L
ALT SERPL-CCNC: 13 U/L
ANION GAP SERPL CALC-SCNC: 11 MMOL/L
AST SERPL-CCNC: 16 U/L
BILIRUB SERPL-MCNC: 0.4 MG/DL
BUN SERPL-MCNC: 10 MG/DL
CALCIUM SERPL-MCNC: 9.4 MG/DL
CHLORIDE SERPL-SCNC: 109 MMOL/L
CO2 SERPL-SCNC: 25 MMOL/L
CREAT SERPL-MCNC: 0.71 MG/DL
EGFR: 90 ML/MIN/1.73M2
GLUCOSE SERPL-MCNC: 76 MG/DL
LDH SERPL-CCNC: 161 U/L
POTASSIUM SERPL-SCNC: 4.4 MMOL/L
PROT SERPL-MCNC: 5.8 G/DL
SODIUM SERPL-SCNC: 145 MMOL/L

## 2023-04-03 ENCOUNTER — RX RENEWAL (OUTPATIENT)
Age: 73
End: 2023-04-03

## 2023-04-03 NOTE — ASSESSMENT
[FreeTextEntry1] : 72 year old female IgG kappa multiple myeloma,  FISH panel - positive for CCND1/IGH fusion - a/w favorable prognosis. PET CT (7/12/18) did not show any bone lesions. S/p VRd, followed by 4 cycles of KRd, followed by autoPSCT on 2/28/19. \par On 5/18/19 started on Pomalyst for persistently elevated KFLC at 50, FLC ratio 136.\par On Pomalyst + weekly Dex, she has had a partial response, her KFLC has improved to some degree and bone marrow in 12/2019 shows persistent plasma cells of 15-20%. Daratumumab was added from 2/3/2020 onwards.\par PET CT 5/15/20 with no FDG avid lesions, and slight enlargement of right adnexal cyst.\par Q4 week daratumumab started 7/30/20\par 3/24/2022 S/p R Shoulder Replacement for AVN\par 2/17/22 FLC ratio 4.10\par 4/14/22 FLC ratio 8.91\par 4/14/2022 Restarted Daratumumab q 4 weeks after shoulder surgery\par 6/9/22 FLC ratio 4.11\par 10/27/22 FCL ratio 4.37\par \par \par 3/17/23 FCL ration 3.94\par Continues on monthly Darzalex. S/p Darzalex/Dex on 3/17/23 \par Reviewed CBC w dif from today  WBC 5.7 K HGB 11.4 g  HCT 37.0 %  K Neutrophil # 2.1 K\par \par Plan:\par - Continue Daratumumab q 4 weeks, FLC ratio ranges 2-4 and is overall stable. Next tx due 4/14/23\par - Continue Pomalyst 3mg 3 weeks on followed by 1 week off.\par - Anemia - stable likely secondary to pomalyst/darzalex, Hgb is 11.4 g/dl \par -Intermittent imbalance- MRI Brain ordered, advised to obtain and follow up with PCP \par -Advised to call with concerns/symptoms \par -Follow up in 2 months with Dr De Oliveira or sooner if needed  Call primary care provider for follow up after discharge/Activities as tolerated/Low salt diet/Monitor weight daily/Report signs and symptoms to primary care provider

## 2023-04-03 NOTE — HISTORY OF PRESENT ILLNESS
[Home] : at home, [unfilled] , at the time of the visit. [Medical Office: ([facility])___] : at the medical office located in  [Verbal consent obtained from patient] : the patient, [unfilled] [de-identified] : Patient returns for follow-up visit. S/p Darzalex/Dex on 3/17/23 \par \par Followed up with PCP yesterday, PCP discontinued antihypertensive and hyperlipidemia medications due to normal values \par Currently on Pomalyst, pending to start week off on 4/1/23\par Notes for the past 2 weeks experiencing intermittent imbalance. Denies headache, dizziness, visual changes/disturbances. Denies fall/trauma/injury \par Denies pain, fever, night sweats, weight loss, pruritus \par Denies dyspnea, cough\par Denies abdominal pain, nausea, vomiting, diarrhea\par No rash\par Feels well  [de-identified] : Ms. Calero is a 72 year old female w/ multiple myeloma. \par \par She was noted to have proteinuria and then had a 24 hour urine protein which showed non-nephrotic range proteinuria of 510 mg/24 hours. She was referred to Dr. Lesa Rendon of nephrology. SPEP showed faint M-spike 0.2 g/dL in gammaglobulin region, immunofixation showed this to be a IgG kappa monoclonal protein. UPEP showed 2 monoclonal protein bands, 61%, and 0.7%. \par \par Subsequent work up:\par 6/19/18 M-protein 0.2 g/dL, Kappa FLC 71, lambda FLC 0.36, FLC ratio 198.61\par , Beta 2 microglobulin 1.5 mg/L\par \par She had a bone marrow biopsy on 6/22/18. Pathology: plasma cell neoplasm, plasma cells comprise 20-25% of marrow cells. Trilineage hematopoiesis present with maturation, increased iron stores. Congo red stain negative for amyloid. Karyotype 46, XX. FISH panel - positive for CCND1/IGH fusion - a/w favorable prognosis.\par \par 7/12/18 PET - negative\par \par Treatment Summary \par 7/19/18 - C1 VRd\par 8/9/18 -C2 VRd\par 8/30/18 - C3 VRd\par 9/20/18 -partial C4 VRd\par 10/11/18 - 1/2/19 KRd (carfilzomib, Revlimid, Dexamethasone) x 4 cycles. \par 2/28/19 - autoPSCT \par 5/18/19 - started Pomalyst\par 7/19/19 - decadron 20 mg weekly added to Pomalyst.\par 2/03/20 - Daratumumab week 1-8 2/03 - 3/31/20. Week 9 (began every other week) 4/07/20.

## 2023-04-03 NOTE — PHYSICAL EXAM
[Restricted in physically strenuous activity but ambulatory and able to carry out work of a light or sedentary nature] : Status 1- Restricted in physically strenuous activity but ambulatory and able to carry out work of a light or sedentary nature, e.g., light house work, office work [Normal] : affect appropriate [de-identified] : Pleasant, interactive in NAD.

## 2023-04-04 LAB
ALBUMIN MFR SERPL ELPH: 66 %
ALBUMIN SERPL-MCNC: 3.8 G/DL
ALBUMIN/GLOB SERPL: 1.9 RATIO
ALPHA1 GLOB MFR SERPL ELPH: 5.3 %
ALPHA1 GLOB SERPL ELPH-MCNC: 0.3 G/DL
ALPHA2 GLOB MFR SERPL ELPH: 12.6 %
ALPHA2 GLOB SERPL ELPH-MCNC: 0.7 G/DL
B-GLOBULIN MFR SERPL ELPH: 11.1 %
B-GLOBULIN SERPL ELPH-MCNC: 0.6 G/DL
DEPRECATED KAPPA LC FREE/LAMBDA SER: 2.19 RATIO
GAMMA GLOB FLD ELPH-MCNC: 0.3 G/DL
GAMMA GLOB MFR SERPL ELPH: 5 %
IGA SER QL IEP: 44 MG/DL
IGG SER QL IEP: 291 MG/DL
IGM SER QL IEP: <10 MG/DL
INTERPRETATION SERPL IEP-IMP: NORMAL
KAPPA LC CSF-MCNC: 0.59 MG/DL
KAPPA LC SERPL-MCNC: 1.29 MG/DL
M PROTEIN MFR SERPL ELPH: NORMAL
M PROTEIN SPEC IFE-MCNC: NORMAL
MONOCLON BAND OBS SERPL: NORMAL
PROT SERPL-MCNC: 5.8 G/DL
PROT SERPL-MCNC: 5.8 G/DL

## 2023-04-12 NOTE — DISCHARGE NOTE PROVIDER - PROVIDER RX CONTACT NUMBER
[phone] Tranexamic Acid Counseling:  Patient advised of the small risk of bleeding problems with tranexamic acid. They were also instructed to call if they developed any nausea, vomiting or diarrhea. All of the patient's questions and concerns were addressed.

## 2023-04-14 ENCOUNTER — RESULT REVIEW (OUTPATIENT)
Age: 73
End: 2023-04-14

## 2023-04-14 ENCOUNTER — APPOINTMENT (OUTPATIENT)
Age: 73
End: 2023-04-14

## 2023-04-14 LAB
BASOPHILS # BLD AUTO: 0.1 K/UL — SIGNIFICANT CHANGE UP (ref 0–0.2)
BASOPHILS NFR BLD AUTO: 2.1 % — HIGH (ref 0–2)
EOSINOPHIL # BLD AUTO: 0.1 K/UL — SIGNIFICANT CHANGE UP (ref 0–0.5)
EOSINOPHIL NFR BLD AUTO: 2.1 % — SIGNIFICANT CHANGE UP (ref 0–6)
HCT VFR BLD CALC: 36.4 % — SIGNIFICANT CHANGE UP (ref 34.5–45)
HGB BLD-MCNC: 11.7 G/DL — SIGNIFICANT CHANGE UP (ref 11.5–15.5)
LYMPHOCYTES # BLD AUTO: 3.7 K/UL — HIGH (ref 1–3.3)
LYMPHOCYTES # BLD AUTO: 58.1 % — HIGH (ref 13–44)
MCHC RBC-ENTMCNC: 30.5 PG — SIGNIFICANT CHANGE UP (ref 27–34)
MCHC RBC-ENTMCNC: 32.2 G/DL — SIGNIFICANT CHANGE UP (ref 32–36)
MCV RBC AUTO: 94.8 FL — SIGNIFICANT CHANGE UP (ref 80–100)
MONOCYTES # BLD AUTO: 0.9 K/UL — SIGNIFICANT CHANGE UP (ref 0–0.9)
MONOCYTES NFR BLD AUTO: 13.6 % — SIGNIFICANT CHANGE UP (ref 2–14)
NEUTROPHILS # BLD AUTO: 1.5 K/UL — LOW (ref 1.8–7.4)
NEUTROPHILS NFR BLD AUTO: 24.1 % — LOW (ref 43–77)
PLATELET # BLD AUTO: 251 K/UL — SIGNIFICANT CHANGE UP (ref 150–400)
RBC # BLD: 3.84 M/UL — SIGNIFICANT CHANGE UP (ref 3.8–5.2)
RBC # FLD: 15.2 % — HIGH (ref 10.3–14.5)
WBC # BLD: 6.4 K/UL — SIGNIFICANT CHANGE UP (ref 3.8–10.5)
WBC # FLD AUTO: 6.4 K/UL — SIGNIFICANT CHANGE UP (ref 3.8–10.5)

## 2023-04-15 ENCOUNTER — APPOINTMENT (OUTPATIENT)
Dept: MRI IMAGING | Facility: CLINIC | Age: 73
End: 2023-04-15
Payer: MEDICARE

## 2023-04-15 ENCOUNTER — OUTPATIENT (OUTPATIENT)
Dept: OUTPATIENT SERVICES | Facility: HOSPITAL | Age: 73
LOS: 1 days | End: 2023-04-15

## 2023-04-15 DIAGNOSIS — C90.00 MULTIPLE MYELOMA NOT HAVING ACHIEVED REMISSION: ICD-10-CM

## 2023-04-15 DIAGNOSIS — Z94.84 STEM CELLS TRANSPLANT STATUS: Chronic | ICD-10-CM

## 2023-04-15 DIAGNOSIS — Z98.890 OTHER SPECIFIED POSTPROCEDURAL STATES: Chronic | ICD-10-CM

## 2023-04-15 DIAGNOSIS — Z98.51 TUBAL LIGATION STATUS: Chronic | ICD-10-CM

## 2023-04-15 LAB
ALBUMIN SERPL ELPH-MCNC: 4.1 G/DL — SIGNIFICANT CHANGE UP (ref 3.3–5)
ALP SERPL-CCNC: 73 U/L — SIGNIFICANT CHANGE UP (ref 40–120)
ALT FLD-CCNC: 13 U/L — SIGNIFICANT CHANGE UP (ref 10–45)
ANION GAP SERPL CALC-SCNC: 14 MMOL/L — SIGNIFICANT CHANGE UP (ref 5–17)
AST SERPL-CCNC: 25 U/L — SIGNIFICANT CHANGE UP (ref 10–40)
BILIRUB SERPL-MCNC: 0.3 MG/DL — SIGNIFICANT CHANGE UP (ref 0.2–1.2)
BUN SERPL-MCNC: 16 MG/DL — SIGNIFICANT CHANGE UP (ref 7–23)
CALCIUM SERPL-MCNC: 9.7 MG/DL — SIGNIFICANT CHANGE UP (ref 8.4–10.5)
CHLORIDE SERPL-SCNC: 107 MMOL/L — SIGNIFICANT CHANGE UP (ref 96–108)
CO2 SERPL-SCNC: 22 MMOL/L — SIGNIFICANT CHANGE UP (ref 22–31)
CREAT SERPL-MCNC: 0.66 MG/DL — SIGNIFICANT CHANGE UP (ref 0.5–1.3)
EGFR: 93 ML/MIN/1.73M2 — SIGNIFICANT CHANGE UP
GLUCOSE SERPL-MCNC: 64 MG/DL — LOW (ref 70–99)
POTASSIUM SERPL-MCNC: 4.4 MMOL/L — SIGNIFICANT CHANGE UP (ref 3.5–5.3)
POTASSIUM SERPL-SCNC: 4.4 MMOL/L — SIGNIFICANT CHANGE UP (ref 3.5–5.3)
PROT SERPL-MCNC: 5.6 G/DL — LOW (ref 6–8.3)
PROT SERPL-MCNC: 5.6 G/DL — LOW (ref 6–8.3)
PROT SERPL-MCNC: 5.9 G/DL — LOW (ref 6–8.3)
SODIUM SERPL-SCNC: 142 MMOL/L — SIGNIFICANT CHANGE UP (ref 135–145)

## 2023-04-15 PROCEDURE — 70553 MRI BRAIN STEM W/O & W/DYE: CPT | Mod: 26

## 2023-04-17 LAB
IGA FLD-MCNC: 45 MG/DL — LOW (ref 84–499)
IGG FLD-MCNC: 272 MG/DL — LOW (ref 610–1660)
IGM SERPL-MCNC: 11 MG/DL — LOW (ref 35–242)
KAPPA LC SER QL IFE: 1.37 MG/DL — SIGNIFICANT CHANGE UP (ref 0.33–1.94)
KAPPA/LAMBDA FREE LIGHT CHAIN RATIO, SERUM: 4.42 RATIO — HIGH (ref 0.26–1.65)
LAMBDA LC SER QL IFE: 0.31 MG/DL — LOW (ref 0.57–2.63)

## 2023-04-18 LAB
% ALBUMIN: 67.6 % — SIGNIFICANT CHANGE UP
% ALPHA 1: 5.1 % — SIGNIFICANT CHANGE UP
% ALPHA 2: 12 % — SIGNIFICANT CHANGE UP
% BETA: 10.5 % — SIGNIFICANT CHANGE UP
% GAMMA: 4.8 % — SIGNIFICANT CHANGE UP
% M SPIKE: SIGNIFICANT CHANGE UP
ALBUMIN SERPL ELPH-MCNC: 3.8 G/DL — SIGNIFICANT CHANGE UP (ref 3.6–5.5)
ALBUMIN/GLOB SERPL ELPH: 2.1 RATIO — SIGNIFICANT CHANGE UP
ALPHA1 GLOB SERPL ELPH-MCNC: 0.3 G/DL — SIGNIFICANT CHANGE UP (ref 0.1–0.4)
ALPHA2 GLOB SERPL ELPH-MCNC: 0.7 G/DL — SIGNIFICANT CHANGE UP (ref 0.5–1)
B-GLOBULIN SERPL ELPH-MCNC: 0.6 G/DL — SIGNIFICANT CHANGE UP (ref 0.5–1)
GAMMA GLOBULIN: 0.3 G/DL — LOW (ref 0.6–1.6)
INTERPRETATION SERPL IFE-IMP: SIGNIFICANT CHANGE UP
M-SPIKE: SIGNIFICANT CHANGE UP (ref 0–0)
PROT PATTERN SERPL ELPH-IMP: SIGNIFICANT CHANGE UP

## 2023-04-24 ENCOUNTER — RX RENEWAL (OUTPATIENT)
Age: 73
End: 2023-04-24

## 2023-05-03 ENCOUNTER — LABORATORY RESULT (OUTPATIENT)
Age: 73
End: 2023-05-03

## 2023-05-12 ENCOUNTER — RESULT REVIEW (OUTPATIENT)
Age: 73
End: 2023-05-12

## 2023-05-12 ENCOUNTER — APPOINTMENT (OUTPATIENT)
Age: 73
End: 2023-05-12

## 2023-05-12 LAB
ALBUMIN SERPL ELPH-MCNC: 3.7 G/DL — SIGNIFICANT CHANGE UP (ref 3.3–5)
ALP SERPL-CCNC: 76 U/L — SIGNIFICANT CHANGE UP (ref 40–120)
ALT FLD-CCNC: 19 U/L — SIGNIFICANT CHANGE UP (ref 10–45)
ANION GAP SERPL CALC-SCNC: 12 MMOL/L — SIGNIFICANT CHANGE UP (ref 5–17)
AST SERPL-CCNC: 19 U/L — SIGNIFICANT CHANGE UP (ref 10–40)
BASOPHILS # BLD AUTO: 0.2 K/UL — SIGNIFICANT CHANGE UP (ref 0–0.2)
BASOPHILS NFR BLD AUTO: 2 % — SIGNIFICANT CHANGE UP (ref 0–2)
BILIRUB SERPL-MCNC: 0.2 MG/DL — SIGNIFICANT CHANGE UP (ref 0.2–1.2)
BUN SERPL-MCNC: 15 MG/DL — SIGNIFICANT CHANGE UP (ref 7–23)
CALCIUM SERPL-MCNC: 9.4 MG/DL — SIGNIFICANT CHANGE UP (ref 8.4–10.5)
CHLORIDE SERPL-SCNC: 109 MMOL/L — HIGH (ref 96–108)
CO2 SERPL-SCNC: 24 MMOL/L — SIGNIFICANT CHANGE UP (ref 22–31)
CREAT SERPL-MCNC: 0.67 MG/DL — SIGNIFICANT CHANGE UP (ref 0.5–1.3)
EGFR: 93 ML/MIN/1.73M2 — SIGNIFICANT CHANGE UP
EOSINOPHIL # BLD AUTO: 0.2 K/UL — SIGNIFICANT CHANGE UP (ref 0–0.5)
EOSINOPHIL NFR BLD AUTO: 2.2 % — SIGNIFICANT CHANGE UP (ref 0–6)
GLUCOSE SERPL-MCNC: 86 MG/DL — SIGNIFICANT CHANGE UP (ref 70–99)
HCT VFR BLD CALC: 35.4 % — SIGNIFICANT CHANGE UP (ref 34.5–45)
HGB BLD-MCNC: 11.6 G/DL — SIGNIFICANT CHANGE UP (ref 11.5–15.5)
IGA FLD-MCNC: 38 MG/DL — LOW (ref 84–499)
IGG FLD-MCNC: 254 MG/DL — LOW (ref 610–1660)
IGM SERPL-MCNC: <10 MG/DL — LOW (ref 35–242)
KAPPA LC SER QL IFE: 1.3 MG/DL — SIGNIFICANT CHANGE UP (ref 0.33–1.94)
KAPPA/LAMBDA FREE LIGHT CHAIN RATIO, SERUM: 3.94 RATIO — HIGH (ref 0.26–1.65)
LAMBDA LC SER QL IFE: 0.33 MG/DL — LOW (ref 0.57–2.63)
LYMPHOCYTES # BLD AUTO: 3.7 K/UL — HIGH (ref 1–3.3)
LYMPHOCYTES # BLD AUTO: 47.6 % — HIGH (ref 13–44)
MCHC RBC-ENTMCNC: 29.9 PG — SIGNIFICANT CHANGE UP (ref 27–34)
MCHC RBC-ENTMCNC: 32.7 G/DL — SIGNIFICANT CHANGE UP (ref 32–36)
MCV RBC AUTO: 91.3 FL — SIGNIFICANT CHANGE UP (ref 80–100)
MONOCYTES # BLD AUTO: 0.6 K/UL — SIGNIFICANT CHANGE UP (ref 0–0.9)
MONOCYTES NFR BLD AUTO: 8.4 % — SIGNIFICANT CHANGE UP (ref 2–14)
NEUTROPHILS # BLD AUTO: 3.1 K/UL — SIGNIFICANT CHANGE UP (ref 1.8–7.4)
NEUTROPHILS NFR BLD AUTO: 39.9 % — LOW (ref 43–77)
PLATELET # BLD AUTO: 288 K/UL — SIGNIFICANT CHANGE UP (ref 150–400)
POTASSIUM SERPL-MCNC: 4.5 MMOL/L — SIGNIFICANT CHANGE UP (ref 3.5–5.3)
POTASSIUM SERPL-SCNC: 4.5 MMOL/L — SIGNIFICANT CHANGE UP (ref 3.5–5.3)
PROT SERPL-MCNC: 5.4 G/DL — LOW (ref 6–8.3)
RBC # BLD: 3.88 M/UL — SIGNIFICANT CHANGE UP (ref 3.8–5.2)
RBC # FLD: 15.2 % — HIGH (ref 10.3–14.5)
SODIUM SERPL-SCNC: 145 MMOL/L — SIGNIFICANT CHANGE UP (ref 135–145)
WBC # BLD: 7.7 K/UL — SIGNIFICANT CHANGE UP (ref 3.8–10.5)
WBC # FLD AUTO: 7.7 K/UL — SIGNIFICANT CHANGE UP (ref 3.8–10.5)

## 2023-05-15 ENCOUNTER — APPOINTMENT (OUTPATIENT)
Dept: FAMILY MEDICINE | Facility: CLINIC | Age: 73
End: 2023-05-15
Payer: MEDICARE

## 2023-05-15 PROCEDURE — 99214 OFFICE O/P EST MOD 30 MIN: CPT | Mod: 95

## 2023-05-15 NOTE — HISTORY OF PRESENT ILLNESS
[Home] : at home, [unfilled] , at the time of the visit. [Medical Office: ([facility])___] : at the medical office located in  [Verbal consent obtained from patient] : the patient, [unfilled] [FreeTextEntry8] : Patient c/o cough and nasal congestion x 2 weeks.\par Patient sought initial evaluation at [facility] on 5/5/23 and was Dx'd with asthmatic bronchitis.no hx of asthma \par She has not had much relief from the Prednisone and Doxycycline prescriptions, which are now finished.pt notes was blow out  yelow, and cough is constnat, pt fel a little better on rpred but was given 5 days only. pt notes cough is not keep her upo at night . pt notes no sob, \par Denies fever, however states she was told she was feverish at her [facility] appointment.\par pt notes sysmtoms started end of april , pt is long time pt of prior associate who restablished w practice assoc , i have not seen her in years, pt notes cough is main issue at this time , pt notes no wheeze or sob, just mainly am cough and nasal congestion, pt was not given inhaler by walkin

## 2023-05-16 LAB
% ALBUMIN: 64.4 % — SIGNIFICANT CHANGE UP
% ALPHA 1: 6.1 % — SIGNIFICANT CHANGE UP
% ALPHA 2: 14.6 % — SIGNIFICANT CHANGE UP
% BETA: 10.2 % — SIGNIFICANT CHANGE UP
% GAMMA: 4.7 % — SIGNIFICANT CHANGE UP
% M SPIKE: SIGNIFICANT CHANGE UP
ALBUMIN SERPL ELPH-MCNC: 3.5 G/DL — LOW (ref 3.6–5.5)
ALBUMIN/GLOB SERPL ELPH: 1.8 RATIO — SIGNIFICANT CHANGE UP
ALPHA1 GLOB SERPL ELPH-MCNC: 0.3 G/DL — SIGNIFICANT CHANGE UP (ref 0.1–0.4)
ALPHA2 GLOB SERPL ELPH-MCNC: 0.8 G/DL — SIGNIFICANT CHANGE UP (ref 0.5–1)
B-GLOBULIN SERPL ELPH-MCNC: 0.6 G/DL — SIGNIFICANT CHANGE UP (ref 0.5–1)
GAMMA GLOBULIN: 0.3 G/DL — LOW (ref 0.6–1.6)
INTERPRETATION SERPL IFE-IMP: SIGNIFICANT CHANGE UP
M-SPIKE: SIGNIFICANT CHANGE UP (ref 0–0)
PROT PATTERN SERPL ELPH-IMP: SIGNIFICANT CHANGE UP

## 2023-05-21 ENCOUNTER — OUTPATIENT (OUTPATIENT)
Dept: OUTPATIENT SERVICES | Facility: HOSPITAL | Age: 73
LOS: 1 days | Discharge: HOME | End: 2023-05-21

## 2023-05-21 DIAGNOSIS — Z98.51 TUBAL LIGATION STATUS: Chronic | ICD-10-CM

## 2023-05-21 DIAGNOSIS — Z94.84 STEM CELLS TRANSPLANT STATUS: Chronic | ICD-10-CM

## 2023-05-21 DIAGNOSIS — C90.00 MULTIPLE MYELOMA NOT HAVING ACHIEVED REMISSION: ICD-10-CM

## 2023-05-21 DIAGNOSIS — Z98.890 OTHER SPECIFIED POSTPROCEDURAL STATES: Chronic | ICD-10-CM

## 2023-05-30 ENCOUNTER — APPOINTMENT (OUTPATIENT)
Dept: FAMILY MEDICINE | Facility: CLINIC | Age: 73
End: 2023-05-30
Payer: MEDICARE

## 2023-05-30 VITALS
TEMPERATURE: 97 F | DIASTOLIC BLOOD PRESSURE: 78 MMHG | WEIGHT: 158 LBS | HEIGHT: 63 IN | BODY MASS INDEX: 28 KG/M2 | OXYGEN SATURATION: 100 % | HEART RATE: 91 BPM | SYSTOLIC BLOOD PRESSURE: 116 MMHG

## 2023-05-30 DIAGNOSIS — J20.9 ACUTE BRONCHITIS, UNSPECIFIED: ICD-10-CM

## 2023-05-30 PROCEDURE — 99213 OFFICE O/P EST LOW 20 MIN: CPT

## 2023-05-30 RX ORDER — ASPIRIN 81 MG
81 TABLET, DELAYED RELEASE (ENTERIC COATED) ORAL DAILY
Refills: 0 | Status: ACTIVE | COMMUNITY

## 2023-05-30 RX ORDER — NAPROXEN 500 MG/1
500 TABLET ORAL
Qty: 28 | Refills: 0 | Status: DISCONTINUED | COMMUNITY
Start: 2022-07-13 | End: 2023-05-30

## 2023-05-30 RX ORDER — CEPHALEXIN 500 MG/1
500 TABLET ORAL 3 TIMES DAILY
Qty: 21 | Refills: 0 | Status: DISCONTINUED | COMMUNITY
Start: 2022-05-06 | End: 2023-05-30

## 2023-05-30 RX ORDER — AMOXICILLIN AND CLAVULANATE POTASSIUM 875; 125 MG/1; MG/1
875-125 TABLET, COATED ORAL
Qty: 20 | Refills: 0 | Status: DISCONTINUED | COMMUNITY
Start: 2023-05-15 | End: 2023-05-30

## 2023-05-30 NOTE — HISTORY OF PRESENT ILLNESS
[FreeTextEntry8] : 73 year female with PMH of multiple myeloma since 2018 (on chemo therapy), peripheral stem cell transplant, HLD, HTN, SIRENA, peripheral neuropathy, OA seen today in office for lingering productive cough, congestion x 2 months. She was prescribed Prednisone 10 mg TID for 7 days and Doxycycline Hyclate 100 mg BID for 1 week after being examined in the ED on 5/5 for the same symptoms. Patients say they have not improved. On May 15, the patient was seen on televisit, and Augmentin and a Ventolin inhaler were prescribed. The patient reported taking the antibiotic as directed but not using the inhaler since she was unaware that it might be used for coughing. Patient report concern because she have an upcoming chemo treatment. No prior seasonal allergy history.  Awake, alert and oriented x4, in no acute distress. Denies any chest pain, shortness of breath, palpitation or dizziness. Patient verified medications and medical diagnosis. Report taking medications as prescribed.\par

## 2023-05-30 NOTE — ASSESSMENT
[FreeTextEntry1] : Educated on taking Ventolin inhaler as prescribed\par Patient report she have not followed up with Dr. De Oliveira - patient encouraged to follow up with Dr. De Oliveira ASAP\par Start Benzonatate and Singulair as prescribed, lung sound clear and no s/s of respiratory distress - chest xray order per patient request\par RPP order - will call with results\par Avoid triggers\par Discussed breathing exercise and benefit of using spirometer\par Healthy Diet and exercise\par Referred to Allergist and Pulmonologist - patient will f/u with specialist if no changes in symptoms\par \par Patient encounter incorporated clinical review of the medical record including consultation from specialists, review of lab and diagnostic testing with interpretation and discussion of results with patient and/or primary care taker, general patient counseling and coordination of care as well documentation update within the electronic medical record. All patient questions/concerns addressed during time of visit.\par

## 2023-05-31 ENCOUNTER — OUTPATIENT (OUTPATIENT)
Dept: OUTPATIENT SERVICES | Facility: HOSPITAL | Age: 73
LOS: 1 days | End: 2023-05-31
Payer: MEDICARE

## 2023-05-31 DIAGNOSIS — J20.9 ACUTE BRONCHITIS, UNSPECIFIED: ICD-10-CM

## 2023-05-31 DIAGNOSIS — Z98.51 TUBAL LIGATION STATUS: Chronic | ICD-10-CM

## 2023-05-31 DIAGNOSIS — Z94.84 STEM CELLS TRANSPLANT STATUS: Chronic | ICD-10-CM

## 2023-05-31 DIAGNOSIS — Z98.890 OTHER SPECIFIED POSTPROCEDURAL STATES: Chronic | ICD-10-CM

## 2023-05-31 LAB
RAPID RVP RESULT: NOT DETECTED
SARS-COV-2 RNA PNL RESP NAA+PROBE: NOT DETECTED

## 2023-05-31 PROCEDURE — 71046 X-RAY EXAM CHEST 2 VIEWS: CPT | Mod: 26

## 2023-06-06 ENCOUNTER — RX RENEWAL (OUTPATIENT)
Age: 73
End: 2023-06-06

## 2023-06-07 ENCOUNTER — RESULT REVIEW (OUTPATIENT)
Age: 73
End: 2023-06-07

## 2023-06-07 ENCOUNTER — APPOINTMENT (OUTPATIENT)
Dept: HEMATOLOGY ONCOLOGY | Facility: CLINIC | Age: 73
End: 2023-06-07
Payer: MEDICARE

## 2023-06-07 VITALS
HEART RATE: 69 BPM | OXYGEN SATURATION: 98 % | BODY MASS INDEX: 26.58 KG/M2 | WEIGHT: 150 LBS | SYSTOLIC BLOOD PRESSURE: 116 MMHG | HEIGHT: 63 IN | DIASTOLIC BLOOD PRESSURE: 75 MMHG

## 2023-06-07 LAB
BASOPHILS # BLD AUTO: 0.2 K/UL — SIGNIFICANT CHANGE UP (ref 0–0.2)
BASOPHILS NFR BLD AUTO: 2.6 % — HIGH (ref 0–2)
EOSINOPHIL # BLD AUTO: 0.1 K/UL — SIGNIFICANT CHANGE UP (ref 0–0.5)
EOSINOPHIL NFR BLD AUTO: 1.6 % — SIGNIFICANT CHANGE UP (ref 0–6)
HCT VFR BLD CALC: 33.8 % — LOW (ref 34.5–45)
HGB BLD-MCNC: 11.2 G/DL — LOW (ref 11.5–15.5)
LYMPHOCYTES # BLD AUTO: 2.9 K/UL — SIGNIFICANT CHANGE UP (ref 1–3.3)
LYMPHOCYTES # BLD AUTO: 56.5 % — HIGH (ref 13–44)
MCHC RBC-ENTMCNC: 30.8 PG — SIGNIFICANT CHANGE UP (ref 27–34)
MCHC RBC-ENTMCNC: 33.2 G/DL — SIGNIFICANT CHANGE UP (ref 32–36)
MCV RBC AUTO: 92.9 FL — SIGNIFICANT CHANGE UP (ref 80–100)
MONOCYTES # BLD AUTO: 0.5 K/UL — SIGNIFICANT CHANGE UP (ref 0–0.9)
MONOCYTES NFR BLD AUTO: 10.8 % — SIGNIFICANT CHANGE UP (ref 2–14)
NEUTROPHILS # BLD AUTO: 1.4 K/UL — LOW (ref 1.8–7.4)
NEUTROPHILS NFR BLD AUTO: 27.7 % — LOW (ref 43–77)
PLATELET # BLD AUTO: 272 K/UL — SIGNIFICANT CHANGE UP (ref 150–400)
RBC # BLD: 3.64 M/UL — LOW (ref 3.8–5.2)
RBC # FLD: 14.9 % — HIGH (ref 10.3–14.5)
WBC # BLD: 5.1 K/UL — SIGNIFICANT CHANGE UP (ref 3.8–10.5)
WBC # FLD AUTO: 5.1 K/UL — SIGNIFICANT CHANGE UP (ref 3.8–10.5)

## 2023-06-07 PROCEDURE — 99214 OFFICE O/P EST MOD 30 MIN: CPT

## 2023-06-07 RX ORDER — DEXAMETHASONE 4 MG/1
4 TABLET ORAL
Qty: 80 | Refills: 6 | Status: DISCONTINUED | COMMUNITY
Start: 2020-03-10 | End: 2023-06-07

## 2023-06-07 NOTE — PHYSICAL EXAM
[Restricted in physically strenuous activity but ambulatory and able to carry out work of a light or sedentary nature] : Status 1- Restricted in physically strenuous activity but ambulatory and able to carry out work of a light or sedentary nature, e.g., light house work, office work [Normal] : affect appropriate [de-identified] : Pleasant, interactive in NAD.

## 2023-06-07 NOTE — HISTORY OF PRESENT ILLNESS
[de-identified] : Ms. Calero is a 72 year old female w/ multiple myeloma. \par \par She was noted to have proteinuria and then had a 24 hour urine protein which showed non-nephrotic range proteinuria of 510 mg/24 hours. She was referred to Dr. Lesa Rendon of nephrology. SPEP showed faint M-spike 0.2 g/dL in gammaglobulin region, immunofixation showed this to be a IgG kappa monoclonal protein. UPEP showed 2 monoclonal protein bands, 61%, and 0.7%. \par \par Subsequent work up:\par 6/19/18 M-protein 0.2 g/dL, Kappa FLC 71, lambda FLC 0.36, FLC ratio 198.61\par , Beta 2 microglobulin 1.5 mg/L\par \par She had a bone marrow biopsy on 6/22/18. Pathology: plasma cell neoplasm, plasma cells comprise 20-25% of marrow cells. Trilineage hematopoiesis present with maturation, increased iron stores. Congo red stain negative for amyloid. Karyotype 46, XX. FISH panel - positive for CCND1/IGH fusion - a/w favorable prognosis.\par \par 7/12/18 PET - negative\par \par Treatment Summary \par 7/19/18 - C1 VRd\par 8/9/18 -C2 VRd\par 8/30/18 - C3 VRd\par 9/20/18 -partial C4 VRd\par 10/11/18 - 1/2/19 KRd (carfilzomib, Revlimid, Dexamethasone) x 4 cycles. \par 2/28/19 - autoPSCT \par 5/18/19 - started Pomalyst\par 7/19/19 - decadron 20 mg weekly added to Pomalyst.\par 2/03/20 - Daratumumab week 1-8 2/03 - 3/31/20. Week 9 (began every other week) 4/07/20. [de-identified] : Patient returns for follow-up visit. \par Recent episode of bronchitis, was on Singulair and Tessalon perls. Also took Doxycycline and Prednisone, and Amoxicillin. \par Feels well \par Denies any pain.\par

## 2023-06-07 NOTE — ASSESSMENT
[FreeTextEntry1] : 72 year old female IgG kappa multiple myeloma,  FISH panel - positive for CCND1/IGH fusion - a/w favorable prognosis. PET CT (7/12/18) did not show any bone lesions. S/p VRd, followed by 4 cycles of KRd, followed by autoPSCT on 2/28/19. \par On 5/18/19 started on Pomalyst for persistently elevated KFLC at 50, FLC ratio 136.\par On Pomalyst + weekly Dex, she has had a partial response, her KFLC has improved to some degree and bone marrow in 12/2019 shows persistent plasma cells of 15-20%. Daratumumab was added from 2/3/2020 onwards.\par PET CT 5/15/20 with no FDG avid lesions, and slight enlargement of right adnexal cyst.\par Q4 week daratumumab started 7/30/20\par 3/24/2022 S/p R Shoulder Replacement for AVN\par 2/17/22 FLC ratio 4.10\par 4/14/22 FLC ratio 8.91\par 4/14/2022 Restarted Daratumumab q 4 weeks after shoulder surgery\par 6/9/22 FLC ratio 4.11\par 10/27/22 FCL ratio 4.37\par \par \par 3/17/23 FCL ration 3.94\par Continues on monthly Darzalex. S/p Darzalex/Dex on 3/17/23 \par Reviewed CBC w dif from today  WBC 5.7 K HGB 11.4 g  HCT 37.0 %  K Neutrophil # 2.1 K\par \par Plan:\par - Continue Daratumumab q 4 weeks, FLC ratio ranges 2-4 and is overall stable. \par - Continue Pomalyst 3mg 3 weeks on followed by 1 week off\par - Anemia - stable likely secondary to pomalyst/darzalex, Hgb is 11.2 g/dl\par -continue ASA\par -RTO 2 months with NP\par

## 2023-06-09 ENCOUNTER — RESULT REVIEW (OUTPATIENT)
Age: 73
End: 2023-06-09

## 2023-06-09 ENCOUNTER — APPOINTMENT (OUTPATIENT)
Age: 73
End: 2023-06-09

## 2023-06-10 LAB
ALBUMIN SERPL ELPH-MCNC: 4.1 G/DL — SIGNIFICANT CHANGE UP (ref 3.3–5)
ALP SERPL-CCNC: 74 U/L — SIGNIFICANT CHANGE UP (ref 40–120)
ALT FLD-CCNC: 10 U/L — SIGNIFICANT CHANGE UP (ref 10–45)
ANION GAP SERPL CALC-SCNC: 9 MMOL/L — SIGNIFICANT CHANGE UP (ref 5–17)
AST SERPL-CCNC: 29 U/L — SIGNIFICANT CHANGE UP (ref 10–40)
BILIRUB SERPL-MCNC: 0.3 MG/DL — SIGNIFICANT CHANGE UP (ref 0.2–1.2)
BUN SERPL-MCNC: 14 MG/DL — SIGNIFICANT CHANGE UP (ref 7–23)
CALCIUM SERPL-MCNC: 9.8 MG/DL — SIGNIFICANT CHANGE UP (ref 8.4–10.5)
CHLORIDE SERPL-SCNC: 106 MMOL/L — SIGNIFICANT CHANGE UP (ref 96–108)
CO2 SERPL-SCNC: 27 MMOL/L — SIGNIFICANT CHANGE UP (ref 22–31)
CREAT SERPL-MCNC: 0.63 MG/DL — SIGNIFICANT CHANGE UP (ref 0.5–1.3)
EGFR: 94 ML/MIN/1.73M2 — SIGNIFICANT CHANGE UP
GLUCOSE SERPL-MCNC: 66 MG/DL — LOW (ref 70–99)
POTASSIUM SERPL-MCNC: 4.3 MMOL/L — SIGNIFICANT CHANGE UP (ref 3.5–5.3)
POTASSIUM SERPL-SCNC: 4.3 MMOL/L — SIGNIFICANT CHANGE UP (ref 3.5–5.3)
PROT SERPL-MCNC: 5.6 G/DL — LOW (ref 6–8.3)
PROT SERPL-MCNC: 5.6 G/DL — LOW (ref 6–8.3)
PROT SERPL-MCNC: 6 G/DL — SIGNIFICANT CHANGE UP (ref 6–8.3)
SODIUM SERPL-SCNC: 142 MMOL/L — SIGNIFICANT CHANGE UP (ref 135–145)

## 2023-06-12 DIAGNOSIS — Z51.11 ENCOUNTER FOR ANTINEOPLASTIC CHEMOTHERAPY: ICD-10-CM

## 2023-06-12 DIAGNOSIS — R11.2 NAUSEA WITH VOMITING, UNSPECIFIED: ICD-10-CM

## 2023-06-12 LAB
IGA FLD-MCNC: 37 MG/DL — LOW (ref 84–499)
IGG FLD-MCNC: 273 MG/DL — LOW (ref 610–1660)
IGM SERPL-MCNC: 12 MG/DL — LOW (ref 35–242)
KAPPA LC SER QL IFE: 1.33 MG/DL — SIGNIFICANT CHANGE UP (ref 0.33–1.94)
KAPPA/LAMBDA FREE LIGHT CHAIN RATIO, SERUM: 4.03 RATIO — HIGH (ref 0.26–1.65)
LAMBDA LC SER QL IFE: 0.33 MG/DL — LOW (ref 0.57–2.63)

## 2023-06-13 LAB
% ALBUMIN: 66.6 % — SIGNIFICANT CHANGE UP
% ALPHA 1: 5.2 % — SIGNIFICANT CHANGE UP
% ALPHA 2: 12.3 % — SIGNIFICANT CHANGE UP
% BETA: 10.6 % — SIGNIFICANT CHANGE UP
% GAMMA: 5.3 % — SIGNIFICANT CHANGE UP
% M SPIKE: SIGNIFICANT CHANGE UP
ALBUMIN SERPL ELPH-MCNC: 3.7 G/DL — SIGNIFICANT CHANGE UP (ref 3.6–5.5)
ALBUMIN/GLOB SERPL ELPH: 1.9 RATIO — SIGNIFICANT CHANGE UP
ALPHA1 GLOB SERPL ELPH-MCNC: 0.3 G/DL — SIGNIFICANT CHANGE UP (ref 0.1–0.4)
ALPHA2 GLOB SERPL ELPH-MCNC: 0.7 G/DL — SIGNIFICANT CHANGE UP (ref 0.5–1)
B-GLOBULIN SERPL ELPH-MCNC: 0.6 G/DL — SIGNIFICANT CHANGE UP (ref 0.5–1)
GAMMA GLOBULIN: 0.3 G/DL — LOW (ref 0.6–1.6)
INTERPRETATION SERPL IFE-IMP: SIGNIFICANT CHANGE UP
M-SPIKE: SIGNIFICANT CHANGE UP (ref 0–0)
PROT PATTERN SERPL ELPH-IMP: SIGNIFICANT CHANGE UP

## 2023-06-27 NOTE — ASSESSMENT
[FreeTextEntry1] : 72 year old female IgG kappa multiple myeloma,  FISH panel - positive for CCND1/IGH fusion - a/w favorable prognosis. PET CT (7/12/18) did not show any bone lesions. S/p VRd, followed by 4 cycles of KRd, followed by autoPSCT on 2/28/19. \par On 5/18/19 started on Pomalyst for persistently elevated KFLC at 50, FLC ratio 136.\par On Pomalyst + weekly Dex, she has had a partial response, her KFLC has improved to some degree and bone marrow in 12/2019 shows persistent plasma cells of 15-20%. Daratumumab was added from 2/3/2020 onwards.\par PET CT 5/15/20 with no FDG avid lesions, and slight enlargement of right adnexal cyst.\par Q4 week daratumumab started 7/30/20\par 3/24/2022 S/p R Shoulder Replacement for AVN\par 2/17/22 FLC ratio 4.10\par 4/14/22 FLC ratio 8.91\par 4/14/2022 Restarted Daratumumab q 4 weeks after shoulder surgery\par 6/9/22 FLC ratio 4.11\par 10/27/22 FCL ratio 4.37\par \par Last darzalex on 10/27/22\par Now with cough/SOB x 1 week\par \par Plan:\par -CXR today, and will come to office for Covid19 PCR swab\par - Continue Daratumumab q 4 weeks, not due until 11/27/22. FLC ratio is stable. \par - Continue Pomalyst 3mg 3 weeks on followed by 1 week off. Hold pomalyst during this URI. \par - Anemia - stable likely secondary to pomalyst/darzalex, Hgb is 11.7 g/dl, improved\par -RTO 2 months\par 
48

## 2023-06-29 ENCOUNTER — APPOINTMENT (OUTPATIENT)
Dept: FAMILY MEDICINE | Facility: CLINIC | Age: 73
End: 2023-06-29
Payer: MEDICARE

## 2023-06-29 VITALS
HEART RATE: 67 BPM | OXYGEN SATURATION: 98 % | HEIGHT: 63 IN | DIASTOLIC BLOOD PRESSURE: 84 MMHG | TEMPERATURE: 97.3 F | SYSTOLIC BLOOD PRESSURE: 126 MMHG | BODY MASS INDEX: 28.35 KG/M2 | WEIGHT: 160 LBS

## 2023-06-29 DIAGNOSIS — Z86.79 PERSONAL HISTORY OF OTHER DISEASES OF THE CIRCULATORY SYSTEM: ICD-10-CM

## 2023-06-29 PROCEDURE — 99213 OFFICE O/P EST LOW 20 MIN: CPT

## 2023-06-29 RX ORDER — MONTELUKAST 10 MG/1
10 TABLET, FILM COATED ORAL
Qty: 30 | Refills: 0 | Status: DISCONTINUED | COMMUNITY
Start: 2023-05-30 | End: 2023-06-29

## 2023-06-29 RX ORDER — BENZONATATE 100 MG/1
100 CAPSULE ORAL 3 TIMES DAILY
Qty: 30 | Refills: 0 | Status: DISCONTINUED | COMMUNITY
Start: 2023-05-30 | End: 2023-06-29

## 2023-06-29 RX ORDER — TRAMADOL HYDROCHLORIDE 50 MG/1
50 TABLET, COATED ORAL
Qty: 30 | Refills: 0 | Status: DISCONTINUED | COMMUNITY
Start: 2021-12-23 | End: 2023-06-29

## 2023-07-02 PROBLEM — Z86.79 HISTORY OF HYPERTENSION: Status: RESOLVED | Noted: 2021-03-18 | Resolved: 2023-07-02

## 2023-07-02 NOTE — HISTORY OF PRESENT ILLNESS
[FreeTextEntry1] : Patient is following up on HLD. [de-identified] : Ms. LAITH VAUGHN is a 73 year female who presents in office to follow up on HLD. Patient is feeling well. She has no acute complaints at this time. Patient is due for  labs.

## 2023-07-07 ENCOUNTER — RESULT REVIEW (OUTPATIENT)
Age: 73
End: 2023-07-07

## 2023-07-07 ENCOUNTER — APPOINTMENT (OUTPATIENT)
Age: 73
End: 2023-07-07

## 2023-07-07 LAB
ANISOCYTOSIS BLD QL: SLIGHT — SIGNIFICANT CHANGE UP
BASOPHILS # BLD AUTO: 0.1 K/UL — SIGNIFICANT CHANGE UP (ref 0–0.2)
ELLIPTOCYTES BLD QL SMEAR: SLIGHT — SIGNIFICANT CHANGE UP
EOSINOPHIL # BLD AUTO: 0.1 K/UL — SIGNIFICANT CHANGE UP (ref 0–0.5)
EOSINOPHIL NFR BLD AUTO: 5 % — SIGNIFICANT CHANGE UP (ref 0–6)
GIANT PLATELETS BLD QL SMEAR: PRESENT — SIGNIFICANT CHANGE UP
HCT VFR BLD CALC: 33.1 % — LOW (ref 34.5–45)
HGB BLD-MCNC: 11.4 G/DL — LOW (ref 11.5–15.5)
LG PLATELETS BLD QL AUTO: SLIGHT — SIGNIFICANT CHANGE UP
LYMPHOCYTES # BLD AUTO: 3.1 K/UL — SIGNIFICANT CHANGE UP (ref 1–3.3)
LYMPHOCYTES # BLD AUTO: 55 % — HIGH (ref 13–44)
MACROCYTES BLD QL: SLIGHT — SIGNIFICANT CHANGE UP
MCHC RBC-ENTMCNC: 30.4 PG — SIGNIFICANT CHANGE UP (ref 27–34)
MCHC RBC-ENTMCNC: 34.3 G/DL — SIGNIFICANT CHANGE UP (ref 32–36)
MCV RBC AUTO: 88.6 FL — SIGNIFICANT CHANGE UP (ref 80–100)
MICROCYTES BLD QL: SLIGHT — SIGNIFICANT CHANGE UP
MONOCYTES # BLD AUTO: 1.1 K/UL — HIGH (ref 0–0.9)
MONOCYTES NFR BLD AUTO: 15 % — HIGH (ref 2–14)
NEUTROPHILS # BLD AUTO: 1.2 K/UL — LOW (ref 1.8–7.4)
NEUTROPHILS NFR BLD AUTO: 23 % — LOW (ref 43–77)
OVALOCYTES BLD QL SMEAR: SIGNIFICANT CHANGE UP
PLAT MORPH BLD: NORMAL — SIGNIFICANT CHANGE UP
PLATELET # BLD AUTO: 255 K/UL — SIGNIFICANT CHANGE UP (ref 150–400)
POIKILOCYTOSIS BLD QL AUTO: SLIGHT — SIGNIFICANT CHANGE UP
POLYCHROMASIA BLD QL SMEAR: SLIGHT — SIGNIFICANT CHANGE UP
RBC # BLD: 3.74 M/UL — LOW (ref 3.8–5.2)
RBC # FLD: 14.9 % — HIGH (ref 10.3–14.5)
RBC BLD AUTO: SIGNIFICANT CHANGE UP
SMUDGE CELLS # BLD: PRESENT — SIGNIFICANT CHANGE UP
VARIANT LYMPHS # BLD: 2 % — SIGNIFICANT CHANGE UP (ref 0–6)
WBC # BLD: 5.7 K/UL — SIGNIFICANT CHANGE UP (ref 3.8–10.5)
WBC # FLD AUTO: 5.7 K/UL — SIGNIFICANT CHANGE UP (ref 3.8–10.5)

## 2023-07-13 ENCOUNTER — RESULT REVIEW (OUTPATIENT)
Age: 73
End: 2023-07-13

## 2023-07-13 ENCOUNTER — APPOINTMENT (OUTPATIENT)
Age: 73
End: 2023-07-13

## 2023-07-13 LAB
ALBUMIN SERPL ELPH-MCNC: 4.2 G/DL — SIGNIFICANT CHANGE UP (ref 3.3–5)
ALP SERPL-CCNC: 73 U/L — SIGNIFICANT CHANGE UP (ref 40–120)
ALT FLD-CCNC: 12 U/L — SIGNIFICANT CHANGE UP (ref 10–45)
ANION GAP SERPL CALC-SCNC: 12 MMOL/L — SIGNIFICANT CHANGE UP (ref 5–17)
AST SERPL-CCNC: 29 U/L — SIGNIFICANT CHANGE UP (ref 10–40)
BASOPHILS # BLD AUTO: 0.1 K/UL — SIGNIFICANT CHANGE UP (ref 0–0.2)
BASOPHILS NFR BLD AUTO: 2 % — SIGNIFICANT CHANGE UP (ref 0–2)
BILIRUB SERPL-MCNC: 0.4 MG/DL — SIGNIFICANT CHANGE UP (ref 0.2–1.2)
BUN SERPL-MCNC: 9 MG/DL — SIGNIFICANT CHANGE UP (ref 7–23)
CALCIUM SERPL-MCNC: 9.4 MG/DL — SIGNIFICANT CHANGE UP (ref 8.4–10.5)
CHLORIDE SERPL-SCNC: 111 MMOL/L — HIGH (ref 96–108)
CO2 SERPL-SCNC: 23 MMOL/L — SIGNIFICANT CHANGE UP (ref 22–31)
CREAT SERPL-MCNC: 0.62 MG/DL — SIGNIFICANT CHANGE UP (ref 0.5–1.3)
EGFR: 95 ML/MIN/1.73M2 — SIGNIFICANT CHANGE UP
EOSINOPHIL # BLD AUTO: 0.1 K/UL — SIGNIFICANT CHANGE UP (ref 0–0.5)
EOSINOPHIL NFR BLD AUTO: 1.9 % — SIGNIFICANT CHANGE UP (ref 0–6)
GLUCOSE SERPL-MCNC: 68 MG/DL — LOW (ref 70–99)
HCT VFR BLD CALC: 34.3 % — LOW (ref 34.5–45)
HGB BLD-MCNC: 11.3 G/DL — LOW (ref 11.5–15.5)
LYMPHOCYTES # BLD AUTO: 3.4 K/UL — HIGH (ref 1–3.3)
LYMPHOCYTES # BLD AUTO: 52.7 % — HIGH (ref 13–44)
MCHC RBC-ENTMCNC: 30.3 PG — SIGNIFICANT CHANGE UP (ref 27–34)
MCHC RBC-ENTMCNC: 32.9 G/DL — SIGNIFICANT CHANGE UP (ref 32–36)
MCV RBC AUTO: 92 FL — SIGNIFICANT CHANGE UP (ref 80–100)
MONOCYTES # BLD AUTO: 0.3 K/UL — SIGNIFICANT CHANGE UP (ref 0–0.9)
MONOCYTES NFR BLD AUTO: 4.4 % — SIGNIFICANT CHANGE UP (ref 2–14)
NEUTROPHILS # BLD AUTO: 2.5 K/UL — SIGNIFICANT CHANGE UP (ref 1.8–7.4)
NEUTROPHILS NFR BLD AUTO: 39 % — LOW (ref 43–77)
PLATELET # BLD AUTO: 185 K/UL — SIGNIFICANT CHANGE UP (ref 150–400)
POTASSIUM SERPL-MCNC: 3.7 MMOL/L — SIGNIFICANT CHANGE UP (ref 3.5–5.3)
POTASSIUM SERPL-SCNC: 3.7 MMOL/L — SIGNIFICANT CHANGE UP (ref 3.5–5.3)
PROT SERPL-MCNC: 6.3 G/DL — SIGNIFICANT CHANGE UP (ref 6–8.3)
RBC # BLD: 3.73 M/UL — LOW (ref 3.8–5.2)
RBC # FLD: 14.6 % — HIGH (ref 10.3–14.5)
SODIUM SERPL-SCNC: 146 MMOL/L — HIGH (ref 135–145)
WBC # BLD: 6.5 K/UL — SIGNIFICANT CHANGE UP (ref 3.8–10.5)
WBC # FLD AUTO: 6.5 K/UL — SIGNIFICANT CHANGE UP (ref 3.8–10.5)

## 2023-07-14 LAB
% ALBUMIN: 66.7 % — SIGNIFICANT CHANGE UP
% ALPHA 1: 5.2 % — SIGNIFICANT CHANGE UP
% ALPHA 2: 12.4 % — SIGNIFICANT CHANGE UP
% BETA: 10.7 % — SIGNIFICANT CHANGE UP
% GAMMA: 5 % — SIGNIFICANT CHANGE UP
% M SPIKE: SIGNIFICANT CHANGE UP
ALBUMIN SERPL ELPH-MCNC: 4 G/DL — SIGNIFICANT CHANGE UP (ref 3.6–5.5)
ALBUMIN/GLOB SERPL ELPH: 2 RATIO — SIGNIFICANT CHANGE UP
ALPHA1 GLOB SERPL ELPH-MCNC: 0.3 G/DL — SIGNIFICANT CHANGE UP (ref 0.1–0.4)
ALPHA2 GLOB SERPL ELPH-MCNC: 0.7 G/DL — SIGNIFICANT CHANGE UP (ref 0.5–1)
B-GLOBULIN SERPL ELPH-MCNC: 0.6 G/DL — SIGNIFICANT CHANGE UP (ref 0.5–1)
GAMMA GLOBULIN: 0.3 G/DL — LOW (ref 0.6–1.6)
IGA FLD-MCNC: 37 MG/DL — LOW (ref 84–499)
IGG FLD-MCNC: 282 MG/DL — LOW (ref 610–1660)
IGM SERPL-MCNC: 10 MG/DL — LOW (ref 35–242)
INTERPRETATION SERPL IFE-IMP: SIGNIFICANT CHANGE UP
KAPPA LC SER QL IFE: 1.22 MG/DL — SIGNIFICANT CHANGE UP (ref 0.33–1.94)
KAPPA/LAMBDA FREE LIGHT CHAIN RATIO, SERUM: 3.3 RATIO — HIGH (ref 0.26–1.65)
LAMBDA LC SER QL IFE: 0.37 MG/DL — LOW (ref 0.57–2.63)
M-SPIKE: SIGNIFICANT CHANGE UP (ref 0–0)
PROT PATTERN SERPL ELPH-IMP: SIGNIFICANT CHANGE UP
PROT SERPL-MCNC: 6 G/DL — SIGNIFICANT CHANGE UP (ref 6–8.3)
PROT SERPL-MCNC: 6 G/DL — SIGNIFICANT CHANGE UP (ref 6–8.3)

## 2023-07-17 ENCOUNTER — RX RENEWAL (OUTPATIENT)
Age: 73
End: 2023-07-17

## 2023-07-17 LAB
CHOLEST SERPL-MCNC: 242 MG/DL
HDLC SERPL-MCNC: 88 MG/DL
LDLC SERPL CALC-MCNC: 143 MG/DL
NONHDLC SERPL-MCNC: 155 MG/DL
TRIGL SERPL-MCNC: 59 MG/DL

## 2023-07-19 ENCOUNTER — LABORATORY RESULT (OUTPATIENT)
Age: 73
End: 2023-07-19

## 2023-07-20 ENCOUNTER — NON-APPOINTMENT (OUTPATIENT)
Age: 73
End: 2023-07-20

## 2023-07-20 ENCOUNTER — APPOINTMENT (OUTPATIENT)
Dept: OBGYN | Facility: CLINIC | Age: 73
End: 2023-07-20
Payer: MEDICARE

## 2023-07-20 VITALS
SYSTOLIC BLOOD PRESSURE: 126 MMHG | BODY MASS INDEX: 28.88 KG/M2 | WEIGHT: 163 LBS | DIASTOLIC BLOOD PRESSURE: 68 MMHG | HEIGHT: 63 IN

## 2023-07-20 DIAGNOSIS — Z23 ENCOUNTER FOR IMMUNIZATION: ICD-10-CM

## 2023-07-20 DIAGNOSIS — Z12.11 ENCOUNTER FOR SCREENING FOR MALIGNANT NEOPLASM OF COLON: ICD-10-CM

## 2023-07-20 DIAGNOSIS — Z51.11 ENCOUNTER FOR ANTINEOPLASTIC CHEMOTHERAPY: ICD-10-CM

## 2023-07-20 DIAGNOSIS — Z09 ENCOUNTER FOR FOLLOW-UP EXAMINATION AFTER COMPLETED TREATMENT FOR CONDITIONS OTHER THAN MALIGNANT NEOPLASM: ICD-10-CM

## 2023-07-20 DIAGNOSIS — N89.8 OTHER SPECIFIED NONINFLAMMATORY DISORDERS OF VAGINA: ICD-10-CM

## 2023-07-20 DIAGNOSIS — N83.209 UNSPECIFIED OVARIAN CYST, UNSPECIFIED SIDE: ICD-10-CM

## 2023-07-20 DIAGNOSIS — N95.2 POSTMENOPAUSAL ATROPHIC VAGINITIS: ICD-10-CM

## 2023-07-20 DIAGNOSIS — M25.562 PAIN IN LEFT KNEE: ICD-10-CM

## 2023-07-20 DIAGNOSIS — N76.2 ACUTE VULVITIS: ICD-10-CM

## 2023-07-20 DIAGNOSIS — Z12.31 ENCOUNTER FOR SCREENING MAMMOGRAM FOR MALIGNANT NEOPLASM OF BREAST: ICD-10-CM

## 2023-07-20 DIAGNOSIS — R25.2 CRAMP AND SPASM: ICD-10-CM

## 2023-07-20 DIAGNOSIS — Z01.411 ENCOUNTER FOR GYNECOLOGICAL EXAMINATION (GENERAL) (ROUTINE) WITH ABNORMAL FINDINGS: ICD-10-CM

## 2023-07-20 DIAGNOSIS — Z01.419 ENCOUNTER FOR GYNECOLOGICAL EXAMINATION (GENERAL) (ROUTINE) W/OUT ABNORMAL FINDINGS: ICD-10-CM

## 2023-07-20 DIAGNOSIS — N94.9 UNSPECIFIED CONDITION ASSOCIATED WITH FEMALE GENITAL ORGANS AND MENSTRUAL CYCLE: ICD-10-CM

## 2023-07-20 PROCEDURE — 99214 OFFICE O/P EST MOD 30 MIN: CPT | Mod: 25

## 2023-07-20 PROCEDURE — G0101: CPT

## 2023-07-20 RX ORDER — DARATUMUMAB 100 MG/5ML
INJECTION, SOLUTION, CONCENTRATE INTRAVENOUS
Refills: 0 | Status: ACTIVE | COMMUNITY

## 2023-07-20 RX ORDER — DEXAMETHASONE 4 MG/1
4 TABLET ORAL
Refills: 0 | Status: ACTIVE | COMMUNITY

## 2023-07-20 RX ORDER — CLOTRIMAZOLE AND BETAMETHASONE DIPROPIONATE 10; .5 MG/G; MG/G
1-0.05 CREAM TOPICAL TWICE DAILY
Qty: 1 | Refills: 3 | Status: ACTIVE | COMMUNITY
Start: 2023-07-20 | End: 1900-01-01

## 2023-07-20 RX ORDER — BACILLUS COAGULANS/INULIN 1B-250 MG
CAPSULE ORAL
Refills: 0 | Status: ACTIVE | COMMUNITY

## 2023-07-21 ENCOUNTER — RX RENEWAL (OUTPATIENT)
Age: 73
End: 2023-07-21

## 2023-07-23 PROBLEM — Z01.411 ENCOUNTER FOR WELL WOMAN EXAM WITH ABNORMAL FINDINGS: Status: ACTIVE | Noted: 2023-07-23

## 2023-07-23 PROBLEM — N89.8 VAGINAL DRYNESS: Status: ACTIVE | Noted: 2022-07-12

## 2023-07-23 NOTE — DISCUSSION/SUMMARY
[FreeTextEntry1] : Pap done today.\par \par Prescription for Clotrimazole-Betamethasone 1-0.05% external cream given.\par \par Prescription for mammogram screening given.\par \par Prescription for transvaginal US given secondary to hx of ovary cyst.\par \par Self-breast exam reviewed.\par \par She will follow up annually and as needed.

## 2023-07-23 NOTE — HISTORY OF PRESENT ILLNESS
[LMP unknown] : LMP unknown [postmenopausal] : postmenopausal [Y] : Positive pregnancy history [unknown] : Patient is unsure of the date of her LMP [Menarche Age: ____] : age at menarche was [unfilled] [No] : Patient does not have concerns regarding sex [Patient reported colonoscopy was normal] : Patient reported colonoscopy was normal [N] : Patient is not sexually active [Previously active] : previously active [FreeTextEntry1] : LAITH 74 yo  LMP unknown is here today for her annual exam. No gynecological complaints.\par \par Last pap smear 22 normal, HPV negative.\par \par Last mammogram 22 revealed BR1. [Mammogramdate] : 07/12/2022 [TextBox_19] : BR1 [PapSmeardate] : 07/12/2022 [BoneDensityDate] : 07/29/2022 [TextBox_31] : Negative (-) [TextBox_37] : Osteopenia [ColonoscopyDate] : 2019 [GonorrheaDate] : 07/12/2022 [TextBox_63] : Negative (-) [TextBox_68] : Negative (-) [ChlamydiaDate] : 07/12/2022 [PGHxTotal] : 3 [BannerxFullTerm] : 2 [[facility]xLiving] : 2 [PGHxABInduced] : 1

## 2023-07-23 NOTE — PHYSICAL EXAM
[Appropriately responsive] : appropriately responsive [Alert] : alert [No Acute Distress] : no acute distress [Soft] : soft [Non-tender] : non-tender [Non-distended] : non-distended [Oriented x3] : oriented x3 [Examination Of The Breasts] : a normal appearance [No Discharge] : no discharge [No Masses] : no breast masses were palpable [Labia Majora] : normal [Labia Minora] : normal [No Bleeding] : There was no active vaginal bleeding [Normal] : normal [Uterine Adnexae] : normal [Labia Majora Erythema] : erythema [Labia Minora Erythema] : erythema [Atrophy] : atrophy [Dry Mucosa] : dry mucosa [FreeTextEntry1] : Erythematous vulva noted.

## 2023-07-23 NOTE — END OF VISIT
[FreeTextEntry3] : I, Iván Wilkes, solely acted as scribe for Dr. Ebony More on 07/20/2023. All medical entries made by the Scribe were at my, Dr. More's, direction and personally dictated by me on 07/20/2023. I have reviewed the chart and agree that the record accurately reflects my personal performance of the history, physical exam, assessment and plan. I have also personally directed, reviewed, and agreed with the chart.

## 2023-07-24 ENCOUNTER — OUTPATIENT (OUTPATIENT)
Dept: OUTPATIENT SERVICES | Facility: HOSPITAL | Age: 73
LOS: 1 days | End: 2023-07-24
Payer: COMMERCIAL

## 2023-07-24 ENCOUNTER — APPOINTMENT (OUTPATIENT)
Dept: ULTRASOUND IMAGING | Facility: CLINIC | Age: 73
End: 2023-07-24
Payer: MEDICARE

## 2023-07-24 ENCOUNTER — APPOINTMENT (OUTPATIENT)
Dept: MAMMOGRAPHY | Facility: CLINIC | Age: 73
End: 2023-07-24
Payer: MEDICARE

## 2023-07-24 ENCOUNTER — RESULT REVIEW (OUTPATIENT)
Age: 73
End: 2023-07-24

## 2023-07-24 DIAGNOSIS — Z94.84 STEM CELLS TRANSPLANT STATUS: Chronic | ICD-10-CM

## 2023-07-24 DIAGNOSIS — Z98.51 TUBAL LIGATION STATUS: Chronic | ICD-10-CM

## 2023-07-24 DIAGNOSIS — N83.209 UNSPECIFIED OVARIAN CYST, UNSPECIFIED SIDE: ICD-10-CM

## 2023-07-24 DIAGNOSIS — Z98.890 OTHER SPECIFIED POSTPROCEDURAL STATES: Chronic | ICD-10-CM

## 2023-07-24 PROCEDURE — 77067 SCR MAMMO BI INCL CAD: CPT | Mod: 26

## 2023-07-24 PROCEDURE — 76830 TRANSVAGINAL US NON-OB: CPT | Mod: 26

## 2023-07-24 PROCEDURE — 77063 BREAST TOMOSYNTHESIS BI: CPT

## 2023-07-24 PROCEDURE — 76830 TRANSVAGINAL US NON-OB: CPT

## 2023-07-24 PROCEDURE — 77067 SCR MAMMO BI INCL CAD: CPT

## 2023-07-24 PROCEDURE — 77063 BREAST TOMOSYNTHESIS BI: CPT | Mod: 26

## 2023-07-27 ENCOUNTER — APPOINTMENT (OUTPATIENT)
Dept: ENDOCRINOLOGY | Facility: CLINIC | Age: 73
End: 2023-07-27
Payer: MEDICARE

## 2023-07-27 VITALS
BODY MASS INDEX: 28.7 KG/M2 | OXYGEN SATURATION: 98 % | WEIGHT: 162 LBS | HEART RATE: 65 BPM | HEIGHT: 63 IN | DIASTOLIC BLOOD PRESSURE: 76 MMHG | SYSTOLIC BLOOD PRESSURE: 124 MMHG

## 2023-07-27 PROCEDURE — 99214 OFFICE O/P EST MOD 30 MIN: CPT

## 2023-07-27 RX ORDER — ALBUTEROL SULFATE 90 UG/1
108 (90 BASE) AEROSOL, METERED RESPIRATORY (INHALATION)
Qty: 1 | Refills: 0 | Status: DISCONTINUED | COMMUNITY
Start: 2023-05-15 | End: 2023-07-27

## 2023-07-27 NOTE — ASSESSMENT
[FreeTextEntry1] : Abnormal TFts in context of chemoT for multiple myeloma \par TSh slightly suppressed \par thyroid uptake and scan done show low uptake 7 % \par TPO ab positive, TSi and Trab  negative. \par consider start MMi 2.5 mg qd \par \par Hyperlipidemia: continue crestor . lipids at target \par \par obesity: discussed diet and exercise\par \par MNG : US shows stable nodules. Repeat US in 1 yr \par \par Overweight: discussed diet and exercise\par \par osteopenia: takes steroids only with chemoT \par cont calcium and vit D supplements. \par FRAX score 9/1% . No need for BIP \par check DXA now due to sterids use \par \par \par \par \par \par \par

## 2023-07-28 ENCOUNTER — NON-APPOINTMENT (OUTPATIENT)
Age: 73
End: 2023-07-28

## 2023-08-01 ENCOUNTER — NON-APPOINTMENT (OUTPATIENT)
Age: 73
End: 2023-08-01

## 2023-08-03 ENCOUNTER — OUTPATIENT (OUTPATIENT)
Dept: OUTPATIENT SERVICES | Facility: HOSPITAL | Age: 73
LOS: 1 days | Discharge: HOME | End: 2023-08-03

## 2023-08-03 DIAGNOSIS — Z98.51 TUBAL LIGATION STATUS: Chronic | ICD-10-CM

## 2023-08-03 DIAGNOSIS — Z98.890 OTHER SPECIFIED POSTPROCEDURAL STATES: Chronic | ICD-10-CM

## 2023-08-03 DIAGNOSIS — C90.00 MULTIPLE MYELOMA NOT HAVING ACHIEVED REMISSION: ICD-10-CM

## 2023-08-10 ENCOUNTER — RESULT REVIEW (OUTPATIENT)
Age: 73
End: 2023-08-10

## 2023-08-10 ENCOUNTER — APPOINTMENT (OUTPATIENT)
Age: 73
End: 2023-08-10

## 2023-08-10 LAB
ALBUMIN SERPL ELPH-MCNC: 4.1 G/DL — SIGNIFICANT CHANGE UP (ref 3.3–5)
ALP SERPL-CCNC: 66 U/L — SIGNIFICANT CHANGE UP (ref 40–120)
ALT FLD-CCNC: 12 U/L — SIGNIFICANT CHANGE UP (ref 10–45)
ANION GAP SERPL CALC-SCNC: 9 MMOL/L — SIGNIFICANT CHANGE UP (ref 5–17)
AST SERPL-CCNC: 22 U/L — SIGNIFICANT CHANGE UP (ref 10–40)
BASOPHILS # BLD AUTO: 0.1 K/UL — SIGNIFICANT CHANGE UP (ref 0–0.2)
BASOPHILS NFR BLD AUTO: 1.7 % — SIGNIFICANT CHANGE UP (ref 0–2)
BILIRUB SERPL-MCNC: 0.4 MG/DL — SIGNIFICANT CHANGE UP (ref 0.2–1.2)
BUN SERPL-MCNC: 16 MG/DL — SIGNIFICANT CHANGE UP (ref 7–23)
CALCIUM SERPL-MCNC: 9.5 MG/DL — SIGNIFICANT CHANGE UP (ref 8.4–10.5)
CHLORIDE SERPL-SCNC: 110 MMOL/L — HIGH (ref 96–108)
CO2 SERPL-SCNC: 24 MMOL/L — SIGNIFICANT CHANGE UP (ref 22–31)
CREAT SERPL-MCNC: 0.63 MG/DL — SIGNIFICANT CHANGE UP (ref 0.5–1.3)
EGFR: 94 ML/MIN/1.73M2 — SIGNIFICANT CHANGE UP
EOSINOPHIL # BLD AUTO: 0.1 K/UL — SIGNIFICANT CHANGE UP (ref 0–0.5)
EOSINOPHIL NFR BLD AUTO: 1.8 % — SIGNIFICANT CHANGE UP (ref 0–6)
GLUCOSE SERPL-MCNC: 81 MG/DL — SIGNIFICANT CHANGE UP (ref 70–99)
HCT VFR BLD CALC: 37.4 % — SIGNIFICANT CHANGE UP (ref 34.5–45)
HGB BLD-MCNC: 12.2 G/DL — SIGNIFICANT CHANGE UP (ref 11.5–15.5)
IGA FLD-MCNC: 35 MG/DL — LOW (ref 84–499)
IGG FLD-MCNC: 272 MG/DL — LOW (ref 610–1660)
IGM SERPL-MCNC: <10 MG/DL — LOW (ref 35–242)
KAPPA LC SER QL IFE: 1.31 MG/DL — SIGNIFICANT CHANGE UP (ref 0.33–1.94)
KAPPA/LAMBDA FREE LIGHT CHAIN RATIO, SERUM: 3.74 RATIO — HIGH (ref 0.26–1.65)
LAMBDA LC SER QL IFE: 0.35 MG/DL — LOW (ref 0.57–2.63)
LYMPHOCYTES # BLD AUTO: 3.4 K/UL — HIGH (ref 1–3.3)
LYMPHOCYTES # BLD AUTO: 49.6 % — HIGH (ref 13–44)
MCHC RBC-ENTMCNC: 30.3 PG — SIGNIFICANT CHANGE UP (ref 27–34)
MCHC RBC-ENTMCNC: 32.7 G/DL — SIGNIFICANT CHANGE UP (ref 32–36)
MCV RBC AUTO: 92.7 FL — SIGNIFICANT CHANGE UP (ref 80–100)
MONOCYTES # BLD AUTO: 0.3 K/UL — SIGNIFICANT CHANGE UP (ref 0–0.9)
MONOCYTES NFR BLD AUTO: 4.3 % — SIGNIFICANT CHANGE UP (ref 2–14)
NEUTROPHILS # BLD AUTO: 2.9 K/UL — SIGNIFICANT CHANGE UP (ref 1.8–7.4)
NEUTROPHILS NFR BLD AUTO: 42.6 % — LOW (ref 43–77)
PLATELET # BLD AUTO: 186 K/UL — SIGNIFICANT CHANGE UP (ref 150–400)
POTASSIUM SERPL-MCNC: 4.2 MMOL/L — SIGNIFICANT CHANGE UP (ref 3.5–5.3)
POTASSIUM SERPL-SCNC: 4.2 MMOL/L — SIGNIFICANT CHANGE UP (ref 3.5–5.3)
PROT SERPL-MCNC: 6 G/DL — SIGNIFICANT CHANGE UP (ref 6–8.3)
RBC # BLD: 4.04 M/UL — SIGNIFICANT CHANGE UP (ref 3.8–5.2)
RBC # FLD: 13.9 % — SIGNIFICANT CHANGE UP (ref 10.3–14.5)
SODIUM SERPL-SCNC: 142 MMOL/L — SIGNIFICANT CHANGE UP (ref 135–145)
WBC # BLD: 6.8 K/UL — SIGNIFICANT CHANGE UP (ref 3.8–10.5)
WBC # FLD AUTO: 6.8 K/UL — SIGNIFICANT CHANGE UP (ref 3.8–10.5)

## 2023-08-11 DIAGNOSIS — Z51.11 ENCOUNTER FOR ANTINEOPLASTIC CHEMOTHERAPY: ICD-10-CM

## 2023-08-11 DIAGNOSIS — R11.2 NAUSEA WITH VOMITING, UNSPECIFIED: ICD-10-CM

## 2023-08-15 ENCOUNTER — APPOINTMENT (OUTPATIENT)
Dept: ULTRASOUND IMAGING | Facility: CLINIC | Age: 73
End: 2023-08-15
Payer: MEDICARE

## 2023-08-15 ENCOUNTER — OUTPATIENT (OUTPATIENT)
Dept: OUTPATIENT SERVICES | Facility: HOSPITAL | Age: 73
LOS: 1 days | End: 2023-08-15
Payer: COMMERCIAL

## 2023-08-15 ENCOUNTER — APPOINTMENT (OUTPATIENT)
Dept: RADIOLOGY | Facility: CLINIC | Age: 73
End: 2023-08-15
Payer: MEDICARE

## 2023-08-15 DIAGNOSIS — Z98.51 TUBAL LIGATION STATUS: Chronic | ICD-10-CM

## 2023-08-15 DIAGNOSIS — Z94.84 STEM CELLS TRANSPLANT STATUS: Chronic | ICD-10-CM

## 2023-08-15 DIAGNOSIS — Z98.890 OTHER SPECIFIED POSTPROCEDURAL STATES: Chronic | ICD-10-CM

## 2023-08-15 DIAGNOSIS — M81.8 OTHER OSTEOPOROSIS WITHOUT CURRENT PATHOLOGICAL FRACTURE: ICD-10-CM

## 2023-08-15 LAB
% ALBUMIN: 66.5 % — SIGNIFICANT CHANGE UP
% ALPHA 1: 5.3 % — SIGNIFICANT CHANGE UP
% ALPHA 2: 12.6 % — SIGNIFICANT CHANGE UP
% BETA: 10.7 % — SIGNIFICANT CHANGE UP
% GAMMA: 4.9 % — SIGNIFICANT CHANGE UP
% M SPIKE: SIGNIFICANT CHANGE UP
ALBUMIN SERPL ELPH-MCNC: 4 G/DL — SIGNIFICANT CHANGE UP (ref 3.6–5.5)
ALBUMIN/GLOB SERPL ELPH: 2 RATIO — SIGNIFICANT CHANGE UP
ALPHA1 GLOB SERPL ELPH-MCNC: 0.3 G/DL — SIGNIFICANT CHANGE UP (ref 0.1–0.4)
ALPHA2 GLOB SERPL ELPH-MCNC: 0.8 G/DL — SIGNIFICANT CHANGE UP (ref 0.5–1)
B-GLOBULIN SERPL ELPH-MCNC: 0.6 G/DL — SIGNIFICANT CHANGE UP (ref 0.5–1)
GAMMA GLOBULIN: 0.3 G/DL — LOW (ref 0.6–1.6)
INTERPRETATION SERPL IFE-IMP: SIGNIFICANT CHANGE UP
M-SPIKE: SIGNIFICANT CHANGE UP (ref 0–0)
PROT PATTERN SERPL ELPH-IMP: SIGNIFICANT CHANGE UP

## 2023-08-15 PROCEDURE — 77085 DXA BONE DENSITY AXL VRT FX: CPT

## 2023-08-15 PROCEDURE — 77085 DXA BONE DENSITY AXL VRT FX: CPT | Mod: 26

## 2023-08-15 PROCEDURE — 76536 US EXAM OF HEAD AND NECK: CPT

## 2023-08-15 PROCEDURE — 76536 US EXAM OF HEAD AND NECK: CPT | Mod: 26

## 2023-08-22 ENCOUNTER — RESULT REVIEW (OUTPATIENT)
Age: 73
End: 2023-08-22

## 2023-08-22 ENCOUNTER — LABORATORY RESULT (OUTPATIENT)
Age: 73
End: 2023-08-22

## 2023-08-22 ENCOUNTER — APPOINTMENT (OUTPATIENT)
Dept: HEMATOLOGY ONCOLOGY | Facility: CLINIC | Age: 73
End: 2023-08-22
Payer: MEDICARE

## 2023-08-22 VITALS
DIASTOLIC BLOOD PRESSURE: 80 MMHG | BODY MASS INDEX: 28.96 KG/M2 | TEMPERATURE: 97.4 F | OXYGEN SATURATION: 98 % | WEIGHT: 163.47 LBS | SYSTOLIC BLOOD PRESSURE: 149 MMHG | HEART RATE: 62 BPM | HEIGHT: 63 IN

## 2023-08-22 LAB
ALBUMIN SERPL ELPH-MCNC: 4.3 G/DL
ALP BLD-CCNC: 72 U/L
ALT SERPL-CCNC: 11 U/L
ANION GAP SERPL CALC-SCNC: 14 MMOL/L
AST SERPL-CCNC: 15 U/L
BASOPHILS # BLD AUTO: 0.2 K/UL — SIGNIFICANT CHANGE UP (ref 0–0.2)
BASOPHILS NFR BLD AUTO: 2.9 % — HIGH (ref 0–2)
BILIRUB SERPL-MCNC: 0.4 MG/DL
BUN SERPL-MCNC: 15 MG/DL
CALCIUM SERPL-MCNC: 9.3 MG/DL
CHLORIDE SERPL-SCNC: 107 MMOL/L
CO2 SERPL-SCNC: 24 MMOL/L
CREAT SERPL-MCNC: 0.65 MG/DL
EGFR: 93 ML/MIN/1.73M2
EOSINOPHIL # BLD AUTO: 0.3 K/UL — SIGNIFICANT CHANGE UP (ref 0–0.5)
EOSINOPHIL NFR BLD AUTO: 6 % — SIGNIFICANT CHANGE UP (ref 0–6)
GLUCOSE SERPL-MCNC: 86 MG/DL
HCT VFR BLD CALC: 36 % — SIGNIFICANT CHANGE UP (ref 34.5–45)
HGB BLD-MCNC: 11.6 G/DL — SIGNIFICANT CHANGE UP (ref 11.5–15.5)
LDH SERPL-CCNC: 164 U/L
LYMPHOCYTES # BLD AUTO: 3 K/UL — SIGNIFICANT CHANGE UP (ref 1–3.3)
LYMPHOCYTES # BLD AUTO: 58.2 % — HIGH (ref 13–44)
MCHC RBC-ENTMCNC: 29.6 PG — SIGNIFICANT CHANGE UP (ref 27–34)
MCHC RBC-ENTMCNC: 32.1 G/DL — SIGNIFICANT CHANGE UP (ref 32–36)
MCV RBC AUTO: 92.3 FL — SIGNIFICANT CHANGE UP (ref 80–100)
MONOCYTES # BLD AUTO: 0.7 K/UL — SIGNIFICANT CHANGE UP (ref 0–0.9)
MONOCYTES NFR BLD AUTO: 13.5 % — SIGNIFICANT CHANGE UP (ref 2–14)
NEUTROPHILS # BLD AUTO: 1 K/UL — LOW (ref 1.8–7.4)
NEUTROPHILS NFR BLD AUTO: 19.4 % — LOW (ref 43–77)
PLATELET # BLD AUTO: 153 K/UL — SIGNIFICANT CHANGE UP (ref 150–400)
POTASSIUM SERPL-SCNC: 4.5 MMOL/L
PROT SERPL-MCNC: 5.9 G/DL
RBC # BLD: 3.9 M/UL — SIGNIFICANT CHANGE UP (ref 3.8–5.2)
RBC # FLD: 14 % — SIGNIFICANT CHANGE UP (ref 10.3–14.5)
SODIUM SERPL-SCNC: 145 MMOL/L
WBC # BLD: 5.2 K/UL — SIGNIFICANT CHANGE UP (ref 3.8–10.5)
WBC # FLD AUTO: 5.2 K/UL — SIGNIFICANT CHANGE UP (ref 3.8–10.5)

## 2023-08-22 PROCEDURE — 99214 OFFICE O/P EST MOD 30 MIN: CPT

## 2023-08-23 NOTE — ASSESSMENT
[FreeTextEntry1] : 72 year old female IgG kappa multiple myeloma,  FISH panel - positive for CCND1/IGH fusion - a/w favorable prognosis. PET CT (7/12/18) did not show any bone lesions. S/p VRd, followed by 4 cycles of KRd, followed by autoPSCT on 2/28/19.  On 5/18/19 started on Pomalyst for persistently elevated KFLC at 50, FLC ratio 136. On Pomalyst + weekly Dex, she has had a partial response, her KFLC has improved to some degree and bone marrow in 12/2019 shows persistent plasma cells of 15-20%. Daratumumab was added from 2/3/2020 onwards. PET CT 5/15/20 with no FDG avid lesions, and slight enlargement of right adnexal cyst. Q4 week daratumumab started 7/30/20 3/24/2022 S/p R Shoulder Replacement for AVN 2/17/22 FLC ratio 4.10 4/14/22 FLC ratio 8.91 4/14/2022 Restarted Daratumumab q 4 weeks after shoulder surgery 6/9/22 FLC ratio 4.11 10/27/22 FLC ratio 4.37 11/7/22 FLC ratio 2.66 12/23/22 FLC ratio 2.75 1/20/23 FLC ration 4.06 2/17/23 FLC ration 4.53	 3/17/23 FLC ratio 3.94 4/14/23 FLC ratio 4.42 5/12/23 FLC ratio 3.94 6/9/23 FLC ratio 4.03	 	 7/13/23 FLC ratio 3.30 8/10/23 FLC ratio 3.74  Continues on monthly Darzalex. S/p Darzalex/Dex on 8/10/23  Reviewed CBC w dif from today WBC 5.2 K HGB	11.6 g HCT 36.0 %  K NEUT#	1.0  Plan: - Continue Daratumumab q 4 weeks, FLC ratio ranges 2-4 and is overall stable.  - Continue Pomalyst 3mg 3 weeks on followed by 1 week off -Left shoulder pain- CT Left shoulder ordered, advised to obtain asap, advised to f/u with ortho Dr Giron for further eval -Neutropenia- advised neutropenic precaution. Started week off of Pomalyst today  - Anemia - stable likely secondary to pomalyst/darzalex, Hgb is 11.6 g/dl -Hand and feet cramps- advised trial of Tonic water at bedtime. Magnesium level ordered.  -Continue ASA -Advised to call with concerns/symptoms -RTO 2 months with Dr De Oliveira

## 2023-08-23 NOTE — PHYSICAL EXAM
[Restricted in physically strenuous activity but ambulatory and able to carry out work of a light or sedentary nature] : Status 1- Restricted in physically strenuous activity but ambulatory and able to carry out work of a light or sedentary nature, e.g., light house work, office work [Normal] : affect appropriate [de-identified] : Pleasant, interactive in NAD.

## 2023-08-23 NOTE — HISTORY OF PRESENT ILLNESS
[de-identified] : Ms. Calero is a 72 year old female w/ multiple myeloma. \par  \par  She was noted to have proteinuria and then had a 24 hour urine protein which showed non-nephrotic range proteinuria of 510 mg/24 hours. She was referred to Dr. Lesa Rendon of nephrology. SPEP showed faint M-spike 0.2 g/dL in gammaglobulin region, immunofixation showed this to be a IgG kappa monoclonal protein. UPEP showed 2 monoclonal protein bands, 61%, and 0.7%. \par  \par  Subsequent work up:\par  6/19/18 M-protein 0.2 g/dL, Kappa FLC 71, lambda FLC 0.36, FLC ratio 198.61\par  , Beta 2 microglobulin 1.5 mg/L\par  \par  She had a bone marrow biopsy on 6/22/18. Pathology: plasma cell neoplasm, plasma cells comprise 20-25% of marrow cells. Trilineage hematopoiesis present with maturation, increased iron stores. Congo red stain negative for amyloid. Karyotype 46, XX. FISH panel - positive for CCND1/IGH fusion - a/w favorable prognosis.\par  \par  7/12/18 PET - negative\par  \par  Treatment Summary \par  7/19/18 - C1 VRd\par  8/9/18 -C2 VRd\par  8/30/18 - C3 VRd\par  9/20/18 -partial C4 VRd\par  10/11/18 - 1/2/19 KRd (carfilzomib, Revlimid, Dexamethasone) x 4 cycles. \par  2/28/19 - autoPSCT \par  5/18/19 - started Pomalyst\par  7/19/19 - decadron 20 mg weekly added to Pomalyst.\par  2/03/20 - Daratumumab week 1-8 2/03 - 3/31/20. Week 9 (began every other week) 4/07/20. [de-identified] : Patient returns for follow-up visit.   Notes intermittent numbness of toes for 1 year, not affecting QOL Has hand cramps and feet cramps for 1 year mostly at night  Notes started to experience left shoulder pain at rest and with ROM for 3-4 months. Denies trauma/injury. No edema of LUE. Denies radiating pain. Not affecting QOL  Denies headache, dizziness Denies fever, night sweats, infections  Denies dyspnea, cough Denies abdominal pain, nausea, vomiting, diarrhea Good appetite. No weight loss

## 2023-08-25 LAB
ALBUMIN MFR SERPL ELPH: 66 %
ALBUMIN SERPL-MCNC: 3.9 G/DL
ALBUMIN/GLOB SERPL: 2 RATIO
ALPHA1 GLOB MFR SERPL ELPH: 5.4 %
ALPHA1 GLOB SERPL ELPH-MCNC: 0.3 G/DL
ALPHA2 GLOB MFR SERPL ELPH: 13 %
ALPHA2 GLOB SERPL ELPH-MCNC: 0.8 G/DL
B-GLOBULIN MFR SERPL ELPH: 10.8 %
B-GLOBULIN SERPL ELPH-MCNC: 0.6 G/DL
DEPRECATED KAPPA LC FREE/LAMBDA SER: 3.24 RATIO
GAMMA GLOB FLD ELPH-MCNC: 0.3 G/DL
GAMMA GLOB MFR SERPL ELPH: 4.8 %
IGA SER QL IEP: 37 MG/DL
IGG SER QL IEP: 291 MG/DL
IGM SER QL IEP: 13 MG/DL
INTERPRETATION SERPL IEP-IMP: NORMAL
KAPPA LC CSF-MCNC: 0.42 MG/DL
KAPPA LC SERPL-MCNC: 1.36 MG/DL
M PROTEIN MFR SERPL ELPH: NORMAL
M PROTEIN SPEC IFE-MCNC: NORMAL
MONOCLON BAND OBS SERPL: NORMAL
PROT SERPL-MCNC: 5.9 G/DL
PROT SERPL-MCNC: 5.9 G/DL

## 2023-08-30 ENCOUNTER — NON-APPOINTMENT (OUTPATIENT)
Age: 73
End: 2023-08-30

## 2023-09-07 ENCOUNTER — RESULT REVIEW (OUTPATIENT)
Age: 73
End: 2023-09-07

## 2023-09-07 ENCOUNTER — APPOINTMENT (OUTPATIENT)
Age: 73
End: 2023-09-07

## 2023-09-07 LAB
ALBUMIN SERPL ELPH-MCNC: 4.1 G/DL — SIGNIFICANT CHANGE UP (ref 3.3–5)
ALP SERPL-CCNC: 61 U/L — SIGNIFICANT CHANGE UP (ref 40–120)
ALT FLD-CCNC: 15 U/L — SIGNIFICANT CHANGE UP (ref 10–45)
ANION GAP SERPL CALC-SCNC: 10 MMOL/L — SIGNIFICANT CHANGE UP (ref 5–17)
AST SERPL-CCNC: 24 U/L — SIGNIFICANT CHANGE UP (ref 10–40)
BASOPHILS # BLD AUTO: 0.1 K/UL — SIGNIFICANT CHANGE UP (ref 0–0.2)
BASOPHILS NFR BLD AUTO: 1.4 % — SIGNIFICANT CHANGE UP (ref 0–2)
BILIRUB SERPL-MCNC: 0.2 MG/DL — SIGNIFICANT CHANGE UP (ref 0.2–1.2)
BUN SERPL-MCNC: 9 MG/DL — SIGNIFICANT CHANGE UP (ref 7–23)
CALCIUM SERPL-MCNC: 9.4 MG/DL — SIGNIFICANT CHANGE UP (ref 8.4–10.5)
CHLORIDE SERPL-SCNC: 110 MMOL/L — HIGH (ref 96–108)
CO2 SERPL-SCNC: 23 MMOL/L — SIGNIFICANT CHANGE UP (ref 22–31)
CREAT SERPL-MCNC: 0.6 MG/DL — SIGNIFICANT CHANGE UP (ref 0.5–1.3)
EGFR: 95 ML/MIN/1.73M2 — SIGNIFICANT CHANGE UP
EOSINOPHIL # BLD AUTO: 0.1 K/UL — SIGNIFICANT CHANGE UP (ref 0–0.5)
EOSINOPHIL NFR BLD AUTO: 0.7 % — SIGNIFICANT CHANGE UP (ref 0–6)
GLUCOSE SERPL-MCNC: 80 MG/DL — SIGNIFICANT CHANGE UP (ref 70–99)
HCT VFR BLD CALC: 35.4 % — SIGNIFICANT CHANGE UP (ref 34.5–45)
HGB BLD-MCNC: 12 G/DL — SIGNIFICANT CHANGE UP (ref 11.5–15.5)
LYMPHOCYTES # BLD AUTO: 3.6 K/UL — HIGH (ref 1–3.3)
LYMPHOCYTES # BLD AUTO: 41.9 % — SIGNIFICANT CHANGE UP (ref 13–44)
MCHC RBC-ENTMCNC: 30.4 PG — SIGNIFICANT CHANGE UP (ref 27–34)
MCHC RBC-ENTMCNC: 34 G/DL — SIGNIFICANT CHANGE UP (ref 32–36)
MCV RBC AUTO: 89.2 FL — SIGNIFICANT CHANGE UP (ref 80–100)
MONOCYTES # BLD AUTO: 0.5 K/UL — SIGNIFICANT CHANGE UP (ref 0–0.9)
MONOCYTES NFR BLD AUTO: 5.6 % — SIGNIFICANT CHANGE UP (ref 2–14)
NEUTROPHILS # BLD AUTO: 4.3 K/UL — SIGNIFICANT CHANGE UP (ref 1.8–7.4)
NEUTROPHILS NFR BLD AUTO: 50.5 % — SIGNIFICANT CHANGE UP (ref 43–77)
PLATELET # BLD AUTO: 201 K/UL — SIGNIFICANT CHANGE UP (ref 150–400)
POTASSIUM SERPL-MCNC: 4 MMOL/L — SIGNIFICANT CHANGE UP (ref 3.5–5.3)
POTASSIUM SERPL-SCNC: 4 MMOL/L — SIGNIFICANT CHANGE UP (ref 3.5–5.3)
PROT SERPL-MCNC: 5.7 G/DL — LOW (ref 6–8.3)
RBC # BLD: 3.97 M/UL — SIGNIFICANT CHANGE UP (ref 3.8–5.2)
RBC # FLD: 13.4 % — SIGNIFICANT CHANGE UP (ref 10.3–14.5)
SODIUM SERPL-SCNC: 144 MMOL/L — SIGNIFICANT CHANGE UP (ref 135–145)
WBC # BLD: 8.5 K/UL — SIGNIFICANT CHANGE UP (ref 3.8–10.5)
WBC # FLD AUTO: 8.5 K/UL — SIGNIFICANT CHANGE UP (ref 3.8–10.5)

## 2023-09-08 LAB
IGA FLD-MCNC: 35 MG/DL — LOW (ref 84–499)
IGG FLD-MCNC: 305 MG/DL — LOW (ref 610–1660)
IGM SERPL-MCNC: 14 MG/DL — LOW (ref 35–242)
KAPPA LC SER QL IFE: 1.65 MG/DL — SIGNIFICANT CHANGE UP (ref 0.33–1.94)
KAPPA/LAMBDA FREE LIGHT CHAIN RATIO, SERUM: 4.02 RATIO — HIGH (ref 0.26–1.65)
LAMBDA LC SER QL IFE: 0.41 MG/DL — LOW (ref 0.57–2.63)

## 2023-09-09 ENCOUNTER — RESULT REVIEW (OUTPATIENT)
Age: 73
End: 2023-09-09

## 2023-09-09 ENCOUNTER — OUTPATIENT (OUTPATIENT)
Dept: OUTPATIENT SERVICES | Facility: HOSPITAL | Age: 73
LOS: 1 days | End: 2023-09-09

## 2023-09-09 ENCOUNTER — APPOINTMENT (OUTPATIENT)
Dept: CT IMAGING | Facility: CLINIC | Age: 73
End: 2023-09-09
Payer: MEDICARE

## 2023-09-09 DIAGNOSIS — Z98.51 TUBAL LIGATION STATUS: Chronic | ICD-10-CM

## 2023-09-09 DIAGNOSIS — C90.00 MULTIPLE MYELOMA NOT HAVING ACHIEVED REMISSION: ICD-10-CM

## 2023-09-09 DIAGNOSIS — Z94.84 STEM CELLS TRANSPLANT STATUS: Chronic | ICD-10-CM

## 2023-09-09 DIAGNOSIS — Z98.890 OTHER SPECIFIED POSTPROCEDURAL STATES: Chronic | ICD-10-CM

## 2023-09-09 PROCEDURE — 76376 3D RENDER W/INTRP POSTPROCES: CPT | Mod: 26

## 2023-09-09 PROCEDURE — 73200 CT UPPER EXTREMITY W/O DYE: CPT | Mod: 26,LT

## 2023-09-11 LAB
% ALBUMIN: 66.6 % — SIGNIFICANT CHANGE UP
% ALPHA 1: 5.3 % — SIGNIFICANT CHANGE UP
% ALPHA 2: 13 % — SIGNIFICANT CHANGE UP
% BETA: 10 % — SIGNIFICANT CHANGE UP
% GAMMA: 5.1 % — SIGNIFICANT CHANGE UP
% M SPIKE: SIGNIFICANT CHANGE UP
ALBUMIN SERPL ELPH-MCNC: 3.8 G/DL — SIGNIFICANT CHANGE UP (ref 3.6–5.5)
ALBUMIN/GLOB SERPL ELPH: 2 RATIO — SIGNIFICANT CHANGE UP
ALPHA1 GLOB SERPL ELPH-MCNC: 0.3 G/DL — SIGNIFICANT CHANGE UP (ref 0.1–0.4)
ALPHA2 GLOB SERPL ELPH-MCNC: 0.7 G/DL — SIGNIFICANT CHANGE UP (ref 0.5–1)
B-GLOBULIN SERPL ELPH-MCNC: 0.6 G/DL — SIGNIFICANT CHANGE UP (ref 0.5–1)
GAMMA GLOBULIN: 0.3 G/DL — LOW (ref 0.6–1.6)
INTERPRETATION SERPL IFE-IMP: SIGNIFICANT CHANGE UP
M-SPIKE: SIGNIFICANT CHANGE UP (ref 0–0)
PROT PATTERN SERPL ELPH-IMP: SIGNIFICANT CHANGE UP

## 2023-09-29 ENCOUNTER — LABORATORY RESULT (OUTPATIENT)
Age: 73
End: 2023-09-29

## 2023-10-02 ENCOUNTER — OUTPATIENT (OUTPATIENT)
Dept: OUTPATIENT SERVICES | Facility: HOSPITAL | Age: 73
LOS: 1 days | Discharge: HOME | End: 2023-10-02

## 2023-10-02 ENCOUNTER — NON-APPOINTMENT (OUTPATIENT)
Age: 73
End: 2023-10-02

## 2023-10-02 ENCOUNTER — APPOINTMENT (OUTPATIENT)
Dept: FAMILY MEDICINE | Facility: CLINIC | Age: 73
End: 2023-10-02
Payer: MEDICARE

## 2023-10-02 VITALS
BODY MASS INDEX: 28.17 KG/M2 | OXYGEN SATURATION: 99 % | DIASTOLIC BLOOD PRESSURE: 80 MMHG | HEART RATE: 59 BPM | WEIGHT: 159 LBS | SYSTOLIC BLOOD PRESSURE: 126 MMHG | HEIGHT: 63 IN

## 2023-10-02 DIAGNOSIS — Z94.84 STEM CELLS TRANSPLANT STATUS: Chronic | ICD-10-CM

## 2023-10-02 DIAGNOSIS — C90.00 MULTIPLE MYELOMA NOT HAVING ACHIEVED REMISSION: ICD-10-CM

## 2023-10-02 DIAGNOSIS — Z98.51 TUBAL LIGATION STATUS: Chronic | ICD-10-CM

## 2023-10-02 DIAGNOSIS — Z00.00 ENCOUNTER FOR GENERAL ADULT MEDICAL EXAMINATION W/OUT ABNORMAL FINDINGS: ICD-10-CM

## 2023-10-02 DIAGNOSIS — Z98.890 OTHER SPECIFIED POSTPROCEDURAL STATES: Chronic | ICD-10-CM

## 2023-10-02 PROCEDURE — G0439: CPT

## 2023-10-02 PROCEDURE — 93000 ELECTROCARDIOGRAM COMPLETE: CPT

## 2023-10-03 RX ORDER — AMLODIPINE BESYLATE 5 MG/1
5 TABLET ORAL
Refills: 0 | Status: DISCONTINUED | COMMUNITY
End: 2023-10-03

## 2023-10-03 RX ORDER — DARATUMUMAB 100 MG/5ML
INJECTION, SOLUTION, CONCENTRATE INTRAVENOUS
Refills: 0 | Status: DISCONTINUED | COMMUNITY
End: 2023-10-03

## 2023-10-03 RX ORDER — ALBUTEROL SULFATE 90 UG/1
108 (90 BASE) INHALANT RESPIRATORY (INHALATION)
Qty: 1 | Refills: 2 | Status: DISCONTINUED | COMMUNITY
Start: 2023-07-17 | End: 2023-10-03

## 2023-10-05 ENCOUNTER — RESULT REVIEW (OUTPATIENT)
Age: 73
End: 2023-10-05

## 2023-10-05 ENCOUNTER — APPOINTMENT (OUTPATIENT)
Age: 73
End: 2023-10-05

## 2023-10-05 LAB
ALBUMIN SERPL ELPH-MCNC: 4.2 G/DL — SIGNIFICANT CHANGE UP (ref 3.3–5)
ALP SERPL-CCNC: 71 U/L — SIGNIFICANT CHANGE UP (ref 40–120)
ALT FLD-CCNC: 11 U/L — SIGNIFICANT CHANGE UP (ref 10–45)
ANION GAP SERPL CALC-SCNC: 11 MMOL/L — SIGNIFICANT CHANGE UP (ref 5–17)
AST SERPL-CCNC: 21 U/L — SIGNIFICANT CHANGE UP (ref 10–40)
BASOPHILS # BLD AUTO: 0.1 K/UL — SIGNIFICANT CHANGE UP (ref 0–0.2)
BASOPHILS NFR BLD AUTO: 2 % — SIGNIFICANT CHANGE UP (ref 0–2)
BILIRUB SERPL-MCNC: 0.4 MG/DL — SIGNIFICANT CHANGE UP (ref 0.2–1.2)
BUN SERPL-MCNC: 15 MG/DL — SIGNIFICANT CHANGE UP (ref 7–23)
CALCIUM SERPL-MCNC: 9.6 MG/DL — SIGNIFICANT CHANGE UP (ref 8.4–10.5)
CHLORIDE SERPL-SCNC: 107 MMOL/L — SIGNIFICANT CHANGE UP (ref 96–108)
CO2 SERPL-SCNC: 25 MMOL/L — SIGNIFICANT CHANGE UP (ref 22–31)
CREAT SERPL-MCNC: 0.66 MG/DL — SIGNIFICANT CHANGE UP (ref 0.5–1.3)
EGFR: 93 ML/MIN/1.73M2 — SIGNIFICANT CHANGE UP
EOSINOPHIL # BLD AUTO: 0.1 K/UL — SIGNIFICANT CHANGE UP (ref 0–0.5)
EOSINOPHIL NFR BLD AUTO: 1.8 % — SIGNIFICANT CHANGE UP (ref 0–6)
GLUCOSE SERPL-MCNC: 59 MG/DL — LOW (ref 70–99)
HCT VFR BLD CALC: 37.1 % — SIGNIFICANT CHANGE UP (ref 34.5–45)
HGB BLD-MCNC: 12 G/DL — SIGNIFICANT CHANGE UP (ref 11.5–15.5)
LYMPHOCYTES # BLD AUTO: 2.3 K/UL — SIGNIFICANT CHANGE UP (ref 1–3.3)
LYMPHOCYTES # BLD AUTO: 39.1 % — SIGNIFICANT CHANGE UP (ref 13–44)
MCHC RBC-ENTMCNC: 30.4 PG — SIGNIFICANT CHANGE UP (ref 27–34)
MCHC RBC-ENTMCNC: 32.4 G/DL — SIGNIFICANT CHANGE UP (ref 32–36)
MCV RBC AUTO: 93.8 FL — SIGNIFICANT CHANGE UP (ref 80–100)
MONOCYTES # BLD AUTO: 0.3 K/UL — SIGNIFICANT CHANGE UP (ref 0–0.9)
MONOCYTES NFR BLD AUTO: 5.4 % — SIGNIFICANT CHANGE UP (ref 2–14)
NEUTROPHILS # BLD AUTO: 3.1 K/UL — SIGNIFICANT CHANGE UP (ref 1.8–7.4)
NEUTROPHILS NFR BLD AUTO: 51.7 % — SIGNIFICANT CHANGE UP (ref 43–77)
PLATELET # BLD AUTO: 208 K/UL — SIGNIFICANT CHANGE UP (ref 150–400)
POTASSIUM SERPL-MCNC: 4.1 MMOL/L — SIGNIFICANT CHANGE UP (ref 3.5–5.3)
POTASSIUM SERPL-SCNC: 4.1 MMOL/L — SIGNIFICANT CHANGE UP (ref 3.5–5.3)
PROT SERPL-MCNC: 6.2 G/DL — SIGNIFICANT CHANGE UP (ref 6–8.3)
RBC # BLD: 3.96 M/UL — SIGNIFICANT CHANGE UP (ref 3.8–5.2)
RBC # FLD: 14.2 % — SIGNIFICANT CHANGE UP (ref 10.3–14.5)
SODIUM SERPL-SCNC: 143 MMOL/L — SIGNIFICANT CHANGE UP (ref 135–145)
WBC # BLD: 5.9 K/UL — SIGNIFICANT CHANGE UP (ref 3.8–10.5)
WBC # FLD AUTO: 5.9 K/UL — SIGNIFICANT CHANGE UP (ref 3.8–10.5)

## 2023-10-06 DIAGNOSIS — Z51.11 ENCOUNTER FOR ANTINEOPLASTIC CHEMOTHERAPY: ICD-10-CM

## 2023-10-06 DIAGNOSIS — R11.2 NAUSEA WITH VOMITING, UNSPECIFIED: ICD-10-CM

## 2023-10-06 LAB
IGA FLD-MCNC: 28 MG/DL — LOW (ref 84–499)
IGG FLD-MCNC: 274 MG/DL — LOW (ref 610–1660)
IGM SERPL-MCNC: 11 MG/DL — LOW (ref 35–242)
KAPPA LC SER QL IFE: 1.3 MG/DL — SIGNIFICANT CHANGE UP (ref 0.33–1.94)
KAPPA/LAMBDA FREE LIGHT CHAIN RATIO, SERUM: 3.82 RATIO — HIGH (ref 0.26–1.65)
LAMBDA LC SER QL IFE: 0.34 MG/DL — LOW (ref 0.57–2.63)

## 2023-10-07 LAB
% ALBUMIN: 68 % — SIGNIFICANT CHANGE UP
% ALPHA 1: 5.1 % — SIGNIFICANT CHANGE UP
% ALPHA 2: 12.2 % — SIGNIFICANT CHANGE UP
% BETA: 10.1 % — SIGNIFICANT CHANGE UP
% GAMMA: 4.6 % — SIGNIFICANT CHANGE UP
% M SPIKE: SIGNIFICANT CHANGE UP
ALBUMIN SERPL ELPH-MCNC: 4.2 G/DL — SIGNIFICANT CHANGE UP (ref 3.6–5.5)
ALBUMIN/GLOB SERPL ELPH: 2.1 RATIO — SIGNIFICANT CHANGE UP
ALPHA1 GLOB SERPL ELPH-MCNC: 0.3 G/DL — SIGNIFICANT CHANGE UP (ref 0.1–0.4)
ALPHA2 GLOB SERPL ELPH-MCNC: 0.8 G/DL — SIGNIFICANT CHANGE UP (ref 0.5–1)
B-GLOBULIN SERPL ELPH-MCNC: 0.6 G/DL — SIGNIFICANT CHANGE UP (ref 0.5–1)
GAMMA GLOBULIN: 0.3 G/DL — LOW (ref 0.6–1.6)
INTERPRETATION SERPL IFE-IMP: SIGNIFICANT CHANGE UP
M-SPIKE: SIGNIFICANT CHANGE UP (ref 0–0)
PROT PATTERN SERPL ELPH-IMP: SIGNIFICANT CHANGE UP

## 2023-10-08 LAB
CHOLEST SERPL-MCNC: 186 MG/DL
HDLC SERPL-MCNC: 84 MG/DL
LDLC SERPL CALC-MCNC: 91 MG/DL
NONHDLC SERPL-MCNC: 102 MG/DL
TRIGL SERPL-MCNC: 58 MG/DL

## 2023-10-16 NOTE — ASSESSMENT
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 \par \par 2) Transaminitis- mild\par Plan- Diflucan adjusted to 200 mg po daily, complete supply then discontinue\par Monitor LFT's. \par \par RTC in 4 weeks.
No

## 2023-10-23 ENCOUNTER — APPOINTMENT (OUTPATIENT)
Dept: CARDIOLOGY | Facility: CLINIC | Age: 73
End: 2023-10-23
Payer: MEDICARE

## 2023-10-23 ENCOUNTER — NON-APPOINTMENT (OUTPATIENT)
Age: 73
End: 2023-10-23

## 2023-10-23 VITALS
HEART RATE: 62 BPM | BODY MASS INDEX: 28 KG/M2 | HEIGHT: 63 IN | RESPIRATION RATE: 16 BRPM | WEIGHT: 158 LBS | SYSTOLIC BLOOD PRESSURE: 139 MMHG | DIASTOLIC BLOOD PRESSURE: 73 MMHG

## 2023-10-23 DIAGNOSIS — R00.2 PALPITATIONS: ICD-10-CM

## 2023-10-23 PROCEDURE — 93000 ELECTROCARDIOGRAM COMPLETE: CPT

## 2023-10-23 PROCEDURE — 99214 OFFICE O/P EST MOD 30 MIN: CPT

## 2023-10-23 PROCEDURE — 99204 OFFICE O/P NEW MOD 45 MIN: CPT

## 2023-10-23 RX ORDER — ROSUVASTATIN CALCIUM 5 MG/1
5 TABLET, FILM COATED ORAL
Qty: 90 | Refills: 0 | Status: DISCONTINUED | COMMUNITY
Start: 2023-04-24 | End: 2023-10-23

## 2023-10-30 ENCOUNTER — APPOINTMENT (OUTPATIENT)
Dept: HEMATOLOGY ONCOLOGY | Facility: CLINIC | Age: 73
End: 2023-10-30
Payer: MEDICARE

## 2023-10-30 ENCOUNTER — RESULT REVIEW (OUTPATIENT)
Age: 73
End: 2023-10-30

## 2023-10-30 VITALS
DIASTOLIC BLOOD PRESSURE: 72 MMHG | HEIGHT: 63 IN | TEMPERATURE: 98.2 F | WEIGHT: 160.71 LBS | OXYGEN SATURATION: 95 % | BODY MASS INDEX: 28.48 KG/M2 | SYSTOLIC BLOOD PRESSURE: 111 MMHG | HEART RATE: 83 BPM

## 2023-10-30 LAB
ALBUMIN SERPL ELPH-MCNC: 4.2 G/DL
ALP BLD-CCNC: 69 U/L
ALT SERPL-CCNC: 13 U/L
ANION GAP SERPL CALC-SCNC: 9 MMOL/L
AST SERPL-CCNC: 18 U/L
BASOPHILS # BLD AUTO: 0.1 K/UL — SIGNIFICANT CHANGE UP (ref 0–0.2)
BASOPHILS # BLD AUTO: 0.1 K/UL — SIGNIFICANT CHANGE UP (ref 0–0.2)
BASOPHILS NFR BLD AUTO: 2.6 % — HIGH (ref 0–2)
BASOPHILS NFR BLD AUTO: 2.6 % — HIGH (ref 0–2)
BILIRUB SERPL-MCNC: 0.4 MG/DL
BUN SERPL-MCNC: 12 MG/DL
CALCIUM SERPL-MCNC: 9.1 MG/DL
CHLORIDE SERPL-SCNC: 108 MMOL/L
CO2 SERPL-SCNC: 26 MMOL/L
CREAT SERPL-MCNC: 0.67 MG/DL
EGFR: 93 ML/MIN/1.73M2
EOSINOPHIL # BLD AUTO: 0.1 K/UL — SIGNIFICANT CHANGE UP (ref 0–0.5)
EOSINOPHIL # BLD AUTO: 0.1 K/UL — SIGNIFICANT CHANGE UP (ref 0–0.5)
EOSINOPHIL NFR BLD AUTO: 2.6 % — SIGNIFICANT CHANGE UP (ref 0–6)
EOSINOPHIL NFR BLD AUTO: 2.6 % — SIGNIFICANT CHANGE UP (ref 0–6)
GLUCOSE SERPL-MCNC: 90 MG/DL
HCT VFR BLD CALC: 35.4 % — SIGNIFICANT CHANGE UP (ref 34.5–45)
HCT VFR BLD CALC: 35.4 % — SIGNIFICANT CHANGE UP (ref 34.5–45)
HGB BLD-MCNC: 11.2 G/DL — LOW (ref 11.5–15.5)
HGB BLD-MCNC: 11.2 G/DL — LOW (ref 11.5–15.5)
LYMPHOCYTES # BLD AUTO: 2.6 K/UL — SIGNIFICANT CHANGE UP (ref 1–3.3)
LYMPHOCYTES # BLD AUTO: 2.6 K/UL — SIGNIFICANT CHANGE UP (ref 1–3.3)
LYMPHOCYTES # BLD AUTO: 49 % — HIGH (ref 13–44)
LYMPHOCYTES # BLD AUTO: 49 % — HIGH (ref 13–44)
MCHC RBC-ENTMCNC: 29.9 PG — SIGNIFICANT CHANGE UP (ref 27–34)
MCHC RBC-ENTMCNC: 29.9 PG — SIGNIFICANT CHANGE UP (ref 27–34)
MCHC RBC-ENTMCNC: 31.6 G/DL — LOW (ref 32–36)
MCHC RBC-ENTMCNC: 31.6 G/DL — LOW (ref 32–36)
MCV RBC AUTO: 94.5 FL — SIGNIFICANT CHANGE UP (ref 80–100)
MCV RBC AUTO: 94.5 FL — SIGNIFICANT CHANGE UP (ref 80–100)
MONOCYTES # BLD AUTO: 0.4 K/UL — SIGNIFICANT CHANGE UP (ref 0–0.9)
MONOCYTES # BLD AUTO: 0.4 K/UL — SIGNIFICANT CHANGE UP (ref 0–0.9)
MONOCYTES NFR BLD AUTO: 7.1 % — SIGNIFICANT CHANGE UP (ref 2–14)
MONOCYTES NFR BLD AUTO: 7.1 % — SIGNIFICANT CHANGE UP (ref 2–14)
NEUTROPHILS # BLD AUTO: 2 K/UL — SIGNIFICANT CHANGE UP (ref 1.8–7.4)
NEUTROPHILS # BLD AUTO: 2 K/UL — SIGNIFICANT CHANGE UP (ref 1.8–7.4)
NEUTROPHILS NFR BLD AUTO: 38.6 % — LOW (ref 43–77)
NEUTROPHILS NFR BLD AUTO: 38.6 % — LOW (ref 43–77)
PLATELET # BLD AUTO: 245 K/UL — SIGNIFICANT CHANGE UP (ref 150–400)
PLATELET # BLD AUTO: 245 K/UL — SIGNIFICANT CHANGE UP (ref 150–400)
POTASSIUM SERPL-SCNC: 4.3 MMOL/L
PROT SERPL-MCNC: 5.6 G/DL
RBC # BLD: 3.75 M/UL — LOW (ref 3.8–5.2)
RBC # BLD: 3.75 M/UL — LOW (ref 3.8–5.2)
RBC # FLD: 15.5 % — HIGH (ref 10.3–14.5)
RBC # FLD: 15.5 % — HIGH (ref 10.3–14.5)
SODIUM SERPL-SCNC: 144 MMOL/L
T3 SERPL-MCNC: 97 NG/DL
T4 FREE SERPL-MCNC: 1.2 NG/DL
TSH SERPL-ACNC: 0.13 UIU/ML
WBC # BLD: 5.2 K/UL — SIGNIFICANT CHANGE UP (ref 3.8–10.5)
WBC # BLD: 5.2 K/UL — SIGNIFICANT CHANGE UP (ref 3.8–10.5)
WBC # FLD AUTO: 5.2 K/UL — SIGNIFICANT CHANGE UP (ref 3.8–10.5)
WBC # FLD AUTO: 5.2 K/UL — SIGNIFICANT CHANGE UP (ref 3.8–10.5)

## 2023-10-30 PROCEDURE — 99214 OFFICE O/P EST MOD 30 MIN: CPT

## 2023-10-30 RX ORDER — ROSUVASTATIN CALCIUM 5 MG/1
5 TABLET, FILM COATED ORAL
Qty: 45 | Refills: 1 | Status: DISCONTINUED | COMMUNITY
Start: 2021-08-19 | End: 2023-10-30

## 2023-11-02 ENCOUNTER — APPOINTMENT (OUTPATIENT)
Age: 73
End: 2023-11-02

## 2023-11-02 ENCOUNTER — RESULT REVIEW (OUTPATIENT)
Age: 73
End: 2023-11-02

## 2023-11-02 LAB
ALBUMIN SERPL ELPH-MCNC: 4.1 G/DL — SIGNIFICANT CHANGE UP (ref 3.3–5)
ALBUMIN SERPL ELPH-MCNC: 4.1 G/DL — SIGNIFICANT CHANGE UP (ref 3.3–5)
ALP SERPL-CCNC: 70 U/L — SIGNIFICANT CHANGE UP (ref 40–120)
ALP SERPL-CCNC: 70 U/L — SIGNIFICANT CHANGE UP (ref 40–120)
ALT FLD-CCNC: 12 U/L — SIGNIFICANT CHANGE UP (ref 10–45)
ALT FLD-CCNC: 12 U/L — SIGNIFICANT CHANGE UP (ref 10–45)
ANION GAP SERPL CALC-SCNC: 8 MMOL/L — SIGNIFICANT CHANGE UP (ref 5–17)
ANION GAP SERPL CALC-SCNC: 8 MMOL/L — SIGNIFICANT CHANGE UP (ref 5–17)
AST SERPL-CCNC: 23 U/L — SIGNIFICANT CHANGE UP (ref 10–40)
AST SERPL-CCNC: 23 U/L — SIGNIFICANT CHANGE UP (ref 10–40)
BILIRUB SERPL-MCNC: 0.4 MG/DL — SIGNIFICANT CHANGE UP (ref 0.2–1.2)
BILIRUB SERPL-MCNC: 0.4 MG/DL — SIGNIFICANT CHANGE UP (ref 0.2–1.2)
BUN SERPL-MCNC: 14 MG/DL — SIGNIFICANT CHANGE UP (ref 7–23)
BUN SERPL-MCNC: 14 MG/DL — SIGNIFICANT CHANGE UP (ref 7–23)
CALCIUM SERPL-MCNC: 9.8 MG/DL — SIGNIFICANT CHANGE UP (ref 8.4–10.5)
CALCIUM SERPL-MCNC: 9.8 MG/DL — SIGNIFICANT CHANGE UP (ref 8.4–10.5)
CHLORIDE SERPL-SCNC: 109 MMOL/L — HIGH (ref 96–108)
CHLORIDE SERPL-SCNC: 109 MMOL/L — HIGH (ref 96–108)
CO2 SERPL-SCNC: 26 MMOL/L — SIGNIFICANT CHANGE UP (ref 22–31)
CO2 SERPL-SCNC: 26 MMOL/L — SIGNIFICANT CHANGE UP (ref 22–31)
CREAT SERPL-MCNC: 0.59 MG/DL — SIGNIFICANT CHANGE UP (ref 0.5–1.3)
CREAT SERPL-MCNC: 0.59 MG/DL — SIGNIFICANT CHANGE UP (ref 0.5–1.3)
EGFR: 96 ML/MIN/1.73M2 — SIGNIFICANT CHANGE UP
EGFR: 96 ML/MIN/1.73M2 — SIGNIFICANT CHANGE UP
GLUCOSE SERPL-MCNC: 77 MG/DL — SIGNIFICANT CHANGE UP (ref 70–99)
GLUCOSE SERPL-MCNC: 77 MG/DL — SIGNIFICANT CHANGE UP (ref 70–99)
IGA FLD-MCNC: 36 MG/DL — LOW (ref 84–499)
IGA FLD-MCNC: 36 MG/DL — LOW (ref 84–499)
IGG FLD-MCNC: 268 MG/DL — LOW (ref 610–1660)
IGG FLD-MCNC: 268 MG/DL — LOW (ref 610–1660)
IGM SERPL-MCNC: 13 MG/DL — LOW (ref 35–242)
IGM SERPL-MCNC: 13 MG/DL — LOW (ref 35–242)
KAPPA LC SER QL IFE: 1.23 MG/DL — SIGNIFICANT CHANGE UP (ref 0.33–1.94)
KAPPA LC SER QL IFE: 1.23 MG/DL — SIGNIFICANT CHANGE UP (ref 0.33–1.94)
KAPPA/LAMBDA FREE LIGHT CHAIN RATIO, SERUM: 4.1 RATIO — HIGH (ref 0.26–1.65)
KAPPA/LAMBDA FREE LIGHT CHAIN RATIO, SERUM: 4.1 RATIO — HIGH (ref 0.26–1.65)
LAMBDA LC SER QL IFE: 0.3 MG/DL — LOW (ref 0.57–2.63)
LAMBDA LC SER QL IFE: 0.3 MG/DL — LOW (ref 0.57–2.63)
POTASSIUM SERPL-MCNC: 4.3 MMOL/L — SIGNIFICANT CHANGE UP (ref 3.5–5.3)
POTASSIUM SERPL-MCNC: 4.3 MMOL/L — SIGNIFICANT CHANGE UP (ref 3.5–5.3)
POTASSIUM SERPL-SCNC: 4.3 MMOL/L — SIGNIFICANT CHANGE UP (ref 3.5–5.3)
POTASSIUM SERPL-SCNC: 4.3 MMOL/L — SIGNIFICANT CHANGE UP (ref 3.5–5.3)
PROT SERPL-MCNC: 6.2 G/DL — SIGNIFICANT CHANGE UP (ref 6–8.3)
SODIUM SERPL-SCNC: 143 MMOL/L — SIGNIFICANT CHANGE UP (ref 135–145)
SODIUM SERPL-SCNC: 143 MMOL/L — SIGNIFICANT CHANGE UP (ref 135–145)

## 2023-11-03 LAB
ALBUMIN MFR SERPL ELPH: 67 %
ALBUMIN SERPL-MCNC: 3.8 G/DL
ALBUMIN/GLOB SERPL: 2 RATIO
ALPHA1 GLOB MFR SERPL ELPH: 5.2 %
ALPHA1 GLOB SERPL ELPH-MCNC: 0.3 G/DL
ALPHA2 GLOB MFR SERPL ELPH: 12.7 %
ALPHA2 GLOB SERPL ELPH-MCNC: 0.7 G/DL
B-GLOBULIN MFR SERPL ELPH: 10.4 %
B-GLOBULIN SERPL ELPH-MCNC: 0.6 G/DL
DEPRECATED KAPPA LC FREE/LAMBDA SER: 3.81 RATIO
GAMMA GLOB FLD ELPH-MCNC: 0.3 G/DL
GAMMA GLOB MFR SERPL ELPH: 4.7 %
IGA SER QL IEP: 26 MG/DL
IGG SER QL IEP: 262 MG/DL
IGM SER QL IEP: <10 MG/DL
INTERPRETATION SERPL IEP-IMP: NORMAL
KAPPA LC CSF-MCNC: 0.32 MG/DL
KAPPA LC SERPL-MCNC: 1.22 MG/DL
LDH SERPL-CCNC: 167 U/L
M PROTEIN MFR SERPL ELPH: NORMAL
M PROTEIN SPEC IFE-MCNC: NORMAL
MONOCLON BAND OBS SERPL: NORMAL
PROT SERPL-MCNC: 5.7 G/DL
PROT SERPL-MCNC: 5.7 G/DL

## 2023-11-07 ENCOUNTER — APPOINTMENT (OUTPATIENT)
Dept: ENDOCRINOLOGY | Facility: CLINIC | Age: 73
End: 2023-11-07
Payer: MEDICARE

## 2023-11-07 VITALS — BODY MASS INDEX: 28.53 KG/M2 | WEIGHT: 161 LBS | HEIGHT: 63 IN

## 2023-11-07 VITALS — SYSTOLIC BLOOD PRESSURE: 112 MMHG | DIASTOLIC BLOOD PRESSURE: 70 MMHG | HEART RATE: 61 BPM | OXYGEN SATURATION: 98 %

## 2023-11-07 DIAGNOSIS — R79.89 OTHER SPECIFIED ABNORMAL FINDINGS OF BLOOD CHEMISTRY: ICD-10-CM

## 2023-11-07 LAB
% ALBUMIN: 65.8 % — SIGNIFICANT CHANGE UP
% ALBUMIN: 65.8 % — SIGNIFICANT CHANGE UP
% ALPHA 1: 5.3 % — SIGNIFICANT CHANGE UP
% ALPHA 1: 5.3 % — SIGNIFICANT CHANGE UP
% ALPHA 2: 13.5 % — SIGNIFICANT CHANGE UP
% ALPHA 2: 13.5 % — SIGNIFICANT CHANGE UP
% BETA: 10.7 % — SIGNIFICANT CHANGE UP
% BETA: 10.7 % — SIGNIFICANT CHANGE UP
% GAMMA: 4.7 % — SIGNIFICANT CHANGE UP
% GAMMA: 4.7 % — SIGNIFICANT CHANGE UP
% M SPIKE: SIGNIFICANT CHANGE UP
% M SPIKE: SIGNIFICANT CHANGE UP
ALBUMIN SERPL ELPH-MCNC: 4.1 G/DL — SIGNIFICANT CHANGE UP (ref 3.6–5.5)
ALBUMIN SERPL ELPH-MCNC: 4.1 G/DL — SIGNIFICANT CHANGE UP (ref 3.6–5.5)
ALBUMIN/GLOB SERPL ELPH: 2 RATIO — SIGNIFICANT CHANGE UP
ALBUMIN/GLOB SERPL ELPH: 2 RATIO — SIGNIFICANT CHANGE UP
ALPHA1 GLOB SERPL ELPH-MCNC: 0.3 G/DL — SIGNIFICANT CHANGE UP (ref 0.1–0.4)
ALPHA1 GLOB SERPL ELPH-MCNC: 0.3 G/DL — SIGNIFICANT CHANGE UP (ref 0.1–0.4)
ALPHA2 GLOB SERPL ELPH-MCNC: 0.8 G/DL — SIGNIFICANT CHANGE UP (ref 0.5–1)
ALPHA2 GLOB SERPL ELPH-MCNC: 0.8 G/DL — SIGNIFICANT CHANGE UP (ref 0.5–1)
B-GLOBULIN SERPL ELPH-MCNC: 0.7 G/DL — SIGNIFICANT CHANGE UP (ref 0.5–1)
B-GLOBULIN SERPL ELPH-MCNC: 0.7 G/DL — SIGNIFICANT CHANGE UP (ref 0.5–1)
GAMMA GLOBULIN: 0.3 G/DL — LOW (ref 0.6–1.6)
GAMMA GLOBULIN: 0.3 G/DL — LOW (ref 0.6–1.6)
INTERPRETATION SERPL IFE-IMP: SIGNIFICANT CHANGE UP
INTERPRETATION SERPL IFE-IMP: SIGNIFICANT CHANGE UP
M-SPIKE: SIGNIFICANT CHANGE UP (ref 0–0)
M-SPIKE: SIGNIFICANT CHANGE UP (ref 0–0)
PROT PATTERN SERPL ELPH-IMP: SIGNIFICANT CHANGE UP
PROT PATTERN SERPL ELPH-IMP: SIGNIFICANT CHANGE UP
PROT SERPL-MCNC: 6.2 G/DL — SIGNIFICANT CHANGE UP (ref 6–8.3)
PROT SERPL-MCNC: 6.2 G/DL — SIGNIFICANT CHANGE UP (ref 6–8.3)

## 2023-11-07 PROCEDURE — 99214 OFFICE O/P EST MOD 30 MIN: CPT

## 2023-11-20 ENCOUNTER — APPOINTMENT (OUTPATIENT)
Dept: CARDIOLOGY | Facility: CLINIC | Age: 73
End: 2023-11-20
Payer: MEDICARE

## 2023-11-20 PROCEDURE — 93306 TTE W/DOPPLER COMPLETE: CPT

## 2023-11-26 NOTE — PATIENT PROFILE ADULT - VISION (WITH CORRECTIVE LENSES IF THE PATIENT USUALLY WEARS THEM):
B TEAM SURGERY DAILY PROGRESS NOTE:     INTERVAL EVENTS: One episode of emesis, abdominal pain    SUBJECTIVE/ROS: Patient seen and examined at bedside by surgical team. Reports feeling uncomfortable with abdominal pain after NGT was removed, had one episode of emesis and has feeling nauseous. Denies sob/chest pain, fever/chills.     OBJECTIVE:  Vital Signs Last 24 Hrs  T(C): 36.7 (26 Nov 2023 06:00), Max: 37.3 (25 Nov 2023 18:00)  T(F): 98.1 (26 Nov 2023 06:00), Max: 99.2 (25 Nov 2023 18:00)  HR: 74 (26 Nov 2023 06:00) (74 - 86)  BP: 161/67 (26 Nov 2023 06:00) (158/68 - 172/70)  BP(mean): --  RR: 18 (26 Nov 2023 06:00) (18 - 18)  SpO2: 100% (26 Nov 2023 06:00) (96% - 100%)    Parameters below as of 26 Nov 2023 06:00  Patient On (Oxygen Delivery Method): room air                            11.6   10.98 )-----------( 192      ( 25 Nov 2023 06:35 )             35.9     11-25    141  |  105  |  30<H>  ----------------------------<  145<H>  3.7   |  24  |  1.05    Ca    9.0      25 Nov 2023 06:35  Phos  2.9     11-25  Mg     1.90     11-25       I&O's Detail    25 Nov 2023 07:01  -  26 Nov 2023 07:00  --------------------------------------------------------  IN:    Lactated Ringers: 1000 mL    Oral Fluid: 400 mL  Total IN: 1400 mL    OUT:  Total OUT: 0 mL    Total NET: 1400 mL          IMAGING:      PHYSICAL EXAM:  Constitutional: NAD  Respiratory: non-labored breathing, patent airway  Gastrointestinal: abdomen soft, tender to deep palpation, no involuntary guarding, no peritoneal signs of irritation, nondistended  Extremities: warm  Neurological: intact           Partially impaired: cannot see medication labels or newsprint, but can see obstacles in path, and the surrounding layout; can count fingers at arm's length

## 2023-11-28 ENCOUNTER — OFFICE (OUTPATIENT)
Dept: URBAN - METROPOLITAN AREA CLINIC 63 | Facility: CLINIC | Age: 73
Setting detail: OPHTHALMOLOGY
End: 2023-11-28
Payer: MEDICARE

## 2023-11-28 DIAGNOSIS — H40.013: ICD-10-CM

## 2023-11-28 DIAGNOSIS — H01.004: ICD-10-CM

## 2023-11-28 DIAGNOSIS — D31.32: ICD-10-CM

## 2023-11-28 DIAGNOSIS — H01.001: ICD-10-CM

## 2023-11-28 PROCEDURE — 92014 COMPRE OPH EXAM EST PT 1/>: CPT | Performed by: STUDENT IN AN ORGANIZED HEALTH CARE EDUCATION/TRAINING PROGRAM

## 2023-11-28 ASSESSMENT — REFRACTION_MANIFEST
OD_CYLINDER: SPHERE
OD_SPHERE: -0.50
OD_VA1: 20/25
OS_CYLINDER: -0.25
OD_ADD: +2.50
OS_SPHERE: +0.25
OD_AXIS: 000
OS_AXIS: 040
OS_ADD: +2.50
OS_VA1: 20/20

## 2023-11-28 ASSESSMENT — REFRACTION_CURRENTRX
OD_OVR_VA: 20/
OS_AXIS: 079
OD_ADD: +2.00
OD_SPHERE: +0.25
OS_VPRISM_DIRECTION: PROGS
OS_OVR_VA: 20/
OD_CYLINDER: 0.00
OD_VPRISM_DIRECTION: PROGS
OS_CYLINDER: -0.25
OS_SPHERE: +0.25
OS_ADD: +2.00
OD_AXIS: 180

## 2023-11-28 ASSESSMENT — REFRACTION_AUTOREFRACTION
OS_CYLINDER: -0.75
OD_AXIS: 093
OD_SPHERE: +0.25
OS_AXIS: 054
OD_CYLINDER: -1.00
OS_SPHERE: +0.50

## 2023-11-28 ASSESSMENT — LID EXAM ASSESSMENTS
OS_BLEPHARITIS: LUL T
OD_BLEPHARITIS: RUL T
OD_MEIBOMITIS: RUL 1+
OS_MEIBOMITIS: LUL 1+

## 2023-11-28 ASSESSMENT — CONFRONTATIONAL VISUAL FIELD TEST (CVF)
OD_FINDINGS: FULL
OS_FINDINGS: FULL

## 2023-11-28 ASSESSMENT — SPHEQUIV_DERIVED
OS_SPHEQUIV: 0.125
OS_SPHEQUIV: 0.125
OD_SPHEQUIV: -0.25

## 2023-11-28 ASSESSMENT — TEAR BREAK UP TIME (TBUT)
OD_TBUT: T
OS_TBUT: T

## 2023-11-30 ENCOUNTER — APPOINTMENT (OUTPATIENT)
Age: 73
End: 2023-11-30

## 2023-11-30 ENCOUNTER — RESULT REVIEW (OUTPATIENT)
Age: 73
End: 2023-11-30

## 2023-11-30 LAB
BASOPHILS # BLD AUTO: 0.1 K/UL — SIGNIFICANT CHANGE UP (ref 0–0.2)
BASOPHILS # BLD AUTO: 0.1 K/UL — SIGNIFICANT CHANGE UP (ref 0–0.2)
BASOPHILS NFR BLD AUTO: 1.7 % — SIGNIFICANT CHANGE UP (ref 0–2)
BASOPHILS NFR BLD AUTO: 1.7 % — SIGNIFICANT CHANGE UP (ref 0–2)
EOSINOPHIL # BLD AUTO: 0.1 K/UL — SIGNIFICANT CHANGE UP (ref 0–0.5)
EOSINOPHIL # BLD AUTO: 0.1 K/UL — SIGNIFICANT CHANGE UP (ref 0–0.5)
EOSINOPHIL NFR BLD AUTO: 1.4 % — SIGNIFICANT CHANGE UP (ref 0–6)
EOSINOPHIL NFR BLD AUTO: 1.4 % — SIGNIFICANT CHANGE UP (ref 0–6)
HCT VFR BLD CALC: 36.6 % — SIGNIFICANT CHANGE UP (ref 34.5–45)
HCT VFR BLD CALC: 36.6 % — SIGNIFICANT CHANGE UP (ref 34.5–45)
HGB BLD-MCNC: 11.2 G/DL — LOW (ref 11.5–15.5)
HGB BLD-MCNC: 11.2 G/DL — LOW (ref 11.5–15.5)
LYMPHOCYTES # BLD AUTO: 2.6 K/UL — SIGNIFICANT CHANGE UP (ref 1–3.3)
LYMPHOCYTES # BLD AUTO: 2.6 K/UL — SIGNIFICANT CHANGE UP (ref 1–3.3)
LYMPHOCYTES # BLD AUTO: 39.3 % — SIGNIFICANT CHANGE UP (ref 13–44)
LYMPHOCYTES # BLD AUTO: 39.3 % — SIGNIFICANT CHANGE UP (ref 13–44)
MCHC RBC-ENTMCNC: 30.2 PG — SIGNIFICANT CHANGE UP (ref 27–34)
MCHC RBC-ENTMCNC: 30.2 PG — SIGNIFICANT CHANGE UP (ref 27–34)
MCHC RBC-ENTMCNC: 30.7 G/DL — LOW (ref 32–36)
MCHC RBC-ENTMCNC: 30.7 G/DL — LOW (ref 32–36)
MCV RBC AUTO: 98.6 FL — SIGNIFICANT CHANGE UP (ref 80–100)
MCV RBC AUTO: 98.6 FL — SIGNIFICANT CHANGE UP (ref 80–100)
MONOCYTES # BLD AUTO: 0.3 K/UL — SIGNIFICANT CHANGE UP (ref 0–0.9)
MONOCYTES # BLD AUTO: 0.3 K/UL — SIGNIFICANT CHANGE UP (ref 0–0.9)
MONOCYTES NFR BLD AUTO: 4.6 % — SIGNIFICANT CHANGE UP (ref 2–14)
MONOCYTES NFR BLD AUTO: 4.6 % — SIGNIFICANT CHANGE UP (ref 2–14)
NEUTROPHILS # BLD AUTO: 3.5 K/UL — SIGNIFICANT CHANGE UP (ref 1.8–7.4)
NEUTROPHILS # BLD AUTO: 3.5 K/UL — SIGNIFICANT CHANGE UP (ref 1.8–7.4)
NEUTROPHILS NFR BLD AUTO: 53.1 % — SIGNIFICANT CHANGE UP (ref 43–77)
NEUTROPHILS NFR BLD AUTO: 53.1 % — SIGNIFICANT CHANGE UP (ref 43–77)
PLATELET # BLD AUTO: 205 K/UL — SIGNIFICANT CHANGE UP (ref 150–400)
PLATELET # BLD AUTO: 205 K/UL — SIGNIFICANT CHANGE UP (ref 150–400)
RBC # BLD: 3.72 M/UL — LOW (ref 3.8–5.2)
RBC # BLD: 3.72 M/UL — LOW (ref 3.8–5.2)
RBC # FLD: 14.3 % — SIGNIFICANT CHANGE UP (ref 10.3–14.5)
RBC # FLD: 14.3 % — SIGNIFICANT CHANGE UP (ref 10.3–14.5)
WBC # BLD: 6.6 K/UL — SIGNIFICANT CHANGE UP (ref 3.8–10.5)
WBC # BLD: 6.6 K/UL — SIGNIFICANT CHANGE UP (ref 3.8–10.5)
WBC # FLD AUTO: 6.6 K/UL — SIGNIFICANT CHANGE UP (ref 3.8–10.5)
WBC # FLD AUTO: 6.6 K/UL — SIGNIFICANT CHANGE UP (ref 3.8–10.5)

## 2023-12-01 LAB
% ALBUMIN: 66.1 % — SIGNIFICANT CHANGE UP
% ALBUMIN: 66.1 % — SIGNIFICANT CHANGE UP
% ALPHA 1: 5.4 % — SIGNIFICANT CHANGE UP
% ALPHA 1: 5.4 % — SIGNIFICANT CHANGE UP
% ALPHA 2: 13.4 % — SIGNIFICANT CHANGE UP
% ALPHA 2: 13.4 % — SIGNIFICANT CHANGE UP
% BETA: 10.5 % — SIGNIFICANT CHANGE UP
% BETA: 10.5 % — SIGNIFICANT CHANGE UP
% GAMMA: 4.6 % — SIGNIFICANT CHANGE UP
% GAMMA: 4.6 % — SIGNIFICANT CHANGE UP
% M SPIKE: SIGNIFICANT CHANGE UP
% M SPIKE: SIGNIFICANT CHANGE UP
ALBUMIN MFR SERPL ELPH: 65.1 %
ALBUMIN SERPL ELPH-MCNC: 3.8 G/DL — SIGNIFICANT CHANGE UP (ref 3.3–5)
ALBUMIN SERPL ELPH-MCNC: 3.8 G/DL — SIGNIFICANT CHANGE UP (ref 3.3–5)
ALBUMIN SERPL ELPH-MCNC: 3.8 G/DL — SIGNIFICANT CHANGE UP (ref 3.6–5.5)
ALBUMIN SERPL ELPH-MCNC: 3.8 G/DL — SIGNIFICANT CHANGE UP (ref 3.6–5.5)
ALBUMIN SERPL ELPH-MCNC: 4.1 G/DL
ALBUMIN SERPL-MCNC: 3.7 G/DL
ALBUMIN/GLOB SERPL ELPH: 2 RATIO — SIGNIFICANT CHANGE UP
ALBUMIN/GLOB SERPL ELPH: 2 RATIO — SIGNIFICANT CHANGE UP
ALBUMIN/GLOB SERPL: 1.8 RATIO
ALP BLD-CCNC: 76 U/L
ALP SERPL-CCNC: 69 U/L — SIGNIFICANT CHANGE UP (ref 40–120)
ALP SERPL-CCNC: 69 U/L — SIGNIFICANT CHANGE UP (ref 40–120)
ALPHA1 GLOB MFR SERPL ELPH: 5.6 %
ALPHA1 GLOB SERPL ELPH-MCNC: 0.3 G/DL
ALPHA1 GLOB SERPL ELPH-MCNC: 0.3 G/DL — SIGNIFICANT CHANGE UP (ref 0.1–0.4)
ALPHA1 GLOB SERPL ELPH-MCNC: 0.3 G/DL — SIGNIFICANT CHANGE UP (ref 0.1–0.4)
ALPHA2 GLOB MFR SERPL ELPH: 13.6 %
ALPHA2 GLOB SERPL ELPH-MCNC: 0.8 G/DL
ALPHA2 GLOB SERPL ELPH-MCNC: 0.8 G/DL — SIGNIFICANT CHANGE UP (ref 0.5–1)
ALPHA2 GLOB SERPL ELPH-MCNC: 0.8 G/DL — SIGNIFICANT CHANGE UP (ref 0.5–1)
ALT FLD-CCNC: 13 U/L — SIGNIFICANT CHANGE UP (ref 10–45)
ALT FLD-CCNC: 13 U/L — SIGNIFICANT CHANGE UP (ref 10–45)
ALT SERPL-CCNC: 11 U/L
ANION GAP SERPL CALC-SCNC: 10 MMOL/L
ANION GAP SERPL CALC-SCNC: 10 MMOL/L — SIGNIFICANT CHANGE UP (ref 5–17)
ANION GAP SERPL CALC-SCNC: 10 MMOL/L — SIGNIFICANT CHANGE UP (ref 5–17)
AST SERPL-CCNC: 17 U/L
AST SERPL-CCNC: 23 U/L — SIGNIFICANT CHANGE UP (ref 10–40)
AST SERPL-CCNC: 23 U/L — SIGNIFICANT CHANGE UP (ref 10–40)
B-GLOBULIN MFR SERPL ELPH: 10.9 %
B-GLOBULIN SERPL ELPH-MCNC: 0.6 G/DL
B-GLOBULIN SERPL ELPH-MCNC: 0.6 G/DL — SIGNIFICANT CHANGE UP (ref 0.5–1)
B-GLOBULIN SERPL ELPH-MCNC: 0.6 G/DL — SIGNIFICANT CHANGE UP (ref 0.5–1)
BILIRUB SERPL-MCNC: 0.3 MG/DL
BILIRUB SERPL-MCNC: 0.4 MG/DL — SIGNIFICANT CHANGE UP (ref 0.2–1.2)
BILIRUB SERPL-MCNC: 0.4 MG/DL — SIGNIFICANT CHANGE UP (ref 0.2–1.2)
BUN SERPL-MCNC: 13 MG/DL — SIGNIFICANT CHANGE UP (ref 7–23)
BUN SERPL-MCNC: 13 MG/DL — SIGNIFICANT CHANGE UP (ref 7–23)
BUN SERPL-MCNC: 14 MG/DL
CALCIUM SERPL-MCNC: 9.4 MG/DL
CALCIUM SERPL-MCNC: 9.4 MG/DL — SIGNIFICANT CHANGE UP (ref 8.4–10.5)
CALCIUM SERPL-MCNC: 9.4 MG/DL — SIGNIFICANT CHANGE UP (ref 8.4–10.5)
CHLORIDE SERPL-SCNC: 107 MMOL/L — SIGNIFICANT CHANGE UP (ref 96–108)
CHLORIDE SERPL-SCNC: 107 MMOL/L — SIGNIFICANT CHANGE UP (ref 96–108)
CHLORIDE SERPL-SCNC: 108 MMOL/L
CO2 SERPL-SCNC: 24 MMOL/L — SIGNIFICANT CHANGE UP (ref 22–31)
CO2 SERPL-SCNC: 24 MMOL/L — SIGNIFICANT CHANGE UP (ref 22–31)
CO2 SERPL-SCNC: 25 MMOL/L
CREAT SERPL-MCNC: 0.61 MG/DL — SIGNIFICANT CHANGE UP (ref 0.5–1.3)
CREAT SERPL-MCNC: 0.61 MG/DL — SIGNIFICANT CHANGE UP (ref 0.5–1.3)
CREAT SERPL-MCNC: 0.69 MG/DL
DEPRECATED KAPPA LC FREE/LAMBDA SER: 2.59 RATIO
EGFR: 92 ML/MIN/1.73M2
EGFR: 95 ML/MIN/1.73M2 — SIGNIFICANT CHANGE UP
EGFR: 95 ML/MIN/1.73M2 — SIGNIFICANT CHANGE UP
GAMMA GLOB FLD ELPH-MCNC: 0.3 G/DL
GAMMA GLOB MFR SERPL ELPH: 4.8 %
GAMMA GLOBULIN: 0.3 G/DL — LOW (ref 0.6–1.6)
GAMMA GLOBULIN: 0.3 G/DL — LOW (ref 0.6–1.6)
GLUCOSE SERPL-MCNC: 66 MG/DL — LOW (ref 70–99)
GLUCOSE SERPL-MCNC: 66 MG/DL — LOW (ref 70–99)
GLUCOSE SERPL-MCNC: 77 MG/DL
IGA FLD-MCNC: 23 MG/DL — LOW (ref 84–499)
IGA FLD-MCNC: 23 MG/DL — LOW (ref 84–499)
IGA SER QL IEP: 22 MG/DL
IGG FLD-MCNC: 264 MG/DL — LOW (ref 610–1660)
IGG FLD-MCNC: 264 MG/DL — LOW (ref 610–1660)
IGG SER QL IEP: 272 MG/DL
IGM SER QL IEP: 16 MG/DL
IGM SERPL-MCNC: 15 MG/DL — LOW (ref 35–242)
IGM SERPL-MCNC: 15 MG/DL — LOW (ref 35–242)
INTERPRETATION SERPL IEP-IMP: NORMAL
INTERPRETATION SERPL IFE-IMP: SIGNIFICANT CHANGE UP
INTERPRETATION SERPL IFE-IMP: SIGNIFICANT CHANGE UP
KAPPA LC CSF-MCNC: 0.56 MG/DL
KAPPA LC SER QL IFE: 1.33 MG/DL — SIGNIFICANT CHANGE UP (ref 0.33–1.94)
KAPPA LC SER QL IFE: 1.33 MG/DL — SIGNIFICANT CHANGE UP (ref 0.33–1.94)
KAPPA LC SERPL-MCNC: 1.45 MG/DL
KAPPA/LAMBDA FREE LIGHT CHAIN RATIO, SERUM: 3.59 RATIO — HIGH (ref 0.26–1.65)
KAPPA/LAMBDA FREE LIGHT CHAIN RATIO, SERUM: 3.59 RATIO — HIGH (ref 0.26–1.65)
LAMBDA LC SER QL IFE: 0.37 MG/DL — LOW (ref 0.57–2.63)
LAMBDA LC SER QL IFE: 0.37 MG/DL — LOW (ref 0.57–2.63)
LDH SERPL-CCNC: 174 U/L
M PROTEIN MFR SERPL ELPH: NORMAL
M PROTEIN SPEC IFE-MCNC: NORMAL
M-SPIKE: SIGNIFICANT CHANGE UP (ref 0–0)
M-SPIKE: SIGNIFICANT CHANGE UP (ref 0–0)
MAGNESIUM SERPL-MCNC: 2.2 MG/DL
MONOCLON BAND OBS SERPL: NORMAL
POTASSIUM SERPL-MCNC: 4.5 MMOL/L — SIGNIFICANT CHANGE UP (ref 3.5–5.3)
POTASSIUM SERPL-MCNC: 4.5 MMOL/L — SIGNIFICANT CHANGE UP (ref 3.5–5.3)
POTASSIUM SERPL-SCNC: 4.4 MMOL/L
POTASSIUM SERPL-SCNC: 4.5 MMOL/L — SIGNIFICANT CHANGE UP (ref 3.5–5.3)
POTASSIUM SERPL-SCNC: 4.5 MMOL/L — SIGNIFICANT CHANGE UP (ref 3.5–5.3)
PROT PATTERN SERPL ELPH-IMP: SIGNIFICANT CHANGE UP
PROT PATTERN SERPL ELPH-IMP: SIGNIFICANT CHANGE UP
PROT SERPL-MCNC: 5.7 G/DL
PROT SERPL-MCNC: 5.7 G/DL — LOW (ref 6–8.3)
PROT SERPL-MCNC: 6 G/DL — SIGNIFICANT CHANGE UP (ref 6–8.3)
PROT SERPL-MCNC: 6 G/DL — SIGNIFICANT CHANGE UP (ref 6–8.3)
SODIUM SERPL-SCNC: 142 MMOL/L — SIGNIFICANT CHANGE UP (ref 135–145)
SODIUM SERPL-SCNC: 142 MMOL/L — SIGNIFICANT CHANGE UP (ref 135–145)
SODIUM SERPL-SCNC: 143 MMOL/L

## 2023-12-18 ENCOUNTER — RESULT REVIEW (OUTPATIENT)
Age: 73
End: 2023-12-18

## 2023-12-18 ENCOUNTER — OUTPATIENT (OUTPATIENT)
Dept: OUTPATIENT SERVICES | Facility: HOSPITAL | Age: 73
LOS: 1 days | End: 2023-12-18

## 2023-12-18 ENCOUNTER — APPOINTMENT (OUTPATIENT)
Dept: NUCLEAR MEDICINE | Facility: CLINIC | Age: 73
End: 2023-12-18
Payer: MEDICARE

## 2023-12-18 DIAGNOSIS — Z98.890 OTHER SPECIFIED POSTPROCEDURAL STATES: Chronic | ICD-10-CM

## 2023-12-18 DIAGNOSIS — Z94.84 STEM CELLS TRANSPLANT STATUS: Chronic | ICD-10-CM

## 2023-12-18 DIAGNOSIS — E04.2 NONTOXIC MULTINODULAR GOITER: ICD-10-CM

## 2023-12-19 ENCOUNTER — APPOINTMENT (OUTPATIENT)
Dept: NUCLEAR MEDICINE | Facility: CLINIC | Age: 73
End: 2023-12-19

## 2023-12-19 ENCOUNTER — OUTPATIENT (OUTPATIENT)
Dept: OUTPATIENT SERVICES | Facility: HOSPITAL | Age: 73
LOS: 1 days | End: 2023-12-19

## 2023-12-19 DIAGNOSIS — Z98.890 OTHER SPECIFIED POSTPROCEDURAL STATES: Chronic | ICD-10-CM

## 2023-12-19 DIAGNOSIS — Z94.84 STEM CELLS TRANSPLANT STATUS: Chronic | ICD-10-CM

## 2023-12-19 DIAGNOSIS — Z98.51 TUBAL LIGATION STATUS: Chronic | ICD-10-CM

## 2023-12-19 DIAGNOSIS — Z00.8 ENCOUNTER FOR OTHER GENERAL EXAMINATION: ICD-10-CM

## 2023-12-19 PROCEDURE — 78014 THYROID IMAGING W/BLOOD FLOW: CPT | Mod: 26

## 2023-12-21 ENCOUNTER — OUTPATIENT (OUTPATIENT)
Dept: OUTPATIENT SERVICES | Facility: HOSPITAL | Age: 73
LOS: 1 days | Discharge: HOME | End: 2023-12-21

## 2023-12-21 DIAGNOSIS — C90.00 MULTIPLE MYELOMA NOT HAVING ACHIEVED REMISSION: ICD-10-CM

## 2023-12-21 DIAGNOSIS — Z98.890 OTHER SPECIFIED POSTPROCEDURAL STATES: Chronic | ICD-10-CM

## 2023-12-21 DIAGNOSIS — Z94.84 STEM CELLS TRANSPLANT STATUS: Chronic | ICD-10-CM

## 2023-12-21 DIAGNOSIS — Z98.51 TUBAL LIGATION STATUS: Chronic | ICD-10-CM

## 2023-12-22 ENCOUNTER — APPOINTMENT (OUTPATIENT)
Dept: FAMILY MEDICINE | Facility: CLINIC | Age: 73
End: 2023-12-22
Payer: MEDICARE

## 2023-12-22 VITALS
WEIGHT: 155 LBS | SYSTOLIC BLOOD PRESSURE: 134 MMHG | DIASTOLIC BLOOD PRESSURE: 66 MMHG | OXYGEN SATURATION: 95 % | BODY MASS INDEX: 27.46 KG/M2 | HEIGHT: 63 IN | HEART RATE: 59 BPM | TEMPERATURE: 97.2 F

## 2023-12-22 DIAGNOSIS — R47.89 OTHER SPEECH DISTURBANCES: ICD-10-CM

## 2023-12-22 DIAGNOSIS — G62.9 POLYNEUROPATHY, UNSPECIFIED: ICD-10-CM

## 2023-12-22 PROCEDURE — 99213 OFFICE O/P EST LOW 20 MIN: CPT

## 2023-12-22 RX ORDER — GLUCOSA SU 2KCL/CHONDROITIN SU 500-400 MG
100 CAPSULE ORAL
Refills: 0 | Status: ACTIVE | COMMUNITY

## 2023-12-22 NOTE — HISTORY OF PRESENT ILLNESS
[FreeTextEntry1] : Patient is following up on hyperlipidemia and peripheal neuropathy. Patient states she never started the Gabapentin prescribed at her previous visit. [de-identified] : Patient reports she is doing OK. She has noticed difficulty remembering words. She finds that she can picture the item in her mind but has difficulty verbalizing it. As an example, she was speaking with her granddaughter about a brick used to prop the fence and she could not remember the word for it. She mentions that having her granddaughter living with her creates tension, but she tries to keep the peace and she is not sure if this is contributing to issue mentioned.

## 2023-12-28 ENCOUNTER — APPOINTMENT (OUTPATIENT)
Age: 73
End: 2023-12-28

## 2023-12-28 ENCOUNTER — RESULT REVIEW (OUTPATIENT)
Age: 73
End: 2023-12-28

## 2023-12-28 LAB
BASOPHILS # BLD AUTO: 0.1 K/UL — SIGNIFICANT CHANGE UP (ref 0–0.2)
BASOPHILS # BLD AUTO: 0.1 K/UL — SIGNIFICANT CHANGE UP (ref 0–0.2)
BASOPHILS NFR BLD AUTO: 1.7 % — SIGNIFICANT CHANGE UP (ref 0–2)
BASOPHILS NFR BLD AUTO: 1.7 % — SIGNIFICANT CHANGE UP (ref 0–2)
EOSINOPHIL # BLD AUTO: 0.2 K/UL — SIGNIFICANT CHANGE UP (ref 0–0.5)
EOSINOPHIL # BLD AUTO: 0.2 K/UL — SIGNIFICANT CHANGE UP (ref 0–0.5)
EOSINOPHIL NFR BLD AUTO: 2.4 % — SIGNIFICANT CHANGE UP (ref 0–6)
EOSINOPHIL NFR BLD AUTO: 2.4 % — SIGNIFICANT CHANGE UP (ref 0–6)
HCT VFR BLD CALC: 36.8 % — SIGNIFICANT CHANGE UP (ref 34.5–45)
HCT VFR BLD CALC: 36.8 % — SIGNIFICANT CHANGE UP (ref 34.5–45)
HGB BLD-MCNC: 11.2 G/DL — LOW (ref 11.5–15.5)
HGB BLD-MCNC: 11.2 G/DL — LOW (ref 11.5–15.5)
LYMPHOCYTES # BLD AUTO: 3.2 K/UL — SIGNIFICANT CHANGE UP (ref 1–3.3)
LYMPHOCYTES # BLD AUTO: 3.2 K/UL — SIGNIFICANT CHANGE UP (ref 1–3.3)
LYMPHOCYTES # BLD AUTO: 42.6 % — SIGNIFICANT CHANGE UP (ref 13–44)
LYMPHOCYTES # BLD AUTO: 42.6 % — SIGNIFICANT CHANGE UP (ref 13–44)
MCHC RBC-ENTMCNC: 29.9 PG — SIGNIFICANT CHANGE UP (ref 27–34)
MCHC RBC-ENTMCNC: 29.9 PG — SIGNIFICANT CHANGE UP (ref 27–34)
MCHC RBC-ENTMCNC: 30.4 G/DL — LOW (ref 32–36)
MCHC RBC-ENTMCNC: 30.4 G/DL — LOW (ref 32–36)
MCV RBC AUTO: 98.5 FL — SIGNIFICANT CHANGE UP (ref 80–100)
MCV RBC AUTO: 98.5 FL — SIGNIFICANT CHANGE UP (ref 80–100)
MONOCYTES # BLD AUTO: 0.3 K/UL — SIGNIFICANT CHANGE UP (ref 0–0.9)
MONOCYTES # BLD AUTO: 0.3 K/UL — SIGNIFICANT CHANGE UP (ref 0–0.9)
MONOCYTES NFR BLD AUTO: 3.6 % — SIGNIFICANT CHANGE UP (ref 2–14)
MONOCYTES NFR BLD AUTO: 3.6 % — SIGNIFICANT CHANGE UP (ref 2–14)
NEUTROPHILS # BLD AUTO: 3.8 K/UL — SIGNIFICANT CHANGE UP (ref 1.8–7.4)
NEUTROPHILS # BLD AUTO: 3.8 K/UL — SIGNIFICANT CHANGE UP (ref 1.8–7.4)
NEUTROPHILS NFR BLD AUTO: 49.7 % — SIGNIFICANT CHANGE UP (ref 43–77)
NEUTROPHILS NFR BLD AUTO: 49.7 % — SIGNIFICANT CHANGE UP (ref 43–77)
PLATELET # BLD AUTO: 208 K/UL — SIGNIFICANT CHANGE UP (ref 150–400)
PLATELET # BLD AUTO: 208 K/UL — SIGNIFICANT CHANGE UP (ref 150–400)
RBC # BLD: 3.73 M/UL — LOW (ref 3.8–5.2)
RBC # BLD: 3.73 M/UL — LOW (ref 3.8–5.2)
RBC # FLD: 14.3 % — SIGNIFICANT CHANGE UP (ref 10.3–14.5)
RBC # FLD: 14.3 % — SIGNIFICANT CHANGE UP (ref 10.3–14.5)
WBC # BLD: 7.6 K/UL — SIGNIFICANT CHANGE UP (ref 3.8–10.5)
WBC # BLD: 7.6 K/UL — SIGNIFICANT CHANGE UP (ref 3.8–10.5)
WBC # FLD AUTO: 7.6 K/UL — SIGNIFICANT CHANGE UP (ref 3.8–10.5)
WBC # FLD AUTO: 7.6 K/UL — SIGNIFICANT CHANGE UP (ref 3.8–10.5)

## 2023-12-29 ENCOUNTER — APPOINTMENT (OUTPATIENT)
Dept: FAMILY MEDICINE | Facility: CLINIC | Age: 73
End: 2023-12-29

## 2023-12-29 DIAGNOSIS — R11.2 NAUSEA WITH VOMITING, UNSPECIFIED: ICD-10-CM

## 2023-12-29 DIAGNOSIS — Z51.11 ENCOUNTER FOR ANTINEOPLASTIC CHEMOTHERAPY: ICD-10-CM

## 2023-12-29 LAB
ALBUMIN SERPL ELPH-MCNC: 3.9 G/DL — SIGNIFICANT CHANGE UP (ref 3.3–5)
ALBUMIN SERPL ELPH-MCNC: 3.9 G/DL — SIGNIFICANT CHANGE UP (ref 3.3–5)
ALP SERPL-CCNC: 65 U/L — SIGNIFICANT CHANGE UP (ref 40–120)
ALP SERPL-CCNC: 65 U/L — SIGNIFICANT CHANGE UP (ref 40–120)
ALT FLD-CCNC: 14 U/L — SIGNIFICANT CHANGE UP (ref 10–45)
ALT FLD-CCNC: 14 U/L — SIGNIFICANT CHANGE UP (ref 10–45)
ANION GAP SERPL CALC-SCNC: 11 MMOL/L — SIGNIFICANT CHANGE UP (ref 5–17)
ANION GAP SERPL CALC-SCNC: 11 MMOL/L — SIGNIFICANT CHANGE UP (ref 5–17)
AST SERPL-CCNC: 20 U/L — SIGNIFICANT CHANGE UP (ref 10–40)
AST SERPL-CCNC: 20 U/L — SIGNIFICANT CHANGE UP (ref 10–40)
BILIRUB SERPL-MCNC: 0.2 MG/DL — SIGNIFICANT CHANGE UP (ref 0.2–1.2)
BILIRUB SERPL-MCNC: 0.2 MG/DL — SIGNIFICANT CHANGE UP (ref 0.2–1.2)
BUN SERPL-MCNC: 14 MG/DL — SIGNIFICANT CHANGE UP (ref 7–23)
BUN SERPL-MCNC: 14 MG/DL — SIGNIFICANT CHANGE UP (ref 7–23)
CALCIUM SERPL-MCNC: 9 MG/DL — SIGNIFICANT CHANGE UP (ref 8.4–10.5)
CALCIUM SERPL-MCNC: 9 MG/DL — SIGNIFICANT CHANGE UP (ref 8.4–10.5)
CHLORIDE SERPL-SCNC: 109 MMOL/L — HIGH (ref 96–108)
CHLORIDE SERPL-SCNC: 109 MMOL/L — HIGH (ref 96–108)
CO2 SERPL-SCNC: 22 MMOL/L — SIGNIFICANT CHANGE UP (ref 22–31)
CO2 SERPL-SCNC: 22 MMOL/L — SIGNIFICANT CHANGE UP (ref 22–31)
CREAT SERPL-MCNC: 0.56 MG/DL — SIGNIFICANT CHANGE UP (ref 0.5–1.3)
CREAT SERPL-MCNC: 0.56 MG/DL — SIGNIFICANT CHANGE UP (ref 0.5–1.3)
EGFR: 97 ML/MIN/1.73M2 — SIGNIFICANT CHANGE UP
EGFR: 97 ML/MIN/1.73M2 — SIGNIFICANT CHANGE UP
GLUCOSE SERPL-MCNC: 70 MG/DL — SIGNIFICANT CHANGE UP (ref 70–99)
GLUCOSE SERPL-MCNC: 70 MG/DL — SIGNIFICANT CHANGE UP (ref 70–99)
IGA FLD-MCNC: 31 MG/DL — LOW (ref 84–499)
IGA FLD-MCNC: 31 MG/DL — LOW (ref 84–499)
IGG FLD-MCNC: 260 MG/DL — LOW (ref 610–1660)
IGG FLD-MCNC: 260 MG/DL — LOW (ref 610–1660)
IGM SERPL-MCNC: 17 MG/DL — LOW (ref 35–242)
IGM SERPL-MCNC: 17 MG/DL — LOW (ref 35–242)
KAPPA LC SER QL IFE: 1.62 MG/DL — SIGNIFICANT CHANGE UP (ref 0.33–1.94)
KAPPA LC SER QL IFE: 1.62 MG/DL — SIGNIFICANT CHANGE UP (ref 0.33–1.94)
KAPPA/LAMBDA FREE LIGHT CHAIN RATIO, SERUM: 3 RATIO — HIGH (ref 0.26–1.65)
KAPPA/LAMBDA FREE LIGHT CHAIN RATIO, SERUM: 3 RATIO — HIGH (ref 0.26–1.65)
LAMBDA LC SER QL IFE: 0.54 MG/DL — LOW (ref 0.57–2.63)
LAMBDA LC SER QL IFE: 0.54 MG/DL — LOW (ref 0.57–2.63)
POTASSIUM SERPL-MCNC: 4.2 MMOL/L — SIGNIFICANT CHANGE UP (ref 3.5–5.3)
POTASSIUM SERPL-MCNC: 4.2 MMOL/L — SIGNIFICANT CHANGE UP (ref 3.5–5.3)
POTASSIUM SERPL-SCNC: 4.2 MMOL/L — SIGNIFICANT CHANGE UP (ref 3.5–5.3)
POTASSIUM SERPL-SCNC: 4.2 MMOL/L — SIGNIFICANT CHANGE UP (ref 3.5–5.3)
PROT SERPL-MCNC: 5.5 G/DL — LOW (ref 6–8.3)
PROT SERPL-MCNC: 5.5 G/DL — LOW (ref 6–8.3)
PROT SERPL-MCNC: 6 G/DL — SIGNIFICANT CHANGE UP (ref 6–8.3)
PROT SERPL-MCNC: 6 G/DL — SIGNIFICANT CHANGE UP (ref 6–8.3)
SODIUM SERPL-SCNC: 143 MMOL/L — SIGNIFICANT CHANGE UP (ref 135–145)
SODIUM SERPL-SCNC: 143 MMOL/L — SIGNIFICANT CHANGE UP (ref 135–145)

## 2024-01-03 LAB
% ALBUMIN: 66.3 % — SIGNIFICANT CHANGE UP
% ALBUMIN: 66.3 % — SIGNIFICANT CHANGE UP
% ALPHA 1: 5.5 % — SIGNIFICANT CHANGE UP
% ALPHA 1: 5.5 % — SIGNIFICANT CHANGE UP
% ALPHA 2: 12.6 % — SIGNIFICANT CHANGE UP
% ALPHA 2: 12.6 % — SIGNIFICANT CHANGE UP
% BETA: 10.6 % — SIGNIFICANT CHANGE UP
% BETA: 10.6 % — SIGNIFICANT CHANGE UP
% GAMMA: 5 % — SIGNIFICANT CHANGE UP
% GAMMA: 5 % — SIGNIFICANT CHANGE UP
% M SPIKE: SIGNIFICANT CHANGE UP
% M SPIKE: SIGNIFICANT CHANGE UP
ALBUMIN SERPL ELPH-MCNC: 3.6 G/DL — SIGNIFICANT CHANGE UP (ref 3.6–5.5)
ALBUMIN SERPL ELPH-MCNC: 3.6 G/DL — SIGNIFICANT CHANGE UP (ref 3.6–5.5)
ALBUMIN/GLOB SERPL ELPH: 1.9 RATIO — SIGNIFICANT CHANGE UP
ALBUMIN/GLOB SERPL ELPH: 1.9 RATIO — SIGNIFICANT CHANGE UP
ALPHA1 GLOB SERPL ELPH-MCNC: 0.3 G/DL — SIGNIFICANT CHANGE UP (ref 0.1–0.4)
ALPHA1 GLOB SERPL ELPH-MCNC: 0.3 G/DL — SIGNIFICANT CHANGE UP (ref 0.1–0.4)
ALPHA2 GLOB SERPL ELPH-MCNC: 0.7 G/DL — SIGNIFICANT CHANGE UP (ref 0.5–1)
ALPHA2 GLOB SERPL ELPH-MCNC: 0.7 G/DL — SIGNIFICANT CHANGE UP (ref 0.5–1)
B-GLOBULIN SERPL ELPH-MCNC: 0.6 G/DL — SIGNIFICANT CHANGE UP (ref 0.5–1)
B-GLOBULIN SERPL ELPH-MCNC: 0.6 G/DL — SIGNIFICANT CHANGE UP (ref 0.5–1)
GAMMA GLOBULIN: 0.3 G/DL — LOW (ref 0.6–1.6)
GAMMA GLOBULIN: 0.3 G/DL — LOW (ref 0.6–1.6)
INTERPRETATION SERPL IFE-IMP: SIGNIFICANT CHANGE UP
INTERPRETATION SERPL IFE-IMP: SIGNIFICANT CHANGE UP
M-SPIKE: SIGNIFICANT CHANGE UP (ref 0–0)
M-SPIKE: SIGNIFICANT CHANGE UP (ref 0–0)
PROT PATTERN SERPL ELPH-IMP: SIGNIFICANT CHANGE UP
PROT PATTERN SERPL ELPH-IMP: SIGNIFICANT CHANGE UP
PROT SERPL-MCNC: 5.5 G/DL — LOW (ref 6–8.3)
PROT SERPL-MCNC: 5.5 G/DL — LOW (ref 6–8.3)

## 2024-01-11 NOTE — ASSESSMENT
[FreeTextEntry1] : 1) IgG kappa myeloma, s/p RVD then changed to KRd with rising KFLC, followed by autologous peripheral stem cell transplant on 19, now with rising KFLC.\par Plan-  I previously discussed initiating immunomodulatory therapy with Pomalyst as she has recovered well post autoPSCT and has minimal symptoms. I discussed rationale of starting Pomalyst versus Revlimid therapy. I discussed potential side effects of Pomalyst, including but not limited to, risk of thrombosis, rash, gastrointestinal/hepatic, musculoskeletal, myelosuppression, risk of infection, secondary malignancy, fatigue. She agrees to begin Pomalyst. Begin Pomalyst 3 mg po daily on 19, take 21 days then 7 days off. C1D21- 19\par Begin aspirin 81 mg po daily on 19.\par 19:\par Previously on Pomalyst 3 mg po every other day(Day 1 of Cycle 2 Pomalyst started on 6/15/19).\par Adjusted schedule of Pomalyst to 3 mg po daily x 21 days, then 7 days off; (started cycle on 19)\par Continue aspirin 81 mg po daily.\par Continue Decadron 20 mg po 2X/week(on Tuesday, Friday)\par Continue Acyclovir while on Pom/dex therapy; off Mepron\par Drink plenty of water daily\par Await CMP, myeloma labs\par Continue current medications as documented in medication history\par Discontinued restrictions on 19\par Continue MVI with folate\par Continue antiemetics(Zofran, Reglan) as needed.\par Hold Amlodipine if SBP <=110\par Moisturizing cream to the skin several times per day\par Recommend PET/CT scan to assess new bone involvement, pre-initiating new therapy. \par Patient stated that she would like to resume followup with primary hematologist; recommend addition of Daratumumab to Pom/Dex given persistent myeloma on bone marrow evaluation done 19. As recent KFLC is stable, no objection to patient proceeding with her planned vacation on - 20. During office visit today, I discussed potential benefit as well as risks and toxicities of Daratumumab(in combination with Pom/Dex) with patient. She will sign consent form at [facility] with Dr. De Oliveira. \par Repeat lab tests on20, after returning from vacation. \par Hematology followup with Dr. De Oliveira as directed. \par 20:\par Continue Pom/Dex/Darzalex at [facility]\par Continue Acyclovir prophylaxis\par Patient initiated 12 month post-transplant vaccinations on 3/16/20; she requested to receive week 2 at [facility], vaccination schedule sent to Dr. De Oliveira. Will check if patient can receive 14 month post-transplant vaccines at [facility].\par Echo, PFT's to assess vital organ function at 1 yr post transplant will be done locally after COVID pandemic controlled with testing considered safe. Dr. De Oliveira to order\par \par I confirmed patient's name and  at beginning of Telehealth service. Verbal consent for Telehealth services given on 20 at 12:23 pm by Anahi Calero, cornelia. Visit start time- 12:22 pm- visit end time- 12:37 pm. Total visit time- 15 minutes. \par \par RTC in 2021.  Wheelchair/Stroller

## 2024-01-19 ENCOUNTER — LABORATORY RESULT (OUTPATIENT)
Age: 74
End: 2024-01-19

## 2024-01-19 ENCOUNTER — NON-APPOINTMENT (OUTPATIENT)
Age: 74
End: 2024-01-19

## 2024-01-19 ENCOUNTER — APPOINTMENT (OUTPATIENT)
Dept: CARDIOLOGY | Facility: CLINIC | Age: 74
End: 2024-01-19
Payer: MEDICARE

## 2024-01-19 ENCOUNTER — APPOINTMENT (OUTPATIENT)
Dept: FAMILY MEDICINE | Facility: CLINIC | Age: 74
End: 2024-01-19
Payer: MEDICARE

## 2024-01-19 VITALS
HEIGHT: 63 IN | SYSTOLIC BLOOD PRESSURE: 126 MMHG | OXYGEN SATURATION: 98 % | HEART RATE: 70 BPM | TEMPERATURE: 97.3 F | WEIGHT: 154 LBS | BODY MASS INDEX: 27.29 KG/M2 | DIASTOLIC BLOOD PRESSURE: 68 MMHG

## 2024-01-19 VITALS
DIASTOLIC BLOOD PRESSURE: 76 MMHG | HEART RATE: 55 BPM | BODY MASS INDEX: 27.11 KG/M2 | HEIGHT: 63 IN | SYSTOLIC BLOOD PRESSURE: 124 MMHG | RESPIRATION RATE: 16 BRPM | WEIGHT: 153 LBS

## 2024-01-19 DIAGNOSIS — I34.0 NONRHEUMATIC MITRAL (VALVE) INSUFFICIENCY: ICD-10-CM

## 2024-01-19 DIAGNOSIS — U07.1 COVID-19: ICD-10-CM

## 2024-01-19 DIAGNOSIS — J45.901 UNSPECIFIED ASTHMA WITH (ACUTE) EXACERBATION: ICD-10-CM

## 2024-01-19 LAB
T3 SERPL-MCNC: 120 NG/DL
T4 FREE SERPL-MCNC: 1.3 NG/DL
TSH SERPL-ACNC: 0.29 UIU/ML

## 2024-01-19 PROCEDURE — 99214 OFFICE O/P EST MOD 30 MIN: CPT

## 2024-01-19 PROCEDURE — 99213 OFFICE O/P EST LOW 20 MIN: CPT

## 2024-01-19 NOTE — PHYSICAL EXAM
[No Acute Distress] : no acute distress [No Lymphadenopathy] : no lymphadenopathy [Normal Rate] : the respiratory rate was normal [Wet Cough] : a wet cough was heard [Rales / Crackles Bilateral] : no rales or crackles were heard [Wheezing Bilaterally] : no wheezing was heard [Rhonchi Bilateral] : no rhonchi were heard [Bronchial Breath Sounds Bilaterally] : bronchial breath sounds were heard over the middle lung fields bilaterally [Normal] : affect was normal and insight and judgment were intact

## 2024-01-19 NOTE — HISTORY OF PRESENT ILLNESS
[FreeTextEntry8] : Patient complains of cough, chest and nasal congestion x 2 weeks. Patient stated she tested positive for COVID 2 wks ago on 01/05/2024. Patient has been taking Dayquil and Nyquil with minor relief. Cough is productive, unable to expectorate, mild sob, no fever.

## 2024-01-19 NOTE — DISCUSSION/SUMMARY
[FreeTextEntry1] : 1.  No additional cardiac testing at this time.  Repeat echocardiogram and ambulatory monitor in a year or if she has any changing or worsening in her symptoms. 2.  Patient discussed with endocrinologist and per them will remain on rosuvastatin 5 mg every other day for now. 3.  Continue off medication of hypertension for now. 4.  Monitor BP at home, keep a log and bring to f/u. 5.  Encourage patient to maintain healthy habits. 6.  Patient encouraged  to find ways to reduce stress, such as meditation and/or yoga. 7.  Follow up here in six months.

## 2024-01-19 NOTE — PHYSICAL EXAM
[Well Developed] : well developed [Well Nourished] : well nourished [No Acute Distress] : no acute distress [Normal Conjunctiva] : normal conjunctiva [Normal Venous Pressure] : normal venous pressure [No Carotid Bruit] : no carotid bruit [Normal S1, S2] : normal S1, S2 [No Rub] : no rub [No Gallop] : no gallop [Clear Lung Fields] : clear lung fields [Good Air Entry] : good air entry [No Respiratory Distress] : no respiratory distress  [Soft] : abdomen soft [Non Tender] : non-tender [No Masses/organomegaly] : no masses/organomegaly [Normal Bowel Sounds] : normal bowel sounds [Normal Gait] : normal gait [No Edema] : no edema [No Cyanosis] : no cyanosis [No Clubbing] : no clubbing [No Varicosities] : no varicosities [No Rash] : no rash [No Skin Lesions] : no skin lesions [Moves all extremities] : moves all extremities [No Focal Deficits] : no focal deficits [Normal Speech] : normal speech [Alert and Oriented] : alert and oriented [Normal memory] : normal memory [de-identified] : 2/6 systolic ejection murmur

## 2024-01-19 NOTE — ASSESSMENT
[FreeTextEntry1] : Echocardiogram November 20, 2023 demonstrated left ventricle normal size and function with ejection fraction of 60 to 65%.  Grade 1 left ventricular diastolic dysfunction.  Mild left atrial enlargement.  Mild to moderate mitral and moderate to severe tricuspid regurgitation.  Mild pulmonary hypertension with estimated pulmonary systolic pressure of 45 mmHg.  Event monitor October 2016 November 24, 2023 showed a presenting rhythm of sinus with an average heart rate of 67 bpm, maximum 175, minimum 37 bpm.  2 brief episodes of what appears to be PAT were noted.  Rare PACs and PVCs.  73-year-old female with a past medical history of dyslipidemia, hypertension now off medication, multiple myeloma on chemotherapy who presented to me for evaluation of palpitations.  Event monitor does show brief episodes of PAT but these appear to been asymptomatic.  When she had symptoms she had just sinus rhythm.  I do not believe this is really related to her symptoms and palpitations are improving.  Echocardiogram shows a normal EF with mild to moderate mitral and moderate severe tricuspid regurgitation as well as mild pulmonary hypertension.  Despite this she still does not appear to have hypertension although she should be monitoring her blood pressure more closely at home.  I would not add any medication at this time or do any additional testing for now.  I will plan repeat echocardiogram and monitor in a year or if her symptoms worsen or change at all.  She discussed with her endocrinologist and it has been decided that she will remain on rosuvastatin every other day for now.

## 2024-01-19 NOTE — HISTORY OF PRESENT ILLNESS
[FreeTextEntry1] : Patient presents back to the office today feeling better.  She still has some palpitations at times but overall these are better.  She does note that she is under stress and wonders if that and anxiety are related to the palpitations.  She does not report really any other physical symptoms at this time.  No dizziness or lightheadedness.  Patient denies chest pain, shortness of breath, palpitations, orthopnea, presyncope, syncope.

## 2024-01-22 ENCOUNTER — APPOINTMENT (OUTPATIENT)
Dept: ENDOCRINOLOGY | Facility: CLINIC | Age: 74
End: 2024-01-22
Payer: MEDICARE

## 2024-01-22 VITALS — DIASTOLIC BLOOD PRESSURE: 72 MMHG | SYSTOLIC BLOOD PRESSURE: 120 MMHG

## 2024-01-22 VITALS — BODY MASS INDEX: 27.82 KG/M2 | WEIGHT: 157 LBS | HEIGHT: 63 IN

## 2024-01-22 DIAGNOSIS — E78.5 HYPERLIPIDEMIA, UNSPECIFIED: ICD-10-CM

## 2024-01-22 DIAGNOSIS — E55.9 VITAMIN D DEFICIENCY, UNSPECIFIED: ICD-10-CM

## 2024-01-22 DIAGNOSIS — M85.80 OTHER SPECIFIED DISORDERS OF BONE DENSITY AND STRUCTURE, UNSPECIFIED SITE: ICD-10-CM

## 2024-01-22 DIAGNOSIS — E05.90 THYROTOXICOSIS, UNSPECIFIED W/OUT THYROTOXIC CRISIS OR STORM: ICD-10-CM

## 2024-01-22 PROCEDURE — 99214 OFFICE O/P EST MOD 30 MIN: CPT

## 2024-01-22 NOTE — ASSESSMENT
[FreeTextEntry1] : Abnormal TFts in context of chemo for multiple myeloma - TSi and Trab negative. - Thyroid uptake and scan done in 2020 showed low uptake 7 % - Repeat thyroid uptake and scan 12/2023 showed increased uptake to 13%, MMI 2.5 started daily - TSH improved (0.13-> 0.29) with initiation of MMI - Continue with MMI 2.5 mg daily - Repeat TFT's prior to next visit with MD  Hyperlipidemia: continue with Statin, managed by PCP  MNG: Reviewed recent US from 08/2023- shows stable nodules. Repeat US in 1 year: 08/2024  Osteopenia: takes steroids only with chemoT - DEXA from 08/2023, FRAX total risk 12%/ hip 2.1% - DEXA next due 08/2025 - continue with OTC supplements - Will check Vitamin D levels prior to next visit   Follow up with MD in 3 months

## 2024-01-22 NOTE — DATA REVIEWED
[FreeTextEntry1] : Last ultrasound 08/15/2023: interval stability, bilateral thyroid nodules not significantly changed  Last DEXA 08/15/2023: Osteopenia. FRAX total risk 12%/ hip 2.1%  TFTs 10/26/23  TSH 0.13, Free T4 1.2, Total T3 97  TFTs 1/19/2024 TSH 0.29, FT4 1.3, TT3 120

## 2024-01-22 NOTE — HISTORY OF PRESENT ILLNESS
[FreeTextEntry1] : Interval history:  Had COVID earlier this month. Has been taking MMI 2.5 daily with no adverse effects.  Abnormal TFT's in context of chemo for multiple myeloma TSi and Trab negative. Thyroid uptake and scan done on 6/10/202: showed low uptake 7 % Repeat uptake and scan done on 12/19/2023: showed increased uptake of 13%  Onset: being treated with chemo for multiple myeloma Stable for years- was treated with no medication TFTs gradually worsening, uptake and scan showed increase uptake from 7-13% MMI 2.5 mg daily started 12/2023  TFTs 10/26/23  TSH 0.13, Free T4 1.2, Total T3 97  TFTs 1/19/2024 TSH 0.29, FT4 1.3, TT3 120   HLD Controlled by PCP Treated with Rosuvastatin 5 mg every other day   MNG/Thyroid uptake and scan Thyroiditis likely from chemo Multiple bilateral thyroid nodules, see below report Denies anterior neck pain, dysphagia, dysphonia Last ultrasound 08/15/2023: interval stability, bilateral thyroid nodules not significantly changed   Osteopenia Last DEXA 08/15/2023: Osteopenia. FRAX total risk 12%/ hip 2.1% Taking Calcium supplements OTC

## 2024-01-22 NOTE — PHYSICAL EXAM
[Alert] : alert [No Acute Distress] : no acute distress [Normal Sclera/Conjunctiva] : normal sclera/conjunctiva [Thyroid Not Enlarged] : the thyroid was not enlarged [No Respiratory Distress] : no respiratory distress [Normal Rate and Effort] : normal respiratory rate and effort [No Accessory Muscle Use] : no accessory muscle use [Clear to Auscultation] : lungs were clear to auscultation bilaterally [Normal Rate] : heart rate was normal [Regular Rhythm] : with a regular rhythm [Normal Bowel Sounds] : normal bowel sounds [Soft] : abdomen soft [No Tremors] : no tremors [Oriented x3] : oriented to person, place, and time [Normal Affect] : the affect was normal [Normal Mood] : the mood was normal

## 2024-01-22 NOTE — REVIEW OF SYSTEMS
[Fatigue] : no fatigue [Visual Field Defect] : no visual field defect [Dysphagia] : no dysphagia [Neck Pain] : no neck pain [Dysphonia] : no dysphonia [Chest Pain] : no chest pain [Palpitations] : no palpitations [Shortness Of Breath] : no shortness of breath [Nausea] : no nausea [Constipation] : no constipation [Vomiting] : no vomiting [Diarrhea] : no diarrhea [Polyuria] : no polyuria [Tremors] : no tremors [Cold Intolerance] : no cold intolerance [Heat Intolerance] : no heat intolerance

## 2024-01-25 ENCOUNTER — RESULT REVIEW (OUTPATIENT)
Age: 74
End: 2024-01-25

## 2024-01-25 ENCOUNTER — APPOINTMENT (OUTPATIENT)
Age: 74
End: 2024-01-25

## 2024-01-25 LAB
ALBUMIN SERPL ELPH-MCNC: 4.2 G/DL — SIGNIFICANT CHANGE UP (ref 3.3–5)
ALP SERPL-CCNC: 72 U/L — SIGNIFICANT CHANGE UP (ref 40–120)
ALT FLD-CCNC: 14 U/L — SIGNIFICANT CHANGE UP (ref 10–45)
ANION GAP SERPL CALC-SCNC: 12 MMOL/L — SIGNIFICANT CHANGE UP (ref 5–17)
AST SERPL-CCNC: 21 U/L — SIGNIFICANT CHANGE UP (ref 10–40)
BASOPHILS # BLD AUTO: 0.1 K/UL — SIGNIFICANT CHANGE UP (ref 0–0.2)
BASOPHILS NFR BLD AUTO: 1.5 % — SIGNIFICANT CHANGE UP (ref 0–2)
BILIRUB SERPL-MCNC: 0.3 MG/DL — SIGNIFICANT CHANGE UP (ref 0.2–1.2)
BUN SERPL-MCNC: 17 MG/DL — SIGNIFICANT CHANGE UP (ref 7–23)
CALCIUM SERPL-MCNC: 9.4 MG/DL — SIGNIFICANT CHANGE UP (ref 8.4–10.5)
CHLORIDE SERPL-SCNC: 107 MMOL/L — SIGNIFICANT CHANGE UP (ref 96–108)
CO2 SERPL-SCNC: 24 MMOL/L — SIGNIFICANT CHANGE UP (ref 22–31)
CREAT SERPL-MCNC: 0.66 MG/DL — SIGNIFICANT CHANGE UP (ref 0.5–1.3)
EGFR: 93 ML/MIN/1.73M2 — SIGNIFICANT CHANGE UP
EOSINOPHIL # BLD AUTO: 0.1 K/UL — SIGNIFICANT CHANGE UP (ref 0–0.5)
EOSINOPHIL NFR BLD AUTO: 1.1 % — SIGNIFICANT CHANGE UP (ref 0–6)
GLUCOSE SERPL-MCNC: 65 MG/DL — LOW (ref 70–99)
HCT VFR BLD CALC: 37.2 % — SIGNIFICANT CHANGE UP (ref 34.5–45)
HGB BLD-MCNC: 11.8 G/DL — SIGNIFICANT CHANGE UP (ref 11.5–15.5)
LYMPHOCYTES # BLD AUTO: 2.1 K/UL — SIGNIFICANT CHANGE UP (ref 1–3.3)
LYMPHOCYTES # BLD AUTO: 26.8 % — SIGNIFICANT CHANGE UP (ref 13–44)
MCHC RBC-ENTMCNC: 30.1 PG — SIGNIFICANT CHANGE UP (ref 27–34)
MCHC RBC-ENTMCNC: 31.8 G/DL — LOW (ref 32–36)
MCV RBC AUTO: 94.7 FL — SIGNIFICANT CHANGE UP (ref 80–100)
MONOCYTES # BLD AUTO: 0.6 K/UL — SIGNIFICANT CHANGE UP (ref 0–0.9)
MONOCYTES NFR BLD AUTO: 8.1 % — SIGNIFICANT CHANGE UP (ref 2–14)
NEUTROPHILS # BLD AUTO: 5 K/UL — SIGNIFICANT CHANGE UP (ref 1.8–7.4)
NEUTROPHILS NFR BLD AUTO: 62.5 % — SIGNIFICANT CHANGE UP (ref 43–77)
PLATELET # BLD AUTO: 239 K/UL — SIGNIFICANT CHANGE UP (ref 150–400)
POTASSIUM SERPL-MCNC: 3.5 MMOL/L — SIGNIFICANT CHANGE UP (ref 3.5–5.3)
POTASSIUM SERPL-SCNC: 3.5 MMOL/L — SIGNIFICANT CHANGE UP (ref 3.5–5.3)
PROT SERPL-MCNC: 6 G/DL — SIGNIFICANT CHANGE UP (ref 6–8.3)
RBC # BLD: 3.93 M/UL — SIGNIFICANT CHANGE UP (ref 3.8–5.2)
RBC # FLD: 14.8 % — HIGH (ref 10.3–14.5)
SODIUM SERPL-SCNC: 144 MMOL/L — SIGNIFICANT CHANGE UP (ref 135–145)
WBC # BLD: 8 K/UL — SIGNIFICANT CHANGE UP (ref 3.8–10.5)
WBC # FLD AUTO: 8 K/UL — SIGNIFICANT CHANGE UP (ref 3.8–10.5)

## 2024-01-26 ENCOUNTER — APPOINTMENT (OUTPATIENT)
Dept: FAMILY MEDICINE | Facility: CLINIC | Age: 74
End: 2024-01-26
Payer: MEDICARE

## 2024-01-26 VITALS
RESPIRATION RATE: 16 BRPM | TEMPERATURE: 97.3 F | BODY MASS INDEX: 28.17 KG/M2 | SYSTOLIC BLOOD PRESSURE: 136 MMHG | HEIGHT: 63 IN | HEART RATE: 68 BPM | OXYGEN SATURATION: 98 % | DIASTOLIC BLOOD PRESSURE: 80 MMHG | WEIGHT: 159 LBS

## 2024-01-26 DIAGNOSIS — J01.90 ACUTE SINUSITIS, UNSPECIFIED: ICD-10-CM

## 2024-01-26 DIAGNOSIS — G47.00 INSOMNIA, UNSPECIFIED: ICD-10-CM

## 2024-01-26 LAB
IGA FLD-MCNC: 32 MG/DL — LOW (ref 84–499)
IGG FLD-MCNC: 323 MG/DL — LOW (ref 610–1660)
IGM SERPL-MCNC: 16 MG/DL — LOW (ref 35–242)
KAPPA LC SER QL IFE: 1.18 MG/DL — SIGNIFICANT CHANGE UP (ref 0.33–1.94)
KAPPA/LAMBDA FREE LIGHT CHAIN RATIO, SERUM: 1.74 RATIO — HIGH (ref 0.26–1.65)
LAMBDA LC SER QL IFE: 0.68 MG/DL — SIGNIFICANT CHANGE UP (ref 0.57–2.63)
PROT SERPL-MCNC: 5.8 G/DL — LOW (ref 6–8.3)

## 2024-01-26 PROCEDURE — G2211 COMPLEX E/M VISIT ADD ON: CPT

## 2024-01-26 PROCEDURE — 99214 OFFICE O/P EST MOD 30 MIN: CPT

## 2024-01-26 RX ORDER — PREDNISONE 20 MG/1
20 TABLET ORAL
Qty: 10 | Refills: 0 | Status: DISCONTINUED | COMMUNITY
Start: 2024-01-19 | End: 2024-01-26

## 2024-01-26 RX ORDER — ZOLPIDEM TARTRATE 5 MG/1
5 TABLET ORAL
Qty: 30 | Refills: 2 | Status: ACTIVE | COMMUNITY
Start: 2024-01-26 | End: 1900-01-01

## 2024-01-26 NOTE — PHYSICAL EXAM
[Normal] : no acute distress, well nourished, well developed and well-appearing [EOMI] : extraocular movements intact [Supple] : supple [Coordination Grossly Intact] : coordination grossly intact [No Focal Deficits] : no focal deficits [Normal Gait] : normal gait [Normal Affect] : the affect was normal [Normal Insight/Judgement] : insight and judgment were intact [de-identified] : Mild rales

## 2024-01-26 NOTE — HISTORY OF PRESENT ILLNESS
[FreeTextEntry8] : Ms. LAITH VAUGHN is a 74-year female presenting with c/o persistent cough and congestion. Cough sounds wet but she is unable to Bring up any phlegm. She denies fever or chills. She states when she was on prednisone, symptoms did not improve. She is s/p covid infection diagnosed on 1/5/24.  Patient is complaining of not being able to sleep,

## 2024-01-28 LAB
% ALBUMIN: 66.2 % — SIGNIFICANT CHANGE UP
% ALPHA 1: 5.5 % — SIGNIFICANT CHANGE UP
% ALPHA 2: 13 % — SIGNIFICANT CHANGE UP
% BETA: 9.8 % — SIGNIFICANT CHANGE UP
% GAMMA: 5.5 % — SIGNIFICANT CHANGE UP
% M SPIKE: SIGNIFICANT CHANGE UP
ALBUMIN SERPL ELPH-MCNC: 3.8 G/DL — SIGNIFICANT CHANGE UP (ref 3.6–5.5)
ALBUMIN/GLOB SERPL ELPH: 1.9 RATIO — SIGNIFICANT CHANGE UP
ALPHA1 GLOB SERPL ELPH-MCNC: 0.3 G/DL — SIGNIFICANT CHANGE UP (ref 0.1–0.4)
ALPHA2 GLOB SERPL ELPH-MCNC: 0.8 G/DL — SIGNIFICANT CHANGE UP (ref 0.5–1)
B-GLOBULIN SERPL ELPH-MCNC: 0.6 G/DL — SIGNIFICANT CHANGE UP (ref 0.5–1)
GAMMA GLOBULIN: 0.3 G/DL — LOW (ref 0.6–1.6)
INTERPRETATION SERPL IFE-IMP: SIGNIFICANT CHANGE UP
M-SPIKE: SIGNIFICANT CHANGE UP (ref 0–0)
PROT PATTERN SERPL ELPH-IMP: SIGNIFICANT CHANGE UP
PROT SERPL-MCNC: 5.8 G/DL — LOW (ref 6–8.3)

## 2024-02-06 ENCOUNTER — APPOINTMENT (OUTPATIENT)
Dept: HEMATOLOGY ONCOLOGY | Facility: CLINIC | Age: 74
End: 2024-02-06
Payer: MEDICARE

## 2024-02-06 VITALS
HEIGHT: 63 IN | BODY MASS INDEX: 27.82 KG/M2 | WEIGHT: 157.04 LBS | TEMPERATURE: 97.6 F | OXYGEN SATURATION: 99 % | HEART RATE: 64 BPM | SYSTOLIC BLOOD PRESSURE: 143 MMHG | DIASTOLIC BLOOD PRESSURE: 78 MMHG

## 2024-02-06 PROCEDURE — 99214 OFFICE O/P EST MOD 30 MIN: CPT

## 2024-02-06 RX ORDER — AMOXICILLIN AND CLAVULANATE POTASSIUM 875; 125 MG/1; MG/1
875-125 TABLET, COATED ORAL
Qty: 20 | Refills: 0 | Status: DISCONTINUED | COMMUNITY
Start: 2024-01-26 | End: 2024-02-06

## 2024-02-06 NOTE — ASSESSMENT
[FreeTextEntry1] : 74 year old female IgG kappa multiple myeloma, FISH panel - positive for CCND1/IGH fusion - a/w favorable prognosis. PET CT (7/12/18) did not show any bone lesions. S/p VRd, followed by 4 cycles of KRd, followed by autoPSCT on 2/28/19. On 5/18/19 started on Pomalyst for persistently elevated KFLC at 50, FLC ratio 136. On Pomalyst + weekly Dex, she has had a partial response, her KFLC has improved to some degree and bone marrow in 12/2019 shows persistent plasma cells of 15-20%. Daratumumab was added from 2/3/2020 onwards. PET CT 5/15/20 with no FDG avid lesions, and slight enlargement of right adnexal cyst. Q4 week daratumumab started 7/30/20 3/24/2022 S/p R Shoulder Replacement for AVN 2/17/22 FLC ratio 4.10 4/14/22 FLC ratio 8.91 4/14/2022 Restarted Daratumumab q 4 weeks after shoulder surgery 6/9/22 FLC ratio 4.11 10/27/22 FLC ratio 4.37 11/7/22 FLC ratio 2.66 12/23/22 FLC ratio 2.75 1/20/23 FLC ration 4.06 2/17/23 FLC ration 4.53  3/17/23 FLC ratio 3.94 4/14/23 FLC ratio 4.42 5/12/23 FLC ratio 3.94 6/9/23 FLC ratio 4.03    7/13/23 FLC ratio 3.30 8/10/23 FLC ratio 3.74 1/25/24 FLC ratio 1.74   Plan: - Continue Daratumumab q 4 weeks, FLC ratio as above.  - Continue Pomalyst 3mg 3 weeks on followed by 1 week off - Anemia - stable likely secondary to pomalyst/darzalex, Hgb is 11.2 g/dl -Continue ASA -RTO 3  months

## 2024-02-06 NOTE — HISTORY OF PRESENT ILLNESS
[de-identified] : Ms. Calero is a 72 year old female w/ multiple myeloma. \par  \par  She was noted to have proteinuria and then had a 24 hour urine protein which showed non-nephrotic range proteinuria of 510 mg/24 hours. She was referred to Dr. Lesa Rendon of nephrology. SPEP showed faint M-spike 0.2 g/dL in gammaglobulin region, immunofixation showed this to be a IgG kappa monoclonal protein. UPEP showed 2 monoclonal protein bands, 61%, and 0.7%. \par  \par  Subsequent work up:\par  6/19/18 M-protein 0.2 g/dL, Kappa FLC 71, lambda FLC 0.36, FLC ratio 198.61\par  , Beta 2 microglobulin 1.5 mg/L\par  \par  She had a bone marrow biopsy on 6/22/18. Pathology: plasma cell neoplasm, plasma cells comprise 20-25% of marrow cells. Trilineage hematopoiesis present with maturation, increased iron stores. Congo red stain negative for amyloid. Karyotype 46, XX. FISH panel - positive for CCND1/IGH fusion - a/w favorable prognosis.\par  \par  7/12/18 PET - negative\par  \par  Treatment Summary \par  7/19/18 - C1 VRd\par  8/9/18 -C2 VRd\par  8/30/18 - C3 VRd\par  9/20/18 -partial C4 VRd\par  10/11/18 - 1/2/19 KRd (carfilzomib, Revlimid, Dexamethasone) x 4 cycles. \par  2/28/19 - autoPSCT \par  5/18/19 - started Pomalyst\par  7/19/19 - decadron 20 mg weekly added to Pomalyst.\par  2/03/20 - Daratumumab week 1-8 2/03 - 3/31/20. Week 9 (began every other week) 4/07/20. [de-identified] : Patient returns for follow-up visit.  s/p covid19 infection last night. Needed abx Breathing is improved. No cough. No weight loss. No pain.  No nausea/vomiting.  No diarrhea.        Notes intermittent numbness of toes for 1 year, not affecting QOL Has hand cramps and feet cramps for 1 year mostly at night  Notes started to experience left shoulder pain at rest and with ROM for 3-4 months. Denies trauma/injury. No edema of LUE. Denies radiating pain. Not affecting QOL  Denies headache, dizziness Denies fever, night sweats, infections  Denies dyspnea, cough Denies abdominal pain, nausea, vomiting, diarrhea Good appetite. No weight loss

## 2024-02-06 NOTE — ADDENDUM
[FreeTextEntry1] : Documented by Abigail Mccartney acting as a scribe for Dr. De Oliveira on [mm//dd/yyyy].   All Medical record entries made by the scribe were at my, Dr. De Oliveira, direction and personally directed by me on 10/30/2023. I have reviewed the chart and agree that the record accurately reflects my personal performance of the history, physical exam, assessment, and plan. I have also personally directed, reviewed, and agreed with the discharge instructions.

## 2024-02-06 NOTE — PHYSICAL EXAM
[Restricted in physically strenuous activity but ambulatory and able to carry out work of a light or sedentary nature] : Status 1- Restricted in physically strenuous activity but ambulatory and able to carry out work of a light or sedentary nature, e.g., light house work, office work [Normal] : affect appropriate [de-identified] : Pleasant, interactive in NAD.

## 2024-02-16 ENCOUNTER — OUTPATIENT (OUTPATIENT)
Dept: OUTPATIENT SERVICES | Facility: HOSPITAL | Age: 74
LOS: 1 days | Discharge: HOME | End: 2024-02-16

## 2024-02-16 DIAGNOSIS — Z98.890 OTHER SPECIFIED POSTPROCEDURAL STATES: Chronic | ICD-10-CM

## 2024-02-16 DIAGNOSIS — Z98.51 TUBAL LIGATION STATUS: Chronic | ICD-10-CM

## 2024-02-16 DIAGNOSIS — C90.00 MULTIPLE MYELOMA NOT HAVING ACHIEVED REMISSION: ICD-10-CM

## 2024-02-16 DIAGNOSIS — Z94.84 STEM CELLS TRANSPLANT STATUS: Chronic | ICD-10-CM

## 2024-02-22 ENCOUNTER — RESULT REVIEW (OUTPATIENT)
Age: 74
End: 2024-02-22

## 2024-02-22 ENCOUNTER — APPOINTMENT (OUTPATIENT)
Age: 74
End: 2024-02-22

## 2024-02-22 LAB
BASOPHILS # BLD AUTO: 0.1 K/UL — SIGNIFICANT CHANGE UP (ref 0–0.2)
BASOPHILS NFR BLD AUTO: 1.9 % — SIGNIFICANT CHANGE UP (ref 0–2)
EOSINOPHIL # BLD AUTO: 0.1 K/UL — SIGNIFICANT CHANGE UP (ref 0–0.5)
EOSINOPHIL NFR BLD AUTO: 1.7 % — SIGNIFICANT CHANGE UP (ref 0–6)
HCT VFR BLD CALC: 35.3 % — SIGNIFICANT CHANGE UP (ref 34.5–45)
HGB BLD-MCNC: 11.2 G/DL — LOW (ref 11.5–15.5)
LYMPHOCYTES # BLD AUTO: 2.4 K/UL — SIGNIFICANT CHANGE UP (ref 1–3.3)
LYMPHOCYTES # BLD AUTO: 41.4 % — SIGNIFICANT CHANGE UP (ref 13–44)
MCHC RBC-ENTMCNC: 30.4 PG — SIGNIFICANT CHANGE UP (ref 27–34)
MCHC RBC-ENTMCNC: 31.8 G/DL — LOW (ref 32–36)
MCV RBC AUTO: 95.4 FL — SIGNIFICANT CHANGE UP (ref 80–100)
MONOCYTES # BLD AUTO: 0.2 K/UL — SIGNIFICANT CHANGE UP (ref 0–0.9)
MONOCYTES NFR BLD AUTO: 4.2 % — SIGNIFICANT CHANGE UP (ref 2–14)
NEUTROPHILS # BLD AUTO: 2.9 K/UL — SIGNIFICANT CHANGE UP (ref 1.8–7.4)
NEUTROPHILS NFR BLD AUTO: 50.8 % — SIGNIFICANT CHANGE UP (ref 43–77)
PLATELET # BLD AUTO: 206 K/UL — SIGNIFICANT CHANGE UP (ref 150–400)
RBC # BLD: 3.7 M/UL — LOW (ref 3.8–5.2)
RBC # FLD: 15.1 % — HIGH (ref 10.3–14.5)
WBC # BLD: 5.7 K/UL — SIGNIFICANT CHANGE UP (ref 3.8–10.5)
WBC # FLD AUTO: 5.7 K/UL — SIGNIFICANT CHANGE UP (ref 3.8–10.5)

## 2024-02-23 DIAGNOSIS — Z51.11 ENCOUNTER FOR ANTINEOPLASTIC CHEMOTHERAPY: ICD-10-CM

## 2024-02-23 DIAGNOSIS — R11.2 NAUSEA WITH VOMITING, UNSPECIFIED: ICD-10-CM

## 2024-02-23 LAB
ALBUMIN SERPL ELPH-MCNC: 4 G/DL — SIGNIFICANT CHANGE UP (ref 3.3–5)
ALP SERPL-CCNC: 74 U/L — SIGNIFICANT CHANGE UP (ref 40–120)
ALT FLD-CCNC: 12 U/L — SIGNIFICANT CHANGE UP (ref 10–45)
ANION GAP SERPL CALC-SCNC: 11 MMOL/L — SIGNIFICANT CHANGE UP (ref 5–17)
AST SERPL-CCNC: 25 U/L — SIGNIFICANT CHANGE UP (ref 10–40)
BILIRUB SERPL-MCNC: 0.4 MG/DL — SIGNIFICANT CHANGE UP (ref 0.2–1.2)
BUN SERPL-MCNC: 11 MG/DL — SIGNIFICANT CHANGE UP (ref 7–23)
CALCIUM SERPL-MCNC: 8.9 MG/DL — SIGNIFICANT CHANGE UP (ref 8.4–10.5)
CHLORIDE SERPL-SCNC: 109 MMOL/L — HIGH (ref 96–108)
CO2 SERPL-SCNC: 21 MMOL/L — LOW (ref 22–31)
CREAT SERPL-MCNC: 0.6 MG/DL — SIGNIFICANT CHANGE UP (ref 0.5–1.3)
EGFR: 95 ML/MIN/1.73M2 — SIGNIFICANT CHANGE UP
GLUCOSE SERPL-MCNC: 160 MG/DL — HIGH (ref 70–99)
IGA FLD-MCNC: 30 MG/DL — LOW (ref 84–499)
IGG FLD-MCNC: 402 MG/DL — LOW (ref 610–1660)
IGM SERPL-MCNC: 21 MG/DL — LOW (ref 35–242)
KAPPA LC SER QL IFE: 1.34 MG/DL — SIGNIFICANT CHANGE UP (ref 0.33–1.94)
KAPPA/LAMBDA FREE LIGHT CHAIN RATIO, SERUM: 2.03 RATIO — HIGH (ref 0.26–1.65)
LAMBDA LC SER QL IFE: 0.66 MG/DL — SIGNIFICANT CHANGE UP (ref 0.57–2.63)
POTASSIUM SERPL-MCNC: 4.2 MMOL/L — SIGNIFICANT CHANGE UP (ref 3.5–5.3)
POTASSIUM SERPL-SCNC: 4.2 MMOL/L — SIGNIFICANT CHANGE UP (ref 3.5–5.3)
PROT SERPL-MCNC: 5.8 G/DL — LOW (ref 6–8.3)
PROT SERPL-MCNC: 6.2 G/DL — SIGNIFICANT CHANGE UP (ref 6–8.3)
SODIUM SERPL-SCNC: 140 MMOL/L — SIGNIFICANT CHANGE UP (ref 135–145)

## 2024-02-24 LAB
% ALBUMIN: 63.9 % — SIGNIFICANT CHANGE UP
% ALPHA 1: 5.3 % — SIGNIFICANT CHANGE UP
% ALPHA 2: 13 % — SIGNIFICANT CHANGE UP
% BETA: 11.1 % — SIGNIFICANT CHANGE UP
% GAMMA: 6.7 % — SIGNIFICANT CHANGE UP
% M SPIKE: 2 % — SIGNIFICANT CHANGE UP
ALBUMIN SERPL ELPH-MCNC: 3.7 G/DL — SIGNIFICANT CHANGE UP (ref 3.6–5.5)
ALBUMIN/GLOB SERPL ELPH: 1.8 RATIO — SIGNIFICANT CHANGE UP
ALPHA1 GLOB SERPL ELPH-MCNC: 0.3 G/DL — SIGNIFICANT CHANGE UP (ref 0.1–0.4)
ALPHA2 GLOB SERPL ELPH-MCNC: 0.8 G/DL — SIGNIFICANT CHANGE UP (ref 0.5–1)
B-GLOBULIN SERPL ELPH-MCNC: 0.6 G/DL — SIGNIFICANT CHANGE UP (ref 0.5–1)
GAMMA GLOBULIN: 0.4 G/DL — LOW (ref 0.6–1.6)
INTERPRETATION SERPL IFE-IMP: SIGNIFICANT CHANGE UP
M-SPIKE: 0.1 G/DL — HIGH (ref 0–0)
PROT PATTERN SERPL ELPH-IMP: SIGNIFICANT CHANGE UP
PROT SERPL-MCNC: 5.8 G/DL — LOW (ref 6–8.3)

## 2024-03-08 ENCOUNTER — NON-APPOINTMENT (OUTPATIENT)
Age: 74
End: 2024-03-08

## 2024-03-14 ENCOUNTER — RX RENEWAL (OUTPATIENT)
Age: 74
End: 2024-03-14

## 2024-03-21 ENCOUNTER — RESULT REVIEW (OUTPATIENT)
Age: 74
End: 2024-03-21

## 2024-03-21 ENCOUNTER — APPOINTMENT (OUTPATIENT)
Age: 74
End: 2024-03-21

## 2024-03-21 LAB
BASOPHILS # BLD AUTO: 0.1 K/UL — SIGNIFICANT CHANGE UP (ref 0–0.2)
BASOPHILS NFR BLD AUTO: 1.9 % — SIGNIFICANT CHANGE UP (ref 0–2)
EOSINOPHIL # BLD AUTO: 0.1 K/UL — SIGNIFICANT CHANGE UP (ref 0–0.5)
EOSINOPHIL NFR BLD AUTO: 1.3 % — SIGNIFICANT CHANGE UP (ref 0–6)
HCT VFR BLD CALC: 37.6 % — SIGNIFICANT CHANGE UP (ref 34.5–45)
HGB BLD-MCNC: 12.1 G/DL — SIGNIFICANT CHANGE UP (ref 11.5–15.5)
LYMPHOCYTES # BLD AUTO: 2.5 K/UL — SIGNIFICANT CHANGE UP (ref 1–3.3)
LYMPHOCYTES # BLD AUTO: 38.6 % — SIGNIFICANT CHANGE UP (ref 13–44)
MCHC RBC-ENTMCNC: 30.7 PG — SIGNIFICANT CHANGE UP (ref 27–34)
MCHC RBC-ENTMCNC: 32.1 G/DL — SIGNIFICANT CHANGE UP (ref 32–36)
MCV RBC AUTO: 95.6 FL — SIGNIFICANT CHANGE UP (ref 80–100)
MONOCYTES # BLD AUTO: 0.2 K/UL — SIGNIFICANT CHANGE UP (ref 0–0.9)
MONOCYTES NFR BLD AUTO: 3.7 % — SIGNIFICANT CHANGE UP (ref 2–14)
NEUTROPHILS # BLD AUTO: 3.5 K/UL — SIGNIFICANT CHANGE UP (ref 1.8–7.4)
NEUTROPHILS NFR BLD AUTO: 54.4 % — SIGNIFICANT CHANGE UP (ref 43–77)
PLATELET # BLD AUTO: 228 K/UL — SIGNIFICANT CHANGE UP (ref 150–400)
RBC # BLD: 3.93 M/UL — SIGNIFICANT CHANGE UP (ref 3.8–5.2)
RBC # FLD: 15 % — HIGH (ref 10.3–14.5)
WBC # BLD: 6.3 K/UL — SIGNIFICANT CHANGE UP (ref 3.8–10.5)
WBC # FLD AUTO: 6.3 K/UL — SIGNIFICANT CHANGE UP (ref 3.8–10.5)

## 2024-03-22 LAB
ALBUMIN SERPL ELPH-MCNC: 4.5 G/DL — SIGNIFICANT CHANGE UP (ref 3.3–5)
ALP SERPL-CCNC: 73 U/L — SIGNIFICANT CHANGE UP (ref 40–120)
ALT FLD-CCNC: 15 U/L — SIGNIFICANT CHANGE UP (ref 10–45)
ANION GAP SERPL CALC-SCNC: 13 MMOL/L — SIGNIFICANT CHANGE UP (ref 5–17)
AST SERPL-CCNC: 28 U/L — SIGNIFICANT CHANGE UP (ref 10–40)
BILIRUB SERPL-MCNC: 0.3 MG/DL — SIGNIFICANT CHANGE UP (ref 0.2–1.2)
BUN SERPL-MCNC: 12 MG/DL — SIGNIFICANT CHANGE UP (ref 7–23)
CALCIUM SERPL-MCNC: 9.3 MG/DL — SIGNIFICANT CHANGE UP (ref 8.4–10.5)
CHLORIDE SERPL-SCNC: 106 MMOL/L — SIGNIFICANT CHANGE UP (ref 96–108)
CO2 SERPL-SCNC: 24 MMOL/L — SIGNIFICANT CHANGE UP (ref 22–31)
CREAT SERPL-MCNC: 0.66 MG/DL — SIGNIFICANT CHANGE UP (ref 0.5–1.3)
EGFR: 93 ML/MIN/1.73M2 — SIGNIFICANT CHANGE UP
GLUCOSE SERPL-MCNC: 71 MG/DL — SIGNIFICANT CHANGE UP (ref 70–99)
IGA FLD-MCNC: 38 MG/DL — LOW (ref 84–499)
IGG FLD-MCNC: 370 MG/DL — LOW (ref 610–1660)
IGM SERPL-MCNC: 16 MG/DL — LOW (ref 35–242)
KAPPA LC SER QL IFE: 1.33 MG/DL — SIGNIFICANT CHANGE UP (ref 0.33–1.94)
KAPPA/LAMBDA FREE LIGHT CHAIN RATIO, SERUM: 2.11 RATIO — HIGH (ref 0.26–1.65)
LAMBDA LC SER QL IFE: 0.63 MG/DL — SIGNIFICANT CHANGE UP (ref 0.57–2.63)
POTASSIUM SERPL-MCNC: 4.2 MMOL/L — SIGNIFICANT CHANGE UP (ref 3.5–5.3)
POTASSIUM SERPL-SCNC: 4.2 MMOL/L — SIGNIFICANT CHANGE UP (ref 3.5–5.3)
PROT SERPL-MCNC: 6.5 G/DL — SIGNIFICANT CHANGE UP (ref 6–8.3)
PROT SERPL-MCNC: 6.6 G/DL — SIGNIFICANT CHANGE UP (ref 6–8.3)
SODIUM SERPL-SCNC: 142 MMOL/L — SIGNIFICANT CHANGE UP (ref 135–145)

## 2024-03-26 LAB
% ALBUMIN: 66.8 % — SIGNIFICANT CHANGE UP
% ALPHA 1: 5.1 % — SIGNIFICANT CHANGE UP
% ALPHA 2: 12.1 % — SIGNIFICANT CHANGE UP
% BETA: 10.3 % — SIGNIFICANT CHANGE UP
% GAMMA: 5.7 % — SIGNIFICANT CHANGE UP
% M SPIKE: SIGNIFICANT CHANGE UP
ALBUMIN SERPL ELPH-MCNC: 4.3 G/DL — SIGNIFICANT CHANGE UP (ref 3.6–5.5)
ALBUMIN/GLOB SERPL ELPH: 2 RATIO — SIGNIFICANT CHANGE UP
ALPHA1 GLOB SERPL ELPH-MCNC: 0.3 G/DL — SIGNIFICANT CHANGE UP (ref 0.1–0.4)
ALPHA2 GLOB SERPL ELPH-MCNC: 0.8 G/DL — SIGNIFICANT CHANGE UP (ref 0.5–1)
B-GLOBULIN SERPL ELPH-MCNC: 0.7 G/DL — SIGNIFICANT CHANGE UP (ref 0.5–1)
GAMMA GLOBULIN: 0.4 G/DL — LOW (ref 0.6–1.6)
INTERPRETATION SERPL IFE-IMP: SIGNIFICANT CHANGE UP
M-SPIKE: SIGNIFICANT CHANGE UP (ref 0–0)
PROT PATTERN SERPL ELPH-IMP: SIGNIFICANT CHANGE UP
PROT SERPL-MCNC: 6.5 G/DL — SIGNIFICANT CHANGE UP (ref 6–8.3)

## 2024-04-01 ENCOUNTER — LABORATORY RESULT (OUTPATIENT)
Age: 74
End: 2024-04-01

## 2024-04-02 LAB
25(OH)D3 SERPL-MCNC: 68.8 NG/ML
ALBUMIN SERPL ELPH-MCNC: 4.3 G/DL
ALP BLD-CCNC: 68 U/L
ALT SERPL-CCNC: 12 U/L
ANION GAP SERPL CALC-SCNC: 10 MMOL/L
AST SERPL-CCNC: 17 U/L
BASOPHILS # BLD AUTO: 0.09 K/UL
BASOPHILS NFR BLD AUTO: 1.8 %
BILIRUB SERPL-MCNC: 0.5 MG/DL
BUN SERPL-MCNC: 14 MG/DL
CALCIUM SERPL-MCNC: 9.3 MG/DL
CHLORIDE SERPL-SCNC: 108 MMOL/L
CHOLEST SERPL-MCNC: 201 MG/DL
CO2 SERPL-SCNC: 27 MMOL/L
CREAT SERPL-MCNC: 0.77 MG/DL
EGFR: 81 ML/MIN/1.73M2
EOSINOPHIL # BLD AUTO: 0.47 K/UL
EOSINOPHIL NFR BLD AUTO: 9 %
GLUCOSE SERPL-MCNC: 91 MG/DL
HCT VFR BLD CALC: 35.3 %
HDLC SERPL-MCNC: 90 MG/DL
HGB BLD-MCNC: 11.1 G/DL
LDLC SERPL CALC-MCNC: 101 MG/DL
LYMPHOCYTES # BLD AUTO: 3.22 K/UL
LYMPHOCYTES NFR BLD AUTO: 61.3 %
MAN DIFF?: NORMAL
MCHC RBC-ENTMCNC: 29.6 PG
MCHC RBC-ENTMCNC: 31.4 GM/DL
MCV RBC AUTO: 94.1 FL
MONOCYTES # BLD AUTO: 0.38 K/UL
MONOCYTES NFR BLD AUTO: 7.2 %
NEUTROPHILS # BLD AUTO: 1.09 K/UL
NEUTROPHILS NFR BLD AUTO: 20.7 %
NONHDLC SERPL-MCNC: 111 MG/DL
PLATELET # BLD AUTO: 164 K/UL
POTASSIUM SERPL-SCNC: 4.5 MMOL/L
PROT SERPL-MCNC: 5.7 G/DL
RBC # BLD: 3.75 M/UL
RBC # FLD: 15.9 %
SODIUM SERPL-SCNC: 145 MMOL/L
T3 SERPL-MCNC: 107 NG/DL
T4 FREE SERPL-MCNC: 1 NG/DL
TRIGL SERPL-MCNC: 56 MG/DL
TSH SERPL-ACNC: 0.95 UIU/ML
WBC # FLD AUTO: 5.25 K/UL

## 2024-04-10 ENCOUNTER — OUTPATIENT (OUTPATIENT)
Dept: OUTPATIENT SERVICES | Facility: HOSPITAL | Age: 74
LOS: 1 days | Discharge: HOME | End: 2024-04-10

## 2024-04-10 DIAGNOSIS — Z98.51 TUBAL LIGATION STATUS: Chronic | ICD-10-CM

## 2024-04-10 DIAGNOSIS — Z94.84 STEM CELLS TRANSPLANT STATUS: Chronic | ICD-10-CM

## 2024-04-10 DIAGNOSIS — C90.00 MULTIPLE MYELOMA NOT HAVING ACHIEVED REMISSION: ICD-10-CM

## 2024-04-10 DIAGNOSIS — Z98.890 OTHER SPECIFIED POSTPROCEDURAL STATES: Chronic | ICD-10-CM

## 2024-04-18 ENCOUNTER — RESULT REVIEW (OUTPATIENT)
Age: 74
End: 2024-04-18

## 2024-04-18 ENCOUNTER — APPOINTMENT (OUTPATIENT)
Age: 74
End: 2024-04-18

## 2024-04-18 LAB
BASOPHILS # BLD AUTO: 0.1 K/UL — SIGNIFICANT CHANGE UP (ref 0–0.2)
BASOPHILS NFR BLD AUTO: 1.5 % — SIGNIFICANT CHANGE UP (ref 0–2)
EOSINOPHIL # BLD AUTO: 0.1 K/UL — SIGNIFICANT CHANGE UP (ref 0–0.5)
EOSINOPHIL NFR BLD AUTO: 1 % — SIGNIFICANT CHANGE UP (ref 0–6)
HCT VFR BLD CALC: 36.9 % — SIGNIFICANT CHANGE UP (ref 34.5–45)
HGB BLD-MCNC: 12 G/DL — SIGNIFICANT CHANGE UP (ref 11.5–15.5)
LYMPHOCYTES # BLD AUTO: 2.5 K/UL — SIGNIFICANT CHANGE UP (ref 1–3.3)
LYMPHOCYTES # BLD AUTO: 40 % — SIGNIFICANT CHANGE UP (ref 13–44)
MCHC RBC-ENTMCNC: 30.7 PG — SIGNIFICANT CHANGE UP (ref 27–34)
MCHC RBC-ENTMCNC: 32.4 G/DL — SIGNIFICANT CHANGE UP (ref 32–36)
MCV RBC AUTO: 94.8 FL — SIGNIFICANT CHANGE UP (ref 80–100)
MONOCYTES # BLD AUTO: 0.2 K/UL — SIGNIFICANT CHANGE UP (ref 0–0.9)
MONOCYTES NFR BLD AUTO: 2.9 % — SIGNIFICANT CHANGE UP (ref 2–14)
NEUTROPHILS # BLD AUTO: 3.4 K/UL — SIGNIFICANT CHANGE UP (ref 1.8–7.4)
NEUTROPHILS NFR BLD AUTO: 54.6 % — SIGNIFICANT CHANGE UP (ref 43–77)
PLATELET # BLD AUTO: 202 K/UL — SIGNIFICANT CHANGE UP (ref 150–400)
RBC # BLD: 3.9 M/UL — SIGNIFICANT CHANGE UP (ref 3.8–5.2)
RBC # FLD: 14.5 % — SIGNIFICANT CHANGE UP (ref 10.3–14.5)
WBC # BLD: 6.2 K/UL — SIGNIFICANT CHANGE UP (ref 3.8–10.5)
WBC # FLD AUTO: 6.2 K/UL — SIGNIFICANT CHANGE UP (ref 3.8–10.5)

## 2024-04-19 ENCOUNTER — NON-APPOINTMENT (OUTPATIENT)
Age: 74
End: 2024-04-19

## 2024-04-19 DIAGNOSIS — R11.2 NAUSEA WITH VOMITING, UNSPECIFIED: ICD-10-CM

## 2024-04-19 DIAGNOSIS — Z51.11 ENCOUNTER FOR ANTINEOPLASTIC CHEMOTHERAPY: ICD-10-CM

## 2024-04-19 LAB
ALBUMIN SERPL ELPH-MCNC: 4.1 G/DL — SIGNIFICANT CHANGE UP (ref 3.3–5)
ALP SERPL-CCNC: 72 U/L — SIGNIFICANT CHANGE UP (ref 40–120)
ALT FLD-CCNC: 18 U/L — SIGNIFICANT CHANGE UP (ref 10–45)
ANION GAP SERPL CALC-SCNC: 19 MMOL/L — HIGH (ref 5–17)
AST SERPL-CCNC: 36 U/L — SIGNIFICANT CHANGE UP (ref 10–40)
BILIRUB SERPL-MCNC: 0.2 MG/DL — SIGNIFICANT CHANGE UP (ref 0.2–1.2)
BUN SERPL-MCNC: 14 MG/DL — SIGNIFICANT CHANGE UP (ref 7–23)
CALCIUM SERPL-MCNC: 9.7 MG/DL — SIGNIFICANT CHANGE UP (ref 8.4–10.5)
CHLORIDE SERPL-SCNC: 106 MMOL/L — SIGNIFICANT CHANGE UP (ref 96–108)
CO2 SERPL-SCNC: 16 MMOL/L — LOW (ref 22–31)
CREAT SERPL-MCNC: 0.62 MG/DL — SIGNIFICANT CHANGE UP (ref 0.5–1.3)
EGFR: 94 ML/MIN/1.73M2 — SIGNIFICANT CHANGE UP
GLUCOSE SERPL-MCNC: 51 MG/DL — CRITICAL LOW (ref 70–99)
IGA FLD-MCNC: 30 MG/DL — LOW (ref 84–499)
IGG FLD-MCNC: 335 MG/DL — LOW (ref 610–1660)
IGM SERPL-MCNC: 14 MG/DL — LOW (ref 35–242)
KAPPA LC SER QL IFE: 1.33 MG/DL — SIGNIFICANT CHANGE UP (ref 0.33–1.94)
KAPPA/LAMBDA FREE LIGHT CHAIN RATIO, SERUM: 2.05 RATIO — HIGH (ref 0.26–1.65)
LAMBDA LC SER QL IFE: 0.65 MG/DL — SIGNIFICANT CHANGE UP (ref 0.57–2.63)
POTASSIUM SERPL-MCNC: 5 MMOL/L — SIGNIFICANT CHANGE UP (ref 3.5–5.3)
POTASSIUM SERPL-SCNC: 5 MMOL/L — SIGNIFICANT CHANGE UP (ref 3.5–5.3)
PROT SERPL-MCNC: 6.7 G/DL — SIGNIFICANT CHANGE UP (ref 6–8.3)
PROT SERPL-MCNC: 6.7 G/DL — SIGNIFICANT CHANGE UP (ref 6–8.3)
SODIUM SERPL-SCNC: 141 MMOL/L — SIGNIFICANT CHANGE UP (ref 135–145)

## 2024-04-20 LAB
% ALBUMIN: 62.6 % — SIGNIFICANT CHANGE UP
% ALPHA 1: 6.1 % — SIGNIFICANT CHANGE UP
% ALPHA 2: 14.7 % — SIGNIFICANT CHANGE UP
% BETA: 11.2 % — SIGNIFICANT CHANGE UP
% GAMMA: 5.4 % — SIGNIFICANT CHANGE UP
% M SPIKE: SIGNIFICANT CHANGE UP
ALBUMIN SERPL ELPH-MCNC: 4.2 G/DL — SIGNIFICANT CHANGE UP (ref 3.6–5.5)
ALBUMIN/GLOB SERPL ELPH: 1.7 RATIO — SIGNIFICANT CHANGE UP
ALPHA1 GLOB SERPL ELPH-MCNC: 0.4 G/DL — SIGNIFICANT CHANGE UP (ref 0.1–0.4)
ALPHA2 GLOB SERPL ELPH-MCNC: 1 G/DL — SIGNIFICANT CHANGE UP (ref 0.5–1)
B-GLOBULIN SERPL ELPH-MCNC: 0.8 G/DL — SIGNIFICANT CHANGE UP (ref 0.5–1)
GAMMA GLOBULIN: 0.4 G/DL — LOW (ref 0.6–1.6)
INTERPRETATION SERPL IFE-IMP: SIGNIFICANT CHANGE UP
M-SPIKE: SIGNIFICANT CHANGE UP (ref 0–0)
PROT PATTERN SERPL ELPH-IMP: SIGNIFICANT CHANGE UP
PROT SERPL-MCNC: 6.7 G/DL — SIGNIFICANT CHANGE UP (ref 6–8.3)

## 2024-04-22 ENCOUNTER — APPOINTMENT (OUTPATIENT)
Dept: HEMATOLOGY ONCOLOGY | Facility: CLINIC | Age: 74
End: 2024-04-22

## 2024-04-22 ENCOUNTER — RESULT REVIEW (OUTPATIENT)
Age: 74
End: 2024-04-22

## 2024-04-22 LAB
BASOPHILS # BLD AUTO: 0.2 K/UL — SIGNIFICANT CHANGE UP (ref 0–0.2)
BASOPHILS NFR BLD AUTO: 2.6 % — HIGH (ref 0–2)
EOSINOPHIL # BLD AUTO: 0.2 K/UL — SIGNIFICANT CHANGE UP (ref 0–0.5)
EOSINOPHIL NFR BLD AUTO: 2.6 % — SIGNIFICANT CHANGE UP (ref 0–6)
HCT VFR BLD CALC: 37.2 % — SIGNIFICANT CHANGE UP (ref 34.5–45)
HGB BLD-MCNC: 11.9 G/DL — SIGNIFICANT CHANGE UP (ref 11.5–15.5)
LYMPHOCYTES # BLD AUTO: 2.3 K/UL — SIGNIFICANT CHANGE UP (ref 1–3.3)
LYMPHOCYTES # BLD AUTO: 37 % — SIGNIFICANT CHANGE UP (ref 13–44)
MCHC RBC-ENTMCNC: 30.2 PG — SIGNIFICANT CHANGE UP (ref 27–34)
MCHC RBC-ENTMCNC: 32 G/DL — SIGNIFICANT CHANGE UP (ref 32–36)
MCV RBC AUTO: 94.4 FL — SIGNIFICANT CHANGE UP (ref 80–100)
MONOCYTES # BLD AUTO: 0.8 K/UL — SIGNIFICANT CHANGE UP (ref 0–0.9)
MONOCYTES NFR BLD AUTO: 13.4 % — SIGNIFICANT CHANGE UP (ref 2–14)
NEUTROPHILS # BLD AUTO: 2.8 K/UL — SIGNIFICANT CHANGE UP (ref 1.8–7.4)
NEUTROPHILS NFR BLD AUTO: 44.4 % — SIGNIFICANT CHANGE UP (ref 43–77)
PLATELET # BLD AUTO: 184 K/UL — SIGNIFICANT CHANGE UP (ref 150–400)
RBC # BLD: 3.94 M/UL — SIGNIFICANT CHANGE UP (ref 3.8–5.2)
RBC # FLD: 14.2 % — SIGNIFICANT CHANGE UP (ref 10.3–14.5)
WBC # BLD: 6.3 K/UL — SIGNIFICANT CHANGE UP (ref 3.8–10.5)
WBC # FLD AUTO: 6.3 K/UL — SIGNIFICANT CHANGE UP (ref 3.8–10.5)

## 2024-04-24 LAB
ALBUMIN SERPL ELPH-MCNC: 4.2 G/DL
ALP BLD-CCNC: 72 U/L
ALT SERPL-CCNC: 10 U/L
ANION GAP SERPL CALC-SCNC: 11 MMOL/L
AST SERPL-CCNC: 16 U/L
BILIRUB SERPL-MCNC: 0.4 MG/DL
BUN SERPL-MCNC: 13 MG/DL
CALCIUM SERPL-MCNC: 9.2 MG/DL
CHLORIDE SERPL-SCNC: 109 MMOL/L
CO2 SERPL-SCNC: 25 MMOL/L
CREAT SERPL-MCNC: 0.69 MG/DL
EGFR: 92 ML/MIN/1.73M2
GLUCOSE SERPL-MCNC: 90 MG/DL
POTASSIUM SERPL-SCNC: 4.6 MMOL/L
PROT SERPL-MCNC: 5.6 G/DL
SODIUM SERPL-SCNC: 145 MMOL/L

## 2024-04-25 ENCOUNTER — APPOINTMENT (OUTPATIENT)
Dept: ENDOCRINOLOGY | Facility: CLINIC | Age: 74
End: 2024-04-25
Payer: MEDICARE

## 2024-04-25 VITALS
DIASTOLIC BLOOD PRESSURE: 70 MMHG | BODY MASS INDEX: 28 KG/M2 | HEART RATE: 58 BPM | WEIGHT: 158 LBS | SYSTOLIC BLOOD PRESSURE: 128 MMHG | HEIGHT: 63 IN | OXYGEN SATURATION: 99 %

## 2024-04-25 DIAGNOSIS — E04.2 NONTOXIC MULTINODULAR GOITER: ICD-10-CM

## 2024-04-25 DIAGNOSIS — M81.8 OTHER OSTEOPOROSIS W/OUT CURRENT PATHOLOGICAL FRACTURE: ICD-10-CM

## 2024-04-25 DIAGNOSIS — T38.0X5A OTHER OSTEOPOROSIS W/OUT CURRENT PATHOLOGICAL FRACTURE: ICD-10-CM

## 2024-04-25 DIAGNOSIS — I10 ESSENTIAL (PRIMARY) HYPERTENSION: ICD-10-CM

## 2024-04-25 DIAGNOSIS — R73.9 HYPERGLYCEMIA, UNSPECIFIED: ICD-10-CM

## 2024-04-25 PROCEDURE — 99214 OFFICE O/P EST MOD 30 MIN: CPT

## 2024-04-25 PROCEDURE — G2211 COMPLEX E/M VISIT ADD ON: CPT

## 2024-04-25 NOTE — PHYSICAL EXAM
[Alert] : alert [Well Nourished] : well nourished [No Acute Distress] : no acute distress [Well Developed] : well developed [Normal Sclera/Conjunctiva] : normal sclera/conjunctiva [EOMI] : extra ocular movement intact [No Proptosis] : no proptosis [Normal Oropharynx] : the oropharynx was normal [Thyroid Not Enlarged] : the thyroid was not enlarged [No Thyroid Nodules] : no palpable thyroid nodules [No Respiratory Distress] : no respiratory distress [No Accessory Muscle Use] : no accessory muscle use [Clear to Auscultation] : lungs were clear to auscultation bilaterally [Normal S1, S2] : normal S1 and S2 [Normal Rate] : heart rate was normal [Regular Rhythm] : with a regular rhythm [No Edema] : no peripheral edema [Pedal Pulses Normal] : the pedal pulses are present [Normal Bowel Sounds] : normal bowel sounds [Not Tender] : non-tender [Not Distended] : not distended [Soft] : abdomen soft [Normal Gait] : normal gait [No Rash] : no rash [Oriented x3] : oriented to person, place, and time [Acanthosis Nigricans] : no acanthosis nigricans

## 2024-04-25 NOTE — ASSESSMENT
[FreeTextEntry1] : Abnormal TFts in context of chemoT for multiple myeloma  thyroid uptake and scan done show low uptake 7 %  TPO ab positive, TSi and Trab  negative.  pt euthyroid clinically and biochemically. Cont  MMi 2.5 mg qd   Hyperlipidemia: continue crestor . lipids at target   obesity: discussed diet and exercise  MNG : US shows stable nodules. Repeat US in Spet 2024   Overweight: discussed diet and exercise  Osteopenia: takes steroids only with chemoT  cont calcium and vit D supplements.  FRAX score 9/1% . No need for BIP  DXA scan with FRAX score: 12/ 2.1 %.

## 2024-05-09 ENCOUNTER — APPOINTMENT (OUTPATIENT)
Dept: HEMATOLOGY ONCOLOGY | Facility: CLINIC | Age: 74
End: 2024-05-09
Payer: MEDICARE

## 2024-05-09 VITALS
WEIGHT: 153.04 LBS | HEIGHT: 63 IN | BODY MASS INDEX: 27.12 KG/M2 | OXYGEN SATURATION: 100 % | SYSTOLIC BLOOD PRESSURE: 156 MMHG | TEMPERATURE: 97.3 F | DIASTOLIC BLOOD PRESSURE: 83 MMHG | HEART RATE: 70 BPM

## 2024-05-09 PROCEDURE — 99214 OFFICE O/P EST MOD 30 MIN: CPT

## 2024-05-09 PROCEDURE — G2211 COMPLEX E/M VISIT ADD ON: CPT

## 2024-05-09 NOTE — PHYSICAL EXAM
[Restricted in physically strenuous activity but ambulatory and able to carry out work of a light or sedentary nature] : Status 1- Restricted in physically strenuous activity but ambulatory and able to carry out work of a light or sedentary nature, e.g., light house work, office work [Normal] : affect appropriate [de-identified] : Pleasant, interactive in NAD.

## 2024-05-09 NOTE — HISTORY OF PRESENT ILLNESS
[de-identified] : Ms. Calero is a 74 year old female w/ multiple myeloma.   She was noted to have proteinuria and then had a 24 hour urine protein which showed non-nephrotic range proteinuria of 510 mg/24 hours. She was referred to Dr. Lesa Rendon of nephrology. SPEP showed faint M-spike 0.2 g/dL in gammaglobulin region, immunofixation showed this to be a IgG kappa monoclonal protein. UPEP showed 2 monoclonal protein bands, 61%, and 0.7%.   Subsequent work up: 6/19/18 M-protein 0.2 g/dL, Kappa FLC 71, lambda FLC 0.36, FLC ratio 198.61 , Beta 2 microglobulin 1.5 mg/L  She had a bone marrow biopsy on 6/22/18. Pathology: plasma cell neoplasm, plasma cells comprise 20-25% of marrow cells. Trilineage hematopoiesis present with maturation, increased iron stores. Congo red stain negative for amyloid. Karyotype 46, XX. FISH panel - positive for CCND1/IGH fusion - a/w favorable prognosis.  7/12/18 PET - negative  Treatment Summary  7/19/18 - C1 VRd 8/9/18 -C2 VRd 8/30/18 - C3 VRd 9/20/18 -partial C4 VRd 10/11/18 - 1/2/19 KRd (carfilzomib, Revlimid, Dexamethasone) x 4 cycles.  2/28/19 - autoPSCT  5/18/19 - started Pomalyst 7/19/19 - decadron 20 mg weekly added to Pomalyst. 2/03/20 - Daratumumab week 1-8 2/03 - 3/31/20. Week 9 (began every other week) 4/07/20. [de-identified] : Patient returns for follow-up visit.  Denies infections, SOB, coughing  Neuropathy is unchanged Denies new pain       Notes intermittent numbness of toes for 1 year, not affecting QOL Has hand cramps and feet cramps for 1 year mostly at night  Notes started to experience left shoulder pain at rest and with ROM for 3-4 months. Denies trauma/injury. No edema of LUE. Denies radiating pain. Not affecting QOL  Denies headache, dizziness Denies fever, night sweats, infections  Denies dyspnea, cough Denies abdominal pain, nausea, vomiting, diarrhea Good appetite. No weight loss

## 2024-05-09 NOTE — ASSESSMENT
[FreeTextEntry1] : 74 year old female IgG kappa multiple myeloma, FISH panel - positive for CCND1/IGH fusion - a/w favorable prognosis. PET CT (7/12/18) did not show any bone lesions. S/p VRd, followed by 4 cycles of KRd, followed by autoPSCT on 2/28/19. On 5/18/19 started on Pomalyst for persistently elevated KFLC at 50, FLC ratio 136. On Pomalyst + weekly Dex, she has had a partial response, her KFLC has improved to some degree and bone marrow in 12/2019 shows persistent plasma cells of 15-20%. Daratumumab was added from 2/3/2020 onwards. PET CT 5/15/20 with no FDG avid lesions, and slight enlargement of right adnexal cyst. Q4 week daratumumab started 7/30/20 3/24/2022 S/p R Shoulder Replacement for AVN 2/17/22 FLC ratio 4.10 4/14/22 FLC ratio 8.91 4/14/2022 Restarted Daratumumab q 4 weeks after shoulder surgery 1/25/24 FLC ratio 1.74  Reviewed:  4/22/24 CBC:  WBC 6.3 , HGB 11.9, HCT 37.2 ,  4/18/24 FLC ratio 2.05  Plan: - Continue Daratumumab q 4 weeks - Continue Pomalyst 3mg 3 weeks on followed by 1 week off - Anemia - stable likely secondary to pomalyst/darzalex, Hgb is 11.9 g/dl -Continue ASA -RTO 3  months

## 2024-05-09 NOTE — ADDENDUM
[FreeTextEntry1] :  Documented by Dionna Burgess acting as scribe for Dr. De Oliveira on  05/09/2024.   All Medical record entries made by the Scribe were at my, Dr. De Oliveira's, direction and personally dictated by me on  05/09/2024. I have reviewed the chart and agree that the record accurately reflects my personal performance of the history, physical exam, assessment and plan. I have also personally directed, reviewed, and agreed with the discharge instructions.

## 2024-05-13 ENCOUNTER — APPOINTMENT (OUTPATIENT)
Dept: ORTHOPEDIC SURGERY | Facility: CLINIC | Age: 74
End: 2024-05-13
Payer: MEDICARE

## 2024-05-13 VITALS
WEIGHT: 153 LBS | DIASTOLIC BLOOD PRESSURE: 82 MMHG | BODY MASS INDEX: 27.11 KG/M2 | HEIGHT: 63 IN | SYSTOLIC BLOOD PRESSURE: 154 MMHG | HEART RATE: 60 BPM

## 2024-05-13 DIAGNOSIS — M25.511 PAIN IN RIGHT SHOULDER: ICD-10-CM

## 2024-05-13 DIAGNOSIS — Z96.619 PRESENCE OF UNSPECIFIED ARTIFICIAL SHOULDER JOINT: ICD-10-CM

## 2024-05-13 PROCEDURE — 73030 X-RAY EXAM OF SHOULDER: CPT | Mod: RT

## 2024-05-13 PROCEDURE — 99213 OFFICE O/P EST LOW 20 MIN: CPT

## 2024-05-13 NOTE — HISTORY OF PRESENT ILLNESS
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  The patient states that she is doing well and has minimal to no pain.  She does get some discomfort because of the position she sleeps in at night with her arm underneath her pillow.  Otherwise she is doing very well and has no complaints.  She is here today for routine annual follow-up.  She denies any fevers, chills, sweats, or pain elsewhere. [de-identified] : On exam, the patient is pleasant.  They  are awake, alert, and oriented x3.  The patient presents today with no assistive devices. Weight is appropriate for height Full range of motion of cervical spine without instability Full range of motion of back without instability Intact neurologic, vascular, and dermatologic exam to right and left upper extremities Intact neurologic, vascular, and dermatologic exam to right and left lower extremities  Right upper Extremity The patient's incision is well-healed.  No erythema, warmth, or drainage.  No swelling no bony tenderness to palpation about the shoulder. Range of motion: Forward flexion to 175, external rotation to 70, internal rotation to beltline.  Strength testin/5 in all planes. Motor and sensory function is intact, sensations intact light touch in C5-T1 distributions, DP/PT pulses are palpable, compartments are soft and compressible, there is no calf tenderness to palpation bilaterally. [de-identified] : X-ray 2 views of the right shoulder for previous office visit reviewed with the patient today.  There is no acute fracture or dislocation of the hardware remains in good position without any evidence of loosening [de-identified] : 74-year-old female status post right reverse total shoulder arthroplasty on 3/24/2022 [de-identified] : Anahi is doing well with her reverse shoulder replacement.  She has minimal to no pain unless she is sleeping a certain way.  She has good function.  Her x-rays demonstrate that her prosthesis remains stable without any evidence of loosening.  She may continue with all activities as tolerated.  She was reminded of of her range of motion and weightbearing restrictions.  She is advised to call for antibiotics prior to any future dental procedure.  She will follow-up in 1 year for routine x-ray surveillance.  She verbalizes her understanding and agrees with the plan.  All questions were answered to her satisfaction.

## 2024-05-16 ENCOUNTER — RESULT REVIEW (OUTPATIENT)
Age: 74
End: 2024-05-16

## 2024-05-16 ENCOUNTER — APPOINTMENT (OUTPATIENT)
Age: 74
End: 2024-05-16

## 2024-05-16 LAB
BASOPHILS # BLD AUTO: 0.1 K/UL — SIGNIFICANT CHANGE UP (ref 0–0.2)
BASOPHILS NFR BLD AUTO: 1.7 % — SIGNIFICANT CHANGE UP (ref 0–2)
EOSINOPHIL # BLD AUTO: 0.1 K/UL — SIGNIFICANT CHANGE UP (ref 0–0.5)
EOSINOPHIL NFR BLD AUTO: 1.1 % — SIGNIFICANT CHANGE UP (ref 0–6)
HCT VFR BLD CALC: 34.8 % — SIGNIFICANT CHANGE UP (ref 34.5–45)
HGB BLD-MCNC: 11.3 G/DL — LOW (ref 11.5–15.5)
LYMPHOCYTES # BLD AUTO: 2 K/UL — SIGNIFICANT CHANGE UP (ref 1–3.3)
LYMPHOCYTES # BLD AUTO: 32.8 % — SIGNIFICANT CHANGE UP (ref 13–44)
MCHC RBC-ENTMCNC: 30.6 PG — SIGNIFICANT CHANGE UP (ref 27–34)
MCHC RBC-ENTMCNC: 32.6 G/DL — SIGNIFICANT CHANGE UP (ref 32–36)
MCV RBC AUTO: 93.8 FL — SIGNIFICANT CHANGE UP (ref 80–100)
MONOCYTES # BLD AUTO: 0.3 K/UL — SIGNIFICANT CHANGE UP (ref 0–0.9)
MONOCYTES NFR BLD AUTO: 4.8 % — SIGNIFICANT CHANGE UP (ref 2–14)
NEUTROPHILS # BLD AUTO: 3.7 K/UL — SIGNIFICANT CHANGE UP (ref 1.8–7.4)
NEUTROPHILS NFR BLD AUTO: 59.6 % — SIGNIFICANT CHANGE UP (ref 43–77)
PLATELET # BLD AUTO: 191 K/UL — SIGNIFICANT CHANGE UP (ref 150–400)
RBC # BLD: 3.7 M/UL — LOW (ref 3.8–5.2)
RBC # FLD: 14 % — SIGNIFICANT CHANGE UP (ref 10.3–14.5)
WBC # BLD: 6.2 K/UL — SIGNIFICANT CHANGE UP (ref 3.8–10.5)
WBC # FLD AUTO: 6.2 K/UL — SIGNIFICANT CHANGE UP (ref 3.8–10.5)

## 2024-05-16 RX ORDER — ROSUVASTATIN CALCIUM 5 MG/1
1 TABLET ORAL
Qty: 0 | Refills: 0 | DISCHARGE

## 2024-05-16 RX ORDER — MELOXICAM 15 MG/1
1 TABLET ORAL
Qty: 0 | Refills: 0 | DISCHARGE

## 2024-05-16 RX ORDER — ASPIRIN/CALCIUM CARB/MAGNESIUM 324 MG
1 TABLET ORAL
Qty: 0 | Refills: 0 | DISCHARGE

## 2024-05-17 LAB
ALBUMIN SERPL ELPH-MCNC: 4.1 G/DL — SIGNIFICANT CHANGE UP (ref 3.3–5)
ALP SERPL-CCNC: 70 U/L — SIGNIFICANT CHANGE UP (ref 40–120)
ALT FLD-CCNC: 15 U/L — SIGNIFICANT CHANGE UP (ref 10–45)
ANION GAP SERPL CALC-SCNC: 13 MMOL/L — SIGNIFICANT CHANGE UP (ref 5–17)
AST SERPL-CCNC: 24 U/L — SIGNIFICANT CHANGE UP (ref 10–40)
BILIRUB SERPL-MCNC: 0.3 MG/DL — SIGNIFICANT CHANGE UP (ref 0.2–1.2)
BUN SERPL-MCNC: 11 MG/DL — SIGNIFICANT CHANGE UP (ref 7–23)
CALCIUM SERPL-MCNC: 9.2 MG/DL — SIGNIFICANT CHANGE UP (ref 8.4–10.5)
CHLORIDE SERPL-SCNC: 109 MMOL/L — HIGH (ref 96–108)
CO2 SERPL-SCNC: 23 MMOL/L — SIGNIFICANT CHANGE UP (ref 22–31)
CREAT SERPL-MCNC: 0.59 MG/DL — SIGNIFICANT CHANGE UP (ref 0.5–1.3)
EGFR: 95 ML/MIN/1.73M2 — SIGNIFICANT CHANGE UP
GLUCOSE SERPL-MCNC: 78 MG/DL — SIGNIFICANT CHANGE UP (ref 70–99)
HBV CORE AB SER-ACNC: SIGNIFICANT CHANGE UP
HBV SURFACE AB SER-ACNC: SIGNIFICANT CHANGE UP
HBV SURFACE AG SER-ACNC: SIGNIFICANT CHANGE UP
IGA FLD-MCNC: 25 MG/DL — LOW (ref 84–499)
IGG FLD-MCNC: 282 MG/DL — LOW (ref 610–1660)
IGM SERPL-MCNC: 12 MG/DL — LOW (ref 35–242)
KAPPA LC SER QL IFE: 1.04 MG/DL — SIGNIFICANT CHANGE UP (ref 0.33–1.94)
KAPPA/LAMBDA FREE LIGHT CHAIN RATIO, SERUM: 2.42 RATIO — HIGH (ref 0.26–1.65)
LAMBDA LC SER QL IFE: 0.43 MG/DL — LOW (ref 0.57–2.63)
POTASSIUM SERPL-MCNC: 4 MMOL/L — SIGNIFICANT CHANGE UP (ref 3.5–5.3)
POTASSIUM SERPL-SCNC: 4 MMOL/L — SIGNIFICANT CHANGE UP (ref 3.5–5.3)
PROT SERPL-MCNC: 6.1 G/DL — SIGNIFICANT CHANGE UP (ref 6–8.3)
PROT SERPL-MCNC: 6.1 G/DL — SIGNIFICANT CHANGE UP (ref 6–8.3)
SODIUM SERPL-SCNC: 145 MMOL/L — SIGNIFICANT CHANGE UP (ref 135–145)

## 2024-05-17 RX ORDER — ACYCLOVIR 400 MG/1
400 TABLET ORAL 3 TIMES DAILY
Qty: 90 | Refills: 5 | Status: ACTIVE | COMMUNITY
Start: 2019-03-15 | End: 1900-01-01

## 2024-05-21 LAB
% ALBUMIN: 65.6 % — SIGNIFICANT CHANGE UP
% ALPHA 1: 5.4 % — SIGNIFICANT CHANGE UP
% ALPHA 2: 13.3 % — SIGNIFICANT CHANGE UP
% BETA: 10.7 % — SIGNIFICANT CHANGE UP
% GAMMA: 5 % — SIGNIFICANT CHANGE UP
% M SPIKE: SIGNIFICANT CHANGE UP
ALBUMIN SERPL ELPH-MCNC: 4 G/DL — SIGNIFICANT CHANGE UP (ref 3.6–5.5)
ALBUMIN/GLOB SERPL ELPH: 1.9 RATIO — SIGNIFICANT CHANGE UP
ALPHA1 GLOB SERPL ELPH-MCNC: 0.3 G/DL — SIGNIFICANT CHANGE UP (ref 0.1–0.4)
ALPHA2 GLOB SERPL ELPH-MCNC: 0.8 G/DL — SIGNIFICANT CHANGE UP (ref 0.5–1)
B-GLOBULIN SERPL ELPH-MCNC: 0.7 G/DL — SIGNIFICANT CHANGE UP (ref 0.5–1)
GAMMA GLOBULIN: 0.3 G/DL — LOW (ref 0.6–1.6)
INTERPRETATION SERPL IFE-IMP: SIGNIFICANT CHANGE UP
M-SPIKE: SIGNIFICANT CHANGE UP (ref 0–0)
PROT PATTERN SERPL ELPH-IMP: SIGNIFICANT CHANGE UP
PROT SERPL-MCNC: 6.1 G/DL — SIGNIFICANT CHANGE UP (ref 6–8.3)

## 2024-06-06 ENCOUNTER — APPOINTMENT (OUTPATIENT)
Dept: FAMILY MEDICINE | Facility: CLINIC | Age: 74
End: 2024-06-06
Payer: MEDICARE

## 2024-06-06 ENCOUNTER — OUTPATIENT (OUTPATIENT)
Dept: OUTPATIENT SERVICES | Facility: HOSPITAL | Age: 74
LOS: 1 days | End: 2024-06-06
Payer: MEDICARE

## 2024-06-06 VITALS
HEART RATE: 98 BPM | HEIGHT: 63 IN | TEMPERATURE: 100.9 F | SYSTOLIC BLOOD PRESSURE: 132 MMHG | BODY MASS INDEX: 26.93 KG/M2 | OXYGEN SATURATION: 96 % | DIASTOLIC BLOOD PRESSURE: 84 MMHG | WEIGHT: 152 LBS

## 2024-06-06 DIAGNOSIS — R05.9 COUGH, UNSPECIFIED: ICD-10-CM

## 2024-06-06 DIAGNOSIS — Z94.84 STEM CELLS TRANSPLANT STATUS: Chronic | ICD-10-CM

## 2024-06-06 DIAGNOSIS — Z98.890 OTHER SPECIFIED POSTPROCEDURAL STATES: Chronic | ICD-10-CM

## 2024-06-06 DIAGNOSIS — J18.9 PNEUMONIA, UNSPECIFIED ORGANISM: ICD-10-CM

## 2024-06-06 DIAGNOSIS — Z98.51 TUBAL LIGATION STATUS: Chronic | ICD-10-CM

## 2024-06-06 PROCEDURE — 71046 X-RAY EXAM CHEST 2 VIEWS: CPT | Mod: 26

## 2024-06-06 PROCEDURE — 99213 OFFICE O/P EST LOW 20 MIN: CPT

## 2024-06-06 RX ORDER — AZITHROMYCIN 250 MG/1
250 TABLET, FILM COATED ORAL
Qty: 1 | Refills: 0 | Status: ACTIVE | COMMUNITY
Start: 2024-06-06 | End: 1900-01-01

## 2024-06-06 RX ORDER — AMOXICILLIN AND CLAVULANATE POTASSIUM 875; 125 MG/1; MG/1
875-125 TABLET, COATED ORAL
Qty: 14 | Refills: 0 | Status: COMPLETED | COMMUNITY
Start: 2024-06-06 | End: 2024-06-13

## 2024-06-06 RX ORDER — ROSUVASTATIN CALCIUM 5 MG/1
5 TABLET, FILM COATED ORAL
Refills: 0 | Status: ACTIVE | COMMUNITY

## 2024-06-06 NOTE — HISTORY OF PRESENT ILLNESS
[FreeTextEntry8] : LAITH is a 74 y.o female with a past medical history of multiple myeloma status post peripheral stem cell transplant who is currently on chemotherapy with Darzalex presents with c/o cough, congestion, and fever x 2 days ago. Patient states that 3 months ago she had been experiencing similar symptoms and after 1 and1/2 months of taking over-the-counter medication she [facility] where she was treated with 7 days of doxycycline with 3 days of prednisone. She notes that she has been feeling well until the last 2 days where she again developed a cough with congestion and fever at home.  In office today She does have a low-grade fever.  She denies body aches or chills or any sick contacts.

## 2024-06-06 NOTE — PHYSICAL EXAM
[Normal] : no acute distress, well nourished, well developed and well-appearing [Supple] : supple [de-identified] : Rhonchi in RLL, othewise normal examination

## 2024-06-06 NOTE — PLAN
[FreeTextEntry1] : Treat for presumed pneumonia in light of cough and fever in 75 yo female who is on chemotherapy. Viral panel sent to rule out. CXR to evaluate for any possible complications. Will follow up with results.

## 2024-06-10 LAB
RAPID RVP RESULT: DETECTED
RV+EV RNA SPEC QL NAA+PROBE: DETECTED
SARS-COV-2 RNA PNL RESP NAA+PROBE: NOT DETECTED

## 2024-06-17 ENCOUNTER — RX RENEWAL (OUTPATIENT)
Age: 74
End: 2024-06-17

## 2024-06-20 ENCOUNTER — RESULT REVIEW (OUTPATIENT)
Age: 74
End: 2024-06-20

## 2024-06-20 ENCOUNTER — APPOINTMENT (OUTPATIENT)
Age: 74
End: 2024-06-20

## 2024-06-20 ENCOUNTER — APPOINTMENT (OUTPATIENT)
Dept: HEMATOLOGY ONCOLOGY | Facility: CLINIC | Age: 74
End: 2024-06-20
Payer: MEDICARE

## 2024-06-20 VITALS
TEMPERATURE: 97.3 F | OXYGEN SATURATION: 100 % | BODY MASS INDEX: 26.93 KG/M2 | RESPIRATION RATE: 17 BRPM | DIASTOLIC BLOOD PRESSURE: 78 MMHG | HEIGHT: 63 IN | SYSTOLIC BLOOD PRESSURE: 145 MMHG | WEIGHT: 152 LBS | HEART RATE: 64 BPM

## 2024-06-20 DIAGNOSIS — C90.00 MULTIPLE MYELOMA NOT HAVING ACHIEVED REMISSION: ICD-10-CM

## 2024-06-20 DIAGNOSIS — Z79.899 OTHER LONG TERM (CURRENT) DRUG THERAPY: ICD-10-CM

## 2024-06-20 PROCEDURE — 99214 OFFICE O/P EST MOD 30 MIN: CPT

## 2024-06-20 RX ORDER — METHIMAZOLE 5 MG/1
TABLET ORAL
Refills: 0 | Status: ACTIVE | COMMUNITY

## 2024-06-20 NOTE — RESULTS/DATA
[FreeTextEntry1] : 	KFLC	KLFLC Ratio 05/08/19	50.32	136.00 08/15/19	25.08	51.18 11/19/19	34.61	57.68 12/16/19	15.87	52.90 Daratumumab added 2/03/20		 3/09/20	5.57  	20.63 4/07/20    4.69        16.17 5/7/20      3.53        16.05 6/02/20    2.59        13.63 8/4/20      1.94         9.70 9/15/20    2.09         10.45 10/20/20  2.30         12.11 11/24/20  1.99          9.05

## 2024-06-20 NOTE — ASSESSMENT
[FreeTextEntry1] : 74 year old female IgG kappa multiple myeloma, FISH panel - positive for CCND1/IGH fusion - a/w favorable prognosis. PET CT (7/12/18) did not show any bone lesions. S/p VRd, followed by 4 cycles of KRd, followed by autoPSCT on 2/28/19. On 5/18/19 started on Pomalyst for persistently elevated KFLC at 50, FLC ratio 136. On Pomalyst + weekly Dex, she has had a partial response, her KFLC has improved to some degree and bone marrow in 12/2019 shows persistent plasma cells of 15-20%. Daratumumab was added from 2/3/2020 onwards. PET CT 5/15/20 with no FDG avid lesions, and slight enlargement of right adnexal cyst. Q4 week daratumumab started 7/30/20 3/24/2022 S/p R Shoulder Replacement for AVN 2/17/22 FLC ratio 4.10 4/14/22 FLC ratio 8.91 4/14/2022 Restarted Daratumumab q 4 weeks after shoulder surgery 1/25/24 FLC ratio 1.74 2/22/24 FLC ratio 2.03 3/21/24 FLC ratio 2.11 4/18/24 FLC ratio 2.05 5/16/24 FLC ratio 2.42 M spike unable to quantify, weak IgG Kappa band identified  Recent common flu, bronchitis, completed Z pack no new bone pain, normal kidney function Anemia - stable likely secondary to pomalyst/darzalex, Hgb is 11.1 g/dl today CBC today: WBC 6.1 Hgb 11.1 HCT 34.4 %   Plan: - Continue Daratumumab q 4 weeks, treatment due today - Continue Pomalyst 3mg 3 weeks on followed by 1 week off -Repeat blood work today-pending CMP, Immunoelectrophoresis -Continue ASA, Acyclovir -Continue age- appropriate preventive care/ Follow up with PCP RTO 3 months

## 2024-06-20 NOTE — HISTORY OF PRESENT ILLNESS
[de-identified] : Ms. Calero is a 74 year old female w/ multiple myeloma.   She was noted to have proteinuria and then had a 24 hour urine protein which showed non-nephrotic range proteinuria of 510 mg/24 hours. She was referred to Dr. Lesa Rendon of nephrology. SPEP showed faint M-spike 0.2 g/dL in gammaglobulin region, immunofixation showed this to be a IgG kappa monoclonal protein. UPEP showed 2 monoclonal protein bands, 61%, and 0.7%.   Subsequent work up: 6/19/18 M-protein 0.2 g/dL, Kappa FLC 71, lambda FLC 0.36, FLC ratio 198.61 , Beta 2 microglobulin 1.5 mg/L  She had a bone marrow biopsy on 6/22/18. Pathology: plasma cell neoplasm, plasma cells comprise 20-25% of marrow cells. Trilineage hematopoiesis present with maturation, increased iron stores. Congo red stain negative for amyloid. Karyotype 46, XX. FISH panel - positive for CCND1/IGH fusion - a/w favorable prognosis.  7/12/18 PET - negative  Treatment Summary  7/19/18 - C1 VRd 8/9/18 -C2 VRd 8/30/18 - C3 VRd 9/20/18 -partial C4 VRd 10/11/18 - 1/2/19 KRd (carfilzomib, Revlimid, Dexamethasone) x 4 cycles.  2/28/19 - autoPSCT  5/18/19 - started Pomalyst 7/19/19 - decadron 20 mg weekly added to Pomalyst. 2/03/20 - Daratumumab week 1-8 2/03 - 3/31/20. Week 9 (began every other week) 4/07/20. [de-identified] : She presents for follow-up visit today Continues on Monthly Daratumumab Continues on Pomalyst, day 17/28 days Reports bronchitis for >1 month, saw PCP 2 weeks ago, completed Z pack, feels better today Reports dry cough Denies fever, chills, weight loss, bone pain, lumps or bumps on the neck/axilla/groin, chest pain, sob, fatigue, rashes, itching, abdominal pain, loss of appetite, nausea, vomiting, diarrhea, constipation, urinary symptoms some tingling on the fingers unchanged Up to date with colonoscopy, last one 3 years ago

## 2024-06-21 RX ORDER — METHIMAZOLE 5 MG/1
5 TABLET ORAL
Qty: 45 | Refills: 1 | Status: ACTIVE | COMMUNITY
Start: 2023-12-27 | End: 1900-01-01

## 2024-06-21 RX ORDER — POMALIDOMIDE 3 MG/1
3 CAPSULE ORAL
Qty: 21 | Refills: 0 | Status: ACTIVE | COMMUNITY
Start: 2019-05-15 | End: 1900-01-01

## 2024-07-11 ENCOUNTER — NON-APPOINTMENT (OUTPATIENT)
Age: 74
End: 2024-07-11

## 2024-07-11 ENCOUNTER — APPOINTMENT (OUTPATIENT)
Dept: CARDIOLOGY | Facility: CLINIC | Age: 74
End: 2024-07-11
Payer: MEDICARE

## 2024-07-11 VITALS
RESPIRATION RATE: 16 BRPM | DIASTOLIC BLOOD PRESSURE: 80 MMHG | SYSTOLIC BLOOD PRESSURE: 122 MMHG | WEIGHT: 156 LBS | BODY MASS INDEX: 27.64 KG/M2 | OXYGEN SATURATION: 97 % | HEART RATE: 54 BPM | HEIGHT: 63 IN

## 2024-07-11 DIAGNOSIS — E78.5 HYPERLIPIDEMIA, UNSPECIFIED: ICD-10-CM

## 2024-07-11 DIAGNOSIS — C90.00 MULTIPLE MYELOMA NOT HAVING ACHIEVED REMISSION: ICD-10-CM

## 2024-07-11 DIAGNOSIS — I10 ESSENTIAL (PRIMARY) HYPERTENSION: ICD-10-CM

## 2024-07-11 PROCEDURE — 99214 OFFICE O/P EST MOD 30 MIN: CPT

## 2024-07-11 PROCEDURE — G2211 COMPLEX E/M VISIT ADD ON: CPT

## 2024-07-11 PROCEDURE — 93000 ELECTROCARDIOGRAM COMPLETE: CPT

## 2024-07-15 ENCOUNTER — APPOINTMENT (OUTPATIENT)
Dept: OBGYN | Facility: CLINIC | Age: 74
End: 2024-07-15

## 2024-07-15 VITALS
SYSTOLIC BLOOD PRESSURE: 126 MMHG | HEIGHT: 63 IN | BODY MASS INDEX: 27.46 KG/M2 | DIASTOLIC BLOOD PRESSURE: 80 MMHG | WEIGHT: 155 LBS

## 2024-07-15 DIAGNOSIS — Z01.419 ENCOUNTER FOR GYNECOLOGICAL EXAMINATION (GENERAL) (ROUTINE) W/OUT ABNORMAL FINDINGS: ICD-10-CM

## 2024-07-15 DIAGNOSIS — Z12.39 ENCOUNTER FOR OTHER SCREENING FOR MALIGNANT NEOPLASM OF BREAST: ICD-10-CM

## 2024-07-15 PROCEDURE — 99459 PELVIC EXAMINATION: CPT

## 2024-07-15 PROCEDURE — 99397 PER PM REEVAL EST PAT 65+ YR: CPT

## 2024-07-18 ENCOUNTER — RESULT REVIEW (OUTPATIENT)
Age: 74
End: 2024-07-18

## 2024-07-18 ENCOUNTER — APPOINTMENT (OUTPATIENT)
Age: 74
End: 2024-07-18

## 2024-07-31 ENCOUNTER — NON-APPOINTMENT (OUTPATIENT)
Age: 74
End: 2024-07-31

## 2024-07-31 ENCOUNTER — LABORATORY RESULT (OUTPATIENT)
Age: 74
End: 2024-07-31

## 2024-08-01 ENCOUNTER — APPOINTMENT (OUTPATIENT)
Dept: ENDOCRINOLOGY | Facility: CLINIC | Age: 74
End: 2024-08-01
Payer: MEDICARE

## 2024-08-01 VITALS
DIASTOLIC BLOOD PRESSURE: 76 MMHG | OXYGEN SATURATION: 96 % | BODY MASS INDEX: 27.64 KG/M2 | SYSTOLIC BLOOD PRESSURE: 118 MMHG | HEIGHT: 63 IN | WEIGHT: 156 LBS | HEART RATE: 58 BPM

## 2024-08-01 DIAGNOSIS — M81.8 OTHER OSTEOPOROSIS W/OUT CURRENT PATHOLOGICAL FRACTURE: ICD-10-CM

## 2024-08-01 DIAGNOSIS — R79.89 OTHER SPECIFIED ABNORMAL FINDINGS OF BLOOD CHEMISTRY: ICD-10-CM

## 2024-08-01 DIAGNOSIS — T38.0X5A OTHER OSTEOPOROSIS W/OUT CURRENT PATHOLOGICAL FRACTURE: ICD-10-CM

## 2024-08-01 DIAGNOSIS — E78.5 HYPERLIPIDEMIA, UNSPECIFIED: ICD-10-CM

## 2024-08-01 DIAGNOSIS — E05.90 THYROTOXICOSIS, UNSPECIFIED W/OUT THYROTOXIC CRISIS OR STORM: ICD-10-CM

## 2024-08-01 PROCEDURE — 99214 OFFICE O/P EST MOD 30 MIN: CPT

## 2024-08-01 NOTE — ASSESSMENT
[FreeTextEntry1] :  Abnormal TFts in context of chemoT for multiple myeloma  thyroid uptake and scan done show low uptake 7 %  TPO ab positive, TSi and Trab  negative.  pt euthyroid clinically and biochemically. Cont  MMi 2.5 mg qd   Hyperlipidemia:  lipids high. INCrease crestor to 5 mg qd   obesity: discussed diet and exercise  MNG : US shows stable nodules. Repeat US in next appt   Osteopenia: takes steroids only with chemoT  cont calcium and vit D supplements.  FRAX score 9/1% . No need for BIP  DXA scan with FRAX score: 12/ 2.1 %.  next DEXA Aug 2025

## 2024-08-06 ENCOUNTER — RESULT REVIEW (OUTPATIENT)
Age: 74
End: 2024-08-06

## 2024-08-06 ENCOUNTER — OUTPATIENT (OUTPATIENT)
Dept: OUTPATIENT SERVICES | Facility: HOSPITAL | Age: 74
LOS: 1 days | End: 2024-08-06
Payer: COMMERCIAL

## 2024-08-06 ENCOUNTER — APPOINTMENT (OUTPATIENT)
Dept: MAMMOGRAPHY | Facility: CLINIC | Age: 74
End: 2024-08-06

## 2024-08-06 DIAGNOSIS — Z98.890 OTHER SPECIFIED POSTPROCEDURAL STATES: Chronic | ICD-10-CM

## 2024-08-06 DIAGNOSIS — Z94.84 STEM CELLS TRANSPLANT STATUS: Chronic | ICD-10-CM

## 2024-08-06 DIAGNOSIS — Z12.39 ENCOUNTER FOR OTHER SCREENING FOR MALIGNANT NEOPLASM OF BREAST: ICD-10-CM

## 2024-08-06 PROCEDURE — 77063 BREAST TOMOSYNTHESIS BI: CPT

## 2024-08-06 PROCEDURE — 77067 SCR MAMMO BI INCL CAD: CPT | Mod: 26

## 2024-08-06 PROCEDURE — 77067 SCR MAMMO BI INCL CAD: CPT

## 2024-08-06 PROCEDURE — 77063 BREAST TOMOSYNTHESIS BI: CPT | Mod: 26

## 2024-08-08 ENCOUNTER — OUTPATIENT (OUTPATIENT)
Dept: OUTPATIENT SERVICES | Facility: HOSPITAL | Age: 74
LOS: 1 days | Discharge: HOME | End: 2024-08-08

## 2024-08-08 DIAGNOSIS — Z94.84 STEM CELLS TRANSPLANT STATUS: Chronic | ICD-10-CM

## 2024-08-08 DIAGNOSIS — C90.00 MULTIPLE MYELOMA NOT HAVING ACHIEVED REMISSION: ICD-10-CM

## 2024-08-15 ENCOUNTER — APPOINTMENT (OUTPATIENT)
Age: 74
End: 2024-08-15

## 2024-08-15 ENCOUNTER — RESULT REVIEW (OUTPATIENT)
Age: 74
End: 2024-08-15

## 2024-08-15 ENCOUNTER — APPOINTMENT (OUTPATIENT)
Dept: HEMATOLOGY ONCOLOGY | Facility: CLINIC | Age: 74
End: 2024-08-15
Payer: MEDICARE

## 2024-08-15 DIAGNOSIS — G62.89 OTHER SPECIFIED POLYNEUROPATHIES: ICD-10-CM

## 2024-08-15 DIAGNOSIS — C90.00 MULTIPLE MYELOMA NOT HAVING ACHIEVED REMISSION: ICD-10-CM

## 2024-08-15 DIAGNOSIS — Z79.899 OTHER LONG TERM (CURRENT) DRUG THERAPY: ICD-10-CM

## 2024-08-15 DIAGNOSIS — G62.9 POLYNEUROPATHY, UNSPECIFIED: ICD-10-CM

## 2024-08-15 LAB
BASOPHILS # BLD AUTO: 0.2 K/UL — SIGNIFICANT CHANGE UP (ref 0–0.2)
BASOPHILS NFR BLD AUTO: 2.8 % — HIGH (ref 0–2)
EOSINOPHIL # BLD AUTO: 0.2 K/UL — SIGNIFICANT CHANGE UP (ref 0–0.5)
EOSINOPHIL NFR BLD AUTO: 2.8 % — SIGNIFICANT CHANGE UP (ref 0–6)
HCT VFR BLD CALC: 36.1 % — SIGNIFICANT CHANGE UP (ref 34.5–45)
HGB BLD-MCNC: 11.2 G/DL — LOW (ref 11.5–15.5)
LYMPHOCYTES # BLD AUTO: 2.9 K/UL — SIGNIFICANT CHANGE UP (ref 1–3.3)
LYMPHOCYTES # BLD AUTO: 50.6 % — HIGH (ref 13–44)
MCHC RBC-ENTMCNC: 30.6 PG — SIGNIFICANT CHANGE UP (ref 27–34)
MCHC RBC-ENTMCNC: 31.1 G/DL — LOW (ref 32–36)
MCV RBC AUTO: 98.6 FL — SIGNIFICANT CHANGE UP (ref 80–100)
MONOCYTES # BLD AUTO: 0.7 K/UL — SIGNIFICANT CHANGE UP (ref 0–0.9)
MONOCYTES NFR BLD AUTO: 12.4 % — SIGNIFICANT CHANGE UP (ref 2–14)
NEUTROPHILS # BLD AUTO: 1.8 K/UL — SIGNIFICANT CHANGE UP (ref 1.8–7.4)
NEUTROPHILS NFR BLD AUTO: 31.5 % — LOW (ref 43–77)
PLATELET # BLD AUTO: 144 K/UL — LOW (ref 150–400)
RBC # BLD: 3.66 M/UL — LOW (ref 3.8–5.2)
RBC # FLD: 14.7 % — HIGH (ref 10.3–14.5)
WBC # BLD: 5.7 K/UL — SIGNIFICANT CHANGE UP (ref 3.8–10.5)
WBC # FLD AUTO: 5.7 K/UL — SIGNIFICANT CHANGE UP (ref 3.8–10.5)

## 2024-08-15 PROCEDURE — 99214 OFFICE O/P EST MOD 30 MIN: CPT

## 2024-08-15 PROCEDURE — G2211 COMPLEX E/M VISIT ADD ON: CPT

## 2024-08-15 RX ORDER — GABAPENTIN 300 MG/1
300 CAPSULE ORAL
Qty: 90 | Refills: 5 | Status: ACTIVE | COMMUNITY
Start: 2024-08-15 | End: 1900-01-01

## 2024-08-15 RX ORDER — POMALIDOMIDE 2 MG/1
2 CAPSULE ORAL DAILY
Qty: 21 | Refills: 0 | Status: ACTIVE | COMMUNITY
Start: 2024-08-15 | End: 1900-01-01

## 2024-08-15 NOTE — ADDENDUM
[FreeTextEntry1] : Documented by Nikolai Armijo acting as scribe for Dr. De Oliveira on  08/15/2024.   All Medical record entries made by the Scribe were at my, Dr. De Oliveira's, direction and personally dictated by me on  08/15/2024. I have reviewed the chart and agree that the record accurately reflects my personal performance of the history, physical exam, assessment and plan. I have also personally directed, reviewed, and agreed with the discharge instructions.

## 2024-08-15 NOTE — ASSESSMENT
[FreeTextEntry1] : 74 year old female IgG kappa multiple myeloma, FISH panel - positive for CCND1/IGH fusion - a/w favorable prognosis. PET CT (7/12/18) did not show any bone lesions. S/p VRd, followed by 4 cycles of KRd, followed by autoPSCT on 2/28/19. On 5/18/19 started on Pomalyst for persistently elevated KFLC at 50, FLC ratio 136. On Pomalyst + weekly Dex, she has had a partial response, her KFLC has improved to some degree and bone marrow in 12/2019 shows persistent plasma cells of 15-20%. Daratumumab was added from 2/3/2020 onwards. PET CT 5/15/20 with no FDG avid lesions, and slight enlargement of right adnexal cyst. Q4 week daratumumab started 7/30/20 3/24/2022 S/p R Shoulder Replacement for AVN 2/17/22 FLC ratio 4.10 4/14/22 FLC ratio 8.91 4/14/2022 Restarted Daratumumab q 4 weeks after shoulder surgery 1/25/24 FLC ratio 1.74 2/22/24 FLC ratio 2.03 3/21/24 FLC ratio 2.11 4/18/24 FLC ratio 2.05 5/16/24 FLC ratio 2.42 M spike unable to quantify, weak IgG Kappa band identified 7/18/24 FLC ratio 2.69 Weak IgG Kappa Band Identified   Anemia - stable likely secondary to pomalyst/darzalex, Hgb is 11.2 g/dl today CBC today: WBC 5.7 Hgb 11.2 HCT 36.1 %   Plan: - Continue Daratumumab q 4 weeks, treatment due today - Continue Pomalyst, dose reduce to 2 mg for progressive neuropathy, 3 weeks on followed by 1 week off.  -Neuropathy - start gabapentin 300 mg qHS and increase to TID as tolerated.  -Continue ASA, Acyclovir -Continue age- appropriate preventive care/ Follow up with PCP RTO 1 month

## 2024-08-15 NOTE — HISTORY OF PRESENT ILLNESS
[de-identified] : Ms. Calero is a 74 year old female w/ multiple myeloma.   She was noted to have proteinuria and then had a 24 hour urine protein which showed non-nephrotic range proteinuria of 510 mg/24 hours. She was referred to Dr. Lesa Rendon of nephrology. SPEP showed faint M-spike 0.2 g/dL in gammaglobulin region, immunofixation showed this to be a IgG kappa monoclonal protein. UPEP showed 2 monoclonal protein bands, 61%, and 0.7%.   Subsequent work up: 6/19/18 M-protein 0.2 g/dL, Kappa FLC 71, lambda FLC 0.36, FLC ratio 198.61 , Beta 2 microglobulin 1.5 mg/L  She had a bone marrow biopsy on 6/22/18. Pathology: plasma cell neoplasm, plasma cells comprise 20-25% of marrow cells. Trilineage hematopoiesis present with maturation, increased iron stores. Congo red stain negative for amyloid. Karyotype 46, XX. FISH panel - positive for CCND1/IGH fusion - a/w favorable prognosis.  7/12/18 PET - negative  Treatment Summary  7/19/18 - C1 VRd 8/9/18 -C2 VRd 8/30/18 - C3 VRd 9/20/18 -partial C4 VRd 10/11/18 - 1/2/19 KRd (carfilzomib, Revlimid, Dexamethasone) x 4 cycles.  2/28/19 - autoPSCT  5/18/19 - started Pomalyst 7/19/19 - decadron 20 mg weekly added to Pomalyst. 2/03/20 - Daratumumab week 1-8 2/03 - 3/31/20. Week 9 (began every other week) 4/07/20. [de-identified] : She presents for follow-up visit today Continues on Monthly Daratumumab, currently receiving infusion.  Continues on Pomalyst Patient feels well today.  She notes worsening numbness/tingling. Toes > fingers. Symptoms previously were intermittent and now becoming more constant in nature.  Denies fever, chills, cough, CP, SOB, fatigue.

## 2024-08-16 DIAGNOSIS — Z51.11 ENCOUNTER FOR ANTINEOPLASTIC CHEMOTHERAPY: ICD-10-CM

## 2024-08-16 DIAGNOSIS — R11.2 NAUSEA WITH VOMITING, UNSPECIFIED: ICD-10-CM

## 2024-09-09 ENCOUNTER — RX RENEWAL (OUTPATIENT)
Age: 74
End: 2024-09-09

## 2024-09-12 ENCOUNTER — RESULT REVIEW (OUTPATIENT)
Age: 74
End: 2024-09-12

## 2024-09-12 ENCOUNTER — APPOINTMENT (OUTPATIENT)
Age: 74
End: 2024-09-12

## 2024-09-12 LAB
ANISOCYTOSIS BLD QL: SLIGHT — SIGNIFICANT CHANGE UP
BASOPHILS # BLD AUTO: 0.1 K/UL — SIGNIFICANT CHANGE UP (ref 0–0.2)
BASOPHILS NFR BLD AUTO: 2 % — SIGNIFICANT CHANGE UP (ref 0–2)
ELLIPTOCYTES BLD QL SMEAR: SLIGHT — SIGNIFICANT CHANGE UP
EOSINOPHIL # BLD AUTO: 0.2 K/UL — SIGNIFICANT CHANGE UP (ref 0–0.5)
EOSINOPHIL NFR BLD AUTO: 2 % — SIGNIFICANT CHANGE UP (ref 0–6)
HCT VFR BLD CALC: 36 % — SIGNIFICANT CHANGE UP (ref 34.5–45)
HGB BLD-MCNC: 11.2 G/DL — LOW (ref 11.5–15.5)
LYMPHOCYTES # BLD AUTO: 2.3 K/UL — SIGNIFICANT CHANGE UP (ref 1–3.3)
LYMPHOCYTES # BLD AUTO: 37 % — SIGNIFICANT CHANGE UP (ref 13–44)
MACROCYTES BLD QL: SLIGHT — SIGNIFICANT CHANGE UP
MCHC RBC-ENTMCNC: 30.4 PG — SIGNIFICANT CHANGE UP (ref 27–34)
MCHC RBC-ENTMCNC: 31 G/DL — LOW (ref 32–36)
MCV RBC AUTO: 98.3 FL — SIGNIFICANT CHANGE UP (ref 80–100)
MICROCYTES BLD QL: SLIGHT — SIGNIFICANT CHANGE UP
MONOCYTES # BLD AUTO: 1.1 K/UL — HIGH (ref 0–0.9)
MONOCYTES NFR BLD AUTO: 19 % — HIGH (ref 2–14)
NEUTROPHILS # BLD AUTO: 2.1 K/UL — SIGNIFICANT CHANGE UP (ref 1.8–7.4)
NEUTROPHILS NFR BLD AUTO: 39 % — LOW (ref 43–77)
OVALOCYTES BLD QL SMEAR: SIGNIFICANT CHANGE UP
PLAT MORPH BLD: NORMAL — SIGNIFICANT CHANGE UP
PLATELET # BLD AUTO: 170 K/UL — SIGNIFICANT CHANGE UP (ref 150–400)
POIKILOCYTOSIS BLD QL AUTO: SIGNIFICANT CHANGE UP
POLYCHROMASIA BLD QL SMEAR: SLIGHT — SIGNIFICANT CHANGE UP
RBC # BLD: 3.66 M/UL — LOW (ref 3.8–5.2)
RBC # FLD: 14.8 % — HIGH (ref 10.3–14.5)
RBC BLD AUTO: ABNORMAL
SCHISTOCYTES BLD QL AUTO: SLIGHT — SIGNIFICANT CHANGE UP
SMUDGE CELLS # BLD: PRESENT — SIGNIFICANT CHANGE UP
VARIANT LYMPHS # BLD: 1 % — SIGNIFICANT CHANGE UP (ref 0–6)
WBC # BLD: 6 K/UL — SIGNIFICANT CHANGE UP (ref 3.8–10.5)
WBC # FLD AUTO: 6 K/UL — SIGNIFICANT CHANGE UP (ref 3.8–10.5)

## 2024-09-17 ENCOUNTER — APPOINTMENT (OUTPATIENT)
Dept: HEMATOLOGY ONCOLOGY | Facility: CLINIC | Age: 74
End: 2024-09-17
Payer: MEDICARE

## 2024-09-17 VITALS
OXYGEN SATURATION: 100 % | BODY MASS INDEX: 27.58 KG/M2 | SYSTOLIC BLOOD PRESSURE: 149 MMHG | HEART RATE: 63 BPM | HEIGHT: 63 IN | DIASTOLIC BLOOD PRESSURE: 79 MMHG | WEIGHT: 155.64 LBS

## 2024-09-17 DIAGNOSIS — C90.00 MULTIPLE MYELOMA NOT HAVING ACHIEVED REMISSION: ICD-10-CM

## 2024-09-17 PROCEDURE — 99214 OFFICE O/P EST MOD 30 MIN: CPT

## 2024-09-17 NOTE — ASSESSMENT
[FreeTextEntry1] : 74 year old female IgG kappa multiple myeloma, FISH panel - positive for CCND1/IGH fusion - a/w favorable prognosis. PET CT (7/12/18) did not show any bone lesions. S/p VRd, followed by 4 cycles of KRd, followed by autoPSCT on 2/28/19. On 5/18/19 started on Pomalyst for persistently elevated KFLC at 50, FLC ratio 136. On Pomalyst + weekly Dex, she has had a partial response, her KFLC has improved to some degree and bone marrow in 12/2019 shows persistent plasma cells of 15-20%. Daratumumab was added from 2/3/2020 onwards. PET CT 5/15/20 with no FDG avid lesions, and slight enlargement of right adnexal cyst. Q4 week daratumumab started 7/30/20 3/24/2022 S/p R Shoulder Replacement for AVN 2/17/22 FLC ratio 4.10 4/14/22 FLC ratio 8.91 4/14/2022 Restarted Daratumumab q 4 weeks after shoulder surgery 1/25/24 FLC ratio 1.74 2/22/24 FLC ratio 2.03 3/21/24 FLC ratio 2.11 4/18/24 FLC ratio 2.05 5/16/24 FLC ratio 2.42 M spike unable to quantify, weak IgG Kappa band identified 7/18/24 FLC ratio 2.69 Weak IgG Kappa Band Identified  8/15/2024 K/L FLC ratio 2.69 IgG Kappa Band Identified  8/15/24 Pomalyst dose reduced to 2 mg for progressive neuropathy  Reviewed  9/6/2024 K/L FLC ratio 2.22 9/6/24 CBC: WBC 6.0 Hgb 11.2 HCT 36.0%  anc 2.1 CMP-WNL  Plan: -Anemia - stable likely secondary to pomalyst/darzalex, Hgb is 11.2 g/dl today - Continue Daratumumab q 4 weeks, next due on 10/10/24 - Continue Pomalyst 2 mg, 3 weeks on followed by 1 week off.  -Neuropathy - continue gabapentin 300 mg qHS and increase to TID as tolerated.  -Continue ASA, Acyclovir -Continue age- appropriate preventive care/ Follow up with PCP RTO 1 month

## 2024-09-17 NOTE — HISTORY OF PRESENT ILLNESS
[de-identified] : Ms. Calero is a 74 year old female w/ multiple myeloma.   She was noted to have proteinuria and then had a 24 hour urine protein which showed non-nephrotic range proteinuria of 510 mg/24 hours. She was referred to Dr. Lesa Rendon of nephrology. SPEP showed faint M-spike 0.2 g/dL in gammaglobulin region, immunofixation showed this to be a IgG kappa monoclonal protein. UPEP showed 2 monoclonal protein bands, 61%, and 0.7%.   Subsequent work up: 6/19/18 M-protein 0.2 g/dL, Kappa FLC 71, lambda FLC 0.36, FLC ratio 198.61 , Beta 2 microglobulin 1.5 mg/L  She had a bone marrow biopsy on 6/22/18. Pathology: plasma cell neoplasm, plasma cells comprise 20-25% of marrow cells. Trilineage hematopoiesis present with maturation, increased iron stores. Congo red stain negative for amyloid. Karyotype 46, XX. FISH panel - positive for CCND1/IGH fusion - a/w favorable prognosis.  7/12/18 PET - negative  Treatment Summary  7/19/18 - C1 VRd 8/9/18 -C2 VRd 8/30/18 - C3 VRd 9/20/18 -partial C4 VRd 10/11/18 - 1/2/19 KRd (carfilzomib, Revlimid, Dexamethasone) x 4 cycles.  2/28/19 - autoPSCT  5/18/19 - started Pomalyst 7/19/19 - decadron 20 mg weekly added to Pomalyst. 2/03/20 - Daratumumab week 1-8 2/03 - 3/31/20. Week 9 (began every other week) 4/07/20. [de-identified] : She presents for follow-up visit today Continues on Monthly Daratumumab, C53 on 9/6/24 Continues on Pomalyst 2mg, current cycle completed on 9/16, currently off week Had 2 episodes of bronchitis in June, since then she feels well  Denies bone pain, weight loss Reports numbness/tingling of toes and fingers have been better Denies fever, chills, cough, CP, SOB, fatigue.  denies abdominal pain, nausea, vomiting denies constipation or diarrhea Denies rashes, itching

## 2024-10-03 ENCOUNTER — APPOINTMENT (OUTPATIENT)
Dept: CARDIOLOGY | Facility: CLINIC | Age: 74
End: 2024-10-03

## 2024-10-03 ENCOUNTER — OUTPATIENT (OUTPATIENT)
Dept: OUTPATIENT SERVICES | Facility: HOSPITAL | Age: 74
LOS: 1 days | Discharge: HOME | End: 2024-10-03

## 2024-10-03 DIAGNOSIS — C90.00 MULTIPLE MYELOMA NOT HAVING ACHIEVED REMISSION: ICD-10-CM

## 2024-10-03 DIAGNOSIS — Z94.84 STEM CELLS TRANSPLANT STATUS: Chronic | ICD-10-CM

## 2024-10-03 DIAGNOSIS — Z98.890 OTHER SPECIFIED POSTPROCEDURAL STATES: Chronic | ICD-10-CM

## 2024-10-03 DIAGNOSIS — Z98.51 TUBAL LIGATION STATUS: Chronic | ICD-10-CM

## 2024-10-08 ENCOUNTER — APPOINTMENT (OUTPATIENT)
Dept: CARDIOLOGY | Facility: CLINIC | Age: 74
End: 2024-10-08
Payer: MEDICARE

## 2024-10-08 ENCOUNTER — NON-APPOINTMENT (OUTPATIENT)
Age: 74
End: 2024-10-08

## 2024-10-08 VITALS
DIASTOLIC BLOOD PRESSURE: 66 MMHG | HEART RATE: 56 BPM | BODY MASS INDEX: 27.81 KG/M2 | OXYGEN SATURATION: 96 % | RESPIRATION RATE: 16 BRPM | WEIGHT: 157 LBS | SYSTOLIC BLOOD PRESSURE: 124 MMHG

## 2024-10-08 DIAGNOSIS — I34.0 NONRHEUMATIC MITRAL (VALVE) INSUFFICIENCY: ICD-10-CM

## 2024-10-08 DIAGNOSIS — I10 ESSENTIAL (PRIMARY) HYPERTENSION: ICD-10-CM

## 2024-10-08 DIAGNOSIS — E78.5 HYPERLIPIDEMIA, UNSPECIFIED: ICD-10-CM

## 2024-10-08 PROCEDURE — 99214 OFFICE O/P EST MOD 30 MIN: CPT

## 2024-10-08 PROCEDURE — G2211 COMPLEX E/M VISIT ADD ON: CPT

## 2024-10-08 PROCEDURE — 93000 ELECTROCARDIOGRAM COMPLETE: CPT

## 2024-10-10 ENCOUNTER — APPOINTMENT (OUTPATIENT)
Age: 74
End: 2024-10-10

## 2024-10-10 ENCOUNTER — RESULT REVIEW (OUTPATIENT)
Age: 74
End: 2024-10-10

## 2024-10-10 LAB
ANISOCYTOSIS BLD QL: SLIGHT — SIGNIFICANT CHANGE UP
BASOPHILS # BLD AUTO: 0.2 K/UL — SIGNIFICANT CHANGE UP (ref 0–0.2)
BASOPHILS NFR BLD AUTO: 4 % — HIGH (ref 0–2)
ELLIPTOCYTES BLD QL SMEAR: SLIGHT — SIGNIFICANT CHANGE UP
EOSINOPHIL # BLD AUTO: 0.2 K/UL — SIGNIFICANT CHANGE UP (ref 0–0.5)
HCT VFR BLD CALC: 37.6 % — SIGNIFICANT CHANGE UP (ref 34.5–45)
HGB BLD-MCNC: 11.8 G/DL — SIGNIFICANT CHANGE UP (ref 11.5–15.5)
LYMPHOCYTES # BLD AUTO: 2.4 K/UL — SIGNIFICANT CHANGE UP (ref 1–3.3)
LYMPHOCYTES # BLD AUTO: 44 % — SIGNIFICANT CHANGE UP (ref 13–44)
MACROCYTES BLD QL: SLIGHT — SIGNIFICANT CHANGE UP
MCHC RBC-ENTMCNC: 30.8 PG — SIGNIFICANT CHANGE UP (ref 27–34)
MCHC RBC-ENTMCNC: 31.5 G/DL — LOW (ref 32–36)
MCV RBC AUTO: 97.8 FL — SIGNIFICANT CHANGE UP (ref 80–100)
MICROCYTES BLD QL: SLIGHT — SIGNIFICANT CHANGE UP
MONOCYTES # BLD AUTO: 0.7 K/UL — SIGNIFICANT CHANGE UP (ref 0–0.9)
MONOCYTES NFR BLD AUTO: 18 % — HIGH (ref 2–14)
NEUTROPHILS # BLD AUTO: 2.1 K/UL — SIGNIFICANT CHANGE UP (ref 1.8–7.4)
NEUTROPHILS NFR BLD AUTO: 34 % — LOW (ref 43–77)
OVALOCYTES BLD QL SMEAR: SLIGHT — SIGNIFICANT CHANGE UP
PLAT MORPH BLD: NORMAL — SIGNIFICANT CHANGE UP
PLATELET # BLD AUTO: 176 K/UL — SIGNIFICANT CHANGE UP (ref 150–400)
POIKILOCYTOSIS BLD QL AUTO: SLIGHT — SIGNIFICANT CHANGE UP
RBC # BLD: 3.85 M/UL — SIGNIFICANT CHANGE UP (ref 3.8–5.2)
RBC # FLD: 15.2 % — HIGH (ref 10.3–14.5)
RBC BLD AUTO: ABNORMAL
SCHISTOCYTES BLD QL AUTO: SLIGHT — SIGNIFICANT CHANGE UP
WBC # BLD: 5.6 K/UL — SIGNIFICANT CHANGE UP (ref 3.8–10.5)
WBC # FLD AUTO: 5.6 K/UL — SIGNIFICANT CHANGE UP (ref 3.8–10.5)

## 2024-10-11 ENCOUNTER — NON-APPOINTMENT (OUTPATIENT)
Age: 74
End: 2024-10-11

## 2024-10-11 DIAGNOSIS — Z51.11 ENCOUNTER FOR ANTINEOPLASTIC CHEMOTHERAPY: ICD-10-CM

## 2024-10-11 DIAGNOSIS — R11.2 NAUSEA WITH VOMITING, UNSPECIFIED: ICD-10-CM

## 2024-10-11 LAB
ALBUMIN SERPL ELPH-MCNC: 4 G/DL — SIGNIFICANT CHANGE UP (ref 3.3–5)
ALP SERPL-CCNC: 71 U/L — SIGNIFICANT CHANGE UP (ref 40–120)
ALT FLD-CCNC: 11 U/L — SIGNIFICANT CHANGE UP (ref 10–45)
ANION GAP SERPL CALC-SCNC: 11 MMOL/L — SIGNIFICANT CHANGE UP (ref 5–17)
AST SERPL-CCNC: 22 U/L — SIGNIFICANT CHANGE UP (ref 10–40)
BILIRUB SERPL-MCNC: 0.3 MG/DL — SIGNIFICANT CHANGE UP (ref 0.2–1.2)
BUN SERPL-MCNC: 12 MG/DL — SIGNIFICANT CHANGE UP (ref 7–23)
CALCIUM SERPL-MCNC: 9.2 MG/DL — SIGNIFICANT CHANGE UP (ref 8.4–10.5)
CHLORIDE SERPL-SCNC: 105 MMOL/L — SIGNIFICANT CHANGE UP (ref 96–108)
CO2 SERPL-SCNC: 26 MMOL/L — SIGNIFICANT CHANGE UP (ref 22–31)
CREAT SERPL-MCNC: 0.66 MG/DL — SIGNIFICANT CHANGE UP (ref 0.5–1.3)
EGFR: 93 ML/MIN/1.73M2 — SIGNIFICANT CHANGE UP
GLUCOSE SERPL-MCNC: 71 MG/DL — SIGNIFICANT CHANGE UP (ref 70–99)
IGA FLD-MCNC: 22 MG/DL — LOW (ref 84–499)
IGG FLD-MCNC: 275 MG/DL — LOW (ref 610–1660)
IGM SERPL-MCNC: 17 MG/DL — LOW (ref 35–242)
KAPPA LC SER QL IFE: 0.88 MG/DL — SIGNIFICANT CHANGE UP (ref 0.33–1.94)
KAPPA/LAMBDA FREE LIGHT CHAIN RATIO, SERUM: 2.84 RATIO — HIGH (ref 0.26–1.65)
LAMBDA LC SER QL IFE: 0.31 MG/DL — LOW (ref 0.57–2.63)
POTASSIUM SERPL-MCNC: 4.2 MMOL/L — SIGNIFICANT CHANGE UP (ref 3.5–5.3)
POTASSIUM SERPL-SCNC: 4.2 MMOL/L — SIGNIFICANT CHANGE UP (ref 3.5–5.3)
PROT SERPL-MCNC: 5.7 G/DL — LOW (ref 6–8.3)
PROT SERPL-MCNC: 5.7 G/DL — LOW (ref 6–8.3)
SODIUM SERPL-SCNC: 141 MMOL/L — SIGNIFICANT CHANGE UP (ref 135–145)

## 2024-10-13 LAB
% ALBUMIN: 67.2 % — SIGNIFICANT CHANGE UP
% ALPHA 1: 4.9 % — SIGNIFICANT CHANGE UP
% ALPHA 2: 12.1 % — SIGNIFICANT CHANGE UP
% BETA: 10.9 % — SIGNIFICANT CHANGE UP
% GAMMA: 4.9 % — SIGNIFICANT CHANGE UP
% M SPIKE: SIGNIFICANT CHANGE UP
ALBUMIN SERPL ELPH-MCNC: 3.8 G/DL — SIGNIFICANT CHANGE UP (ref 3.6–5.5)
ALBUMIN/GLOB SERPL ELPH: 2 RATIO — SIGNIFICANT CHANGE UP
ALPHA1 GLOB SERPL ELPH-MCNC: 0.3 G/DL — SIGNIFICANT CHANGE UP (ref 0.1–0.4)
ALPHA2 GLOB SERPL ELPH-MCNC: 0.7 G/DL — SIGNIFICANT CHANGE UP (ref 0.5–1)
B-GLOBULIN SERPL ELPH-MCNC: 0.6 G/DL — SIGNIFICANT CHANGE UP (ref 0.5–1)
GAMMA GLOBULIN: 0.3 G/DL — LOW (ref 0.6–1.6)
INTERPRETATION SERPL IFE-IMP: SIGNIFICANT CHANGE UP
M-SPIKE: SIGNIFICANT CHANGE UP (ref 0–0)
PROT PATTERN SERPL ELPH-IMP: SIGNIFICANT CHANGE UP
PROT SERPL-MCNC: 5.7 G/DL — LOW (ref 6–8.3)

## 2024-10-14 LAB
ALBUMIN SERPL ELPH-MCNC: 4.2 G/DL
ALP BLD-CCNC: 77 U/L
ALT SERPL-CCNC: 13 U/L
AST SERPL-CCNC: 20 U/L
BILIRUB DIRECT SERPL-MCNC: 0.1 MG/DL
BILIRUB INDIRECT SERPL-MCNC: 0.2 MG/DL
BILIRUB SERPL-MCNC: 0.3 MG/DL
CHOLEST SERPL-MCNC: 198 MG/DL
HDLC SERPL-MCNC: 98 MG/DL
LDLC SERPL CALC-MCNC: 87 MG/DL
NONHDLC SERPL-MCNC: 100 MG/DL
PROT SERPL-MCNC: 5.9 G/DL
TRIGL SERPL-MCNC: 74 MG/DL

## 2024-10-14 PROCEDURE — 93784 AMBL BP MNTR W/SOFTWARE: CPT

## 2024-10-17 ENCOUNTER — APPOINTMENT (OUTPATIENT)
Dept: HEMATOLOGY ONCOLOGY | Facility: CLINIC | Age: 74
End: 2024-10-17
Payer: MEDICARE

## 2024-10-17 VITALS
SYSTOLIC BLOOD PRESSURE: 123 MMHG | WEIGHT: 155 LBS | TEMPERATURE: 97.5 F | OXYGEN SATURATION: 98 % | DIASTOLIC BLOOD PRESSURE: 73 MMHG | BODY MASS INDEX: 27.46 KG/M2 | HEART RATE: 61 BPM | HEIGHT: 63 IN

## 2024-10-17 DIAGNOSIS — C90.00 MULTIPLE MYELOMA NOT HAVING ACHIEVED REMISSION: ICD-10-CM

## 2024-10-17 DIAGNOSIS — Z79.899 OTHER LONG TERM (CURRENT) DRUG THERAPY: ICD-10-CM

## 2024-10-17 PROCEDURE — 99214 OFFICE O/P EST MOD 30 MIN: CPT

## 2024-10-17 PROCEDURE — G2211 COMPLEX E/M VISIT ADD ON: CPT

## 2024-11-07 ENCOUNTER — RESULT REVIEW (OUTPATIENT)
Age: 74
End: 2024-11-07

## 2024-11-07 ENCOUNTER — APPOINTMENT (OUTPATIENT)
Age: 74
End: 2024-11-07

## 2024-11-07 LAB
ALBUMIN SERPL ELPH-MCNC: 3.8 G/DL — SIGNIFICANT CHANGE UP (ref 3.3–5)
ALP SERPL-CCNC: 78 U/L — SIGNIFICANT CHANGE UP (ref 40–120)
ALT FLD-CCNC: 9 U/L — LOW (ref 10–45)
ANION GAP SERPL CALC-SCNC: 11 MMOL/L — SIGNIFICANT CHANGE UP (ref 5–17)
ANISOCYTOSIS BLD QL: SLIGHT — SIGNIFICANT CHANGE UP
AST SERPL-CCNC: 20 U/L — SIGNIFICANT CHANGE UP (ref 10–40)
BASOPHILS # BLD AUTO: 0.3 K/UL — HIGH (ref 0–0.2)
BASOPHILS NFR BLD AUTO: 1 % — SIGNIFICANT CHANGE UP (ref 0–2)
BILIRUB SERPL-MCNC: 0.6 MG/DL — SIGNIFICANT CHANGE UP (ref 0.2–1.2)
BUN SERPL-MCNC: 10 MG/DL — SIGNIFICANT CHANGE UP (ref 7–23)
CALCIUM SERPL-MCNC: 9 MG/DL — SIGNIFICANT CHANGE UP (ref 8.4–10.5)
CHLORIDE SERPL-SCNC: 105 MMOL/L — SIGNIFICANT CHANGE UP (ref 96–108)
CO2 SERPL-SCNC: 24 MMOL/L — SIGNIFICANT CHANGE UP (ref 22–31)
CREAT SERPL-MCNC: 0.6 MG/DL — SIGNIFICANT CHANGE UP (ref 0.5–1.3)
DACRYOCYTES BLD QL SMEAR: SLIGHT — SIGNIFICANT CHANGE UP
DOHLE BOD BLD QL SMEAR: PRESENT — SIGNIFICANT CHANGE UP
EGFR: 95 ML/MIN/1.73M2 — SIGNIFICANT CHANGE UP
ELLIPTOCYTES BLD QL SMEAR: SLIGHT — SIGNIFICANT CHANGE UP
EOSINOPHIL # BLD AUTO: 0 K/UL — SIGNIFICANT CHANGE UP (ref 0–0.5)
GLUCOSE SERPL-MCNC: 76 MG/DL — SIGNIFICANT CHANGE UP (ref 70–99)
HCT VFR BLD CALC: 34.4 % — LOW (ref 34.5–45)
HGB BLD-MCNC: 11 G/DL — LOW (ref 11.5–15.5)
LYMPHOCYTES # BLD AUTO: 2.1 K/UL — SIGNIFICANT CHANGE UP (ref 1–3.3)
LYMPHOCYTES # BLD AUTO: 26 % — SIGNIFICANT CHANGE UP (ref 13–44)
MACROCYTES BLD QL: SLIGHT — SIGNIFICANT CHANGE UP
MCHC RBC-ENTMCNC: 30.8 PG — SIGNIFICANT CHANGE UP (ref 27–34)
MCHC RBC-ENTMCNC: 31.9 G/DL — LOW (ref 32–36)
MCV RBC AUTO: 96.5 FL — SIGNIFICANT CHANGE UP (ref 80–100)
MONOCYTES # BLD AUTO: 1 K/UL — HIGH (ref 0–0.9)
MONOCYTES NFR BLD AUTO: 20 % — HIGH (ref 2–14)
NEUTROPHILS # BLD AUTO: 4 K/UL — SIGNIFICANT CHANGE UP (ref 1.8–7.4)
NEUTROPHILS NFR BLD AUTO: 52 % — SIGNIFICANT CHANGE UP (ref 43–77)
OVALOCYTES BLD QL SMEAR: SLIGHT — SIGNIFICANT CHANGE UP
PLAT MORPH BLD: NORMAL — SIGNIFICANT CHANGE UP
PLATELET # BLD AUTO: 142 K/UL — LOW (ref 150–400)
POIKILOCYTOSIS BLD QL AUTO: SLIGHT — SIGNIFICANT CHANGE UP
POTASSIUM SERPL-MCNC: 3.9 MMOL/L — SIGNIFICANT CHANGE UP (ref 3.5–5.3)
POTASSIUM SERPL-SCNC: 3.9 MMOL/L — SIGNIFICANT CHANGE UP (ref 3.5–5.3)
PROT SERPL-MCNC: 5.4 G/DL — LOW (ref 6–8.3)
PROT SERPL-MCNC: 5.4 G/DL — LOW (ref 6–8.3)
RBC # BLD: 3.57 M/UL — LOW (ref 3.8–5.2)
RBC # FLD: 14.4 % — SIGNIFICANT CHANGE UP (ref 10.3–14.5)
RBC BLD AUTO: ABNORMAL
SCHISTOCYTES BLD QL AUTO: SLIGHT — SIGNIFICANT CHANGE UP
SODIUM SERPL-SCNC: 140 MMOL/L — SIGNIFICANT CHANGE UP (ref 135–145)
VARIANT LYMPHS # BLD: 1 % — SIGNIFICANT CHANGE UP (ref 0–6)
WBC # BLD: 7.4 K/UL — SIGNIFICANT CHANGE UP (ref 3.8–10.5)
WBC # FLD AUTO: 7.4 K/UL — SIGNIFICANT CHANGE UP (ref 3.8–10.5)

## 2024-11-08 LAB
IGA FLD-MCNC: 15 MG/DL — LOW (ref 84–499)
IGG FLD-MCNC: 247 MG/DL — LOW (ref 610–1660)
IGM SERPL-MCNC: 12 MG/DL — LOW (ref 35–242)
KAPPA LC SER QL IFE: 0.93 MG/DL — SIGNIFICANT CHANGE UP (ref 0.33–1.94)
KAPPA/LAMBDA FREE LIGHT CHAIN RATIO, SERUM: 2.91 RATIO — HIGH (ref 0.26–1.65)
LAMBDA LC SER QL IFE: 0.32 MG/DL — LOW (ref 0.57–2.63)

## 2024-11-12 LAB
% ALBUMIN: 61.1 % — SIGNIFICANT CHANGE UP
% ALPHA 1: 7.4 % — SIGNIFICANT CHANGE UP
% ALPHA 2: 15.2 % — SIGNIFICANT CHANGE UP
% BETA: 11.5 % — SIGNIFICANT CHANGE UP
% GAMMA: 4.8 % — SIGNIFICANT CHANGE UP
% M SPIKE: SIGNIFICANT CHANGE UP
ALBUMIN SERPL ELPH-MCNC: 3.3 G/DL — LOW (ref 3.6–5.5)
ALBUMIN/GLOB SERPL ELPH: 1.6 RATIO — SIGNIFICANT CHANGE UP
ALPHA1 GLOB SERPL ELPH-MCNC: 0.4 G/DL — SIGNIFICANT CHANGE UP (ref 0.1–0.4)
ALPHA2 GLOB SERPL ELPH-MCNC: 0.8 G/DL — SIGNIFICANT CHANGE UP (ref 0.5–1)
B-GLOBULIN SERPL ELPH-MCNC: 0.6 G/DL — SIGNIFICANT CHANGE UP (ref 0.5–1)
GAMMA GLOBULIN: 0.3 G/DL — LOW (ref 0.6–1.6)
INTERPRETATION SERPL IFE-IMP: SIGNIFICANT CHANGE UP
M-SPIKE: SIGNIFICANT CHANGE UP (ref 0–0)
PROT PATTERN SERPL ELPH-IMP: SIGNIFICANT CHANGE UP
PROT SERPL-MCNC: 5.4 G/DL — LOW (ref 6–8.3)

## 2024-11-13 ENCOUNTER — APPOINTMENT (OUTPATIENT)
Dept: CARDIOLOGY | Facility: CLINIC | Age: 74
End: 2024-11-13
Payer: MEDICARE

## 2024-11-13 PROCEDURE — 93306 TTE W/DOPPLER COMPLETE: CPT

## 2024-11-18 ENCOUNTER — APPOINTMENT (OUTPATIENT)
Dept: CARDIOLOGY | Facility: CLINIC | Age: 74
End: 2024-11-18
Payer: MEDICARE

## 2024-11-18 PROCEDURE — 93015 CV STRESS TEST SUPVJ I&R: CPT

## 2024-11-26 ENCOUNTER — APPOINTMENT (OUTPATIENT)
Dept: ENDOCRINOLOGY | Facility: CLINIC | Age: 74
End: 2024-11-26
Payer: MEDICARE

## 2024-11-26 VITALS
DIASTOLIC BLOOD PRESSURE: 84 MMHG | HEART RATE: 70 BPM | WEIGHT: 154 LBS | HEIGHT: 63 IN | SYSTOLIC BLOOD PRESSURE: 120 MMHG | OXYGEN SATURATION: 98 % | BODY MASS INDEX: 27.29 KG/M2

## 2024-11-26 DIAGNOSIS — E55.9 VITAMIN D DEFICIENCY, UNSPECIFIED: ICD-10-CM

## 2024-11-26 DIAGNOSIS — E05.90 THYROTOXICOSIS, UNSPECIFIED W/OUT THYROTOXIC CRISIS OR STORM: ICD-10-CM

## 2024-11-26 DIAGNOSIS — R79.89 OTHER SPECIFIED ABNORMAL FINDINGS OF BLOOD CHEMISTRY: ICD-10-CM

## 2024-11-26 DIAGNOSIS — I10 ESSENTIAL (PRIMARY) HYPERTENSION: ICD-10-CM

## 2024-11-26 PROCEDURE — G2211 COMPLEX E/M VISIT ADD ON: CPT

## 2024-11-26 PROCEDURE — 99214 OFFICE O/P EST MOD 30 MIN: CPT

## 2024-11-27 ENCOUNTER — APPOINTMENT (OUTPATIENT)
Dept: ULTRASOUND IMAGING | Facility: CLINIC | Age: 74
End: 2024-11-27

## 2024-11-27 ENCOUNTER — OUTPATIENT (OUTPATIENT)
Dept: OUTPATIENT SERVICES | Facility: HOSPITAL | Age: 74
LOS: 1 days | End: 2024-11-27
Payer: MEDICARE

## 2024-11-27 DIAGNOSIS — E05.90 THYROTOXICOSIS, UNSPECIFIED WITHOUT THYROTOXIC CRISIS OR STORM: ICD-10-CM

## 2024-11-27 DIAGNOSIS — Z98.890 OTHER SPECIFIED POSTPROCEDURAL STATES: Chronic | ICD-10-CM

## 2024-11-27 DIAGNOSIS — Z94.84 STEM CELLS TRANSPLANT STATUS: Chronic | ICD-10-CM

## 2024-11-27 DIAGNOSIS — Z98.51 TUBAL LIGATION STATUS: Chronic | ICD-10-CM

## 2024-11-27 PROCEDURE — 76536 US EXAM OF HEAD AND NECK: CPT | Mod: 26

## 2024-11-29 ENCOUNTER — LABORATORY RESULT (OUTPATIENT)
Age: 74
End: 2024-11-29

## 2024-11-29 ENCOUNTER — OUTPATIENT (OUTPATIENT)
Dept: OUTPATIENT SERVICES | Facility: HOSPITAL | Age: 74
LOS: 1 days | Discharge: HOME | End: 2024-11-29

## 2024-11-29 DIAGNOSIS — Z98.890 OTHER SPECIFIED POSTPROCEDURAL STATES: Chronic | ICD-10-CM

## 2024-11-29 DIAGNOSIS — Z98.51 TUBAL LIGATION STATUS: Chronic | ICD-10-CM

## 2024-11-29 DIAGNOSIS — C90.00 MULTIPLE MYELOMA NOT HAVING ACHIEVED REMISSION: ICD-10-CM

## 2024-11-29 DIAGNOSIS — Z94.84 STEM CELLS TRANSPLANT STATUS: Chronic | ICD-10-CM

## 2024-12-05 ENCOUNTER — APPOINTMENT (OUTPATIENT)
Age: 74
End: 2024-12-05

## 2024-12-05 ENCOUNTER — RESULT REVIEW (OUTPATIENT)
Age: 74
End: 2024-12-05

## 2024-12-05 LAB
ANISOCYTOSIS BLD QL: SLIGHT — SIGNIFICANT CHANGE UP
BASOPHILS # BLD AUTO: 0.2 K/UL — SIGNIFICANT CHANGE UP (ref 0–0.2)
BASOPHILS NFR BLD AUTO: 3.3 % — HIGH (ref 0–2)
EOSINOPHIL # BLD AUTO: 0.3 K/UL — SIGNIFICANT CHANGE UP (ref 0–0.5)
EOSINOPHIL NFR BLD AUTO: 4 % — SIGNIFICANT CHANGE UP (ref 0–6)
HCT VFR BLD CALC: 39.5 % — SIGNIFICANT CHANGE UP (ref 34.5–45)
HGB BLD-MCNC: 12.1 G/DL — SIGNIFICANT CHANGE UP (ref 11.5–15.5)
LYMPHOCYTES # BLD AUTO: 2.8 K/UL — SIGNIFICANT CHANGE UP (ref 1–3.3)
LYMPHOCYTES # BLD AUTO: 42.1 % — SIGNIFICANT CHANGE UP (ref 13–44)
MACROCYTES BLD QL: SLIGHT — SIGNIFICANT CHANGE UP
MCHC RBC-ENTMCNC: 30.4 PG — SIGNIFICANT CHANGE UP (ref 27–34)
MCHC RBC-ENTMCNC: 30.5 G/DL — LOW (ref 32–36)
MCV RBC AUTO: 99.4 FL — SIGNIFICANT CHANGE UP (ref 80–100)
MICROCYTES BLD QL: SLIGHT — SIGNIFICANT CHANGE UP
MONOCYTES # BLD AUTO: 0.9 K/UL — SIGNIFICANT CHANGE UP (ref 0–0.9)
MONOCYTES NFR BLD AUTO: 13.1 % — SIGNIFICANT CHANGE UP (ref 2–14)
NEUTROPHILS # BLD AUTO: 2.5 K/UL — SIGNIFICANT CHANGE UP (ref 1.8–7.4)
NEUTROPHILS NFR BLD AUTO: 37.6 % — LOW (ref 43–77)
PLAT MORPH BLD: NORMAL — SIGNIFICANT CHANGE UP
PLATELET # BLD AUTO: 158 K/UL — SIGNIFICANT CHANGE UP (ref 150–400)
POIKILOCYTOSIS BLD QL AUTO: SLIGHT — SIGNIFICANT CHANGE UP
POLYCHROMASIA BLD QL SMEAR: SLIGHT — SIGNIFICANT CHANGE UP
RBC # BLD: 3.98 M/UL — SIGNIFICANT CHANGE UP (ref 3.8–5.2)
RBC # FLD: 14.7 % — HIGH (ref 10.3–14.5)
RBC BLD AUTO: SIGNIFICANT CHANGE UP
ROULEAUX BLD QL SMEAR: PRESENT — SIGNIFICANT CHANGE UP
SCHISTOCYTES BLD QL AUTO: SLIGHT — SIGNIFICANT CHANGE UP
WBC # BLD: 6.6 K/UL — SIGNIFICANT CHANGE UP (ref 3.8–10.5)
WBC # FLD AUTO: 6.6 K/UL — SIGNIFICANT CHANGE UP (ref 3.8–10.5)

## 2024-12-06 DIAGNOSIS — Z51.11 ENCOUNTER FOR ANTINEOPLASTIC CHEMOTHERAPY: ICD-10-CM

## 2024-12-06 LAB
ALBUMIN SERPL ELPH-MCNC: 4 G/DL — SIGNIFICANT CHANGE UP (ref 3.3–5)
ALP SERPL-CCNC: 83 U/L — SIGNIFICANT CHANGE UP (ref 40–120)
ALT FLD-CCNC: 12 U/L — SIGNIFICANT CHANGE UP (ref 10–45)
ANION GAP SERPL CALC-SCNC: 12 MMOL/L — SIGNIFICANT CHANGE UP (ref 5–17)
AST SERPL-CCNC: 21 U/L — SIGNIFICANT CHANGE UP (ref 10–40)
BILIRUB SERPL-MCNC: 0.3 MG/DL — SIGNIFICANT CHANGE UP (ref 0.2–1.2)
BUN SERPL-MCNC: 15 MG/DL — SIGNIFICANT CHANGE UP (ref 7–23)
CALCIUM SERPL-MCNC: 9.5 MG/DL — SIGNIFICANT CHANGE UP (ref 8.4–10.5)
CHLORIDE SERPL-SCNC: 106 MMOL/L — SIGNIFICANT CHANGE UP (ref 96–108)
CO2 SERPL-SCNC: 25 MMOL/L — SIGNIFICANT CHANGE UP (ref 22–31)
CREAT SERPL-MCNC: 0.6 MG/DL — SIGNIFICANT CHANGE UP (ref 0.5–1.3)
EGFR: 95 ML/MIN/1.73M2 — SIGNIFICANT CHANGE UP
GLUCOSE SERPL-MCNC: 51 MG/DL — CRITICAL LOW (ref 70–99)
IGA FLD-MCNC: 26 MG/DL — LOW (ref 84–499)
IGG FLD-MCNC: 265 MG/DL — LOW (ref 610–1660)
IGM SERPL-MCNC: 24 MG/DL — LOW (ref 35–242)
KAPPA LC SER QL IFE: 0.99 MG/DL — SIGNIFICANT CHANGE UP (ref 0.33–1.94)
KAPPA/LAMBDA FREE LIGHT CHAIN RATIO, SERUM: 1.71 RATIO — HIGH (ref 0.26–1.65)
LAMBDA LC SER QL IFE: 0.58 MG/DL — SIGNIFICANT CHANGE UP (ref 0.57–2.63)
POTASSIUM SERPL-MCNC: 4 MMOL/L — SIGNIFICANT CHANGE UP (ref 3.5–5.3)
POTASSIUM SERPL-SCNC: 4 MMOL/L — SIGNIFICANT CHANGE UP (ref 3.5–5.3)
PROT SERPL-MCNC: 6.2 G/DL — SIGNIFICANT CHANGE UP (ref 6–8.3)
PROT SERPL-MCNC: 6.4 G/DL — SIGNIFICANT CHANGE UP (ref 6–8.3)
SODIUM SERPL-SCNC: 143 MMOL/L — SIGNIFICANT CHANGE UP (ref 135–145)

## 2024-12-08 LAB
% ALBUMIN: 64.1 % — SIGNIFICANT CHANGE UP
% ALPHA 1: 6.4 % — SIGNIFICANT CHANGE UP
% ALPHA 2: 14 % — SIGNIFICANT CHANGE UP
% BETA: 10.7 % — SIGNIFICANT CHANGE UP
% GAMMA: 4.8 % — SIGNIFICANT CHANGE UP
% M SPIKE: SIGNIFICANT CHANGE UP
ALBUMIN SERPL ELPH-MCNC: 4 G/DL — SIGNIFICANT CHANGE UP (ref 3.6–5.5)
ALBUMIN/GLOB SERPL ELPH: 1.8 RATIO — SIGNIFICANT CHANGE UP
ALPHA1 GLOB SERPL ELPH-MCNC: 0.4 G/DL — SIGNIFICANT CHANGE UP (ref 0.1–0.4)
ALPHA2 GLOB SERPL ELPH-MCNC: 0.9 G/DL — SIGNIFICANT CHANGE UP (ref 0.5–1)
B-GLOBULIN SERPL ELPH-MCNC: 0.7 G/DL — SIGNIFICANT CHANGE UP (ref 0.5–1)
GAMMA GLOBULIN: 0.3 G/DL — LOW (ref 0.6–1.6)
INTERPRETATION SERPL IFE-IMP: SIGNIFICANT CHANGE UP
M-SPIKE: SIGNIFICANT CHANGE UP (ref 0–0)
PROT PATTERN SERPL ELPH-IMP: SIGNIFICANT CHANGE UP
PROT SERPL-MCNC: 6.2 G/DL — SIGNIFICANT CHANGE UP (ref 6–8.3)

## 2024-12-18 ENCOUNTER — NON-APPOINTMENT (OUTPATIENT)
Age: 74
End: 2024-12-18

## 2024-12-18 ENCOUNTER — APPOINTMENT (OUTPATIENT)
Dept: FAMILY MEDICINE | Facility: CLINIC | Age: 74
End: 2024-12-18
Payer: MEDICARE

## 2024-12-18 VITALS
WEIGHT: 150 LBS | OXYGEN SATURATION: 98 % | BODY MASS INDEX: 26.58 KG/M2 | HEIGHT: 63 IN | HEART RATE: 88 BPM | DIASTOLIC BLOOD PRESSURE: 82 MMHG | SYSTOLIC BLOOD PRESSURE: 128 MMHG

## 2024-12-18 DIAGNOSIS — J98.8 OTHER SPECIFIED RESPIRATORY DISORDERS: ICD-10-CM

## 2024-12-18 PROCEDURE — 99214 OFFICE O/P EST MOD 30 MIN: CPT

## 2024-12-19 ENCOUNTER — APPOINTMENT (OUTPATIENT)
Dept: HEMATOLOGY ONCOLOGY | Facility: CLINIC | Age: 74
End: 2024-12-19
Payer: MEDICARE

## 2024-12-19 VITALS
BODY MASS INDEX: 26.7 KG/M2 | HEART RATE: 68 BPM | WEIGHT: 150.69 LBS | HEIGHT: 63 IN | SYSTOLIC BLOOD PRESSURE: 145 MMHG | OXYGEN SATURATION: 100 % | DIASTOLIC BLOOD PRESSURE: 73 MMHG

## 2024-12-19 DIAGNOSIS — C90.00 MULTIPLE MYELOMA NOT HAVING ACHIEVED REMISSION: ICD-10-CM

## 2024-12-19 PROCEDURE — 99214 OFFICE O/P EST MOD 30 MIN: CPT

## 2024-12-28 ENCOUNTER — APPOINTMENT (OUTPATIENT)
Dept: CT IMAGING | Facility: CLINIC | Age: 74
End: 2024-12-28

## 2025-01-09 ENCOUNTER — RESULT REVIEW (OUTPATIENT)
Age: 75
End: 2025-01-09

## 2025-01-09 ENCOUNTER — APPOINTMENT (OUTPATIENT)
Age: 75
End: 2025-01-09

## 2025-01-09 LAB
ACANTHOCYTES BLD QL SMEAR: SLIGHT — SIGNIFICANT CHANGE UP
ALBUMIN SERPL ELPH-MCNC: 3.9 G/DL — SIGNIFICANT CHANGE UP (ref 3.3–5)
ALP SERPL-CCNC: 70 U/L — SIGNIFICANT CHANGE UP (ref 40–120)
ALT FLD-CCNC: 17 U/L — SIGNIFICANT CHANGE UP (ref 10–45)
ANION GAP SERPL CALC-SCNC: 9 MMOL/L — SIGNIFICANT CHANGE UP (ref 5–17)
ANISOCYTOSIS BLD QL: SLIGHT — SIGNIFICANT CHANGE UP
AST SERPL-CCNC: 34 U/L — SIGNIFICANT CHANGE UP (ref 10–40)
BASOPHILS # BLD AUTO: 0.1 K/UL — SIGNIFICANT CHANGE UP (ref 0–0.2)
BASOPHILS NFR BLD AUTO: 3 % — HIGH (ref 0–2)
BILIRUB SERPL-MCNC: 0.2 MG/DL — SIGNIFICANT CHANGE UP (ref 0.2–1.2)
BUN SERPL-MCNC: 11 MG/DL — SIGNIFICANT CHANGE UP (ref 7–23)
CALCIUM SERPL-MCNC: 9.5 MG/DL — SIGNIFICANT CHANGE UP (ref 8.4–10.5)
CHLORIDE SERPL-SCNC: 107 MMOL/L — SIGNIFICANT CHANGE UP (ref 96–108)
CO2 SERPL-SCNC: 26 MMOL/L — SIGNIFICANT CHANGE UP (ref 22–31)
CREAT SERPL-MCNC: 0.63 MG/DL — SIGNIFICANT CHANGE UP (ref 0.5–1.3)
EGFR: 94 ML/MIN/1.73M2 — SIGNIFICANT CHANGE UP
ELLIPTOCYTES BLD QL SMEAR: SLIGHT — SIGNIFICANT CHANGE UP
EOSINOPHIL # BLD AUTO: 0.1 K/UL — SIGNIFICANT CHANGE UP (ref 0–0.5)
EOSINOPHIL NFR BLD AUTO: 4 % — SIGNIFICANT CHANGE UP (ref 0–6)
GLUCOSE SERPL-MCNC: 70 MG/DL — SIGNIFICANT CHANGE UP (ref 70–99)
HCT VFR BLD CALC: 36.4 % — SIGNIFICANT CHANGE UP (ref 34.5–45)
HGB BLD-MCNC: 11.6 G/DL — SIGNIFICANT CHANGE UP (ref 11.5–15.5)
LYMPHOCYTES # BLD AUTO: 3.3 K/UL — SIGNIFICANT CHANGE UP (ref 1–3.3)
LYMPHOCYTES # BLD AUTO: 63 % — HIGH (ref 13–44)
MCHC RBC-ENTMCNC: 30.3 PG — SIGNIFICANT CHANGE UP (ref 27–34)
MCHC RBC-ENTMCNC: 31.8 G/DL — LOW (ref 32–36)
MCV RBC AUTO: 95.3 FL — SIGNIFICANT CHANGE UP (ref 80–100)
MICROCYTES BLD QL: SLIGHT — SIGNIFICANT CHANGE UP
MONOCYTES # BLD AUTO: 0.6 K/UL — SIGNIFICANT CHANGE UP (ref 0–0.9)
MONOCYTES NFR BLD AUTO: 5 % — SIGNIFICANT CHANGE UP (ref 2–14)
NEUTROPHILS # BLD AUTO: 0.8 K/UL — LOW (ref 1.8–7.4)
NEUTROPHILS NFR BLD AUTO: 16 % — LOW (ref 43–77)
OVALOCYTES BLD QL SMEAR: SLIGHT — SIGNIFICANT CHANGE UP
PLAT MORPH BLD: NORMAL — SIGNIFICANT CHANGE UP
PLATELET # BLD AUTO: 184 K/UL — SIGNIFICANT CHANGE UP (ref 150–400)
POIKILOCYTOSIS BLD QL AUTO: SLIGHT — SIGNIFICANT CHANGE UP
POLYCHROMASIA BLD QL SMEAR: SLIGHT — SIGNIFICANT CHANGE UP
POTASSIUM SERPL-MCNC: 4.3 MMOL/L — SIGNIFICANT CHANGE UP (ref 3.5–5.3)
POTASSIUM SERPL-SCNC: 4.3 MMOL/L — SIGNIFICANT CHANGE UP (ref 3.5–5.3)
PROT SERPL-MCNC: 6 G/DL — SIGNIFICANT CHANGE UP (ref 6–8.3)
PROT SERPL-MCNC: 6 G/DL — SIGNIFICANT CHANGE UP (ref 6–8.3)
RBC # BLD: 3.82 M/UL — SIGNIFICANT CHANGE UP (ref 3.8–5.2)
RBC # FLD: 14.3 % — SIGNIFICANT CHANGE UP (ref 10.3–14.5)
RBC BLD AUTO: SIGNIFICANT CHANGE UP
SCHISTOCYTES BLD QL AUTO: SLIGHT — SIGNIFICANT CHANGE UP
SODIUM SERPL-SCNC: 141 MMOL/L — SIGNIFICANT CHANGE UP (ref 135–145)
VARIANT LYMPHS # BLD: 9 % — HIGH (ref 0–6)
WBC # BLD: 4.9 K/UL — SIGNIFICANT CHANGE UP (ref 3.8–10.5)
WBC # FLD AUTO: 4.9 K/UL — SIGNIFICANT CHANGE UP (ref 3.8–10.5)

## 2025-01-10 LAB
IGA FLD-MCNC: 15 MG/DL — LOW (ref 84–499)
IGG FLD-MCNC: 295 MG/DL — LOW (ref 610–1660)
IGM SERPL-MCNC: 18 MG/DL — LOW (ref 35–242)
KAPPA LC SER QL IFE: 1.05 MG/DL — SIGNIFICANT CHANGE UP (ref 0.33–1.94)
KAPPA/LAMBDA FREE LIGHT CHAIN RATIO, SERUM: 2.19 RATIO — HIGH (ref 0.26–1.65)
LAMBDA LC SER QL IFE: 0.48 MG/DL — LOW (ref 0.57–2.63)

## 2025-01-12 LAB
% ALBUMIN: 67.5 % — SIGNIFICANT CHANGE UP
% ALPHA 1: 5 % — SIGNIFICANT CHANGE UP
% ALPHA 2: 12.4 % — SIGNIFICANT CHANGE UP
% BETA: 10.2 % — SIGNIFICANT CHANGE UP
% GAMMA: 4.9 % — SIGNIFICANT CHANGE UP
% M SPIKE: SIGNIFICANT CHANGE UP
ALBUMIN SERPL ELPH-MCNC: 4 G/DL — SIGNIFICANT CHANGE UP (ref 3.6–5.5)
ALBUMIN/GLOB SERPL ELPH: 2 RATIO — SIGNIFICANT CHANGE UP
ALPHA1 GLOB SERPL ELPH-MCNC: 0.3 G/DL — SIGNIFICANT CHANGE UP (ref 0.1–0.4)
ALPHA2 GLOB SERPL ELPH-MCNC: 0.7 G/DL — SIGNIFICANT CHANGE UP (ref 0.5–1)
B-GLOBULIN SERPL ELPH-MCNC: 0.6 G/DL — SIGNIFICANT CHANGE UP (ref 0.5–1)
GAMMA GLOBULIN: 0.3 G/DL — LOW (ref 0.6–1.6)
INTERPRETATION SERPL IFE-IMP: SIGNIFICANT CHANGE UP
M-SPIKE: SIGNIFICANT CHANGE UP (ref 0–0)
PROT PATTERN SERPL ELPH-IMP: SIGNIFICANT CHANGE UP
PROT SERPL-MCNC: 6 G/DL — SIGNIFICANT CHANGE UP (ref 6–8.3)

## 2025-01-21 NOTE — REASON FOR VISIT
This order has been signed   [Follow-Up Visit] : a follow-up visit for [FreeTextEntry2] : Multiple Myeloma

## 2025-01-28 ENCOUNTER — OUTPATIENT (OUTPATIENT)
Dept: OUTPATIENT SERVICES | Facility: HOSPITAL | Age: 75
LOS: 1 days | End: 2025-01-28
Payer: MEDICARE

## 2025-01-28 ENCOUNTER — APPOINTMENT (OUTPATIENT)
Dept: CT IMAGING | Facility: CLINIC | Age: 75
End: 2025-01-28

## 2025-01-28 DIAGNOSIS — Z94.84 STEM CELLS TRANSPLANT STATUS: Chronic | ICD-10-CM

## 2025-01-28 DIAGNOSIS — J98.8 OTHER SPECIFIED RESPIRATORY DISORDERS: ICD-10-CM

## 2025-01-28 DIAGNOSIS — Z98.51 TUBAL LIGATION STATUS: Chronic | ICD-10-CM

## 2025-01-28 DIAGNOSIS — Z98.890 OTHER SPECIFIED POSTPROCEDURAL STATES: Chronic | ICD-10-CM

## 2025-01-28 PROCEDURE — 71250 CT THORAX DX C-: CPT | Mod: 26

## 2025-01-30 ENCOUNTER — OUTPATIENT (OUTPATIENT)
Dept: OUTPATIENT SERVICES | Facility: HOSPITAL | Age: 75
LOS: 1 days | Discharge: HOME | End: 2025-01-30

## 2025-01-30 DIAGNOSIS — Z98.890 OTHER SPECIFIED POSTPROCEDURAL STATES: Chronic | ICD-10-CM

## 2025-01-30 DIAGNOSIS — Z94.84 STEM CELLS TRANSPLANT STATUS: Chronic | ICD-10-CM

## 2025-01-30 DIAGNOSIS — C90.00 MULTIPLE MYELOMA NOT HAVING ACHIEVED REMISSION: ICD-10-CM

## 2025-01-30 DIAGNOSIS — Z98.51 TUBAL LIGATION STATUS: Chronic | ICD-10-CM

## 2025-01-31 ENCOUNTER — APPOINTMENT (OUTPATIENT)
Dept: ENDOCRINOLOGY | Facility: CLINIC | Age: 75
End: 2025-01-31
Payer: MEDICARE

## 2025-01-31 VITALS
SYSTOLIC BLOOD PRESSURE: 120 MMHG | HEIGHT: 63 IN | HEART RATE: 74 BPM | BODY MASS INDEX: 26.93 KG/M2 | OXYGEN SATURATION: 96 % | WEIGHT: 152 LBS | DIASTOLIC BLOOD PRESSURE: 66 MMHG

## 2025-01-31 DIAGNOSIS — E55.9 VITAMIN D DEFICIENCY, UNSPECIFIED: ICD-10-CM

## 2025-01-31 DIAGNOSIS — R73.9 HYPERGLYCEMIA, UNSPECIFIED: ICD-10-CM

## 2025-01-31 DIAGNOSIS — E04.2 NONTOXIC MULTINODULAR GOITER: ICD-10-CM

## 2025-01-31 DIAGNOSIS — E05.90 THYROTOXICOSIS, UNSPECIFIED W/OUT THYROTOXIC CRISIS OR STORM: ICD-10-CM

## 2025-01-31 DIAGNOSIS — E78.5 HYPERLIPIDEMIA, UNSPECIFIED: ICD-10-CM

## 2025-01-31 DIAGNOSIS — R79.89 OTHER SPECIFIED ABNORMAL FINDINGS OF BLOOD CHEMISTRY: ICD-10-CM

## 2025-01-31 DIAGNOSIS — I10 ESSENTIAL (PRIMARY) HYPERTENSION: ICD-10-CM

## 2025-01-31 PROCEDURE — G2211 COMPLEX E/M VISIT ADD ON: CPT

## 2025-01-31 PROCEDURE — 99214 OFFICE O/P EST MOD 30 MIN: CPT

## 2025-02-04 ENCOUNTER — RX RENEWAL (OUTPATIENT)
Age: 75
End: 2025-02-04

## 2025-02-06 ENCOUNTER — RESULT REVIEW (OUTPATIENT)
Age: 75
End: 2025-02-06

## 2025-02-06 ENCOUNTER — APPOINTMENT (OUTPATIENT)
Age: 75
End: 2025-02-06

## 2025-02-06 LAB
ALBUMIN SERPL ELPH-MCNC: 4 G/DL — SIGNIFICANT CHANGE UP (ref 3.3–5)
ALP SERPL-CCNC: 75 U/L — SIGNIFICANT CHANGE UP (ref 40–120)
ALT FLD-CCNC: 16 U/L — SIGNIFICANT CHANGE UP (ref 10–45)
ANION GAP SERPL CALC-SCNC: 9 MMOL/L — SIGNIFICANT CHANGE UP (ref 5–17)
AST SERPL-CCNC: 23 U/L — SIGNIFICANT CHANGE UP (ref 10–40)
BASOPHILS # BLD AUTO: 0.1 K/UL — SIGNIFICANT CHANGE UP (ref 0–0.2)
BASOPHILS NFR BLD AUTO: 1.9 % — SIGNIFICANT CHANGE UP (ref 0–2)
BILIRUB SERPL-MCNC: 0.2 MG/DL — SIGNIFICANT CHANGE UP (ref 0.2–1.2)
BUN SERPL-MCNC: 16 MG/DL — SIGNIFICANT CHANGE UP (ref 7–23)
CALCIUM SERPL-MCNC: 9.1 MG/DL — SIGNIFICANT CHANGE UP (ref 8.4–10.5)
CHLORIDE SERPL-SCNC: 110 MMOL/L — HIGH (ref 96–108)
CO2 SERPL-SCNC: 27 MMOL/L — SIGNIFICANT CHANGE UP (ref 22–31)
CREAT SERPL-MCNC: 0.64 MG/DL — SIGNIFICANT CHANGE UP (ref 0.5–1.3)
EGFR: 93 ML/MIN/1.73M2 — SIGNIFICANT CHANGE UP
EGFR: 93 ML/MIN/1.73M2 — SIGNIFICANT CHANGE UP
EOSINOPHIL # BLD AUTO: 0.1 K/UL — SIGNIFICANT CHANGE UP (ref 0–0.5)
EOSINOPHIL NFR BLD AUTO: 1.4 % — SIGNIFICANT CHANGE UP (ref 0–6)
GLUCOSE SERPL-MCNC: 80 MG/DL — SIGNIFICANT CHANGE UP (ref 70–99)
HCT VFR BLD CALC: 37.3 % — SIGNIFICANT CHANGE UP (ref 34.5–45)
HGB BLD-MCNC: 11.5 G/DL — SIGNIFICANT CHANGE UP (ref 11.5–15.5)
LYMPHOCYTES # BLD AUTO: 3.4 K/UL — HIGH (ref 1–3.3)
LYMPHOCYTES # BLD AUTO: 57.5 % — HIGH (ref 13–44)
MCHC RBC-ENTMCNC: 30 PG — SIGNIFICANT CHANGE UP (ref 27–34)
MCHC RBC-ENTMCNC: 30.9 G/DL — LOW (ref 32–36)
MCV RBC AUTO: 96.9 FL — SIGNIFICANT CHANGE UP (ref 80–100)
MONOCYTES # BLD AUTO: 0.7 K/UL — SIGNIFICANT CHANGE UP (ref 0–0.9)
MONOCYTES NFR BLD AUTO: 11.1 % — SIGNIFICANT CHANGE UP (ref 2–14)
NEUTROPHILS # BLD AUTO: 1.7 K/UL — LOW (ref 1.8–7.4)
NEUTROPHILS NFR BLD AUTO: 28.2 % — LOW (ref 43–77)
PLATELET # BLD AUTO: 193 K/UL — SIGNIFICANT CHANGE UP (ref 150–400)
POTASSIUM SERPL-MCNC: 4.7 MMOL/L — SIGNIFICANT CHANGE UP (ref 3.5–5.3)
POTASSIUM SERPL-SCNC: 4.7 MMOL/L — SIGNIFICANT CHANGE UP (ref 3.5–5.3)
PROT SERPL-MCNC: 5.5 G/DL — LOW (ref 6–8.3)
RBC # BLD: 3.85 M/UL — SIGNIFICANT CHANGE UP (ref 3.8–5.2)
RBC # FLD: 14.6 % — HIGH (ref 10.3–14.5)
SODIUM SERPL-SCNC: 146 MMOL/L — HIGH (ref 135–145)
WBC # BLD: 5.9 K/UL — SIGNIFICANT CHANGE UP (ref 3.8–10.5)
WBC # FLD AUTO: 5.9 K/UL — SIGNIFICANT CHANGE UP (ref 3.8–10.5)

## 2025-02-07 DIAGNOSIS — Z51.11 ENCOUNTER FOR ANTINEOPLASTIC CHEMOTHERAPY: ICD-10-CM

## 2025-02-07 DIAGNOSIS — R11.2 NAUSEA WITH VOMITING, UNSPECIFIED: ICD-10-CM

## 2025-02-07 LAB
IGA FLD-MCNC: 14 MG/DL — LOW (ref 84–499)
IGG FLD-MCNC: 284 MG/DL — LOW (ref 610–1660)
IGM SERPL-MCNC: 15 MG/DL — LOW (ref 35–242)
KAPPA LC SER QL IFE: 0.94 MG/DL — SIGNIFICANT CHANGE UP (ref 0.33–1.94)
KAPPA/LAMBDA FREE LIGHT CHAIN RATIO, SERUM: 2.19 RATIO — HIGH (ref 0.26–1.65)
LAMBDA LC SER QL IFE: 0.43 MG/DL — LOW (ref 0.57–2.63)
PROT SERPL-MCNC: 5.6 G/DL — LOW (ref 6–8.3)

## 2025-02-11 LAB
% ALBUMIN: 63.4 % — SIGNIFICANT CHANGE UP
% ALPHA 1: 5.3 % — SIGNIFICANT CHANGE UP
% ALPHA 2: 14.9 % — SIGNIFICANT CHANGE UP
% BETA: 11.3 % — SIGNIFICANT CHANGE UP
% GAMMA: 5.1 % — SIGNIFICANT CHANGE UP
% M SPIKE: SIGNIFICANT CHANGE UP
ALBUMIN SERPL ELPH-MCNC: 3.6 G/DL — SIGNIFICANT CHANGE UP (ref 3.6–5.5)
ALBUMIN/GLOB SERPL ELPH: 1.8 RATIO — SIGNIFICANT CHANGE UP
ALPHA1 GLOB SERPL ELPH-MCNC: 0.3 G/DL — SIGNIFICANT CHANGE UP (ref 0.1–0.4)
ALPHA2 GLOB SERPL ELPH-MCNC: 0.8 G/DL — SIGNIFICANT CHANGE UP (ref 0.5–1)
B-GLOBULIN SERPL ELPH-MCNC: 0.6 G/DL — SIGNIFICANT CHANGE UP (ref 0.5–1)
GAMMA GLOBULIN: 0.3 G/DL — LOW (ref 0.6–1.6)
INTERPRETATION SERPL IFE-IMP: SIGNIFICANT CHANGE UP
M-SPIKE: SIGNIFICANT CHANGE UP (ref 0–0)
PROT PATTERN SERPL ELPH-IMP: SIGNIFICANT CHANGE UP
PROT SERPL-MCNC: 5.6 G/DL — LOW (ref 6–8.3)

## 2025-02-14 NOTE — REASON FOR VISIT
[Follow-Up Visit] : a follow-up visit for [Family Member] : family member [FreeTextEntry2] : IgG kappa myeloma s/p autologous peripheral stem cell transplant on 2/28/19. [Joint Pain] : joint pain [Headache] : headache [Depression] : depression [Postnasal Drip] : postnasal drip [Negative] : ENT [FreeTextEntry2] : stress level improved at this time  [FreeTextEntry6] : at this time none, no use of rescue inhaler needed since last visit [FreeTextEntry7] : occasional bloating- related to dietary intake- less than prior despite her stress levels [FreeTextEntry9] : fell on the steps when picking up a check from office - landed on her hands and her knees with left one very bruised and swollen still one week later. [de-identified] : some migraines recently with stress from death of father and good friend 1 year ago- stress level now with her sister who has pancreatic cancer

## 2025-02-17 ENCOUNTER — NON-APPOINTMENT (OUTPATIENT)
Age: 75
End: 2025-02-17

## 2025-02-17 ENCOUNTER — APPOINTMENT (OUTPATIENT)
Dept: CARDIOLOGY | Facility: CLINIC | Age: 75
End: 2025-02-17
Payer: MEDICARE

## 2025-02-17 VITALS
RESPIRATION RATE: 16 BRPM | BODY MASS INDEX: 26.93 KG/M2 | HEART RATE: 65 BPM | WEIGHT: 152 LBS | DIASTOLIC BLOOD PRESSURE: 75 MMHG | SYSTOLIC BLOOD PRESSURE: 130 MMHG | HEIGHT: 63 IN

## 2025-02-17 DIAGNOSIS — E78.5 HYPERLIPIDEMIA, UNSPECIFIED: ICD-10-CM

## 2025-02-17 DIAGNOSIS — I34.0 NONRHEUMATIC MITRAL (VALVE) INSUFFICIENCY: ICD-10-CM

## 2025-02-17 DIAGNOSIS — I10 ESSENTIAL (PRIMARY) HYPERTENSION: ICD-10-CM

## 2025-02-17 PROCEDURE — 99214 OFFICE O/P EST MOD 30 MIN: CPT

## 2025-02-17 PROCEDURE — 93000 ELECTROCARDIOGRAM COMPLETE: CPT

## 2025-02-19 LAB
CHOLEST SERPL-MCNC: 194 MG/DL
ESTIMATED AVERAGE GLUCOSE: 97 MG/DL
HBA1C MFR BLD HPLC: 5 %
HDLC SERPL-MCNC: 89 MG/DL
LDLC SERPL CALC-MCNC: 93 MG/DL
NONHDLC SERPL-MCNC: 104 MG/DL
TRIGL SERPL-MCNC: 63 MG/DL

## 2025-02-26 ENCOUNTER — APPOINTMENT (OUTPATIENT)
Dept: ENDOCRINOLOGY | Facility: CLINIC | Age: 75
End: 2025-02-26

## 2025-02-27 ENCOUNTER — APPOINTMENT (OUTPATIENT)
Dept: HEMATOLOGY ONCOLOGY | Facility: CLINIC | Age: 75
End: 2025-02-27
Payer: MEDICARE

## 2025-02-27 VITALS
HEART RATE: 64 BPM | BODY MASS INDEX: 26.54 KG/M2 | SYSTOLIC BLOOD PRESSURE: 139 MMHG | HEIGHT: 63 IN | OXYGEN SATURATION: 99 % | DIASTOLIC BLOOD PRESSURE: 79 MMHG | WEIGHT: 149.8 LBS

## 2025-02-27 DIAGNOSIS — C90.00 MULTIPLE MYELOMA NOT HAVING ACHIEVED REMISSION: ICD-10-CM

## 2025-02-27 DIAGNOSIS — Z79.69 LONG TERM (CURRENT) USE OF OTHER IMMUNOMODULATORS AND IMMUNOSUPPRESSANTS: ICD-10-CM

## 2025-02-27 DIAGNOSIS — M25.512 PAIN IN LEFT SHOULDER: ICD-10-CM

## 2025-02-27 PROCEDURE — G2211 COMPLEX E/M VISIT ADD ON: CPT

## 2025-02-27 PROCEDURE — 99214 OFFICE O/P EST MOD 30 MIN: CPT

## 2025-02-28 PROBLEM — Z79.69 ON ANTINEOPLASTIC CHEMOTHERAPY: Status: ACTIVE | Noted: 2018-12-05

## 2025-03-03 ENCOUNTER — RX RENEWAL (OUTPATIENT)
Age: 75
End: 2025-03-03

## 2025-03-06 ENCOUNTER — RESULT REVIEW (OUTPATIENT)
Age: 75
End: 2025-03-06

## 2025-03-06 ENCOUNTER — APPOINTMENT (OUTPATIENT)
Age: 75
End: 2025-03-06

## 2025-03-06 LAB
BASOPHILS # BLD AUTO: 0.05 K/UL — SIGNIFICANT CHANGE UP (ref 0–0.2)
BASOPHILS # BLD AUTO: 0.06 K/UL — SIGNIFICANT CHANGE UP (ref 0–0.2)
BASOPHILS NFR BLD AUTO: 1.1 % — SIGNIFICANT CHANGE UP (ref 0–2)
BASOPHILS NFR BLD AUTO: 1.1 % — SIGNIFICANT CHANGE UP (ref 0–2)
EOSINOPHIL # BLD AUTO: 0.05 K/UL — SIGNIFICANT CHANGE UP (ref 0–0.5)
EOSINOPHIL # BLD AUTO: 0.06 K/UL — SIGNIFICANT CHANGE UP (ref 0–0.5)
EOSINOPHIL NFR BLD AUTO: 1.1 % — SIGNIFICANT CHANGE UP (ref 0–6)
EOSINOPHIL NFR BLD AUTO: 1.1 % — SIGNIFICANT CHANGE UP (ref 0–6)
HCT VFR BLD CALC: 35.5 % — SIGNIFICANT CHANGE UP (ref 34.5–45)
HCT VFR BLD CALC: 36.7 % — SIGNIFICANT CHANGE UP (ref 34.5–45)
HGB BLD-MCNC: 11.7 G/DL — SIGNIFICANT CHANGE UP (ref 11.5–15.5)
HGB BLD-MCNC: 11.8 G/DL — SIGNIFICANT CHANGE UP (ref 11.5–15.5)
IMM GRANULOCYTES # BLD AUTO: 0.01 K/UL — SIGNIFICANT CHANGE UP (ref 0–0.07)
IMM GRANULOCYTES # BLD AUTO: 0.01 K/UL — SIGNIFICANT CHANGE UP (ref 0–0.07)
IMM GRANULOCYTES NFR BLD AUTO: 0.2 % — SIGNIFICANT CHANGE UP (ref 0–0.9)
IMM GRANULOCYTES NFR BLD AUTO: 0.2 % — SIGNIFICANT CHANGE UP (ref 0–0.9)
LYMPHOCYTES # BLD AUTO: 2.4 K/UL — SIGNIFICANT CHANGE UP (ref 1–3.3)
LYMPHOCYTES # BLD AUTO: 3.25 K/UL — SIGNIFICANT CHANGE UP (ref 1–3.3)
LYMPHOCYTES NFR BLD AUTO: 54.1 % — HIGH (ref 13–44)
LYMPHOCYTES NFR BLD AUTO: 61.4 % — HIGH (ref 13–44)
MCHC RBC-ENTMCNC: 29.7 PG — SIGNIFICANT CHANGE UP (ref 27–34)
MCHC RBC-ENTMCNC: 30.2 PG — SIGNIFICANT CHANGE UP (ref 27–34)
MCHC RBC-ENTMCNC: 32.2 G/DL — SIGNIFICANT CHANGE UP (ref 32–36)
MCHC RBC-ENTMCNC: 33 G/DL — SIGNIFICANT CHANGE UP (ref 32–36)
MCV RBC AUTO: 91.5 FL — SIGNIFICANT CHANGE UP (ref 80–100)
MCV RBC AUTO: 92.4 FL — SIGNIFICANT CHANGE UP (ref 80–100)
MONOCYTES # BLD AUTO: 0.63 K/UL — SIGNIFICANT CHANGE UP (ref 0–0.9)
MONOCYTES # BLD AUTO: 0.7 K/UL — SIGNIFICANT CHANGE UP (ref 0–0.9)
MONOCYTES NFR BLD AUTO: 13.2 % — SIGNIFICANT CHANGE UP (ref 2–14)
MONOCYTES NFR BLD AUTO: 14.2 % — HIGH (ref 2–14)
NEUTROPHILS # BLD AUTO: 1.21 K/UL — LOW (ref 1.8–7.4)
NEUTROPHILS # BLD AUTO: 1.3 K/UL — LOW (ref 1.8–7.4)
NEUTROPHILS NFR BLD AUTO: 23 % — LOW (ref 43–77)
NEUTROPHILS NFR BLD AUTO: 29.3 % — LOW (ref 43–77)
NRBC # BLD AUTO: 0 K/UL — SIGNIFICANT CHANGE UP (ref 0–0)
NRBC # BLD AUTO: 0 K/UL — SIGNIFICANT CHANGE UP (ref 0–0)
NRBC # FLD: 0 K/UL — SIGNIFICANT CHANGE UP (ref 0–0)
NRBC # FLD: 0 K/UL — SIGNIFICANT CHANGE UP (ref 0–0)
NRBC BLD AUTO-RTO: 0 /100 WBCS — SIGNIFICANT CHANGE UP (ref 0–0)
NRBC BLD AUTO-RTO: 0 /100 WBCS — SIGNIFICANT CHANGE UP (ref 0–0)
PLATELET # BLD AUTO: 191 K/UL — SIGNIFICANT CHANGE UP (ref 150–400)
PLATELET # BLD AUTO: 203 K/UL — SIGNIFICANT CHANGE UP (ref 150–400)
PMV BLD: 12.1 FL — SIGNIFICANT CHANGE UP (ref 7–13)
PMV BLD: 12.5 FL — SIGNIFICANT CHANGE UP (ref 7–13)
RBC # BLD: 3.88 M/UL — SIGNIFICANT CHANGE UP (ref 3.8–5.2)
RBC # BLD: 3.97 M/UL — SIGNIFICANT CHANGE UP (ref 3.8–5.2)
RBC # FLD: 14.7 % — HIGH (ref 10.3–14.5)
RBC # FLD: 14.8 % — HIGH (ref 10.3–14.5)
WBC # BLD: 4.44 K/UL — SIGNIFICANT CHANGE UP (ref 3.8–10.5)
WBC # BLD: 5.29 K/UL — SIGNIFICANT CHANGE UP (ref 3.8–10.5)
WBC # FLD AUTO: 4.44 K/UL — SIGNIFICANT CHANGE UP (ref 3.8–10.5)
WBC # FLD AUTO: 5.29 K/UL — SIGNIFICANT CHANGE UP (ref 3.8–10.5)

## 2025-03-07 ENCOUNTER — APPOINTMENT (OUTPATIENT)
Dept: FAMILY MEDICINE | Facility: CLINIC | Age: 75
End: 2025-03-07

## 2025-03-07 LAB
ALBUMIN SERPL ELPH-MCNC: 4.2 G/DL — SIGNIFICANT CHANGE UP (ref 3.3–5)
ALP SERPL-CCNC: 78 U/L — SIGNIFICANT CHANGE UP (ref 40–120)
ALT FLD-CCNC: 14 U/L — SIGNIFICANT CHANGE UP (ref 10–45)
ANION GAP SERPL CALC-SCNC: 11 MMOL/L — SIGNIFICANT CHANGE UP (ref 5–17)
AST SERPL-CCNC: 25 U/L — SIGNIFICANT CHANGE UP (ref 10–40)
BILIRUB SERPL-MCNC: 0.2 MG/DL — SIGNIFICANT CHANGE UP (ref 0.2–1.2)
BUN SERPL-MCNC: 15 MG/DL — SIGNIFICANT CHANGE UP (ref 7–23)
CALCIUM SERPL-MCNC: 9.6 MG/DL — SIGNIFICANT CHANGE UP (ref 8.4–10.5)
CHLORIDE SERPL-SCNC: 106 MMOL/L — SIGNIFICANT CHANGE UP (ref 96–108)
CO2 SERPL-SCNC: 25 MMOL/L — SIGNIFICANT CHANGE UP (ref 22–31)
CREAT SERPL-MCNC: 0.62 MG/DL — SIGNIFICANT CHANGE UP (ref 0.5–1.3)
EGFR: 93 ML/MIN/1.73M2 — SIGNIFICANT CHANGE UP
EGFR: 93 ML/MIN/1.73M2 — SIGNIFICANT CHANGE UP
GLUCOSE SERPL-MCNC: 73 MG/DL — SIGNIFICANT CHANGE UP (ref 70–99)
IGA FLD-MCNC: 15 MG/DL — LOW (ref 84–499)
IGG FLD-MCNC: 320 MG/DL — LOW (ref 610–1660)
IGM SERPL-MCNC: 15 MG/DL — LOW (ref 35–242)
KAPPA LC SER QL IFE: 0.98 MG/DL — SIGNIFICANT CHANGE UP (ref 0.33–1.94)
KAPPA/LAMBDA FREE LIGHT CHAIN RATIO, SERUM: 1.88 RATIO — HIGH (ref 0.26–1.65)
LAMBDA LC SER QL IFE: 0.52 MG/DL — LOW (ref 0.57–2.63)
POTASSIUM SERPL-MCNC: 4.1 MMOL/L — SIGNIFICANT CHANGE UP (ref 3.5–5.3)
POTASSIUM SERPL-SCNC: 4.1 MMOL/L — SIGNIFICANT CHANGE UP (ref 3.5–5.3)
PROT SERPL-MCNC: 5.9 G/DL — LOW (ref 6–8.3)
PROT SERPL-MCNC: 6.2 G/DL — SIGNIFICANT CHANGE UP (ref 6–8.3)
SODIUM SERPL-SCNC: 143 MMOL/L — SIGNIFICANT CHANGE UP (ref 135–145)

## 2025-03-11 LAB
% ALBUMIN: 67.6 % — SIGNIFICANT CHANGE UP
% ALPHA 1: 4.8 % — SIGNIFICANT CHANGE UP
% ALPHA 2: 12.4 % — SIGNIFICANT CHANGE UP
% BETA: 10 % — SIGNIFICANT CHANGE UP
% GAMMA: 5.2 % — SIGNIFICANT CHANGE UP
% M SPIKE: SIGNIFICANT CHANGE UP
ALBUMIN SERPL ELPH-MCNC: 4 G/DL — SIGNIFICANT CHANGE UP (ref 3.6–5.5)
ALBUMIN/GLOB SERPL ELPH: 2.1 RATIO — SIGNIFICANT CHANGE UP
ALPHA1 GLOB SERPL ELPH-MCNC: 0.3 G/DL — SIGNIFICANT CHANGE UP (ref 0.1–0.4)
ALPHA2 GLOB SERPL ELPH-MCNC: 0.7 G/DL — SIGNIFICANT CHANGE UP (ref 0.5–1)
B-GLOBULIN SERPL ELPH-MCNC: 0.6 G/DL — SIGNIFICANT CHANGE UP (ref 0.5–1)
GAMMA GLOBULIN: 0.3 G/DL — LOW (ref 0.6–1.6)
INTERPRETATION SERPL IFE-IMP: SIGNIFICANT CHANGE UP
M-SPIKE: SIGNIFICANT CHANGE UP (ref 0–0)
PROT PATTERN SERPL ELPH-IMP: SIGNIFICANT CHANGE UP
PROT SERPL-MCNC: 5.9 G/DL — LOW (ref 6–8.3)

## 2025-03-20 ENCOUNTER — OUTPATIENT (OUTPATIENT)
Dept: OUTPATIENT SERVICES | Facility: HOSPITAL | Age: 75
LOS: 1 days | End: 2025-03-20

## 2025-03-20 ENCOUNTER — APPOINTMENT (OUTPATIENT)
Dept: MRI IMAGING | Facility: CLINIC | Age: 75
End: 2025-03-20
Payer: MEDICARE

## 2025-03-20 DIAGNOSIS — Z98.51 TUBAL LIGATION STATUS: Chronic | ICD-10-CM

## 2025-03-20 DIAGNOSIS — C90.00 MULTIPLE MYELOMA NOT HAVING ACHIEVED REMISSION: ICD-10-CM

## 2025-03-20 DIAGNOSIS — Z98.890 OTHER SPECIFIED POSTPROCEDURAL STATES: Chronic | ICD-10-CM

## 2025-03-20 PROCEDURE — 73223 MRI JOINT UPR EXTR W/O&W/DYE: CPT | Mod: 26,LT

## 2025-03-27 ENCOUNTER — OUTPATIENT (OUTPATIENT)
Dept: OUTPATIENT SERVICES | Facility: HOSPITAL | Age: 75
LOS: 1 days | Discharge: HOME | End: 2025-03-27

## 2025-03-27 DIAGNOSIS — Z94.84 STEM CELLS TRANSPLANT STATUS: Chronic | ICD-10-CM

## 2025-03-27 DIAGNOSIS — C90.00 MULTIPLE MYELOMA NOT HAVING ACHIEVED REMISSION: ICD-10-CM

## 2025-03-27 DIAGNOSIS — Z98.51 TUBAL LIGATION STATUS: Chronic | ICD-10-CM

## 2025-03-27 DIAGNOSIS — Z98.890 OTHER SPECIFIED POSTPROCEDURAL STATES: Chronic | ICD-10-CM

## 2025-04-01 ENCOUNTER — LABORATORY RESULT (OUTPATIENT)
Age: 75
End: 2025-04-01

## 2025-04-03 ENCOUNTER — APPOINTMENT (OUTPATIENT)
Age: 75
End: 2025-04-03

## 2025-04-03 ENCOUNTER — RESULT REVIEW (OUTPATIENT)
Age: 75
End: 2025-04-03

## 2025-04-03 LAB
BASOPHILS # BLD AUTO: 0.08 K/UL — SIGNIFICANT CHANGE UP (ref 0–0.2)
BASOPHILS NFR BLD AUTO: 1.4 % — SIGNIFICANT CHANGE UP (ref 0–2)
EOSINOPHIL # BLD AUTO: 0.07 K/UL — SIGNIFICANT CHANGE UP (ref 0–0.5)
EOSINOPHIL NFR BLD AUTO: 1.2 % — SIGNIFICANT CHANGE UP (ref 0–6)
HCT VFR BLD CALC: 34.8 % — SIGNIFICANT CHANGE UP (ref 34.5–45)
HGB BLD-MCNC: 11.6 G/DL — SIGNIFICANT CHANGE UP (ref 11.5–15.5)
IMM GRANULOCYTES # BLD AUTO: 0.01 K/UL — SIGNIFICANT CHANGE UP (ref 0–0.07)
IMM GRANULOCYTES NFR BLD AUTO: 0.2 % — SIGNIFICANT CHANGE UP (ref 0–0.9)
LYMPHOCYTES # BLD AUTO: 2.84 K/UL — SIGNIFICANT CHANGE UP (ref 1–3.3)
LYMPHOCYTES NFR BLD AUTO: 50.4 % — HIGH (ref 13–44)
MCHC RBC-ENTMCNC: 30.4 PG — SIGNIFICANT CHANGE UP (ref 27–34)
MCHC RBC-ENTMCNC: 33.3 G/DL — SIGNIFICANT CHANGE UP (ref 32–36)
MCV RBC AUTO: 91.3 FL — SIGNIFICANT CHANGE UP (ref 80–100)
MONOCYTES # BLD AUTO: 0.72 K/UL — SIGNIFICANT CHANGE UP (ref 0–0.9)
MONOCYTES NFR BLD AUTO: 12.8 % — SIGNIFICANT CHANGE UP (ref 2–14)
NEUTROPHILS # BLD AUTO: 1.92 K/UL — SIGNIFICANT CHANGE UP (ref 1.8–7.4)
NEUTROPHILS NFR BLD AUTO: 34 % — LOW (ref 43–77)
NRBC # BLD AUTO: 0 K/UL — SIGNIFICANT CHANGE UP (ref 0–0)
NRBC # FLD: 0 K/UL — SIGNIFICANT CHANGE UP (ref 0–0)
NRBC BLD AUTO-RTO: 0 /100 WBCS — SIGNIFICANT CHANGE UP (ref 0–0)
PLATELET # BLD AUTO: 201 K/UL — SIGNIFICANT CHANGE UP (ref 150–400)
PMV BLD: 11.6 FL — SIGNIFICANT CHANGE UP (ref 7–13)
RBC # BLD: 3.81 M/UL — SIGNIFICANT CHANGE UP (ref 3.8–5.2)
RBC # FLD: 14.3 % — SIGNIFICANT CHANGE UP (ref 10.3–14.5)
WBC # BLD: 5.64 K/UL — SIGNIFICANT CHANGE UP (ref 3.8–10.5)
WBC # FLD AUTO: 5.64 K/UL — SIGNIFICANT CHANGE UP (ref 3.8–10.5)

## 2025-04-04 ENCOUNTER — APPOINTMENT (OUTPATIENT)
Dept: ENDOCRINOLOGY | Facility: CLINIC | Age: 75
End: 2025-04-04
Payer: MEDICARE

## 2025-04-04 VITALS
OXYGEN SATURATION: 99 % | HEIGHT: 63 IN | DIASTOLIC BLOOD PRESSURE: 78 MMHG | SYSTOLIC BLOOD PRESSURE: 140 MMHG | HEART RATE: 70 BPM | WEIGHT: 153 LBS | BODY MASS INDEX: 27.11 KG/M2

## 2025-04-04 DIAGNOSIS — E55.9 VITAMIN D DEFICIENCY, UNSPECIFIED: ICD-10-CM

## 2025-04-04 DIAGNOSIS — R79.89 OTHER SPECIFIED ABNORMAL FINDINGS OF BLOOD CHEMISTRY: ICD-10-CM

## 2025-04-04 DIAGNOSIS — Z51.11 ENCOUNTER FOR ANTINEOPLASTIC CHEMOTHERAPY: ICD-10-CM

## 2025-04-04 DIAGNOSIS — R73.9 HYPERGLYCEMIA, UNSPECIFIED: ICD-10-CM

## 2025-04-04 DIAGNOSIS — E04.2 NONTOXIC MULTINODULAR GOITER: ICD-10-CM

## 2025-04-04 DIAGNOSIS — M85.80 OTHER SPECIFIED DISORDERS OF BONE DENSITY AND STRUCTURE, UNSPECIFIED SITE: ICD-10-CM

## 2025-04-04 DIAGNOSIS — M81.8 OTHER OSTEOPOROSIS W/OUT CURRENT PATHOLOGICAL FRACTURE: ICD-10-CM

## 2025-04-04 DIAGNOSIS — I10 ESSENTIAL (PRIMARY) HYPERTENSION: ICD-10-CM

## 2025-04-04 DIAGNOSIS — E05.90 THYROTOXICOSIS, UNSPECIFIED W/OUT THYROTOXIC CRISIS OR STORM: ICD-10-CM

## 2025-04-04 DIAGNOSIS — T38.0X5A OTHER OSTEOPOROSIS W/OUT CURRENT PATHOLOGICAL FRACTURE: ICD-10-CM

## 2025-04-04 DIAGNOSIS — R11.2 NAUSEA WITH VOMITING, UNSPECIFIED: ICD-10-CM

## 2025-04-04 LAB
ALBUMIN SERPL ELPH-MCNC: 4.3 G/DL — SIGNIFICANT CHANGE UP (ref 3.3–5)
ALP SERPL-CCNC: 77 U/L — SIGNIFICANT CHANGE UP (ref 40–120)
ALT FLD-CCNC: 15 U/L — SIGNIFICANT CHANGE UP (ref 10–45)
ANION GAP SERPL CALC-SCNC: 10 MMOL/L — SIGNIFICANT CHANGE UP (ref 5–17)
AST SERPL-CCNC: 29 U/L — SIGNIFICANT CHANGE UP (ref 10–40)
BILIRUB SERPL-MCNC: 0.3 MG/DL — SIGNIFICANT CHANGE UP (ref 0.2–1.2)
BUN SERPL-MCNC: 19 MG/DL — SIGNIFICANT CHANGE UP (ref 7–23)
CALCIUM SERPL-MCNC: 9.8 MG/DL — SIGNIFICANT CHANGE UP (ref 8.4–10.5)
CHLORIDE SERPL-SCNC: 105 MMOL/L — SIGNIFICANT CHANGE UP (ref 96–108)
CO2 SERPL-SCNC: 26 MMOL/L — SIGNIFICANT CHANGE UP (ref 22–31)
CREAT SERPL-MCNC: 0.62 MG/DL — SIGNIFICANT CHANGE UP (ref 0.5–1.3)
EGFR: 93 ML/MIN/1.73M2 — SIGNIFICANT CHANGE UP
EGFR: 93 ML/MIN/1.73M2 — SIGNIFICANT CHANGE UP
GLUCOSE SERPL-MCNC: 75 MG/DL — SIGNIFICANT CHANGE UP (ref 70–99)
IGA FLD-MCNC: 16 MG/DL — LOW (ref 84–499)
IGG FLD-MCNC: 328 MG/DL — LOW (ref 610–1660)
IGM SERPL-MCNC: 14 MG/DL — LOW (ref 35–242)
KAPPA LC SER QL IFE: 0.93 MG/DL — SIGNIFICANT CHANGE UP (ref 0.33–1.94)
KAPPA/LAMBDA FREE LIGHT CHAIN RATIO, SERUM: 2.38 RATIO — HIGH (ref 0.26–1.65)
LAMBDA LC SER QL IFE: 0.39 MG/DL — LOW (ref 0.57–2.63)
POTASSIUM SERPL-MCNC: 4.4 MMOL/L — SIGNIFICANT CHANGE UP (ref 3.5–5.3)
POTASSIUM SERPL-SCNC: 4.4 MMOL/L — SIGNIFICANT CHANGE UP (ref 3.5–5.3)
PROT SERPL-MCNC: 6.5 G/DL — SIGNIFICANT CHANGE UP (ref 6–8.3)
SODIUM SERPL-SCNC: 142 MMOL/L — SIGNIFICANT CHANGE UP (ref 135–145)

## 2025-04-04 PROCEDURE — 99214 OFFICE O/P EST MOD 30 MIN: CPT

## 2025-04-04 PROCEDURE — G2211 COMPLEX E/M VISIT ADD ON: CPT

## 2025-04-05 LAB — PROT SERPL-MCNC: 6.5 G/DL — SIGNIFICANT CHANGE UP (ref 6–8.3)

## 2025-04-07 ENCOUNTER — APPOINTMENT (OUTPATIENT)
Dept: FAMILY MEDICINE | Facility: CLINIC | Age: 75
End: 2025-04-07
Payer: MEDICARE

## 2025-04-07 VITALS
DIASTOLIC BLOOD PRESSURE: 82 MMHG | WEIGHT: 150 LBS | SYSTOLIC BLOOD PRESSURE: 140 MMHG | HEART RATE: 69 BPM | HEIGHT: 63 IN | BODY MASS INDEX: 26.58 KG/M2 | TEMPERATURE: 97.8 F | OXYGEN SATURATION: 99 %

## 2025-04-07 DIAGNOSIS — E78.5 HYPERLIPIDEMIA, UNSPECIFIED: ICD-10-CM

## 2025-04-07 PROCEDURE — G2211 COMPLEX E/M VISIT ADD ON: CPT

## 2025-04-07 PROCEDURE — 99214 OFFICE O/P EST MOD 30 MIN: CPT

## 2025-04-07 RX ORDER — AZELASTINE HYDROCHLORIDE AND FLUTICASONE PROPIONATE 137; 50 UG/1; UG/1
137-50 SPRAY, METERED NASAL TWICE DAILY
Qty: 1 | Refills: 3 | Status: ACTIVE | COMMUNITY
Start: 2025-04-07 | End: 1900-01-01

## 2025-04-08 DIAGNOSIS — J06.9 ACUTE UPPER RESPIRATORY INFECTION, UNSPECIFIED: ICD-10-CM

## 2025-04-08 LAB
% ALBUMIN: 66.5 % — SIGNIFICANT CHANGE UP
% ALPHA 1: 5.1 % — SIGNIFICANT CHANGE UP
% ALPHA 2: 12.6 % — SIGNIFICANT CHANGE UP
% BETA: 10.6 % — SIGNIFICANT CHANGE UP
% GAMMA: 5.2 % — SIGNIFICANT CHANGE UP
% M SPIKE: SIGNIFICANT CHANGE UP
ALBUMIN SERPL ELPH-MCNC: 4.3 G/DL — SIGNIFICANT CHANGE UP (ref 3.6–5.5)
ALBUMIN/GLOB SERPL ELPH: 2 RATIO — SIGNIFICANT CHANGE UP
ALPHA1 GLOB SERPL ELPH-MCNC: 0.3 G/DL — SIGNIFICANT CHANGE UP (ref 0.1–0.4)
ALPHA2 GLOB SERPL ELPH-MCNC: 0.8 G/DL — SIGNIFICANT CHANGE UP (ref 0.5–1)
B-GLOBULIN SERPL ELPH-MCNC: 0.7 G/DL — SIGNIFICANT CHANGE UP (ref 0.5–1)
GAMMA GLOBULIN: 0.3 G/DL — LOW (ref 0.6–1.6)
INTERPRETATION SERPL IFE-IMP: SIGNIFICANT CHANGE UP
M-SPIKE: SIGNIFICANT CHANGE UP (ref 0–0)
PROT PATTERN SERPL ELPH-IMP: SIGNIFICANT CHANGE UP
PROT SERPL-MCNC: 6.5 G/DL — SIGNIFICANT CHANGE UP (ref 6–8.3)

## 2025-04-08 RX ORDER — GUAIFENESIN AND CODEINE PHOSPHATE 100; 10 MG/5ML; MG/5ML
200-20 SOLUTION ORAL EVERY 8 HOURS
Qty: 12 | Refills: 0 | Status: ACTIVE | COMMUNITY
Start: 2025-04-08 | End: 1900-01-01

## 2025-04-08 RX ORDER — AMOXICILLIN AND CLAVULANATE POTASSIUM 875; 125 MG/1; MG/1
875-125 TABLET, COATED ORAL
Qty: 14 | Refills: 0 | Status: COMPLETED | COMMUNITY
Start: 2025-04-08 | End: 2025-04-15

## 2025-04-09 ENCOUNTER — RX CHANGE (OUTPATIENT)
Age: 75
End: 2025-04-09

## 2025-05-01 ENCOUNTER — RESULT REVIEW (OUTPATIENT)
Age: 75
End: 2025-05-01

## 2025-05-01 ENCOUNTER — APPOINTMENT (OUTPATIENT)
Age: 75
End: 2025-05-01

## 2025-05-01 LAB
ALBUMIN SERPL ELPH-MCNC: 4.1 G/DL — SIGNIFICANT CHANGE UP (ref 3.3–5)
ALP SERPL-CCNC: 88 U/L — SIGNIFICANT CHANGE UP (ref 40–120)
ALT FLD-CCNC: 18 U/L — SIGNIFICANT CHANGE UP (ref 10–40)
ANION GAP SERPL CALC-SCNC: 13 MMOL/L — SIGNIFICANT CHANGE UP (ref 5–17)
AST SERPL-CCNC: 22 U/L — SIGNIFICANT CHANGE UP (ref 10–35)
BASOPHILS # BLD AUTO: 0.08 K/UL — SIGNIFICANT CHANGE UP (ref 0–0.2)
BASOPHILS NFR BLD AUTO: 1.1 % — SIGNIFICANT CHANGE UP (ref 0–2)
BILIRUB SERPL-MCNC: 0.2 MG/DL — SIGNIFICANT CHANGE UP (ref 0.2–1.2)
BUN SERPL-MCNC: 12 MG/DL — SIGNIFICANT CHANGE UP (ref 7–23)
CALCIUM SERPL-MCNC: 9.1 MG/DL — SIGNIFICANT CHANGE UP (ref 8.4–10.5)
CHLORIDE SERPL-SCNC: 107 MMOL/L — SIGNIFICANT CHANGE UP (ref 96–108)
CO2 SERPL-SCNC: 25 MMOL/L — SIGNIFICANT CHANGE UP (ref 22–31)
CREAT SERPL-MCNC: 0.55 MG/DL — SIGNIFICANT CHANGE UP (ref 0.5–1.3)
EGFR: 96 ML/MIN/1.73M2 — SIGNIFICANT CHANGE UP
EGFR: 96 ML/MIN/1.73M2 — SIGNIFICANT CHANGE UP
EOSINOPHIL # BLD AUTO: 0.13 K/UL — SIGNIFICANT CHANGE UP (ref 0–0.5)
EOSINOPHIL NFR BLD AUTO: 1.8 % — SIGNIFICANT CHANGE UP (ref 0–6)
GLUCOSE SERPL-MCNC: 76 MG/DL — SIGNIFICANT CHANGE UP (ref 70–99)
HCT VFR BLD CALC: 34.8 % — SIGNIFICANT CHANGE UP (ref 34.5–45)
HGB BLD-MCNC: 11.1 G/DL — LOW (ref 11.5–15.5)
IMM GRANULOCYTES # BLD AUTO: 0.02 K/UL — SIGNIFICANT CHANGE UP (ref 0–0.07)
IMM GRANULOCYTES NFR BLD AUTO: 0.3 % — SIGNIFICANT CHANGE UP (ref 0–0.9)
LYMPHOCYTES # BLD AUTO: 2.65 K/UL — SIGNIFICANT CHANGE UP (ref 1–3.3)
LYMPHOCYTES NFR BLD AUTO: 35.9 % — SIGNIFICANT CHANGE UP (ref 13–44)
MCHC RBC-ENTMCNC: 29.4 PG — SIGNIFICANT CHANGE UP (ref 27–34)
MCHC RBC-ENTMCNC: 31.9 G/DL — LOW (ref 32–36)
MCV RBC AUTO: 92.1 FL — SIGNIFICANT CHANGE UP (ref 80–100)
MONOCYTES # BLD AUTO: 0.98 K/UL — HIGH (ref 0–0.9)
MONOCYTES NFR BLD AUTO: 13.3 % — SIGNIFICANT CHANGE UP (ref 2–14)
NEUTROPHILS # BLD AUTO: 3.52 K/UL — SIGNIFICANT CHANGE UP (ref 1.8–7.4)
NEUTROPHILS NFR BLD AUTO: 47.6 % — SIGNIFICANT CHANGE UP (ref 43–77)
NRBC # BLD AUTO: 0 K/UL — SIGNIFICANT CHANGE UP (ref 0–0)
NRBC # FLD: 0 K/UL — SIGNIFICANT CHANGE UP (ref 0–0)
NRBC BLD AUTO-RTO: 0 /100 WBCS — SIGNIFICANT CHANGE UP (ref 0–0)
PLATELET # BLD AUTO: 239 K/UL — SIGNIFICANT CHANGE UP (ref 150–400)
PMV BLD: 11.4 FL — SIGNIFICANT CHANGE UP (ref 7–13)
POTASSIUM SERPL-MCNC: 4.4 MMOL/L — SIGNIFICANT CHANGE UP (ref 3.5–5.3)
POTASSIUM SERPL-SCNC: 4.4 MMOL/L — SIGNIFICANT CHANGE UP (ref 3.5–5.3)
PROT SERPL-MCNC: 6 G/DL — SIGNIFICANT CHANGE UP (ref 6–8.3)
RBC # BLD: 3.78 M/UL — LOW (ref 3.8–5.2)
RBC # FLD: 13.9 % — SIGNIFICANT CHANGE UP (ref 10.3–14.5)
SODIUM SERPL-SCNC: 145 MMOL/L — SIGNIFICANT CHANGE UP (ref 135–145)
WBC # BLD: 7.38 K/UL — SIGNIFICANT CHANGE UP (ref 3.8–10.5)
WBC # FLD AUTO: 7.38 K/UL — SIGNIFICANT CHANGE UP (ref 3.8–10.5)

## 2025-05-02 LAB
IGA FLD-MCNC: 15 MG/DL — LOW (ref 84–499)
IGG FLD-MCNC: 294 MG/DL — LOW (ref 610–1660)
IGM SERPL-MCNC: 15 MG/DL — LOW (ref 35–242)
KAPPA LC SER QL IFE: 0.77 MG/DL — SIGNIFICANT CHANGE UP (ref 0.33–1.94)
KAPPA/LAMBDA FREE LIGHT CHAIN RATIO, SERUM: 1.67 RATIO — HIGH (ref 0.26–1.65)
LAMBDA LC SER QL IFE: 0.46 MG/DL — LOW (ref 0.57–2.63)
PROT SERPL-MCNC: 6.4 G/DL — SIGNIFICANT CHANGE UP (ref 6–8.3)

## 2025-05-04 LAB
% ALBUMIN: 55.4 % — SIGNIFICANT CHANGE UP
% ALPHA 1: 6.7 % — SIGNIFICANT CHANGE UP
% ALPHA 2: 15.3 % — SIGNIFICANT CHANGE UP
% BETA: 19.1 % — SIGNIFICANT CHANGE UP
% GAMMA: 3.5 % — SIGNIFICANT CHANGE UP
% M SPIKE: SIGNIFICANT CHANGE UP
ALBUMIN SERPL ELPH-MCNC: 3.5 G/DL — LOW (ref 3.6–5.5)
ALBUMIN/GLOB SERPL ELPH: 1.2 RATIO — SIGNIFICANT CHANGE UP
ALPHA1 GLOB SERPL ELPH-MCNC: 0.4 G/DL — SIGNIFICANT CHANGE UP (ref 0.1–0.4)
ALPHA2 GLOB SERPL ELPH-MCNC: 1 G/DL — SIGNIFICANT CHANGE UP (ref 0.5–1)
B-GLOBULIN SERPL ELPH-MCNC: 1.2 G/DL — HIGH (ref 0.5–1)
GAMMA GLOBULIN: 0.2 G/DL — LOW (ref 0.6–1.6)
INTERPRETATION SERPL IFE-IMP: SIGNIFICANT CHANGE UP
M-SPIKE: SIGNIFICANT CHANGE UP (ref 0–0)
PROT PATTERN SERPL ELPH-IMP: SIGNIFICANT CHANGE UP
PROT SERPL-MCNC: 6.4 G/DL — SIGNIFICANT CHANGE UP (ref 6–8.3)

## 2025-05-22 ENCOUNTER — APPOINTMENT (OUTPATIENT)
Dept: HEMATOLOGY ONCOLOGY | Facility: CLINIC | Age: 75
End: 2025-05-22
Payer: MEDICARE

## 2025-05-22 ENCOUNTER — NON-APPOINTMENT (OUTPATIENT)
Age: 75
End: 2025-05-22

## 2025-05-22 VITALS
OXYGEN SATURATION: 99 % | HEART RATE: 63 BPM | TEMPERATURE: 97.8 F | SYSTOLIC BLOOD PRESSURE: 149 MMHG | BODY MASS INDEX: 26.03 KG/M2 | HEIGHT: 64.33 IN | DIASTOLIC BLOOD PRESSURE: 79 MMHG | WEIGHT: 152.5 LBS

## 2025-05-22 DIAGNOSIS — Z79.69 LONG TERM (CURRENT) USE OF OTHER IMMUNOMODULATORS AND IMMUNOSUPPRESSANTS: ICD-10-CM

## 2025-05-22 DIAGNOSIS — C90.00 MULTIPLE MYELOMA NOT HAVING ACHIEVED REMISSION: ICD-10-CM

## 2025-05-22 PROCEDURE — 99214 OFFICE O/P EST MOD 30 MIN: CPT

## 2025-05-22 PROCEDURE — G2211 COMPLEX E/M VISIT ADD ON: CPT

## 2025-05-23 ENCOUNTER — OUTPATIENT (OUTPATIENT)
Dept: OUTPATIENT SERVICES | Facility: HOSPITAL | Age: 75
LOS: 1 days | Discharge: HOME | End: 2025-05-23

## 2025-05-23 DIAGNOSIS — C90.00 MULTIPLE MYELOMA NOT HAVING ACHIEVED REMISSION: ICD-10-CM

## 2025-05-23 DIAGNOSIS — Z98.890 OTHER SPECIFIED POSTPROCEDURAL STATES: Chronic | ICD-10-CM

## 2025-05-23 DIAGNOSIS — Z94.84 STEM CELLS TRANSPLANT STATUS: Chronic | ICD-10-CM

## 2025-05-23 DIAGNOSIS — Z98.51 TUBAL LIGATION STATUS: Chronic | ICD-10-CM

## 2025-05-29 ENCOUNTER — RESULT REVIEW (OUTPATIENT)
Age: 75
End: 2025-05-29

## 2025-05-29 ENCOUNTER — APPOINTMENT (OUTPATIENT)
Age: 75
End: 2025-05-29

## 2025-05-29 DIAGNOSIS — Z51.11 ENCOUNTER FOR ANTINEOPLASTIC CHEMOTHERAPY: ICD-10-CM

## 2025-05-29 DIAGNOSIS — R11.2 NAUSEA WITH VOMITING, UNSPECIFIED: ICD-10-CM

## 2025-05-29 LAB
BASOPHILS # BLD AUTO: 0.07 K/UL — SIGNIFICANT CHANGE UP (ref 0–0.2)
BASOPHILS NFR BLD AUTO: 1.5 % — SIGNIFICANT CHANGE UP (ref 0–2)
EOSINOPHIL # BLD AUTO: 0.1 K/UL — SIGNIFICANT CHANGE UP (ref 0–0.5)
EOSINOPHIL NFR BLD AUTO: 2.1 % — SIGNIFICANT CHANGE UP (ref 0–6)
HCT VFR BLD CALC: 34.6 % — SIGNIFICANT CHANGE UP (ref 34.5–45)
HGB BLD-MCNC: 11.1 G/DL — LOW (ref 11.5–15.5)
IMM GRANULOCYTES # BLD AUTO: 0.01 K/UL — SIGNIFICANT CHANGE UP (ref 0–0.07)
IMM GRANULOCYTES NFR BLD AUTO: 0.2 % — SIGNIFICANT CHANGE UP (ref 0–0.9)
LYMPHOCYTES # BLD AUTO: 2.54 K/UL — SIGNIFICANT CHANGE UP (ref 1–3.3)
LYMPHOCYTES NFR BLD AUTO: 54.5 % — HIGH (ref 13–44)
MCHC RBC-ENTMCNC: 29.4 PG — SIGNIFICANT CHANGE UP (ref 27–34)
MCHC RBC-ENTMCNC: 32.1 G/DL — SIGNIFICANT CHANGE UP (ref 32–36)
MCV RBC AUTO: 91.5 FL — SIGNIFICANT CHANGE UP (ref 80–100)
MONOCYTES # BLD AUTO: 0.62 K/UL — SIGNIFICANT CHANGE UP (ref 0–0.9)
MONOCYTES NFR BLD AUTO: 13.3 % — SIGNIFICANT CHANGE UP (ref 2–14)
NEUTROPHILS # BLD AUTO: 1.32 K/UL — LOW (ref 1.8–7.4)
NEUTROPHILS NFR BLD AUTO: 28.4 % — LOW (ref 43–77)
NRBC # BLD AUTO: 0 K/UL — SIGNIFICANT CHANGE UP (ref 0–0)
NRBC # FLD: 0 K/UL — SIGNIFICANT CHANGE UP (ref 0–0)
NRBC BLD AUTO-RTO: 0 /100 WBCS — SIGNIFICANT CHANGE UP (ref 0–0)
PLATELET # BLD AUTO: 184 K/UL — SIGNIFICANT CHANGE UP (ref 150–400)
PMV BLD: 11.9 FL — SIGNIFICANT CHANGE UP (ref 7–13)
RBC # BLD: 3.78 M/UL — LOW (ref 3.8–5.2)
RBC # FLD: 14.2 % — SIGNIFICANT CHANGE UP (ref 10.3–14.5)
WBC # BLD: 4.66 K/UL — SIGNIFICANT CHANGE UP (ref 3.8–10.5)
WBC # FLD AUTO: 4.66 K/UL — SIGNIFICANT CHANGE UP (ref 3.8–10.5)

## 2025-05-30 LAB
ALBUMIN SERPL ELPH-MCNC: 4.1 G/DL — SIGNIFICANT CHANGE UP (ref 3.3–5)
ALP SERPL-CCNC: 86 U/L — SIGNIFICANT CHANGE UP (ref 40–120)
ALT FLD-CCNC: 22 U/L — SIGNIFICANT CHANGE UP (ref 10–40)
ANION GAP SERPL CALC-SCNC: 14 MMOL/L — SIGNIFICANT CHANGE UP (ref 5–17)
AST SERPL-CCNC: 32 U/L — SIGNIFICANT CHANGE UP (ref 10–35)
BILIRUB SERPL-MCNC: 0.2 MG/DL — SIGNIFICANT CHANGE UP (ref 0.2–1.2)
BUN SERPL-MCNC: 9 MG/DL — SIGNIFICANT CHANGE UP (ref 7–23)
CALCIUM SERPL-MCNC: 9.4 MG/DL — SIGNIFICANT CHANGE UP (ref 8.4–10.5)
CHLORIDE SERPL-SCNC: 104 MMOL/L — SIGNIFICANT CHANGE UP (ref 96–108)
CO2 SERPL-SCNC: 23 MMOL/L — SIGNIFICANT CHANGE UP (ref 22–31)
CREAT SERPL-MCNC: 0.62 MG/DL — SIGNIFICANT CHANGE UP (ref 0.5–1.3)
EGFR: 93 ML/MIN/1.73M2 — SIGNIFICANT CHANGE UP
EGFR: 93 ML/MIN/1.73M2 — SIGNIFICANT CHANGE UP
GLUCOSE SERPL-MCNC: 76 MG/DL — SIGNIFICANT CHANGE UP (ref 70–99)
IGA FLD-MCNC: 15 MG/DL — LOW (ref 84–499)
IGG FLD-MCNC: 321 MG/DL — LOW (ref 610–1660)
IGM SERPL-MCNC: 20 MG/DL — LOW (ref 35–242)
KAPPA LC SER QL IFE: 0.95 MG/DL — SIGNIFICANT CHANGE UP (ref 0.33–1.94)
KAPPA/LAMBDA FREE LIGHT CHAIN RATIO, SERUM: 1.58 RATIO — SIGNIFICANT CHANGE UP (ref 0.26–1.65)
LAMBDA LC SER QL IFE: 0.6 MG/DL — SIGNIFICANT CHANGE UP (ref 0.57–2.63)
POTASSIUM SERPL-MCNC: 4.4 MMOL/L — SIGNIFICANT CHANGE UP (ref 3.5–5.3)
POTASSIUM SERPL-SCNC: 4.4 MMOL/L — SIGNIFICANT CHANGE UP (ref 3.5–5.3)
PROT SERPL-MCNC: 6.5 G/DL — SIGNIFICANT CHANGE UP (ref 6–8.3)
PROT SERPL-MCNC: 6.5 G/DL — SIGNIFICANT CHANGE UP (ref 6–8.3)
SODIUM SERPL-SCNC: 142 MMOL/L — SIGNIFICANT CHANGE UP (ref 135–145)

## 2025-06-01 LAB
% ALBUMIN: 64.3 % — SIGNIFICANT CHANGE UP
% ALPHA 1: 5.6 % — SIGNIFICANT CHANGE UP
% ALPHA 2: 13.8 % — SIGNIFICANT CHANGE UP
% BETA: 11 % — SIGNIFICANT CHANGE UP
% GAMMA: 5.3 % — SIGNIFICANT CHANGE UP
% M SPIKE: SIGNIFICANT CHANGE UP
ALBUMIN SERPL ELPH-MCNC: 4.2 G/DL — SIGNIFICANT CHANGE UP (ref 3.6–5.5)
ALBUMIN/GLOB SERPL ELPH: 1.8 RATIO — SIGNIFICANT CHANGE UP
ALPHA1 GLOB SERPL ELPH-MCNC: 0.4 G/DL — SIGNIFICANT CHANGE UP (ref 0.1–0.4)
ALPHA2 GLOB SERPL ELPH-MCNC: 0.9 G/DL — SIGNIFICANT CHANGE UP (ref 0.5–1)
B-GLOBULIN SERPL ELPH-MCNC: 0.7 G/DL — SIGNIFICANT CHANGE UP (ref 0.5–1)
GAMMA GLOBULIN: 0.3 G/DL — LOW (ref 0.6–1.6)
INTERPRETATION SERPL IFE-IMP: SIGNIFICANT CHANGE UP
M-SPIKE: SIGNIFICANT CHANGE UP (ref 0–0)
PROT PATTERN SERPL ELPH-IMP: SIGNIFICANT CHANGE UP
PROT SERPL-MCNC: 6.5 G/DL — SIGNIFICANT CHANGE UP (ref 6–8.3)

## 2025-06-05 ENCOUNTER — NON-APPOINTMENT (OUTPATIENT)
Age: 75
End: 2025-06-05

## 2025-06-26 ENCOUNTER — RESULT REVIEW (OUTPATIENT)
Age: 75
End: 2025-06-26

## 2025-06-26 ENCOUNTER — APPOINTMENT (OUTPATIENT)
Dept: CARDIOLOGY | Facility: CLINIC | Age: 75
End: 2025-06-26
Payer: MEDICARE

## 2025-06-26 ENCOUNTER — APPOINTMENT (OUTPATIENT)
Age: 75
End: 2025-06-26

## 2025-06-26 VITALS
SYSTOLIC BLOOD PRESSURE: 133 MMHG | DIASTOLIC BLOOD PRESSURE: 77 MMHG | HEIGHT: 64 IN | WEIGHT: 152 LBS | RESPIRATION RATE: 16 BRPM | BODY MASS INDEX: 25.95 KG/M2 | HEART RATE: 58 BPM

## 2025-06-26 LAB
BASOPHILS # BLD AUTO: 0.08 K/UL — SIGNIFICANT CHANGE UP (ref 0–0.2)
BASOPHILS NFR BLD AUTO: 1.5 % — SIGNIFICANT CHANGE UP (ref 0–2)
EOSINOPHIL # BLD AUTO: 0.15 K/UL — SIGNIFICANT CHANGE UP (ref 0–0.5)
EOSINOPHIL NFR BLD AUTO: 2.7 % — SIGNIFICANT CHANGE UP (ref 0–6)
HCT VFR BLD CALC: 34.6 % — SIGNIFICANT CHANGE UP (ref 34.5–45)
HGB BLD-MCNC: 11.1 G/DL — LOW (ref 11.5–15.5)
IMM GRANULOCYTES # BLD AUTO: 0.01 K/UL — SIGNIFICANT CHANGE UP (ref 0–0.07)
IMM GRANULOCYTES NFR BLD AUTO: 0.2 % — SIGNIFICANT CHANGE UP (ref 0–0.9)
LYMPHOCYTES # BLD AUTO: 2.68 K/UL — SIGNIFICANT CHANGE UP (ref 1–3.3)
LYMPHOCYTES NFR BLD AUTO: 48.9 % — HIGH (ref 13–44)
MCHC RBC-ENTMCNC: 29.7 PG — SIGNIFICANT CHANGE UP (ref 27–34)
MCHC RBC-ENTMCNC: 32.1 G/DL — SIGNIFICANT CHANGE UP (ref 32–36)
MCV RBC AUTO: 92.5 FL — SIGNIFICANT CHANGE UP (ref 80–100)
MONOCYTES # BLD AUTO: 0.73 K/UL — SIGNIFICANT CHANGE UP (ref 0–0.9)
MONOCYTES NFR BLD AUTO: 13.3 % — SIGNIFICANT CHANGE UP (ref 2–14)
NEUTROPHILS # BLD AUTO: 1.83 K/UL — SIGNIFICANT CHANGE UP (ref 1.8–7.4)
NEUTROPHILS NFR BLD AUTO: 33.4 % — LOW (ref 43–77)
NRBC # BLD AUTO: 0 K/UL — SIGNIFICANT CHANGE UP (ref 0–0)
NRBC # FLD: 0 K/UL — SIGNIFICANT CHANGE UP (ref 0–0)
NRBC BLD AUTO-RTO: 0 /100 WBCS — SIGNIFICANT CHANGE UP (ref 0–0)
PLATELET # BLD AUTO: 203 K/UL — SIGNIFICANT CHANGE UP (ref 150–400)
PMV BLD: 11.2 FL — SIGNIFICANT CHANGE UP (ref 7–13)
RBC # BLD: 3.74 M/UL — LOW (ref 3.8–5.2)
RBC # FLD: 15.1 % — HIGH (ref 10.3–14.5)
WBC # BLD: 5.48 K/UL — SIGNIFICANT CHANGE UP (ref 3.8–10.5)
WBC # FLD AUTO: 5.48 K/UL — SIGNIFICANT CHANGE UP (ref 3.8–10.5)

## 2025-06-26 PROCEDURE — 99214 OFFICE O/P EST MOD 30 MIN: CPT

## 2025-06-26 PROCEDURE — 93000 ELECTROCARDIOGRAM COMPLETE: CPT

## 2025-06-27 ENCOUNTER — EMERGENCY (EMERGENCY)
Facility: HOSPITAL | Age: 75
LOS: 1 days | End: 2025-06-27
Attending: EMERGENCY MEDICINE
Payer: COMMERCIAL

## 2025-06-27 ENCOUNTER — NON-APPOINTMENT (OUTPATIENT)
Age: 75
End: 2025-06-27

## 2025-06-27 VITALS
WEIGHT: 151.02 LBS | DIASTOLIC BLOOD PRESSURE: 74 MMHG | OXYGEN SATURATION: 100 % | SYSTOLIC BLOOD PRESSURE: 145 MMHG | RESPIRATION RATE: 18 BRPM | TEMPERATURE: 98 F | HEART RATE: 69 BPM | HEIGHT: 64.33 IN

## 2025-06-27 VITALS
TEMPERATURE: 98 F | OXYGEN SATURATION: 100 % | HEART RATE: 61 BPM | SYSTOLIC BLOOD PRESSURE: 139 MMHG | DIASTOLIC BLOOD PRESSURE: 61 MMHG | RESPIRATION RATE: 18 BRPM

## 2025-06-27 DIAGNOSIS — E87.5 HYPERKALEMIA: ICD-10-CM

## 2025-06-27 DIAGNOSIS — R07.89 OTHER CHEST PAIN: ICD-10-CM

## 2025-06-27 DIAGNOSIS — C90.00 MULTIPLE MYELOMA NOT HAVING ACHIEVED REMISSION: ICD-10-CM

## 2025-06-27 DIAGNOSIS — Z94.84 STEM CELLS TRANSPLANT STATUS: Chronic | ICD-10-CM

## 2025-06-27 DIAGNOSIS — Z98.51 TUBAL LIGATION STATUS: Chronic | ICD-10-CM

## 2025-06-27 DIAGNOSIS — Z98.890 OTHER SPECIFIED POSTPROCEDURAL STATES: Chronic | ICD-10-CM

## 2025-06-27 LAB
ALBUMIN SERPL ELPH-MCNC: 4.1 G/DL — SIGNIFICANT CHANGE UP (ref 3.3–5)
ALBUMIN SERPL ELPH-MCNC: 4.2 G/DL — SIGNIFICANT CHANGE UP (ref 3.3–5.2)
ALP SERPL-CCNC: 74 U/L — SIGNIFICANT CHANGE UP (ref 40–120)
ALP SERPL-CCNC: 74 U/L — SIGNIFICANT CHANGE UP (ref 40–120)
ALT FLD-CCNC: 12 U/L — SIGNIFICANT CHANGE UP
ALT FLD-CCNC: 24 U/L — SIGNIFICANT CHANGE UP (ref 10–40)
ANION GAP SERPL CALC-SCNC: 12 MMOL/L — SIGNIFICANT CHANGE UP (ref 5–17)
ANION GAP SERPL CALC-SCNC: 13 MMOL/L — SIGNIFICANT CHANGE UP (ref 5–17)
ANISOCYTOSIS BLD QL: SLIGHT — SIGNIFICANT CHANGE UP
AST SERPL-CCNC: 19 U/L — SIGNIFICANT CHANGE UP
AST SERPL-CCNC: 88 U/L — HIGH (ref 10–35)
BASOPHILS # BLD AUTO: 0.03 K/UL — SIGNIFICANT CHANGE UP (ref 0–0.2)
BASOPHILS # BLD MANUAL: 0 K/UL — SIGNIFICANT CHANGE UP (ref 0–0.2)
BASOPHILS NFR BLD AUTO: 0.2 % — SIGNIFICANT CHANGE UP (ref 0–2)
BASOPHILS NFR BLD MANUAL: 0 % — SIGNIFICANT CHANGE UP (ref 0–2)
BILIRUB SERPL-MCNC: 0.2 MG/DL — SIGNIFICANT CHANGE UP (ref 0.2–1.2)
BILIRUB SERPL-MCNC: 0.3 MG/DL — LOW (ref 0.4–2)
BUN SERPL-MCNC: 13 MG/DL — SIGNIFICANT CHANGE UP (ref 7–23)
BUN SERPL-MCNC: 15.5 MG/DL — SIGNIFICANT CHANGE UP (ref 8–20)
CALCIUM SERPL-MCNC: 9.4 MG/DL — SIGNIFICANT CHANGE UP (ref 8.4–10.5)
CALCIUM SERPL-MCNC: 9.4 MG/DL — SIGNIFICANT CHANGE UP (ref 8.4–10.5)
CHLORIDE SERPL-SCNC: 106 MMOL/L — SIGNIFICANT CHANGE UP (ref 96–108)
CHLORIDE SERPL-SCNC: 107 MMOL/L — SIGNIFICANT CHANGE UP (ref 96–108)
CO2 SERPL-SCNC: 20 MMOL/L — LOW (ref 22–31)
CO2 SERPL-SCNC: 22 MMOL/L — SIGNIFICANT CHANGE UP (ref 22–29)
CREAT SERPL-MCNC: 0.55 MG/DL — SIGNIFICANT CHANGE UP (ref 0.5–1.3)
CREAT SERPL-MCNC: 0.59 MG/DL — SIGNIFICANT CHANGE UP (ref 0.5–1.3)
DACRYOCYTES BLD QL SMEAR: SLIGHT — SIGNIFICANT CHANGE UP
EGFR: 95 ML/MIN/1.73M2 — SIGNIFICANT CHANGE UP
EGFR: 95 ML/MIN/1.73M2 — SIGNIFICANT CHANGE UP
EGFR: 96 ML/MIN/1.73M2 — SIGNIFICANT CHANGE UP
EGFR: 96 ML/MIN/1.73M2 — SIGNIFICANT CHANGE UP
ELLIPTOCYTES BLD QL SMEAR: SLIGHT — SIGNIFICANT CHANGE UP
EOSINOPHIL # BLD AUTO: 0 K/UL — SIGNIFICANT CHANGE UP (ref 0–0.5)
EOSINOPHIL # BLD MANUAL: 0 K/UL — SIGNIFICANT CHANGE UP (ref 0–0.5)
EOSINOPHIL NFR BLD AUTO: 0 % — SIGNIFICANT CHANGE UP (ref 0–6)
EOSINOPHIL NFR BLD MANUAL: 0 % — SIGNIFICANT CHANGE UP (ref 0–6)
GLUCOSE SERPL-MCNC: 81 MG/DL — SIGNIFICANT CHANGE UP (ref 70–99)
GLUCOSE SERPL-MCNC: 96 MG/DL — SIGNIFICANT CHANGE UP (ref 70–99)
HCT VFR BLD CALC: 33.8 % — LOW (ref 34.5–45)
HGB BLD-MCNC: 11.2 G/DL — LOW (ref 11.5–15.5)
IGA FLD-MCNC: 14 MG/DL — LOW (ref 84–499)
IGG FLD-MCNC: 300 MG/DL — LOW (ref 610–1660)
IGM SERPL-MCNC: SIGNIFICANT CHANGE UP MG/DL (ref 35–242)
IMM GRANULOCYTES # BLD AUTO: 0.05 K/UL — SIGNIFICANT CHANGE UP (ref 0–0.07)
IMM GRANULOCYTES NFR BLD AUTO: 0.4 % — SIGNIFICANT CHANGE UP (ref 0–0.9)
KAPPA LC SER QL IFE: 0.96 MG/DL — SIGNIFICANT CHANGE UP (ref 0.33–1.94)
KAPPA/LAMBDA FREE LIGHT CHAIN RATIO, SERUM: 2.29 RATIO — HIGH (ref 0.26–1.65)
LAMBDA LC SER QL IFE: 0.42 MG/DL — LOW (ref 0.57–2.63)
LYMPHOCYTES # BLD AUTO: 1.91 K/UL — SIGNIFICANT CHANGE UP (ref 1–3.3)
LYMPHOCYTES # BLD MANUAL: 1.23 K/UL — SIGNIFICANT CHANGE UP (ref 1–3.3)
LYMPHOCYTES NFR BLD AUTO: 14.9 % — SIGNIFICANT CHANGE UP (ref 13–44)
LYMPHOCYTES NFR BLD MANUAL: 9.6 % — LOW (ref 13–44)
MACROCYTES BLD QL: SLIGHT — SIGNIFICANT CHANGE UP
MCHC RBC-ENTMCNC: 29.9 PG — SIGNIFICANT CHANGE UP (ref 27–34)
MCHC RBC-ENTMCNC: 33.1 G/DL — SIGNIFICANT CHANGE UP (ref 32–36)
MCV RBC AUTO: 90.4 FL — SIGNIFICANT CHANGE UP (ref 80–100)
MICROCYTES BLD QL: SLIGHT — SIGNIFICANT CHANGE UP
MONOCYTES # BLD AUTO: 1.6 K/UL — HIGH (ref 0–0.9)
MONOCYTES # BLD MANUAL: 0.67 K/UL — SIGNIFICANT CHANGE UP (ref 0–0.9)
MONOCYTES NFR BLD AUTO: 12.5 % — SIGNIFICANT CHANGE UP (ref 2–14)
MONOCYTES NFR BLD MANUAL: 5.2 % — SIGNIFICANT CHANGE UP (ref 2–14)
NEUTROPHILS # BLD AUTO: 9.21 K/UL — HIGH (ref 1.8–7.4)
NEUTROPHILS # BLD MANUAL: 10.91 K/UL — HIGH (ref 1.8–7.4)
NEUTROPHILS NFR BLD AUTO: 72 % — SIGNIFICANT CHANGE UP (ref 43–77)
NEUTROPHILS NFR BLD MANUAL: 85.2 % — HIGH (ref 43–77)
NRBC # BLD AUTO: 0 K/UL — SIGNIFICANT CHANGE UP (ref 0–0)
NRBC # FLD: 0 K/UL — SIGNIFICANT CHANGE UP (ref 0–0)
NRBC BLD AUTO-RTO: 0 /100 WBCS — SIGNIFICANT CHANGE UP (ref 0–0)
NT-PROBNP SERPL-SCNC: 206 PG/ML — SIGNIFICANT CHANGE UP (ref 0–300)
OVALOCYTES BLD QL SMEAR: SLIGHT — SIGNIFICANT CHANGE UP
PLAT MORPH BLD: NORMAL — SIGNIFICANT CHANGE UP
PLATELET # BLD AUTO: 247 K/UL — SIGNIFICANT CHANGE UP (ref 150–400)
PLATELET COUNT - ESTIMATE: NORMAL — SIGNIFICANT CHANGE UP
PMV BLD: 11.5 FL — SIGNIFICANT CHANGE UP (ref 7–13)
POIKILOCYTOSIS BLD QL AUTO: SLIGHT — SIGNIFICANT CHANGE UP
POLYCHROMASIA BLD QL SMEAR: SLIGHT — SIGNIFICANT CHANGE UP
POTASSIUM SERPL-MCNC: 4 MMOL/L — SIGNIFICANT CHANGE UP (ref 3.5–5.3)
POTASSIUM SERPL-MCNC: 6.6 MMOL/L — CRITICAL HIGH (ref 3.5–5.3)
POTASSIUM SERPL-SCNC: 4 MMOL/L — SIGNIFICANT CHANGE UP (ref 3.5–5.3)
POTASSIUM SERPL-SCNC: 6.6 MMOL/L — CRITICAL HIGH (ref 3.5–5.3)
PROT SERPL-MCNC: 6.3 G/DL — SIGNIFICANT CHANGE UP (ref 6–8.3)
PROT SERPL-MCNC: 6.3 G/DL — SIGNIFICANT CHANGE UP (ref 6–8.3)
PROT SERPL-MCNC: 6.5 G/DL — LOW (ref 6.6–8.7)
RBC # BLD: 3.74 M/UL — LOW (ref 3.8–5.2)
RBC # FLD: 14.9 % — HIGH (ref 10.3–14.5)
RBC BLD AUTO: ABNORMAL
SMUDGE CELLS # BLD: PRESENT
SODIUM SERPL-SCNC: 139 MMOL/L — SIGNIFICANT CHANGE UP (ref 135–145)
SODIUM SERPL-SCNC: 141 MMOL/L — SIGNIFICANT CHANGE UP (ref 135–145)
TROPONIN T, HIGH SENSITIVITY RESULT: 8 NG/L — SIGNIFICANT CHANGE UP (ref 0–51)
WBC # BLD: 12.8 K/UL — HIGH (ref 3.8–10.5)
WBC # FLD AUTO: 12.8 K/UL — HIGH (ref 3.8–10.5)

## 2025-06-27 PROCEDURE — 75574 CT ANGIO HRT W/3D IMAGE: CPT | Mod: 26

## 2025-06-27 PROCEDURE — 71045 X-RAY EXAM CHEST 1 VIEW: CPT | Mod: 26

## 2025-06-27 PROCEDURE — 83880 ASSAY OF NATRIURETIC PEPTIDE: CPT

## 2025-06-27 PROCEDURE — 80053 COMPREHEN METABOLIC PANEL: CPT

## 2025-06-27 PROCEDURE — 99285 EMERGENCY DEPT VISIT HI MDM: CPT

## 2025-06-27 PROCEDURE — 84484 ASSAY OF TROPONIN QUANT: CPT

## 2025-06-27 PROCEDURE — 85025 COMPLETE CBC W/AUTO DIFF WBC: CPT

## 2025-06-27 PROCEDURE — 93010 ELECTROCARDIOGRAM REPORT: CPT

## 2025-06-27 PROCEDURE — 75574 CT ANGIO HRT W/3D IMAGE: CPT

## 2025-06-27 PROCEDURE — 36415 COLL VENOUS BLD VENIPUNCTURE: CPT

## 2025-06-27 PROCEDURE — 71045 X-RAY EXAM CHEST 1 VIEW: CPT

## 2025-06-27 PROCEDURE — 93005 ELECTROCARDIOGRAM TRACING: CPT

## 2025-06-27 PROCEDURE — 99285 EMERGENCY DEPT VISIT HI MDM: CPT | Mod: 25

## 2025-06-27 NOTE — ED ADULT NURSE NOTE - OBJECTIVE STATEMENT
Pt arrives to ED following referral by oncologist for hyperkalemia. PT states she received treatment yesterday for multiple myeloma. Pt is A&Ox4. Pt states she has had chest tightness x2 weeks. Pt is in nsr on the cardiac monitor. Respirations even and unlabored on room air. Lung sounds clear on ausculation.

## 2025-06-27 NOTE — ED PROVIDER NOTE - OBJECTIVE STATEMENT
75 year old female with hx of multiple myeloma (currently on chemo once a month) presents with hyperkalemia on outpatient labs yesterday. Pt also reports constant chest pressure for the past 2 weeks. Was seen by cardiologist (Dr. Meredith) yesterday who has her scheduled for stress test in August. Denies sob, leg swelling, lightheadedness, fever, cough, or any other complaints

## 2025-06-27 NOTE — ED PROVIDER NOTE - PROGRESS NOTE DETAILS
Potassium normal in ED, elevated reading from oncologist was hemolyzed. EKG NSR. Trop 8, Cardiologist (Dr. Meredith) recommended CTCA. CTCA performed, results discussed with Dr. Meredith who cleared pt for discharge. Return precautions given

## 2025-06-27 NOTE — ED PROVIDER NOTE - CLINICAL SUMMARY MEDICAL DECISION MAKING FREE TEXT BOX
75 year old female with hx of multiple myeloma (currently on chemo once a month) presents with hyperkalemia on outpatient labs yesterday. 75 year old female with hx of multiple myeloma (currently on chemo once a month) presents with hyperkalemia on outpatient labs yesterday. Labs reviewed from outpatient - Potassium hemolyzed.

## 2025-06-27 NOTE — ED PROVIDER NOTE - NSICDXPASTMEDICALHX_GEN_ALL_CORE_FT
PAST MEDICAL HISTORY:  Bilateral knee pain     Complex ovarian cyst     COVID-19 vaccine series completed     Endometrial disorder Fluid in endometrial cavity    [place] (hard of hearing)     Hyperlipidemia     Hyperlipidemia, unspecified hyperlipidemia type     Hypertension     Multiple myeloma Chemotherapy    Multiple thyroid nodules     Pain in right shoulder     Subacute vulvitis     Termination of pregnancy (fetus)

## 2025-06-27 NOTE — ED PROVIDER NOTE - PATIENT PORTAL LINK FT
You can access the FollowMyHealth Patient Portal offered by [facility] by registering at the following website: [email or web address]. By joining [employer]’s FollowMyHealth portal, you will also be able to view your health information using other applications (apps) compatible with our system.

## 2025-06-27 NOTE — ED ADULT NURSE NOTE - NSICDXPASTMEDICALHX_GEN_ALL_CORE_FT
PAST MEDICAL HISTORY:  Bilateral knee pain     Complex ovarian cyst     COVID-19 vaccine series completed     Endometrial disorder Fluid in endometrial cavity    [place] (hard of hearing)     Hyperlipidemia     Hyperlipidemia, unspecified hyperlipidemia type     Hypertension     Multiple myeloma Chemotherapy    Multiple thyroid nodules     Pain in right shoulder     Subacute vulvitis     Termination of pregnancy (fetus)

## 2025-06-27 NOTE — ED PROVIDER NOTE - DISPOSITION TYPE
Clinic Care Coordination Contact  Community Health Worker Follow Up    Reason: [facility] CHW Follow Up Called (follow up current goal's)    Care Gaps:   Health Maintenance Due   Topic Date Due    YEARLY PREVENTIVE VISIT  Never done    HEPATITIS B IMMUNIZATION (2 of 3 - 19+ 3-dose series) 10/10/2017    PHQ-2 (once per calendar year)  01/01/2023    COVID-19 Vaccine (4 - 2023-24 season) 09/01/2023     Pt is aware of due care gap. Pt agreed to discuss/review details and completion with Dr. Mancia on 12/14/2023.    Care Plan:   Care Plan: General       Problem: HP GENERAL PROBLEM       Goal: General Goal - I would like to discuss and completed preventive/annual wellness visit, Hepatitis B and COVID-19 vaccine #4 before end of December 2023.       Start Date: 10/18/2023    This Visit's Progress: 30% Recent Progress: 10%    Note:     Measure of Success: NA  Barriers: Language  Strengths: Spouse/family member assisted to appt as schedule, and connect with PCP office to schedule additional follow up appt as need.   Patient expressed understanding of goal: yes    Action steps to achieve this goal:  1. I will attend the preventive visit appt on 11/30/2023 at 9:40AM with Dr. Mancia in [facility]. (Completed. Updated- appt rescheduled to 12/14/2023 due to daughter appt conflict per pt on 11/29/2023).   2. I will discuss due care gaps details and seeking advised with Dr. Mancia on 11/30/2023. (Completed. Updated- appt rescheduled to 12/14/2023 due to daughter appt conflict per pt on 11/29/2023).   3. I will updates status with [facility] team at next outreach follow up. (Updated: 11/29/2023)  4. I will attend appt rescheduled on 12/14/2023 with Dr. Mancia in [facility]. (Updated: 11/29/2023)                            Patient Outreach Discussion:   Per appt desk reviewed; pt appt 11/30/2023 rescheduled to 12/14/2023 with Dr. Mancia.     [facility] CHW was able to connect with patient today regard to reason above and verified appt 12/14/2023. Pt is encouraged to attend appt  and discuss/review due care gaps completion with Dr. Mancia on 12/14/2023. Pt agreed. Updated current goal.     Patient shared:  -Daughter have a appt with the heart doctor tomorrow, 11/30/2023. Rescheduled me and my son Fabián appt 11/30/2023 to 12/14/2023 with Dr. Mancia in [facility].   -Doing well. No other questions or concerns at this time.     CHW suggested patient for the following:  -Pt connect CCC/CHW with any additional resources need and PCP office for scheduling appt.    Appt reminder:   -Patient is reminded to appt date below. Pt confirmed and family of 2 (pt and son, Fabián).   Name: Shaila Savage MRN: [id number]     Date: 12/14/2023 Status: Scheduled     Time: 9:40 AM Length: 40     Visit Type: PREVENTATIVE ADULT [8179] Copay: $0.00     Provider: Beau Mancia MD Department: [facility]       Notes: Fam of 2  preventive/annual visit & discuss due care gaps (hep B & COVID vaccine) per pt agreed on 10/18     CHW Next Follow-up: Goal's follow up. Informed patient of due care gaps (if any).     Outreach frequency: monthly      CHW Plan:   -Patient attend appt: 12/14/2023 at 9:40AM with Dr. Mancia in clinic. Pt discuss due care gaps details completion.   -Next CHW outreach plan: 1 month   DISCHARGE

## 2025-06-27 NOTE — ED PROVIDER NOTE - ATTENDING CONTRIBUTION TO CARE
75 year old female with hx of multiple myeloma (currently on chemo once a month) presents with hyperkalemia on outpatient labs yesterday. Pt also reports constant chest pressure for the past 2 weeks. Was seen by cardiologist (Dr. Rebollar) yesterday who has her scheduled for stress test in August. Denies sob, leg swelling, lightheadedness, fever, cough, or any other complaints    in ED pt ntoes mild chest discomfort. outpatient labs had hemolysis. will rpt labs, acs workup    spoke to dr rebollar who reccs ctca. if neg can dc with outpt follow up

## 2025-06-30 LAB
% ALBUMIN: 62 % — SIGNIFICANT CHANGE UP
% ALPHA 1: 4.4 % — SIGNIFICANT CHANGE UP
% ALPHA 2: 13.9 % — SIGNIFICANT CHANGE UP
% BETA: 14.8 % — SIGNIFICANT CHANGE UP
% GAMMA: 4.9 % — SIGNIFICANT CHANGE UP
% M SPIKE: SIGNIFICANT CHANGE UP
ALBUMIN SERPL ELPH-MCNC: 3.9 G/DL — SIGNIFICANT CHANGE UP (ref 3.6–5.5)
ALBUMIN/GLOB SERPL ELPH: 1.6 RATIO — SIGNIFICANT CHANGE UP
ALPHA1 GLOB SERPL ELPH-MCNC: 0.3 G/DL — SIGNIFICANT CHANGE UP (ref 0.1–0.4)
ALPHA2 GLOB SERPL ELPH-MCNC: 0.9 G/DL — SIGNIFICANT CHANGE UP (ref 0.5–1)
B-GLOBULIN SERPL ELPH-MCNC: 0.9 G/DL — SIGNIFICANT CHANGE UP (ref 0.5–1)
GAMMA GLOBULIN: 0.3 G/DL — LOW (ref 0.6–1.6)
INTERPRETATION SERPL IFE-IMP: SIGNIFICANT CHANGE UP
M-SPIKE: SIGNIFICANT CHANGE UP (ref 0–0)
PROT PATTERN SERPL ELPH-IMP: SIGNIFICANT CHANGE UP
PROT SERPL-MCNC: 6.3 G/DL — SIGNIFICANT CHANGE UP (ref 6–8.3)

## 2025-07-02 LAB
ALBUMIN SERPL ELPH-MCNC: 4 G/DL
ALP BLD-CCNC: 74 U/L
ALT SERPL-CCNC: 17 U/L
AST SERPL-CCNC: 19 U/L
BILIRUB DIRECT SERPL-MCNC: 0.08 MG/DL
BILIRUB INDIRECT SERPL-MCNC: 0.1 MG/DL
BILIRUB SERPL-MCNC: 0.2 MG/DL
CHOLEST SERPL-MCNC: 214 MG/DL
HDLC SERPL-MCNC: 87 MG/DL
LDLC SERPL-MCNC: 111 MG/DL
NONHDLC SERPL-MCNC: 127 MG/DL
PROT SERPL-MCNC: 5.5 G/DL
TRIGL SERPL-MCNC: 89 MG/DL

## 2025-07-03 ENCOUNTER — APPOINTMENT (OUTPATIENT)
Dept: ENDOCRINOLOGY | Facility: CLINIC | Age: 75
End: 2025-07-03
Payer: MEDICARE

## 2025-07-03 VITALS
HEART RATE: 57 BPM | HEIGHT: 64 IN | WEIGHT: 153 LBS | OXYGEN SATURATION: 99 % | DIASTOLIC BLOOD PRESSURE: 74 MMHG | BODY MASS INDEX: 26.12 KG/M2 | SYSTOLIC BLOOD PRESSURE: 130 MMHG

## 2025-07-03 PROCEDURE — G2211 COMPLEX E/M VISIT ADD ON: CPT

## 2025-07-03 PROCEDURE — 99214 OFFICE O/P EST MOD 30 MIN: CPT

## 2025-07-03 RX ORDER — ROSUVASTATIN CALCIUM 10 MG/1
10 TABLET, FILM COATED ORAL
Qty: 90 | Refills: 1 | Status: ACTIVE | COMMUNITY
Start: 2025-07-03 | End: 1900-01-01

## 2025-07-07 ENCOUNTER — APPOINTMENT (OUTPATIENT)
Dept: CARDIOLOGY | Facility: CLINIC | Age: 75
End: 2025-07-07
Payer: MEDICARE

## 2025-07-07 VITALS
WEIGHT: 155 LBS | BODY MASS INDEX: 26.46 KG/M2 | RESPIRATION RATE: 16 BRPM | HEART RATE: 65 BPM | SYSTOLIC BLOOD PRESSURE: 147 MMHG | HEIGHT: 64 IN | DIASTOLIC BLOOD PRESSURE: 77 MMHG

## 2025-07-07 PROBLEM — R07.9 CHEST PAIN, UNSPECIFIED TYPE: Status: ACTIVE | Noted: 2025-06-26

## 2025-07-07 PROBLEM — I25.10 CAD IN NATIVE ARTERY: Status: ACTIVE | Noted: 2025-07-07

## 2025-07-07 PROCEDURE — 93000 ELECTROCARDIOGRAM COMPLETE: CPT

## 2025-07-07 PROCEDURE — 99214 OFFICE O/P EST MOD 30 MIN: CPT

## 2025-07-09 ENCOUNTER — TRANSCRIPTION ENCOUNTER (OUTPATIENT)
Age: 75
End: 2025-07-09

## 2025-07-18 ENCOUNTER — OUTPATIENT (OUTPATIENT)
Dept: OUTPATIENT SERVICES | Facility: HOSPITAL | Age: 75
LOS: 1 days | Discharge: HOME | End: 2025-07-18

## 2025-07-18 DIAGNOSIS — Z98.51 TUBAL LIGATION STATUS: Chronic | ICD-10-CM

## 2025-07-18 DIAGNOSIS — C90.00 MULTIPLE MYELOMA NOT HAVING ACHIEVED REMISSION: ICD-10-CM

## 2025-07-18 DIAGNOSIS — Z98.890 OTHER SPECIFIED POSTPROCEDURAL STATES: Chronic | ICD-10-CM

## 2025-07-18 DIAGNOSIS — Z94.84 STEM CELLS TRANSPLANT STATUS: Chronic | ICD-10-CM

## 2025-07-21 ENCOUNTER — APPOINTMENT (OUTPATIENT)
Dept: OBGYN | Facility: CLINIC | Age: 75
End: 2025-07-21
Payer: MEDICARE

## 2025-07-21 VITALS
SYSTOLIC BLOOD PRESSURE: 124 MMHG | DIASTOLIC BLOOD PRESSURE: 76 MMHG | BODY MASS INDEX: 25.95 KG/M2 | WEIGHT: 152 LBS | HEIGHT: 64 IN

## 2025-07-21 DIAGNOSIS — Z12.39 ENCOUNTER FOR OTHER SCREENING FOR MALIGNANT NEOPLASM OF BREAST: ICD-10-CM

## 2025-07-21 DIAGNOSIS — Z01.419 ENCOUNTER FOR GYNECOLOGICAL EXAMINATION (GENERAL) (ROUTINE) W/OUT ABNORMAL FINDINGS: ICD-10-CM

## 2025-07-21 PROCEDURE — 99397 PER PM REEVAL EST PAT 65+ YR: CPT

## 2025-07-21 PROCEDURE — 99459 PELVIC EXAMINATION: CPT

## 2025-07-23 DIAGNOSIS — C90.00 MULTIPLE MYELOMA NOT HAVING ACHIEVED REMISSION: ICD-10-CM

## 2025-07-24 ENCOUNTER — RESULT REVIEW (OUTPATIENT)
Age: 75
End: 2025-07-24

## 2025-07-24 ENCOUNTER — APPOINTMENT (OUTPATIENT)
Age: 75
End: 2025-07-24

## 2025-07-24 LAB
BASOPHILS # BLD AUTO: 0.06 K/UL — SIGNIFICANT CHANGE UP (ref 0–0.2)
BASOPHILS NFR BLD AUTO: 1.4 % — SIGNIFICANT CHANGE UP (ref 0–2)
EOSINOPHIL # BLD AUTO: 0.09 K/UL — SIGNIFICANT CHANGE UP (ref 0–0.5)
EOSINOPHIL NFR BLD AUTO: 2.1 % — SIGNIFICANT CHANGE UP (ref 0–6)
HCT VFR BLD CALC: 36 % — SIGNIFICANT CHANGE UP (ref 34.5–45)
HGB BLD-MCNC: 11.6 G/DL — SIGNIFICANT CHANGE UP (ref 11.5–15.5)
IMM GRANULOCYTES # BLD AUTO: 0.01 K/UL — SIGNIFICANT CHANGE UP (ref 0–0.07)
IMM GRANULOCYTES NFR BLD AUTO: 0.2 % — SIGNIFICANT CHANGE UP (ref 0–0.9)
LYMPHOCYTES # BLD AUTO: 2.5 K/UL — SIGNIFICANT CHANGE UP (ref 1–3.3)
LYMPHOCYTES NFR BLD AUTO: 58 % — HIGH (ref 13–44)
MCHC RBC-ENTMCNC: 29.7 PG — SIGNIFICANT CHANGE UP (ref 27–34)
MCHC RBC-ENTMCNC: 32.2 G/DL — SIGNIFICANT CHANGE UP (ref 32–36)
MCV RBC AUTO: 92.3 FL — SIGNIFICANT CHANGE UP (ref 80–100)
MONOCYTES # BLD AUTO: 0.46 K/UL — SIGNIFICANT CHANGE UP (ref 0–0.9)
MONOCYTES NFR BLD AUTO: 10.7 % — SIGNIFICANT CHANGE UP (ref 2–14)
NEUTROPHILS # BLD AUTO: 1.19 K/UL — LOW (ref 1.8–7.4)
NEUTROPHILS NFR BLD AUTO: 27.6 % — LOW (ref 43–77)
NRBC # BLD AUTO: 0 K/UL — SIGNIFICANT CHANGE UP (ref 0–0)
NRBC # FLD: 0 K/UL — SIGNIFICANT CHANGE UP (ref 0–0)
NRBC BLD AUTO-RTO: 0 /100 WBCS — SIGNIFICANT CHANGE UP (ref 0–0)
PLATELET # BLD AUTO: 203 K/UL — SIGNIFICANT CHANGE UP (ref 150–400)
PMV BLD: 11.5 FL — SIGNIFICANT CHANGE UP (ref 7–13)
RBC # BLD: 3.9 M/UL — SIGNIFICANT CHANGE UP (ref 3.8–5.2)
RBC # FLD: 14.7 % — HIGH (ref 10.3–14.5)
WBC # BLD: 4.31 K/UL — SIGNIFICANT CHANGE UP (ref 3.8–10.5)
WBC # FLD AUTO: 4.31 K/UL — SIGNIFICANT CHANGE UP (ref 3.8–10.5)

## 2025-07-25 DIAGNOSIS — Z51.11 ENCOUNTER FOR ANTINEOPLASTIC CHEMOTHERAPY: ICD-10-CM

## 2025-07-30 LAB
ALBUMIN MFR SERPL ELPH: 66.7 %
ALBUMIN SERPL ELPH-MCNC: 4.5 G/DL
ALBUMIN SERPL-MCNC: 4.1 G/DL
ALBUMIN/GLOB SERPL: 2 RATIO
ALP BLD-CCNC: 84 U/L
ALPHA1 GLOB MFR SERPL ELPH: 5.2 %
ALPHA1 GLOB SERPL ELPH-MCNC: 0.3 G/DL
ALPHA2 GLOB MFR SERPL ELPH: 12.7 %
ALPHA2 GLOB SERPL ELPH-MCNC: 0.8 G/DL
ALT SERPL-CCNC: 19 U/L
ANION GAP SERPL CALC-SCNC: 11 MMOL/L
AST SERPL-CCNC: 23 U/L
B-GLOBULIN MFR SERPL ELPH: 10.5 %
B-GLOBULIN SERPL ELPH-MCNC: 0.7 G/DL
BILIRUB SERPL-MCNC: 0.2 MG/DL
BUN SERPL-MCNC: 10 MG/DL
CALCIUM SERPL-MCNC: 9.6 MG/DL
CHLORIDE SERPL-SCNC: 110 MMOL/L
CO2 SERPL-SCNC: 25 MMOL/L
CREAT SERPL-MCNC: 0.72 MG/DL
DEPRECATED KAPPA LC FREE/LAMBDA SER: 1.98 RATIO
EGFRCR SERPLBLD CKD-EPI 2021: 88 ML/MIN/1.73M2
GAMMA GLOB FLD ELPH-MCNC: 0.3 G/DL
GAMMA GLOB MFR SERPL ELPH: 4.9 %
GLUCOSE SERPL-MCNC: 92 MG/DL
IGA SERPL-MCNC: 15 MG/DL
IGG SERPL-MCNC: 309 MG/DL
IGM SERPL-MCNC: 13 MG/DL
INTERPRETATION SERPL IEP-IMP: NORMAL
KAPPA LC CSF-MCNC: 0.46 MG/DL
KAPPA LC SERPL-MCNC: 0.91 MG/DL
M PROTEIN MFR SERPL ELPH: NORMAL
M PROTEIN SPEC IFE-MCNC: NORMAL
MONOCLON BAND OBS SERPL: NORMAL
POTASSIUM SERPL-SCNC: 4.8 MMOL/L
PROT SERPL-MCNC: 6.2 G/DL
SODIUM SERPL-SCNC: 146 MMOL/L

## 2025-08-06 ENCOUNTER — APPOINTMENT (OUTPATIENT)
Dept: CARDIOLOGY | Facility: CLINIC | Age: 75
End: 2025-08-06

## 2025-08-08 ENCOUNTER — APPOINTMENT (OUTPATIENT)
Dept: MAMMOGRAPHY | Facility: CLINIC | Age: 75
End: 2025-08-08
Payer: MEDICARE

## 2025-08-08 ENCOUNTER — OUTPATIENT (OUTPATIENT)
Dept: OUTPATIENT SERVICES | Facility: HOSPITAL | Age: 75
LOS: 1 days | End: 2025-08-08
Payer: COMMERCIAL

## 2025-08-08 ENCOUNTER — APPOINTMENT (OUTPATIENT)
Dept: ULTRASOUND IMAGING | Facility: CLINIC | Age: 75
End: 2025-08-08
Payer: MEDICARE

## 2025-08-08 ENCOUNTER — RESULT REVIEW (OUTPATIENT)
Age: 75
End: 2025-08-08

## 2025-08-08 DIAGNOSIS — Z98.890 OTHER SPECIFIED POSTPROCEDURAL STATES: Chronic | ICD-10-CM

## 2025-08-08 DIAGNOSIS — Z00.8 ENCOUNTER FOR OTHER GENERAL EXAMINATION: ICD-10-CM

## 2025-08-08 DIAGNOSIS — R92.8 OTHER ABNORMAL AND INCONCLUSIVE FINDINGS ON DIAGNOSTIC IMAGING OF BREAST: ICD-10-CM

## 2025-08-08 DIAGNOSIS — Z98.51 TUBAL LIGATION STATUS: Chronic | ICD-10-CM

## 2025-08-08 DIAGNOSIS — Z94.84 STEM CELLS TRANSPLANT STATUS: Chronic | ICD-10-CM

## 2025-08-08 PROCEDURE — 77067 SCR MAMMO BI INCL CAD: CPT | Mod: 26

## 2025-08-08 PROCEDURE — 76641 ULTRASOUND BREAST COMPLETE: CPT | Mod: 26,50

## 2025-08-08 PROCEDURE — 77067 SCR MAMMO BI INCL CAD: CPT

## 2025-08-08 PROCEDURE — 77063 BREAST TOMOSYNTHESIS BI: CPT | Mod: 26

## 2025-08-08 PROCEDURE — 77063 BREAST TOMOSYNTHESIS BI: CPT

## 2025-08-08 PROCEDURE — 76641 ULTRASOUND BREAST COMPLETE: CPT

## 2025-08-11 ENCOUNTER — APPOINTMENT (OUTPATIENT)
Dept: FAMILY MEDICINE | Facility: CLINIC | Age: 75
End: 2025-08-11
Payer: MEDICARE

## 2025-08-11 VITALS
WEIGHT: 149 LBS | SYSTOLIC BLOOD PRESSURE: 122 MMHG | HEART RATE: 66 BPM | HEIGHT: 64 IN | BODY MASS INDEX: 25.44 KG/M2 | OXYGEN SATURATION: 98 % | DIASTOLIC BLOOD PRESSURE: 84 MMHG

## 2025-08-11 DIAGNOSIS — R41.3 OTHER AMNESIA: ICD-10-CM

## 2025-08-11 DIAGNOSIS — G31.84 MILD COGNITIVE IMPAIRMENT, SO STATED: ICD-10-CM

## 2025-08-11 PROCEDURE — 99214 OFFICE O/P EST MOD 30 MIN: CPT

## 2025-08-12 ENCOUNTER — APPOINTMENT (OUTPATIENT)
Dept: HEMATOLOGY ONCOLOGY | Facility: CLINIC | Age: 75
End: 2025-08-12
Payer: MEDICARE

## 2025-08-12 VITALS
BODY MASS INDEX: 26.05 KG/M2 | WEIGHT: 152.56 LBS | SYSTOLIC BLOOD PRESSURE: 164 MMHG | HEIGHT: 64 IN | OXYGEN SATURATION: 99 % | HEART RATE: 64 BPM | TEMPERATURE: 98.3 F | DIASTOLIC BLOOD PRESSURE: 73 MMHG

## 2025-08-12 PROCEDURE — 99214 OFFICE O/P EST MOD 30 MIN: CPT

## 2025-08-13 DIAGNOSIS — R91.1 SOLITARY PULMONARY NODULE: ICD-10-CM

## 2025-08-20 DIAGNOSIS — C90.00 MULTIPLE MYELOMA NOT HAVING ACHIEVED REMISSION: ICD-10-CM

## 2025-08-21 ENCOUNTER — APPOINTMENT (OUTPATIENT)
Age: 75
End: 2025-08-21

## 2025-08-21 ENCOUNTER — RESULT REVIEW (OUTPATIENT)
Age: 75
End: 2025-08-21

## 2025-08-21 LAB
BASOPHILS # BLD AUTO: 0.05 K/UL — SIGNIFICANT CHANGE UP (ref 0–0.2)
BASOPHILS NFR BLD AUTO: 0.8 % — SIGNIFICANT CHANGE UP (ref 0–2)
EOSINOPHIL # BLD AUTO: 0.07 K/UL — SIGNIFICANT CHANGE UP (ref 0–0.5)
EOSINOPHIL NFR BLD AUTO: 1.2 % — SIGNIFICANT CHANGE UP (ref 0–6)
HCT VFR BLD CALC: 34.9 % — SIGNIFICANT CHANGE UP (ref 34.5–45)
HGB BLD-MCNC: 11.2 G/DL — LOW (ref 11.5–15.5)
IMM GRANULOCYTES # BLD AUTO: 0.01 K/UL — SIGNIFICANT CHANGE UP (ref 0–0.07)
IMM GRANULOCYTES NFR BLD AUTO: 0.2 % — SIGNIFICANT CHANGE UP (ref 0–0.9)
LYMPHOCYTES # BLD AUTO: 3.62 K/UL — HIGH (ref 1–3.3)
LYMPHOCYTES NFR BLD AUTO: 61.3 % — HIGH (ref 13–44)
MCHC RBC-ENTMCNC: 29.6 PG — SIGNIFICANT CHANGE UP (ref 27–34)
MCHC RBC-ENTMCNC: 32.1 G/DL — SIGNIFICANT CHANGE UP (ref 32–36)
MCV RBC AUTO: 92.3 FL — SIGNIFICANT CHANGE UP (ref 80–100)
MONOCYTES # BLD AUTO: 0.6 K/UL — SIGNIFICANT CHANGE UP (ref 0–0.9)
MONOCYTES NFR BLD AUTO: 10.2 % — SIGNIFICANT CHANGE UP (ref 2–14)
NEUTROPHILS # BLD AUTO: 1.56 K/UL — LOW (ref 1.8–7.4)
NEUTROPHILS NFR BLD AUTO: 26.3 % — LOW (ref 43–77)
NRBC # BLD AUTO: 0 K/UL — SIGNIFICANT CHANGE UP (ref 0–0)
NRBC # FLD: 0 K/UL — SIGNIFICANT CHANGE UP (ref 0–0)
NRBC BLD AUTO-RTO: 0 /100 WBCS — SIGNIFICANT CHANGE UP (ref 0–0)
PLATELET # BLD AUTO: 225 K/UL — SIGNIFICANT CHANGE UP (ref 150–400)
PMV BLD: 11.6 FL — SIGNIFICANT CHANGE UP (ref 7–13)
RBC # BLD: 3.78 M/UL — LOW (ref 3.8–5.2)
RBC # FLD: 14.4 % — SIGNIFICANT CHANGE UP (ref 10.3–14.5)
WBC # BLD: 5.91 K/UL — SIGNIFICANT CHANGE UP (ref 3.8–10.5)
WBC # FLD AUTO: 5.91 K/UL — SIGNIFICANT CHANGE UP (ref 3.8–10.5)

## 2025-08-22 LAB
ALBUMIN SERPL ELPH-MCNC: 4.4 G/DL
ALP BLD-CCNC: 83 U/L
ALT SERPL-CCNC: 18 U/L
ANION GAP SERPL CALC-SCNC: 13 MMOL/L
AST SERPL-CCNC: 19 U/L
BILIRUB SERPL-MCNC: 0.3 MG/DL
BUN SERPL-MCNC: 11 MG/DL
CALCIUM SERPL-MCNC: 9.2 MG/DL
CHLORIDE SERPL-SCNC: 108 MMOL/L
CO2 SERPL-SCNC: 24 MMOL/L
CREAT SERPL-MCNC: 0.59 MG/DL
EGFRCR SERPLBLD CKD-EPI 2021: 95 ML/MIN/1.73M2
GLUCOSE SERPL-MCNC: 86 MG/DL
POTASSIUM SERPL-SCNC: 3.8 MMOL/L
PROT SERPL-MCNC: 5.8 G/DL
SODIUM SERPL-SCNC: 145 MMOL/L

## 2025-08-26 LAB
ALBUMIN MFR SERPL ELPH: 64.2 %
ALBUMIN SERPL-MCNC: 3.7 G/DL
ALBUMIN/GLOB SERPL: 1.8 RATIO
ALPHA1 GLOB MFR SERPL ELPH: 5.7 %
ALPHA1 GLOB SERPL ELPH-MCNC: 0.3 G/DL
ALPHA2 GLOB MFR SERPL ELPH: 14.1 %
ALPHA2 GLOB SERPL ELPH-MCNC: 0.8 G/DL
B-GLOBULIN MFR SERPL ELPH: 10.6 %
B-GLOBULIN SERPL ELPH-MCNC: 0.6 G/DL
DEPRECATED KAPPA LC FREE/LAMBDA SER: 1.28 RATIO
GAMMA GLOB FLD ELPH-MCNC: 0.3 G/DL
GAMMA GLOB MFR SERPL ELPH: 5.4 %
IGA SERPL-MCNC: 14 MG/DL
IGG SERPL-MCNC: 298 MG/DL
IGM SERPL-MCNC: 12 MG/DL
INTERPRETATION SERPL IEP-IMP: NORMAL
KAPPA LC CSF-MCNC: 0.68 MG/DL
KAPPA LC SERPL-MCNC: 0.87 MG/DL
M PROTEIN MFR SERPL ELPH: NORMAL
M PROTEIN SPEC IFE-MCNC: NORMAL
MONOCLON BAND OBS SERPL: NORMAL
PROT SERPL-MCNC: 5.8 G/DL
PROT SERPL-MCNC: 5.8 G/DL

## 2025-09-02 ENCOUNTER — OUTPATIENT (OUTPATIENT)
Dept: OUTPATIENT SERVICES | Facility: HOSPITAL | Age: 75
LOS: 1 days | End: 2025-09-02
Payer: COMMERCIAL

## 2025-09-02 ENCOUNTER — APPOINTMENT (OUTPATIENT)
Dept: CT IMAGING | Facility: CLINIC | Age: 75
End: 2025-09-02

## 2025-09-02 DIAGNOSIS — R91.1 SOLITARY PULMONARY NODULE: ICD-10-CM

## 2025-09-02 DIAGNOSIS — Z98.890 OTHER SPECIFIED POSTPROCEDURAL STATES: Chronic | ICD-10-CM

## 2025-09-02 DIAGNOSIS — Z94.84 STEM CELLS TRANSPLANT STATUS: Chronic | ICD-10-CM

## 2025-09-02 DIAGNOSIS — Z98.51 TUBAL LIGATION STATUS: Chronic | ICD-10-CM

## 2025-09-02 PROCEDURE — 71250 CT THORAX DX C-: CPT | Mod: 26

## 2025-09-02 PROCEDURE — 71250 CT THORAX DX C-: CPT

## 2025-09-18 ENCOUNTER — RESULT REVIEW (OUTPATIENT)
Age: 75
End: 2025-09-18

## 2025-09-18 ENCOUNTER — APPOINTMENT (OUTPATIENT)
Age: 75
End: 2025-09-18

## (undated) DEVICE — DRAPE SPLIT SHEETS 77X108"

## (undated) DEVICE — BLANKET WARMER LOWER ADULT

## (undated) DEVICE — DRAPE U LONG 47X70"

## (undated) DEVICE — Device

## (undated) DEVICE — DRSG STOCKINETTE IMPERVIOUS XL

## (undated) DEVICE — WRAP COMPRESSION CALF MED

## (undated) DEVICE — FRAZIER SUCTION TIP 10FR

## (undated) DEVICE — ELCTR PENCIL NEPTUNE SMOKE EVACUATION

## (undated) DEVICE — SPONGE LAP PAD W RING 18X18"

## (undated) DEVICE — DRAPE SURGICAL #1010

## (undated) DEVICE — SUT ETHIBOND 5 4-30" CCS

## (undated) DEVICE — DRILL BIT BIOMET 3.2MM

## (undated) DEVICE — GLV 7.5 PROTEXIS

## (undated) DEVICE — SUT FIBERWIRE #2 38"

## (undated) DEVICE — ELCTR EDGE BOVIE INSULATED BLADE TIP 2.75"

## (undated) DEVICE — DRSG COMBINE 5X9"

## (undated) DEVICE — DRILL BIT BIOMET 2.7MM

## (undated) DEVICE — SOL IRR POUR NS 0.9% 1000ML

## (undated) DEVICE — GLV 8 ESTEEM BLUE

## (undated) DEVICE — SUT HEWSON RETRIEVER

## (undated) DEVICE — PACK EXTREMITY SOUTHSIDE

## (undated) DEVICE — SOL IRR POUR H2O 1000ML

## (undated) DEVICE — SUT VICRYL 0 27" CT-1 UNDYED

## (undated) DEVICE — SUT MONOCRYL 3-0 27" PS-2 UNDYED

## (undated) DEVICE — DRAPE MAYO STAND 23"

## (undated) DEVICE — DRSG WEBRIL 6"

## (undated) DEVICE — DRAIN JACKSON PRATT 3 SPRING RESERVOIR W 10FR PVC DRAIN

## (undated) DEVICE — ELCTR GROUNDING PAD ADULT COVIDIEN

## (undated) DEVICE — SUT VICRYL 2-0 27" CT-2 UNDYED

## (undated) DEVICE — KT PULSAVAC WOUND W/ SPRAYTIP

## (undated) DEVICE — DRAPE IOBAN 23X33"

## (undated) DEVICE — NDL HYPO REGULAR BEVEL 25G X 1.5"

## (undated) DEVICE — ELCTR BOVIE BLADE EXTENDED 6.5"

## (undated) DEVICE — DRAPE 3/4 SHEET 52X76"

## (undated) DEVICE — NDL 1/2 TROCAR MAYO CATGUT

## (undated) DEVICE — DRAPE U POLY BLUE 60X84"

## (undated) DEVICE — DRSG COBAN 4"